# Patient Record
Sex: MALE | Race: WHITE | NOT HISPANIC OR LATINO | Employment: OTHER | URBAN - METROPOLITAN AREA
[De-identification: names, ages, dates, MRNs, and addresses within clinical notes are randomized per-mention and may not be internally consistent; named-entity substitution may affect disease eponyms.]

---

## 2017-01-25 ENCOUNTER — TRANSCRIBE ORDERS (OUTPATIENT)
Dept: ADMINISTRATIVE | Facility: HOSPITAL | Age: 77
End: 2017-01-25

## 2017-01-25 DIAGNOSIS — G95.9 CERVICAL MYELOPATHY (HCC): Primary | ICD-10-CM

## 2017-01-30 ENCOUNTER — APPOINTMENT (OUTPATIENT)
Dept: MRI IMAGING | Facility: HOSPITAL | Age: 77
End: 2017-01-30
Attending: INTERNAL MEDICINE

## 2017-02-03 ENCOUNTER — HOSPITAL ENCOUNTER (OUTPATIENT)
Dept: MRI IMAGING | Facility: HOSPITAL | Age: 77
Discharge: HOME | End: 2017-02-03
Attending: INTERNAL MEDICINE | Admitting: INTERNAL MEDICINE

## 2017-02-03 DIAGNOSIS — G95.9 CERVICAL MYELOPATHY (HCC): ICD-10-CM

## 2017-02-03 PROCEDURE — 0 GADOBENATE DIMEGLUMINE 529 MG/ML SOLUTION: Performed by: INTERNAL MEDICINE

## 2017-02-03 PROCEDURE — 82565 ASSAY OF CREATININE: CPT

## 2017-02-03 PROCEDURE — 72156 MRI NECK SPINE W/O & W/DYE: CPT

## 2017-02-03 PROCEDURE — A9577 INJ MULTIHANCE: HCPCS | Performed by: INTERNAL MEDICINE

## 2017-02-03 RX ADMIN — GADOBENATE DIMEGLUMINE 17 ML: 529 INJECTION, SOLUTION INTRAVENOUS at 07:38

## 2017-02-06 LAB — CREAT BLDA-MCNC: 1 MG/DL (ref 0.6–1.3)

## 2017-07-05 ENCOUNTER — TELEPHONE (OUTPATIENT)
Dept: SURGERY | Facility: CLINIC | Age: 77
End: 2017-07-05

## 2017-07-05 NOTE — TELEPHONE ENCOUNTER
Patient called stating he has an abscess on his face under his right eye and that you have seen him in the past for the same thing.  Asking to be seen today.  I looked in portal and did not find any H&P or Op Notes by you so it may have been some years ago but he has been seen by Dr. Barahona in 2016.  I also told him when your next opening was and that is August and could not guarantee that you would see him because it is on his face and may recommend him seeing a plastic surgeon or proceeding to the ER.  He wanted to know what the ER could do for him and is very reluctant to go.  Do you want to see him or have me call and give him any other recommendations?    I would not feel comfortable opening a wound around the eye.  He might see if Dr Barahona is willing, but he may be reluctant as well.    Korin Melo MD

## 2017-08-03 ENCOUNTER — TRANSCRIBE ORDERS (OUTPATIENT)
Dept: ADMINISTRATIVE | Facility: HOSPITAL | Age: 77
End: 2017-08-03

## 2017-08-03 DIAGNOSIS — L72.9 SKIN CYST: Primary | ICD-10-CM

## 2017-08-09 ENCOUNTER — HOSPITAL ENCOUNTER (OUTPATIENT)
Dept: INFUSION THERAPY | Facility: HOSPITAL | Age: 77
Discharge: HOME | End: 2017-08-09
Attending: PLASTIC SURGERY | Admitting: PLASTIC SURGERY

## 2017-08-09 VITALS
RESPIRATION RATE: 20 BRPM | SYSTOLIC BLOOD PRESSURE: 124 MMHG | OXYGEN SATURATION: 98 % | DIASTOLIC BLOOD PRESSURE: 75 MMHG | TEMPERATURE: 96.3 F | HEART RATE: 70 BPM | WEIGHT: 195 LBS | HEIGHT: 72 IN | BODY MASS INDEX: 26.41 KG/M2

## 2017-08-09 DIAGNOSIS — L72.9 SKIN CYST: Primary | ICD-10-CM

## 2017-08-09 PROCEDURE — 88312 SPECIAL STAINS GROUP 1: CPT | Performed by: PLASTIC SURGERY

## 2017-08-09 PROCEDURE — 88304 TISSUE EXAM BY PATHOLOGIST: CPT | Performed by: PLASTIC SURGERY

## 2017-08-09 RX ORDER — LIDOCAINE HYDROCHLORIDE AND EPINEPHRINE 10; 10 MG/ML; UG/ML
20 INJECTION, SOLUTION INFILTRATION; PERINEURAL ONCE
Status: COMPLETED | OUTPATIENT
Start: 2017-08-09 | End: 2017-08-09

## 2017-08-09 RX ADMIN — LIDOCAINE HYDROCHLORIDE,EPINEPHRINE BITARTRATE 20 ML: 10; .01 INJECTION, SOLUTION INFILTRATION; PERINEURAL at 11:59

## 2017-08-09 NOTE — PROGRESS NOTES
Medications added to the MAR by the ACU nurse were used for procedure by provider.  See provider procedure dictation for details regarding provider's method and timing of administration as well as dosages.    Pt tolerated procedure well.  AVS given with discharge instructions, incisional care, and follow-up information.  Pt D/C per ambulation with cane @ 1958.

## 2017-08-09 NOTE — PATIENT INSTRUCTIONS
MINOR SURGERY DISCHARGE INSTRUCTIONS    IMPORTANT:  The following information will help you return to your best level of health.    Wound Care:  · Leave steri-strips in place till seen by MD next week.    · If incision is near the ear, clean phones.  · If incision is on head or neck, cover your pillow with a towel.  · Allow steri-strips to come off as you bathe/shower.  Steri-strips will likely have a red or brown blood stain on them.  The incision will still be secure.  If steri-strips are present.  · No cosmetics to incision.  · You may use band aids after dressing is removed.  · Avoid stooping and heavy lifting.    You may shower:  Tomorrow  Do not get steri-strips soaking wet.    Apply ice to area 30 minutes on and off for the rest of the day and possibly the next day if helpful.  Elevate area for 24-48 hours to reduce swelling.    If hand:  While walking keep hand higher than your elbow.  When resting keep your hand higher than your     heart.    If incision is on finger or toe:   Do not get dressing wet.  Recommend glove and Glad Press N Seal for showering.    Check the color of skin at the end of the finger or toe.    Medications:   Following this procedure, you should continue to take all other medications as directed by your primary  physician unless otherwise instructed. There have been no changes to the medications you provided  us (with the following  exceptions, if applicable):   You may use Tylenol or Advil for discomfort.    Activity:   You may increase your activity as tolerated.   You may resume normal activity:        Tomorrow   No strenuous activity, lifting or sports:        Today      Call your doctor to make an appointment for: 5 days.    Dr. Trevino  Office # [phone]      Incision Care  An incision is when a surgeon cuts into your body. After surgery, the incision needs to be cared for properly to prevent infection.   HOW TO CARE FOR YOUR INCISION  · Take medicines only as directed  by your health care provider.  · There are many different ways to close and cover an incision, including stitches, skin glue, and adhesive strips. Follow your health care provider's instructions on:    Incision care.    Bandage (dressing) changes and removal.    Incision closure removal.  · Do not take baths, swim, or use a hot tub until your health care provider approves. You may shower as directed by your health care provider.  · Resume your normal diet and activities as directed.  · Use anti-itch medicine (such as an antihistamine) as directed by your health care provider. The incision may itch while it is healing. Do not pick or scratch at the incision.  · Drink enough fluid to keep your urine clear or pale yellow.  SEEK MEDICAL CARE IF:   · You have drainage, redness, swelling, or pain at your incision site.  · You have muscle aches, chills, or a general ill feeling.  · You notice a bad smell coming from the incision or dressing.  · Your incision edges separate after the sutures, staples, or skin adhesive strips have been removed.  · You have persistent nausea or vomiting.  · You have a fever.  · You are dizzy.  SEEK IMMEDIATE MEDICAL CARE IF:   · You have a rash.  · You faint.  · You have difficulty breathing.  MAKE SURE YOU:   · Understand these instructions.  · Will watch your condition.  · Will get help right away if you are not doing well or get worse.     This information is not intended to replace advice given to you by your health care provider. Make sure you discuss any questions you have with your health care provider.     Document Released: 07/07/2006 Document Revised: 01/08/2016 Document Reviewed: 02/11/2015  [email or web address] Interactive Patient Education ©2017 [email or web address]

## 2017-08-11 ENCOUNTER — TELEPHONE (OUTPATIENT)
Dept: INFUSION THERAPY | Facility: HOSPITAL | Age: 77
End: 2017-08-11

## 2017-08-11 LAB
CYTO UR: NORMAL
LAB AP CASE REPORT: NORMAL
LAB AP INTRADEPARTMENTAL CONSULT: NORMAL
Lab: NORMAL
PATH REPORT.FINAL DX SPEC: NORMAL
PATH REPORT.GROSS SPEC: NORMAL

## 2017-08-14 ENCOUNTER — DOCUMENTATION (OUTPATIENT)
Dept: SURGERY | Facility: HOSPITAL | Age: 77
End: 2017-08-14

## 2017-08-14 NOTE — OP NOTE
Lesion Excision Procedure Note    Indications: patient complains of a lesion on the right cheek just below the right lower eyelid, which has been previously infected and now enlarging.    Pre-operative Diagnosis:  Sebaceous cyst right cheek       Post-operative Diagnosis: Post-Op Diagnosis Codes:     Sebaceous cyst right cheek    Surgeon: Ike Trevino MD     Assistants: None    Anesthesia:  Local Anesthesia 1% Xylocaine with 1:100,000 epinephrine    ASA Class: 2    Procedure Details     A Time Out was held and the above information confirmed.    The patient was placed lying supine.  The face was prepped and draped in standard fashion.  One percent lidocaine with epinephrine was used to anesthetize the skin surrounding the lesion.  An oblique elliptical incision 2.5 x 1cm was made around the upper cheek lesion.  The cyst was completely excised.  Hemostasis was achieved with cautery.  Closure was achieved a with layered closure utilizing a 5-0 Vicryl sutures. Steri-Strips were placed on the wound.  At the end of the operation, all sponge, instrument, and needle counts were correct.    Findings:  Sebaceous cyst right cheek    Estimated Blood Loss:  Minimal           Specimens: x 1             Complications:  None; patient tolerated the procedure well.           Disposition: Home           Condition: stable    Attending Attestation: I was present and scrubbed for the entire procedure.    Ike Trevino MD

## 2022-09-23 ENCOUNTER — APPOINTMENT (OUTPATIENT)
Dept: CARDIOLOGY | Facility: HOSPITAL | Age: 82
End: 2022-09-23

## 2022-09-23 ENCOUNTER — APPOINTMENT (OUTPATIENT)
Dept: GENERAL RADIOLOGY | Facility: HOSPITAL | Age: 82
End: 2022-09-23

## 2022-09-23 ENCOUNTER — HOSPITAL ENCOUNTER (INPATIENT)
Facility: HOSPITAL | Age: 82
LOS: 2 days | Discharge: HOME | End: 2022-09-25
Attending: STUDENT IN AN ORGANIZED HEALTH CARE EDUCATION/TRAINING PROGRAM | Admitting: STUDENT IN AN ORGANIZED HEALTH CARE EDUCATION/TRAINING PROGRAM

## 2022-09-23 PROBLEM — E78.5 HLD (HYPERLIPIDEMIA): Status: ACTIVE | Noted: 2022-09-23

## 2022-09-23 PROBLEM — Z86.39 HISTORY OF THYROTOXICOSIS: Status: ACTIVE | Noted: 2022-09-23

## 2022-09-23 PROBLEM — I48.91 NEW ONSET ATRIAL FIBRILLATION: Status: ACTIVE | Noted: 2022-09-23

## 2022-09-23 LAB
ALBUMIN SERPL-MCNC: 3.7 G/DL (ref 3.5–5.2)
ALBUMIN/GLOB SERPL: 1.7 G/DL
ALP SERPL-CCNC: 120 U/L (ref 39–117)
ALT SERPL W P-5'-P-CCNC: 37 U/L (ref 1–41)
ANION GAP SERPL CALCULATED.3IONS-SCNC: 12.5 MMOL/L (ref 5–15)
AORTIC DIMENSIONLESS INDEX: 0.9 (DI)
ASCENDING AORTA: 3 CM
AST SERPL-CCNC: 27 U/L (ref 1–40)
BASOPHILS # BLD AUTO: 0.03 10*3/MM3 (ref 0–0.2)
BASOPHILS NFR BLD AUTO: 0.5 % (ref 0–1.5)
BH CV ECHO MEAS - ACS: 1.58 CM
BH CV ECHO MEAS - AO MEAN PG: 1.88 MMHG
BH CV ECHO MEAS - AO ROOT DIAM: 3.4 CM
BH CV ECHO MEAS - AO V2 VTI: 19.9 CM
BH CV ECHO MEAS - AVA(I,D): 3.3 CM2
BH CV ECHO MEAS - EDV(CUBED): 43.5 ML
BH CV ECHO MEAS - EDV(MOD-SP2): 81 ML
BH CV ECHO MEAS - EDV(MOD-SP4): 80 ML
BH CV ECHO MEAS - EF(MOD-SP2): 61.7 %
BH CV ECHO MEAS - EF(MOD-SP4): 65 %
BH CV ECHO MEAS - ESV(CUBED): 10.6 ML
BH CV ECHO MEAS - ESV(MOD-SP2): 31 ML
BH CV ECHO MEAS - ESV(MOD-SP4): 28 ML
BH CV ECHO MEAS - FS: 37.5 %
BH CV ECHO MEAS - IVS/LVPW: 1.25 CM
BH CV ECHO MEAS - IVSD: 1.7 CM
BH CV ECHO MEAS - LA A2CS (ATRIAL LENGTH): 6.2 CM
BH CV ECHO MEAS - LA A4C LENGTH: 5.6 CM
BH CV ECHO MEAS - LV DIASTOLIC VOL/BSA (35-75): 40.6 CM2
BH CV ECHO MEAS - LV MASS(C)D: 200.7 GRAMS
BH CV ECHO MEAS - LV MEAN PG: 1.51 MMHG
BH CV ECHO MEAS - LV SYSTOLIC VOL/BSA (12-30): 14.2 CM2
BH CV ECHO MEAS - LV V1 VTI: 17.7 CM
BH CV ECHO MEAS - LVIDD: 3.5 CM
BH CV ECHO MEAS - LVIDS: 2.2 CM
BH CV ECHO MEAS - LVOT AREA: 3.7 CM2
BH CV ECHO MEAS - LVOT DIAM: 2.16 CM
BH CV ECHO MEAS - LVPWD: 1.36 CM
BH CV ECHO MEAS - MR MAX PG: 99.8 MMHG
BH CV ECHO MEAS - MR MAX VEL: 499.5 CM/SEC
BH CV ECHO MEAS - MV DEC SLOPE: 756.5 CM/SEC2
BH CV ECHO MEAS - MV DEC TIME: 0.21 MSEC
BH CV ECHO MEAS - MV E MAX VEL: 123.9 CM/SEC
BH CV ECHO MEAS - MV MEAN PG: 3 MMHG
BH CV ECHO MEAS - MV P1/2T: 54.7 MSEC
BH CV ECHO MEAS - MV V2 VTI: 18.4 CM
BH CV ECHO MEAS - MVA(P1/2T): 4 CM2
BH CV ECHO MEAS - MVA(VTI): 3.6 CM2
BH CV ECHO MEAS - PA ACC SLOPE: 61.7 CM/SEC2
BH CV ECHO MEAS - PA ACC TIME: 0.07 SEC
BH CV ECHO MEAS - PA PR(ACCEL): 47.3 MMHG
BH CV ECHO MEAS - PA V2 MAX: 66 CM/SEC
BH CV ECHO MEAS - PI END-D VEL: 81 CM/SEC
BH CV ECHO MEAS - PULM A REVS DUR: 0.12 SEC
BH CV ECHO MEAS - PULM A REVS VEL: 22 CM/SEC
BH CV ECHO MEAS - PULM DIAS VEL: 29.3 CM/SEC
BH CV ECHO MEAS - PULM S/D: 1.46
BH CV ECHO MEAS - PULM SYS VEL: 42.8 CM/SEC
BH CV ECHO MEAS - RAP SYSTOLE: 15 MMHG
BH CV ECHO MEAS - RV MAX PG: 0.97 MMHG
BH CV ECHO MEAS - RV V1 MAX: 49.4 CM/SEC
BH CV ECHO MEAS - RV V1 VTI: 8.6 CM
BH CV ECHO MEAS - RVSP: 38 MMHG
BH CV ECHO MEAS - SI(MOD-SP2): 25.4 ML/M2
BH CV ECHO MEAS - SI(MOD-SP4): 26.4 ML/M2
BH CV ECHO MEAS - SV(LVOT): 65.2 ML
BH CV ECHO MEAS - SV(MOD-SP2): 50 ML
BH CV ECHO MEAS - SV(MOD-SP4): 52 ML
BH CV ECHO MEAS - TAPSE (>1.6): 1.6 CM
BH CV ECHO MEAS - TR MAX PG: 23.6 MMHG
BH CV ECHO MEAS - TR MAX VEL: 243 CM/SEC
BH CV XLRA - RV BASE: 4.5 CM
BH CV XLRA - RV LENGTH: 6.5 CM
BH CV XLRA - RV MID: 3.4 CM
BILIRUB SERPL-MCNC: 1.6 MG/DL (ref 0–1.2)
BUN SERPL-MCNC: 24 MG/DL (ref 8–23)
BUN/CREAT SERPL: 25.5 (ref 7–25)
CALCIUM SPEC-SCNC: 9.3 MG/DL (ref 8.6–10.5)
CHLORIDE SERPL-SCNC: 106 MMOL/L (ref 98–107)
CO2 SERPL-SCNC: 24.5 MMOL/L (ref 22–29)
CREAT SERPL-MCNC: 0.94 MG/DL (ref 0.76–1.27)
DEPRECATED RDW RBC AUTO: 42.5 FL (ref 37–54)
EGFRCR SERPLBLD CKD-EPI 2021: 80.9 ML/MIN/1.73
EOSINOPHIL # BLD AUTO: 0.08 10*3/MM3 (ref 0–0.4)
EOSINOPHIL NFR BLD AUTO: 1.3 % (ref 0.3–6.2)
ERYTHROCYTE [DISTWIDTH] IN BLOOD BY AUTOMATED COUNT: 14 % (ref 12.3–15.4)
GLOBULIN UR ELPH-MCNC: 2.2 GM/DL
GLUCOSE SERPL-MCNC: 72 MG/DL (ref 65–99)
HCT VFR BLD AUTO: 42.2 % (ref 37.5–51)
HGB BLD-MCNC: 13.7 G/DL (ref 13–17.7)
IMM GRANULOCYTES # BLD AUTO: 0.02 10*3/MM3 (ref 0–0.05)
IMM GRANULOCYTES NFR BLD AUTO: 0.3 % (ref 0–0.5)
LV EF 2D ECHO EST: 61 %
LYMPHOCYTES # BLD AUTO: 1.37 10*3/MM3 (ref 0.7–3.1)
LYMPHOCYTES NFR BLD AUTO: 22.5 % (ref 19.6–45.3)
MAGNESIUM SERPL-MCNC: 2.5 MG/DL (ref 1.6–2.4)
MAXIMAL PREDICTED HEART RATE: 138 BPM
MCH RBC QN AUTO: 27.3 PG (ref 26.6–33)
MCHC RBC AUTO-ENTMCNC: 32.5 G/DL (ref 31.5–35.7)
MCV RBC AUTO: 84.1 FL (ref 79–97)
MONOCYTES # BLD AUTO: 0.48 10*3/MM3 (ref 0.1–0.9)
MONOCYTES NFR BLD AUTO: 7.9 % (ref 5–12)
NEUTROPHILS NFR BLD AUTO: 4.1 10*3/MM3 (ref 1.7–7)
NEUTROPHILS NFR BLD AUTO: 67.5 % (ref 42.7–76)
NRBC BLD AUTO-RTO: 0 /100 WBC (ref 0–0.2)
NT-PROBNP SERPL-MCNC: 2037 PG/ML (ref 0–1800)
PHOSPHATE SERPL-MCNC: 3.4 MG/DL (ref 2.5–4.5)
PLATELET # BLD AUTO: 103 10*3/MM3 (ref 140–450)
PMV BLD AUTO: 13 FL (ref 6–12)
POTASSIUM SERPL-SCNC: 4.5 MMOL/L (ref 3.5–5.2)
PROT SERPL-MCNC: 5.9 G/DL (ref 6–8.5)
QT INTERVAL: 342 MS
RBC # BLD AUTO: 5.02 10*6/MM3 (ref 4.14–5.8)
SODIUM SERPL-SCNC: 143 MMOL/L (ref 136–145)
STRESS TARGET HR: 117 BPM
T4 FREE SERPL-MCNC: 1.59 NG/DL (ref 0.93–1.7)
TROPONIN T SERPL-MCNC: <0.01 NG/ML (ref 0–0.03)
TSH SERPL DL<=0.05 MIU/L-ACNC: 2.6 UIU/ML (ref 0.27–4.2)
WBC NRBC COR # BLD: 6.08 10*3/MM3 (ref 3.4–10.8)

## 2022-09-23 PROCEDURE — 93010 ELECTROCARDIOGRAM REPORT: CPT | Performed by: INTERNAL MEDICINE

## 2022-09-23 PROCEDURE — 80053 COMPREHEN METABOLIC PANEL: CPT | Performed by: STUDENT IN AN ORGANIZED HEALTH CARE EDUCATION/TRAINING PROGRAM

## 2022-09-23 PROCEDURE — 93306 TTE W/DOPPLER COMPLETE: CPT

## 2022-09-23 PROCEDURE — 84439 ASSAY OF FREE THYROXINE: CPT | Performed by: STUDENT IN AN ORGANIZED HEALTH CARE EDUCATION/TRAINING PROGRAM

## 2022-09-23 PROCEDURE — 84443 ASSAY THYROID STIM HORMONE: CPT | Performed by: STUDENT IN AN ORGANIZED HEALTH CARE EDUCATION/TRAINING PROGRAM

## 2022-09-23 PROCEDURE — 25010000002 FUROSEMIDE PER 20 MG: Performed by: STUDENT IN AN ORGANIZED HEALTH CARE EDUCATION/TRAINING PROGRAM

## 2022-09-23 PROCEDURE — 99222 1ST HOSP IP/OBS MODERATE 55: CPT | Performed by: NURSE PRACTITIONER

## 2022-09-23 PROCEDURE — 83735 ASSAY OF MAGNESIUM: CPT | Performed by: STUDENT IN AN ORGANIZED HEALTH CARE EDUCATION/TRAINING PROGRAM

## 2022-09-23 PROCEDURE — 85025 COMPLETE CBC W/AUTO DIFF WBC: CPT | Performed by: STUDENT IN AN ORGANIZED HEALTH CARE EDUCATION/TRAINING PROGRAM

## 2022-09-23 PROCEDURE — 84484 ASSAY OF TROPONIN QUANT: CPT | Performed by: STUDENT IN AN ORGANIZED HEALTH CARE EDUCATION/TRAINING PROGRAM

## 2022-09-23 PROCEDURE — 84100 ASSAY OF PHOSPHORUS: CPT | Performed by: STUDENT IN AN ORGANIZED HEALTH CARE EDUCATION/TRAINING PROGRAM

## 2022-09-23 PROCEDURE — 71045 X-RAY EXAM CHEST 1 VIEW: CPT

## 2022-09-23 PROCEDURE — 83880 ASSAY OF NATRIURETIC PEPTIDE: CPT | Performed by: STUDENT IN AN ORGANIZED HEALTH CARE EDUCATION/TRAINING PROGRAM

## 2022-09-23 PROCEDURE — 93306 TTE W/DOPPLER COMPLETE: CPT | Performed by: INTERNAL MEDICINE

## 2022-09-23 PROCEDURE — 93005 ELECTROCARDIOGRAM TRACING: CPT | Performed by: STUDENT IN AN ORGANIZED HEALTH CARE EDUCATION/TRAINING PROGRAM

## 2022-09-23 RX ORDER — FUROSEMIDE 10 MG/ML
20 INJECTION INTRAMUSCULAR; INTRAVENOUS ONCE
Status: COMPLETED | OUTPATIENT
Start: 2022-09-23 | End: 2022-09-23

## 2022-09-23 RX ORDER — NITROGLYCERIN 0.4 MG/1
0.4 TABLET SUBLINGUAL
Status: DISCONTINUED | OUTPATIENT
Start: 2022-09-23 | End: 2022-09-25 | Stop reason: HOSPADM

## 2022-09-23 RX ORDER — MULTIPLE VITAMINS W/ MINERALS TAB 9MG-400MCG
1 TAB ORAL DAILY
Status: DISCONTINUED | OUTPATIENT
Start: 2022-09-24 | End: 2022-09-25 | Stop reason: HOSPADM

## 2022-09-23 RX ORDER — ONDANSETRON 2 MG/ML
4 INJECTION INTRAMUSCULAR; INTRAVENOUS EVERY 6 HOURS PRN
Status: DISCONTINUED | OUTPATIENT
Start: 2022-09-23 | End: 2022-09-25 | Stop reason: HOSPADM

## 2022-09-23 RX ORDER — MULTIPLE VITAMINS W/ MINERALS TAB 9MG-400MCG
1 TAB ORAL DAILY
COMMUNITY

## 2022-09-23 RX ORDER — ACETAMINOPHEN 325 MG/1
650 TABLET ORAL EVERY 4 HOURS PRN
Status: DISCONTINUED | OUTPATIENT
Start: 2022-09-23 | End: 2022-09-25 | Stop reason: HOSPADM

## 2022-09-23 RX ORDER — UREA 10 %
3 LOTION (ML) TOPICAL NIGHTLY PRN
Status: DISCONTINUED | OUTPATIENT
Start: 2022-09-23 | End: 2022-09-25 | Stop reason: HOSPADM

## 2022-09-23 RX ORDER — HEPARIN SODIUM 5000 [USP'U]/ML
40-80 INJECTION, SOLUTION INTRAVENOUS; SUBCUTANEOUS EVERY 6 HOURS PRN
Status: CANCELLED | OUTPATIENT
Start: 2022-09-23

## 2022-09-23 RX ORDER — METHIMAZOLE 5 MG/1
5 TABLET ORAL SEE ADMIN INSTRUCTIONS
Status: DISCONTINUED | OUTPATIENT
Start: 2022-09-24 | End: 2022-09-24

## 2022-09-23 RX ORDER — HEPARIN SODIUM 10000 [USP'U]/100ML
18 INJECTION, SOLUTION INTRAVENOUS
Status: CANCELLED | OUTPATIENT
Start: 2022-09-23

## 2022-09-23 RX ORDER — ONDANSETRON 4 MG/1
4 TABLET, FILM COATED ORAL EVERY 6 HOURS PRN
Status: DISCONTINUED | OUTPATIENT
Start: 2022-09-23 | End: 2022-09-25 | Stop reason: HOSPADM

## 2022-09-23 RX ORDER — MELATONIN
1000 DAILY
Status: DISCONTINUED | OUTPATIENT
Start: 2022-09-24 | End: 2022-09-25 | Stop reason: HOSPADM

## 2022-09-23 RX ORDER — POLYETHYLENE GLYCOL 3350 17 G/17G
8.5 POWDER, FOR SOLUTION ORAL DAILY PRN
COMMUNITY

## 2022-09-23 RX ORDER — FUROSEMIDE 10 MG/ML
40 INJECTION INTRAMUSCULAR; INTRAVENOUS EVERY 12 HOURS
Status: DISCONTINUED | OUTPATIENT
Start: 2022-09-24 | End: 2022-09-25

## 2022-09-23 RX ORDER — METHIMAZOLE 5 MG/1
5 TABLET ORAL
COMMUNITY

## 2022-09-23 RX ORDER — HEPARIN SODIUM 5000 [USP'U]/ML
80 INJECTION, SOLUTION INTRAVENOUS; SUBCUTANEOUS ONCE
Status: CANCELLED | OUTPATIENT
Start: 2022-09-23 | End: 2022-09-23

## 2022-09-23 RX ORDER — MELATONIN
1000 DAILY
COMMUNITY

## 2022-09-23 RX ADMIN — FUROSEMIDE 20 MG: 20 INJECTION, SOLUTION INTRAMUSCULAR; INTRAVENOUS at 16:12

## 2022-09-23 RX ADMIN — METOPROLOL TARTRATE 25 MG: 25 TABLET, FILM COATED ORAL at 22:37

## 2022-09-24 PROBLEM — I50.30 (HFPEF) HEART FAILURE WITH PRESERVED EJECTION FRACTION: Status: ACTIVE | Noted: 2022-09-24

## 2022-09-24 PROBLEM — I48.19 ATRIAL FIBRILLATION, PERSISTENT: Status: ACTIVE | Noted: 2022-09-24

## 2022-09-24 LAB
ANION GAP SERPL CALCULATED.3IONS-SCNC: 9.3 MMOL/L (ref 5–15)
BUN SERPL-MCNC: 27 MG/DL (ref 8–23)
BUN/CREAT SERPL: 19.9 (ref 7–25)
CALCIUM SPEC-SCNC: 9.7 MG/DL (ref 8.6–10.5)
CHLORIDE SERPL-SCNC: 103 MMOL/L (ref 98–107)
CO2 SERPL-SCNC: 33.7 MMOL/L (ref 22–29)
CREAT SERPL-MCNC: 1.36 MG/DL (ref 0.76–1.27)
DEPRECATED RDW RBC AUTO: 45.4 FL (ref 37–54)
EGFRCR SERPLBLD CKD-EPI 2021: 52 ML/MIN/1.73
ERYTHROCYTE [DISTWIDTH] IN BLOOD BY AUTOMATED COUNT: 14.3 % (ref 12.3–15.4)
GLUCOSE SERPL-MCNC: 114 MG/DL (ref 65–99)
HCT VFR BLD AUTO: 46.4 % (ref 37.5–51)
HGB BLD-MCNC: 14.6 G/DL (ref 13–17.7)
MAGNESIUM SERPL-MCNC: 2.4 MG/DL (ref 1.6–2.4)
MCH RBC QN AUTO: 27.1 PG (ref 26.6–33)
MCHC RBC AUTO-ENTMCNC: 31.5 G/DL (ref 31.5–35.7)
MCV RBC AUTO: 86.2 FL (ref 79–97)
PHOSPHATE SERPL-MCNC: 4.5 MG/DL (ref 2.5–4.5)
PLATELET # BLD AUTO: 140 10*3/MM3 (ref 140–450)
PMV BLD AUTO: 12.7 FL (ref 6–12)
POTASSIUM SERPL-SCNC: 4.1 MMOL/L (ref 3.5–5.2)
RBC # BLD AUTO: 5.38 10*6/MM3 (ref 4.14–5.8)
SODIUM SERPL-SCNC: 146 MMOL/L (ref 136–145)
WBC NRBC COR # BLD: 8.24 10*3/MM3 (ref 3.4–10.8)

## 2022-09-24 PROCEDURE — 83735 ASSAY OF MAGNESIUM: CPT | Performed by: STUDENT IN AN ORGANIZED HEALTH CARE EDUCATION/TRAINING PROGRAM

## 2022-09-24 PROCEDURE — 25010000002 FUROSEMIDE PER 20 MG: Performed by: NURSE PRACTITIONER

## 2022-09-24 PROCEDURE — 85027 COMPLETE CBC AUTOMATED: CPT | Performed by: STUDENT IN AN ORGANIZED HEALTH CARE EDUCATION/TRAINING PROGRAM

## 2022-09-24 PROCEDURE — 80048 BASIC METABOLIC PNL TOTAL CA: CPT | Performed by: STUDENT IN AN ORGANIZED HEALTH CARE EDUCATION/TRAINING PROGRAM

## 2022-09-24 PROCEDURE — 99232 SBSQ HOSP IP/OBS MODERATE 35: CPT | Performed by: INTERNAL MEDICINE

## 2022-09-24 PROCEDURE — 84100 ASSAY OF PHOSPHORUS: CPT | Performed by: STUDENT IN AN ORGANIZED HEALTH CARE EDUCATION/TRAINING PROGRAM

## 2022-09-24 RX ORDER — METHIMAZOLE 5 MG/1
5 TABLET ORAL
Status: DISCONTINUED | OUTPATIENT
Start: 2022-09-24 | End: 2022-09-25 | Stop reason: HOSPADM

## 2022-09-24 RX ORDER — POLYETHYLENE GLYCOL 3350 17 G/17G
8.5 POWDER, FOR SOLUTION ORAL DAILY PRN
Status: DISCONTINUED | OUTPATIENT
Start: 2022-09-24 | End: 2022-09-25 | Stop reason: HOSPADM

## 2022-09-24 RX ADMIN — Medication 1000 UNITS: at 08:06

## 2022-09-24 RX ADMIN — MULTIPLE VITAMINS W/ MINERALS TAB 1 TABLET: TAB at 08:06

## 2022-09-24 RX ADMIN — FUROSEMIDE 40 MG: 10 INJECTION, SOLUTION INTRAMUSCULAR; INTRAVENOUS at 05:11

## 2022-09-24 RX ADMIN — METHIMAZOLE 5 MG: 5 TABLET ORAL at 20:24

## 2022-09-24 RX ADMIN — METOPROLOL TARTRATE 25 MG: 25 TABLET, FILM COATED ORAL at 20:23

## 2022-09-24 RX ADMIN — METOPROLOL TARTRATE 25 MG: 25 TABLET, FILM COATED ORAL at 08:06

## 2022-09-24 RX ADMIN — APIXABAN 5 MG: 5 TABLET, FILM COATED ORAL at 20:23

## 2022-09-24 RX ADMIN — APIXABAN 5 MG: 5 TABLET, FILM COATED ORAL at 08:06

## 2022-09-24 RX ADMIN — FUROSEMIDE 40 MG: 10 INJECTION, SOLUTION INTRAMUSCULAR; INTRAVENOUS at 17:02

## 2022-09-25 ENCOUNTER — READMISSION MANAGEMENT (OUTPATIENT)
Dept: CALL CENTER | Facility: HOSPITAL | Age: 82
End: 2022-09-25

## 2022-09-25 VITALS
SYSTOLIC BLOOD PRESSURE: 111 MMHG | WEIGHT: 146 LBS | BODY MASS INDEX: 20.44 KG/M2 | HEART RATE: 85 BPM | RESPIRATION RATE: 16 BRPM | DIASTOLIC BLOOD PRESSURE: 77 MMHG | HEIGHT: 71 IN | OXYGEN SATURATION: 95 % | TEMPERATURE: 98 F

## 2022-09-25 LAB
ANION GAP SERPL CALCULATED.3IONS-SCNC: 6.2 MMOL/L (ref 5–15)
BUN SERPL-MCNC: 30 MG/DL (ref 8–23)
BUN/CREAT SERPL: 21.9 (ref 7–25)
CALCIUM SPEC-SCNC: 9.1 MG/DL (ref 8.6–10.5)
CHLORIDE SERPL-SCNC: 104 MMOL/L (ref 98–107)
CO2 SERPL-SCNC: 36.8 MMOL/L (ref 22–29)
CREAT SERPL-MCNC: 1.37 MG/DL (ref 0.76–1.27)
DEPRECATED RDW RBC AUTO: 43.5 FL (ref 37–54)
EGFRCR SERPLBLD CKD-EPI 2021: 51.5 ML/MIN/1.73
ERYTHROCYTE [DISTWIDTH] IN BLOOD BY AUTOMATED COUNT: 14.3 % (ref 12.3–15.4)
GLUCOSE SERPL-MCNC: 92 MG/DL (ref 65–99)
HCT VFR BLD AUTO: 42.8 % (ref 37.5–51)
HGB BLD-MCNC: 14 G/DL (ref 13–17.7)
MAGNESIUM SERPL-MCNC: 2.3 MG/DL (ref 1.6–2.4)
MCH RBC QN AUTO: 27.2 PG (ref 26.6–33)
MCHC RBC AUTO-ENTMCNC: 32.7 G/DL (ref 31.5–35.7)
MCV RBC AUTO: 83.1 FL (ref 79–97)
PHOSPHATE SERPL-MCNC: 5.4 MG/DL (ref 2.5–4.5)
PLATELET # BLD AUTO: 142 10*3/MM3 (ref 140–450)
PMV BLD AUTO: 12.2 FL (ref 6–12)
POTASSIUM SERPL-SCNC: 4 MMOL/L (ref 3.5–5.2)
RBC # BLD AUTO: 5.15 10*6/MM3 (ref 4.14–5.8)
SODIUM SERPL-SCNC: 147 MMOL/L (ref 136–145)
WBC NRBC COR # BLD: 7.6 10*3/MM3 (ref 3.4–10.8)

## 2022-09-25 PROCEDURE — 83735 ASSAY OF MAGNESIUM: CPT | Performed by: STUDENT IN AN ORGANIZED HEALTH CARE EDUCATION/TRAINING PROGRAM

## 2022-09-25 PROCEDURE — 80048 BASIC METABOLIC PNL TOTAL CA: CPT | Performed by: STUDENT IN AN ORGANIZED HEALTH CARE EDUCATION/TRAINING PROGRAM

## 2022-09-25 PROCEDURE — 84100 ASSAY OF PHOSPHORUS: CPT | Performed by: STUDENT IN AN ORGANIZED HEALTH CARE EDUCATION/TRAINING PROGRAM

## 2022-09-25 PROCEDURE — 85027 COMPLETE CBC AUTOMATED: CPT | Performed by: STUDENT IN AN ORGANIZED HEALTH CARE EDUCATION/TRAINING PROGRAM

## 2022-09-25 PROCEDURE — 99232 SBSQ HOSP IP/OBS MODERATE 35: CPT | Performed by: INTERNAL MEDICINE

## 2022-09-25 PROCEDURE — 25010000002 FUROSEMIDE PER 20 MG: Performed by: NURSE PRACTITIONER

## 2022-09-25 RX ORDER — FUROSEMIDE 40 MG/1
40 TABLET ORAL 3 TIMES WEEKLY
Qty: 12 TABLET | Refills: 0
Start: 2022-09-26 | End: 2022-11-28 | Stop reason: SDUPTHER

## 2022-09-25 RX ORDER — FUROSEMIDE 40 MG/1
40 TABLET ORAL DAILY
Status: DISCONTINUED | OUTPATIENT
Start: 2022-09-26 | End: 2022-09-25 | Stop reason: HOSPADM

## 2022-09-25 RX ORDER — FUROSEMIDE 40 MG/1
40 TABLET ORAL DAILY
Qty: 30 TABLET | Refills: 0 | Status: SHIPPED | OUTPATIENT
Start: 2022-09-26 | End: 2022-09-25 | Stop reason: SDUPTHER

## 2022-09-25 RX ADMIN — FUROSEMIDE 40 MG: 10 INJECTION, SOLUTION INTRAMUSCULAR; INTRAVENOUS at 05:00

## 2022-09-25 RX ADMIN — APIXABAN 5 MG: 5 TABLET, FILM COATED ORAL at 08:27

## 2022-09-25 RX ADMIN — Medication 1000 UNITS: at 08:27

## 2022-09-25 RX ADMIN — METOPROLOL TARTRATE 25 MG: 25 TABLET, FILM COATED ORAL at 08:27

## 2022-09-25 RX ADMIN — MULTIPLE VITAMINS W/ MINERALS TAB 1 TABLET: TAB at 08:27

## 2022-09-26 NOTE — OUTREACH NOTE
Prep Survey    Flowsheet Row Responses   [facility] patient discharged from? [place]   Is LACE score < 7 ? No   Emergency Room discharge w/ pulse ox? No   Eligibility Readm Mgmt   Discharge diagnosis CHF   Does the patient have one of the following disease processes/diagnoses(primary or secondary)? CHF   Does the patient have Home health ordered? No   Is there a DME ordered? No   Medication alerts for this patient see AVS   Prep survey completed? Yes          JEANETH JAMES - Registered Nurse

## 2022-09-28 ENCOUNTER — READMISSION MANAGEMENT (OUTPATIENT)
Dept: CALL CENTER | Facility: HOSPITAL | Age: 82
End: 2022-09-28

## 2022-09-28 NOTE — OUTREACH NOTE
CHF Week 1 Survey    Flowsheet Row Responses   [facility] patient discharged from? [place]   Does the patient have one of the following disease processes/diagnoses(primary or secondary)? CHF   CHF Week 1 attempt successful? No   Unsuccessful attempts Attempt 1          TANISHA ZAMARRIPA - Licensed Nurse

## 2022-10-04 ENCOUNTER — READMISSION MANAGEMENT (OUTPATIENT)
Dept: CALL CENTER | Facility: HOSPITAL | Age: 82
End: 2022-10-04

## 2022-10-04 NOTE — OUTREACH NOTE
CHF Week 2 Survey    Flowsheet Row Responses   [facility] patient discharged from? [place]   Does the patient have one of the following disease processes/diagnoses(primary or secondary)? CHF   Week 2 attempt successful? Yes   Call start time 1012   Revoke Decline to participate   Call end time 1013   Discharge diagnosis CHF   Call end time 1013          HARVEY JAMES - Registered Nurse

## 2022-10-10 ENCOUNTER — OFFICE VISIT (OUTPATIENT)
Dept: CARDIOLOGY | Facility: CLINIC | Age: 82
End: 2022-10-10

## 2022-10-10 VITALS
SYSTOLIC BLOOD PRESSURE: 120 MMHG | BODY MASS INDEX: 21.14 KG/M2 | HEIGHT: 71 IN | HEART RATE: 73 BPM | WEIGHT: 151 LBS | DIASTOLIC BLOOD PRESSURE: 80 MMHG

## 2022-10-10 DIAGNOSIS — I27.20 PULMONARY HYPERTENSION: ICD-10-CM

## 2022-10-10 DIAGNOSIS — E87.0 ACUTE HYPERNATREMIA: ICD-10-CM

## 2022-10-10 DIAGNOSIS — I48.19 ATRIAL FIBRILLATION, PERSISTENT: Primary | ICD-10-CM

## 2022-10-10 DIAGNOSIS — I50.31 ACUTE HEART FAILURE WITH PRESERVED EJECTION FRACTION: ICD-10-CM

## 2022-10-10 DIAGNOSIS — E07.9 THYROID DISEASE: ICD-10-CM

## 2022-10-10 PROCEDURE — 99214 OFFICE O/P EST MOD 30 MIN: CPT | Performed by: NURSE PRACTITIONER

## 2022-10-10 PROCEDURE — 93000 ELECTROCARDIOGRAM COMPLETE: CPT | Performed by: NURSE PRACTITIONER

## 2022-10-10 NOTE — SIGNIFICANT NOTE
10/10/22 1017   [id number] Score for Atrial Fibrillation Stroke Risk   Age in Years 75+   Sex Male   CHF History Y   Hypertension History N   Stroke/TIA/Thromboembolism History N   Vascular Disease History N   Diabetes History N   [id number] Score 3

## 2022-10-10 NOTE — PROGRESS NOTES
Date of Office Visit: 10/10/2022  Encounter Provider: CHARLEE Lopez  Place of Service: [facility] CARDIOLOGY  Patient Name: Mehreen Silva IV  :1940  Primary Cardiologist: Dr. Huang Chan    Chief Complaint   Patient presents with   • Atrial Fibrillation   • Cardiomyopathy   • Hospital Follow Up Visit   :     HPI: Mehreen Silva IV is a pleasant 82 y.o. male who presents today for hospital follow-up visit for heart failure and atrial fibrillation.  He is a new patient to me and I reviewed his medical records.    He has been a relatively healthy 82-year-old gentleman.  He has been diagnosed with arthritis, thyrotoxicosis, hyperlipidemia, and cervical spondylosis with several back surgeries in the past.  His mobility is significantly impaired and is often in a wheelchair or ambulates with a walker.    In 2022, he presented to his PCP for symptoms of lower extremity edema, orthopnea and weight gain of 16 pounds.  He denied shortness of breath.  Upon arrival, he was in atrial fibrillation with heart rates in the 80s.  He denied any history of cardiovascular disease.  Echocardiogram showed EF 61%, moderate LVH, diastolic function indeterminate, right ventricle cavity moderately dilated with moderately reduced right systolic function, left atrial volume mildly increased, right atrium mild to moderately dilated, aortic valve sclerosis, moderate mitral valve calcification, trace to mild MR, trace to mild TR, and mild pulmonary hypertension.  He underwent significant diuresis with resolution of lower extremity edema and hypoxia.  His serum sodium elevated due to mild intravascular volume depletion.  He was transitioned from IV furosemide to oral furosemide.  He was discharged on apixaban and metoprolol.    He presents today for a follow-up visit with his wife accompanying him.  He is in a wheelchair today, and ambulates at home with a walker.  His wife informs me that they  saw Dr. Hines last week and he had blood work completed.  She has been concerned about his weight loss and he has been recommended to start taking furosemide 20 mg on Monday, , and .  His heart failure symptom before was orthopnea and they both deny that symptoms today.  He denies chest pain, shortness of air, palpitations, edema, dizziness, syncope, or bleeding.      Past Medical History:   Diagnosis Date   • (HFpEF) heart failure with preserved ejection fraction (HCC) 2022   • Arthritis    • Atrial fibrillation, persistent (HCC) 2022   • Cervical spondylosis with myelopathy    • HLD (hyperlipidemia) 2022   • Hx of toxic multinodular goiter 2022   • Lumbar radiculopathy    • Pulmonary hypertension (HCC)        Past Surgical History:   Procedure Laterality Date   • APPENDECTOMY     • BACK SURGERY     • COLONOSCOPY     • EXCISION MASS TRUNK N/A 2016    Procedure: Excision soft tissue neoplasm on abdominal wall and left neck;  Surgeon: Shaji Barahona Jr., MD;  Location: [facility];  Service:    • KNEE SURGERY     • NECK SURGERY     • SHOULDER SURGERY         Social History     Socioeconomic History   • Marital status:    • Number of children: 1   • Highest education level: Professional school degree (e.g., MD, DDS, DVM, BRITNEY)   Tobacco Use   • Smoking status: Former     Types: Cigarettes     Quit date: 1983     Years since quittin.3   • Smokeless tobacco: Never   Substance and Sexual Activity   • Alcohol use: Yes     Comment: SOCIAL   • Drug use: No   • Sexual activity: Defer       Family History   Problem Relation Age of Onset   • Cancer Mother    • Heart disease Father    • Aneurysm Father        The following portion of the patient's history were reviewed and updated as appropriate: past medical history, past surgical history, past social history, past family history, allergies, current medications, and problem list.    Review of Systems  "  Constitutional: Positive for weight loss.   Cardiovascular: Negative.    Respiratory: Negative.    Hematologic/Lymphatic: Negative.    Neurological: Negative.        No Known Allergies      Current Outpatient Medications:   •  apixaban (ELIQUIS) 5 MG tablet tablet, Take 1 tablet by mouth Every 12 (Twelve) Hours for 60 days. Indications: Atrial Fibrillation, Disp: 60 tablet, Rfl: 1  •  cholecalciferol (VITAMIN D3) 25 MCG (1000 UT) tablet, Take 1,000 Units by mouth Daily., Disp: , Rfl:   •  ezetimibe (ZETIA) 10 MG tablet, Take 10 mg by mouth daily., Disp: , Rfl:   •  furosemide (LASIX) 40 MG tablet, Take 1 tablet by mouth 3 (Three) Times a Week for 30 days., Disp: 12 tablet, Rfl: 0  •  methIMAzole (TAPAZOLE) 5 MG tablet, Take 5 mg by mouth. Takes 5 days - No Wednesday or sundays, Disp: , Rfl:   •  metoprolol tartrate (LOPRESSOR) 25 MG tablet, Take 1 tablet by mouth Every 12 (Twelve) Hours for 60 days., Disp: 60 tablet, Rfl: 1  •  multivitamin with minerals tablet tablet, Take 1 tablet by mouth Daily., Disp: , Rfl:   •  polyethylene glycol (MIRALAX) 17 g packet, Take 8.5 g by mouth Daily As Needed. Takes every 3 days 1/2 cap, Disp: , Rfl:         Objective:     Vitals:    10/10/22 0936   BP: 120/80   Pulse: 73   Weight: 68.5 kg (151 lb)   Height: 180.3 cm (71\")     Body mass index is 21.06 kg/m².    PHYSICAL EXAM:    Vitals Reviewed.   General Appearance: No acute distress, well developed and well nourished.  Thin.  Eyes: Conjunctiva and lids: No erythema, swelling, or discharge. Sclera non-icteric.   HENT: Atraumatic, normocephalic. External eyes, ears, and nose normal. No hearing loss noted. Mucous membranes normal. Lips not cyanotic. Neck supple with no tenderness. Wearing mask.   Respiratory: No signs of respiratory distress. Respiration rhythm and depth normal.   Clear to auscultation. No rales, crackles, rhonchi, or wheezing auscultated.   Cardiovascular:  Jugular Venous Pressure: Normal  Heart Rate and Rhythm: " Irregular, irregular.  Heart Sounds: Normal S1 and S2. No S3 or S4 noted.  Murmurs: No murmurs noted. No rubs, thrills, or gallops.   Lower Extremities: No edema noted.  Gastrointestinal:  Abdomen soft, non-distended, non-tender.    Musculoskeletal: Normal movement of extremities.  Skin and Nails: General appearance normal. No pallor, cyanosis, diaphoresis. Skin temperature normal. No clubbing of fingernails.   Psychiatric: Patient alert and oriented to person, place, and time. Speech and behavior appropriate. Normal mood and affect.       ECG 12 Lead    Date/Time: 10/10/2022 9:40 AM  Performed by: Clarita Armenta APRN  Authorized by: Clarita Armenta APRN   Comparison: compared with previous ECG from 9/23/2022  Similar to previous ECG  Rhythm: atrial fibrillation  Rate: normal  BPM: 73  Conduction: conduction normal  ST Segments: ST segments normal  T Waves: T waves normal  QRS axis: normal    Clinical impression: abnormal EKG              Assessment:       Diagnosis Plan   1. Atrial fibrillation, persistent (HCC)  Holter Monitor - 72 Hour Up To 15 Days    ECG 12 Lead      2. Acute heart failure with preserved ejection fraction (HCC)        3. Thyroid disease        4. Acute hypernatremia        5. Pulmonary hypertension (HCC)               Plan:       1.  Persistent Atrial Fibrillation: Rate controlled on current dose of metoprolol.  He has been compliant with the apixaban and denies bleeding.  I recommended a 14-day Holter monitor to assess if this is persistent versus paroxysmal.  His right atrium was mild to moderately dilated on echocardiogram.  He may be a possible candidate for cardioversion in the future.    2.  Acute Heart Failure with preserved EF: He was experiencing orthopnea and this has resolved.  His PCP reduced his furosemide to 20 mg on Monday, Wednesday, and Friday starting today.  I asked them to assess for any fluid retention symptoms such as orthopnea, shortness of breath, lower extremity  edema, or weight gain.    3.  Thyroid Disease: His recent TSH and free T4 were normal in the hospital.  His wife is concerned that the atrial fibrillation is related to his thyroid disease.  They will plan to follow-up with Dr. Bess Jackson.    4.  Hypernatremia: I requested his recent blood work from his PCP office.    5.  Mild pulmonary hypertension noted per recent echocardiogram. Sleep apnea?    6.  He will follow-up in 1 month with Dr. Chan for consideration of cardioversion.    ADDENDUM 10/10/2022:  · I reviewed blood work 10/3/2022.  · CMP normal except for BUN of 31 and total bilirubin 1.7.  Sodium 141, potassium 4.8, and creatinine 1.16.  · TSH and free T4 both normal.  · Hemoglobin 14.8 and hematocrit 47.1.    As always, it has been a pleasure to participate in your patient's care. Thank you.       Sincerely,         CHARLEE Jackson  [facility] Cardiology      · Dictated utilizing Dragon Dictation  · COVID-19 Precautions - Patient was compliant in wearing a mask. When I saw the patient, I used appropriate personal protective equipment (PPE) including mask and eye shield (standard procedure).  Additionally, I used gown and gloves if indicated.  Hand hygiene was completed before and after seeing the patient.  · I spent 30 minutes reviewing her medical records/testing/previous office notes/labs, face-to-face interaction with patient, physical examination, formulating the plan of care, and discussion of plan of care with patient.

## 2022-11-03 ENCOUNTER — OFFICE VISIT (OUTPATIENT)
Dept: CARDIOLOGY | Facility: CLINIC | Age: 82
End: 2022-11-03

## 2022-11-03 VITALS
HEART RATE: 72 BPM | BODY MASS INDEX: 22.68 KG/M2 | HEIGHT: 71 IN | WEIGHT: 162 LBS | SYSTOLIC BLOOD PRESSURE: 106 MMHG | DIASTOLIC BLOOD PRESSURE: 66 MMHG

## 2022-11-03 DIAGNOSIS — I48.19 ATRIAL FIBRILLATION, PERSISTENT: Primary | ICD-10-CM

## 2022-11-03 DIAGNOSIS — I50.32 CHRONIC HEART FAILURE WITH PRESERVED EJECTION FRACTION: ICD-10-CM

## 2022-11-03 PROCEDURE — 93000 ELECTROCARDIOGRAM COMPLETE: CPT | Performed by: INTERNAL MEDICINE

## 2022-11-03 PROCEDURE — 99213 OFFICE O/P EST LOW 20 MIN: CPT | Performed by: INTERNAL MEDICINE

## 2022-11-03 NOTE — PROGRESS NOTES
Date of Office Visit: 22  Encounter Provider: Huang Chan MD  Place of Service: [facility] CARDIOLOGY  Patient Name: Mehreen Silva IV  :1940    Chief Complaint   Patient presents with   • Atrial Fibrillation   :     HPI:     Mr. Silva is 82 y.o. and presents today to follow up.     He presented in 2022 with dyspnea; he was found to have acute diastolic CHF and atrial fibrillation. An echo revealed normal LVSF (60%), moderate LVH, bi-atrial dilation, and very mild PHTN. He was started on metoprolol, apixaban, and furosemide.     He's doing well. He's limited in his mobility but he's still working. His son accompanies him today. He has no orthopnea, dyspnea, PND, chest pain, palpitations, or lightheadedness. He has mild pedal edema. A 14-day rhythm monitor resulted yesterday; it shows an average HR of 80 bpm.     Past Medical History:   Diagnosis Date   • (HFpEF) heart failure with preserved ejection fraction (HCC) 2022   • Arthritis    • Atrial fibrillation, persistent (HCC) 2022   • Cervical spondylosis with myelopathy    • HLD (hyperlipidemia) 2022   • Hx of toxic multinodular goiter 2022   • Lumbar radiculopathy    • Pulmonary hypertension (HCC)        Past Surgical History:   Procedure Laterality Date   • APPENDECTOMY     • BACK SURGERY     • COLONOSCOPY     • EXCISION MASS TRUNK N/A 2016    Procedure: Excision soft tissue neoplasm on abdominal wall and left neck;  Surgeon: Shaji Barahona Jr., MD;  Location: [facility];  Service:    • KNEE SURGERY     • NECK SURGERY     • SHOULDER SURGERY         Social History     Socioeconomic History   • Marital status:    • Number of children: 1   • Highest education level: Professional school degree (e.g., MD, DDS, DVM, BRITNEY)   Tobacco Use   • Smoking status: Former     Types: Cigarettes     Quit date: 1983     Years since quittin.4   • Smokeless tobacco: Never   Vaping Use  "  • Vaping Use: Never used   Substance and Sexual Activity   • Alcohol use: Yes     Comment: SOCIAL   • Drug use: No   • Sexual activity: Defer       Family History   Problem Relation Age of Onset   • Cancer Mother    • Heart disease Father    • Aneurysm Father        Review of Systems   Constitutional: Positive for malaise/fatigue.   Musculoskeletal: Positive for muscle weakness.   Neurological: Positive for weakness.   Psychiatric/Behavioral: Positive for depression.       No Known Allergies      Current Outpatient Medications:   •  apixaban (ELIQUIS) 5 MG tablet tablet, Take 1 tablet by mouth Every 12 (Twelve) Hours for 60 days. Indications: Atrial Fibrillation, Disp: 60 tablet, Rfl: 1  •  cholecalciferol (VITAMIN D3) 25 MCG (1000 UT) tablet, Take 1,000 Units by mouth Daily., Disp: , Rfl:   •  ezetimibe (ZETIA) 10 MG tablet, Take 10 mg by mouth daily., Disp: , Rfl:   •  metoprolol tartrate (LOPRESSOR) 25 MG tablet, Take 1 tablet by mouth Every 12 (Twelve) Hours for 60 days., Disp: 60 tablet, Rfl: 1  •  multivitamin with minerals tablet tablet, Take 1 tablet by mouth Daily., Disp: , Rfl:   •  polyethylene glycol (MIRALAX) 17 g packet, Take 8.5 g by mouth Daily As Needed. Takes every 3 days 1/2 cap, Disp: , Rfl:   •  furosemide (LASIX) 40 MG tablet, Take 1 tablet by mouth 3 (Three) Times a Week for 30 days., Disp: 12 tablet, Rfl: 0  •  methIMAzole (TAPAZOLE) 5 MG tablet, Take 5 mg by mouth. Takes 5 days - No Wednesday or sundays, Disp: , Rfl:       Objective:     Vitals:    11/03/22 0939   BP: 106/66   BP Location: Right arm   Pulse: 72   Weight: 73.5 kg (162 lb)   Height: 180.3 cm (71\")     Body mass index is 22.59 kg/m².    Vitals reviewed.   Constitutional:       Appearance: Well-developed and not in distress.      Comments: frail   Eyes:      Conjunctiva/sclera: Conjunctivae normal.   HENT:      Head: Normocephalic.      Nose: Nose normal.   Neck:      Vascular: No JVD. JVD normal.      Lymphadenopathy: No " cervical adenopathy.   Pulmonary:      Effort: Pulmonary effort is normal.      Breath sounds: Normal breath sounds.   Cardiovascular:      Normal rate. Irregular rhythm.      Murmurs: There is no murmur.   Pulses:     Intact distal pulses.   Edema:     Peripheral edema absent.   Abdominal:      Palpations: Abdomen is soft.      Tenderness: There is no abdominal tenderness.   Musculoskeletal: Normal range of motion.      Cervical back: Normal range of motion. Skin:     General: Skin is warm and dry.      Findings: No rash.   Neurological:      General: No focal deficit present.      Mental Status: Alert and oriented to person, place, and time.      Cranial Nerves: No cranial nerve deficit.   Psychiatric:         Behavior: Behavior normal.         Thought Content: Thought content normal.         Judgment: Judgment normal.           ECG 12 Lead    Date/Time: 11/3/2022 9:49 AM  Performed by: Huang Chan MD  Authorized by: Huang Chan MD   Comparison: compared with previous ECG   Similar to previous ECG  Rhythm: atrial fibrillation  Conduction: conduction normal  ST Segments: ST segments normal  T Waves: T waves normal  QRS axis: normal  Other findings: poor R wave progression    Clinical impression: abnormal EKG            Assessment:       Diagnosis Plan   1. Atrial fibrillation, persistent (HCC)        2. Chronic heart failure with preserved ejection fraction (HCC)            Plan:       Mr Silva has persistent (likely permanent) atrial fibrillation. He has significant atrial dilation. I feel that the likelihood of successful rhythm control is very small and that we should proceed with rate control. He will remain on metoprolol and apixaban. He will remain on furosemide every other day. I asked him to call if he has worsening edema, or orthopnea or any other worrisome symptom.     He can see us on a yearly basis.     Sincerely,       Huang Chan MD

## 2022-11-28 RX ORDER — FUROSEMIDE 40 MG/1
40 TABLET ORAL 3 TIMES WEEKLY
Qty: 45 TABLET | Refills: 1 | Status: SHIPPED | OUTPATIENT
Start: 2022-11-28

## 2022-12-06 ENCOUNTER — TELEPHONE (OUTPATIENT)
Dept: CARDIOLOGY | Facility: CLINIC | Age: 82
End: 2022-12-06

## 2022-12-06 NOTE — TELEPHONE ENCOUNTER
Called and informed pt, he is aware of the potential risk while holding his medication prior to dental procedure.

## 2022-12-06 NOTE — TELEPHONE ENCOUNTER
He is acceptable risk to proceed with the procedure.  He may hold the apixaban 1 full day before the procedure and start as soon as possible postprocedure.  When holding the apixaban, he is at potential risk of stroke with persistent atrial fibrillation.  Please make sure he is aware of that.  If he has any strokelike symptoms, he needs to present to the ED immediately.  Thank you.

## 2022-12-06 NOTE — TELEPHONE ENCOUNTER
Dr. Chan Pt:  Out of office: [facility]   EST APC: SHALONDA STERN  [phone]  Please advise.        Pt called, he is scheduled to have a tooth extraction on Thurs.  Pt is on Eliquis 5 mg bid and will need instruction prior to extraction.

## 2023-04-03 ENCOUNTER — HOSPITAL ENCOUNTER (OUTPATIENT)
Dept: GENERAL RADIOLOGY | Facility: HOSPITAL | Age: 83
Discharge: HOME | End: 2023-04-03
Admitting: INTERNAL MEDICINE
Payer: MEDICARE

## 2023-04-03 ENCOUNTER — TRANSCRIBE ORDERS (OUTPATIENT)
Dept: ADMINISTRATIVE | Facility: HOSPITAL | Age: 83
End: 2023-04-03
Payer: MEDICARE

## 2023-04-03 DIAGNOSIS — M79.671 PAIN IN RIGHT FOOT: Primary | ICD-10-CM

## 2023-04-03 DIAGNOSIS — M79.671 PAIN IN RIGHT FOOT: ICD-10-CM

## 2023-04-03 PROCEDURE — 73630 X-RAY EXAM OF FOOT: CPT

## 2023-05-10 ENCOUNTER — TELEPHONE (OUTPATIENT)
Dept: CARDIOLOGY | Facility: CLINIC | Age: 83
End: 2023-05-10

## 2023-05-10 NOTE — TELEPHONE ENCOUNTER
Caller: Mehreen Silva IV    Relationship: Self    Best call back number: [phone]    What is the best time to reach you: ANYTIME      What was the call regarding: PATIENT HAS QUESTIONS AND WANT TO KNOW IF HE SHOULD COME IN FOR AN APPOINTMENT. PLEASE REACH OUT TO PATIENT.     Do you require a callback: YES

## 2023-05-17 NOTE — PROGRESS NOTES
RM:________     PCP: System, Provider Not In    : 1940  AGE: 83 y.o.  EST PATIENT   REASON FOR VISIT/  CC:    BP Readings from Last 3 Encounters:   05/15/23 116/59   23 122/65   23 128/74        WT: ____________ BP: __________L __________R HR______    CHEST PAIN: _____________    SOA: _____________PALPS: _______________     LIGHTHEADED: ___________FATIGUE: ________________ EDEMA __________    ALLERGIES:Patient has no known allergies. SMOKING HISTORY:  Social History     Tobacco Use   • Smoking status: Former     Types: Cigarettes     Quit date: 1983     Years since quittin.9   • Smokeless tobacco: Never   Vaping Use   • Vaping Use: Never used   Substance Use Topics   • Alcohol use: Yes     Comment: SOCIAL   • Drug use: No     CAFFEINE USE_________________  ALCOHOL ______________________    Below is the patient's most recent value for Albumin, ALT, AST, BUN, Calcium, Chloride, Cholesterol, CO2, Creatinine, GFR, Glucose, HDL, Hematocrit, Hemoglobin, Hemoglobin A1C, LDL, Magnesium, Phosphorus, Platelets, Potassium, PSA, Sodium, Triglycerides, TSH and WBC.   Lab Results   Component Value Date    ALBUMIN 3.70 2022    ALT 37 2022    AST 27 2022    BUN 30 (H) 2022    CALCIUM 9.1 2022     2022    CO2 36.8 (H) 2022    CREATININE 1.37 (H) 2022    HCT 42.8 2022    HGB 14.0 2022    MG 2.3 2022    PHOS 5.4 (H) 2022     2022    K 4.0 2022     (H) 2022    TSH 2.600 2022    WBC 7.60 2022          NEW DIAGNOSIS/ SURGERY/ HOSP OR ED VISITS: ______________________    __________________________________________________________________      RECENT LABS OR DIAGNOSTIC TESTING:  _____________________________    __________________________________________________________________      ASSESSMENT/ PLAN:  _______________________________________________    __________________________________________________________________

## 2023-05-22 ENCOUNTER — OFFICE VISIT (OUTPATIENT)
Dept: CARDIOLOGY | Facility: CLINIC | Age: 83
End: 2023-05-22
Payer: MEDICARE

## 2023-05-22 ENCOUNTER — TELEPHONE (OUTPATIENT)
Dept: CARDIOLOGY | Facility: CLINIC | Age: 83
End: 2023-05-22

## 2023-05-22 VITALS
HEART RATE: 84 BPM | HEIGHT: 70 IN | DIASTOLIC BLOOD PRESSURE: 70 MMHG | BODY MASS INDEX: 23.79 KG/M2 | SYSTOLIC BLOOD PRESSURE: 120 MMHG | OXYGEN SATURATION: 97 % | WEIGHT: 166.2 LBS

## 2023-05-22 DIAGNOSIS — I50.32 CHRONIC HEART FAILURE WITH PRESERVED EJECTION FRACTION: Primary | ICD-10-CM

## 2023-05-22 DIAGNOSIS — I48.19 ATRIAL FIBRILLATION, PERSISTENT: ICD-10-CM

## 2023-05-22 RX ORDER — FUROSEMIDE 20 MG/1
TABLET ORAL
Qty: 90 TABLET | Refills: 1 | Status: SHIPPED | OUTPATIENT
Start: 2023-05-22

## 2023-05-22 NOTE — TELEPHONE ENCOUNTER
Caller: Belem Silva    Relationship: Emergency Contact     PHONE: [phone]      PATIENT WAS SUPPOSED TO HAVE A 2 WEEK FOLLOW UP FROM 5.22.23, BUT THE APPOINTMENT WAS MADE FOR 11.8.23. PLEASE CALL TO RESCHEDULE FOLLOW UP.

## 2023-05-22 NOTE — PROGRESS NOTES
Date of Office Visit: 23  Encounter Provider: Huang Chan MD  Place of Service: [facility] CARDIOLOGY  Patient Name: Mehreen Silva IV  :1940    Chief Complaint   Patient presents with   • Fatigue   • Edema   • Follow-up   :     HPI:     Mr. Silva is 83 y.o. and presents today to follow up. I have reviewed prior notes and there are no changes except for any new updates described below. I have also reviewed any information entered into the medical record by the patient or by ancillary staff.     He presented in 2022 with dyspnea; he was found to have acute diastolic CHF and atrial fibrillation. An echo revealed normal LVSF (60%), moderate LVH, bi-atrial dilation, and very mild PHTN. He was started on metoprolol, apixaban, and furosemide.     Since I saw him last, his previous primary care physician decreased his frusemide from 40 mg 3 days weekly to 20 mg 3 days weekly.  However, for at least the last few months, he has not been taking it at all.  He struggles with urinary frequency and incontinence.  His wife has noticed that he is gasping for breath in his sleep every single night, multiple times per night.  He is more fatigued and more short of breath.    Past Medical History:   Diagnosis Date   • (HFpEF) heart failure with preserved ejection fraction 2022   • Arthritis    • Atrial fibrillation, persistent 2022   • Cervical spondylosis with myelopathy    • HLD (hyperlipidemia) 2022   • Hx of toxic multinodular goiter 2022   • Lumbar radiculopathy    • Pulmonary hypertension        Past Surgical History:   Procedure Laterality Date   • APPENDECTOMY     • BACK SURGERY     • COLONOSCOPY     • EXCISION MASS TRUNK N/A 2016    Procedure: Excision soft tissue neoplasm on abdominal wall and left neck;  Surgeon: Shaji Barahona Jr., MD;  Location: [facility] OR;  Service:    • KNEE SURGERY     • NECK SURGERY     • SHOULDER SURGERY          Social History     Socioeconomic History   • Marital status:    • Number of children: 1   • Highest education level: Professional school degree (e.g., MD, DDS, DVM, BRITNEY)   Tobacco Use   • Smoking status: Former     Types: Cigarettes     Quit date: 1983     Years since quittin.9   • Smokeless tobacco: Never   Vaping Use   • Vaping Use: Never used   Substance and Sexual Activity   • Alcohol use: Yes     Comment: SOCIAL   • Drug use: No   • Sexual activity: Defer       Family History   Problem Relation Age of Onset   • Cancer Mother    • Heart disease Father    • Aneurysm Father        Review of Systems   Constitutional: Positive for malaise/fatigue.   Cardiovascular: Positive for orthopnea.   Respiratory: Positive for shortness of breath.    Musculoskeletal: Positive for muscle weakness.   Neurological: Positive for weakness.   Psychiatric/Behavioral: Positive for depression.       No Known Allergies      Current Outpatient Medications:   •  apixaban (Eliquis) 5 MG tablet tablet, Take 1 tablet by mouth Every 12 (Twelve) Hours., Disp: 180 tablet, Rfl: 3  •  cholecalciferol (VITAMIN D3) 25 MCG (1000 UT) tablet, Take 1 tablet by mouth Daily., Disp: , Rfl:   •  ezetimibe (ZETIA) 10 MG tablet, Take 1 tablet by mouth Daily., Disp: , Rfl:   •  methIMAzole (TAPAZOLE) 5 MG tablet, Take 1 tablet by mouth. Takes 5 days - No Wednesday or sundays, Disp: , Rfl:   •  metoprolol tartrate (LOPRESSOR) 25 MG tablet, TAKE ONE TABLET BY MOUTH EVERY 12 HOURS, Disp: 180 tablet, Rfl: 3  •  multivitamin with minerals tablet tablet, Take 1 tablet by mouth Daily., Disp: , Rfl:   •  mupirocin (BACTROBAN) 2 % ointment, Apply to affected area with each dressing change; change dressing at least once a day., Disp: 22 g, Rfl: 0      Objective:     Vitals:    23 0947   BP: 120/70   BP Location: Left arm   Patient Position: Sitting   Cuff Size: Adult   Pulse: 84   SpO2: 97%   Weight: 75.4 kg (166 lb 3.2 oz)   Height: 177.8 cm  "(70\")     Body mass index is 23.85 kg/m².    Vitals reviewed.   Constitutional:       Appearance: Well-developed and not in distress.      Comments: In a wheelchair, [facility]   Eyes:      Conjunctiva/sclera: Conjunctivae normal.   HENT:      Head: Normocephalic.      Nose: Nose normal.   Neck:      Vascular: No JVD. JVD normal.      Lymphadenopathy: No cervical adenopathy.   Pulmonary:      Effort: Pulmonary effort is normal.      Breath sounds: Normal breath sounds.   Cardiovascular:      Normal rate. Irregular rhythm.      Murmurs: There is no murmur.   Pulses:     Intact distal pulses.   Edema:     Peripheral edema present.     Ankle: bilateral 1+ edema of the ankle.  Abdominal:      Palpations: Abdomen is soft.      Tenderness: There is no abdominal tenderness.   Musculoskeletal: Normal range of motion.      Cervical back: Normal range of motion. Skin:     General: Skin is warm and dry.      Findings: No rash.   Neurological:      General: No focal deficit present.      Mental Status: Alert and oriented to person, place, and time.      Cranial Nerves: No cranial nerve deficit.   Psychiatric:         Behavior: Behavior normal.         Thought Content: Thought content normal.         Judgment: Judgment normal.           ECG 12 Lead    Date/Time: 5/22/2023 9:59 AM  Performed by: Huang Chan MD  Authorized by: Huang Chan MD   Comparison: compared with previous ECG   Similar to previous ECG  Rhythm: atrial fibrillation  Conduction: conduction normal  ST Segments: ST segments normal  T Waves: T waves normal  QRS axis: normal  Other: no other findings    Clinical impression: abnormal EKG            Assessment:       Diagnosis Plan   1. Chronic heart failure with preserved ejection fraction        2. Atrial fibrillation, persistent            Plan:       Mr Silva has persistent (likely permanent) atrial fibrillation. He has significant atrial dilation. I feel that the likelihood of successful rhythm control is very " small and that we should proceed with rate control. He will remain on metoprolol and apixaban.    He was previously on 40 mg of furosemide 3 days/week but this reportedly caused an increase in his serum creatinine.  It was decreased to 20 mg 3 days/week but he has self discontinued this due to urinary frequency and incontinence.  Unfortunately, he is going to require some diuretic.  I asked him to take 40 mg 3 days/week for one week and then to resume 20 mg 3 days/week.    I would like him to come back in 2 weeks and have labs drawn in the office that day.    Sincerely,       Huang Chan MD

## 2023-06-05 ENCOUNTER — TELEPHONE (OUTPATIENT)
Dept: CARDIOLOGY | Facility: CLINIC | Age: 83
End: 2023-06-05

## 2023-06-05 ENCOUNTER — OFFICE VISIT (OUTPATIENT)
Dept: CARDIOLOGY | Facility: CLINIC | Age: 83
End: 2023-06-05
Payer: MEDICARE

## 2023-06-05 ENCOUNTER — HOSPITAL ENCOUNTER (OUTPATIENT)
Dept: CARDIOLOGY | Facility: HOSPITAL | Age: 83
Discharge: HOME | End: 2023-06-05
Admitting: NURSE PRACTITIONER
Payer: MEDICARE

## 2023-06-05 VITALS
DIASTOLIC BLOOD PRESSURE: 70 MMHG | SYSTOLIC BLOOD PRESSURE: 110 MMHG | WEIGHT: 168 LBS | BODY MASS INDEX: 24.05 KG/M2 | HEIGHT: 70 IN | HEART RATE: 66 BPM

## 2023-06-05 DIAGNOSIS — R06.00 PND (PAROXYSMAL NOCTURNAL DYSPNEA): ICD-10-CM

## 2023-06-05 DIAGNOSIS — I48.19 ATRIAL FIBRILLATION, PERSISTENT: ICD-10-CM

## 2023-06-05 DIAGNOSIS — R60.0 LOWER EXTREMITY EDEMA: ICD-10-CM

## 2023-06-05 DIAGNOSIS — I50.33 ACUTE ON CHRONIC HEART FAILURE WITH PRESERVED EJECTION FRACTION: Primary | ICD-10-CM

## 2023-06-05 DIAGNOSIS — R06.02 SHORTNESS OF BREATH: ICD-10-CM

## 2023-06-05 LAB
ANION GAP SERPL CALCULATED.3IONS-SCNC: 5 MMOL/L (ref 5–15)
BUN SERPL-MCNC: 19 MG/DL (ref 8–23)
BUN/CREAT SERPL: 18.6 (ref 7–25)
CALCIUM SPEC-SCNC: 9.3 MG/DL (ref 8.6–10.5)
CHLORIDE SERPL-SCNC: 107 MMOL/L (ref 98–107)
CO2 SERPL-SCNC: 31 MMOL/L (ref 22–29)
CREAT SERPL-MCNC: 1.02 MG/DL (ref 0.76–1.27)
EGFRCR SERPLBLD CKD-EPI 2021: 72.9 ML/MIN/1.73
GLUCOSE SERPL-MCNC: 84 MG/DL (ref 65–99)
NT-PROBNP SERPL-MCNC: 2226 PG/ML (ref 0–1800)
POTASSIUM SERPL-SCNC: 5.3 MMOL/L (ref 3.5–5.2)
SODIUM SERPL-SCNC: 143 MMOL/L (ref 136–145)

## 2023-06-05 PROCEDURE — 80048 BASIC METABOLIC PNL TOTAL CA: CPT | Performed by: NURSE PRACTITIONER

## 2023-06-05 PROCEDURE — 83880 ASSAY OF NATRIURETIC PEPTIDE: CPT | Performed by: NURSE PRACTITIONER

## 2023-06-05 PROCEDURE — 36415 COLL VENOUS BLD VENIPUNCTURE: CPT

## 2023-06-05 NOTE — PROGRESS NOTES
Date of Office Visit: 2023  Encounter Provider: CHARLEE Lopez  Place of Service: [facility] CARDIOLOGY  Patient Name: Mehreen Silva IV  :1940  Primary Cardiologist: Dr. Huang Chan     Chief Complaint   Patient presents with    Follow-up    Congestive Heart Failure   :     HPI: Mehreen Silva IV is a 83 y.o. male who presents today for follow-up on heart failure symptoms.  I have reviewed his medical records.    He has known arthritis, cervical spondylosis, status post several back surgeries, and mobility is significantly impaired.  He either ambulates with a walker or uses a wheelchair.    In 2022, he was experiencing heart failure symptoms and presented to the ED.  He was noted to be in atrial fibrillation with heart rates in the 80s.  Echocardiogram showed normal EF, moderate LVH, diastolic function indeterminate, RV moderately dilated, moderately reduced right systolic function, LA volume mildly increased, RA mild to moderately dilated, aortic valve sclerosis, moderate mitral valve calcification, trace to mild MR/TR and mild pulmonary hypertension.  He underwent significant diuresis with resolution of his symptoms.  He was discharged on apixaban, metoprolol, and furosemide.     In May 2023, he followed up with Dr. Chan.  Over the last few months, he had stopped taking his furosemide due to urinary frequency and incontinence.  His wife noticed that he was gasping for breath in his sleep every single night, multiple times per night.  He had worsening lower extremity edema, fatigue, and shortness of breath.  She noted that he previously was on furosemide 40 mg 3 days/week which caused an increase in serum creatinine.  Dr. Chan recommended that he restart frusemide 40 mg 3 days/week for 1 week and then resume furosemide 20 mg 3 days/week.  He was to return in 2 weeks for follow-up and lab work.    He presents today for follow-up visit with his wife and son  accompanying him.  His wife feels that his paroxysmal nocturnal dyspnea and orthopnea have resolved.  His shortness of breath, fatigue level, and lower extremity edema have all improved.  He has been compliant taking the furosemide as instructed and is currently on 20 mg 3 days/week.  He denies chest pain, palpitations, dizziness, syncope, or bleeding.  Blood pressure and heart rate are normal.  Weight is up 2 pounds from last visit.      Past Medical History:   Diagnosis Date    (HFpEF) heart failure with preserved ejection fraction 2022    Arthritis     Atrial fibrillation, persistent 2022    Cervical spondylosis with myelopathy     HLD (hyperlipidemia) 2022    Hx of toxic multinodular goiter 2022    Lumbar radiculopathy     Pulmonary hypertension        Past Surgical History:   Procedure Laterality Date    APPENDECTOMY      BACK SURGERY      COLONOSCOPY      EXCISION MASS TRUNK N/A 2016    Procedure: Excision soft tissue neoplasm on abdominal wall and left neck;  Surgeon: Shaji Barahona Jr., MD;  Location: [facility];  Service:     KNEE SURGERY      NECK SURGERY  1974    SHOULDER SURGERY         Social History     Socioeconomic History    Marital status:     Number of children: 1    Highest education level: Professional school degree (e.g., MD, DDS, DVM, BRITNEY)   Tobacco Use    Smoking status: Former     Types: Cigarettes     Quit date: 1983     Years since quittin.0    Smokeless tobacco: Never   Vaping Use    Vaping Use: Never used   Substance and Sexual Activity    Alcohol use: Yes     Comment: SOCIAL    Drug use: No    Sexual activity: Defer       Family History   Problem Relation Age of Onset    Cancer Mother     Heart disease Father     Aneurysm Father        The following portion of the patient's history were reviewed and updated as appropriate: past medical history, past surgical history, past social history, past family history, allergies, current medications, and  "problem list.    Review of Systems   Constitutional: Negative.   Cardiovascular:  Positive for leg swelling.   Respiratory: Negative.     Hematologic/Lymphatic: Negative.    Neurological: Negative.      No Known Allergies      Current Outpatient Medications:     apixaban (Eliquis) 5 MG tablet tablet, Take 1 tablet by mouth Every 12 (Twelve) Hours., Disp: 180 tablet, Rfl: 3    cholecalciferol (VITAMIN D3) 25 MCG (1000 UT) tablet, Take 1 tablet by mouth Daily., Disp: , Rfl:     ezetimibe (ZETIA) 10 MG tablet, Take 1 tablet by mouth Daily., Disp: , Rfl:     furosemide (LASIX) 20 MG tablet, Take 2 pills on MWF x 1 week, then take one pill daily on MWF, Disp: 90 tablet, Rfl: 1    methIMAzole (TAPAZOLE) 5 MG tablet, Take 1 tablet by mouth. Takes 5 days - No Wednesday or sundays, Disp: , Rfl:     metoprolol tartrate (LOPRESSOR) 25 MG tablet, TAKE ONE TABLET BY MOUTH EVERY 12 HOURS, Disp: 180 tablet, Rfl: 3    multivitamin with minerals tablet tablet, Take 1 tablet by mouth Daily., Disp: , Rfl:     mupirocin (BACTROBAN) 2 % ointment, Apply to affected area with each dressing change; change dressing at least once a day., Disp: 22 g, Rfl: 0         Objective:     Vitals:    06/05/23 1054   BP: 110/70   BP Location: Left arm   Patient Position: Sitting   Cuff Size: Adult   Pulse: 66   Weight: 76.2 kg (168 lb)   Height: 177.8 cm (70\")     Body mass index is 24.11 kg/m².    PHYSICAL EXAM:    Vitals Reviewed.   General Appearance: No acute distress, well developed and well nourished.    HENT: No hearing loss noted.    Respiratory: No signs of respiratory distress. Respiration rhythm and depth normal.  Clear to auscultation.   Cardiovascular:  Jugular Venous Pressure: Normal  Heart Rate and Rhythm: Irregular, irregular.  Heart Sounds: Normal S1 and S2. No S3 or S4 noted.  Murmurs: No murmurs noted. No rubs, thrills, or gallops.   Lower Extremities: Bilateral trace lower extremity edema noted.  Musculoskeletal: Normal movement of " extremities.  Skin: General appearance normal.    Psychiatric: Patient alert and oriented to person, place, and time. Speech and behavior appropriate. Normal mood and affect.       ECG 12 Lead    Date/Time: 6/5/2023 11:01 AM  Performed by: Clarita Armenta APRN  Authorized by: Clarita Armenta APRN   Comparison: compared with previous ECG from 5/22/2023  Similar to previous ECG  Rhythm: atrial fibrillation  Rate: normal  BPM: 66  Q waves: V1 and V2    ST Segments: ST segments normal  T Waves: T waves normal  QRS axis: normal    Clinical impression: abnormal EKG          Assessment:       Diagnosis Plan   1. Acute on chronic heart failure with preserved ejection fraction        2. PND (paroxysmal nocturnal dyspnea)  Basic Metabolic Panel    proBNP      3. Lower extremity edema  Basic Metabolic Panel    proBNP      4. Shortness of breath        5. Atrial fibrillation, persistent  Basic Metabolic Panel    proBNP             Plan:       1-4.  Acute on chronic heart failure: He recently was not taking his furosemide as prescribed.  He was experiencing PND, orthopnea, shortness of breath, lower extremity edema, and fatigue.  His symptoms have improved on frusemide 20 mg 3 days/week.  Repeat a proBNP and BMP today.  I gave them instructions to perform daily weights and observe for symptoms.  If he has symptoms or weight gain, he should take an extra dose of furosemide the following day.    5.  Persistent Atrial Fibrillation: Rate controlled on metoprolol tartrate and remains on apixaban for stroke prevention.  He has a [id number] score of 3.    6.  I recommended that they call with any concerns.  Keep scheduled appointment Dr. Chan in November.    As always, it has been a pleasure to participate in your patient's care. Thank you.         Sincerely,         CHARLEE Jackson  [facility] Cardiology      Dictated utilizing Dragon Dictation  I spent 30 minutes reviewing her medical  records/testing/previous office notes/labs, face-to-face interaction with patient, physical examination, formulating the plan of care, and discussion of plan of care with patient.

## 2023-06-05 NOTE — TELEPHONE ENCOUNTER
Patient's wife (Belem) called asking if she can be called to get final instructions on how patient is to take his furosemide if he has bleeding problems at night. Patient was in the office today.

## 2023-06-06 RX ORDER — FUROSEMIDE 20 MG/1
20 TABLET ORAL 3 TIMES WEEKLY
Qty: 45 TABLET | Refills: 1 | Status: SHIPPED | OUTPATIENT
Start: 2023-06-07

## 2023-06-06 NOTE — TELEPHONE ENCOUNTER
Patient's wife called back and is upset she did not get a call yesterday or heard anything back as of yet today.  She is confused on the instructions and wants clarification.  Clarita can you please clarify for her.  In the assessment and plan it states if patient has increase in weight gain he is to take an extra Furosemide the following day.  I am confused and want to be certain this is correct before it is relayed. Thank you/ JOCELYN

## 2023-06-06 NOTE — TELEPHONE ENCOUNTER
proBNP elevated. BMP stable except for potassium a little high at 5.3. He is not on a potassium supplement. Decrease potassium intake.  Wife informed of results. She wanted to review the furosemide dosage. She verbalized understanding and will call with any concerns.

## 2023-06-06 NOTE — PROGRESS NOTES
proBNP elevated.  BMP stable except for potassium a little high at 5.3.  Is not on a potassium supplement.  Wife informed of results.

## 2023-06-19 ENCOUNTER — TELEPHONE (OUTPATIENT)
Dept: CARDIOLOGY | Facility: CLINIC | Age: 83
End: 2023-06-19
Payer: MEDICARE

## 2023-06-19 NOTE — TELEPHONE ENCOUNTER
Pt's wife called and left a vm, that she needed to change pt's appt on 09/12.  After chart review and research pt is not scheduled with our office until Nov.      I called pt's wife, but had to leave a vm.

## 2023-08-16 DIAGNOSIS — L89.611 PRESSURE INJURY OF RIGHT HEEL, STAGE 1: Primary | ICD-10-CM

## 2023-08-17 ENCOUNTER — TELEPHONE (OUTPATIENT)
Dept: CARDIOLOGY | Facility: CLINIC | Age: 83
End: 2023-08-17
Payer: MEDICARE

## 2023-08-17 NOTE — TELEPHONE ENCOUNTER
Last OV  6/5/23    Patient's wife called concerning his medication. Patient is having swelling in his right foot and ankle. Patient was currently taking furosemide 1 tablet on Monday, Wednesday and Friday. Patient's wife states they have added another day, Thursday due to his swelling. Patient's wife is asking how much of the Furosemide can he take a day with his condition?        Please Advise

## 2023-08-18 NOTE — TELEPHONE ENCOUNTER
I would have him take furosemide 40 mg x 3 days.  Then go back to 20 mg 3 times per week and see if that helps the swelling.  Leg elevation, compression stockings, and low-sodium diet.  Please check on them next week.  Thank you

## 2023-08-18 NOTE — TELEPHONE ENCOUNTER
I spoke with Mehreen JOHNSON and his wife and updated them on recommendations from provider.  They verbalized understanding and has no further questions at this time.    I'll put a note to FU on this pt on Monday.    Thank you,    Rachel Aguirre RN  Triage [facility] [place]  08/18/23 11:49 EDT

## 2023-08-21 NOTE — TELEPHONE ENCOUNTER
I tried to call Mehreen Silva IV but there was no answer.  Left a voicemail asking patient to call back.  Will continue to try to reach pt.    Thank you,    Rachel Aguirre RN  [place] [facility]  08/21/23 15:10 EDT

## 2023-08-21 NOTE — TELEPHONE ENCOUNTER
Patients, wife Belem, returned call.     Patient did lasix 40 mg x 3 days per recommendation from TK for LE swelling.  His left RLE was > LLE originally on the VM they left 8/17.  The increase in lasix has helped swelling in the left lower extremity, but had little affect on right calf ankle and calf swelling.    Of note, per chart review, patient has some type of wound on the right heel.  Belem reports it is not a pressure ulcer, but like a pressure ulcer.  Dr. Kristopher Villa, dermatology?, has ordered a right foot xray and the patient plans on getting that this week.  The wife just worries about swelling with his CHF.  He is not really more SOA thank normal.    His mobility is unchanged.  He did have a fall last week and went to  for abrasions bilteral upper arms.  APRN recommended antibiotic ointment to right heel and border gauze and kerlix.  I am not sure if swelling is related to this it is hard to tell with the information she is giving me about the possible right heel wound?  She says it is a little red on his foot and a little painful but unclear how long this has been going on.    Any additional recommendations for right lower extremity swelling?  How do you want them to take lasix moving forward?    Stephanie Silva RN  [facility] Triage  08/21/23 15:45 EDT

## 2023-08-22 NOTE — TELEPHONE ENCOUNTER
I would be concerned about possible DVT of the right lower extremity.  You can see if you can get him into see a nurse practitioner tomorrow.  If not, he may need to go to the ED for further evaluation.

## 2023-08-22 NOTE — TELEPHONE ENCOUNTER
Reviewed recommendations with Mehreen Silva IV and the patient verbalized understanding of the recommendations.    Patient declined appointment today due to transportation issues but is agreeable to an appointment tomorrow morning, 8/23, at 9:30 am with Jenny.  Patient has been scheduled.    Notified medical records of add-on.    Thank you,  Yeni KNAPP RN  Triage Nurse [facility]   11:11 EDT

## 2023-08-23 ENCOUNTER — HOSPITAL ENCOUNTER (OUTPATIENT)
Dept: CARDIOLOGY | Facility: HOSPITAL | Age: 83
Discharge: HOME | End: 2023-08-23
Payer: MEDICARE

## 2023-08-23 ENCOUNTER — OFFICE VISIT (OUTPATIENT)
Dept: CARDIOLOGY | Facility: CLINIC | Age: 83
End: 2023-08-23
Payer: MEDICARE

## 2023-08-23 ENCOUNTER — HOSPITAL ENCOUNTER (OUTPATIENT)
Dept: GENERAL RADIOLOGY | Facility: HOSPITAL | Age: 83
Discharge: HOME | End: 2023-08-23
Payer: MEDICARE

## 2023-08-23 ENCOUNTER — TELEPHONE (OUTPATIENT)
Dept: CARDIOLOGY | Facility: CLINIC | Age: 83
End: 2023-08-23
Payer: MEDICARE

## 2023-08-23 VITALS
DIASTOLIC BLOOD PRESSURE: 70 MMHG | HEIGHT: 70 IN | WEIGHT: 165 LBS | BODY MASS INDEX: 23.62 KG/M2 | HEART RATE: 82 BPM | SYSTOLIC BLOOD PRESSURE: 110 MMHG

## 2023-08-23 DIAGNOSIS — I50.31 ACUTE DIASTOLIC CHF (CONGESTIVE HEART FAILURE): Primary | ICD-10-CM

## 2023-08-23 DIAGNOSIS — E78.5 HYPERLIPIDEMIA, UNSPECIFIED HYPERLIPIDEMIA TYPE: Primary | ICD-10-CM

## 2023-08-23 DIAGNOSIS — I50.30 HEART FAILURE WITH PRESERVED EJECTION FRACTION, UNSPECIFIED HF CHRONICITY: ICD-10-CM

## 2023-08-23 DIAGNOSIS — L89.611 PRESSURE INJURY OF RIGHT HEEL, STAGE 1: ICD-10-CM

## 2023-08-23 DIAGNOSIS — M79.89 SWELLING OF RIGHT LOWER EXTREMITY: ICD-10-CM

## 2023-08-23 DIAGNOSIS — I48.19 ATRIAL FIBRILLATION, PERSISTENT: ICD-10-CM

## 2023-08-23 LAB
ANION GAP SERPL CALCULATED.3IONS-SCNC: 7.4 MMOL/L (ref 5–15)
BASOPHILS # BLD AUTO: 0.03 10*3/MM3 (ref 0–0.2)
BASOPHILS NFR BLD AUTO: 0.4 % (ref 0–1.5)
BH CV LOWER VASCULAR LEFT COMMON FEMORAL AUGMENT: NORMAL
BH CV LOWER VASCULAR LEFT COMMON FEMORAL COMPETENT: NORMAL
BH CV LOWER VASCULAR LEFT COMMON FEMORAL COMPRESS: NORMAL
BH CV LOWER VASCULAR LEFT COMMON FEMORAL PHASIC: NORMAL
BH CV LOWER VASCULAR LEFT COMMON FEMORAL SPONT: NORMAL
BH CV LOWER VASCULAR RIGHT COMMON FEMORAL AUGMENT: NORMAL
BH CV LOWER VASCULAR RIGHT COMMON FEMORAL COMPETENT: NORMAL
BH CV LOWER VASCULAR RIGHT COMMON FEMORAL COMPRESS: NORMAL
BH CV LOWER VASCULAR RIGHT COMMON FEMORAL PHASIC: NORMAL
BH CV LOWER VASCULAR RIGHT COMMON FEMORAL SPONT: NORMAL
BH CV LOWER VASCULAR RIGHT DISTAL FEMORAL COMPRESS: NORMAL
BH CV LOWER VASCULAR RIGHT GASTRONEMIUS COMPRESS: NORMAL
BH CV LOWER VASCULAR RIGHT GREATER SAPH AK COMPRESS: NORMAL
BH CV LOWER VASCULAR RIGHT GREATER SAPH BK COMPRESS: NORMAL
BH CV LOWER VASCULAR RIGHT LESSER SAPH COMPRESS: NORMAL
BH CV LOWER VASCULAR RIGHT MID FEMORAL AUGMENT: NORMAL
BH CV LOWER VASCULAR RIGHT MID FEMORAL COMPETENT: NORMAL
BH CV LOWER VASCULAR RIGHT MID FEMORAL COMPRESS: NORMAL
BH CV LOWER VASCULAR RIGHT MID FEMORAL PHASIC: NORMAL
BH CV LOWER VASCULAR RIGHT MID FEMORAL SPONT: NORMAL
BH CV LOWER VASCULAR RIGHT PERONEAL COMPRESS: NORMAL
BH CV LOWER VASCULAR RIGHT POPLITEAL AUGMENT: NORMAL
BH CV LOWER VASCULAR RIGHT POPLITEAL COMPETENT: NORMAL
BH CV LOWER VASCULAR RIGHT POPLITEAL COMPRESS: NORMAL
BH CV LOWER VASCULAR RIGHT POPLITEAL PHASIC: NORMAL
BH CV LOWER VASCULAR RIGHT POPLITEAL SPONT: NORMAL
BH CV LOWER VASCULAR RIGHT POSTERIOR TIBIAL COMPRESS: NORMAL
BH CV LOWER VASCULAR RIGHT PROFUNDA FEMORAL COMPRESS: NORMAL
BH CV LOWER VASCULAR RIGHT PROXIMAL FEMORAL COMPRESS: NORMAL
BH CV LOWER VASCULAR RIGHT SAPHENOFEMORAL JUNCTION COMPRESS: NORMAL
BH CV VAS PRELIMINARY FINDINGS SCRIPTING: 1
BUN SERPL-MCNC: 27 MG/DL (ref 8–23)
BUN/CREAT SERPL: 22.5 (ref 7–25)
CALCIUM SPEC-SCNC: 9.9 MG/DL (ref 8.6–10.5)
CHLORIDE SERPL-SCNC: 107 MMOL/L (ref 98–107)
CO2 SERPL-SCNC: 30.6 MMOL/L (ref 22–29)
CREAT SERPL-MCNC: 1.2 MG/DL (ref 0.76–1.27)
DEPRECATED RDW RBC AUTO: 45.9 FL (ref 37–54)
EGFRCR SERPLBLD CKD-EPI 2021: 60 ML/MIN/1.73
EOSINOPHIL # BLD AUTO: 0.04 10*3/MM3 (ref 0–0.4)
EOSINOPHIL NFR BLD AUTO: 0.5 % (ref 0.3–6.2)
ERYTHROCYTE [DISTWIDTH] IN BLOOD BY AUTOMATED COUNT: 14.7 % (ref 12.3–15.4)
GLUCOSE SERPL-MCNC: 78 MG/DL (ref 65–99)
HCT VFR BLD AUTO: 40.2 % (ref 37.5–51)
HGB BLD-MCNC: 13.2 G/DL (ref 13–17.7)
IMM GRANULOCYTES # BLD AUTO: 0.01 10*3/MM3 (ref 0–0.05)
IMM GRANULOCYTES NFR BLD AUTO: 0.1 % (ref 0–0.5)
LYMPHOCYTES # BLD AUTO: 1.18 10*3/MM3 (ref 0.7–3.1)
LYMPHOCYTES NFR BLD AUTO: 14.9 % (ref 19.6–45.3)
MCH RBC QN AUTO: 28.1 PG (ref 26.6–33)
MCHC RBC AUTO-ENTMCNC: 32.8 G/DL (ref 31.5–35.7)
MCV RBC AUTO: 85.7 FL (ref 79–97)
MONOCYTES # BLD AUTO: 0.69 10*3/MM3 (ref 0.1–0.9)
MONOCYTES NFR BLD AUTO: 8.7 % (ref 5–12)
NEUTROPHILS NFR BLD AUTO: 5.97 10*3/MM3 (ref 1.7–7)
NEUTROPHILS NFR BLD AUTO: 75.4 % (ref 42.7–76)
NRBC BLD AUTO-RTO: 0 /100 WBC (ref 0–0.2)
NT-PROBNP SERPL-MCNC: 2961 PG/ML (ref 0–1800)
PLATELET # BLD AUTO: 158 10*3/MM3 (ref 140–450)
PMV BLD AUTO: 12.1 FL (ref 6–12)
POTASSIUM SERPL-SCNC: 4.4 MMOL/L (ref 3.5–5.2)
RBC # BLD AUTO: 4.69 10*6/MM3 (ref 4.14–5.8)
SODIUM SERPL-SCNC: 145 MMOL/L (ref 136–145)
WBC NRBC COR # BLD: 7.92 10*3/MM3 (ref 3.4–10.8)

## 2023-08-23 PROCEDURE — 93000 ELECTROCARDIOGRAM COMPLETE: CPT | Performed by: NURSE PRACTITIONER

## 2023-08-23 PROCEDURE — 83880 ASSAY OF NATRIURETIC PEPTIDE: CPT | Performed by: NURSE PRACTITIONER

## 2023-08-23 PROCEDURE — 73630 X-RAY EXAM OF FOOT: CPT

## 2023-08-23 PROCEDURE — 80048 BASIC METABOLIC PNL TOTAL CA: CPT | Performed by: NURSE PRACTITIONER

## 2023-08-23 PROCEDURE — 36415 COLL VENOUS BLD VENIPUNCTURE: CPT

## 2023-08-23 PROCEDURE — 99214 OFFICE O/P EST MOD 30 MIN: CPT | Performed by: NURSE PRACTITIONER

## 2023-08-23 PROCEDURE — 1159F MED LIST DOCD IN RCRD: CPT | Performed by: NURSE PRACTITIONER

## 2023-08-23 PROCEDURE — 93971 EXTREMITY STUDY: CPT

## 2023-08-23 PROCEDURE — 85025 COMPLETE CBC W/AUTO DIFF WBC: CPT | Performed by: NURSE PRACTITIONER

## 2023-08-23 PROCEDURE — 1160F RVW MEDS BY RX/DR IN RCRD: CPT | Performed by: NURSE PRACTITIONER

## 2023-08-23 NOTE — PROGRESS NOTES
Subjective:     Encounter Date:08/23/2023      Patient ID: Mehreen Silva IV is a 83 y.o. male.    Chief Complaint:lower extremity swelling  History of Present Illness  This is an 82 y/o man who follows with Dr. Chan and is new to me today. He has a pmhx of atrial fibrillation, diastolic CHF, arthritis, cervical spondylosis, s/p several back surgeries, and impaired mobility.     In September 2022, he was seen in the ED for heart failure symptoms. He had never been seen by a cardiologist prior to this. He was noted to be in atrial fibrillation with rates in the 80s. An echocardiogram showed normal EF, moderate LVH, diastolic function indeterminate, RV moderately dilated, moderately reduced right systolic function, LA volume mildly increased, RA mild to moderately dilated, aortic valve sclerosis, moderate mitral valve calcification, trace to mild MR/TR and mild pulmonary hypertension. He was IV diuresed and discharged on apixaban, metoprolol, and furosemide.     In May 2023 at follow up with Dr. Chan, he reported that he stopped taking his furosemide due to urinary frequency and incontinence. He was having problems with shortness of breath, lower extremity edema, and fatigue. Dr. Chan recommended that he restart frusemide 40 mg 3 days/week for 1 week and then resume furosemide 20 mg 3 days/week.     At follow up with CHARLEE Jackson in June 2023 he was feeling better and tolerating the 20 mg 3 days a week well. A proBNP and BMP were repeated at that visit. proBNP was a little elevated and he was to take an extra furosemide.     He then contacted the office on 8/17 with reported worsening swelling in his legs. He was instructed to take furosemide 40 mg x 3 days, then go back to 20 mg 3 times per week and see if that helps the swelling. Triage RN called to follow up on how the patient was doing on 8/21 and the patient reported the extra lasix helped the left lower extremity but did not help with the right. He  injured his right leg the previous week so there was concern for possible DVT. He was then scheduled to see me urgently.     He is here today for evaluation and quite difficult to get a clear story from. Most info is from his son who is quite involved in his care. He has been having progressive weakness over the last few weeks along with worsening swelling in both legs. He took the extra lasix and the swelling in gone in the left leg but still present in the right. He denies any warmth to touch, fevers or increased pain in the right leg. There is chronic swelling in the right ankle and foot but the son says it is worse than normal. He denies any shortness of breath, cough, dyspnea on exertion, orthopnea or PND. He has not gained any weight per our records but he does not weigh at home. He says he does not feel like he is retaining fluid. He saw a dermatologist recently who has started treatment for multiple sores/wounds on his right foot. An xray was also obtained this morning to rule out any acute abnormalities.     I have reviewed and updated as appropriate allergies, current medications, past family history, past medical history, past surgical history and problem list.    Review of Systems   Constitutional: Negative for fever, malaise/fatigue, weight gain and weight loss.   HENT:  Negative for congestion, hoarse voice and sore throat.    Eyes:  Negative for blurred vision and double vision.   Cardiovascular:  Positive for leg swelling. Negative for chest pain, dyspnea on exertion, orthopnea, palpitations and syncope.   Respiratory:  Negative for cough, shortness of breath and wheezing.    Skin:  Positive for poor wound healing.   Gastrointestinal:  Negative for abdominal pain, hematemesis, hematochezia and melena.   Genitourinary:  Negative for dysuria and hematuria.   Neurological:  Negative for dizziness, headaches, light-headedness and numbness.   Psychiatric/Behavioral:  Negative for depression. The patient  is not nervous/anxious.        Current Outpatient Medications:     apixaban (Eliquis) 5 MG tablet tablet, Take 1 tablet by mouth Every 12 (Twelve) Hours., Disp: 180 tablet, Rfl: 3    cholecalciferol (VITAMIN D3) 25 MCG (1000 UT) tablet, Take 1 tablet by mouth Daily., Disp: , Rfl:     methIMAzole (TAPAZOLE) 5 MG tablet, Take 1 tablet by mouth. Takes 5 days - No Wednesday or sundays, Disp: , Rfl:     metoprolol tartrate (LOPRESSOR) 25 MG tablet, TAKE ONE TABLET BY MOUTH EVERY 12 HOURS, Disp: 180 tablet, Rfl: 3    multivitamin with minerals tablet tablet, Take 1 tablet by mouth Daily., Disp: , Rfl:     mupirocin (BACTROBAN) 2 % ointment, Apply to affected area with each dressing change; change dressing at least once a day., Disp: 22 g, Rfl: 0    ezetimibe (ZETIA) 10 MG tablet, Take 1 tablet by mouth Daily. (Patient not taking: Reported on 8/23/2023), Disp: , Rfl:     furosemide (LASIX) 20 MG tablet, Take 1 tablet by mouth 3 (Three) Times a Week. On Monday, Wed, Friday (Patient not taking: Reported on 8/23/2023), Disp: 45 tablet, Rfl: 1    Past Medical History:   Diagnosis Date    (HFpEF) heart failure with preserved ejection fraction 9/24/2022    Arthritis     Atrial fibrillation, persistent 9/24/2022    Cervical spondylosis with myelopathy     HLD (hyperlipidemia) 9/23/2022    Hx of toxic multinodular goiter 9/23/2022    Lumbar radiculopathy     Pulmonary hypertension        Past Surgical History:   Procedure Laterality Date    APPENDECTOMY      BACK SURGERY      COLONOSCOPY      EXCISION MASS TRUNK N/A 6/8/2016    Procedure: Excision soft tissue neoplasm on abdominal wall and left neck;  Surgeon: Shaji Barahona Jr., MD;  Location: [facility] MAIN OR;  Service:     KNEE SURGERY      NECK SURGERY  1974    SHOULDER SURGERY         Family History   Problem Relation Age of Onset    Cancer Mother     Heart disease Father     Aneurysm Father        Social History     Tobacco Use    Smoking status: Former     Types: Cigarettes  "    Quit date: 1983     Years since quittin.2    Smokeless tobacco: Never   Vaping Use    Vaping Use: Never used   Substance Use Topics    Alcohol use: Yes     Comment: SOCIAL    Drug use: No         ECG 12 Lead    Date/Time: 2023 9:56 AM  Performed by: Jenny Alex APRN  Authorized by: Jenny Alex APRN   Comparison: compared with previous ECG from 2023  Similar to previous ECG  Rhythm: atrial fibrillation           Objective:     Visit Vitals  /70   Pulse 82   Ht 177.8 cm (70\")   Wt 74.8 kg (165 lb)   BMI 23.68 kg/mý             Physical Exam  Constitutional:       Appearance: Normal appearance. He is normal weight.   HENT:      Head: Normocephalic.   Neck:      Vascular: No carotid bruit.   Cardiovascular:      Rate and Rhythm: Normal rate. Rhythm irregularly irregular.      Chest Wall: PMI is not displaced.      Pulses: Normal pulses.           Radial pulses are 2+ on the right side and 2+ on the left side.        Posterior tibial pulses are 1+ on the right side and 1+ on the left side.      Heart sounds: Normal heart sounds. No murmur heard.    No friction rub. No gallop.   Pulmonary:      Effort: Pulmonary effort is normal.      Breath sounds: Normal breath sounds.   Abdominal:      General: Bowel sounds are normal. There is no distension.      Palpations: Abdomen is soft.   Musculoskeletal:      Right lower le+ Edema present.      Left lower leg: No edema.   Skin:     General: Skin is warm and dry.      Capillary Refill: Capillary refill takes less than 2 seconds.   Neurological:      Mental Status: He is alert and oriented to person, place, and time.   Psychiatric:         Mood and Affect: Mood normal.         Behavior: Behavior normal.         Thought Content: Thought content normal.        Lab Review:         Cardiac Procedures:       Assessment:         Diagnoses and all orders for this visit:    1. Hyperlipidemia, unspecified hyperlipidemia type (Primary)    2. Atrial " fibrillation, persistent    3. Heart failure with preserved ejection fraction, unspecified HF chronicity  -     proBNP  -     Basic Metabolic Panel; Future  -     CBC & Differential; Future    4. Swelling of right lower extremity  -     Duplex Venous Lower Extremity - Left CAR; Future    Other orders  -     ECG 12 Lead            Plan:       Right lower extremity swelling: he doesn't really appear to be volume overloaded on exam. He is on apixaban for his atrial fibrillation so I have a somewhat low suspicion for DVT. However, given the injuries to his right foot, recent fall, and persistent increased swelling of the leg, I think we need to obtain a duplex to rule out DVT. The xray obtained by dermatology was ok. I am also going to obtain a proBNP and BMP.  Progressive weakness: will obtain CBC to make sure that anemia is not contributing to his weakness.   Chronic diastolic CHF: on furosemide 20 mg three days a week. We may need to increase this to four days a week to keep the swelling down. Will see how his labs look today.  Permanent atrial fibrillation: rates are controlled with metoprolol. Anticoagulated with apixaban.  HLD: on statin therapy.    Thank you for allowing me to participate in this patient's care. Please call with any questions or concerns. Mr. Silva will follow up with Clarita Armenta as scheduled in November.          Your medication list            Accurate as of August 23, 2023  9:57 AM. If you have any questions, ask your nurse or doctor.                CONTINUE taking these medications        Instructions Last Dose Given Next Dose Due   apixaban 5 MG tablet tablet  Commonly known as: Eliquis      Take 1 tablet by mouth Every 12 (Twelve) Hours.       cholecalciferol 25 MCG (1000 UT) tablet  Commonly known as: VITAMIN D3      Take 1 tablet by mouth Daily.       ezetimibe 10 MG tablet  Commonly known as: ZETIA      Take 1 tablet by mouth Daily.       furosemide 20 MG tablet  Commonly known as:  LASIX      Take 1 tablet by mouth 3 (Three) Times a Week. On Monday, Wed, Friday       methIMAzole 5 MG tablet  Commonly known as: TAPAZOLE      Take 1 tablet by mouth. Takes 5 days - No Wednesday or sundays       metoprolol tartrate 25 MG tablet  Commonly known as: LOPRESSOR      TAKE ONE TABLET BY MOUTH EVERY 12 HOURS       multivitamin with minerals tablet tablet      Take 1 tablet by mouth Daily.       mupirocin 2 % ointment  Commonly known as: BACTROBAN      Apply to affected area with each dressing change; change dressing at least once a day.                  Jenny Alex, CHARLEE  08/23/23  8:23 AM EDT

## 2023-08-23 NOTE — PROGRESS NOTES
Please let Mr. Silva know that the ultrasound of his leg does not show a blood clot. However, his labwork does indicate that he is retaining fluid. I would recommend increasing furosemide to 40 mg 3 days a week.

## 2023-08-23 NOTE — TELEPHONE ENCOUNTER
Pt called back. Went over results and recommendations. Pt verbalized understanding.    Thank you,    Debi Grijalva, RN  Triage [facility]  08/23/23 13:12 EDT

## 2023-08-23 NOTE — TELEPHONE ENCOUNTER
----- Message from CHARLEE Fairbanks sent at 8/23/2023 12:52 PM EDT -----  Please let Mr. Silva know that the ultrasound of his leg does not show a blood clot. However, his labwork does indicate that he is retaining fluid. I would recommend increasing furosemide to 40 mg 3 days a week.

## 2023-08-25 NOTE — TELEPHONE ENCOUNTER
Wife called back to clarify dosage of lasix.  I let her know to increase lasix to 40 mg three days a week.  Also asked patient to get labs in 2 weeks and we went over the process and location for that.    She verbalizes understanding and agrees with plan.    Stephanie Silva RN  [facility] Triage  08/25/23 08:15 EDT

## 2023-11-01 NOTE — PROGRESS NOTES
RM:________     PCP: Taiwo Powers MD    : 1940  AGE: 83 y.o.  EST PATIENT     REASON FOR VISIT/  CC:        BP Readings from Last 3 Encounters:   23 110/70   23 114/63   23 110/70      Wt Readings from Last 3 Encounters:   23 74.8 kg (165 lb)   23 74.8 kg (165 lb)   23 76.2 kg (168 lb)        WT: ____________ BP: __________L __________R HR______    CHEST PAIN: _____________    SOA: _____________PALPS: _______________     LIGHTHEADED: ___________FATIGUE: ________________ EDEMA __________    ALLERGIES:Patient has no known allergies. SMOKING HISTORY:  Social History     Tobacco Use    Smoking status: Former     Types: Cigarettes     Quit date: 1983     Years since quittin.4    Smokeless tobacco: Never   Vaping Use    Vaping Use: Never used   Substance Use Topics    Alcohol use: Yes     Comment: SOCIAL    Drug use: No     CAFFEINE USE_________________  ALCOHOL ______________________

## 2023-11-08 ENCOUNTER — HOSPITAL ENCOUNTER (OUTPATIENT)
Dept: GENERAL RADIOLOGY | Facility: HOSPITAL | Age: 83
Discharge: HOME | End: 2023-11-08
Payer: MEDICARE

## 2023-11-08 ENCOUNTER — APPOINTMENT (OUTPATIENT)
Dept: CT IMAGING | Facility: HOSPITAL | Age: 83
DRG: 291 | End: 2023-11-08
Payer: MEDICARE

## 2023-11-08 ENCOUNTER — HOSPITAL ENCOUNTER (OUTPATIENT)
Dept: CARDIOLOGY | Facility: HOSPITAL | Age: 83
Discharge: HOME | End: 2023-11-08
Payer: MEDICARE

## 2023-11-08 ENCOUNTER — HOSPITAL ENCOUNTER (INPATIENT)
Facility: HOSPITAL | Age: 83
LOS: 6 days | Discharge: HOME | DRG: 291 | End: 2023-11-14
Attending: HOSPITALIST | Admitting: HOSPITALIST
Payer: MEDICARE

## 2023-11-08 ENCOUNTER — OFFICE VISIT (OUTPATIENT)
Dept: CARDIOLOGY | Facility: CLINIC | Age: 83
End: 2023-11-08
Payer: MEDICARE

## 2023-11-08 VITALS
WEIGHT: 165 LBS | SYSTOLIC BLOOD PRESSURE: 114 MMHG | HEIGHT: 70 IN | DIASTOLIC BLOOD PRESSURE: 76 MMHG | HEART RATE: 107 BPM | BODY MASS INDEX: 23.62 KG/M2

## 2023-11-08 VITALS — OXYGEN SATURATION: 82 % | RESPIRATION RATE: 18 BRPM | HEART RATE: 68 BPM

## 2023-11-08 DIAGNOSIS — I50.33 ACUTE ON CHRONIC DIASTOLIC CHF (CONGESTIVE HEART FAILURE): Primary | ICD-10-CM

## 2023-11-08 DIAGNOSIS — I48.19 ATRIAL FIBRILLATION, PERSISTENT: ICD-10-CM

## 2023-11-08 DIAGNOSIS — M54.16 LUMBAR RADICULOPATHY: ICD-10-CM

## 2023-11-08 DIAGNOSIS — I50.33 ACUTE ON CHRONIC HEART FAILURE WITH PRESERVED EJECTION FRACTION: ICD-10-CM

## 2023-11-08 DIAGNOSIS — R06.02 SHORTNESS OF BREATH: Primary | ICD-10-CM

## 2023-11-08 DIAGNOSIS — R06.02 SHORTNESS OF BREATH: ICD-10-CM

## 2023-11-08 DIAGNOSIS — I50.32 CHRONIC HEART FAILURE WITH PRESERVED EJECTION FRACTION: Primary | ICD-10-CM

## 2023-11-08 DIAGNOSIS — I50.32 CHRONIC HEART FAILURE WITH PRESERVED EJECTION FRACTION: ICD-10-CM

## 2023-11-08 DIAGNOSIS — M47.12 CERVICAL SPONDYLOSIS WITH MYELOPATHY: ICD-10-CM

## 2023-11-08 PROBLEM — R09.02 HYPOXIA: Status: ACTIVE | Noted: 2023-11-08

## 2023-11-08 PROBLEM — R73.9 HYPERGLYCEMIA: Status: ACTIVE | Noted: 2023-11-08

## 2023-11-08 PROBLEM — E87.5 HYPERKALEMIA: Status: ACTIVE | Noted: 2023-11-08

## 2023-11-08 PROBLEM — G95.9 CERVICAL MYELOPATHY: Status: ACTIVE | Noted: 2023-11-08

## 2023-11-08 LAB
ALBUMIN SERPL-MCNC: 3.6 G/DL (ref 3.5–5.2)
ALBUMIN/GLOB SERPL: 1.2 G/DL
ALP SERPL-CCNC: 189 U/L (ref 39–117)
ALT SERPL W P-5'-P-CCNC: 32 U/L (ref 1–41)
ANION GAP SERPL CALCULATED.3IONS-SCNC: 9.3 MMOL/L (ref 5–15)
ARTERIAL PATENCY WRIST A: POSITIVE
AST SERPL-CCNC: 33 U/L (ref 1–40)
ATMOSPHERIC PRESS: 739.4 MMHG
BASE EXCESS BLDA CALC-SCNC: 1 MMOL/L (ref 0–2)
BASOPHILS # BLD AUTO: 0.03 10*3/MM3 (ref 0–0.2)
BASOPHILS NFR BLD AUTO: 0.3 % (ref 0–1.5)
BDY SITE: ABNORMAL
BILIRUB SERPL-MCNC: 1.9 MG/DL (ref 0–1.2)
BUN SERPL-MCNC: 27 MG/DL (ref 8–23)
BUN/CREAT SERPL: 25 (ref 7–25)
CALCIUM SPEC-SCNC: 9.5 MG/DL (ref 8.6–10.5)
CHLORIDE SERPL-SCNC: 105 MMOL/L (ref 98–107)
CO2 BLDA-SCNC: 27.1 MMOL/L (ref 23–27)
CO2 SERPL-SCNC: 28.7 MMOL/L (ref 22–29)
CREAT SERPL-MCNC: 1.08 MG/DL (ref 0.76–1.27)
DEPRECATED RDW RBC AUTO: 45 FL (ref 37–54)
EGFRCR SERPLBLD CKD-EPI 2021: 68.1 ML/MIN/1.73
EOSINOPHIL # BLD AUTO: 0.01 10*3/MM3 (ref 0–0.4)
EOSINOPHIL NFR BLD AUTO: 0.1 % (ref 0.3–6.2)
ERYTHROCYTE [DISTWIDTH] IN BLOOD BY AUTOMATED COUNT: 15.1 % (ref 12.3–15.4)
GEN 5 2HR TROPONIN T REFLEX: 56 NG/L
GLOBULIN UR ELPH-MCNC: 2.9 GM/DL
GLUCOSE SERPL-MCNC: 116 MG/DL (ref 65–99)
HCO3 BLDA-SCNC: 25.8 MMOL/L (ref 22–28)
HCT VFR BLD AUTO: 37.8 % (ref 37.5–51)
HEMODILUTION: NO
HGB BLD-MCNC: 11.9 G/DL (ref 13–17.7)
IMM GRANULOCYTES # BLD AUTO: 0.04 10*3/MM3 (ref 0–0.05)
IMM GRANULOCYTES NFR BLD AUTO: 0.4 % (ref 0–0.5)
LYMPHOCYTES # BLD AUTO: 0.92 10*3/MM3 (ref 0.7–3.1)
LYMPHOCYTES NFR BLD AUTO: 8.9 % (ref 19.6–45.3)
Lab: ABNORMAL
MCH RBC QN AUTO: 26.1 PG (ref 26.6–33)
MCHC RBC AUTO-ENTMCNC: 31.5 G/DL (ref 31.5–35.7)
MCV RBC AUTO: 82.9 FL (ref 79–97)
MODALITY: ABNORMAL
MONOCYTES # BLD AUTO: 0.8 10*3/MM3 (ref 0.1–0.9)
MONOCYTES NFR BLD AUTO: 7.8 % (ref 5–12)
NEUTROPHILS NFR BLD AUTO: 8.49 10*3/MM3 (ref 1.7–7)
NEUTROPHILS NFR BLD AUTO: 82.5 % (ref 42.7–76)
NOTIFIED WHO: ABNORMAL
NRBC BLD AUTO-RTO: 0 /100 WBC (ref 0–0.2)
NT-PROBNP SERPL-MCNC: 4685 PG/ML (ref 0–1800)
PCO2 BLDA: 40.8 MM HG (ref 35–45)
PH BLDA: 7.41 PH UNITS (ref 7.35–7.45)
PLATELET # BLD AUTO: 316 10*3/MM3 (ref 140–450)
PMV BLD AUTO: 11 FL (ref 6–12)
PO2 BLDA: 50.5 MM HG (ref 80–100)
POTASSIUM SERPL-SCNC: 5.8 MMOL/L (ref 3.5–5.2)
PROCALCITONIN SERPL-MCNC: 0.1 NG/ML (ref 0–0.25)
PROT SERPL-MCNC: 6.5 G/DL (ref 6–8.5)
RBC # BLD AUTO: 4.56 10*6/MM3 (ref 4.14–5.8)
READ BACK: YES
SAO2 % BLDCOA: 85.6 % (ref 92–98.5)
SODIUM SERPL-SCNC: 143 MMOL/L (ref 136–145)
TOTAL RATE: 18 BREATHS/MINUTE
TROPONIN T DELTA: -4 NG/L
TROPONIN T SERPL HS-MCNC: 60 NG/L
TROPONIN T SERPL HS-MCNC: 64 NG/L
TSH SERPL DL<=0.05 MIU/L-ACNC: 1.65 UIU/ML (ref 0.27–4.2)
URATE SERPL-MCNC: 6.6 MG/DL (ref 3.4–7)
WBC NRBC COR # BLD: 10.29 10*3/MM3 (ref 3.4–10.8)

## 2023-11-08 PROCEDURE — 25010000002 FUROSEMIDE PER 20 MG: Performed by: HOSPITALIST

## 2023-11-08 PROCEDURE — 1160F RVW MEDS BY RX/DR IN RCRD: CPT | Performed by: INTERNAL MEDICINE

## 2023-11-08 PROCEDURE — 94760 N-INVAS EAR/PLS OXIMETRY 1: CPT

## 2023-11-08 PROCEDURE — 83880 ASSAY OF NATRIURETIC PEPTIDE: CPT | Performed by: INTERNAL MEDICINE

## 2023-11-08 PROCEDURE — 85025 COMPLETE CBC W/AUTO DIFF WBC: CPT | Performed by: INTERNAL MEDICINE

## 2023-11-08 PROCEDURE — 82803 BLOOD GASES ANY COMBINATION: CPT

## 2023-11-08 PROCEDURE — 36600 WITHDRAWAL OF ARTERIAL BLOOD: CPT

## 2023-11-08 PROCEDURE — 94640 AIRWAY INHALATION TREATMENT: CPT

## 2023-11-08 PROCEDURE — 84145 PROCALCITONIN (PCT): CPT | Performed by: HOSPITALIST

## 2023-11-08 PROCEDURE — 84443 ASSAY THYROID STIM HORMONE: CPT | Performed by: INTERNAL MEDICINE

## 2023-11-08 PROCEDURE — 94799 UNLISTED PULMONARY SVC/PX: CPT

## 2023-11-08 PROCEDURE — 71046 X-RAY EXAM CHEST 2 VIEWS: CPT

## 2023-11-08 PROCEDURE — 99215 OFFICE O/P EST HI 40 MIN: CPT | Performed by: INTERNAL MEDICINE

## 2023-11-08 PROCEDURE — 84550 ASSAY OF BLOOD/URIC ACID: CPT | Performed by: HOSPITALIST

## 2023-11-08 PROCEDURE — 93000 ELECTROCARDIOGRAM COMPLETE: CPT | Performed by: INTERNAL MEDICINE

## 2023-11-08 PROCEDURE — 80053 COMPREHEN METABOLIC PANEL: CPT | Performed by: INTERNAL MEDICINE

## 2023-11-08 PROCEDURE — 84484 ASSAY OF TROPONIN QUANT: CPT | Performed by: HOSPITALIST

## 2023-11-08 PROCEDURE — 1159F MED LIST DOCD IN RCRD: CPT | Performed by: INTERNAL MEDICINE

## 2023-11-08 PROCEDURE — 71250 CT THORAX DX C-: CPT

## 2023-11-08 PROCEDURE — 36415 COLL VENOUS BLD VENIPUNCTURE: CPT

## 2023-11-08 PROCEDURE — G2212 PROLONG OUTPT/OFFICE VIS: HCPCS | Performed by: INTERNAL MEDICINE

## 2023-11-08 RX ORDER — FUROSEMIDE 10 MG/ML
80 INJECTION INTRAMUSCULAR; INTRAVENOUS ONCE
Status: COMPLETED | OUTPATIENT
Start: 2023-11-08 | End: 2023-11-08

## 2023-11-08 RX ORDER — MULTIPLE VITAMINS W/ MINERALS TAB 9MG-400MCG
1 TAB ORAL DAILY
Status: DISCONTINUED | OUTPATIENT
Start: 2023-11-08 | End: 2023-11-14 | Stop reason: HOSPADM

## 2023-11-08 RX ORDER — ACETAMINOPHEN 325 MG/1
650 TABLET ORAL EVERY 4 HOURS PRN
Status: DISCONTINUED | OUTPATIENT
Start: 2023-11-08 | End: 2023-11-14 | Stop reason: HOSPADM

## 2023-11-08 RX ORDER — ONDANSETRON 2 MG/ML
4 INJECTION INTRAMUSCULAR; INTRAVENOUS EVERY 6 HOURS PRN
Status: DISCONTINUED | OUTPATIENT
Start: 2023-11-08 | End: 2023-11-14 | Stop reason: HOSPADM

## 2023-11-08 RX ORDER — FUROSEMIDE 10 MG/ML
40 INJECTION INTRAMUSCULAR; INTRAVENOUS EVERY 8 HOURS
Status: DISCONTINUED | OUTPATIENT
Start: 2023-11-08 | End: 2023-11-09

## 2023-11-08 RX ORDER — ONDANSETRON 4 MG/1
4 TABLET, FILM COATED ORAL EVERY 6 HOURS PRN
Status: DISCONTINUED | OUTPATIENT
Start: 2023-11-08 | End: 2023-11-14 | Stop reason: HOSPADM

## 2023-11-08 RX ORDER — BISACODYL 5 MG/1
5 TABLET, DELAYED RELEASE ORAL DAILY PRN
Status: DISCONTINUED | OUTPATIENT
Start: 2023-11-08 | End: 2023-11-14 | Stop reason: HOSPADM

## 2023-11-08 RX ORDER — NITROGLYCERIN 0.4 MG/1
0.4 TABLET SUBLINGUAL
Status: DISCONTINUED | OUTPATIENT
Start: 2023-11-08 | End: 2023-11-14 | Stop reason: HOSPADM

## 2023-11-08 RX ORDER — ACETAMINOPHEN 650 MG/1
650 SUPPOSITORY RECTAL EVERY 4 HOURS PRN
Status: DISCONTINUED | OUTPATIENT
Start: 2023-11-08 | End: 2023-11-14 | Stop reason: HOSPADM

## 2023-11-08 RX ORDER — SODIUM CHLORIDE 9 MG/ML
40 INJECTION, SOLUTION INTRAVENOUS AS NEEDED
Status: DISCONTINUED | OUTPATIENT
Start: 2023-11-08 | End: 2023-11-14 | Stop reason: HOSPADM

## 2023-11-08 RX ORDER — BISACODYL 10 MG
10 SUPPOSITORY, RECTAL RECTAL DAILY PRN
Status: DISCONTINUED | OUTPATIENT
Start: 2023-11-08 | End: 2023-11-14 | Stop reason: HOSPADM

## 2023-11-08 RX ORDER — ACETAMINOPHEN 160 MG/5ML
650 SOLUTION ORAL EVERY 4 HOURS PRN
Status: DISCONTINUED | OUTPATIENT
Start: 2023-11-08 | End: 2023-11-14 | Stop reason: HOSPADM

## 2023-11-08 RX ORDER — AMOXICILLIN 250 MG
2 CAPSULE ORAL 2 TIMES DAILY
Status: DISCONTINUED | OUTPATIENT
Start: 2023-11-08 | End: 2023-11-14 | Stop reason: HOSPADM

## 2023-11-08 RX ORDER — SODIUM CHLORIDE 0.9 % (FLUSH) 0.9 %
10 SYRINGE (ML) INJECTION EVERY 12 HOURS SCHEDULED
Status: DISCONTINUED | OUTPATIENT
Start: 2023-11-08 | End: 2023-11-14 | Stop reason: HOSPADM

## 2023-11-08 RX ORDER — SODIUM CHLORIDE 0.9 % (FLUSH) 0.9 %
10 SYRINGE (ML) INJECTION AS NEEDED
Status: DISCONTINUED | OUTPATIENT
Start: 2023-11-08 | End: 2023-11-14 | Stop reason: HOSPADM

## 2023-11-08 RX ORDER — IPRATROPIUM BROMIDE AND ALBUTEROL SULFATE 2.5; .5 MG/3ML; MG/3ML
3 SOLUTION RESPIRATORY (INHALATION)
Status: DISCONTINUED | OUTPATIENT
Start: 2023-11-08 | End: 2023-11-14 | Stop reason: HOSPADM

## 2023-11-08 RX ORDER — POLYETHYLENE GLYCOL 3350 17 G/17G
17 POWDER, FOR SOLUTION ORAL DAILY PRN
Status: DISCONTINUED | OUTPATIENT
Start: 2023-11-08 | End: 2023-11-14 | Stop reason: HOSPADM

## 2023-11-08 RX ADMIN — METOPROLOL TARTRATE 25 MG: 25 TABLET, FILM COATED ORAL at 16:36

## 2023-11-08 RX ADMIN — MULTIPLE VITAMINS W/ MINERALS TAB 1 TABLET: TAB at 16:36

## 2023-11-08 RX ADMIN — FUROSEMIDE 80 MG: 10 INJECTION, SOLUTION INTRAMUSCULAR; INTRAVENOUS at 17:03

## 2023-11-08 RX ADMIN — IPRATROPIUM BROMIDE AND ALBUTEROL SULFATE 3 ML: 2.5; .5 SOLUTION RESPIRATORY (INHALATION) at 20:47

## 2023-11-08 RX ADMIN — Medication 10 ML: at 21:48

## 2023-11-08 NOTE — H&P
Name: Mehreen Silva IV ADMIT: 2023   : 1940  PCP: Taiwo Powers MD    MRN: [id number] LOS: 0 days   AGE/SEX: 83 y.o. male  ROOM: [facility]     No chief complaint on file.      Subjective   Patient is a 83 y.o. male who presents to [facility] with the above chief complaint.  Symptoms started about 4 days ago.  He says his symptoms are mostly anxiety.  He feels anxious about what can happen to him he thinks there is something really wrong with him he worries about what would happen to his wife and son if there is something wrong with him.  He feels short of breath especially at rest.  He admits to some orthopnea but has trouble really saying that he is more short of breath since he does not move around much.  He mainly gets around in a wheelchair and is carried from place to place.  He has been doing this for a while.  He denies chest pain or palpitations.  He says he was admitted at [facility] earlier this year and was diuresed over about 4 days in the hospital there.  He has not been taking his Lasix at home.    History of Present Illness    Past Medical History:   Diagnosis Date    (HFpEF) heart failure with preserved ejection fraction 2022    Arthritis     Atrial fibrillation, persistent 2022    Cervical spondylosis with myelopathy     HLD (hyperlipidemia) 2022    Hx of toxic multinodular goiter 2022    Lumbar radiculopathy     Pulmonary hypertension      Past Surgical History:   Procedure Laterality Date    APPENDECTOMY      BACK SURGERY      COLONOSCOPY      EXCISION MASS TRUNK N/A 2016    Procedure: Excision soft tissue neoplasm on abdominal wall and left neck;  Surgeon: Shaji Barahona Jr., MD;  Location: [facility] OR;  Service:     KNEE SURGERY      NECK SURGERY  1974    SHOULDER SURGERY       Family History   Problem Relation Age of Onset    Cancer Mother     Heart disease Father     Aneurysm Father      Social History     Tobacco Use     Smoking status: Former     Types: Cigarettes     Quit date: 1983     Years since quittin.4    Smokeless tobacco: Never   Vaping Use    Vaping Use: Never used   Substance Use Topics    Alcohol use: Yes     Comment: SOCIAL    Drug use: No     Medications Prior to Admission   Medication Sig Dispense Refill Last Dose    apixaban (Eliquis) 5 MG tablet tablet Take 1 tablet by mouth Every 12 (Twelve) Hours. 180 tablet 3     cholecalciferol (VITAMIN D3) 25 MCG (1000 UT) tablet Take 1 tablet by mouth Daily.       ezetimibe (ZETIA) 10 MG tablet Take 1 tablet by mouth Daily.       furosemide (LASIX) 20 MG tablet Take 1 tablet by mouth 3 (Three) Times a Week. On Monday, Wed, Friday 45 tablet 1     methIMAzole (TAPAZOLE) 5 MG tablet Take 1 tablet by mouth. Takes 5 days - No Wednesday or sundays       metoprolol tartrate (LOPRESSOR) 25 MG tablet TAKE ONE TABLET BY MOUTH EVERY 12 HOURS 180 tablet 3     multivitamin with minerals tablet tablet Take 1 tablet by mouth Daily.       mupirocin (BACTROBAN) 2 % ointment Apply to affected area with each dressing change; change dressing at least once a day. 22 g 0      Allergies:  Patient has no known allergies.    Review of Systems     Objective    Vital Signs  Temp:  [96.9 °F (36.1 °C)] 96.9 °F (36.1 °C)  Heart Rate:  [] 66  Resp:  [18] 18  BP: (114-150)/(76-81) 150/81  SpO2:  [82 %-93 %] 93 %  on   ;   Device (Oxygen Therapy): room air  There is no height or weight on file to calculate BMI.    Physical Exam  Vitals and nursing note reviewed.   Constitutional:       General: He is not in acute distress.     Appearance: He is ill-appearing.   Cardiovascular:      Rate and Rhythm: Normal rate and regular rhythm.   Pulmonary:      Breath sounds: Wheezing and rales present.      Comments: His breathing is somewhat paradoxical.  He is using a lot of his belly and shoulders to breathe his chest does not rise as efficiently as it showed.  Abdominal:      General: Bowel sounds are  "normal. There is no distension.      Palpations: Abdomen is soft.      Tenderness: There is no abdominal tenderness.   Neurological:      Mental Status: He is alert and oriented to person, place, and time.         Results Review:   I reviewed the patient's new clinical results.  Results from last 7 days   Lab Units 11/08/23  1003   WBC 10*3/mm3 10.29   HEMOGLOBIN g/dL 11.9*   PLATELETS 10*3/mm3 316     Results from last 7 days   Lab Units 11/08/23  1003   SODIUM mmol/L 143   POTASSIUM mmol/L 5.8*   CHLORIDE mmol/L 105   CO2 mmol/L 28.7   BUN mg/dL 27*   CREATININE mg/dL 1.08   GLUCOSE mg/dL 116*   ALBUMIN g/dL 3.6   BILIRUBIN mg/dL 1.9*   ALK PHOS U/L 189*   AST (SGOT) U/L 33   ALT (SGPT) U/L 32   Estimated Creatinine Clearance: 54.8 mL/min (by C-G formula based on SCr of 1.08 mg/dL).  Results from last 7 days   Lab Units 11/08/23  1003   PROBNP pg/mL 4,685.0*         Invalid input(s): \"LDLCALC\"    FL Sniff Test    (Results Pending)   CT Chest Without Contrast Diagnostic    (Results Pending)     Assessment & Plan       Acute on chronic diastolic CHF (congestive heart failure)    Hx of toxic multinodular goiter    HLD (hyperlipidemia)    (HFpEF) heart failure with preserved ejection fraction    Atrial fibrillation, persistent    Thyroid disease    Cervical myelopathy    Hypoxia    Hyperkalemia    Hyperglycemia      Assessment & Plan  This is an 83-year-old gentleman with a history of cervical myelopathy, thyroid disease, chronic atrial fibrillation, chronic diastolic heart failure and impaired mobility who presents to the hospital with anxiety and shortness of breath from his cardiologist office where he was noted to be hypoxic with an elevated BNP concerning for acute on chronic diastolic heart failure with hypoxia.  -We will give him 1 dose of 80 mg of IV Lasix monitor his response and adjust his diuretic dosing.  Looks like cardiology is already ordered 3 times daily Lasix 40 mg IV.  We will monitor his renal " function electrolytes and adjust diuretics follow daily weights but this is difficult since he does not stand.  -We will defer to cardiology whether echocardiogram needs to be repeated.  The last one was about a year ago.  -His x-ray does show infiltrates bilaterally.  This is right greater than left.  I am a little bit concerned about dysphagia in this gentleman.  We will have speech therapy evaluate him but of also get a get a CT of the chest to evaluate his infiltrates closer.  -He does not have significant leukocytosis but does have a mild left shift.  We will monitor his white blood cell count and check a procalcitonin but hold off on empiric antibiotics.  -His breathing pattern while lying down is somewhat paradoxical.  His abdomen moves in the wrong direction with respiration and diaphragm does as well.  Not sure what to think about this.  Get a get a sniff test today to evaluate his diaphragm and follow with the CT scan as listed above.  We will also check an ABG.  He cannot speak more than 3-4 words without taking a breath but his breath and respiration seems somewhat shallow.  Not currently hypoxic.  -Discussed with Dr. Chan today she is quite concerned about his impaired mobility and his progressive weakness.  I have asked neurology to evaluate.  He does have a known cervical myelopathy and has had 5 back surgeries in the past.  Certainly this could be part of the problem but we will follow-up their evaluation.  -Noted hyperkalemia and hyperglycemia.  I am expecting his potassium to drop with diuretics we will monitor his electrolytes and may need to aggressively treat if needed.  For his hyperglycemia we will check an A1c but no history of diabetes.  Could just be reactive.  -Speech physical and Occupational Therapy of all been consulted.      I discussed the patients findings and my recommendations with patient and nursing staff.          Kristopher Soliman MD  [facility]  11/08/23  14:43 EST       Chest CT reviewed and Right Thoracentesis ordered for the AM plan to hold Eliquis.    Other abnormalities noted, fluid studies ordered.  Procal is low less likely to be infectious at this point

## 2023-11-08 NOTE — PROGRESS NOTES
Date of Office Visit: 23  Encounter Provider: Huang Chan MD  Place of Service: [facility] CARDIOLOGY  Patient Name: Mehreen Silva IV  :1940    Chief Complaint   Patient presents with    Congestive Heart Failure   :   HPI:     Mr. Silva is 83 y.o. and presents today to follow up. I have reviewed prior notes and there are no changes except for any new updates described below. I have also reviewed any information entered into the medical record by the patient or by ancillary staff.     He presented in 2022 with dyspnea; he was found to have acute diastolic CHF and atrial fibrillation. An echo revealed normal LVSF (60%), moderate LVH, bi-atrial dilation, and very mild PHTN. He was started on metoprolol, apixaban, and furosemide.     When I saw him last, his previous primary care physician had decreased furosemide dose due to urinary frequency.  However, the patient stopped taking it completely.  When I saw him in the office he was volume overloaded and I asked him to resume furosemide.    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  He can no longer walk.  He is losing his muscle mass.  He is anxious and depressed and frustrated.  Over the last 4 to 5 days he has had increasing shortness of breath, worsening edema, and he is now having episodes of PND.  He again complains about the urinary frequency with the diuretic, and I wonder if he is taking it as directed.    Past Medical History:   Diagnosis Date    (HFpEF) heart failure with preserved ejection fraction 2022    Arthritis     Atrial fibrillation, persistent 2022    Cervical spondylosis with myelopathy     HLD (hyperlipidemia) 2022    Hx of toxic multinodular goiter 2022    Lumbar radiculopathy     Pulmonary hypertension        Past Surgical History:   Procedure Laterality Date    APPENDECTOMY       BACK SURGERY      COLONOSCOPY      EXCISION MASS TRUNK N/A 2016    Procedure: Excision soft tissue neoplasm on abdominal wall and left neck;  Surgeon: Shaji Barahona Jr., MD;  Location: [facility];  Service:     KNEE SURGERY      NECK SURGERY  1974    SHOULDER SURGERY         Social History     Socioeconomic History    Marital status:     Number of children: 1    Highest education level: Professional school degree (e.g., MD, DDS, DVM, BRITNEY)   Tobacco Use    Smoking status: Former     Types: Cigarettes     Quit date: 1983     Years since quittin.4    Smokeless tobacco: Never   Vaping Use    Vaping Use: Never used   Substance and Sexual Activity    Alcohol use: Yes     Comment: SOCIAL    Drug use: No    Sexual activity: Defer       Family History   Problem Relation Age of Onset    Cancer Mother     Heart disease Father     Aneurysm Father        Review of Systems   Constitutional: Positive for malaise/fatigue.   Musculoskeletal:  Positive for muscle weakness.   Neurological:  Positive for weakness.   Psychiatric/Behavioral:  Positive for depression.      No Known Allergies      Current Outpatient Medications:     apixaban (Eliquis) 5 MG tablet tablet, Take 1 tablet by mouth Every 12 (Twelve) Hours., Disp: 180 tablet, Rfl: 3    cholecalciferol (VITAMIN D3) 25 MCG (1000 UT) tablet, Take 1 tablet by mouth Daily., Disp: , Rfl:     ezetimibe (ZETIA) 10 MG tablet, Take 1 tablet by mouth Daily., Disp: , Rfl:     furosemide (LASIX) 20 MG tablet, Take 1 tablet by mouth 3 (Three) Times a Week. On Monday, Wed, Friday, Disp: 45 tablet, Rfl: 1    methIMAzole (TAPAZOLE) 5 MG tablet, Take 1 tablet by mouth. Takes 5 days - No Wednesday or sundays, Disp: , Rfl:     metoprolol tartrate (LOPRESSOR) 25 MG tablet, TAKE ONE TABLET BY MOUTH EVERY 12 HOURS, Disp: 180 tablet, Rfl: 3    multivitamin with minerals tablet tablet, Take 1 tablet by mouth Daily., Disp: , Rfl:     mupirocin (BACTROBAN) 2 % ointment, Apply to  "affected area with each dressing change; change dressing at least once a day., Disp: 22 g, Rfl: 0      Objective:     Vitals:    11/08/23 0929   BP: 114/76   BP Location: Left arm   Pulse: 107   Weight: 74.8 kg (165 lb)   Height: 177.8 cm (70\")       Body mass index is 23.68 kg/m².    Vitals reviewed.   Constitutional:       Appearance: Well-developed and not in distress.      Comments: In a wheelchair, [place]   Eyes:      Conjunctiva/sclera: Conjunctivae normal.   HENT:      Head: Normocephalic.      Nose: Nose normal.   Neck:      Vascular: No JVD. JVD normal.      Lymphadenopathy: No cervical adenopathy.   Pulmonary:      Effort: Pulmonary effort is normal.      Breath sounds: Normal breath sounds.   Cardiovascular:      Normal rate. Irregular rhythm.      Murmurs: There is no murmur.   Pulses:     Intact distal pulses.   Abdominal:      Palpations: Abdomen is soft.      Tenderness: There is no abdominal tenderness.   Musculoskeletal: Normal range of motion.      Cervical back: Normal range of motion. Skin:     General: Skin is warm and dry.      Findings: No rash.   Neurological:      General: No focal deficit present.      Mental Status: Alert and oriented to person, place, and time.      Cranial Nerves: No cranial nerve deficit.   Psychiatric:         Behavior: Behavior normal.         Thought Content: Thought content normal.         Judgment: Judgment normal.           ECG 12 Lead    Date/Time: 11/8/2023 9:40 AM  Performed by: Huang Chan MD    Authorized by: Huang Chan MD  Comparison: compared with previous ECG   Similar to previous ECG  Rhythm: atrial fibrillation  Conduction: conduction normal  ST Segments: ST segments normal  T Waves: T waves normal  QRS axis: normal  Other: no other findings    Clinical impression: abnormal EKG          Assessment:       Diagnosis Plan   1. Chronic heart failure with preserved ejection fraction  ECG 12 Lead    CBC & Differential    proBNP    Comprehensive Metabolic " Panel    TSH    XR Chest 2 View      2. Atrial fibrillation, persistent  ECG 12 Lead    CBC & Differential    proBNP    Comprehensive Metabolic Panel    TSH    XR Chest 2 View      3. Shortness of breath  CBC & Differential    proBNP    Comprehensive Metabolic Panel    TSH    XR Chest 2 View            Plan:       Mr Silva has permanent atrial fibrillation. He has significant atrial dilation. I feel that the likelihood of successful rhythm control is very small and that we should proceed with rate control. He will remain on metoprolol and apixaban.    He is volume overloaded and visibly short of breath with even speaking.  His oxygen saturation on room air was 81-82%.  He has decreased breath sounds at both bases, right greater than left.  His proBNP is greater than 4000 and a chest x-ray shows diffuse pulmonary edema and bilateral effusions.  He is going to require direct admission to the hospital for IV diuresis.  I do not think we need to repeat an echocardiogram as this is a known diagnosis.  I suspect that his heart rate will improve as his oxygenation improves.    Unfortunately, he struggles with the diuretic at home because of his limited mobility.  However, if not taken regularly, this is going to be a chronic issue.  I am worried about his progressive neurological decline and lack of a diagnosis that seems to explain the severity of his condition.  He has been referred to neurology as an outpatient but no appointment has been made yet.  He is coughing in the room, which may be heart failure, but I worry that he could be having aspiration or even diaphragmatic dysfunction that could be contributing things as well.  His mood seems poor; he has worsening memory loss and increasing frustration and anxiety.  I do believe that he is very frightened of his disease progression, and I wonder if some additional goals of care discussions could occur while he is there with palliative care.    Between the office visit,  his care while in [facility], and discussing result findings with him several times, discussing his case with his primary care physician by phone, and discussing his case with Dr. Soliman, the hospitalist, I have spent 75 minutes directly involved in his care.    Sincerely,       Huang Chan MD

## 2023-11-09 ENCOUNTER — APPOINTMENT (OUTPATIENT)
Dept: CARDIOLOGY | Facility: HOSPITAL | Age: 83
DRG: 291 | End: 2023-11-09
Payer: MEDICARE

## 2023-11-09 ENCOUNTER — APPOINTMENT (OUTPATIENT)
Dept: GENERAL RADIOLOGY | Facility: HOSPITAL | Age: 83
DRG: 291 | End: 2023-11-09
Payer: MEDICARE

## 2023-11-09 ENCOUNTER — APPOINTMENT (OUTPATIENT)
Dept: ULTRASOUND IMAGING | Facility: HOSPITAL | Age: 83
DRG: 291 | End: 2023-11-09
Payer: MEDICARE

## 2023-11-09 LAB
ANION GAP SERPL CALCULATED.3IONS-SCNC: 10.9 MMOL/L (ref 5–15)
AORTIC DIMENSIONLESS INDEX: 0.7 (DI)
APPEARANCE FLD: ABNORMAL
ASCENDING AORTA: 3.2 CM
BH CV ECHO MEAS - ACS: 2.6 CM
BH CV ECHO MEAS - AO MAX PG: 8.6 MMHG
BH CV ECHO MEAS - AO MEAN PG: 5 MMHG
BH CV ECHO MEAS - AO ROOT DIAM: 3.1 CM
BH CV ECHO MEAS - AO V2 MAX: 147 CM/SEC
BH CV ECHO MEAS - AO V2 VTI: 33.4 CM
BH CV ECHO MEAS - AVA(I,D): 2.11 CM2
BH CV ECHO MEAS - EDV(CUBED): 27 ML
BH CV ECHO MEAS - EDV(MOD-SP2): 67 ML
BH CV ECHO MEAS - EDV(MOD-SP4): 66 ML
BH CV ECHO MEAS - EF(MOD-BP): 65.4 %
BH CV ECHO MEAS - EF(MOD-SP2): 67.2 %
BH CV ECHO MEAS - EF(MOD-SP4): 66.7 %
BH CV ECHO MEAS - ESV(CUBED): 9 ML
BH CV ECHO MEAS - ESV(MOD-SP2): 22 ML
BH CV ECHO MEAS - ESV(MOD-SP4): 22 ML
BH CV ECHO MEAS - FS: 30.7 %
BH CV ECHO MEAS - IVS/LVPW: 1.08 CM
BH CV ECHO MEAS - IVSD: 1.78 CM
BH CV ECHO MEAS - LA DIMENSION: 3.5 CM
BH CV ECHO MEAS - LV DIASTOLIC VOL/BSA (35-75): 33.8 CM2
BH CV ECHO MEAS - LV MASS(C)D: 199.3 GRAMS
BH CV ECHO MEAS - LV MAX PG: 5.8 MMHG
BH CV ECHO MEAS - LV MEAN PG: 3 MMHG
BH CV ECHO MEAS - LV SYSTOLIC VOL/BSA (12-30): 11.3 CM2
BH CV ECHO MEAS - LV V1 MAX: 120 CM/SEC
BH CV ECHO MEAS - LV V1 VTI: 22.4 CM
BH CV ECHO MEAS - LVIDD: 3 CM
BH CV ECHO MEAS - LVIDS: 2.08 CM
BH CV ECHO MEAS - LVOT AREA: 3.1 CM2
BH CV ECHO MEAS - LVOT DIAM: 2 CM
BH CV ECHO MEAS - LVPWD: 1.65 CM
BH CV ECHO MEAS - MR MAX PG: 75 MMHG
BH CV ECHO MEAS - MR MAX VEL: 433 CM/SEC
BH CV ECHO MEAS - MR MEAN PG: 51 MMHG
BH CV ECHO MEAS - MR MEAN VEL: 341 CM/SEC
BH CV ECHO MEAS - MR VTI: 137 CM
BH CV ECHO MEAS - MV DEC SLOPE: 742.5 CM/SEC2
BH CV ECHO MEAS - MV DEC TIME: 135 SEC
BH CV ECHO MEAS - MV E MAX VEL: 110 CM/SEC
BH CV ECHO MEAS - MV MAX PG: 8.1 MMHG
BH CV ECHO MEAS - MV MEAN PG: 3 MMHG
BH CV ECHO MEAS - MV P1/2T: 60.7 MSEC
BH CV ECHO MEAS - MV V2 VTI: 26.6 CM
BH CV ECHO MEAS - MVA(P1/2T): 3.6 CM2
BH CV ECHO MEAS - MVA(VTI): 2.6 CM2
BH CV ECHO MEAS - PA ACC TIME: 0.12 SEC
BH CV ECHO MEAS - PA V2 MAX: 82.8 CM/SEC
BH CV ECHO MEAS - PI END-D VEL: 96.4 CM/SEC
BH CV ECHO MEAS - QP/QS: 1.68
BH CV ECHO MEAS - RAP SYSTOLE: 3 MMHG
BH CV ECHO MEAS - RV MAX PG: 2.06 MMHG
BH CV ECHO MEAS - RV V1 MAX: 71.8 CM/SEC
BH CV ECHO MEAS - RV V1 VTI: 17.9 CM
BH CV ECHO MEAS - RVDD: 2.04 CM
BH CV ECHO MEAS - RVOT DIAM: 2.9 CM
BH CV ECHO MEAS - RVSP: 40.7 MMHG
BH CV ECHO MEAS - SI(MOD-SP2): 23 ML/M2
BH CV ECHO MEAS - SI(MOD-SP4): 22.5 ML/M2
BH CV ECHO MEAS - SV(LVOT): 70.4 ML
BH CV ECHO MEAS - SV(MOD-SP2): 45 ML
BH CV ECHO MEAS - SV(MOD-SP4): 44 ML
BH CV ECHO MEAS - SV(RVOT): 118.2 ML
BH CV ECHO MEAS - TAPSE (>1.6): 1.8 CM
BH CV ECHO MEAS - TR MAX PG: 37.7 MMHG
BH CV ECHO MEAS - TR MAX VEL: 307 CM/SEC
BH CV XLRA - RV BASE: 3.6 CM
BH CV XLRA - RV LENGTH: 6.5 CM
BH CV XLRA - RV MID: 2.49 CM
BUN SERPL-MCNC: 28 MG/DL (ref 8–23)
BUN/CREAT SERPL: 26.4 (ref 7–25)
CALCIUM SPEC-SCNC: 9 MG/DL (ref 8.6–10.5)
CHLORIDE SERPL-SCNC: 105 MMOL/L (ref 98–107)
CO2 SERPL-SCNC: 30.1 MMOL/L (ref 22–29)
COLOR FLD: YELLOW
CREAT SERPL-MCNC: 1.06 MG/DL (ref 0.76–1.27)
DEPRECATED RDW RBC AUTO: 44.5 FL (ref 37–54)
EGFRCR SERPLBLD CKD-EPI 2021: 69.6 ML/MIN/1.73
ERYTHROCYTE [DISTWIDTH] IN BLOOD BY AUTOMATED COUNT: 15.2 % (ref 12.3–15.4)
GLUCOSE SERPL-MCNC: 80 MG/DL (ref 65–99)
HBA1C MFR BLD: 5.4 % (ref 4.8–5.6)
HCT VFR BLD AUTO: 34 % (ref 37.5–51)
HGB BLD-MCNC: 11.1 G/DL (ref 13–17.7)
INR PPP: 1.42 (ref 0.9–1.1)
LDH FLD-CCNC: 58 U/L
LDH SERPL-CCNC: 280 U/L (ref 135–225)
LEFT ATRIUM VOLUME INDEX: 29.9 ML/M2
LYMPHOCYTES NFR FLD MANUAL: 16 %
MAGNESIUM SERPL-MCNC: 2.3 MG/DL (ref 1.6–2.4)
MCH RBC QN AUTO: 26.4 PG (ref 26.6–33)
MCHC RBC AUTO-ENTMCNC: 32.6 G/DL (ref 31.5–35.7)
MCV RBC AUTO: 81 FL (ref 79–97)
METHOD: ABNORMAL
MONOCYTES NFR FLD: 45 %
MONOS+MACROS NFR FLD: 29 %
NEUTROPHILS NFR FLD MANUAL: 10 %
NUC CELL # FLD: 111 /MM3
PHOSPHATE SERPL-MCNC: 4.1 MG/DL (ref 2.5–4.5)
PLATELET # BLD AUTO: 249 10*3/MM3 (ref 140–450)
PMV BLD AUTO: 11.3 FL (ref 6–12)
POTASSIUM SERPL-SCNC: 3.9 MMOL/L (ref 3.5–5.2)
PROT FLD-MCNC: 1.4 G/DL
PROT SERPL-MCNC: 5.8 G/DL (ref 6–8.5)
PROTHROMBIN TIME: 17.5 SECONDS (ref 11.7–14.2)
RBC # BLD AUTO: 4.2 10*6/MM3 (ref 4.14–5.8)
RBC # FLD AUTO: 14 /MM3
SINUS: 2.6 CM
SODIUM SERPL-SCNC: 146 MMOL/L (ref 136–145)
STJ: 2.7 CM
URATE SERPL-MCNC: 8.4 MG/DL (ref 3.4–7)
WBC NRBC COR # BLD: 8.68 10*3/MM3 (ref 3.4–10.8)

## 2023-11-09 PROCEDURE — 93306 TTE W/DOPPLER COMPLETE: CPT

## 2023-11-09 PROCEDURE — 94760 N-INVAS EAR/PLS OXIMETRY 1: CPT

## 2023-11-09 PROCEDURE — 87070 CULTURE OTHR SPECIMN AEROBIC: CPT | Performed by: HOSPITALIST

## 2023-11-09 PROCEDURE — 99222 1ST HOSP IP/OBS MODERATE 55: CPT | Performed by: NURSE PRACTITIONER

## 2023-11-09 PROCEDURE — 85027 COMPLETE CBC AUTOMATED: CPT | Performed by: HOSPITALIST

## 2023-11-09 PROCEDURE — 94799 UNLISTED PULMONARY SVC/PX: CPT

## 2023-11-09 PROCEDURE — 0W993ZX DRAINAGE OF RIGHT PLEURAL CAVITY, PERCUTANEOUS APPROACH, DIAGNOSTIC: ICD-10-PCS | Performed by: RADIOLOGY

## 2023-11-09 PROCEDURE — 25010000002 FUROSEMIDE PER 20 MG: Performed by: INTERNAL MEDICINE

## 2023-11-09 PROCEDURE — 93306 TTE W/DOPPLER COMPLETE: CPT | Performed by: INTERNAL MEDICINE

## 2023-11-09 PROCEDURE — 87205 SMEAR GRAM STAIN: CPT | Performed by: HOSPITALIST

## 2023-11-09 PROCEDURE — 83036 HEMOGLOBIN GLYCOSYLATED A1C: CPT | Performed by: INTERNAL MEDICINE

## 2023-11-09 PROCEDURE — 84100 ASSAY OF PHOSPHORUS: CPT | Performed by: HOSPITALIST

## 2023-11-09 PROCEDURE — 85610 PROTHROMBIN TIME: CPT | Performed by: HOSPITALIST

## 2023-11-09 PROCEDURE — 88112 CYTOPATH CELL ENHANCE TECH: CPT | Performed by: HOSPITALIST

## 2023-11-09 PROCEDURE — 84550 ASSAY OF BLOOD/URIC ACID: CPT | Performed by: HOSPITALIST

## 2023-11-09 PROCEDURE — 94664 DEMO&/EVAL PT USE INHALER: CPT

## 2023-11-09 PROCEDURE — 80048 BASIC METABOLIC PNL TOTAL CA: CPT | Performed by: HOSPITALIST

## 2023-11-09 PROCEDURE — 99232 SBSQ HOSP IP/OBS MODERATE 35: CPT | Performed by: STUDENT IN AN ORGANIZED HEALTH CARE EDUCATION/TRAINING PROGRAM

## 2023-11-09 PROCEDURE — 76000 FLUOROSCOPY <1 HR PHYS/QHP: CPT

## 2023-11-09 PROCEDURE — 89051 BODY FLUID CELL COUNT: CPT | Performed by: HOSPITALIST

## 2023-11-09 PROCEDURE — 84155 ASSAY OF PROTEIN SERUM: CPT | Performed by: HOSPITALIST

## 2023-11-09 PROCEDURE — 83615 LACTATE (LD) (LDH) ENZYME: CPT | Performed by: HOSPITALIST

## 2023-11-09 PROCEDURE — 83735 ASSAY OF MAGNESIUM: CPT | Performed by: HOSPITALIST

## 2023-11-09 PROCEDURE — 88305 TISSUE EXAM BY PATHOLOGIST: CPT | Performed by: HOSPITALIST

## 2023-11-09 PROCEDURE — 25010000002 LIDOCAINE 1 % SOLUTION: Performed by: RADIOLOGY

## 2023-11-09 PROCEDURE — 76942 ECHO GUIDE FOR BIOPSY: CPT

## 2023-11-09 PROCEDURE — 25010000002 FUROSEMIDE PER 20 MG: Performed by: STUDENT IN AN ORGANIZED HEALTH CARE EDUCATION/TRAINING PROGRAM

## 2023-11-09 PROCEDURE — 84157 ASSAY OF PROTEIN OTHER: CPT | Performed by: HOSPITALIST

## 2023-11-09 PROCEDURE — 25510000001 PERFLUTREN (DEFINITY) 8.476 MG IN SODIUM CHLORIDE (PF) 0.9 % 10 ML INJECTION: Performed by: HOSPITALIST

## 2023-11-09 PROCEDURE — 94761 N-INVAS EAR/PLS OXIMETRY MLT: CPT

## 2023-11-09 RX ORDER — FUROSEMIDE 10 MG/ML
40 INJECTION INTRAMUSCULAR; INTRAVENOUS ONCE
Status: COMPLETED | OUTPATIENT
Start: 2023-11-09 | End: 2023-11-09

## 2023-11-09 RX ORDER — LIDOCAINE HYDROCHLORIDE 10 MG/ML
10 INJECTION, SOLUTION INFILTRATION; PERINEURAL ONCE
Status: COMPLETED | OUTPATIENT
Start: 2023-11-09 | End: 2023-11-09

## 2023-11-09 RX ADMIN — Medication 10 ML: at 21:19

## 2023-11-09 RX ADMIN — METOPROLOL TARTRATE 25 MG: 25 TABLET, FILM COATED ORAL at 16:06

## 2023-11-09 RX ADMIN — IPRATROPIUM BROMIDE AND ALBUTEROL SULFATE 3 ML: 2.5; .5 SOLUTION RESPIRATORY (INHALATION) at 15:32

## 2023-11-09 RX ADMIN — METOPROLOL TARTRATE 25 MG: 25 TABLET, FILM COATED ORAL at 04:04

## 2023-11-09 RX ADMIN — MULTIPLE VITAMINS W/ MINERALS TAB 1 TABLET: TAB at 09:24

## 2023-11-09 RX ADMIN — DOCUSATE SODIUM 50MG AND SENNOSIDES 8.6MG 2 TABLET: 8.6; 5 TABLET, FILM COATED ORAL at 21:19

## 2023-11-09 RX ADMIN — FUROSEMIDE 40 MG: 10 INJECTION, SOLUTION INTRAMUSCULAR; INTRAVENOUS at 19:47

## 2023-11-09 RX ADMIN — METOPROLOL TARTRATE 25 MG: 25 TABLET, FILM COATED ORAL at 09:27

## 2023-11-09 RX ADMIN — Medication 10 ML: at 09:25

## 2023-11-09 RX ADMIN — PERFLUTREN 2 ML: 6.52 INJECTION, SUSPENSION INTRAVENOUS at 13:52

## 2023-11-09 RX ADMIN — METOPROLOL TARTRATE 25 MG: 25 TABLET, FILM COATED ORAL at 21:19

## 2023-11-09 RX ADMIN — FUROSEMIDE 40 MG: 10 INJECTION, SOLUTION INTRAMUSCULAR; INTRAVENOUS at 00:07

## 2023-11-09 RX ADMIN — IPRATROPIUM BROMIDE AND ALBUTEROL SULFATE 3 ML: 2.5; .5 SOLUTION RESPIRATORY (INHALATION) at 20:14

## 2023-11-09 RX ADMIN — DOCUSATE SODIUM 50MG AND SENNOSIDES 8.6MG 2 TABLET: 8.6; 5 TABLET, FILM COATED ORAL at 09:24

## 2023-11-09 RX ADMIN — LIDOCAINE HYDROCHLORIDE 7 ML: 10 INJECTION, SOLUTION INFILTRATION; PERINEURAL at 11:59

## 2023-11-09 RX ADMIN — IPRATROPIUM BROMIDE AND ALBUTEROL SULFATE 3 ML: 2.5; .5 SOLUTION RESPIRATORY (INHALATION) at 07:46

## 2023-11-09 RX ADMIN — FUROSEMIDE 40 MG: 10 INJECTION, SOLUTION INTRAMUSCULAR; INTRAVENOUS at 07:02

## 2023-11-09 NOTE — PLAN OF CARE
Goal Outcome Evaluation:  Plan of Care Reviewed With: patient        Progress: no change  Outcome Evaluation: Pt on 1L NC, Afib on monitor. Purewick in place with good output. Q2 turns. Thoracentesis today. Rested well. Will continue to monitor.

## 2023-11-09 NOTE — PROGRESS NOTES
LOS: 1 day   Patient Care Team:  Taiwo Powers MD as PCP - General (Internal Medicine)  Self, Bess YO MD as Consulting Physician (Endocrinology)  Huang Chan MD as Cardiologist (Cardiology)    Chief Complaint:  Following for decompensated diastolic CHF    Interval History:   Net -3.3 L    CT chest shows probable multifocal pneumonia and large right and moderate left pleural effusion.  There is also a masslike area within the right chest wall contiguous to the pectoralis muscle which could represent malignancy.    He underwent thoracentesis at the time my evaluation, studies pending.  He is having significant productive cough likely related to atelectasis, but otherwise states his breathing is a lot better.    Objective   Vital Signs  Temp:  [96.9 °F (36.1 °C)-98.5 °F (36.9 °C)] 98.5 °F (36.9 °C)  Heart Rate:  [] 125  Resp:  [18] 18  BP: (102-150)/(61-81) 105/68    Intake/Output Summary (Last 24 hours) at 11/9/2023 0834  Last data filed at 11/9/2023 0407  Gross per 24 hour   Intake 240 ml   Output 3600 ml   Net -3360 ml       Comfortable NAD, resting in bed  PERRL, conjunctivae clear  Neck supple, JVD noted to mid neck  S1/S2 irregularly irregular, no m/r/g  Coarse breath sounds noted diffusely.  Abdomen S/NT/ND (+) BS, no HSM appreciated  Extremities warm, no clubbing, cyanosis, 1+ RLE, RUE edema  Moves all 4 extremities well without deficits  No visible or palpable skin lesions  A/Ox4, mood and affect appropriate    Results Review:      Results from last 7 days   Lab Units 11/09/23  0644 11/08/23  1003   SODIUM mmol/L 146* 143   POTASSIUM mmol/L 3.9 5.8*   CHLORIDE mmol/L 105 105   CO2 mmol/L 30.1* 28.7   BUN mg/dL 28* 27*   CREATININE mg/dL 1.06 1.08   GLUCOSE mg/dL 80 116*   CALCIUM mg/dL 9.0 9.5     Results from last 7 days   Lab Units 11/08/23  2048 11/08/23  1534 11/08/23  1003   HSTROP T ng/L 64* 56* 60*     Results from last 7 days   Lab Units 11/09/23  0644 11/08/23  1003   WBC  10*3/mm3 8.68 10.29   HEMOGLOBIN g/dL 11.1* 11.9*   HEMATOCRIT % 34.0* 37.8   PLATELETS 10*3/mm3 249 316     Results from last 7 days   Lab Units 11/09/23  0644   INR  1.42*         Results from last 7 days   Lab Units 11/09/23  0644   MAGNESIUM mg/dL 2.3           I reviewed the patient's new clinical results.  I personally viewed and interpreted the patient's EKG/Telemetry data        Medication Review:   furosemide, 40 mg, Intravenous, Q8H  ipratropium-albuterol, 3 mL, Nebulization, 4x Daily - RT  metoprolol tartrate, 25 mg, Oral, Q12H  multivitamin with minerals, 1 tablet, Oral, Daily  senna-docusate sodium, 2 tablet, Oral, BID  sodium chloride, 10 mL, Intravenous, Q12H        hold, 1 each        Assessment & Plan       Acute on chronic diastolic CHF (congestive heart failure)    Hx of toxic multinodular goiter    HLD (hyperlipidemia)    (HFpEF) heart failure with preserved ejection fraction    Atrial fibrillation, persistent    Thyroid disease    Cervical myelopathy    Hypoxia    Hyperkalemia    Hyperglycemia    Acute on chronic decompensated diastolic heart failure.  Continue diuresis.  Lasix was stopped by his primary care due to worsening urinary incontinence  We will give a dose renal dose of Lasix IV 40 this afternoon, he still has some JVD on exam and his echo did show mildly elevated right atrial pressure  Echo shows mildly elevated PA pressures when compared to previous echo 1 year ago  There is a thickened segment in the RV that appears consistent with a thickened papillary muscle.  It is nonmobile.  This was also noted on previous echocardiogram 1 year ago.,  Stable in size.  Unlikely thrombus, patient is on Eliquis  Acute on chronic hypoxic respite failure, likely related to pneumonia versus aspiration  Bilateral pleural effusions  Right chest wall mass, predominately right-sided pleural effusion  Agree with thoracentesis.  We will need to establish if it is a transudate or exudate  Concerns for  neurodegenerative disease, weakness, cachexia  Atrial fibrillation, probably permanent  Rates currently controlled.  Reasonable to hold his anticoagulation for procedures  His rates do remain a little bit high.  Reasonable given his illness, but for now we will increase his metoprolol to 25 mg every 6 hours    Thank you for the consult.  We will continue diuresis for now, awaiting above testing and studies    Hector Youssef MD  11/09/23  08:34 EST      Part of this note may be an electronic transcription/translation of spoken language to printed text using the Dragon Dictation System.

## 2023-11-09 NOTE — SIGNIFICANT NOTE
11/09/23 1405   OTHER   Discipline occupational therapist   Rehab Time/Intention   Session Not Performed patient unavailable for evaluation  (Pt [facility] for cardiology. OT will f/u tomorrow 11/9.)   Recommendation   OT - Next Appointment 11/10/23

## 2023-11-09 NOTE — PROGRESS NOTES
"Nutrition Services    Patient Name:  Mehreen Silva IV  YOB: 1940  MRN: [id number]  Admit Date:  11/8/2023    Assessment Date:  11/09/23    NUTRITION SCREENING      Reason for Encounter MST score 2+, Nonhealing wound or pressure ulcer   Diagnosis/Problem Acute on Chronic CHF, HLD, Afib, hyperkalemia, hyperglycemia       PO Diet Diet: Cardiac Diets; Healthy Heart (2-3 Na+); Texture: Regular Texture (IDDSI 7); Fluid Consistency: Thin (IDDSI 0)   Supplements    PO Intake % 50%       Medications MAR reviewed by RD   Labs  Listed below, reviewed   Physical Findings Alert, disoriented to time, O2 therapy   GI Function Small BM 11/8   Skin Status R heel pi- unstageable       Height  Weight  BMI  Weight Trend     Height: 177.8 cm (70\")  Weight: 77.6 kg (171 lb) (11/09/23 1304)  Body mass index is 24.54 kg/m².  Gain       Nutrition Problem (PES) Increased nutrient needs related to protein needs AEB pressure injury.       Intervention/Plan Mauricio BID (mixed with crystal light) to promote wound healing. Boost once daily at lunch.     RD to follow up per protocol.     Results from last 7 days   Lab Units 11/09/23  0644 11/08/23  1003   SODIUM mmol/L 146* 143   POTASSIUM mmol/L 3.9 5.8*   CHLORIDE mmol/L 105 105   CO2 mmol/L 30.1* 28.7   BUN mg/dL 28* 27*   CREATININE mg/dL 1.06 1.08   CALCIUM mg/dL 9.0 9.5   BILIRUBIN mg/dL  --  1.9*   ALK PHOS U/L  --  189*   ALT (SGPT) U/L  --  32   AST (SGOT) U/L  --  33   GLUCOSE mg/dL 80 116*     Results from last 7 days   Lab Units 11/09/23  0644   MAGNESIUM mg/dL 2.3   PHOSPHORUS mg/dL 4.1   HEMOGLOBIN g/dL 11.1*   HEMATOCRIT % 34.0*     Lab Results   Component Value Date    HGBA1C 5.40 11/09/2023         Electronically signed by:  Emma Daigle RD  11/09/23 14:23 EST  "

## 2023-11-09 NOTE — CONSULTS
"Neurology Consult Note    Consult Date: 11/9/2023    Referring MD: Huang Chan MD    Reason for Consult I have been asked to see the patient in neurological consultation to render advice and opinion regarding \"myelopathy vs neuropathy\"    Mehreen Silva IV is a 83 y.o. male with A-fib on Eliquis, pulmonary hypertension, CHF, cervical spondylosis with myelopathy and lumbar radiculopathy status post previous surgeries, and toxic multinodular goiter who was admitted with shortness of air, and hypoxia, diagnosed with acute on chronic CHF and pneumonia.  Neurology consulted due to decline in mobility.  There was also mention of abnormal diaphragm movement on exam.    History provided by the patient and his wife.  He has a longstanding history of neck and back problems.  He underwent 2 cervical surgeries with fusion in 1974 and 4 lumbar surgeries in the early 2000's.  Since his cervical surgeries he has had right arm and leg weakness. He has also had progressively worsening lower extremity weakness and associated balance issues. His wife he has had a limp and right foot drop for as long as she has been  to him, 38 years.  He used a cane for many years, then a walker for 3 to 4 years and has progressed to using a wheelchair for the last year.  He no longer drives.  They report these changes have been gradual with no acute worsening.  No change in bowel or bladder.  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  He has severe right leg weakness on exam and leg is contracted which his wife states is not new.Cognitively he has not had any issues and continues to work 1 to 2 days a week as an .      He denies any change in " speech, difficulty swallowing, change in vision or double vision.  No difficulty breathing until he developed shortness of air and cough over the last few days.  There is concern he may have dysphagia due to pneumonia seen on CT chest however he is waiting to see the speech therapist.  He did undergo thoracentesis today.    Past Medical/Surgical Hx:  Past Medical History:   Diagnosis Date    (HFpEF) heart failure with preserved ejection fraction 9/24/2022    Arthritis     Atrial fibrillation, persistent 9/24/2022    Cervical spondylosis with myelopathy     HLD (hyperlipidemia) 9/23/2022    Hx of toxic multinodular goiter 9/23/2022    Lumbar radiculopathy     Pulmonary hypertension      Past Surgical History:   Procedure Laterality Date    APPENDECTOMY      BACK SURGERY      COLONOSCOPY      EXCISION MASS TRUNK N/A 6/8/2016    Procedure: Excision soft tissue neoplasm on abdominal wall and left neck;  Surgeon: Shaji Barahona Jr., MD;  Location: [facility];  Service:     KNEE SURGERY      NECK SURGERY  1974    SHOULDER SURGERY         Medications On Admission  Medications Prior to Admission   Medication Sig Dispense Refill Last Dose    apixaban (Eliquis) 5 MG tablet tablet Take 1 tablet by mouth Every 12 (Twelve) Hours. 180 tablet 3 11/8/2023    cholecalciferol (VITAMIN D3) 25 MCG (1000 UT) tablet Take 1 tablet by mouth Daily.   11/8/2023    ezetimibe (ZETIA) 10 MG tablet Take 1 tablet by mouth Daily.   11/8/2023    furosemide (LASIX) 20 MG tablet Take 1 tablet by mouth 3 (Three) Times a Week. On Monday, Wed, Friday 45 tablet 1 11/8/2023    methIMAzole (TAPAZOLE) 5 MG tablet Take 1 tablet by mouth. Takes 5 days - No Wednesday or sundays   11/7/2023    metoprolol tartrate (LOPRESSOR) 25 MG tablet TAKE ONE TABLET BY MOUTH EVERY 12 HOURS 180 tablet 3 11/8/2023    multivitamin with minerals tablet tablet Take 1 tablet by mouth Daily.   11/8/2023    mupirocin (BACTROBAN) 2 % ointment Apply to affected area with each  "dressing change; change dressing at least once a day. 22 g 0 Unknown       Allergies:  No Known Allergies    Social Hx:  Social History     Socioeconomic History    Marital status:     Number of children: 1    Highest education level: Professional school degree (e.g., MD, DDS, DVM, BRITNEY)   Tobacco Use    Smoking status: Former     Types: Cigarettes     Quit date: 1983     Years since quittin.4    Smokeless tobacco: Never   Vaping Use    Vaping Use: Never used   Substance and Sexual Activity    Alcohol use: Yes     Comment: SOCIAL    Drug use: No    Sexual activity: Defer       Family Hx:  Family History   Problem Relation Age of Onset    Cancer Mother     Heart disease Father     Aneurysm Father        REVIEW OF SYSTEMS:   Constitutional: [No fevers, chills, sweats or weight loss/gain]   Eye: [No change in vision, double vision, or loss of vision]   HEENT: [No headaches, tenderness, dizziness, or tinnitus. Normal smell and taste. Normal speech and swallowing]   Respiratory:+ SOA,cough  Cardiovascular: [No Chest pain, palpitations, syncope] +RDZ, +edema worse in RUE/RLE  Gastrointestinal: [No nausea, vomiting, diarrhea] +chronic constipation  Genitourinary: [Normal bladder function]   Musculoskeletal: Chronic back pain, weakness per HPI  Skin: [No itching, burning, pain, rashes, or birthmarks]   Endocrinology: [No heat or cold intolerance]   Psychiatric: [No sleep disturbance. No anxiety or depression]   Neurologic: [See HPI, above]       Exam    /71 (BP Location: Left arm)   Pulse 85   Temp 97.3 °F (36.3 °C) (Oral)   Resp 16   Ht 177.8 cm (70\")   Wt 77.6 kg (171 lb)   SpO2 96%   BMI 24.54 kg/m²   General appearance: Well developed, alert and cooperative.   HEENT: Normocephalic.   Neck: decreased ROM/fused.   Cardiac: Irregular rhythm  Chest Exam: Breath sounds diminished.  Extremities: R pedal edema.  Skin: No rashes or birthmarks.     Higher integrative function: Oriented to time, place, " "person, intact recent and remote memory, attention span, concentration and language. Spontaneous speech, fund of vocabulary are normal.   Cranial nerves: Visual fields intact, extraocular movements full without nystagmus.  PERRL.  Normal facial sensation, face symmetric.  Hearing intact.  Symmetric palatal movement, symmetric shoulder shrug.  Tongue midline.  Mild dysarthria.  Motor: Very mild weakness in the right upper extremity compared to the left which is 5 out of 5.  Right leg 2 out of 5 with foot drop, left leg 4 out of 5 throughout.  Increased tone in right lower extremity.  Sensation: Decreased to light touch in the right foot.  Intact to temperature sensation in all extremities.  Station and gait: Deferred, nonambulatory at baseline.  Muscle stretch reflexes: Reflexes are normal and symmetric in the upper 3+ in the right patella, absent in the left lower extremity.  No Najera's.Plantar reflexes are flexor bilaterally.   Coordination: Finger to nose test showed no dysmetria.           DATA:    Lab Results   Component Value Date    GLUCOSE 80 11/09/2023    CALCIUM 9.0 11/09/2023     (H) 11/09/2023    K 3.9 11/09/2023    CO2 30.1 (H) 11/09/2023     11/09/2023    BUN 28 (H) 11/09/2023    CREATININE 1.06 11/09/2023    EGFRIFNONA 74 06/06/2016    BCR 26.4 (H) 11/09/2023    ANIONGAP 10.9 11/09/2023     Lab Results   Component Value Date    WBC 8.68 11/09/2023    HGB 11.1 (L) 11/09/2023    HCT 34.0 (L) 11/09/2023    MCV 81.0 11/09/2023     11/09/2023     No results found for: \"CHOL\"  No results found for: \"HDL\"  No results found for: \"LDL\"  No results found for: \"TRIG\"  Lab Results   Component Value Date    HGBA1C 5.40 11/09/2023     Lab Results   Component Value Date    INR 1.42 (H) 11/09/2023    PROTIME 17.5 (H) 11/09/2023       Imaging review:   US Thoracentesis    Result Date: 11/9/2023  Ultrasound-guided right thoracentesis as described     This report was finalized on 11/9/2023 1:34 PM by " Dr. Randy Villa M.D on Workstation: [id number]      CT Chest Without Contrast Diagnostic    Result Date: 11/8/2023  1.  Findings most suggestive of multifocal pneumonia within the left upper lobe and perihilar aspect of the left lower lobe. Large right and moderate left pleural effusion. There is near complete collapse of the right lower lobe with a somewhat rounded appearance. While findings may be related to rounded atelectasis, continued close attention follow-up is recommended with chest CT in 4 to 6 weeks to ensure appropriate evolution/resolution and exclude any possibility of neoplasm. 2.  Ovoid masslike area within the right chest wall contiguous with the pectoralis musculature, possibly representing a hematoma. However, findings are incompletely characterized and underlying neoplasm/malignancy cannot be excluded. Correlation with patient history is recommended with at least continued attention on follow-up to ensure resolution. Findings can also be further evaluated with MRI with and without contrast if clinically indicated. 3.  Cholelithiasis. 4.  Other findings above.    This report was finalized on 11/8/2023 5:06 PM by Dr. Matias Fischer M.D on Workstation: [id number]      XR Chest 2 View    Result Date: 11/8/2023  Extensive bilateral infiltrates and right more than left pleural effusions.  This report was finalized on 11/8/2023 12:24 PM by Dr. Jun Maldonado M.D on Workstation: [id number]       Impression/Plan:  1) 80-year-old male with past medical history as above, anticoagulated, admitted with CHF exacerbation, hypoxia, and pneumonia with decline in mobility.  Patient and wife describe a gradual decline in mobility over the years previously attributed to cervical myelopathy and lumbar stenosis.      Given more abrupt recent decline, concern for dysphagia, and abnormal diaphragm movement will check MRI brain without and MRI C-spine with contrast.      We will also check B12 and folate.  TSH normal  at 1.65 this admission.    Consider outpatient EMG.    F/U SLP eval    D/W Dr Osorio today. Will follow.

## 2023-11-09 NOTE — PROGRESS NOTES
Discharge Planning Assessment  [facility]     Patient Name: Mehreen Silva IV  MRN: [id number]  Today's Date: 11/9/2023    Admit Date: 11/8/2023    Plan: Return home with spouse and caregiver and [facility]  following (referral pending)   Discharge Needs Assessment       Row Name 11/09/23 1228       Living Environment    People in Home spouse    Name(s) of People in Home Benjamin Patricia    Current Living Arrangements home    Potentially Unsafe Housing Conditions none    Primary Care Provided by other (see comments)    Provides Primary Care For no one, unable/limited ability to care for self    Family Caregiver if Needed spouse    Family Caregiver Names Wife Belem [phone]    Quality of Family Relationships helpful    Able to Return to Prior Arrangements yes       Resource/Environmental Concerns    Resource/Environmental Concerns none    Transportation Concerns none       Food Insecurity    Within the past 12 months, you worried that your food would run out before you got the money to buy more. Never true    Within the past 12 months, the food you bought just didn't last and you didn't have money to get more. Never true       Transition Planning    Patient/Family Anticipates Transition to home with family    Patient/Family Anticipated Services at Transition home health care    Transportation Anticipated family or friend will provide       Discharge Needs Assessment    Readmission Within the Last 30 Days no previous admission in last 30 days    Equipment Currently Used at Home wheelchair;cane, straight;walker, rolling    Concerns to be Addressed basic needs    Anticipated Changes Related to Illness none    Equipment Needed After Discharge none    Provided Post Acute Provider List? Yes    Post Acute Provider List Home Health    Delivered To Support Person    Support Person Benjamin Patricia    Method of Delivery In person                   Discharge Plan       Row Name 11/09/23 1237       Plan    Plan Return home with  spouse and caregiver and [facility] following (referral pending)    Patient/Family in Agreement with Plan yes    Plan Comments Spoke with wife Belem [phone] at bedside.  Patient is off unit for testing.  Patient lives with wife, has a caregiver, uses a W/C, [facility].  He has used home PT in the past but they are not sure of provider, and has never been to SNF.  PCP is Dr. Taiwo Powers and pharmacy is [facility] in [place].  Patient has walker and cane but is currently unable to stand or use them.  They are interested in HH following - would like to use [facility] - left message for Ivanna.  Plan is to return home at [place] with [facility] following (pending acceptance).  CCP will follow.  Loree BARCENAS                  Continued Care and Services - Admitted Since 11/8/2023       Home Medical Care       Service Provider Request Status Selected Services Address Phone Fax Patient Preferred    [facility] Pending - Request Sent N/A [id number], [address], [place] [facility] [id number] [phone] --                  Expected Discharge Date and Time       Expected Discharge Date Expected Discharge Time    Nov 12, 2023            Demographic Summary       Row Name 11/09/23 1226       General Information    Admission Type inpatient    Arrived From home    Referral Source admission list    Reason for Consult discharge planning    Preferred Language English                   Functional Status       Row Name 11/09/23 1227       Functional Status    Usual Activity Tolerance poor    Current Activity Tolerance poor       Functional Status, IADL    Medications completely dependent    Meal Preparation completely dependent    Housekeeping completely dependent    Laundry completely dependent    Shopping completely dependent       Mental Status    General Appearance WDL --  Unable to assess       Mental Status Summary    Recent Changes in Mental Status/Cognitive Functioning unable to assess                               Becky S. [place], RN

## 2023-11-09 NOTE — NURSING NOTE
"   11/09/23 1535   Wound 11/08/23 2144 Right heel Pressure Injury   Placement Date/Time: 11/08/23 2144   Side: Right  Location: heel  Primary Wound Type: Pressure Injury   Base purple;dry  (fading)   Edges   (dry skin, lifting)   Wound Length (cm)   (approx 6x6)   Drainage Amount none   Dressing Care dressing applied  (betadine moist gauze, ABD pad, kerlix)   Wound 11/09/23 Right lateral malleolus Pressure Injury   Placement Date: 11/09/23   Present on Original Admission: Yes  Side: Right  Orientation: lateral  Location: malleolus  Primary Wound Type: Pressure Injury   Base purple  (skin soft, mobile, intact)   Wound Length (cm) 2 cm   Wound Width (cm) 2 cm   Wound Surface Area (cm^2) 4 cm^2   Drainage Amount none   Dressing Care dressing applied  (betadine moist gauze, ABD pad, kerlix)   Wound 11/09/23 Right lateral foot Pressure Injury   Placement Date: 11/09/23   Present on Original Admission: Yes  Side: Right  Orientation: lateral  Location: foot  Primary Wound Type: Pressure Injury   Base purple  (soft, skin intact but mobile)   Wound Length (cm) 1.5 cm   Wound Width (cm) 1.5 cm   Wound Surface Area (cm^2) 2.25 cm^2   Drainage Amount none   Dressing Care dressing applied  (betadine moist gauze, ABD pad, kerlix)   Skin Interventions   Pressure Reduction Devices heel offloading device utilized  (heel boots applied BLE)     Wound/ostomy - consult received regarding wounds to R lateral foot present on admission. Patient w/c dependent, no longer ambulatory, RLE laterally rotates causing R lateral foot constant pressure in bed, patient also wears shoes which are store bought, canvas regular width. They provide fragmented history of foot issues, states \"they have been there for years, we have been to podiatrists, dermatologist and a wound center\". [facility] reviewed and it is noted patient has had mobility issues curvature of foot, has had c=issues with calluses and has been to  and [facility] dating back to 2021 for " callus paring and management. Patient was at Dr. Stef Villa's office in Aug 2023 and noted to have a stage 1 pressure injury to the R heel, that is the first encounter noted for pressure injury.     Wound to heel is dry, firm but not hard eschar, fading purple/brown, edges intact but lifting, no redness or indications of any infection, wounds stable, evolving DTI. Lateral foot and malleolus soft, skin intact. Recommend to treat with betadine and ABD pad and kerlix to pad. They were initially focussed on what they could put on it to heal it. Discussed in detail with both need for pressure relief for healing. Betadine will allow for clean environment and dry the surface. Should follow up with a wound center upon d/c but not urgently just to have someone addressing any changes. Hell boots applied, wedge used at trochanter to prevent lateral rotation, discussed importance of controlling the lateral rotation. Advised no use of shoes as feet are swollen and they provide pressure. Can get all supplies OTC or from [employer], Wife states they have ABD pads and rolled gauze already.

## 2023-11-09 NOTE — SIGNIFICANT NOTE
11/09/23 1303   OTHER   Discipline physical therapist   Rehab Time/Intention   Session Not Performed patient unavailable for evaluation  (Patient CHAY, unable to complete therapy evaluation. Will follow-up tomorrow.)   Recommendation   PT - Next Appointment 11/10/23

## 2023-11-09 NOTE — PROGRESS NOTES
[facility]    [phone]   LOS: 1 day     Name: Mehreen Silva IV  Age/Sex: 83 y.o. male  :  1940        PCP: Taiwo Powers MD  No chief complaint on file.     Subjective   He feels a little better today.  He is urinated quite a bit per the nursing staff.  Feels less anxious this morning.  General: No Fever or Chills, Cardiac: No Chest Pain or Palpitations, GI: No Nausea, Vomiting, or Diarrhea, and Other: No bleeding    furosemide, 40 mg, Intravenous, Q8H  ipratropium-albuterol, 3 mL, Nebulization, 4x Daily - RT  metoprolol tartrate, 25 mg, Oral, Q12H  multivitamin with minerals, 1 tablet, Oral, Daily  senna-docusate sodium, 2 tablet, Oral, BID  sodium chloride, 10 mL, Intravenous, Q12H      hold, 1 each        Objective   Vital Signs  Temp:  [96.9 °F (36.1 °C)-98.5 °F (36.9 °C)] 98.5 °F (36.9 °C)  Heart Rate:  [] 125  Resp:  [18] 18  BP: (102-150)/(61-81) 105/68  Body mass index is 24.55 kg/m².    Intake/Output Summary (Last 24 hours) at 2023 0816  Last data filed at 2023 0407  Gross per 24 hour   Intake 240 ml   Output 3600 ml   Net -3360 ml       Physical Exam  Vitals and nursing note reviewed.   Constitutional:       Appearance: Normal appearance.   Cardiovascular:      Rate and Rhythm: Normal rate. Rhythm irregular.   Pulmonary:      Effort: Pulmonary effort is normal. No respiratory distress.      Comments: Decreased breath sounds at the bases bilaterally with noted expiratory wheezes and rails  Abdominal:      General: Bowel sounds are normal. There is no distension.      Palpations: Abdomen is soft.   Neurological:      Mental Status: He is alert.           Results Review:       I reviewed the patient's new clinical results.  Results from last 7 days   Lab Units 23  0644 23  1003   WBC 10*3/mm3 8.68 10.29   HEMOGLOBIN g/dL 11.1* 11.9*   PLATELETS 10*3/mm3 249 316     Results from last 7 days   Lab Units 23  0644 23  1003   SODIUM  mmol/L 146* 143   POTASSIUM mmol/L 3.9 5.8*   CHLORIDE mmol/L 105 105   CO2 mmol/L 30.1* 28.7   BUN mg/dL 28* 27*   CREATININE mg/dL 1.06 1.08   CALCIUM mg/dL 9.0 9.5   MAGNESIUM mg/dL 2.3  --    PHOSPHORUS mg/dL 4.1  --    Estimated Creatinine Clearance: 58 mL/min (by C-G formula based on SCr of 1.06 mg/dL).      Assessment & Plan   Active Hospital Problems    Diagnosis  POA    **Acute on chronic diastolic CHF (congestive heart failure) [I50.33]  Yes    Cervical myelopathy [G95.9]  Unknown    Hypoxia [R09.02]  Unknown    Hyperkalemia [E87.5]  Unknown    Hyperglycemia [R73.9]  Unknown    Thyroid disease [E07.9]  Yes    (HFpEF) heart failure with preserved ejection fraction [I50.30]  Yes    Atrial fibrillation, persistent [I48.19]  Yes    HLD (hyperlipidemia) [E78.5]  Yes    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       PLAN  This is an 83-year-old gentleman with a history of chronic diastolic heart failure, thyroid issues, atrial fibrillation, hyperlipidemia and cervical myelopathy with neuropathy who presents to the hospital with shortness of breath anxiety and weakness and was admitted with acute on chronic diastolic heart failure  -He has diuresed rather aggressively with more than 3 L diuresed overnight.  It looks like he may be a little volume contracted this morning with now contraction alkalosis and hypernatremia.  Uric acid is also increased.  He is scheduled to get 3 doses of IV Lasix today.  This might be too much.  We will follow-up cardiology's recommendations.  -In the meantime he had the CT scan that I independently reviewed last night shows a right greater than left pleural effusion.  He also has scattered infiltrates and some other findings that could probably benefit from CT with contrast once things have stabilized.  In the meantime we need to do a thoracentesis today given his oxygen requirements and ongoing symptoms.  Fluid  studies have been ordered and we will follow these up to determine the etiology for this pleural effusion.  -Given the bilateral pleural effusions and change in his clinical status I have gone ahead and ordered an echocardiogram today.  We will follow-up those results and see if things have changed.  -His potassium is normalized this morning after aggressive diuresis.  Continue to monitor electrolytes and renal function closely  -Neurology was consulted yesterday for this progressive decline and weakness.  We will follow-up their evaluation today.  Per patient this is all related to his cervical myelopathy but has been referred to neurology multiple times but has not been able to make that appointment.  -Continue beta-blocker for now his heart rate has been somewhat labile with heart rates up into the 120s at times.  Mom holding his anticoagulant this morning for thoracentesis can resume Lasix following procedure.  -His breathing pattern is much more normal this morning.  I think the diuretics really helped.  I am less concerned about a neurogenic problem with his diaphragm at this time but he is already been taken for the sniff test this morning we will follow-up those results.  -ABG yesterday showed significant hypoxia but no CO2 retention continue to monitor closely.  -Patient is a DNR    Disposition  Expected discharge date/ time has not been documented.       Kristopher Soliman MD  [facility]  11/09/23  08:16 EST

## 2023-11-09 NOTE — DISCHARGE PLACEMENT REQUEST
"Ellen Silva IV (83 y.o. Male)       Date of Birth   1940    Social Security Number       Address   [address] [place] [address]    Home Phone   [phone]    MRN   [id number]       P[employer]ecostal   Anabaptist    Marital Status                               Admission Date   11/8/23    Admission Type   Urgent    Admitting Provider   Kristopher Soliman MD    Attending Provider   Kristopher Soliman MD    Department, Room/Bed   [address], S622/1       Discharge Date       Discharge Disposition       Discharge Destination                                 Attending Provider: Kristopher Soliman MD    Allergies: No Known Allergies    Isolation: None   Infection: None   Code Status: No CPR    Ht: 177.8 cm (70\")   Wt: 77.6 kg (171 lb 1.2 oz)    Admission Cmt: None   Principal Problem: Acute on chronic diastolic CHF (congestive heart failure) [I50.33]                   Active Insurance as of 11/8/2023       Primary Coverage       Payor Plan Insurance Group Employer/Plan Group    MEDICARE MEDICARE A & B        Payor Plan Address Payor Plan Phone Number Payor Plan Fax Number Effective Dates    [address] [phone]  5/1/2005 - None Entered    [facility] [id number]         Subscriber Name Subscriber Birth Date Member ID       ELLEN SILVA IV 1940 [id number]               Secondary Coverage       Payor Plan Insurance Group Employer/Plan Group    [facility] SUPP [id number]       Payor Plan Address Payor Plan Phone Number Payor Plan Fax Number Effective Dates    [address]   5/1/2005 - None Entered    [facility] [id number]         Subscriber Name Subscriber Birth Date Member ID       ELLEN SILVA IV 1940 [id number]                     Emergency Contacts        (Rel.) Home Phone Work Phone Mobile Phone    Belem Silva (Spouse) [phone] -- [phone]    ELLEN SILVA (Son) [phone] -- [phone]                "

## 2023-11-09 NOTE — SIGNIFICANT NOTE
11/09/23 1129   OTHER   Discipline speech language pathologist   Rehab Time/Intention   Session Not Performed patient unavailable for evaluation  (Patient off floor for thoracentesis. Unable to complete swallow evaluation. Will follow up as time allows.)

## 2023-11-10 ENCOUNTER — APPOINTMENT (OUTPATIENT)
Dept: MRI IMAGING | Facility: HOSPITAL | Age: 83
DRG: 291 | End: 2023-11-10
Payer: MEDICARE

## 2023-11-10 LAB
ANION GAP SERPL CALCULATED.3IONS-SCNC: 7.1 MMOL/L (ref 5–15)
BASOPHILS # BLD AUTO: 0.02 10*3/MM3 (ref 0–0.2)
BASOPHILS NFR BLD AUTO: 0.2 % (ref 0–1.5)
BUN SERPL-MCNC: 30 MG/DL (ref 8–23)
BUN/CREAT SERPL: 31.9 (ref 7–25)
CALCIUM SPEC-SCNC: 8.4 MG/DL (ref 8.6–10.5)
CHLORIDE SERPL-SCNC: 100 MMOL/L (ref 98–107)
CO2 SERPL-SCNC: 35.9 MMOL/L (ref 22–29)
CREAT SERPL-MCNC: 0.94 MG/DL (ref 0.76–1.27)
DEPRECATED RDW RBC AUTO: 42.6 FL (ref 37–54)
EGFRCR SERPLBLD CKD-EPI 2021: 80.4 ML/MIN/1.73
EOSINOPHIL # BLD AUTO: 0.06 10*3/MM3 (ref 0–0.4)
EOSINOPHIL NFR BLD AUTO: 0.7 % (ref 0.3–6.2)
ERYTHROCYTE [DISTWIDTH] IN BLOOD BY AUTOMATED COUNT: 14.9 % (ref 12.3–15.4)
FOLATE SERPL-MCNC: >20 NG/ML (ref 4.78–24.2)
GLUCOSE SERPL-MCNC: 90 MG/DL (ref 65–99)
HCT VFR BLD AUTO: 32.3 % (ref 37.5–51)
HGB BLD-MCNC: 10.8 G/DL (ref 13–17.7)
IMM GRANULOCYTES # BLD AUTO: 0.03 10*3/MM3 (ref 0–0.05)
IMM GRANULOCYTES NFR BLD AUTO: 0.4 % (ref 0–0.5)
LYMPHOCYTES # BLD AUTO: 1.01 10*3/MM3 (ref 0.7–3.1)
LYMPHOCYTES NFR BLD AUTO: 12.5 % (ref 19.6–45.3)
MCH RBC QN AUTO: 26.4 PG (ref 26.6–33)
MCHC RBC AUTO-ENTMCNC: 33.4 G/DL (ref 31.5–35.7)
MCV RBC AUTO: 79 FL (ref 79–97)
MONOCYTES # BLD AUTO: 0.77 10*3/MM3 (ref 0.1–0.9)
MONOCYTES NFR BLD AUTO: 9.6 % (ref 5–12)
NEUTROPHILS NFR BLD AUTO: 6.16 10*3/MM3 (ref 1.7–7)
NEUTROPHILS NFR BLD AUTO: 76.6 % (ref 42.7–76)
NRBC BLD AUTO-RTO: 0 /100 WBC (ref 0–0.2)
PLATELET # BLD AUTO: 234 10*3/MM3 (ref 140–450)
PMV BLD AUTO: 11 FL (ref 6–12)
POTASSIUM SERPL-SCNC: 3.3 MMOL/L (ref 3.5–5.2)
POTASSIUM SERPL-SCNC: 3.9 MMOL/L (ref 3.5–5.2)
RBC # BLD AUTO: 4.09 10*6/MM3 (ref 4.14–5.8)
SODIUM SERPL-SCNC: 143 MMOL/L (ref 136–145)
VIT B12 BLD-MCNC: 1703 PG/ML (ref 211–946)
WBC NRBC COR # BLD: 8.05 10*3/MM3 (ref 3.4–10.8)

## 2023-11-10 PROCEDURE — 94799 UNLISTED PULMONARY SVC/PX: CPT

## 2023-11-10 PROCEDURE — 72156 MRI NECK SPINE W/O & W/DYE: CPT

## 2023-11-10 PROCEDURE — 82607 VITAMIN B-12: CPT | Performed by: NURSE PRACTITIONER

## 2023-11-10 PROCEDURE — A9577 INJ MULTIHANCE: HCPCS | Performed by: HOSPITALIST

## 2023-11-10 PROCEDURE — 99232 SBSQ HOSP IP/OBS MODERATE 35: CPT | Performed by: STUDENT IN AN ORGANIZED HEALTH CARE EDUCATION/TRAINING PROGRAM

## 2023-11-10 PROCEDURE — 94761 N-INVAS EAR/PLS OXIMETRY MLT: CPT

## 2023-11-10 PROCEDURE — 0 GADOBENATE DIMEGLUMINE 529 MG/ML SOLUTION: Performed by: HOSPITALIST

## 2023-11-10 PROCEDURE — 82746 ASSAY OF FOLIC ACID SERUM: CPT | Performed by: NURSE PRACTITIONER

## 2023-11-10 PROCEDURE — 85025 COMPLETE CBC W/AUTO DIFF WBC: CPT | Performed by: HOSPITALIST

## 2023-11-10 PROCEDURE — 80048 BASIC METABOLIC PNL TOTAL CA: CPT | Performed by: HOSPITALIST

## 2023-11-10 PROCEDURE — 92610 EVALUATE SWALLOWING FUNCTION: CPT

## 2023-11-10 PROCEDURE — 97166 OT EVAL MOD COMPLEX 45 MIN: CPT

## 2023-11-10 PROCEDURE — 70551 MRI BRAIN STEM W/O DYE: CPT

## 2023-11-10 PROCEDURE — 94760 N-INVAS EAR/PLS OXIMETRY 1: CPT

## 2023-11-10 PROCEDURE — 99233 SBSQ HOSP IP/OBS HIGH 50: CPT | Performed by: NURSE PRACTITIONER

## 2023-11-10 PROCEDURE — 94664 DEMO&/EVAL PT USE INHALER: CPT

## 2023-11-10 PROCEDURE — 84132 ASSAY OF SERUM POTASSIUM: CPT | Performed by: HOSPITALIST

## 2023-11-10 RX ORDER — FUROSEMIDE 40 MG/1
40 TABLET ORAL DAILY
Status: DISCONTINUED | OUTPATIENT
Start: 2023-11-10 | End: 2023-11-10

## 2023-11-10 RX ORDER — POTASSIUM CHLORIDE 750 MG/1
40 TABLET, FILM COATED, EXTENDED RELEASE ORAL EVERY 4 HOURS
Status: COMPLETED | OUTPATIENT
Start: 2023-11-10 | End: 2023-11-10

## 2023-11-10 RX ORDER — METOPROLOL SUCCINATE 25 MG/1
37.5 TABLET, EXTENDED RELEASE ORAL EVERY 12 HOURS SCHEDULED
Status: DISCONTINUED | OUTPATIENT
Start: 2023-11-10 | End: 2023-11-11

## 2023-11-10 RX ORDER — FUROSEMIDE 40 MG/1
40 TABLET ORAL DAILY
Status: DISCONTINUED | OUTPATIENT
Start: 2023-11-11 | End: 2023-11-14 | Stop reason: HOSPADM

## 2023-11-10 RX ADMIN — IPRATROPIUM BROMIDE AND ALBUTEROL SULFATE 3 ML: 2.5; .5 SOLUTION RESPIRATORY (INHALATION) at 16:01

## 2023-11-10 RX ADMIN — Medication 10 ML: at 20:26

## 2023-11-10 RX ADMIN — EMPAGLIFLOZIN 10 MG: 10 TABLET, FILM COATED ORAL at 17:14

## 2023-11-10 RX ADMIN — APIXABAN 5 MG: 5 TABLET, FILM COATED ORAL at 11:10

## 2023-11-10 RX ADMIN — IPRATROPIUM BROMIDE AND ALBUTEROL SULFATE 3 ML: 2.5; .5 SOLUTION RESPIRATORY (INHALATION) at 11:28

## 2023-11-10 RX ADMIN — GADOBENATE DIMEGLUMINE 15 ML: 529 INJECTION, SOLUTION INTRAVENOUS at 01:15

## 2023-11-10 RX ADMIN — POTASSIUM CHLORIDE 40 MEQ: 750 TABLET, EXTENDED RELEASE ORAL at 09:42

## 2023-11-10 RX ADMIN — IPRATROPIUM BROMIDE AND ALBUTEROL SULFATE 3 ML: 2.5; .5 SOLUTION RESPIRATORY (INHALATION) at 19:37

## 2023-11-10 RX ADMIN — IPRATROPIUM BROMIDE AND ALBUTEROL SULFATE 3 ML: 2.5; .5 SOLUTION RESPIRATORY (INHALATION) at 08:08

## 2023-11-10 RX ADMIN — APIXABAN 5 MG: 5 TABLET, FILM COATED ORAL at 20:26

## 2023-11-10 RX ADMIN — METOPROLOL SUCCINATE 37.5 MG: 25 TABLET, EXTENDED RELEASE ORAL at 20:26

## 2023-11-10 RX ADMIN — Medication 10 ML: at 08:48

## 2023-11-10 RX ADMIN — METOPROLOL TARTRATE 25 MG: 25 TABLET, FILM COATED ORAL at 09:42

## 2023-11-10 RX ADMIN — MULTIPLE VITAMINS W/ MINERALS TAB 1 TABLET: TAB at 08:48

## 2023-11-10 RX ADMIN — POTASSIUM CHLORIDE 40 MEQ: 750 TABLET, EXTENDED RELEASE ORAL at 14:43

## 2023-11-10 NOTE — PROGRESS NOTES
LOS: 2 days   Patient Care Team:  Taiwo Powers MD as PCP - General (Internal Medicine)  Self, Bess YO MD as Consulting Physician (Endocrinology)  Huang Chan MD as Cardiologist (Cardiology)    Chief Complaint:  Following for decompensated diastolic CHF    Interval History:     Cytology remains pending.  Remains net negative.  Breathing better.    Fluid studies suggest transudate.  He is off oxygen and overall feeling much better.  His edema has resolved.    Objective   Vital Signs  Temp:  [97.3 °F (36.3 °C)-97.9 °F (36.6 °C)] 97.9 °F (36.6 °C)  Heart Rate:  [] 101  Resp:  [16-20] 20  BP: ()/(55-71) 104/60    Intake/Output Summary (Last 24 hours) at 11/10/2023 0934  Last data filed at 11/10/2023 0840  Gross per 24 hour   Intake 480 ml   Output 3200 ml   Net -2720 ml       Comfortable NAD, resting in bed  PERRL, conjunctivae clear  Neck supple, JVD noted to just above clavicle  S1/S2 irregularly irregular, no m/r/g  Coarse breath sounds noted diffusely.  Abdomen S/NT/ND (+) BS, no HSM appreciated  Extremities warm, no clubbing, cyanosis,  no significant lower extremity edema or upper extremity edema noted.  Moves all 4 extremities well without deficits  No visible or palpable skin lesions  A/Ox4, mood and affect appropriate    Results Review:      Results from last 7 days   Lab Units 11/10/23  0739 11/09/23  0644 11/08/23  1003   SODIUM mmol/L 143 146* 143   POTASSIUM mmol/L 3.3* 3.9 5.8*   CHLORIDE mmol/L 100 105 105   CO2 mmol/L 35.9* 30.1* 28.7   BUN mg/dL 30* 28* 27*   CREATININE mg/dL 0.94 1.06 1.08   GLUCOSE mg/dL 90 80 116*   CALCIUM mg/dL 8.4* 9.0 9.5     Results from last 7 days   Lab Units 11/08/23  2048 11/08/23  1534 11/08/23  1003   HSTROP T ng/L 64* 56* 60*     Results from last 7 days   Lab Units 11/10/23  0739 11/09/23  0644 11/08/23  1003   WBC 10*3/mm3 8.05 8.68 10.29   HEMOGLOBIN g/dL 10.8* 11.1* 11.9*   HEMATOCRIT % 32.3* 34.0* 37.8   PLATELETS 10*3/mm3 234 057 586      Results from last 7 days   Lab Units 11/09/23  0644   INR  1.42*         Results from last 7 days   Lab Units 11/09/23  0644   MAGNESIUM mg/dL 2.3           I reviewed the patient's new clinical results.  I personally viewed and interpreted the patient's EKG/Telemetry data        Medication Review:   ipratropium-albuterol, 3 mL, Nebulization, 4x Daily - RT  metoprolol tartrate, 25 mg, Oral, Q6H  multivitamin with minerals, 1 tablet, Oral, Daily  potassium chloride ER, 40 mEq, Oral, Q4H  senna-docusate sodium, 2 tablet, Oral, BID  sodium chloride, 10 mL, Intravenous, Q12H        hold, 1 each        Assessment & Plan       Acute on chronic diastolic CHF (congestive heart failure)    Hx of toxic multinodular goiter    HLD (hyperlipidemia)    (HFpEF) heart failure with preserved ejection fraction    Atrial fibrillation, persistent    Thyroid disease    Cervical myelopathy    Hypoxia    Hyperkalemia    Hyperglycemia    Acute on chronic decompensated diastolic heart failure.  We will resume oral Lasix 40 daily.  Can hold off on today's dose.  Echo shows mildly elevated PA pressures when compared to previous echo 1 year ago  There is a thickened segment in the RV that appears consistent with a thickened papillary muscle.  It is nonmobile.  This was also noted on previous echocardiogram 1 year ago.,  Stable in size.  Unlikely thrombus, patient is on Eliquis  I discussed with patient.  He has no prior history of any significant UTIs.  We will start Jardiance 10.  Acute on chronic hypoxic respite failure, likely related to pneumonia versus aspiration  Bilateral pleural effusions  Right chest wall mass, predominately right-sided pleural effusion  Thoracentesis shows transudate.  Cytology remains pending  Diuresis per above  Concerns for neurodegenerative disease, weakness, cachexia  Atrial fibrillation, probably permanent  Rates currently controlled.  Reasonable to hold his anticoagulation for procedures  His rates do  remain a little bit high.  Reasonable given his illness, but for now we will increase his metoprolol to 25 mg every 6 hours    Thank you for the consult.  We will continue diuresis for now, awaiting above testing and studies    Hector Youssef MD  11/10/23  09:34 EST      Part of this note may be an electronic transcription/translation of spoken language to printed text using the Dragon Dictation System.

## 2023-11-10 NOTE — SIGNIFICANT NOTE
11/10/23 1258   OTHER   Discipline physical therapist   Rehab Time/Intention   Session Not Performed other (see comments)  (Per OT and chart the patient is dependent for transfers and ADLs at baseline and states that he does not need any therapy services. The patient is at baseline mobility and the family is trained in use of lift for transfers. PT services not indicated.)   Therapy Assessment/Plan (PT)   Criteria for Skilled Interventions Met (PT) no

## 2023-11-10 NOTE — PLAN OF CARE
Goal Outcome Evaluation:  Plan of Care Reviewed With: patient           Outcome Evaluation: Clinical swallow evaluation completed. Recommend soft/chopped solid diet with nectar thick liquids. No mixed consistencies. Meds crushed in puree. Sitting upright, slow rate, small bites/sips. Speech to follow with [facility].

## 2023-11-10 NOTE — PROGRESS NOTES
[facility] Hospital [facility]    [phone]   LOS: 2 days     Name: Mehreen Silva IV  Age/Sex: 83 y.o. male  :  1940        PCP: Taiwo Powers MD  No chief complaint on file.     Subjective   He feels a little better today.  He is urinated quite a bit per the nursing staff.  Feels less anxious this morning.  General: No Fever or Chills, Cardiac: No Chest Pain or Palpitations, GI: No Nausea, Vomiting, or Diarrhea, and Other: No bleeding    ipratropium-albuterol, 3 mL, Nebulization, 4x Daily - RT  metoprolol tartrate, 25 mg, Oral, Q6H  multivitamin with minerals, 1 tablet, Oral, Daily  potassium chloride ER, 40 mEq, Oral, Q4H  senna-docusate sodium, 2 tablet, Oral, BID  sodium chloride, 10 mL, Intravenous, Q12H      hold, 1 each        Objective   Vital Signs  Temp:  [97.3 °F (36.3 °C)-97.9 °F (36.6 °C)] 97.9 °F (36.6 °C)  Heart Rate:  [] 101  Resp:  [16-20] 20  BP: ()/(55-71) 104/60  Body mass index is 24.74 kg/m².    Intake/Output Summary (Last 24 hours) at 11/10/2023 0932  Last data filed at 11/10/2023 0840  Gross per 24 hour   Intake 480 ml   Output 3200 ml   Net -2720 ml       Physical Exam  Vitals and nursing note reviewed.   Constitutional:       Appearance: Normal appearance.   Cardiovascular:      Rate and Rhythm: Normal rate. Rhythm irregular.   Pulmonary:      Effort: Pulmonary effort is normal. No respiratory distress.      Comments: Decreased breath sounds at the bases bilaterally with noted expiratory wheezes and rails  Abdominal:      General: Bowel sounds are normal. There is no distension.      Palpations: Abdomen is soft.   Neurological:      Mental Status: He is alert.           Results Review:       I reviewed the patient's new clinical results.  Results from last 7 days   Lab Units 11/10/23  0739 23  0644 23  1003   WBC 10*3/mm3 8.05 8.68 10.29   HEMOGLOBIN g/dL 10.8* 11.1* 11.9*   PLATELETS 10*3/mm3 234 249 316     Results from last 7 days   Lab Units  11/10/23  0739 11/09/23  0644 11/08/23  1003   SODIUM mmol/L 143 146* 143   POTASSIUM mmol/L 3.3* 3.9 5.8*   CHLORIDE mmol/L 100 105 105   CO2 mmol/L 35.9* 30.1* 28.7   BUN mg/dL 30* 28* 27*   CREATININE mg/dL 0.94 1.06 1.08   CALCIUM mg/dL 8.4* 9.0 9.5   MAGNESIUM mg/dL  --  2.3  --    PHOSPHORUS mg/dL  --  4.1  --    Estimated Creatinine Clearance: 65.9 mL/min (by C-G formula based on SCr of 0.94 mg/dL).      Assessment & Plan   Active Hospital Problems    Diagnosis  POA    **Acute on chronic diastolic CHF (congestive heart failure) [I50.33]  Yes    Cervical myelopathy [G95.9]  Unknown    Hypoxia [R09.02]  Unknown    Hyperkalemia [E87.5]  Unknown    Hyperglycemia [R73.9]  Unknown    Thyroid disease [E07.9]  Yes    (HFpEF) heart failure with preserved ejection fraction [I50.30]  Yes    Atrial fibrillation, persistent [I48.19]  Yes    HLD (hyperlipidemia) [E78.5]  Yes    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       PLAN  This is an 83-year-old gentleman with a history of chronic diastolic heart failure, thyroid issues, atrial fibrillation, hyperlipidemia and cervical myelopathy with neuropathy who presents to the hospital with shortness of breath anxiety and weakness and was admitted with acute on chronic diastolic heart failure  -He has diuresed rather aggressively with more than 5.5 L diuresed since admission.  Also had 1.7L removed via thoracentesis.  Give another day for fluid to equilibrate before initiating oral diuretic unless cardiology feels differently  -In the meantime he had the CT scan that I independently reviewed last night shows a right greater than left pleural effusion.  He also has scattered infiltrates and some other findings that could probably benefit from CT (plan tomorrow if renal function remains stable).     -noted the possible pna's his WBC is nl and nl procal, I suspect possible chronic pneumonitis from aspiration;  awaiting SLP eval, will need video exam.    -Tolerated thoracentesis and breathing has improved.  LDH and Protein levels support transudate  effusion    -His diaphragm moves but not a lot; I suspect he has significant diaphragmatic weakness from deconditioning.  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  Recommend aggressive pulm hygeine  -Echo shows preserved EF and diastolic dysfunction (HFpEF)  -His potassium is low this AM and will correct  -Appreciate neurology eval and noted MRIs both independently reviewed and no CVA and noted chronic cervical changes.  -Continue beta-blocker for now his heart rate has been somewhat labile with heart rates up into the 120s at times.  Resume AC today  -His breathing pattern is much more normal this morning.  I think the diuretics and thoracentesis really helped.  Sniff test reviewed.    -ABG yesterday showed significant hypoxia but no CO2 retention continue to monitor closely.  -Patient is a DNR    Disposition  Expected Discharge Date: 11/12/2023; Expected Discharge Time:        Kristopher Soliman MD  [facility]  11/10/23  09:32 EST

## 2023-11-10 NOTE — PROGRESS NOTES
"DOS: 11/10/2023  NAME: Mehreen Silva IV   : 1940  PCP: Taiwo Powers MD  CC: SOA    Neurology    Subjective:  No significant events overnight. SOA/cough improving. Denies worsening weakness. No family at bedside.     Objective:  Vital signs: /60 (BP Location: Left arm, Patient Position: Lying)   Pulse 100   Temp 97.9 °F (36.6 °C) (Oral)   Resp 20   Ht 177.8 cm (70\")   Wt 78.2 kg (172 lb 6.4 oz)   SpO2 95%   BMI 24.74 kg/m²       General appearance: Well developed, alert and cooperative.   HEENT: Normocephalic.   Neck: decreased ROM/fused.   Cardiac: Irregular rhythm  Chest Exam: Breath sounds diminished.  Extremities: No edema.      Higher integrative function: Oriented to month/year (not exact date), place, person. Spontaneous speech, fund of vocabulary are normal. No neglect.  Cranial nerves: Visual fields intact, extraocular movements full without nystagmus.  PERRL.  Normal facial sensation, face symmetric.  Hearing intact.  Symmetric palatal movement, symmetric shoulder shrug.  Tongue midline.  No dysarthria.  Motor: Very mild weakness in the right upper extremity compared to the left which is 5 out of 5.  Right leg 2 out of 5 with foot drop, left leg 4 out of 5 throughout.  Increased tone in right lower extremity.  Sensation: Decreased to light touch in RLE   Station and gait: Deferred, nonambulatory at baseline.  The patient was reexamined, changes noted.     Scheduled Meds:apixaban, 5 mg, Oral, Q12H  ipratropium-albuterol, 3 mL, Nebulization, 4x Daily - RT  metoprolol tartrate, 25 mg, Oral, Q6H  multivitamin with minerals, 1 tablet, Oral, Daily  potassium chloride ER, 40 mEq, Oral, Q4H  senna-docusate sodium, 2 tablet, Oral, BID  sodium chloride, 10 mL, Intravenous, Q12H      Continuous Infusions:hold, 1 each      PRN Meds:.  acetaminophen **OR** acetaminophen **OR** acetaminophen    senna-docusate sodium **AND** polyethylene glycol **AND** bisacodyl **AND** bisacodyl    Calcium " "Replacement - Follow Nurse / BPA Driven Protocol    hold    influenza vaccine    Magnesium Standard Dose Replacement - Follow Nurse / BPA Driven Protocol    nitroglycerin    ondansetron **OR** ondansetron    Phosphorus Replacement - Follow Nurse / BPA Driven Protocol    Potassium Replacement - Follow Nurse / BPA Driven Protocol    sodium chloride    sodium chloride    Laboratory results:  Lab Results   Component Value Date    GLUCOSE 90 11/10/2023    CALCIUM 8.4 (L) 11/10/2023     11/10/2023    K 3.3 (L) 11/10/2023    CO2 35.9 (H) 11/10/2023     11/10/2023    BUN 30 (H) 11/10/2023    CREATININE 0.94 11/10/2023    EGFRIFNONA 74 06/06/2016    BCR 31.9 (H) 11/10/2023    ANIONGAP 7.1 11/10/2023     Lab Results   Component Value Date    WBC 8.05 11/10/2023    HGB 10.8 (L) 11/10/2023    HCT 32.3 (L) 11/10/2023    MCV 79.0 11/10/2023     11/10/2023     No results found for: \"CHOL\"  No results found for: \"HDL\"  No results found for: \"LDL\"  No results found for: \"TRIG\"      Lab 11/09/23  0644   HEMOGLOBIN A1C 5.40      Review and interpretation of imaging: MRI brain images viewed by me, no acute findings seen  MRI Cervical Spine With & Without Contrast    Result Date: 11/10/2023  Patient: ELLEN WRAY  Time Out: 05:17 Exam(s): MRI C SPINE W WO Contrast EXAM:   MR Cervical Spine Without and With Intravenous Contrast CLINICAL HISTORY:    Reason for exam: Myelopathy, chronic, cervical spine. TECHNIQUE:   Magnetic resonance images of the cervical spine without and with intravenous contrast in multiple planes. COMPARISON: 2017. FINDINGS:   Vertebrae:  No acute fracture. Normal alignment.   Interspaces:  Unremarkable.   Spinal cord: Mild Malacia of the cord at C4-5.   Soft tissues:  Unremarkable.  DISCS SPINAL CANAL NEURAL FORAMINA: C2-3: No spinal canal stenosis.  Mild to moderate left foraminal stenosis. C3-4: No spinal canal stenosis.  Severe bilateral foraminal stenosis. C4-5: No spinal canal stenosis.  Mild " bilateral foraminal stenosis. C5-6: Severe spinal canal stenosis from posterior disc osteophyte complex and ligamentum flavum redundancy.  Mild bilateral foraminal stenosis. C6-7: Severe spinal canal stenosis from posterior disc osteophyte complex and ligamentum flavum redundancy.  Severe bilateral foraminal stenosis. IMPRESSION:       No acute findings.  Relatively similar degenerative changes are seen most notable C5-6 and C6-7.  Chronic myelomalacia of the cord again seen C4-5.     Electronically signed by Margarette Roque MD on 11-10-23 at 0517    MRI Brain Without Contrast    Result Date: 11/10/2023  Patient: ELLEN WRAY  Time Out: 04:54 Exam(s): MRI HEAD Without Contrast EXAM:   MR Head Without Intravenous Contrast CLINICAL HISTORY:    Reason for exam: Palate weakness (CN 9). TECHNIQUE:   Magnetic resonance images of the head brain without intravenous contrast in multiple planes. COMPARISON:   No relevant prior studies available. FINDINGS:   Brain:  No mass.  No hemorrhage.  No restricted diffusion.  Chronic microvascular ischemic changes.  Cerebral volume loss.   Ventricles:  No hydrocephalus.   Bones joints:  Unremarkable.   Sinuses:  No acute sinusitis.   Mastoid air cells:  No effusion.   Orbits:  Unremarkable. IMPRESSION:       No acute infarct, hemorrhage, or hydrocephalus.     Electronically signed by Margarette Roque MD on 11-10-23 at 0454    FL Sniff Test    Result Date: 11/9/2023  FLUOROSCOPIC SNIFF TEST 11/09/2023  HISTORY: Evaluate for diaphragmatic paralysis.   image of the chest shows borderline cardiomegaly. There is increased density in the right lung base which may be from right pleural effusion and there may be some atelectasis as well. Minimal haziness of the left base is seen as well.  With inspiration, expiration and sniffing there is diminished movement of the diaphragm but this is probably related to limited inspiration and expiration by this patient. There is no paradoxical movement  of the diaphragm to suggest paralysis and the right and left diaphragms move relatively symmetrically.      1. Limited inspiration and expiration by this patient with limited movement of the diaphragm but no evidence of diaphragmatic paralysis. 2. Increased density in the right lung base as described. Minimal haziness in the left base is seen as well.   FLUOROSCOPY TIME:  55 seconds,  16   images. 1026 dose area product.  This report was finalized on 11/9/2023 3:24 PM by Dr. Romeo Cummings M.D on Workstation: [id number]      Adult Transthoracic Echo Complete W/ Cont if Necessary Per Protocol    Result Date: 11/9/2023    Left ventricular systolic function is hyperdynamic (EF > 70%).   Left ventricular wall thickness is consistent with mild concentric hypertrophy with component of basal septal asymmetric hypertrophy.   Left ventricular diastolic function is consistent with (grade II w/high LAP) pseudonormalization.   RV cavity mass near the apex likely part of papillary muscle but can not rule out mass.   Calculated right ventricular systolic pressure from tricuspid regurgitation is 41 mmHg.     US Thoracentesis    Result Date: 11/9/2023  ULTRASOUND-GUIDED RIGHT THORACENTESIS  HISTORY: Pleural effusion  After signed informed consent was obtained the patient was prepped and draped in the usual sterile fashion with 2% chlorhexidine. Lidocaine was used for local anesthesia.  Ultrasound guidance was used to place a 5 [place]ian catheter into the right pleural effusion. 1700 mL of straw-colored fluid was removed. Sample was sent to the lab.  Confirmatory images were obtained.  Patient tolerated the procedure well with no complications.      Ultrasound-guided right thoracentesis as described     This report was finalized on 11/9/2023 1:34 PM by Dr. aRndy Villa M.D on Workstation: [id number]      CT Chest Without Contrast Diagnostic    Result Date: 11/8/2023  CT CHEST WITHOUT IV CONTRAST  HISTORY: Evaluate pneumonia  effusions  TECHNIQUE: Radiation dose reduction techniques were utilized, including automated exposure control and exposure modulation based on body size. 3 mm images were obtained through the chest without IV contrast.  COMPARISON: None  FINDINGS: Evaluation is suboptimal without intravenous contrast.  There is cholelithiasis. No mediastinal or axillary adenopathy by size criteria. Asymmetric masslike swelling at contiguous with the right pectoralis musculature measures approximate 3.4 x 6.9 cm. No significant pericardial effusion. There is a large right and moderate left pleural effusion with overlying pulmonary opacification. The right lower lobe is nearly completely collapsed. There is a large area of pulmonary consolidation within the left upper lobe. Small area of pulmonary opacification is present in the perihilar aspect of the left lower lobe. Left shoulder arthroplasty is present.  No suspicious lytic or blastic osseous lesions. Lumbar fusion hardware is incompletely visualized and cannot be characterized.      1.  Findings most suggestive of multifocal pneumonia within the left upper lobe and perihilar aspect of the left lower lobe. Large right and moderate left pleural effusion. There is near complete collapse of the right lower lobe with a somewhat rounded appearance. While findings may be related to rounded atelectasis, continued close attention follow-up is recommended with chest CT in 4 to 6 weeks to ensure appropriate evolution/resolution and exclude any possibility of neoplasm. 2.  Ovoid masslike area within the right chest wall contiguous with the pectoralis musculature, possibly representing a hematoma. However, findings are incompletely characterized and underlying neoplasm/malignancy cannot be excluded. Correlation with patient history is recommended with at least continued attention on follow-up to ensure resolution. Findings can also be further evaluated with MRI with and without contrast if  clinically indicated. 3.  Cholelithiasis. 4.  Other findings above.    This report was finalized on 11/8/2023 5:06 PM by Dr. Matias Fischer M.D on Workstation: [id number]      XR Chest 2 View    Result Date: 11/8/2023  XR CHEST 2 VW-  HISTORY: Male who is 83 years-old, hypoxia, shortness of breath, cough  TECHNIQUE: Frontal and lateral views of the chest  COMPARISON: 9/23/2022  FINDINGS: The heart size is borderline. Extensive bilateral infiltrates are present, and may represent edema and/or pneumonia. Moderate to large right and mild to moderate left pleural effusions are apparent. No pneumothorax. Otherwise stable.      Extensive bilateral infiltrates and right more than left pleural effusions.  This report was finalized on 11/8/2023 12:24 PM by Dr. Jun Maldonado M.D on Workstation: [id number]       Impression:  Mehreen Silva IV is a 83 y.o. male with A-fib on Eliquis, pulmonary hypertension, CHF, cervical spondylosis with myelopathy and lumbar radiculopathy status post previous surgeries, and toxic multinodular goiter who was admitted with shortness of air, and hypoxia, diagnosed with acute on chronic CHF and pneumonia.  Neurology consulted due to decline in mobility.  There was also mention of abnormal diaphragm movement on exam.  Patient and wife reported a longstanding history of neck and back problems status post 2 cervical surgeries in the 1970s and 4 lumbar surgeries in early 2000.  He has had progressive worsening of bilateral lower extremity weakness, particularly in the right lower extremity which she was told was unexpected given his spine disease.  He was told he was not a surgical candidate.    Work-up:   CT chest: Multifocal pneumonia noted, worse on the right with large right pleural effusion and moderate left pleural effusion.  Right chest wall ovoid masslike area, possible hematoma, cannot rule out neoplasm/malignancy.  MRI brain without: No acute findings   MRI C-spine with and without: Chronic  myelomalacia again seen at C4-5, no acute findings.  Similar degenerative changes most notable at C5-C6 and C6-C7 compared to previous MRI C-spine from 2017.  Sniff test: Limited inspiration and expiration with limited movement of the diaphragm but no evidence of diaphragmatic paralysis  2D echo: EF greater than 70%, mild concentric hypertrophy of the LV, normal left atrial cavity size.  Mild tricuspid valve regurgitation.  Labs: TSH 1.65, B12 1703, folate greater than 20    Diagnoses:  CHF exacerbation  Multifocal pneumonia, concern for dysphagia  Cervical spondylosis with myelopathy and lumbar radiculopathy felt to be the most likely cause of patient's decreased mobility and decline  Pleural effusion status post right thoracentesis  Right chest wall mass    Plan:  No further inpatient neurological work-up needed.  We will arrange outpatient neurology follow-up with Dr. Meyer or Dr. Villa.  Can consider EMG as outpatient.  PT/ST  D/W Dr Osorio today. Will sign off but please call with further questions/concerns.

## 2023-11-10 NOTE — THERAPY EVALUATION
Patient Name: Mehreen Silva IV  : 1940    MRN: [id number]                              Today's Date: 11/10/2023       Admit Date: 2023    Visit Dx: No diagnosis found.  Patient Active Problem List   Diagnosis    Cervical spondylosis with myelopathy    Lumbar radiculopathy    Hx of toxic multinodular goiter    HLD (hyperlipidemia)    (HFpEF) heart failure with preserved ejection fraction    Atrial fibrillation, persistent    Thyroid disease    Acute on chronic diastolic CHF (congestive heart failure)    Cervical myelopathy    Hypoxia    Hyperkalemia    Hyperglycemia     Past Medical History:   Diagnosis Date    (HFpEF) heart failure with preserved ejection fraction 2022    Arthritis     Atrial fibrillation, persistent 2022    Cervical spondylosis with myelopathy     HLD (hyperlipidemia) 2022    Hx of toxic multinodular goiter 2022    Lumbar radiculopathy     Pulmonary hypertension      Past Surgical History:   Procedure Laterality Date    APPENDECTOMY      BACK SURGERY      COLONOSCOPY      EXCISION MASS TRUNK N/A 2016    Procedure: Excision soft tissue neoplasm on abdominal wall and left neck;  Surgeon: Shaji Barahona Jr., MD;  Location: [facility];  Service:     KNEE SURGERY      NECK SURGERY  1974    SHOULDER SURGERY        General Information       Row Name 11/10/23 1044          OT Time and Intention    Document Type evaluation  -     Mode of Treatment individual therapy;occupational therapy  -       Row Name 11/10/23 1044          General Information    Patient Profile Reviewed yes  -     Prior Level of Function dependent:  -     Existing Precautions/Restrictions fall  -     Barriers to Rehab medically complex;previous functional deficit  -       Row Name 11/10/23 1044          Living Environment    People in Home spouse;child(juventino), adult  -     Name(s) of People in Home [place]  -       Row Name 11/10/23 1044          Home Main Entrance    Number of Stairs,  Main Entrance none  -     Stair Railings, Main Entrance none  -       Row Name 11/10/23 1044          Stairs Within Home, Primary    Number of Stairs, Within Home, Primary none  -     Stair Railings, Within Home, Primary none  -       Row Name 11/10/23 1044          Cognition    Orientation Status (Cognition) oriented x 4  -       Row Name 11/10/23 1044          Safety Issues, Functional Mobility    Safety Issues Affecting Function (Mobility) --  -     Impairments Affecting Function (Mobility) balance;coordination;endurance/activity tolerance;shortness of breath  -     Cognitive Impairments, Mobility Safety/Performance --  -               User Key  (r) = Recorded By, (t) = Taken By, (c) = Cosigned By      Initials Name Provider Type     Ivanna Taylor OT Occupational Therapist                     Mobility/ADL's       [facility] Name 11/10/23 1046          Bed Mobility    Bed Mobility bed mobility (all) activities  -     All Activities, [place] (Bed Mobility) maximum assist (25% patient effort)  -     Bed Mobility, Safety Issues impaired trunk control for bed mobility;decreased use of arms for pushing/pulling;decreased use of legs for bridging/pushing  -     Assistive Device (Bed Mobility) bed rails  -       Row Name 11/10/23 1046          Transfers    Comment, (Transfers) N/A patient does not transfer at baseline, he reports that his son lifts him and places him in chair. Patient has obtained a mechanical lift  -       Row Name 11/10/23 1046          Functional Mobility    Functional Mobility- Comment Patient reports that he has difficulty bearing weight through bilateral lower extremities, has son assist with lifting during all transfers <> wc  -     Patient was able to Ambulate no, other medical factors prevent ambulation  -     Reason Patient was unable to Ambulate Excessive Weakness  -       Row Name 11/10/23 1046          Activities of Daily Living    BADL Assessment/Intervention  grooming;feeding  -[facility] Name 11/10/23 1046          Grooming Assessment/Training    [place] Level (Grooming) grooming skills;wash face, hands;oral care regimen;minimum assist (75% patient effort)  -     Position (Grooming) edge of bed sitting  -[facility] Name 11/10/23 1046          Self-Feeding Assessment/Training    [place] Level (Feeding) feeding skills;finger foods;liquids to mouth;prepare tray/open items;minimum assist (75% patient effort)  -               User Key  (r) = Recorded By, (t) = Taken By, (c) = Cosigned By      Initials Name Provider Type     Ivanna Taylor OT Occupational Therapist                   Obj/Interventions       [facility] Name 11/10/23 1226          Vision Assessment/Intervention    Visual Impairment/Limitations L  -LH       Row Name 11/10/23 1226          Range of Motion Comprehensive    General Range of Motion bilateral upper extremity ROM WFL  -LH       Row Name 11/10/23 1226          Balance    Balance Interventions occupation based/functional task  -               User Key  (r) = Recorded By, (t) = Taken By, (c) = Cosigned By      Initials Name Provider Type     Ivanna Taylor OT Occupational Therapist                   Goals/Plan       Row Name 11/10/23 1230          Bed Mobility Goal 1 (OT)    Activity/Assistive Device (Bed Mobility Goal 1, OT) bed mobility activities, all  -     [place] Level/Cues Needed (Bed Mobility Goal 1, OT) maximum assist (25-49% patient effort)  -     Time Frame (Bed Mobility Goal 1, OT) short term goal (STG);1 day  -     Progress/Outcomes (Bed Mobility Goal 1, OT) goal met  -               User Key  (r) = Recorded By, (t) = Taken By, (c) = Cosigned By      Initials Name Provider Type     Ivanna Taylor OT Occupational Therapist                   Clinical Impression       [facility] Name 11/10/23 1226          Pain Assessment    Pretreatment Pain Rating 0/10 - no pain  -     Posttreatment Pain Rating 0/10 - no pain  -        Row Name 11/10/23 1226          Plan of Care Review    Plan of Care Reviewed With patient  -     Outcome Evaluation Patient is an 83 year old male who is hospitalized at [facility] with acute on chronic diastolic CHF exacerbation. Patient reports that he lives with his spouse and son, and is dependent with all functional mobility and transfers at baseline. Patient states his son picks him up and places him in wc from bed, transfers him as needed throughout the day. Patient requires mod to max A with ADLs at baseline, and son/spouse provide care in the home. Patient states his family is trained on use of mechanical lift for transfers in the home, and no further training is needed. At this time, patient is at functional baseline, and no further OT needed at this time. OT to sign off.  -       Row Name 11/10/23 1226          Therapy Assessment/Plan (OT)    Therapy Frequency (OT) evaluation only  -       Row Name 11/10/23 1226          Therapy Plan Review/Discharge Plan (OT)    Anticipated Discharge Disposition (OT) home;home with 24/7 care  -       Row Name 11/10/23 1226          Positioning and Restraints    Pre-Treatment Position in bed  -     Post Treatment Position bed  -     In Bed fowlers;call light within reach;encouraged to call for assist;exit alarm on  -               User Key  (r) = Recorded By, (t) = Taken By, (c) = Cosigned By      Initials Name Provider Type     Ivanna Taylor, OT Occupational Therapist                   Outcome Measures       Row Name 11/10/23 1238          How much help from another is currently needed...    Putting on and taking off regular lower body clothing? 1  -LH     Bathing (including washing, rinsing, and drying) 1  -LH     Toileting (which includes using toilet bed pan or urinal) 1  -LH     Putting on and taking off regular upper body clothing 2  -LH     Taking care of personal grooming (such as brushing teeth) 3  -LH     Eating meals 3  -LH     AM-PAC 6 Clicks  Score (OT) 11  -       Row Name 11/10/23 0840          How much help from another person do you currently need...    Turning from your back to your side while in flat bed without using bedrails? 3  -KE     Moving from lying on back to sitting on the side of a flat bed without bedrails? 1  -KE     Moving to and from a bed to a chair (including a wheelchair)? 1  -KE     Standing up from a chair using your arms (e.g., wheelchair, bedside chair)? 1  -KE     Climbing 3-5 steps with a railing? 1  -KE     To walk in hospital room? 1  -KE     AM-PAC 6 Clicks Score (PT) 8  -KE     Highest level of mobility 3 --> Sat at edge of bed  -       Row Name 11/10/23 1230          Functional Assessment    Outcome Measure Options AM-PAC 6 Clicks Daily Activity (OT)  -               User Key  (r) = Recorded By, (t) = Taken By, (c) = Cosigned By      Initials Name Provider Type    Karen Weston, RN Registered Nurse     Ivanna Taylor OT Occupational Therapist                    Occupational Therapy Education       Title: PT OT SLP Therapies (Done)       Topic: Occupational Therapy (Done)       Point: ADL training (Done)       Description:   Instruct learner(s) on proper safety adaptation and remediation techniques during self care or transfers.   Instruct in proper use of assistive devices.                  Learning Progress Summary             Patient Acceptance, E,TB, VU by  at 11/10/2023 1231    Comment: Role of OT, discharge planning                         Point: Home exercise program (Done)       Description:   Instruct learner(s) on appropriate technique for monitoring, assisting and/or progressing therapeutic exercises/activities.                  Learning Progress Summary             Patient Acceptance, E,TB, VU by  at 11/10/2023 1231    Comment: Role of OT, discharge planning                         Point: Precautions (Done)       Description:   Instruct learner(s) on prescribed precautions during self-care  and functional transfers.                  Learning Progress Summary             Patient Acceptance, E,TB, VU by  at 11/10/2023 1231    Comment: Role of OT, discharge planning                         Point: Body mechanics (Done)       Description:   Instruct learner(s) on proper positioning and spine alignment during self-care, functional mobility activities and/or exercises.                  Learning Progress Summary             Patient Acceptance, E,TB, VU by  at 11/10/2023 1231    Comment: Role of OT, discharge planning                                         User Key       Initials Effective Dates Name Provider Type Discipline     08/31/23 -  Ivanna Taylor, OT Occupational Therapist OT                  OT Recommendation and Plan  Therapy Frequency (OT): evaluation only  Plan of Care Review  Plan of Care Reviewed With: patient  Outcome Evaluation: Patient is an 83 year old male who is hospitalized at [facility] with acute on chronic diastolic CHF exacerbation. Patient reports that he lives with his spouse and son, and is dependent with all functional mobility and transfers at baseline. Patient states his son picks him up and places him in wc from bed, transfers him as needed throughout the day. Patient requires mod to max A with ADLs at baseline, and son/spouse provide care in the home. Patient states his family is trained on use of mechanical lift for transfers in the home, and no further training is needed. At this time, patient is at functional baseline, and no further OT needed at this time. OT to sign off.     Time Calculation:   Evaluation Complexity (OT)  Review Occupational Profile/Medical/Therapy History Complexity: brief/low complexity  Assessment, Occupational Performance/Identification of Deficit Complexity: 1-3 performance deficits  Clinical Decision Making Complexity (OT): problem focused assessment/low complexity  Overall Complexity of Evaluation (OT): low complexity     Time Calculation- OT        Row Name 11/10/23 1232             Time Calculation- OT    OT Start Time 1041  -      OT Stop Time 1056  -      OT Time Calculation (min) 15 min  -      OT Non-Billable Time (min) 15 min  -      OT Received On 11/10/23  -         Untimed Charges    OT Eval/Re-eval Minutes 15  -LH         Total Minutes    Untimed Charges Total Minutes 15  -LH       Total Minutes 15  -LH                User Key  (r) = Recorded By, (t) = Taken By, (c) = Cosigned By      Initials Name Provider Type     Ivanna Taylor OT Occupational Therapist                  Therapy Charges for Today       Code Description Service Date Service Provider Modifiers Qty    30290236935 HC OT EVAL MOD COMPLEXITY 3 11/10/2023 Ivanna Taylor OT GO 1                 Ivanna Taylor OT  11/10/2023

## 2023-11-10 NOTE — PLAN OF CARE
Goal Outcome Evaluation:  Plan of Care Reviewed With: patient        Progress: no change  Outcome Evaluation: Pt on 3LNC. Purewick in place. Hold Metoprolol this morning for BP low. MRI completed this shift. Q2 turns. Will continue to monitor.

## 2023-11-10 NOTE — PLAN OF CARE
Goal Outcome Evaluation:  Plan of Care Reviewed With: patient           Outcome Evaluation: Patient is an 83 year old male who is hospitalized at [facility] with acute on chronic diastolic CHF exacerbation. Patient reports that he lives with his spouse and son, and is dependent with all functional mobility and transfers at baseline. Patient states his son picks him up and places him in wc from bed, transfers him as needed throughout the day. Patient requires mod to max A with ADLs at baseline, and son/spouse provide care in the home. Patient states his family is trained on use of mechanical lift for transfers in the home, and no further training is needed. At this time, patient is at functional baseline, and no further OT needed at this time. OT to sign off.      Anticipated Discharge Disposition (OT): home, home with 24/7 care

## 2023-11-10 NOTE — THERAPY EVALUATION
Acute Care - Speech Language Pathology   Swallow Initial Evaluation [facility]     Patient Name: Mehreen Silva IV  : 1940  MRN: [id number]  Today's Date: 11/10/2023               Admit Date: 2023    Visit Dx:   No diagnosis found.  Patient Active Problem List   Diagnosis    Cervical spondylosis with myelopathy    Lumbar radiculopathy    Hx of toxic multinodular goiter    HLD (hyperlipidemia)    (HFpEF) heart failure with preserved ejection fraction    Atrial fibrillation, persistent    Thyroid disease    Acute on chronic diastolic CHF (congestive heart failure)    Cervical myelopathy    Hypoxia    Hyperkalemia    Hyperglycemia     Past Medical History:   Diagnosis Date    (HFpEF) heart failure with preserved ejection fraction 2022    Arthritis     Atrial fibrillation, persistent 2022    Cervical spondylosis with myelopathy     HLD (hyperlipidemia) 2022    Hx of toxic multinodular goiter 2022    Lumbar radiculopathy     Pulmonary hypertension      Past Surgical History:   Procedure Laterality Date    APPENDECTOMY      BACK SURGERY      COLONOSCOPY      EXCISION MASS TRUNK N/A 2016    Procedure: Excision soft tissue neoplasm on abdominal wall and left neck;  Surgeon: Shaji Barahona Jr., MD;  Location: [facility];  Service:     KNEE SURGERY      NECK SURGERY  1974    SHOULDER SURGERY         SLP Recommendation and Plan  SLP Swallowing Diagnosis: suspected pharyngeal dysphagia (11/10/23 1000)  SLP Diet Recommendation: soft to chew textures, chopped, no mixed consistencies, nectar thick liquids (11/10/23 1000)  Recommended Precautions and Strategies: upright posture during/after eating, small bites of food and sips of liquid, general aspiration precautions (11/10/23 1000)  SLP Rec. for Method of Medication Administration: meds crushed, with puree, as tolerated (11/10/23 1000)     Monitor for Signs of Aspiration: yes, notify SLP if any concerns (11/10/23 1000)  Recommended  Diagnostics: VFSS ([facility] [place] [facility]) (11/10/23 1000)  Swallow Criteria for Skilled Therapeutic Interventions Met: demonstrates skilled criteria (11/10/23 1000)  Anticipated Discharge Disposition (SLP): unknown (11/10/23 1000)  Rehab Potential/Prognosis, Swallowing: good, to achieve stated therapy goals (11/10/23 1000)  Therapy Frequency (Swallow): PRN (11/10/23 1000)  Predicted Duration Therapy Intervention (Days): until discharge (11/10/23 1000)  Oral Care Recommendations: Oral Care BID/PRN (11/10/23 1000)                                      Oral Care Recommendations: Oral Care BID/PRN (11/10/23 1000)    Plan of Care Reviewed With: patient  Outcome Evaluation: Clinical swallow evaluation completed. Recommend soft/chopped solid diet with nectar thick liquids. No mixed consistencies. Meds crushed in puree. Sitting upright, slow rate, small bites/sips. Speech to follow with VFSS.      SWALLOW EVALUATION (last 72 hours)       SLP Adult Swallow Evaluation       Row Name 11/10/23 1000                   Rehab Evaluation    Document Type evaluation  -CR        Subjective Information no complaints  -CR        Patient Observations alert;cooperative  -CR        Patient Effort good  -CR        Symptoms Noted During/After Treatment none  -CR           General Information    Patient Profile Reviewed yes  -CR        Pertinent History Of Current Problem 82 y/o male admitted with CHF and PNA, right greater than left.  -CR        Current Method of Nutrition regular textures;thin liquids  -CR        Precautions/Limitations, Vision WFL;for purposes of eval  -CR        Precautions/Limitations, Hearing WFL;for purposes of eval  -CR        Prior Level of Function-Swallowing no diet consistency restrictions  -CR        Plans/Goals Discussed with patient;agreed upon  -CR        Barriers to Rehab none identified  -CR           Pain    Additional Documentation Pain Scale: Numbers Pre/Post-Treatment (Group)  -CR           Pain Scale: Numbers  Pre/Post-Treatment    Pretreatment Pain Rating 0/10 - no pain  -CR           Oral Motor Structure and Function    Dentition Assessment natural, present and adequate  -CR        Secretion Management WNL/WFL  -CR        Mucosal Quality moist, healthy  -CR           Oral Musculature and Cranial Nerve Assessment    Oral Motor General Assessment WFL  -CR           Clinical Swallow Eval    Clinical Swallow Evaluation Summary Clinical swallow evaluation completed. Subtle wet vocal quality with thins by straw. Coughing followiing mixed trial. No overt s/s of aspiration with ice chips, nectar by spoon/cup/straw, puree, or soft/chopped solids. Recommend soft/chopped solid diet with nectar thick liquids. No mixed consistencies. Meds crushed in puree. Sitting upright, slow rate, small bites/sips. MD wishing for VFSS to be completed. Speech to follow with VFSS.  -CR           SLP Evaluation Clinical Impression    SLP Swallowing Diagnosis suspected pharyngeal dysphagia  -CR        Functional Impact risk of aspiration/pneumonia  -CR        Rehab Potential/Prognosis, Swallowing good, to achieve stated therapy goals  -CR        Swallow Criteria for Skilled Therapeutic Interventions Met demonstrates skilled criteria  -CR           Recommendations    Therapy Frequency (Swallow) PRN  -CR        Predicted Duration Therapy Intervention (Days) until discharge  -CR        SLP Diet Recommendation soft to chew textures;chopped;no mixed consistencies;nectar thick liquids  -CR        Recommended Diagnostics VFSS ([facility])  -CR        Recommended Precautions and Strategies upright posture during/after eating;small bites of food and sips of liquid;general aspiration precautions  -CR        Oral Care Recommendations Oral Care BID/PRN  -CR        SLP Rec. for Method of Medication Administration meds crushed;with puree;as tolerated  -CR        Monitor for Signs of Aspiration yes;notify SLP if any concerns  -CR        Anticipated Discharge Disposition  (SLP) unknown  -CR           Swallow Goals (SLP)    Swallow LTGs Patient will demonstrate functional swallow for  -CR           (LTG) Patient will demonstrate functional swallow for    Diet Texture (Demonstrate functional swallow) soft to chew (chopped) textures  -CR        Liquid viscosity (Demonstrate functional swallow) thin liquids  -CR        Lares (Demonstrate functional swallow) independently (over 90% accuracy)  -CR        Time Frame (Demonstrate functional swallow) by discharge  -CR        Progress/Outcomes (Demonstrate functional swallow) new goal  -CR                  User Key  (r) = Recorded By, (t) = Taken By, (c) = Cosigned By      Initials Name Effective Dates    Aurea Voss SLP 08/28/23 -                     EDUCATION  The patient has been educated in the following areas:   Dysphagia (Swallowing Impairment).        SLP GOALS       Row Name 11/10/23 1000             (LTG) Patient will demonstrate functional swallow for    Diet Texture (Demonstrate functional swallow) soft to chew (chopped) textures  -CR      Liquid viscosity (Demonstrate functional swallow) thin liquids  -CR      Lares (Demonstrate functional swallow) independently (over 90% accuracy)  -CR      Time Frame (Demonstrate functional swallow) by discharge  -CR      Progress/Outcomes (Demonstrate functional swallow) new goal  -CR                User Key  (r) = Recorded By, (t) = Taken By, (c) = Cosigned By      Initials Name Provider Type    Aurea Voss SLP Speech and Language Pathologist                       Time Calculation:    Time Calculation- SLP       Row Name 11/10/23 1045             Time Calculation- SLP    SLP Start Time 0740  -CR      SLP Received On 11/10/23  -CR         Untimed Charges    80305-AR Eval Oral Pharyng Swallow Minutes 45  -CR         Total Minutes    Untimed Charges Total Minutes 45  -CR       Total Minutes 45  -CR                User Key  (r) = Recorded By, (t) = Taken By, (c)  = Cosigned By      Initials Name Provider Type    Aurea Voss SLP Speech and Language Pathologist                    Therapy Charges for Today       Code Description Service Date Service Provider Modifiers Qty    85288765899 HC ST EVAL ORAL PHARYNG SWALLOW 3 11/10/2023 Aurea Melendez, FORREST GN 1                 FORREST Hoyt  11/10/2023

## 2023-11-11 ENCOUNTER — APPOINTMENT (OUTPATIENT)
Dept: GENERAL RADIOLOGY | Facility: HOSPITAL | Age: 83
DRG: 291 | End: 2023-11-11
Payer: MEDICARE

## 2023-11-11 LAB
ANION GAP SERPL CALCULATED.3IONS-SCNC: 7.6 MMOL/L (ref 5–15)
BASOPHILS # BLD AUTO: 0.03 10*3/MM3 (ref 0–0.2)
BASOPHILS NFR BLD AUTO: 0.4 % (ref 0–1.5)
BUN SERPL-MCNC: 29 MG/DL (ref 8–23)
BUN/CREAT SERPL: 28.7 (ref 7–25)
CALCIUM SPEC-SCNC: 8.4 MG/DL (ref 8.6–10.5)
CHLORIDE SERPL-SCNC: 101 MMOL/L (ref 98–107)
CO2 SERPL-SCNC: 32.4 MMOL/L (ref 22–29)
CREAT SERPL-MCNC: 1.01 MG/DL (ref 0.76–1.27)
DEPRECATED RDW RBC AUTO: 44.8 FL (ref 37–54)
EGFRCR SERPLBLD CKD-EPI 2021: 73.8 ML/MIN/1.73
EOSINOPHIL # BLD AUTO: 0.1 10*3/MM3 (ref 0–0.4)
EOSINOPHIL NFR BLD AUTO: 1.3 % (ref 0.3–6.2)
ERYTHROCYTE [DISTWIDTH] IN BLOOD BY AUTOMATED COUNT: 15.1 % (ref 12.3–15.4)
GLUCOSE SERPL-MCNC: 113 MG/DL (ref 65–99)
HCT VFR BLD AUTO: 32.1 % (ref 37.5–51)
HGB BLD-MCNC: 10.2 G/DL (ref 13–17.7)
IMM GRANULOCYTES # BLD AUTO: 0.02 10*3/MM3 (ref 0–0.05)
IMM GRANULOCYTES NFR BLD AUTO: 0.3 % (ref 0–0.5)
LYMPHOCYTES # BLD AUTO: 0.91 10*3/MM3 (ref 0.7–3.1)
LYMPHOCYTES NFR BLD AUTO: 11.5 % (ref 19.6–45.3)
MCH RBC QN AUTO: 26 PG (ref 26.6–33)
MCHC RBC AUTO-ENTMCNC: 31.8 G/DL (ref 31.5–35.7)
MCV RBC AUTO: 81.9 FL (ref 79–97)
MONOCYTES # BLD AUTO: 0.64 10*3/MM3 (ref 0.1–0.9)
MONOCYTES NFR BLD AUTO: 8.1 % (ref 5–12)
NEUTROPHILS NFR BLD AUTO: 6.24 10*3/MM3 (ref 1.7–7)
NEUTROPHILS NFR BLD AUTO: 78.4 % (ref 42.7–76)
NRBC BLD AUTO-RTO: 0 /100 WBC (ref 0–0.2)
PLATELET # BLD AUTO: 208 10*3/MM3 (ref 140–450)
PMV BLD AUTO: 11.1 FL (ref 6–12)
POTASSIUM SERPL-SCNC: 4.2 MMOL/L (ref 3.5–5.2)
RBC # BLD AUTO: 3.92 10*6/MM3 (ref 4.14–5.8)
SODIUM SERPL-SCNC: 141 MMOL/L (ref 136–145)
WBC NRBC COR # BLD: 7.94 10*3/MM3 (ref 3.4–10.8)

## 2023-11-11 PROCEDURE — 94799 UNLISTED PULMONARY SVC/PX: CPT

## 2023-11-11 PROCEDURE — 94664 DEMO&/EVAL PT USE INHALER: CPT

## 2023-11-11 PROCEDURE — 94760 N-INVAS EAR/PLS OXIMETRY 1: CPT

## 2023-11-11 PROCEDURE — 85025 COMPLETE CBC W/AUTO DIFF WBC: CPT | Performed by: HOSPITALIST

## 2023-11-11 PROCEDURE — 74230 X-RAY XM SWLNG FUNCJ C+: CPT

## 2023-11-11 PROCEDURE — 94761 N-INVAS EAR/PLS OXIMETRY MLT: CPT

## 2023-11-11 PROCEDURE — 99232 SBSQ HOSP IP/OBS MODERATE 35: CPT | Performed by: NURSE PRACTITIONER

## 2023-11-11 PROCEDURE — 80048 BASIC METABOLIC PNL TOTAL CA: CPT | Performed by: HOSPITALIST

## 2023-11-11 PROCEDURE — 92611 MOTION FLUOROSCOPY/SWALLOW: CPT

## 2023-11-11 RX ADMIN — BARIUM SULFATE 55 ML: 0.81 POWDER, FOR SUSPENSION ORAL at 11:45

## 2023-11-11 RX ADMIN — BARIUM SULFATE 4 ML: 980 POWDER, FOR SUSPENSION ORAL at 11:46

## 2023-11-11 RX ADMIN — IPRATROPIUM BROMIDE AND ALBUTEROL SULFATE 3 ML: 2.5; .5 SOLUTION RESPIRATORY (INHALATION) at 19:17

## 2023-11-11 RX ADMIN — METOPROLOL TARTRATE 37.5 MG: 25 TABLET, FILM COATED ORAL at 17:55

## 2023-11-11 RX ADMIN — APIXABAN 5 MG: 5 TABLET, FILM COATED ORAL at 08:31

## 2023-11-11 RX ADMIN — BARIUM SULFATE 50 ML: 400 SUSPENSION ORAL at 11:46

## 2023-11-11 RX ADMIN — APIXABAN 5 MG: 5 TABLET, FILM COATED ORAL at 21:48

## 2023-11-11 RX ADMIN — FUROSEMIDE 40 MG: 40 TABLET ORAL at 08:31

## 2023-11-11 RX ADMIN — Medication 10 ML: at 08:31

## 2023-11-11 RX ADMIN — METOPROLOL SUCCINATE 37.5 MG: 25 TABLET, EXTENDED RELEASE ORAL at 08:31

## 2023-11-11 RX ADMIN — EMPAGLIFLOZIN 10 MG: 10 TABLET, FILM COATED ORAL at 08:31

## 2023-11-11 RX ADMIN — MULTIPLE VITAMINS W/ MINERALS TAB 1 TABLET: TAB at 08:31

## 2023-11-11 RX ADMIN — IPRATROPIUM BROMIDE AND ALBUTEROL SULFATE 3 ML: 2.5; .5 SOLUTION RESPIRATORY (INHALATION) at 07:35

## 2023-11-11 RX ADMIN — IPRATROPIUM BROMIDE AND ALBUTEROL SULFATE 3 ML: 2.5; .5 SOLUTION RESPIRATORY (INHALATION) at 14:48

## 2023-11-11 RX ADMIN — BARIUM SULFATE 1 TEASPOON(S): 0.6 CREAM ORAL at 11:45

## 2023-11-11 RX ADMIN — Medication 10 ML: at 21:48

## 2023-11-11 NOTE — MBS/VFSS/FEES
Acute Care - Speech Language Pathology   Swallow Initial Evaluation [facility]     Patient Name: Mehreen Silva IV  : 1940  MRN: [id number]  Today's Date: 2023               Admit Date: 2023    Visit Dx:   No diagnosis found.  Patient Active Problem List   Diagnosis    Cervical spondylosis with myelopathy    Lumbar radiculopathy    Hx of toxic multinodular goiter    HLD (hyperlipidemia)    (HFpEF) heart failure with preserved ejection fraction    Atrial fibrillation, persistent    Thyroid disease    Acute on chronic diastolic CHF (congestive heart failure)    Cervical myelopathy    Hypoxia    Hyperkalemia    Hyperglycemia     Past Medical History:   Diagnosis Date    (HFpEF) heart failure with preserved ejection fraction 2022    Arthritis     Atrial fibrillation, persistent 2022    Cervical spondylosis with myelopathy     HLD (hyperlipidemia) 2022    Hx of toxic multinodular goiter 2022    Lumbar radiculopathy     Pulmonary hypertension      Past Surgical History:   Procedure Laterality Date    APPENDECTOMY      BACK SURGERY      COLONOSCOPY      EXCISION MASS TRUNK N/A 2016    Procedure: Excision soft tissue neoplasm on abdominal wall and left neck;  Surgeon: Shaji Barahona Jr., MD;  Location: [facility] OR;  Service:     KNEE SURGERY      NECK SURGERY  1974    SHOULDER SURGERY         SLP Recommendation and Plan  Video swallow study completed. Mild oropharyngeal dysphagia with minimal to moderate pharyngeal residue/pooling secondary to minimal pharyngeal weakness and anatomical differences/cervical spurring at C5, C6 level. Penetration ejected from the airway occurs with thins. Maximal verbal cues are required to initiate additional swallows to clear residuals.       Recommend: regular and thin liquids. Medications: with thin liquids. Compensations: avoid straws, multiple swallows s/p each bite/sip, upright for all PO, GERD precautions consumption of small meals,  utilization of thin liquid wash or extra sauces/gravies as indicated, remaining upright for all PO consumption and at least 30 minutes s/p, avoid eating at reasonable time frame based on sleep schedule, avoid dry/sticky/dense foods as needed.       SLP extensively discussed and video review for all results s/p VFSS completion in fluoroscopy suite including necessity for multiple swallows and reflux precautions to reduce aspiration risk of residuals and/or reflux. Pt verbalizes agreement, however would benefit from carryover.      SWALLOW EVALUATION (last 72 hours)       SLP Adult Swallow Evaluation       Row Name 11/11/23 1200       Rehab Evaluation    Document Type evaluation  -AB    Subjective Information complains of  irritation with testing  -AB    Patient Observations alert  -AB    Patient/Family/Caregiver Comments/Observations seen in fluoro suite  -AB    Patient Effort good  -AB    Symptoms Noted During/After Treatment none  -AB       General Information    Patient Profile Reviewed yes  -AB    Pertinent History Of Current Problem 83 y.o. male with A-fib on Eliquis, pulmonary hypertension, CHF, cervical spondylosis with myelopathy and lumbar radiculopathy status post previous surgeries, and toxic multinodular goiter who was admitted with shortness of air, and hypoxia, diagnosed with acute on chronic CHF and pneumonia.  Neurology consulted due to decline in mobility.  There was also mention of abnormal diaphragm movement on exam.     History provided by the patient and his wife.  He has a longstanding history of neck and back problems.  He underwent 2 cervical surgeries with fusion in 1974 and 4 lumbar surgeries in the early 2000's.  Since his cervical surgeries he has had right arm and leg weakness. He has also had progressively worsening lower extremity weakness and associated balance issues. His wife he has had a limp and right foot drop for as long as she has been  to him, 38 years.  He used a cane  for many years, then a walker for 3 to 4 years and has progressed to using a wheelchair for the last year.  He no longer drives.  They report these changes have been gradual with no acute worsening.  No change in bowel or bladder.  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  He has severe right leg weakness on exam and leg is contracted which his wife states is not new.Cognitively he has not had any issues and continues to work 1 to 2 days a week as an .       He denies any change in speech, difficulty swallowing, change in vision or double vision.  No difficulty breathing until he developed shortness of air and cough over the last few days.  There is concern he may have dysphagia due to pneumonia seen on CT chest however he is waiting to see the speech therapist.  He did undergo thoracentesis today.  -AB    Current Method of Nutrition soft to chew textures;nectar/syrup-thick liquids  -AB    Precautions/Limitations, Vision WFL;for purposes of eval  -AB    Precautions/Limitations, Hearing WFL;for purposes of eval  -AB    Prior Level of Function-Communication WFL  works 1-2 days weekly as an   -AB    Prior Level of Function-Swallowing no diet consistency restrictions  -AB    Plans/Goals Discussed with patient;agreed upon  -AB    Barriers to Rehab none identified  -AB       Pain    Additional Documentation --       Pain Scale: Numbers Pre/Post-Treatment    Pretreatment Pain Rating --       Oral Motor Structure and Function    Oral Lesions or Structural Abnormalities and/or variants none  -AB    Dentition Assessment natural, present and adequate  -AB    Secretion Management WNL/WFL  -AB    Mucosal Quality moist,  healthy  -AB    Gag Response --  did not assess  -AB    Volitional Swallow WFL  -AB    Volitional Cough WFL  -AB       Oral Musculature and Cranial Nerve Assessment    Oral Motor General Assessment WFL  -AB       Clinical Swallow Eval    Clinical Swallow Evaluation Summary --       MBS/VFSS Interpretation    VFSS Summary Radiologist Dr. Denny present. Pt seated and visualized in a lateral position. Trials assessed included: NTL by cup, puree, mechanical soft/mixed consistency, regular solids, and thin liquids by cup/straw. Cervical spurring at C5, C6 vertebral level results in minimal impingement on posterior esophgeal wall, narrowing in this area. Lingual movements slow in initiation, decreased bolus control. Premature spillage to pyriform with NTL by cup/straw, mixed consistency. Epiglottic deflection incomplete, approximates posterior pharyngeal wall. Anterior movement of hyolaryngeal complex decreased. Aforementioned deficits result in pharyngeal residuals - minimal base of tongue and valleculae following NTL via cup; minimal base of tongue, valleculae, pooled pyriform with NTL via straw, puree, regular solids, thins via cup/straw; moderate to maximal base of tongue, valleculae, pyriform following soft solids. Cued dry swallow eliminates or decreases residuals. Penetration underside of the epiglottis, above vocal folds, ejected from the airway following thins via cup. Penetration during the swallow, above vocal folds, majority to all appears ejected, increased pyriform pooling and base of tongue, valleculae residue following thins by straw. Esophageal scan following regular solids with questionable slow distal clearance  -AB       SLP Communication to Radiology    Severity Level of Dysphagia mild dysphagia;pharyngeal dysfunction;oral dysfunction;suspected esophageal dysfunction  -AB    Consistencies Aspirated/Penetrated penetrated;thin liquids  -AB    Summary Statement Radiologist Dr. Denny present. Pt  seated and visualized in a lateral position. Trials assessed included: NTL by cup, puree, mechanical soft/mixed consistency, regular solids, and thin liquids by cup/straw. Cervical spurring at C5, C6 vertebral level results in minimal impingement on posterior esophgeal wall, narrowing in this area. Lingual movements slow in initiation, decreased bolus control. Premature spillage to pyriform with NTL by cup/straw, mixed consistency. Epiglottic deflection incomplete, approximates posterior pharyngeal wall. Anterior movement of hyolaryngeal complex decreased. Aforementioned deficits result in pharyngeal residuals - minimal base of tongue and valleculae following NTL via cup; minimal base of tongue, valleculae, pooled pyriform with NTL via straw, puree, regular solids, thins via cup/straw; moderate to maximal base of tongue, valleculae, pyriform following soft solids. Cued dry swallow eliminates or decreases residuals. Penetration underside of the epiglottis, above vocal folds, ejected from the airway following thins via cup. Penetration during the swallow, above vocal folds, majority to all appears ejected, increased pyriform pooling and base of tongue, valleculae residue following thins by straw. Esophageal scan following regular solids with questionable slow distal clearance  \  -AB       SLP Evaluation Clinical Impression    SLP Swallowing Diagnosis mild;oral dysphagia;pharyngeal dysphagia  -AB    Functional Impact risk of aspiration/pneumonia  -AB    Rehab Potential/Prognosis, Swallowing good, to achieve stated therapy goals  -AB    Swallow Criteria for Skilled Therapeutic Interventions Met demonstrates skilled criteria  -AB       Recommendations    Therapy Frequency (Swallow) PRN  -AB    Predicted Duration Therapy Intervention (Days) until discharge  -AB    SLP Diet Recommendation regular textures;thin liquids  -AB    Recommended Diagnostics --  -AB    Recommended Precautions and Strategies upright posture  during/after eating;small bites of food and sips of liquid;no straw;multiple swallows per bite of food;multiple swallows per sip of liquid;reflux precautions  -AB    Oral Care Recommendations Oral Care BID/PRN  -AB    SLP Rec. for Method of Medication Administration with thin liquids;as tolerated  -AB    Monitor for Signs of Aspiration yes;notify SLP if any concerns  -AB    Anticipated Discharge Disposition (SLP) unknown  -AB       Swallow Goals (SLP)    Swallow LTGs Patient will demonstrate functional swallow for;Swallow Long Term Goal (free text)  -AB       (LTG) Patient will demonstrate functional swallow for    Diet Texture (Demonstrate functional swallow) soft to chew (chopped) textures  -AB    Liquid viscosity (Demonstrate functional swallow) thin liquids  -AB    [place] (Demonstrate functional swallow) independently (over 90% accuracy)  -AB    Time Frame (Demonstrate functional swallow) by discharge  -AB    Progress/Outcomes (Demonstrate functional swallow) goal no longer appropriate  -AB       (LTG) Swallow    (LTG) Swallow pt will demonstrate tolerance regular/thin diet utilizing compensatory strategies (multiple swallows, no straw, reflux precautions) in 10/10 opps with NO cues required for carryover  -AB    [place] (Swallow Long Term Goal) independently (over 90% accuracy)  -AB    Time Frame (Swallow Long Term Goal) by discharge  -AB    Progress/Outcomes (Swallow Long Term Goal) new goal  -AB              User Key  (r) = Recorded By, (t) = Taken By, (c) = Cosigned By      Initials Name Effective Dates    Ariane Pearson, MS CCC-SLP 12/27/22 -                     EDUCATION  The patient has been educated in the following areas:   Dysphagia (Swallowing Impairment) Modified Diet Instruction.        SLP GOALS       Row Name 11/11/23 1200       (LTG) Patient will demonstrate functional swallow for    Diet Texture (Demonstrate functional swallow) soft to chew (chopped) textures  -AB    Liquid  viscosity (Demonstrate functional swallow) thin liquids  -AB    [place] (Demonstrate functional swallow) independently (over 90% accuracy)  -AB    Time Frame (Demonstrate functional swallow) by discharge  -AB    Progress/Outcomes (Demonstrate functional swallow) goal no longer appropriate  -AB       (LTG) Swallow    (LTG) Swallow pt will demonstrate tolerance regular/thin diet utilizing compensatory strategies (multiple swallows, no straw, reflux precautions) in 10/10 opps with NO cues required for carryover  -AB    [place] (Swallow Long Term Goal) independently (over 90% accuracy)  -AB    Time Frame (Swallow Long Term Goal) by discharge  -AB    Progress/Outcomes (Swallow Long Term Goal) new goal  -AB              User Key  (r) = Recorded By, (t) = Taken By, (c) = Cosigned By      Initials Name Provider Type    Ariane Pearson MS CCC-SLP Speech and Language Pathologist                       Time Calculation:    Time Calculation- SLP       Row Name 11/11/23 1322             Time Calculation- SLP    SLP Received On 11/11/23  -AB                User Key  (r) = Recorded By, (t) = Taken By, (c) = Cosigned By      Initials Name Provider Type    Ariane Pearson MS CCC-SLP Speech and Language Pathologist                    Therapy Charges for Today       Code Description Service Date Service Provider Modifiers Qty    [id number] HC ST MOTION FLUORO EVAL SWALLOW 6 11/11/2023 Ariane Tovar MS CCC-SLP GN 1                 Ariane Tovar MS CCC-FORREST  11/11/2023

## 2023-11-11 NOTE — PLAN OF CARE
Goal Outcome Evaluation:  Plan of Care Reviewed With: patient        Progress: no change  Outcome Evaluation: Video swallow study completed. Mild oropharyngeal dysphagia with minimal to moderate pharyngeal residue/pooling secondary to minimal pharyngeal weakness and anatomical differences/cervical spurring at C5, C6 level. Penetration ejected from the airway occurs with thins. Maximal verbal cues are required to initiate additional swallows to clear residuals.       Recommend: regular and thin liquids. Medications: with thin liquids. Compensations: avoid straws, multiple swallows s/p each bite/sip, upright for all PO, GERD precautions consumption of small meals, utilization of thin liquid wash or extra sauces/gravies as indicated, remaining upright for all PO consumption and at least 30 minutes s/p, avoid eating at reasonable time frame based on sleep schedule, avoid dry/sticky/dense foods as needed.       SLP extensively discussed and video review for all results s/p VFSS completion in fluoroscopy suite including necessity for multiple swallows and reflux precautions to reduce aspiration risk of residuals and/or reflux. Pt verbalizes agreement, however would benefit from carryover.

## 2023-11-11 NOTE — PROGRESS NOTES
[facility]    [phone]   LOS: 3 days     Name: Mehreen Silva IV  Age/Sex: 83 y.o. male  :  1940        PCP: Taiwo Powers MD  No chief complaint on file.     Subjective   He is frustrated this morning by still being here.  He is not enthused about additional testing.  Tolerating thickened liquids initiated yesterday.  Rested well overnight.  General: No Fever or Chills, Cardiac: No Chest Pain or Palpitations, GI: No Nausea, Vomiting, or Diarrhea, and Other: No bleeding    apixaban, 5 mg, Oral, Q12H  empagliflozin, 10 mg, Oral, Daily  furosemide, 40 mg, Oral, Daily  ipratropium-albuterol, 3 mL, Nebulization, 4x Daily - RT  metoprolol succinate XL, 37.5 mg, Oral, Q12H  multivitamin with minerals, 1 tablet, Oral, Daily  senna-docusate sodium, 2 tablet, Oral, BID  sodium chloride, 10 mL, Intravenous, Q12H      hold, 1 each        Objective   Vital Signs  Temp:  [97.5 °F (36.4 °C)-98.1 °F (36.7 °C)] 97.7 °F (36.5 °C)  Heart Rate:  [] 99  Resp:  [16-20] 16  BP: ()/(60-80) 97/64  Body mass index is 23.69 kg/m².    Intake/Output Summary (Last 24 hours) at 2023 0631  Last data filed at 11/10/2023 1837  Gross per 24 hour   Intake 920 ml   Output --   Net 920 ml       Physical Exam  Vitals and nursing note reviewed.   Constitutional:       Appearance: Normal appearance.   Cardiovascular:      Rate and Rhythm: Normal rate. Rhythm irregular.   Pulmonary:      Effort: Pulmonary effort is normal. No respiratory distress.      Comments: Decreased breath sounds at the bases bilaterally with noted expiratory wheezes and rails  Abdominal:      General: Bowel sounds are normal. There is no distension.      Palpations: Abdomen is soft.   Neurological:      Mental Status: He is alert.           Results Review:       I reviewed the patient's new clinical results.  Results from last 7 days   Lab Units 11/10/23  0739 23  0644 23  1003   WBC 10*3/mm3 8.05 8.68 10.29    HEMOGLOBIN g/dL 10.8* 11.1* 11.9*   PLATELETS 10*3/mm3 234 249 316     Results from last 7 days   Lab Units 11/10/23  1813 11/10/23  0739 11/09/23  0644 11/08/23  1003   SODIUM mmol/L  --  143 146* 143   POTASSIUM mmol/L 3.9 3.3* 3.9 5.8*   CHLORIDE mmol/L  --  100 105 105   CO2 mmol/L  --  35.9* 30.1* 28.7   BUN mg/dL  --  30* 28* 27*   CREATININE mg/dL  --  0.94 1.06 1.08   CALCIUM mg/dL  --  8.4* 9.0 9.5   MAGNESIUM mg/dL  --   --  2.3  --    PHOSPHORUS mg/dL  --   --  4.1  --    Estimated Creatinine Clearance: 63.1 mL/min (by C-G formula based on SCr of 0.94 mg/dL).      Assessment & Plan   Active Hospital Problems    Diagnosis  POA    **Acute on chronic diastolic CHF (congestive heart failure) [I50.33]  Yes    Cervical myelopathy [G95.9]  Unknown    Hypoxia [R09.02]  Unknown    Hyperkalemia [E87.5]  Unknown    Hyperglycemia [R73.9]  Unknown    Thyroid disease [E07.9]  Yes    (HFpEF) heart failure with preserved ejection fraction [I50.30]  Yes    Atrial fibrillation, persistent [I48.19]  Yes    HLD (hyperlipidemia) [E78.5]  Yes    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       PLAN  This is an 83-year-old gentleman with a history of chronic diastolic heart failure, thyroid issues, atrial fibrillation, hyperlipidemia and cervical myelopathy with neuropathy who presents to the hospital with shortness of breath anxiety and weakness and was admitted with acute on chronic diastolic heart failure  -He has been started back on oral Lasix.  We will monitor his fluid status.  I's and O's are not really accurately recorded over the last 24 hours will discuss further with nursing today.  -Plan to repeat CT scan with contrast today to evaluate this ovoid mass as well as evaluate the other pleural-based findings and effusions  -noted the possible pna's his WBC is nl and nl procal, I suspect possible chronic pneumonitis from aspiration;  Continue to  hold off on antibiotics  -Tolerated thoracentesis and breathing has improved.  LDH and Protein levels support transudate  effusion    -Appreciate speech therapy evaluation.  Planning for video swallow study today  -His diaphragm moves but not a lot; I suspect he has significant diaphragmatic weakness from deconditioning.  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  Recommend aggressive pulm hygeine  -Echo shows preserved EF and diastolic dysfunction (HFpEF)  -His potassium corrected yesterday.  Continue to follow  -Appreciate neurology eval and noted MRIs both independently reviewed and no CVA and noted chronic cervical changes.  Plan outpatient follow-up with Dr. Meyer or Dr. Villa  -Continue beta-blocker for now his heart rate has been somewhat labile with heart rates up into the 120s at times.  Resume AC today  -His breathing pattern is much more normal this morning.  I think the diuretics and thoracentesis really helped.  Sniff test reviewed.    -ABG showed significant hypoxia but no CO2 retention continue to monitor closely.  -Patient is a DNR    Disposition  Expected Discharge Date: 11/12/2023; Expected Discharge Time:    Plan discharge home tomorrow following video swallow study today    Kristopher Soliman MD  [facility]  11/11/23  06:31 EST

## 2023-11-11 NOTE — PROGRESS NOTES
"      [facility]    Patient Name: Mehreen Silva IV  :1940  83 y.o.  LOS: 3  Encounter Provider: CHARLEE Reddy      Patient Care Team:  Taiwo Powers MD as PCP - General (Internal Medicine)  Self, Bess YO MD as Consulting Physician (Endocrinology)  Huang Chan MD as Cardiologist (Cardiology)    Chief Complaint: Follow-up HFrEF    Interval History: Patient reports that he is irritated from being here and is ready to go home.  He does have further testing ordered, has CT of chest that has been ordered by primary team as well as swallow study.  Denies shortness of breath, edema, chest discomfort       Objective   Vital Signs  Temp:  [97.2 °F (36.2 °C)-98.1 °F (36.7 °C)] 97.2 °F (36.2 °C)  Heart Rate:  [] 98  Resp:  [16-20] 16  BP: ()/(61-80) 104/69    Intake/Output Summary (Last 24 hours) at 2023 1030  Last data filed at 2023 0925  Gross per 24 hour   Intake 558 ml   Output --   Net 558 ml     Flowsheet Rows      Flowsheet Row First Filed Value   Admission Height 177.8 cm (70\") Documented at 2023 1304   Admission Weight 77.6 kg (171 lb 1.2 oz) Documented at 2023 0634              Constitutional:       Appearance: Normal appearance. Well-developed.   Eyes:      Conjunctiva/sclera: Conjunctivae normal.   Neck:      Vascular: No carotid bruit.   Pulmonary:      Effort: Pulmonary effort is normal.      Breath sounds: Normal breath sounds.   Cardiovascular:      Normal rate. Regular rhythm. Normal S1. Normal S2.       Murmurs: There is no murmur.      No gallop.  No click. No rub.   Edema:     Peripheral edema absent.   Musculoskeletal: Normal range of motion. Skin:     General: Skin is warm and dry.   Neurological:      Mental Status: Alert and oriented to person, place, and time.      GCS: GCS eye subscore is 4. GCS verbal subscore is 5. GCS motor subscore is 6.   Psychiatric:         Speech: Speech normal.         Behavior: Behavior normal.  " "       Thought Content: Thought content normal.         Judgment: Judgment normal.           Pertinent Test Results:  Results from last 7 days   Lab Units 11/10/23  1813 11/10/23  0739 11/09/23  0644 11/08/23  1003   SODIUM mmol/L  --  143 146* 143   POTASSIUM mmol/L 3.9 3.3* 3.9 5.8*   CHLORIDE mmol/L  --  100 105 105   CO2 mmol/L  --  35.9* 30.1* 28.7   BUN mg/dL  --  30* 28* 27*   CREATININE mg/dL  --  0.94 1.06 1.08   GLUCOSE mg/dL  --  90 80 116*   CALCIUM mg/dL  --  8.4* 9.0 9.5   AST (SGOT) U/L  --   --   --  33   ALT (SGPT) U/L  --   --   --  32     Results from last 7 days   Lab Units 11/08/23  2048 11/08/23  1534 11/08/23  1003   HSTROP T ng/L 64* 56* 60*     Results from last 7 days   Lab Units 11/11/23  0907 11/10/23  0739 11/09/23  0644 11/08/23  1003   WBC 10*3/mm3 7.94 8.05 8.68 10.29   HEMOGLOBIN g/dL 10.2* 10.8* 11.1* 11.9*   HEMATOCRIT % 32.1* 32.3* 34.0* 37.8   PLATELETS 10*3/mm3 208 234 249 316     Results from last 7 days   Lab Units 11/09/23  0644   INR  1.42*     Results from last 7 days   Lab Units 11/09/23  0644   MAGNESIUM mg/dL 2.3           Invalid input(s): \"LDLCALC\"  Results from last 7 days   Lab Units 11/08/23  1003   PROBNP pg/mL 4,685.0*     Results from last 7 days   Lab Units 11/08/23  1003   TSH uIU/mL 1.650           Medication Review:   apixaban, 5 mg, Oral, Q12H  empagliflozin, 10 mg, Oral, Daily  furosemide, 40 mg, Oral, Daily  ipratropium-albuterol, 3 mL, Nebulization, 4x Daily - RT  metoprolol succinate XL, 37.5 mg, Oral, Q12H  multivitamin with minerals, 1 tablet, Oral, Daily  senna-docusate sodium, 2 tablet, Oral, BID  sodium chloride, 10 mL, Intravenous, Q12H         hold, 1 each        Assessment & Plan     Active Hospital Problems    Diagnosis  POA    **Acute on chronic diastolic CHF (congestive heart failure) [I50.33]  Yes    Cervical myelopathy [G95.9]  Unknown    Hypoxia [R09.02]  Unknown    Hyperkalemia [E87.5]  Unknown    Hyperglycemia [R73.9]  Unknown    Thyroid " disease [E07.9]  Yes    (HFpEF) heart failure with preserved ejection fraction [I50.30]  Yes    Atrial fibrillation, persistent [I48.19]  Yes    HLD (hyperlipidemia) [E78.5]  Yes    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        Acute on chronic HFrEF  History of toxic multinodular goiter  Hyperlipidemia  Persistent A-fib  Thyroid disease  Cervical myelopathy  Hypoxia  Hyperkalemia  Hyperglycemia    Patient now on oral diuretics  Echocardiogram reveals mildly elevated PA pressures when compared to previous echo 1 year ago  Thickened segments of the RV that appears consistent with thickened papillary muscle, stable from 1 year ago  Add Jardiance 10 mg/day  Patient s/p right thoracentesis, cytology pending  Plan for repeat CT of the chest to further evaluate mass  Swallow study ordered by primary team.  Heart rates remaining in the low 100s-120s.  Increase metoprolol tartrate to 37.5 mg every 6 hours.  Monitor for hypotension.      CHARLEE Reddy  [facility]  11/11/23  10:30 EST

## 2023-11-12 ENCOUNTER — APPOINTMENT (OUTPATIENT)
Dept: CT IMAGING | Facility: HOSPITAL | Age: 83
DRG: 291 | End: 2023-11-12
Payer: MEDICARE

## 2023-11-12 PROBLEM — I50.32 CHRONIC HEART FAILURE WITH PRESERVED EJECTION FRACTION (HFPEF): Status: ACTIVE | Noted: 2023-11-12

## 2023-11-12 LAB
ALBUMIN SERPL-MCNC: 2.8 G/DL (ref 3.5–5.2)
ANION GAP SERPL CALCULATED.3IONS-SCNC: 7.9 MMOL/L (ref 5–15)
BUN SERPL-MCNC: 26 MG/DL (ref 8–23)
BUN/CREAT SERPL: 27.4 (ref 7–25)
CALCIUM SPEC-SCNC: 8.5 MG/DL (ref 8.6–10.5)
CHLORIDE SERPL-SCNC: 98 MMOL/L (ref 98–107)
CO2 SERPL-SCNC: 33.1 MMOL/L (ref 22–29)
CREAT SERPL-MCNC: 0.95 MG/DL (ref 0.76–1.27)
DEPRECATED RDW RBC AUTO: 45.9 FL (ref 37–54)
EGFRCR SERPLBLD CKD-EPI 2021: 79.4 ML/MIN/1.73
ERYTHROCYTE [DISTWIDTH] IN BLOOD BY AUTOMATED COUNT: 15.1 % (ref 12.3–15.4)
GLUCOSE SERPL-MCNC: 129 MG/DL (ref 65–99)
HCT VFR BLD AUTO: 36.4 % (ref 37.5–51)
HGB BLD-MCNC: 11.4 G/DL (ref 13–17.7)
MCH RBC QN AUTO: 26.1 PG (ref 26.6–33)
MCHC RBC AUTO-ENTMCNC: 31.3 G/DL (ref 31.5–35.7)
MCV RBC AUTO: 83.5 FL (ref 79–97)
PHOSPHATE SERPL-MCNC: 3.2 MG/DL (ref 2.5–4.5)
PLATELET # BLD AUTO: 186 10*3/MM3 (ref 140–450)
PMV BLD AUTO: 11.6 FL (ref 6–12)
POTASSIUM SERPL-SCNC: 4.2 MMOL/L (ref 3.5–5.2)
RBC # BLD AUTO: 4.36 10*6/MM3 (ref 4.14–5.8)
SODIUM SERPL-SCNC: 139 MMOL/L (ref 136–145)
WBC NRBC COR # BLD: 7.66 10*3/MM3 (ref 3.4–10.8)

## 2023-11-12 PROCEDURE — 94664 DEMO&/EVAL PT USE INHALER: CPT

## 2023-11-12 PROCEDURE — 94799 UNLISTED PULMONARY SVC/PX: CPT

## 2023-11-12 PROCEDURE — 85027 COMPLETE CBC AUTOMATED: CPT | Performed by: HOSPITALIST

## 2023-11-12 PROCEDURE — 71260 CT THORAX DX C+: CPT

## 2023-11-12 PROCEDURE — 80069 RENAL FUNCTION PANEL: CPT | Performed by: HOSPITALIST

## 2023-11-12 PROCEDURE — 94761 N-INVAS EAR/PLS OXIMETRY MLT: CPT

## 2023-11-12 PROCEDURE — 94760 N-INVAS EAR/PLS OXIMETRY 1: CPT

## 2023-11-12 PROCEDURE — 25510000001 IOPAMIDOL 61 % SOLUTION: Performed by: HOSPITALIST

## 2023-11-12 PROCEDURE — 99232 SBSQ HOSP IP/OBS MODERATE 35: CPT | Performed by: NURSE PRACTITIONER

## 2023-11-12 RX ADMIN — FUROSEMIDE 40 MG: 40 TABLET ORAL at 08:47

## 2023-11-12 RX ADMIN — IPRATROPIUM BROMIDE AND ALBUTEROL SULFATE 3 ML: 2.5; .5 SOLUTION RESPIRATORY (INHALATION) at 07:01

## 2023-11-12 RX ADMIN — METOPROLOL TARTRATE 37.5 MG: 25 TABLET, FILM COATED ORAL at 06:40

## 2023-11-12 RX ADMIN — IOPAMIDOL 75 ML: 612 INJECTION, SOLUTION INTRAVENOUS at 09:42

## 2023-11-12 RX ADMIN — METOPROLOL TARTRATE 37.5 MG: 25 TABLET, FILM COATED ORAL at 11:19

## 2023-11-12 RX ADMIN — IPRATROPIUM BROMIDE AND ALBUTEROL SULFATE 3 ML: 2.5; .5 SOLUTION RESPIRATORY (INHALATION) at 19:53

## 2023-11-12 RX ADMIN — EMPAGLIFLOZIN 10 MG: 10 TABLET, FILM COATED ORAL at 08:47

## 2023-11-12 RX ADMIN — Medication 10 ML: at 08:48

## 2023-11-12 RX ADMIN — Medication 10 ML: at 22:53

## 2023-11-12 RX ADMIN — MULTIPLE VITAMINS W/ MINERALS TAB 1 TABLET: TAB at 08:47

## 2023-11-12 RX ADMIN — APIXABAN 5 MG: 5 TABLET, FILM COATED ORAL at 22:52

## 2023-11-12 RX ADMIN — IPRATROPIUM BROMIDE AND ALBUTEROL SULFATE 3 ML: 2.5; .5 SOLUTION RESPIRATORY (INHALATION) at 11:07

## 2023-11-12 RX ADMIN — APIXABAN 5 MG: 5 TABLET, FILM COATED ORAL at 08:47

## 2023-11-12 RX ADMIN — IPRATROPIUM BROMIDE AND ALBUTEROL SULFATE 3 ML: 2.5; .5 SOLUTION RESPIRATORY (INHALATION) at 16:34

## 2023-11-12 NOTE — PLAN OF CARE
Goal Outcome Evaluation:  Plan of Care Reviewed With: patient        Progress: no change  Outcome Evaluation: VSS, no complaints of pain.  Pt is bedbound.  Diet changed to regular consistancy,  no mixed consistancies, and thin liquids after the video swallow study complete.  Drsg change to right lower extremity daily.  Plan is for pt to go home later today after chest ct completed.

## 2023-11-12 NOTE — PROGRESS NOTES
"[facility]    Patient Name: Mehreen Silva IV  :1940  83 y.o.  LOS: 4  Encounter Provider: CHARLEE Reddy      Patient Care Team:  Taiwo Powers MD as PCP - General (Internal Medicine)  Self, Bess YO MD as Consulting Physician (Endocrinology)  Huang Chan MD as Cardiologist (Cardiology)    Chief Complaint: Follow-up HFrEF    Interval History: HR more controlled today after increasing BB.  Patient denies dyspnea or chest pain.  He is very anxious to go home.       Objective   Vital Signs  Temp:  [97.5 °F (36.4 °C)-97.7 °F (36.5 °C)] 97.5 °F (36.4 °C)  Heart Rate:  [] 87  Resp:  [16] 16  BP: ()/(59-78) 109/78    Intake/Output Summary (Last 24 hours) at 2023 1140  Last data filed at 2023 1700  Gross per 24 hour   Intake 446 ml   Output --   Net 446 ml     Flowsheet Rows      Flowsheet Row First Filed Value   Admission Height 177.8 cm (70\") Documented at 2023 1304   Admission Weight 77.6 kg (171 lb 1.2 oz) Documented at 2023 0634              Constitutional:       Appearance: Normal appearance. Well-developed.   Eyes:      Conjunctiva/sclera: Conjunctivae normal.   Neck:      Vascular: No carotid bruit.   Pulmonary:      Effort: Pulmonary effort is normal.      Breath sounds: Normal breath sounds.   Cardiovascular:      Normal rate. Regular rhythm. Normal S1. Normal S2.       Murmurs: There is no murmur.      No gallop.  No click. No rub.   Edema:     Peripheral edema absent.   Musculoskeletal: Normal range of motion. Skin:     General: Skin is warm and dry.   Neurological:      Mental Status: Alert and oriented to person, place, and time.      GCS: GCS eye subscore is 4. GCS verbal subscore is 5. GCS motor subscore is 6.   Psychiatric:         Speech: Speech normal.         Behavior: Behavior normal.         Thought Content: Thought content normal.         Judgment: Judgment normal.           Pertinent Test Results:  Results from last " "7 days   Lab Units 11/11/23  0907 11/10/23  1813 11/10/23  0739 11/09/23  0644 11/08/23  1003   SODIUM mmol/L 141  --  143 146* 143   POTASSIUM mmol/L 4.2 3.9 3.3* 3.9 5.8*   CHLORIDE mmol/L 101  --  100 105 105   CO2 mmol/L 32.4*  --  35.9* 30.1* 28.7   BUN mg/dL 29*  --  30* 28* 27*   CREATININE mg/dL 1.01  --  0.94 1.06 1.08   GLUCOSE mg/dL 113*  --  90 80 116*   CALCIUM mg/dL 8.4*  --  8.4* 9.0 9.5   AST (SGOT) U/L  --   --   --   --  33   ALT (SGPT) U/L  --   --   --   --  32     Results from last 7 days   Lab Units 11/08/23  2048 11/08/23  1534 11/08/23  1003   HSTROP T ng/L 64* 56* 60*     Results from last 7 days   Lab Units 11/11/23  0907 11/10/23  0739 11/09/23  0644 11/08/23  1003   WBC 10*3/mm3 7.94 8.05 8.68 10.29   HEMOGLOBIN g/dL 10.2* 10.8* 11.1* 11.9*   HEMATOCRIT % 32.1* 32.3* 34.0* 37.8   PLATELETS 10*3/mm3 208 234 249 316     Results from last 7 days   Lab Units 11/09/23  0644   INR  1.42*     Results from last 7 days   Lab Units 11/09/23  0644   MAGNESIUM mg/dL 2.3           Invalid input(s): \"LDLCALC\"  Results from last 7 days   Lab Units 11/08/23  1003   PROBNP pg/mL 4,685.0*     Results from last 7 days   Lab Units 11/08/23  1003   TSH uIU/mL 1.650           Medication Review:   apixaban, 5 mg, Oral, Q12H  empagliflozin, 10 mg, Oral, Daily  furosemide, 40 mg, Oral, Daily  ipratropium-albuterol, 3 mL, Nebulization, 4x Daily - RT  metoprolol tartrate, 37.5 mg, Oral, Q6H  multivitamin with minerals, 1 tablet, Oral, Daily  senna-docusate sodium, 2 tablet, Oral, BID  sodium chloride, 10 mL, Intravenous, Q12H         hold, 1 each        Assessment & Plan     Active Hospital Problems    Diagnosis  POA    **Acute on chronic diastolic CHF (congestive heart failure) [I50.33]  Yes    Cervical myelopathy [G95.9]  Unknown    Hypoxia [R09.02]  Unknown    Hyperkalemia [E87.5]  Unknown    Hyperglycemia [R73.9]  Unknown    Thyroid disease [E07.9]  Yes    (HFpEF) heart failure with preserved ejection fraction " [I50.30]  Yes    Atrial fibrillation, persistent [I48.19]  Yes    HLD (hyperlipidemia) [E78.5]  Yes    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        Acute on chronic HFrEF  History of toxic multinodular goiter  Hyperlipidemia  Persistent A-fib  Thyroid disease  Cervical myelopathy  Hypoxia  Hyperkalemia  Hyperglycemia    Patient now on oral diuretics  Echocardiogram reveals mildly elevated PA pressures when compared to previous echo 1 year ago  Thickened segments of the RV that appears consistent with thickened papillary muscle, stable from 1 year ago  Jardiance 10 mg/day added  Patient s/p right thoracentesis, cytology pending  Plan for repeat CT of the chest to further evaluate mass  Swallow study ordered by primary team.  Heart rates more controlled with increased BB.  Patient anxious to go home, he is stable from cardiovascular standpoint.  We will sign off and see on an as-needed basis only.  Patient will need outpatient follow-up with CHARLEE Wang in 1 week.  I will add this follow-up information to the ADT and send a note to scheduling department.      CHARLEE Reddy  [facility]  11/12/23  10:30 EST

## 2023-11-12 NOTE — PROGRESS NOTES
[facility]    [phone]   LOS: 4 days     Name: Mehreen Silva IV  Age/Sex: 83 y.o. male  :  1940        PCP: Taiwo Powers MD  No chief complaint on file.     Subjective   He is still worried about his over all prognosis and what to expect long term.      General: No Fever or Chills, Cardiac: No Chest Pain or Palpitations, GI: No Nausea, Vomiting, or Diarrhea, and Other: No bleeding    apixaban, 5 mg, Oral, Q12H  empagliflozin, 10 mg, Oral, Daily  furosemide, 40 mg, Oral, Daily  ipratropium-albuterol, 3 mL, Nebulization, 4x Daily - RT  metoprolol tartrate, 37.5 mg, Oral, Q6H  multivitamin with minerals, 1 tablet, Oral, Daily  senna-docusate sodium, 2 tablet, Oral, BID  sodium chloride, 10 mL, Intravenous, Q12H      hold, 1 each        Objective   Vital Signs  Temp:  [97.5 °F (36.4 °C)-97.7 °F (36.5 °C)] 97.5 °F (36.4 °C)  Heart Rate:  [] 87  Resp:  [16] 16  BP: ()/(59-78) 109/78  Body mass index is 23.66 kg/m².    Intake/Output Summary (Last 24 hours) at 2023 1316  Last data filed at 2023 1700  Gross per 24 hour   Intake 446 ml   Output --   Net 446 ml       Physical Exam  Vitals and nursing note reviewed.   Constitutional:       Appearance: Normal appearance.   Cardiovascular:      Rate and Rhythm: Normal rate. Rhythm irregular.   Pulmonary:      Effort: Pulmonary effort is normal. No respiratory distress.      Comments: Decreased breath sounds at the bases bilaterally with noted expiratory wheezes and rails  Abdominal:      General: Bowel sounds are normal. There is no distension.      Palpations: Abdomen is soft.   Neurological:      Mental Status: He is alert.           Results Review:       I reviewed the patient's new clinical results.  Results from last 7 days   Lab Units 23  1239 23  0907 11/10/23  0739 23  0644 23  1003   WBC 10*3/mm3 7.66 7.94 8.05 8.68 10.29   HEMOGLOBIN g/dL 11.4* 10.2* 10.8* 11.1* 11.9*   PLATELETS  10*3/mm3 186 208 234 249 316     Results from last 7 days   Lab Units 11/11/23  0907 11/10/23  1813 11/10/23  0739 11/09/23  0644 11/08/23  1003   SODIUM mmol/L 141  --  143 146* 143   POTASSIUM mmol/L 4.2 3.9 3.3* 3.9 5.8*   CHLORIDE mmol/L 101  --  100 105 105   CO2 mmol/L 32.4*  --  35.9* 30.1* 28.7   BUN mg/dL 29*  --  30* 28* 27*   CREATININE mg/dL 1.01  --  0.94 1.06 1.08   CALCIUM mg/dL 8.4*  --  8.4* 9.0 9.5   MAGNESIUM mg/dL  --   --   --  2.3  --    PHOSPHORUS mg/dL  --   --   --  4.1  --    Estimated Creatinine Clearance: 58.6 mL/min (by C-G formula based on SCr of 1.01 mg/dL).      Assessment & Plan   Active Hospital Problems    Diagnosis  POA    **Acute on chronic diastolic CHF (congestive heart failure) [I50.33]  Yes    Cervical myelopathy [G95.9]  Unknown    Hypoxia [R09.02]  Unknown    Hyperkalemia [E87.5]  Unknown    Hyperglycemia [R73.9]  Unknown    Thyroid disease [E07.9]  Yes    (HFpEF) heart failure with preserved ejection fraction [I50.30]  Yes    Atrial fibrillation, persistent [I48.19]  Yes    HLD (hyperlipidemia) [E78.5]  Yes    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       PLAN  This is an 83-year-old gentleman with a history of chronic diastolic heart failure, thyroid issues, atrial fibrillation, hyperlipidemia and cervical myelopathy with neuropathy who presents to the hospital with shortness of breath anxiety and weakness and was admitted with acute on chronic diastolic heart failure  -He has been started back on oral Lasix.  We will monitor his fluid status.  I's and O's are not really accurately recorded over the last 24 hours.  It is difficult to assess the effectiveness of his oral diuretics  -CT scan independently reviewed.  Does show some improvement in the urine.  He still has moderate to large bilateral pleural effusions.  Not sure how effectively we will be diuresing these but he is no longer requiring  oxygen and no longer he spearing any respiratory symptoms.  I think at this point further thoracentesis likely puts him at too high risk for complications and less symptoms are to develop.  -noted the possible pna's his WBC is nl and nl procal, I suspect possible chronic pneumonitis from aspiration;  Continue to hold off on antibiotics especially in light of ongoing clinical improvement and improvement in imaging.  -Tolerated thoracentesis and breathing has improved.  LDH and Protein levels support transudate  effusion    -Appreciate speech therapy evaluation.  Video swallow reviewed and currently on regular and thins  -His diaphragm moves but not a lot; I suspect he has significant diaphragmatic weakness from deconditioning.  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  Recommend aggressive pulm hygeine.  Again reiterated the importance of incentive spirometry  -Echo shows preserved EF and diastolic dysfunction (HFpEF)  -His potassium corrected.  Continue to follow  -Appreciate neurology eval and noted MRIs both independently reviewed and no CVA and noted chronic cervical changes.  Plan outpatient follow-up with Dr. Meyer or Dr. Villa  -Continue beta-blocker for now his heart rate has been somewhat labile with heart rates up into the 120s at times.  Resume AC today  -His breathing pattern is much more normal this morning.  I think the diuretics and thoracentesis really helped.  Sniff test reviewed.    -ABG showed significant hypoxia but no CO2 retention continue to monitor closely.  -Patient is a DNR    Disposition  Expected Discharge Date: 11/12/2023; Expected Discharge Time:    Plan discharge home tomorrow     Kristopher Soliman MD  [facility]  11/12/23  13:16 EST

## 2023-11-13 LAB
ALBUMIN SERPL-MCNC: 2.7 G/DL (ref 3.5–5.2)
ANION GAP SERPL CALCULATED.3IONS-SCNC: 8 MMOL/L (ref 5–15)
BASOPHILS # BLD AUTO: 0.02 10*3/MM3 (ref 0–0.2)
BASOPHILS NFR BLD AUTO: 0.3 % (ref 0–1.5)
BUN SERPL-MCNC: 23 MG/DL (ref 8–23)
BUN/CREAT SERPL: 27.4 (ref 7–25)
CALCIUM SPEC-SCNC: 8.5 MG/DL (ref 8.6–10.5)
CHLORIDE SERPL-SCNC: 101 MMOL/L (ref 98–107)
CO2 SERPL-SCNC: 30 MMOL/L (ref 22–29)
CREAT SERPL-MCNC: 0.84 MG/DL (ref 0.76–1.27)
CYTO UR: NORMAL
DEPRECATED RDW RBC AUTO: 44.2 FL (ref 37–54)
EGFRCR SERPLBLD CKD-EPI 2021: 86.5 ML/MIN/1.73
EOSINOPHIL # BLD AUTO: 0.15 10*3/MM3 (ref 0–0.4)
EOSINOPHIL NFR BLD AUTO: 2 % (ref 0.3–6.2)
ERYTHROCYTE [DISTWIDTH] IN BLOOD BY AUTOMATED COUNT: 15.1 % (ref 12.3–15.4)
GLUCOSE SERPL-MCNC: 85 MG/DL (ref 65–99)
HCT VFR BLD AUTO: 32.4 % (ref 37.5–51)
HGB BLD-MCNC: 10.4 G/DL (ref 13–17.7)
IMM GRANULOCYTES # BLD AUTO: 0.02 10*3/MM3 (ref 0–0.05)
IMM GRANULOCYTES NFR BLD AUTO: 0.3 % (ref 0–0.5)
LAB AP CASE REPORT: NORMAL
LYMPHOCYTES # BLD AUTO: 1 10*3/MM3 (ref 0.7–3.1)
LYMPHOCYTES NFR BLD AUTO: 13.7 % (ref 19.6–45.3)
MAGNESIUM SERPL-MCNC: 2.3 MG/DL (ref 1.6–2.4)
MCH RBC QN AUTO: 26 PG (ref 26.6–33)
MCHC RBC AUTO-ENTMCNC: 32.1 G/DL (ref 31.5–35.7)
MCV RBC AUTO: 81 FL (ref 79–97)
MONOCYTES # BLD AUTO: 0.58 10*3/MM3 (ref 0.1–0.9)
MONOCYTES NFR BLD AUTO: 7.9 % (ref 5–12)
NEUTROPHILS NFR BLD AUTO: 5.55 10*3/MM3 (ref 1.7–7)
NEUTROPHILS NFR BLD AUTO: 75.8 % (ref 42.7–76)
NRBC BLD AUTO-RTO: 0 /100 WBC (ref 0–0.2)
PATH REPORT.FINAL DX SPEC: NORMAL
PATH REPORT.GROSS SPEC: NORMAL
PHOSPHATE SERPL-MCNC: 2.9 MG/DL (ref 2.5–4.5)
PLATELET # BLD AUTO: 182 10*3/MM3 (ref 140–450)
PMV BLD AUTO: 10.8 FL (ref 6–12)
POTASSIUM SERPL-SCNC: 3.9 MMOL/L (ref 3.5–5.2)
RBC # BLD AUTO: 4 10*6/MM3 (ref 4.14–5.8)
SODIUM SERPL-SCNC: 139 MMOL/L (ref 136–145)
WBC NRBC COR # BLD: 7.32 10*3/MM3 (ref 3.4–10.8)

## 2023-11-13 PROCEDURE — 94799 UNLISTED PULMONARY SVC/PX: CPT

## 2023-11-13 PROCEDURE — 94760 N-INVAS EAR/PLS OXIMETRY 1: CPT

## 2023-11-13 PROCEDURE — 80069 RENAL FUNCTION PANEL: CPT | Performed by: HOSPITALIST

## 2023-11-13 PROCEDURE — 94761 N-INVAS EAR/PLS OXIMETRY MLT: CPT

## 2023-11-13 PROCEDURE — 83735 ASSAY OF MAGNESIUM: CPT | Performed by: HOSPITALIST

## 2023-11-13 PROCEDURE — 85025 COMPLETE CBC W/AUTO DIFF WBC: CPT | Performed by: HOSPITALIST

## 2023-11-13 PROCEDURE — 94664 DEMO&/EVAL PT USE INHALER: CPT

## 2023-11-13 PROCEDURE — 99232 SBSQ HOSP IP/OBS MODERATE 35: CPT | Performed by: NURSE PRACTITIONER

## 2023-11-13 RX ORDER — DIGOXIN 0.25 MG/ML
250 INJECTION INTRAMUSCULAR; INTRAVENOUS ONCE
Status: DISCONTINUED | OUTPATIENT
Start: 2023-11-13 | End: 2023-11-13

## 2023-11-13 RX ADMIN — IPRATROPIUM BROMIDE AND ALBUTEROL SULFATE 3 ML: 2.5; .5 SOLUTION RESPIRATORY (INHALATION) at 07:22

## 2023-11-13 RX ADMIN — APIXABAN 5 MG: 5 TABLET, FILM COATED ORAL at 08:46

## 2023-11-13 RX ADMIN — DOCUSATE SODIUM 50MG AND SENNOSIDES 8.6MG 2 TABLET: 8.6; 5 TABLET, FILM COATED ORAL at 20:32

## 2023-11-13 RX ADMIN — EMPAGLIFLOZIN 10 MG: 10 TABLET, FILM COATED ORAL at 08:46

## 2023-11-13 RX ADMIN — METOPROLOL TARTRATE 37.5 MG: 25 TABLET, FILM COATED ORAL at 11:42

## 2023-11-13 RX ADMIN — Medication 10 ML: at 20:32

## 2023-11-13 RX ADMIN — Medication 10 ML: at 08:45

## 2023-11-13 RX ADMIN — MULTIPLE VITAMINS W/ MINERALS TAB 1 TABLET: TAB at 08:46

## 2023-11-13 RX ADMIN — IPRATROPIUM BROMIDE AND ALBUTEROL SULFATE 3 ML: 2.5; .5 SOLUTION RESPIRATORY (INHALATION) at 15:35

## 2023-11-13 RX ADMIN — IPRATROPIUM BROMIDE AND ALBUTEROL SULFATE 3 ML: 2.5; .5 SOLUTION RESPIRATORY (INHALATION) at 19:45

## 2023-11-13 RX ADMIN — IPRATROPIUM BROMIDE AND ALBUTEROL SULFATE 3 ML: 2.5; .5 SOLUTION RESPIRATORY (INHALATION) at 11:06

## 2023-11-13 RX ADMIN — APIXABAN 5 MG: 5 TABLET, FILM COATED ORAL at 20:32

## 2023-11-13 RX ADMIN — METOPROLOL TARTRATE 25 MG: 25 TABLET, FILM COATED ORAL at 20:36

## 2023-11-13 NOTE — PROGRESS NOTES
"    Patient Name: Mehreen Silva IV  :1940  83 y.o.      Patient Care Team:  Taiwo Powers MD as PCP - General (Internal Medicine)  Self, Bess YO MD as Consulting Physician (Endocrinology)  Huagn Chan MD as Cardiologist (Cardiology)    Chief Complaint: follow up atrial fibrillation    Interval History: asked to see again for tachycardia. Lasix and metoprolol held this morning for hypotension. Last dose of metoprolol was yesterday at 11AM.   He is lying flat without orthopnea. He does not feel his heart racing.        Objective   Vital Signs  Temp:  [97.3 °F (36.3 °C)-98.2 °F (36.8 °C)] 97.5 °F (36.4 °C)  Heart Rate:  [] 92  Resp:  [16-18] 18  BP: ()/(57-85) 110/70    Intake/Output Summary (Last 24 hours) at 2023 1513  Last data filed at 2023 1300  Gross per 24 hour   Intake 690 ml   Output --   Net 690 ml     Flowsheet Rows      Flowsheet Row First Filed Value   Admission Height 177.8 cm (70\") Documented at 2023 1304   Admission Weight 77.6 kg (171 lb 1.2 oz) Documented at 2023 0634            Physical Exam:   General Appearance:    Alert, cooperative, in no acute distress   Lungs:     Clear to auscultation.  Normal respiratory effort and rate.      Heart:    irregular rhythm and normal rate, normal S1 and S2, no murmurs, gallops or rubs.     Chest Wall:    No abnormalities observed   Abdomen:     Soft, nontender, positive bowel sounds.     Extremities:   no cyanosis, clubbing or edema.  No marked joint deformities.  Adequate musculoskeletal strength.       Results Review:    Results from last 7 days   Lab Units 23  0643   SODIUM mmol/L 139   POTASSIUM mmol/L 3.9   CHLORIDE mmol/L 101   CO2 mmol/L 30.0*   BUN mg/dL 23   CREATININE mg/dL 0.84   GLUCOSE mg/dL 85   CALCIUM mg/dL 8.5*     Results from last 7 days   Lab Units 23  2048 23  1534 23  1003   HSTROP T ng/L 64* 56* 60*     Results from last 7 days   Lab Units 23  0643   WBC " 10*3/mm3 7.32   HEMOGLOBIN g/dL 10.4*   HEMATOCRIT % 32.4*   PLATELETS 10*3/mm3 182     Results from last 7 days   Lab Units 11/09/23  0644   INR  1.42*     Results from last 7 days   Lab Units 11/13/23  0643   MAGNESIUM mg/dL 2.3             Echo 11/9/2023    Left ventricular systolic function is hyperdynamic (EF > 70%).    Left ventricular wall thickness is consistent with mild concentric hypertrophy with component of basal septal asymmetric hypertrophy.    Left ventricular diastolic function is consistent with (grade II w/high LAP) pseudonormalization.    RV cavity mass near the apex likely part of papillary muscle but can not rule out mass.    Calculated right ventricular systolic pressure from tricuspid regurgitation is 41 mmHg.      Medication Review:   apixaban, 5 mg, Oral, Q12H  empagliflozin, 10 mg, Oral, Daily  furosemide, 40 mg, Oral, Daily  ipratropium-albuterol, 3 mL, Nebulization, 4x Daily - RT  metoprolol tartrate, 37.5 mg, Oral, Q6H  multivitamin with minerals, 1 tablet, Oral, Daily  senna-docusate sodium, 2 tablet, Oral, BID  sodium chloride, 10 mL, Intravenous, Q12H         hold, 1 each        Assessment & Plan   Acute on chronic decompensated diastolic heart failure - on oral diuretics. Held this morning due to hypotension.   Acute on chronic hypoxic respiratory failure  Bilateral pleural effusions status post right thoracentesis 1700 mL drained 11/9/2023  Right chest wall mass  Concerns for neurodegenerative disease, weakness, cachexia  Atrial fibrillation, probably permanent. Rate mildly elevated. Last 3 doses of BB held due to hypotension.     BB has been held due to low BP. Will back off to twice daily. I think his HR is reasonable given clinical scenario. Currently 95-low 100s.     CHARLEE Chappell  [facility]  11/13/23  15:13 EST

## 2023-11-13 NOTE — PROGRESS NOTES
Name: Mehreen Silva IV ADMIT: 2023   : 1940  PCP: Taiwo Powers MD    MRN: [id number] LOS: 5 days   AGE/SEX: 83 y.o. male  ROOM: [facility]     Subjective   Subjective   Resting in bed without new complaints this morning.    Objective   Objective   Vital Signs  Temp:  [97.3 °F (36.3 °C)-98.2 °F (36.8 °C)] 97.3 °F (36.3 °C)  Heart Rate:  [] 117  Resp:  [16-18] 18  BP: ()/(57-78) 88/60  SpO2:  [92 %-97 %] 94 %  on  Flow (L/min):  [2] 2;   Device (Oxygen Therapy): room air  Body mass index is 23.53 kg/m².  Physical Exam  Constitutional:       Appearance: Normal appearance.   Cardiovascular:      Rate and Rhythm: Tachycardia present. Rhythm irregular.   Abdominal:      General: Abdomen is flat. There is no distension.      Tenderness: There is no abdominal tenderness.   Skin:     Comments: Right foot/ankle wrapped in Kerlix   Neurological:      General: No focal deficit present.      Mental Status: He is alert and oriented to person, place, and time.         Results Review     I reviewed the patient's new clinical results.  Results from last 7 days   Lab Units 23  0643 23  1239 23  0907 11/10/23  0739   WBC 10*3/mm3 7.32 7.66 7.94 8.05   HEMOGLOBIN g/dL 10.4* 11.4* 10.2* 10.8*   PLATELETS 10*3/mm3 182 186 208 234     Results from last 7 days   Lab Units 23  0643 23  1535 23  0907 11/10/23  1813 11/10/23  0739   SODIUM mmol/L 139 139 141  --  143   POTASSIUM mmol/L 3.9 4.2 4.2 3.9 3.3*   CHLORIDE mmol/L 101 98 101  --  100   CO2 mmol/L 30.0* 33.1* 32.4*  --  35.9*   BUN mg/dL 23 26* 29*  --  30*   CREATININE mg/dL 0.84 0.95 1.01  --  0.94   GLUCOSE mg/dL 85 129* 113*  --  90   EGFR mL/min/1.73 86.5 79.4 73.8  --  80.4     Results from last 7 days   Lab Units 23  0643 23  1535 23  1003   ALBUMIN g/dL 2.7* 2.8* 3.6   BILIRUBIN mg/dL  --   --  1.9*   ALK PHOS U/L  --   --  189*   AST (SGOT) U/L  --   --  33   ALT (SGPT) U/L  --   --  32  "    Results from last 7 days   Lab Units 11/13/23  0643 11/12/23  1535 11/11/23  0907 11/10/23  0739 11/09/23  0644 11/08/23  1003   CALCIUM mg/dL 8.5* 8.5* 8.4* 8.4* 9.0 9.5   ALBUMIN g/dL 2.7* 2.8*  --   --   --  3.6   MAGNESIUM mg/dL 2.3  --   --   --  2.3  --    PHOSPHORUS mg/dL 2.9 3.2  --   --  4.1  --      Results from last 7 days   Lab Units 11/08/23  1003   PROCALCITONIN ng/mL 0.10     No results found for: \"HGBA1C\", \"POCGLU\"    CT Chest With Contrast Diagnostic    Result Date: 11/12/2023  1. Moderately large bilateral pleural effusions, right greater than left, with bilateral lower lobe atelectasis and/or pneumonia. There is some mild patchy infiltrate in the right upper lobe. 2. Moderate left upper lobe pneumonia has shown improvement since the 11/08/2023 study.  Radiation dose reduction techniques were utilized, including automated exposure control and exposure modulation based on body size.      Scheduled Medications  apixaban, 5 mg, Oral, Q12H  empagliflozin, 10 mg, Oral, Daily  furosemide, 40 mg, Oral, Daily  ipratropium-albuterol, 3 mL, Nebulization, 4x Daily - RT  metoprolol tartrate, 37.5 mg, Oral, Q6H  multivitamin with minerals, 1 tablet, Oral, Daily  senna-docusate sodium, 2 tablet, Oral, BID  sodium chloride, 10 mL, Intravenous, Q12H    Infusions  hold, 1 each    Diet  Diet: Cardiac Diets; Healthy Heart (2-3 Na+); No Mixed Consistencies, No Straw; Texture: Regular Texture (IDDSI 7); Fluid Consistency: Thin (IDDSI 0)       Assessment/Plan     Active Hospital Problems    Diagnosis  POA    **Acute on chronic diastolic CHF (congestive heart failure) [I50.33]  Yes    Chronic heart failure with preserved ejection fraction (HFpEF) [I50.32]  Yes    Cervical myelopathy [G95.9]  Unknown    Hypoxia [R09.02]  Unknown    Hyperkalemia [E87.5]  Unknown    Hyperglycemia [R73.9]  Unknown    Thyroid disease [E07.9]  Yes    (HFpEF) heart failure with preserved ejection fraction [I50.30]  Yes    Atrial " fibrillation, persistent [I48.19]  Yes    HLD (hyperlipidemia) [E78.5]  Yes    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       83 y.o. male admitted with Acute on chronic diastolic CHF (congestive heart failure).      11/13/23  Unfortunately back in A-fib with RVR and BP on the lower side.  Reconsult cardiology.    A-fib w/ RVR  -RC: Metoprolol 37.5 mg every 6 hours  -AC: Apixaban  - cardiology reconsulted given RVR and lower BP    Acute on chronic HFpEF  -Bilateral effusions remain on CT chest on 1112, though oxygenation now improved  -Lasix 40 mg daily, Jardiance    Cervical spondylosis with myelopathy and lumbar radiculopathy  -MRI negative  -Neurology evaluated-recommend outpatient follow-up with Dr. Meyer or Dr. Villa and consider EMG at that time    Long-term prognosis for this gentleman is guarded.  His mobility has declined significantly.  He has persistent effusions which are no longer symptomatic after thoracentesis and diuresis.  However they do remain on CT scan and suspect at some point these will cause issues again.  Given his overall frailty he is not a great candidate for chest tube or aggressive intervention, but again these are currently well controlled on diuretics alone.    Flow (L/min):  [2] 2    DVT Ppx: Eliquis (home med)  Discussed with patient and nursing staff.  Anticipated discharge home, when cleared by consultants            Roderick Casas MD  [facility]  11/13/23  10:51 EST

## 2023-11-13 NOTE — NURSING NOTE
RN notified cardiology that metoprolol has been held for multiple doses due to hold parameters.  BP this morning was 88/60 with HR of 105. Cardiology will see the patient this afternoon.

## 2023-11-14 ENCOUNTER — READMISSION MANAGEMENT (OUTPATIENT)
Dept: CALL CENTER | Facility: HOSPITAL | Age: 83
End: 2023-11-14
Payer: MEDICARE

## 2023-11-14 VITALS
SYSTOLIC BLOOD PRESSURE: 118 MMHG | WEIGHT: 165.4 LBS | RESPIRATION RATE: 18 BRPM | HEIGHT: 70 IN | BODY MASS INDEX: 23.68 KG/M2 | DIASTOLIC BLOOD PRESSURE: 72 MMHG | OXYGEN SATURATION: 90 % | TEMPERATURE: 97.7 F | HEART RATE: 93 BPM

## 2023-11-14 PROBLEM — R73.9 HYPERGLYCEMIA: Status: RESOLVED | Noted: 2023-11-08 | Resolved: 2023-11-14

## 2023-11-14 PROBLEM — E87.5 HYPERKALEMIA: Status: RESOLVED | Noted: 2023-11-08 | Resolved: 2023-11-14

## 2023-11-14 PROBLEM — R09.02 HYPOXIA: Status: RESOLVED | Noted: 2023-11-08 | Resolved: 2023-11-14

## 2023-11-14 LAB
ALBUMIN SERPL-MCNC: 2.7 G/DL (ref 3.5–5.2)
ANION GAP SERPL CALCULATED.3IONS-SCNC: 5.3 MMOL/L (ref 5–15)
BACTERIA FLD CULT: NORMAL
BUN SERPL-MCNC: 22 MG/DL (ref 8–23)
BUN/CREAT SERPL: 30.1 (ref 7–25)
CALCIUM SPEC-SCNC: 8.6 MG/DL (ref 8.6–10.5)
CHLORIDE SERPL-SCNC: 103 MMOL/L (ref 98–107)
CO2 SERPL-SCNC: 29.7 MMOL/L (ref 22–29)
CREAT SERPL-MCNC: 0.73 MG/DL (ref 0.76–1.27)
EGFRCR SERPLBLD CKD-EPI 2021: 90.3 ML/MIN/1.73
GLUCOSE SERPL-MCNC: 82 MG/DL (ref 65–99)
GRAM STN SPEC: NORMAL
PHOSPHATE SERPL-MCNC: 3.4 MG/DL (ref 2.5–4.5)
POTASSIUM SERPL-SCNC: 4.2 MMOL/L (ref 3.5–5.2)
SODIUM SERPL-SCNC: 138 MMOL/L (ref 136–145)

## 2023-11-14 PROCEDURE — 25010000002 INFLUENZA VAC HIGH-DOSE QUAD 0.7 ML SUSPENSION PREFILLED SYRINGE: Performed by: HOSPITALIST

## 2023-11-14 PROCEDURE — 80069 RENAL FUNCTION PANEL: CPT | Performed by: HOSPITALIST

## 2023-11-14 PROCEDURE — 90662 IIV NO PRSV INCREASED AG IM: CPT | Performed by: HOSPITALIST

## 2023-11-14 PROCEDURE — G0008 ADMIN INFLUENZA VIRUS VAC: HCPCS | Performed by: HOSPITALIST

## 2023-11-14 RX ADMIN — MULTIPLE VITAMINS W/ MINERALS TAB 1 TABLET: TAB at 08:50

## 2023-11-14 RX ADMIN — EMPAGLIFLOZIN 10 MG: 10 TABLET, FILM COATED ORAL at 08:50

## 2023-11-14 RX ADMIN — APIXABAN 5 MG: 5 TABLET, FILM COATED ORAL at 08:50

## 2023-11-14 RX ADMIN — FUROSEMIDE 40 MG: 40 TABLET ORAL at 08:50

## 2023-11-14 RX ADMIN — Medication 10 ML: at 08:50

## 2023-11-14 RX ADMIN — METOPROLOL TARTRATE 25 MG: 25 TABLET, FILM COATED ORAL at 08:50

## 2023-11-14 RX ADMIN — INFLUENZA A VIRUS A/VICTORIA/4897/2022 IVR-238 (H1N1) ANTIGEN (FORMALDEHYDE INACTIVATED), INFLUENZA A VIRUS A/DARWIN/9/2021 SAN-010 (H3N2) ANTIGEN (FORMALDEHYDE INACTIVATED), INFLUENZA B VIRUS B/PHUKET/3073/2013 ANTIGEN (FORMALDEHYDE INACTIVATED), AND INFLUENZA B VIRUS B/MICHIGAN/01/2021 ANTIGEN (FORMALDEHYDE INACTIVATED) 0.7 ML: 60; 60; 60; 60 INJECTION, SUSPENSION INTRAMUSCULAR at 10:18

## 2023-11-14 NOTE — PROGRESS NOTES
Continued Stay Note  [facility]     Patient Name: Mehreen Silva IV  MRN: [id number]  Today's Date: 11/14/2023    Admit Date: 11/8/2023    Plan: Return home with spouse and caregivers and [facility]  following   Discharge Plan       Row Name 11/14/23 1548       Plan    Plan Return home with spouse and caregivers and [facility]  following    Patient/Family in Agreement with Plan yes    Plan Comments Spoke with son at bedside.  He is aware [facility]  will call for F/U.    Final Discharge Disposition Code 06 - home with home health care    Final Note DC'd home with [facility]  following                 Expected Discharge Date and Time       Expected Discharge Date Expected Discharge Time    Nov 14, 2023               Becky S. [place], RN

## 2023-11-14 NOTE — OUTREACH NOTE
Prep Survey      Flowsheet Row Responses   Scientologist facility patient discharged from? [facility]   Is LACE score < 7 ? No   Eligibility Readm Mgmt   Discharge diagnosis Acute on chronic diastolic CHF (congestive heart failure)   Does the patient have one of the following disease processes/diagnoses(primary or secondary)? CHF   Does the patient have Home health ordered? Yes   What is the Home health agency?  [facility]   Is there a DME ordered? No   Medication alerts for this patient Jardiance   Prep survey completed? Yes            Sruthi WINSLOW - Registered Nurse

## 2023-11-14 NOTE — PLAN OF CARE
Goal Outcome Evaluation:              Outcome Evaluation: Patient a/o x4, bed bound with sydni use at baseline. Pt NSR-a fib on the monitor. Pt refusing turns and had to be educated on importance of skin care and preventing skin breakdown. All meds given as ordered. No new issues noted. See v/s and labs.

## 2023-11-14 NOTE — PROGRESS NOTES
"Enter Query Response Below      Query Response: Acute hypoxic respiratory failure              If applicable, please update the problem list.     Patient: Mehreen Silva Iv        : 1940  Account: [id number]           Admit Date:         How to Respond to this query:       a. Click New Note     b. Answer query within the yellow box.                c. Update the Problem List, if applicable.        Dr. Montemayor,     83 year old male with diastolic HF, persistent atrial fibrillation, (noted also as \"probably permanent\") and Pulmonary HTN admitted  for decompensated HF.   He does not carry a diagnosis of COPD or Chronic Respiratory failure. He does not wear home O2.  Admit VS include a RA saturation of 82% RRs frequently noted at 20.  Hospitalist 11/10 \"-ABG yesterday showed significant hypoxia but no CO2 retention continue to monitor closely.\"  ABG  pH 7.49 / pCo2 40.8 / pO2 50.5 on RA.  Patient required up to 3L NC while dyspneic and short of breath. Weaning as able. He was also treated with Duo-nebs.   Discharge summary currently marked incomplete includes \"Hypoxia\"    Please clarify if patient treated/monitored for one or more of the following:    - Acute hypoxic respiratory failure  - Hypoxia only  - Other- specify______  - Unable to determine      By submitting this query, we are merely seeking further clarification of documentation to accurately reflect all conditions that you are monitoring, evaluating, treating or that extend the hospitalization or utilize additional resources of care. Please utilize your independent clinical judgment when addressing the question(s) above.     This query and your response, once completed, will be entered into the legal medical record.    Sincerely,  Dakota Neely RN CDIS  Clinical Documentation Integrity Program   [email or web address]  "

## 2023-11-14 NOTE — DISCHARGE SUMMARY
Patient Name: Mehreen Silva IV  : 1940  MRN: [id number]    Date of Admission: 2023  Date of Discharge:  2023  Primary Care Physician: Taiwo Powers MD      Chief Complaint:   No chief complaint on file.      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 male who presents to [facility] with the above chief complaint.  Symptoms started about 4 days ago.  He says his symptoms are mostly anxiety.  He feels anxious about what can happen to him he thinks there is something really wrong with him he worries about what would happen to his wife and son if there is something wrong with him.  He feels short of breath especially at rest.  He admits to some orthopnea but has trouble really saying that he is more short of breath since he does not move around much.  He mainly gets around in a wheelchair and is carried from place to place.  He has been doing this for a while.  He denies chest pain or palpitations.  He says he was admitted at [facility] earlier this year and was diuresed over about 4 days in the hospital there.  He has not been taking his Lasix at home.     Hospital Course     Pt admitted for hypoxia, with imaging showing large pleural effusions on admission.  He apparently also had some  paradoxical breathing with possible concern for diaphragm issues.  He underwent thoracentesis with 1.7 L removed with vast improvement in his respiratory status.  Paradoxical breathing also resolved following this.  Sniff test was done which did not show any diaphragmatic paralysis.  His diuretic had been decreased to 3 times weekly and this coincided with his pleural effusions.  He will be discharged with daily diuretic dosing and will need close follow-up with PCP and cardiology for continued management.  Cardiology did evaluate patient during hospitalization for atrial fibrillation and his regimen was adjusted as summarized below.  In addition he was seen by neurology given longstanding cervical issues and possible evaluation for neurodegenerative disease.  Neurology did not recommend any acute intervention but do recommend follow-up with consideration of EMG.  He got repeat MRIs of his cervical spine and brain which did not show any new pathology, though did reveal chronic myelomalacia of the cervical cord.  He has been seen in the past by Dr. Huff who noted that he is not a candidate for any further cervical surgeries.  I discussed the case at length with patient and spouse he is stable for discharge home..    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 Meyer or Dr. Villa and consider EMG at that time     Day of Discharge     Physical Exam:  Temp:  [97.3 °F (36.3 °C)-97.7 °F (36.5 °C)] 97.7 °F (36.5 °C)  Heart Rate:  [] 93  Resp:  [18-20] 18  BP: (100-118)/(65-85) 118/72  Body mass index is 23.73 kg/m².  Physical Exam  Constitutional:       Appearance: Normal appearance.    Cardiovascular:      Rate and Rhythm: Rhythm irregular.   Abdominal:      General: Abdomen is flat. There is no distension.      Tenderness: There is no abdominal tenderness.   Skin:     Comments: Right foot/ankle wrapped in Kerlix   Neurological:      General: No focal deficit present.      Mental Status: He is alert and oriented to person, place, and time.     Consultants     Consult Orders (all) (From admission, onward)       Start     Ordered    11/08/23 1425  Inpatient Cardiology Consult  Once        Specialty:  Cardiology  Provider:  Lee Boykin III, MD    11/08/23 1424    11/08/23 1424  Inpatient Neurology Consult General  Once        Specialty:  Neurology  Provider:  Elizabeth Call MD    11/08/23 1423                  Procedures     * Surgery not found *      Imaging Results (All)       Procedure Component Value Units Date/Time    CT Chest With Contrast Diagnostic [[id number]] Collected: 11/12/23 1251     Updated: 11/13/23 2119    Narrative:      CT OF THE CHEST WITHOUT CONTRAST 11/12/2013     HISTORY: Pleural effusion. Possible mass.     Spiral images were obtained from the lung apices to the upper abdomen  after intravenous contrast.     There are moderately large bilateral pleural effusions, right greater  than left. These appear similar to the 11/08/2023 study though the right  pleural effusion may be slightly smaller. There has been partial  clearing of the left upper lobe since the previous study with  mild-to-moderate patchy increased density which has a somewhat  ground-glass appearance. Mild bilateral lower lobe atelectasis and/or  consolidation. There is a mild patchy infiltrate in the right upper  lobe.     Small amount of fluid is seen in pericardial recesses. No pathologically  enlarged lymph nodes are seen. There is some aortic and coronary  calcification.       Impression:      1. Moderately large bilateral pleural effusions, right greater than  left, with bilateral lower lobe  atelectasis and/or pneumonia. There is  some mild patchy infiltrate in the right upper lobe.  2. Moderate left upper lobe pneumonia has shown improvement since the  11/08/2023 study.  3. Short-term follow-up CT of the chest suggested.     Radiation dose reduction techniques were utilized, including automated  exposure control and exposure modulation based on body size.        This report was finalized on 11/13/2023 9:15 PM by Dr. Romeo Cummings M.D on Workstation: [id number]       FL Video Swallow With Speech Single Contrast [[id number]] Collected: 11/11/23 1341     Updated: 11/11/23 1618    Narrative:      VIDEO ESOPHAGRAM     HISTORY: Dysphagia.     The patient exhibits mild oropharyngeal dysphagia with some penetration  of thin liquids. Please also see the speech therapist report.     14 imaging sequences were obtained and the fluoroscopy time measures 2  minutes and 26 seconds. The dose Kerma measures 11 mGy.     This report was finalized on 11/11/2023 4:14 PM by Dr. Chavo Denny M.D on Workstation: [id number]       MRI Cervical Spine With & Without Contrast [[id number]] Collected: 11/10/23 0518     Updated: 11/10/23 0518    Narrative:        Patient: ELLEN WARY  Time Out: 05:17  Exam(s): MRI C SPINE W WO Contrast     EXAM:    MR Cervical Spine Without and With Intravenous Contrast    CLINICAL HISTORY:     Reason for exam: Myelopathy, chronic, cervical spine.    TECHNIQUE:    Magnetic resonance images of the cervical spine without and with   intravenous contrast in multiple planes.    COMPARISON:  2017.    FINDINGS:    Vertebrae:  No acute fracture. Normal alignment.    Interspaces:  Unremarkable.    Spinal cord: Mild Malacia of the cord at C4-5.    Soft tissues:  Unremarkable.     DISCS SPINAL CANAL NEURAL FORAMINA:    C2-3: No spinal canal stenosis.  Mild to moderate left foraminal stenosis.    C3-4: No spinal canal stenosis.  Severe bilateral foraminal stenosis.  C4-5: No spinal canal stenosis.  Mild  bilateral foraminal stenosis.  C5-6: Severe spinal canal stenosis from posterior disc osteophyte complex   and ligamentum flavum redundancy.  Mild bilateral foraminal stenosis.  C6-7: Severe spinal canal stenosis from posterior disc osteophyte complex   and ligamentum flavum redundancy.  Severe bilateral foraminal stenosis.    IMPRESSION:         No acute findings.  Relatively similar degenerative changes are seen   most notable C5-6 and C6-7.  Chronic myelomalacia of the cord again seen   C4-5.      Impression:          Electronically signed by Margarette Roque MD on 11-10-23 at 0517    MRI Brain Without Contrast [[id number]] Collected: 11/10/23 0455     Updated: 11/10/23 0455    Narrative:        Patient: ELLEN WRAY  Time Out: 04:54  Exam(s): MRI HEAD Without Contrast     EXAM:    MR Head Without Intravenous Contrast    CLINICAL HISTORY:     Reason for exam: Palate weakness (CN 9).    TECHNIQUE:    Magnetic resonance images of the head brain without intravenous   contrast in multiple planes.    COMPARISON:    No relevant prior studies available.    FINDINGS:    Brain:  No mass.  No hemorrhage.  No restricted diffusion.  Chronic   microvascular ischemic changes.  Cerebral volume loss.    Ventricles:  No hydrocephalus.    Bones joints:  Unremarkable.    Sinuses:  No acute sinusitis.    Mastoid air cells:  No effusion.    Orbits:  Unremarkable.    IMPRESSION:         No acute infarct, hemorrhage, or hydrocephalus.      Impression:          Electronically signed by Margarette Roque MD on 11-10-23 at 0454    FL Sniff Test [[id number]] Collected: 11/09/23 1513     Updated: 11/09/23 1527    Narrative:      FLUOROSCOPIC SNIFF TEST 11/09/2023     HISTORY: Evaluate for diaphragmatic paralysis.      image of the chest shows borderline cardiomegaly. There is  increased density in the right lung base which may be from right pleural  effusion and there may be some atelectasis as well. Minimal haziness of  the left base is seen  as well.     With inspiration, expiration and sniffing there is diminished movement  of the diaphragm but this is probably related to limited inspiration and  expiration by this patient. There is no paradoxical movement of the  diaphragm to suggest paralysis and the right and left diaphragms move  relatively symmetrically.       Impression:      1. Limited inspiration and expiration by this patient with limited  movement of the diaphragm but no evidence of diaphragmatic paralysis.  2. Increased density in the right lung base as described. Minimal  haziness in the left base is seen as well.        FLUOROSCOPY TIME:  55 seconds,  16   images. 1026 dose area product.     This report was finalized on 11/9/2023 3:24 PM by Dr. Romeo Cummings M.D on Workstation: [id number]       US Thoracentesis [[id number]] Collected: 11/09/23 1333    Specimen: Body Fluid Updated: 11/09/23 1337    Narrative:      ULTRASOUND-GUIDED RIGHT THORACENTESIS     HISTORY: Pleural effusion     After signed informed consent was obtained the patient was prepped and  draped in the usual sterile fashion with 2% chlorhexidine. Lidocaine was  used for local anesthesia.     Ultrasound guidance was used to place a 5 [place] catheter into the right  pleural effusion. 1700 mL of straw-colored fluid was removed. Sample was  sent to the lab.     Confirmatory images were obtained.     Patient tolerated the procedure well with no complications.       Impression:      Ultrasound-guided right thoracentesis as described              This report was finalized on 11/9/2023 1:34 PM by Dr. Randy Villa M.D on Workstation: [id number]       CT Chest Without Contrast Diagnostic [[id number]] Collected: 11/08/23 1655     Updated: 11/08/23 1709    Narrative:      CT CHEST WITHOUT IV CONTRAST     HISTORY: Evaluate pneumonia effusions     TECHNIQUE: Radiation dose reduction techniques were utilized, including  automated exposure control and exposure modulation based on  body size.   3 mm images were obtained through the chest without IV contrast.     COMPARISON: None     FINDINGS:  Evaluation is suboptimal without intravenous contrast.     There is cholelithiasis. No mediastinal or axillary adenopathy by size  criteria. Asymmetric masslike swelling at contiguous with the right  pectoralis musculature measures approximate 3.4 x 6.9 cm. No significant  pericardial effusion. There is a large right and moderate left pleural  effusion with overlying pulmonary opacification. The right lower lobe is  nearly completely collapsed. There is a large area of pulmonary  consolidation within the left upper lobe. Small area of pulmonary  opacification is present in the perihilar aspect of the left lower lobe.  Left shoulder arthroplasty is present.     No suspicious lytic or blastic osseous lesions. Lumbar fusion hardware  is incompletely visualized and cannot be characterized.       Impression:      1.  Findings most suggestive of multifocal pneumonia within the left  upper lobe and perihilar aspect of the left lower lobe. Large right and  moderate left pleural effusion. There is near complete collapse of the  right lower lobe with a somewhat rounded appearance. While findings may  be related to rounded atelectasis, continued close attention follow-up  is recommended with chest CT in 4 to 6 weeks to ensure appropriate  evolution/resolution and exclude any possibility of neoplasm.  2.  Ovoid masslike area within the right chest wall contiguous with the  pectoralis musculature, possibly representing a hematoma. However,  findings are incompletely characterized and underlying  neoplasm/malignancy cannot be excluded. Correlation with patient history  is recommended with at least continued attention on follow-up to ensure  resolution. Findings can also be further evaluated with MRI with and  without contrast if clinically indicated.  3.  Cholelithiasis.  4.  Other findings above.           This  report was finalized on 11/8/2023 5:06 PM by Dr. Matias Fischer M.D  on Workstation: [id number]             Results for orders placed during the hospital encounter of 08/23/23    Duplex Venous Lower Extremity - Right CAR    Interpretation Summary    Normal right lower extremity venous duplex scan.    Results for orders placed during the hospital encounter of 11/08/23    Adult Transthoracic Echo Complete W/ Cont if Necessary Per Protocol    Interpretation Summary    Left ventricular systolic function is hyperdynamic (EF > 70%).    Left ventricular wall thickness is consistent with mild concentric hypertrophy with component of basal septal asymmetric hypertrophy.    Left ventricular diastolic function is consistent with (grade II w/high LAP) pseudonormalization.    RV cavity mass near the apex likely part of papillary muscle but can not rule out mass.    Calculated right ventricular systolic pressure from tricuspid regurgitation is 41 mmHg.    Pertinent Labs     Results from last 7 days   Lab Units 11/13/23  0643 11/12/23  1239 11/11/23  0907 11/10/23  0739   WBC 10*3/mm3 7.32 7.66 7.94 8.05   HEMOGLOBIN g/dL 10.4* 11.4* 10.2* 10.8*   PLATELETS 10*3/mm3 182 186 208 234     Results from last 7 days   Lab Units 11/14/23  0658 11/13/23  0643 11/12/23  1535 11/11/23  0907   SODIUM mmol/L 138 139 139 141   POTASSIUM mmol/L 4.2 3.9 4.2 4.2   CHLORIDE mmol/L 103 101 98 101   CO2 mmol/L 29.7* 30.0* 33.1* 32.4*   BUN mg/dL 22 23 26* 29*   CREATININE mg/dL 0.73* 0.84 0.95 1.01   GLUCOSE mg/dL 82 85 129* 113*   EGFR mL/min/1.73 90.3 86.5 79.4 73.8     Results from last 7 days   Lab Units 11/14/23  0658 11/13/23  0643 11/12/23  1535 11/08/23  1003   ALBUMIN g/dL 2.7* 2.7* 2.8* 3.6   BILIRUBIN mg/dL  --   --   --  1.9*   ALK PHOS U/L  --   --   --  189*   AST (SGOT) U/L  --   --   --  33   ALT (SGPT) U/L  --   --   --  32     Results from last 7 days   Lab Units 11/14/23  0658 11/13/23  0643 11/12/23  1535 11/11/23  0907  "11/10/23  0739 11/09/23  0644 11/08/23  1003   CALCIUM mg/dL 8.6 8.5* 8.5* 8.4*   < > 9.0 9.5   ALBUMIN g/dL 2.7* 2.7* 2.8*  --   --   --  3.6   MAGNESIUM mg/dL  --  2.3  --   --   --  2.3  --    PHOSPHORUS mg/dL 3.4 2.9 3.2  --   --  4.1  --     < > = values in this interval not displayed.       Results from last 7 days   Lab Units 11/08/23 2048 11/08/23  1534 11/08/23  1003   HSTROP T ng/L 64* 56* 60*   PROBNP pg/mL  --   --  4,685.0*     Results from last 7 days   Lab Units 11/09/23  0644   URIC ACID mg/dL 8.4*         Invalid input(s): \"LDLCALC\"  Results from last 7 days   Lab Units 11/09/23  1200   BODYFLDCX  No growth at 5 days         Test Results Pending at Discharge       Discharge Details        Discharge Medications        New Medications        Instructions Start Date   Jardiance 10 MG tablet tablet  Generic drug: empagliflozin   10 mg, Oral, Daily             Continue These Medications        Instructions Start Date   apixaban 5 MG tablet tablet  Commonly known as: Eliquis   5 mg, Oral, Every 12 Hours Scheduled      cholecalciferol 25 MCG (1000 UT) tablet  Commonly known as: VITAMIN D3   1,000 Units, Oral, Daily      ezetimibe 10 MG tablet  Commonly known as: ZETIA   10 mg, Oral, Daily      furosemide 20 MG tablet  Commonly known as: LASIX   20 mg, Oral, 3 Times Weekly, On Monday, Wed, Friday      methIMAzole 5 MG tablet  Commonly known as: TAPAZOLE   5 mg, Oral, Takes 5 days - No Wednesday or sundays      metoprolol tartrate 25 MG tablet  Commonly known as: LOPRESSOR   TAKE ONE TABLET BY MOUTH EVERY 12 HOURS      multivitamin with minerals tablet tablet   1 tablet, Oral, Daily      mupirocin 2 % ointment  Commonly known as: BACTROBAN   Apply to affected area with each dressing change; change dressing at least once a day.               No Known Allergies    Discharge Disposition:  Home or Self Care      Discharge Diet:  Diet Order   Procedures    Diet: Cardiac Diets; Healthy Heart (2-3 Na+); No Mixed " Consistencies, No Straw; Texture: Regular Texture (IDDSI 7); Fluid Consistency: Thin (IDDSI 0)       Discharge Activity:   Activity Instructions       Activity as Tolerated              CODE STATUS:    Code Status and Medical Interventions:   Ordered at: 11/08/23 1455     Medical Intervention Limits:    NO intubation (DNI)     Level Of Support Discussed With:    Patient    Health Care Surrogate     Code Status (Patient has no pulse and is not breathing):    No CPR (Do Not Attempt to Resuscitate)     Medical Interventions (Patient has pulse or is breathing):    Limited Support       Future Appointments   Date Time Provider Department Center   11/20/2023 10:00 AM Priya Mustafa APRN [facility]      Contact information for follow-up providers       Clarita Armenta APRN. Schedule an appointment as soon as possible for a visit in 1 week(s).    Specialty: Cardiology  Contact information:  [address] [facility] [address]  [place] [address]  [phone]               Taiwo Powers MD. Schedule an appointment as soon as possible for a visit.    Specialty: Internal Medicine  Contact information:  [address] [facility] [id number] [phone]               Jun Meyer II, MD Follow up in 2 week(s).    Specialty: Neurology  Contact information:  [address] [facility]  [place] [address]  [phone]                       Contact information for after-discharge care       Home Medical Care       [facility] .    Service: Home Health Services  Contact information:  [address] , [address]  [facility] [address] [phone]                                   Time Spent on Discharge:  Greater than 30 minutes spent on discharge management including final examination, discussion of hospital stay and patient education, preparation of records, medication reconciliation, follow up planning      Roderick Casas MD  [facility]  11/14/23  08:37  EST

## 2023-11-15 ENCOUNTER — READMISSION MANAGEMENT (OUTPATIENT)
Dept: CALL CENTER | Facility: HOSPITAL | Age: 83
End: 2023-11-15
Payer: MEDICARE

## 2023-11-15 NOTE — OUTREACH NOTE
CHF Week 1 Survey      Flowsheet Row Responses   [facility] facility patient discharged from? [place]   Does the patient have one of the following disease processes/diagnoses(primary or secondary)? CHF   CHF Week 1 attempt successful? No   Unsuccessful attempts Attempt 1            Paramjit SANTIZO - Registered Nurse

## 2023-11-16 ENCOUNTER — READMISSION MANAGEMENT (OUTPATIENT)
Dept: CALL CENTER | Facility: HOSPITAL | Age: 83
End: 2023-11-16
Payer: MEDICARE

## 2023-11-16 NOTE — OUTREACH NOTE
CHF Week 1 Survey      Flowsheet Row Responses   [facility] patient discharged from? [place]   Does the patient have one of the following disease processes/diagnoses(primary or secondary)? CHF   CHF Week 1 attempt successful? Yes   Call start time 0841   Call end time 0910   Discharge diagnosis Acute on chronic diastolic CHF (congestive heart failure)   Person spoke with today (if not patient) and relationship [place], wife   Meds reviewed with patient/caregiver? Yes   Does the patient have all medications ordered at discharge? Yes   Is the patient taking all medications as directed (includes completed medication regime)? Yes   Medication comments Wife needs clarification of lasix dosing as was told verbally to give 40mg QD but AVS indicates three times weekly, wife has called hospital and spoke with a supervisor and has yet to receive a response--will send a message to  for f/u   Comments regarding appointments Cardiac on 11/20/23   Does the patient have a primary care provider?  Yes   Has the patient kept scheduled appointments due by today? N/A   Comments [facility]red [facility] to [facility] for f/u   What is the Home health agency?  [facility]    Has home health visited the patient within 72 hours of discharge? Yes   Psychosocial issues? No   Did the patient receive a copy of their discharge instructions? Yes   Nursing interventions Reviewed instructions with patient   What is the patient's perception of their health status since discharge? Improving  [Denies any new edema or SOA, pt is weak and tired still.  Pt is unable to stand to complete daily wts but reviewed s/s exac and to inform MD if present.]   Nursing interventions Nurse provided patient education   Is the patient able to teach back signs and symptoms of worsening condition? (i.e. weight gain, shortness of air, etc.) Yes   CHF Zone this Call Green Zone   Green Zone No chest pain, No new swelling -  feet, ankles and legs look normal  for you, No new or worsening shortness of breath   Green Zone Interventions Meds as directed, Follow up visits planned  [Pt is unable to weigh]    CHF Week 1 call completed? Yes   Call end time 0910            MARGARITA BEAN - Registered Nurse

## 2023-11-20 ENCOUNTER — OFFICE VISIT (OUTPATIENT)
Dept: CARDIOLOGY | Facility: CLINIC | Age: 83
End: 2023-11-20
Payer: MEDICARE

## 2023-11-20 ENCOUNTER — TELEPHONE (OUTPATIENT)
Dept: CARDIOLOGY | Facility: CLINIC | Age: 83
End: 2023-11-20
Payer: MEDICARE

## 2023-11-20 VITALS
SYSTOLIC BLOOD PRESSURE: 100 MMHG | WEIGHT: 165 LBS | OXYGEN SATURATION: 99 % | HEIGHT: 70 IN | HEART RATE: 73 BPM | BODY MASS INDEX: 23.62 KG/M2 | DIASTOLIC BLOOD PRESSURE: 70 MMHG

## 2023-11-20 DIAGNOSIS — I50.33 ACUTE ON CHRONIC HEART FAILURE WITH PRESERVED EJECTION FRACTION: Primary | ICD-10-CM

## 2023-11-20 DIAGNOSIS — I48.19 ATRIAL FIBRILLATION, PERSISTENT: ICD-10-CM

## 2023-11-20 DIAGNOSIS — I27.20 PULMONARY HYPERTENSION: ICD-10-CM

## 2023-11-20 DIAGNOSIS — E78.5 HYPERLIPIDEMIA, UNSPECIFIED HYPERLIPIDEMIA TYPE: ICD-10-CM

## 2023-11-20 PROCEDURE — 99214 OFFICE O/P EST MOD 30 MIN: CPT | Performed by: NURSE PRACTITIONER

## 2023-11-20 PROCEDURE — 1159F MED LIST DOCD IN RCRD: CPT | Performed by: NURSE PRACTITIONER

## 2023-11-20 PROCEDURE — 1160F RVW MEDS BY RX/DR IN RCRD: CPT | Performed by: NURSE PRACTITIONER

## 2023-11-20 NOTE — TELEPHONE ENCOUNTER
Patient's wife calls because she does not understand why patient is being instructed to take the 20mg of Lasix 3 times a week. She said he was on this dose before his hospitalization. I explained to her that per the patient he said he could not tolerate the increased dose, so we were ok with him taking it 3 times a week, but any weight gain or edema he would need to call us immediately. She is unhappy with this and thinks that the patient should be on 20mg of Lasix 5 days a week and that we should be proactive about this situation since he almost  last time. I told her I would send you a message in regards to all this to see what you recommend.     Stella Hernandez RN  Triage MG

## 2023-11-20 NOTE — PROGRESS NOTES
CARDIOLOGY        Patient Name: Mehreen Silva IV  :1940  Age: 83 y.o.  Primary Cardiologist: Huang Chan MD  Encounter Provider:  CHARLEE Reddy    Date of Service: 23      CHIEF COMPLAINT / REASON FOR OFFICE VISIT     Congestive Heart Failure      HISTORY OF PRESENT ILLNESS       HPI  Mehreen Silva IV is a 83 y.o. male who presents today for hospital follow-up.     Pt has a  history significant for HFrEF, persistent atrial fibrillation, hyperlipidemia, hypertension.    Patient is new to me but I have reviewed prior medical records.  Patient originally presented to cardiology clinic in 2022 with dyspnea.  Patient was found to have atrial fibrillation and acute diastolic heart failure.  Echocardiogram at that time revealed LVEF 60% with moderate LVH, biatrial dilatation and very mild pulmonary hypertension.  At that time patient was started on metoprolol, apixaban, furosemide.    Patient was evaluated in cardiology clinic 2023 by Dr. Chan.  At that time he was found to be volume overloaded with hypoxia.  Patient also had decreased breath sounds to bilateral bases.  proBNP elevated at 4000 and chest x-ray reveals pulmonary edema with bilateral effusions.  Patient was directly admitted to the hospital for IV diuresis.    Patient was ultimately hospitalized  - 2023.  Patient did have thoracentesis with 1.7 L removed with improvement of respiratory status.  Patient was discharged with daily diuretic dosing with close follow-up.  It should be noted that in the past patient has had difficulty with compliance with diuretics secondary to urinary frequency.  It was also recommended that patient be discontinued with Jardiance.    Patient notes that he feels better since discharge from the hospital.  He notes that he has been unable to take diuretics daily but he is taking furosemide at 20 mg/day 3 times weekly.  He reports that he is taking this every Monday, Wednesday, Friday.   "Patient notes that he was unaware of new medication, Jardiance, and has not picked that up from the pharmacy and thus is not taking it.  He currently does not feel that he is volume overloaded or holding onto fluid.  He denies dyspnea, dyspnea with exertion, orthopnea, generalized fatigue.    The following portions of the patient's history were reviewed and updated as appropriate: allergies, current medications, past family history, past medical history, past social history, past surgical history and problem list.      VITAL SIGNS     Visit Vitals  /70 (BP Location: Right arm, Patient Position: Sitting)   Pulse 73   Ht 177.8 cm (70\")   Wt 74.8 kg (165 lb)   SpO2 99%   BMI 23.68 kg/m²         Wt Readings from Last 3 Encounters:   11/20/23 74.8 kg (165 lb)   11/14/23 75 kg (165 lb 6.4 oz)   11/09/23 77.6 kg (171 lb)     Body mass index is 23.68 kg/m².      REVIEW OF SYSTEMS   Review of Systems   Constitutional: Negative for chills, fever, weight gain and weight loss.   Cardiovascular:  Negative for leg swelling.   Respiratory:  Negative for cough, snoring and wheezing.    Hematologic/Lymphatic: Negative for bleeding problem. Does not bruise/bleed easily.   Skin:  Negative for color change.   Musculoskeletal:  Negative for falls, joint pain and myalgias.   Gastrointestinal:  Negative for melena.   Genitourinary:  Negative for hematuria.   Neurological:  Negative for excessive daytime sleepiness.   Psychiatric/Behavioral:  Negative for depression. The patient is not nervous/anxious.            PHYSICAL EXAMINATION     Constitutional:       Appearance: Normal appearance. Well-developed.   Eyes:      Conjunctiva/sclera: Conjunctivae normal.   Neck:      Vascular: No carotid bruit.   Pulmonary:      Effort: Pulmonary effort is normal.      Breath sounds: Normal breath sounds.   Cardiovascular:      Normal rate. Regular rhythm. Normal S1. Normal S2.       Murmurs: There is no murmur.      No gallop.  No click. No rub. "   Edema:     Peripheral edema absent.   Musculoskeletal: Normal range of motion. Skin:     General: Skin is warm and dry.   Neurological:      Mental Status: Alert and oriented to person, place, and time.      GCS: GCS eye subscore is 4. GCS verbal subscore is 5. GCS motor subscore is 6.   Psychiatric:         Speech: Speech normal.         Behavior: Behavior normal.         Thought Content: Thought content normal.         Judgment: Judgment normal.           REVIEWED DATA     Procedures    Cardiac Procedures:  Echocardiogram 9/23/2022.  LVEF 56-60%.  Moderate hypertrophy.  Right atrial cavity is mild to moderately dilated.  Left atrial volume is mildly increased.  Moderately reduced RV systolic function.  Calculated RVSP 38 mmHg.  Mild pulmonary hypertension.  Zio monitor 11/2/2022.  Predominant rhythm was atrial fibrillation.  Average heart rate 81.  No significant pauses noted.  Echocardiogram 11/9/2023.  LVEF greater than 70%.  Mild hypertrophy.  Grade 2 diastolic dysfunction.  RV cavity mass near the apex is likely part of the papillary muscle but cannot rule out mass.  Calculated RVSP 41 mmHg.        Lab Results   Component Value Date     11/14/2023     11/13/2023    K 4.2 11/14/2023    K 3.9 11/13/2023     11/14/2023     11/13/2023    CO2 29.7 (H) 11/14/2023    CO2 30.0 (H) 11/13/2023    BUN 22 11/14/2023    BUN 23 11/13/2023    CREATININE 0.73 (L) 11/14/2023    CREATININE 0.84 11/13/2023    EGFRIFNONA 74 06/06/2016    GLUCOSE 82 11/14/2023    GLUCOSE 85 11/13/2023    CALCIUM 8.6 11/14/2023    CALCIUM 8.5 (L) 11/13/2023    ALBUMIN 2.7 (L) 11/14/2023    ALBUMIN 2.7 (L) 11/13/2023    BILITOT 1.9 (H) 11/08/2023    BILITOT 1.6 (H) 09/23/2022    AST 33 11/08/2023    AST 27 09/23/2022    ALT 32 11/08/2023    ALT 37 09/23/2022     Lab Results   Component Value Date    WBC 7.32 11/13/2023    WBC 7.66 11/12/2023    HGB 10.4 (L) 11/13/2023    HGB 11.4 (L) 11/12/2023    HCT 32.4 (L) 11/13/2023     HCT 36.4 (L) 11/12/2023    MCV 81.0 11/13/2023    MCV 83.5 11/12/2023     11/13/2023     11/12/2023     Lab Results   Component Value Date    PROBNP 4,685.0 (H) 11/08/2023    PROBNP 2,961.0 (H) 08/23/2023     Lab Results   Component Value Date    TROPONINT 64 (C) 11/08/2023     Lab Results   Component Value Date    TSH 1.650 11/08/2023    TSH 1.83 09/21/2023             ASSESSMENT & PLAN     Diagnoses and all orders for this visit:    1. Acute on chronic heart failure with preserved ejection fraction (Primary)  Assessment & Plan:  Patient with recent hospitalization for exacerbation of HFpEF.  Patient did have thoracentesis with 1.7 L of fluid removed  Patient continues to be unable to tolerate diuretics on a daily basis and is only taking furosemide 20 mg 3 times weekly.  Informed patient that if he experiences increased shortness of breath or signs of weight gain and edema that he needs to increase to daily dosing.  He verbalized understanding  Recommended patient adding SGLT2 during hospitalization however patient has not picked this up from the pharmacy.  Encouraged him to start taking this medication  Patient appears euvolemic on exam today.      2. Atrial fibrillation, persistent  Assessment & Plan:  Heart rate currently controlled  Continue metoprolol tartrate 25 mg twice daily  Patient anticoagulated with apixaban and denies bleeding complications    CHADS-VASc Risk Assessment              3 Total Score    1 CHF    2 Age >/= 75        Criteria that do not apply:    Hypertension    DM    PRIOR STROKE/TIA/THROMBO    Vascular Disease    Age 65-74    Sex: Female                3. Pulmonary hypertension    4. Hyperlipidemia, unspecified hyperlipidemia type  Assessment & Plan:  Lipid abnormalities are unchanged.  Pharmacotherapy as ordered.  Lipids will be reassessed in 6 months.      Other orders  -     empagliflozin (JARDIANCE) 10 MG tablet tablet; Take 1 tablet by mouth Daily.  Dispense: 90  tablet; Refill: 1          Return in about 3 months (around 2/20/2024) for Dr. [facility] follow up.    Future Appointments         Provider Department Center    1/30/2024 10:40 AM Jun Meyer II, MD [facility] NEUROLOGY [place]    2/21/2024 10:15 AM Huang Chan MD [facility] CARDIOLOGY [place]                  MEDICATIONS         Discharge Medications            Accurate as of November 20, 2023 11:05 AM. If you have any questions, ask your nurse or doctor.                Continue These Medications        Instructions Start Date   apixaban 5 MG tablet tablet  Commonly known as: Eliquis   5 mg, Oral, Every 12 Hours Scheduled      cholecalciferol 25 MCG (1000 UT) tablet  Commonly known as: VITAMIN D3   1,000 Units, Oral, Daily      empagliflozin 10 MG tablet tablet  Commonly known as: JARDIANCE   10 mg, Oral, Daily      ezetimibe 10 MG tablet  Commonly known as: ZETIA   10 mg, Oral, Daily      furosemide 20 MG tablet  Commonly known as: LASIX   20 mg, Oral, 3 Times Weekly, On Monday, Wed, Friday      methIMAzole 5 MG tablet  Commonly known as: TAPAZOLE   5 mg, Oral, Takes 5 days - No Wednesday or sundays      metoprolol tartrate 25 MG tablet  Commonly known as: LOPRESSOR   TAKE ONE TABLET BY MOUTH EVERY 12 HOURS      multivitamin with minerals tablet tablet   1 tablet, Oral, Daily                   **Dragon Disclaimer:   Much of this encounter note is an electronic transcription/translation of spoken language to printed text. The electronic translation of spoken language may permit erroneous, or at times, nonsensical words or phrases to be inadvertently transcribed. Although I have reviewed the note for such errors, some may still exist.

## 2023-11-20 NOTE — ASSESSMENT & PLAN NOTE
Heart rate currently controlled  Continue metoprolol tartrate 25 mg twice daily  Patient anticoagulated with apixaban and denies bleeding complications    CHADS-VASc Risk Assessment              3 Total Score    1 CHF    2 Age >/= 75        Criteria that do not apply:    Hypertension    DM    PRIOR STROKE/TIA/THROMBO    Vascular Disease    Age 65-74    Sex: Female

## 2023-11-20 NOTE — TELEPHONE ENCOUNTER
Called and left VM. Will continue to try to reach patient's wife.     Stella Hernandez RN  Triage [facility]

## 2023-11-20 NOTE — ASSESSMENT & PLAN NOTE
Patient with recent hospitalization for exacerbation of HFpEF.  Patient did have thoracentesis with 1.7 L of fluid removed  Patient continues to be unable to tolerate diuretics on a daily basis and is only taking furosemide 20 mg 3 times weekly.  Informed patient that if he experiences increased shortness of breath or signs of weight gain and edema that he needs to increase to daily dosing.  He verbalized understanding  Recommended patient adding SGLT2 during hospitalization however patient has not picked this up from the pharmacy.  Encouraged him to start taking this medication  Patient appears euvolemic on exam today.

## 2023-11-20 NOTE — TELEPHONE ENCOUNTER
11/20/23  13:00 EST    I specifically told the patient and the caregiver that was with him today that if his shortness of breath worsens he must take the diuretic daily.  I cannot force the patient to take the diuretic daily, I can only make recommendations.  He states that because of his urinary issues he only wishes to take this 3 times weekly.      CHARLEE Bryant  [facility]

## 2023-11-20 NOTE — TELEPHONE ENCOUNTER
Reviewed Stephanie's message with Belem and she verbalized understanding of recommendations.  Belem stated she spoke with patient and he has agreed to take furosemide 20 mg daily.    Updated med on patient's med list and pended for signature.    Thank you,  Yeni KNAPP RN  Triage Nurse [facility]   14:21 EST

## 2023-11-20 NOTE — TELEPHONE ENCOUNTER
"Patient's wife Belem returned call.  Reviewed Stephanie's message with Belem.  Belem stated that patient doesn't have issues with shortness of breath, that his primary problem is the swelling.  Belem stated taht patient elevates his feet while sitting and wears compression stockings, but this is not enough.  She stated patient's right side of his body swelled up overnight prior to his previous admission.  Belem is asking if patient should me taking furosemide 20 mg daily to help with his swelling?  She stated that patient will not be able to tolerate another hospital admission/ \"go through what he just went through again\".  Explained recommendation likely applies to managing swelling, however Belem is requesting input from provider.      Please let me know how you would like to proceed.    Thank you,  Yeni KNAPP RN  Triage Nurse [facility]   13:37 EST    "

## 2023-11-20 NOTE — TELEPHONE ENCOUNTER
Yes, the furosemide will also help with the swelling.  The patient has to be willing to take this daily and in the appointment with me he was only willing to take it 3 times weekly.  It would be best if it is taken daily.  Again, I cannot force the patient to take the medicine anymore than he is willing to take it.

## 2023-11-21 RX ORDER — FUROSEMIDE 20 MG/1
20 TABLET ORAL DAILY
Qty: 90 TABLET | Refills: 3 | Status: SHIPPED | OUTPATIENT
Start: 2023-11-21

## 2023-12-04 ENCOUNTER — OFFICE VISIT (OUTPATIENT)
Dept: WOUND CARE | Facility: HOSPITAL | Age: 83
End: 2023-12-04
Payer: MEDICARE

## 2023-12-04 PROCEDURE — G0463 HOSPITAL OUTPT CLINIC VISIT: HCPCS

## 2023-12-04 PROCEDURE — 97602 WOUND(S) CARE NON-SELECTIVE: CPT

## 2023-12-05 ENCOUNTER — TRANSCRIBE ORDERS (OUTPATIENT)
Dept: ADMINISTRATIVE | Facility: HOSPITAL | Age: 83
End: 2023-12-05
Payer: MEDICARE

## 2023-12-05 DIAGNOSIS — I70.235 ATHEROSCLEROSIS OF NATIVE ARTERY OF RIGHT LOWER EXTREMITY WITH ULCERATION OF OTHER PART OF FOOT: Primary | ICD-10-CM

## 2023-12-05 RX ORDER — SULFAMETHOXAZOLE AND TRIMETHOPRIM 400; 80 MG/1; MG/1
1 TABLET ORAL 2 TIMES DAILY
Qty: 20 TABLET | Refills: 0 | Status: SHIPPED | OUTPATIENT
Start: 2023-12-05

## 2023-12-12 RX ORDER — ESCITALOPRAM OXALATE 5 MG/1
5 TABLET ORAL DAILY
COMMUNITY
Start: 2023-11-29

## 2023-12-13 ENCOUNTER — ANESTHESIA EVENT (OUTPATIENT)
Dept: PERIOP | Facility: HOSPITAL | Age: 83
End: 2023-12-13
Payer: MEDICARE

## 2023-12-13 ENCOUNTER — ANESTHESIA (OUTPATIENT)
Dept: PERIOP | Facility: HOSPITAL | Age: 83
End: 2023-12-13
Payer: MEDICARE

## 2023-12-13 ENCOUNTER — HOSPITAL ENCOUNTER (INPATIENT)
Facility: HOSPITAL | Age: 83
LOS: 3 days | Discharge: HOME | End: 2023-12-16
Attending: SURGERY | Admitting: SURGERY
Payer: MEDICARE

## 2023-12-13 DIAGNOSIS — M86.471 CHRONIC OSTEOMYELITIS OF RIGHT ANKLE WITH DRAINING SINUS: Primary | ICD-10-CM

## 2023-12-13 DIAGNOSIS — M86.9 OSTEOMYELITIS: ICD-10-CM

## 2023-12-13 LAB
ABO GROUP BLD: NORMAL
ANION GAP SERPL CALCULATED.3IONS-SCNC: 9.5 MMOL/L (ref 5–15)
BLD GP AB SCN SERPL QL: NEGATIVE
BUN SERPL-MCNC: 17 MG/DL (ref 8–23)
BUN/CREAT SERPL: 15.7 (ref 7–25)
CALCIUM SPEC-SCNC: 8.4 MG/DL (ref 8.6–10.5)
CHLORIDE SERPL-SCNC: 102 MMOL/L (ref 98–107)
CO2 SERPL-SCNC: 26.5 MMOL/L (ref 22–29)
CREAT SERPL-MCNC: 1.08 MG/DL (ref 0.76–1.27)
DEPRECATED RDW RBC AUTO: 45.8 FL (ref 37–54)
EGFRCR SERPLBLD CKD-EPI 2021: 68.1 ML/MIN/1.73
ERYTHROCYTE [DISTWIDTH] IN BLOOD BY AUTOMATED COUNT: 15.8 % (ref 12.3–15.4)
GLUCOSE SERPL-MCNC: 91 MG/DL (ref 65–99)
HCT VFR BLD AUTO: 36.3 % (ref 37.5–51)
HGB BLD-MCNC: 11.2 G/DL (ref 13–17.7)
MAGNESIUM SERPL-MCNC: 2.4 MG/DL (ref 1.6–2.4)
MCH RBC QN AUTO: 24.8 PG (ref 26.6–33)
MCHC RBC AUTO-ENTMCNC: 30.9 G/DL (ref 31.5–35.7)
MCV RBC AUTO: 80.3 FL (ref 79–97)
PHOSPHATE SERPL-MCNC: 4.2 MG/DL (ref 2.5–4.5)
PLATELET # BLD AUTO: 218 10*3/MM3 (ref 140–450)
PMV BLD AUTO: 10.8 FL (ref 6–12)
POTASSIUM SERPL-SCNC: 5 MMOL/L (ref 3.5–5.2)
RBC # BLD AUTO: 4.52 10*6/MM3 (ref 4.14–5.8)
RH BLD: POSITIVE
SODIUM SERPL-SCNC: 138 MMOL/L (ref 136–145)
T&S EXPIRATION DATE: NORMAL
WBC NRBC COR # BLD AUTO: 10.3 10*3/MM3 (ref 3.4–10.8)

## 2023-12-13 PROCEDURE — 86900 BLOOD TYPING SEROLOGIC ABO: CPT | Performed by: SURGERY

## 2023-12-13 PROCEDURE — 83735 ASSAY OF MAGNESIUM: CPT | Performed by: SURGERY

## 2023-12-13 PROCEDURE — 25810000003 LACTATED RINGERS PER 1000 ML: Performed by: SURGERY

## 2023-12-13 PROCEDURE — 0Y6C0Z3 DETACHMENT AT RIGHT UPPER LEG, LOW, OPEN APPROACH: ICD-10-PCS | Performed by: SURGERY

## 2023-12-13 PROCEDURE — 25010000002 ROPIVACAINE PER 1 MG: Performed by: ANESTHESIOLOGY

## 2023-12-13 PROCEDURE — 25010000002 CEFAZOLIN PER 500 MG: Performed by: SURGERY

## 2023-12-13 PROCEDURE — 25010000002 FENTANYL CITRATE (PF) 50 MCG/ML SOLUTION: Performed by: NURSE ANESTHETIST, CERTIFIED REGISTERED

## 2023-12-13 PROCEDURE — 84100 ASSAY OF PHOSPHORUS: CPT | Performed by: SURGERY

## 2023-12-13 PROCEDURE — 86850 RBC ANTIBODY SCREEN: CPT | Performed by: SURGERY

## 2023-12-13 PROCEDURE — 25810000003 LACTATED RINGERS PER 1000 ML: Performed by: ANESTHESIOLOGY

## 2023-12-13 PROCEDURE — 25010000002 PROPOFOL 200 MG/20ML EMULSION: Performed by: NURSE ANESTHETIST, CERTIFIED REGISTERED

## 2023-12-13 PROCEDURE — 25010000002 ONDANSETRON PER 1 MG: Performed by: NURSE ANESTHETIST, CERTIFIED REGISTERED

## 2023-12-13 PROCEDURE — 88311 DECALCIFY TISSUE: CPT | Performed by: SURGERY

## 2023-12-13 PROCEDURE — 25010000002 MIDAZOLAM PER 1 MG: Performed by: ANESTHESIOLOGY

## 2023-12-13 PROCEDURE — 25010000002 FENTANYL CITRATE (PF) 50 MCG/ML SOLUTION: Performed by: ANESTHESIOLOGY

## 2023-12-13 PROCEDURE — 80048 BASIC METABOLIC PNL TOTAL CA: CPT | Performed by: SURGERY

## 2023-12-13 PROCEDURE — 88307 TISSUE EXAM BY PATHOLOGIST: CPT | Performed by: SURGERY

## 2023-12-13 PROCEDURE — 85027 COMPLETE CBC AUTOMATED: CPT | Performed by: SURGERY

## 2023-12-13 PROCEDURE — 25010000002 CEFAZOLIN IN DEXTROSE 2-4 GM/100ML-% SOLUTION: Performed by: SURGERY

## 2023-12-13 PROCEDURE — 25010000002 PHENYLEPHRINE 10 MG/ML SOLUTION: Performed by: NURSE ANESTHETIST, CERTIFIED REGISTERED

## 2023-12-13 PROCEDURE — 86901 BLOOD TYPING SEROLOGIC RH(D): CPT | Performed by: SURGERY

## 2023-12-13 RX ORDER — PROPOFOL 10 MG/ML
INJECTION, EMULSION INTRAVENOUS AS NEEDED
Status: DISCONTINUED | OUTPATIENT
Start: 2023-12-13 | End: 2023-12-13 | Stop reason: SURG

## 2023-12-13 RX ORDER — HYDROMORPHONE HYDROCHLORIDE 1 MG/ML
0.5 INJECTION, SOLUTION INTRAMUSCULAR; INTRAVENOUS; SUBCUTANEOUS
Status: DISCONTINUED | OUTPATIENT
Start: 2023-12-13 | End: 2023-12-16 | Stop reason: HOSPADM

## 2023-12-13 RX ORDER — NICOTINE POLACRILEX 4 MG
15 LOZENGE BUCCAL
Status: DISCONTINUED | OUTPATIENT
Start: 2023-12-13 | End: 2023-12-13

## 2023-12-13 RX ORDER — IPRATROPIUM BROMIDE AND ALBUTEROL SULFATE 2.5; .5 MG/3ML; MG/3ML
3 SOLUTION RESPIRATORY (INHALATION) ONCE AS NEEDED
Status: DISCONTINUED | OUTPATIENT
Start: 2023-12-13 | End: 2023-12-13 | Stop reason: HOSPADM

## 2023-12-13 RX ORDER — LIDOCAINE HYDROCHLORIDE 10 MG/ML
0.5 INJECTION, SOLUTION INFILTRATION; PERINEURAL ONCE AS NEEDED
Status: DISCONTINUED | OUTPATIENT
Start: 2023-12-13 | End: 2023-12-13 | Stop reason: HOSPADM

## 2023-12-13 RX ORDER — FENTANYL CITRATE 50 UG/ML
INJECTION, SOLUTION INTRAMUSCULAR; INTRAVENOUS
Status: COMPLETED | OUTPATIENT
Start: 2023-12-13 | End: 2023-12-13

## 2023-12-13 RX ORDER — HYDRALAZINE HYDROCHLORIDE 20 MG/ML
5 INJECTION INTRAMUSCULAR; INTRAVENOUS
Status: DISCONTINUED | OUTPATIENT
Start: 2023-12-13 | End: 2023-12-13 | Stop reason: HOSPADM

## 2023-12-13 RX ORDER — FENTANYL CITRATE 50 UG/ML
50 INJECTION, SOLUTION INTRAMUSCULAR; INTRAVENOUS ONCE AS NEEDED
Status: DISCONTINUED | OUTPATIENT
Start: 2023-12-13 | End: 2023-12-13 | Stop reason: HOSPADM

## 2023-12-13 RX ORDER — ACETAMINOPHEN 500 MG
1000 TABLET ORAL EVERY 8 HOURS
Status: DISCONTINUED | OUTPATIENT
Start: 2023-12-13 | End: 2023-12-16 | Stop reason: HOSPADM

## 2023-12-13 RX ORDER — FENTANYL CITRATE 50 UG/ML
25 INJECTION, SOLUTION INTRAMUSCULAR; INTRAVENOUS
Status: DISCONTINUED | OUTPATIENT
Start: 2023-12-13 | End: 2023-12-13 | Stop reason: HOSPADM

## 2023-12-13 RX ORDER — NITROGLYCERIN 0.4 MG/1
0.4 TABLET SUBLINGUAL
Status: DISCONTINUED | OUTPATIENT
Start: 2023-12-13 | End: 2023-12-16 | Stop reason: HOSPADM

## 2023-12-13 RX ORDER — LIDOCAINE HYDROCHLORIDE 20 MG/ML
INJECTION, SOLUTION INFILTRATION; PERINEURAL AS NEEDED
Status: DISCONTINUED | OUTPATIENT
Start: 2023-12-13 | End: 2023-12-13 | Stop reason: SURG

## 2023-12-13 RX ORDER — NALOXONE HCL 0.4 MG/ML
0.2 VIAL (ML) INJECTION AS NEEDED
Status: DISCONTINUED | OUTPATIENT
Start: 2023-12-13 | End: 2023-12-13 | Stop reason: HOSPADM

## 2023-12-13 RX ORDER — HYDROMORPHONE HYDROCHLORIDE 1 MG/ML
0.25 INJECTION, SOLUTION INTRAMUSCULAR; INTRAVENOUS; SUBCUTANEOUS
Status: DISCONTINUED | OUTPATIENT
Start: 2023-12-13 | End: 2023-12-13 | Stop reason: HOSPADM

## 2023-12-13 RX ORDER — MIDAZOLAM HYDROCHLORIDE 1 MG/ML
INJECTION INTRAMUSCULAR; INTRAVENOUS
Status: COMPLETED | OUTPATIENT
Start: 2023-12-13 | End: 2023-12-13

## 2023-12-13 RX ORDER — LABETALOL HYDROCHLORIDE 5 MG/ML
5 INJECTION, SOLUTION INTRAVENOUS
Status: DISCONTINUED | OUTPATIENT
Start: 2023-12-13 | End: 2023-12-13 | Stop reason: HOSPADM

## 2023-12-13 RX ORDER — INSULIN LISPRO 100 [IU]/ML
2-7 INJECTION, SOLUTION INTRAVENOUS; SUBCUTANEOUS
Status: DISCONTINUED | OUTPATIENT
Start: 2023-12-13 | End: 2023-12-13

## 2023-12-13 RX ORDER — ONDANSETRON 4 MG/1
4 TABLET, FILM COATED ORAL EVERY 6 HOURS PRN
Status: DISCONTINUED | OUTPATIENT
Start: 2023-12-13 | End: 2023-12-16 | Stop reason: HOSPADM

## 2023-12-13 RX ORDER — IBUPROFEN 600 MG/1
1 TABLET ORAL
Status: DISCONTINUED | OUTPATIENT
Start: 2023-12-13 | End: 2023-12-13

## 2023-12-13 RX ORDER — DIPHENHYDRAMINE HYDROCHLORIDE 50 MG/ML
12.5 INJECTION INTRAMUSCULAR; INTRAVENOUS
Status: DISCONTINUED | OUTPATIENT
Start: 2023-12-13 | End: 2023-12-13 | Stop reason: HOSPADM

## 2023-12-13 RX ORDER — DEXTROSE MONOHYDRATE 25 G/50ML
25 INJECTION, SOLUTION INTRAVENOUS
Status: DISCONTINUED | OUTPATIENT
Start: 2023-12-13 | End: 2023-12-13

## 2023-12-13 RX ORDER — ONDANSETRON 2 MG/ML
INJECTION INTRAMUSCULAR; INTRAVENOUS AS NEEDED
Status: DISCONTINUED | OUTPATIENT
Start: 2023-12-13 | End: 2023-12-13 | Stop reason: SURG

## 2023-12-13 RX ORDER — PHENYLEPHRINE HYDROCHLORIDE 10 MG/ML
INJECTION INTRAVENOUS AS NEEDED
Status: DISCONTINUED | OUTPATIENT
Start: 2023-12-13 | End: 2023-12-13 | Stop reason: SURG

## 2023-12-13 RX ORDER — MIDAZOLAM HYDROCHLORIDE 1 MG/ML
0.5 INJECTION INTRAMUSCULAR; INTRAVENOUS
Status: DISCONTINUED | OUTPATIENT
Start: 2023-12-13 | End: 2023-12-13 | Stop reason: HOSPADM

## 2023-12-13 RX ORDER — CEFAZOLIN SODIUM 2 G/100ML
2000 INJECTION, SOLUTION INTRAVENOUS EVERY 8 HOURS
Qty: 200 ML | Refills: 0 | Status: COMPLETED | OUTPATIENT
Start: 2023-12-13 | End: 2023-12-14

## 2023-12-13 RX ORDER — PROMETHAZINE HYDROCHLORIDE 25 MG/1
25 TABLET ORAL ONCE AS NEEDED
Status: DISCONTINUED | OUTPATIENT
Start: 2023-12-13 | End: 2023-12-13 | Stop reason: HOSPADM

## 2023-12-13 RX ORDER — HYDROCODONE BITARTRATE AND ACETAMINOPHEN 7.5; 325 MG/1; MG/1
1 TABLET ORAL EVERY 4 HOURS PRN
Status: DISCONTINUED | OUTPATIENT
Start: 2023-12-13 | End: 2023-12-13 | Stop reason: HOSPADM

## 2023-12-13 RX ORDER — ONDANSETRON 2 MG/ML
4 INJECTION INTRAMUSCULAR; INTRAVENOUS ONCE AS NEEDED
Status: DISCONTINUED | OUTPATIENT
Start: 2023-12-13 | End: 2023-12-13 | Stop reason: HOSPADM

## 2023-12-13 RX ORDER — ROPIVACAINE HYDROCHLORIDE 5 MG/ML
INJECTION, SOLUTION EPIDURAL; INFILTRATION; PERINEURAL
Status: COMPLETED | OUTPATIENT
Start: 2023-12-13 | End: 2023-12-13

## 2023-12-13 RX ORDER — SODIUM CHLORIDE, SODIUM LACTATE, POTASSIUM CHLORIDE, CALCIUM CHLORIDE 600; 310; 30; 20 MG/100ML; MG/100ML; MG/100ML; MG/100ML
9 INJECTION, SOLUTION INTRAVENOUS CONTINUOUS
Status: DISCONTINUED | OUTPATIENT
Start: 2023-12-13 | End: 2023-12-13

## 2023-12-13 RX ORDER — OXYCODONE HYDROCHLORIDE 5 MG/1
5 TABLET ORAL EVERY 4 HOURS PRN
Status: DISCONTINUED | OUTPATIENT
Start: 2023-12-13 | End: 2023-12-16 | Stop reason: HOSPADM

## 2023-12-13 RX ORDER — NITROGLYCERIN 0.4 MG/1
0.4 TABLET SUBLINGUAL
Status: DISCONTINUED | OUTPATIENT
Start: 2023-12-13 | End: 2023-12-13 | Stop reason: SDUPTHER

## 2023-12-13 RX ORDER — FLUMAZENIL 0.1 MG/ML
0.2 INJECTION INTRAVENOUS AS NEEDED
Status: DISCONTINUED | OUTPATIENT
Start: 2023-12-13 | End: 2023-12-13 | Stop reason: HOSPADM

## 2023-12-13 RX ORDER — SODIUM CHLORIDE, SODIUM LACTATE, POTASSIUM CHLORIDE, CALCIUM CHLORIDE 600; 310; 30; 20 MG/100ML; MG/100ML; MG/100ML; MG/100ML
100 INJECTION, SOLUTION INTRAVENOUS CONTINUOUS
Status: DISCONTINUED | OUTPATIENT
Start: 2023-12-13 | End: 2023-12-14

## 2023-12-13 RX ORDER — SODIUM CHLORIDE 0.9 % (FLUSH) 0.9 %
3 SYRINGE (ML) INJECTION EVERY 12 HOURS SCHEDULED
Status: DISCONTINUED | OUTPATIENT
Start: 2023-12-13 | End: 2023-12-13 | Stop reason: HOSPADM

## 2023-12-13 RX ORDER — DROPERIDOL 2.5 MG/ML
0.62 INJECTION, SOLUTION INTRAMUSCULAR; INTRAVENOUS
Status: DISCONTINUED | OUTPATIENT
Start: 2023-12-13 | End: 2023-12-13 | Stop reason: HOSPADM

## 2023-12-13 RX ORDER — CEFAZOLIN SODIUM 2 G/100ML
2000 INJECTION, SOLUTION INTRAVENOUS ONCE
Status: COMPLETED | OUTPATIENT
Start: 2023-12-13 | End: 2023-12-13

## 2023-12-13 RX ORDER — SODIUM CHLORIDE 0.9 % (FLUSH) 0.9 %
3-10 SYRINGE (ML) INJECTION AS NEEDED
Status: DISCONTINUED | OUTPATIENT
Start: 2023-12-13 | End: 2023-12-13 | Stop reason: HOSPADM

## 2023-12-13 RX ORDER — FENTANYL CITRATE 50 UG/ML
INJECTION, SOLUTION INTRAMUSCULAR; INTRAVENOUS AS NEEDED
Status: DISCONTINUED | OUTPATIENT
Start: 2023-12-13 | End: 2023-12-13 | Stop reason: SURG

## 2023-12-13 RX ORDER — PROMETHAZINE HYDROCHLORIDE 25 MG/1
25 SUPPOSITORY RECTAL ONCE AS NEEDED
Status: DISCONTINUED | OUTPATIENT
Start: 2023-12-13 | End: 2023-12-13 | Stop reason: HOSPADM

## 2023-12-13 RX ORDER — ONDANSETRON 2 MG/ML
4 INJECTION INTRAMUSCULAR; INTRAVENOUS EVERY 6 HOURS PRN
Status: DISCONTINUED | OUTPATIENT
Start: 2023-12-13 | End: 2023-12-16 | Stop reason: HOSPADM

## 2023-12-13 RX ORDER — EPHEDRINE SULFATE 50 MG/ML
5 INJECTION, SOLUTION INTRAVENOUS ONCE AS NEEDED
Status: DISCONTINUED | OUTPATIENT
Start: 2023-12-13 | End: 2023-12-13 | Stop reason: HOSPADM

## 2023-12-13 RX ORDER — HYDROCODONE BITARTRATE AND ACETAMINOPHEN 5; 325 MG/1; MG/1
1 TABLET ORAL ONCE AS NEEDED
Status: DISCONTINUED | OUTPATIENT
Start: 2023-12-13 | End: 2023-12-13 | Stop reason: HOSPADM

## 2023-12-13 RX ORDER — FAMOTIDINE 10 MG/ML
20 INJECTION, SOLUTION INTRAVENOUS ONCE
Status: COMPLETED | OUTPATIENT
Start: 2023-12-13 | End: 2023-12-13

## 2023-12-13 RX ORDER — ESCITALOPRAM OXALATE 5 MG/1
5 TABLET ORAL DAILY
Status: DISCONTINUED | OUTPATIENT
Start: 2023-12-13 | End: 2023-12-16 | Stop reason: HOSPADM

## 2023-12-13 RX ADMIN — ROPIVACAINE HYDROCHLORIDE 20 ML: 5 INJECTION EPIDURAL; INFILTRATION; PERINEURAL at 11:02

## 2023-12-13 RX ADMIN — EMPAGLIFLOZIN 10 MG: 10 TABLET, FILM COATED ORAL at 16:23

## 2023-12-13 RX ADMIN — PHENYLEPHRINE HYDROCHLORIDE 200 MCG: 10 INJECTION INTRAVENOUS at 12:57

## 2023-12-13 RX ADMIN — FENTANYL CITRATE 50 MCG: 50 INJECTION, SOLUTION INTRAMUSCULAR; INTRAVENOUS at 12:38

## 2023-12-13 RX ADMIN — METOPROLOL TARTRATE 25 MG: 25 TABLET, FILM COATED ORAL at 21:31

## 2023-12-13 RX ADMIN — CEFAZOLIN SODIUM 2000 MG: 2 INJECTION, SOLUTION INTRAVENOUS at 11:44

## 2023-12-13 RX ADMIN — PHENYLEPHRINE HYDROCHLORIDE 200 MCG: 10 INJECTION INTRAVENOUS at 12:08

## 2023-12-13 RX ADMIN — MIDAZOLAM 0.5 MG: 1 INJECTION INTRAMUSCULAR; INTRAVENOUS at 10:57

## 2023-12-13 RX ADMIN — FAMOTIDINE 20 MG: 10 INJECTION INTRAVENOUS at 11:11

## 2023-12-13 RX ADMIN — ONDANSETRON 4 MG: 2 INJECTION INTRAMUSCULAR; INTRAVENOUS at 13:07

## 2023-12-13 RX ADMIN — PHENYLEPHRINE HYDROCHLORIDE 200 MCG: 10 INJECTION INTRAVENOUS at 12:05

## 2023-12-13 RX ADMIN — FENTANYL CITRATE 50 MCG: 50 INJECTION, SOLUTION INTRAMUSCULAR; INTRAVENOUS at 10:56

## 2023-12-13 RX ADMIN — SODIUM CHLORIDE, POTASSIUM CHLORIDE, SODIUM LACTATE AND CALCIUM CHLORIDE 100 ML/HR: 600; 310; 30; 20 INJECTION, SOLUTION INTRAVENOUS at 19:51

## 2023-12-13 RX ADMIN — PHENYLEPHRINE HYDROCHLORIDE 200 MCG: 10 INJECTION INTRAVENOUS at 12:42

## 2023-12-13 RX ADMIN — PHENYLEPHRINE HYDROCHLORIDE 200 MCG: 10 INJECTION INTRAVENOUS at 13:01

## 2023-12-13 RX ADMIN — LIDOCAINE HYDROCHLORIDE 100 MG: 20 INJECTION, SOLUTION INFILTRATION; PERINEURAL at 11:58

## 2023-12-13 RX ADMIN — SODIUM CHLORIDE, POTASSIUM CHLORIDE, SODIUM LACTATE AND CALCIUM CHLORIDE 9 ML/HR: 600; 310; 30; 20 INJECTION, SOLUTION INTRAVENOUS at 11:45

## 2023-12-13 RX ADMIN — PROPOFOL 20 MG: 10 INJECTION, EMULSION INTRAVENOUS at 12:37

## 2023-12-13 RX ADMIN — CEFAZOLIN SODIUM 2000 MG: 2 INJECTION, SOLUTION INTRAVENOUS at 19:50

## 2023-12-13 RX ADMIN — SODIUM CHLORIDE, POTASSIUM CHLORIDE, SODIUM LACTATE AND CALCIUM CHLORIDE 50 ML/HR: 600; 310; 30; 20 INJECTION, SOLUTION INTRAVENOUS at 14:48

## 2023-12-13 RX ADMIN — PROPOFOL 130 MG: 10 INJECTION, EMULSION INTRAVENOUS at 11:58

## 2023-12-13 RX ADMIN — ACETAMINOPHEN 1000 MG: 500 TABLET ORAL at 16:23

## 2023-12-13 RX ADMIN — ACETAMINOPHEN 1000 MG: 500 TABLET ORAL at 21:31

## 2023-12-13 NOTE — ANESTHESIA PROCEDURE NOTES
Peripheral Block      Patient reassessed immediately prior to procedure    Patient location during procedure: holding area  Stop time: 12/13/2023 11:05 AM  Reason for block: at surgeon's request and post-op pain management  Performed by  Anesthesiologist: Ivanna Mederos MD  Preanesthetic Checklist  Completed: patient identified, IV checked, site marked, risks and benefits discussed, surgical consent, monitors and equipment checked, pre-op evaluation and timeout performed  Prep:  Sterile barriers:gloves  Prep: ChloraPrep  Patient monitoring: continuous pulse oximetry, blood pressure monitoring and EKG  Procedure    Sedation: yes  Performed under: PNB  Guidance:ultrasound guided    ULTRASOUND INTERPRETATION.  Using ultrasound guidance a 20 G gauge needle was placed in close proximity to the nerve, at which point, under ultrasound guidance anesthetic was injected in the area of the nerve and spread of the anesthesia was seen on ultrasound in close proximity thereto.  There were no abnormalities seen on ultrasound; a digital image was taken; and the patient tolerated the procedure with no complications. Images:still images not obtained    Laterality:right  Block Type:adductor canal block      Medications Used: ropivacaine (NAROPIN) 0.5 % injection - Injection   20 mL - 12/13/2023 11:02:00 AM      Medications  Comment:Ultrasound guidance was used to view and verify medication disbursement.  Ultrasound guidance was used for needle placement.    Post Assessment  Injection Assessment: negative aspiration for heme, no paresthesia on injection and incremental injection  Patient Tolerance:comfortable throughout block  Complications:no  Additional Notes  Ultrasound guidance was used to guide needle placement, as well as to view and verify medication disbursement.

## 2023-12-13 NOTE — ANESTHESIA POSTPROCEDURE EVALUATION
Patient: Mehreen Silva IV    Procedure Summary       Date: 12/13/23 Room / Location: [facility] OR [address] [place] [address] MAIN OR    Anesthesia Start: 1149 Anesthesia Stop: 1336    Procedure: ABOVE KNEE AMPUTATION RIGHT, proveena wound vac placement (Right: Thigh) Diagnosis:     Surgeons: Issa Nieves MD Provider: Lupillo Granado MD    Anesthesia Type: general with block ASA Status: 3            Anesthesia Type: general with block    Vitals  Vitals Value Taken Time   /63 12/13/23 1345   Temp 36.4 °C (97.6 °F) 12/13/23 1332   Pulse 103 12/13/23 1356   Resp 16 12/13/23 1345   SpO2 98 % 12/13/23 1356   Vitals shown include unfiled device data.        Post Anesthesia Care and Evaluation    Patient location during evaluation: bedside  Patient participation: complete - patient participated  Level of consciousness: awake  Pain management: adequate    Airway patency: patent  Anesthetic complications: No anesthetic complications    Cardiovascular status: acceptable  Respiratory status: acceptable  Hydration status: acceptable    Comments: /63   Pulse 120   Temp 36.4 °C (97.6 °F) (Oral)   Resp 16   SpO2 95%

## 2023-12-13 NOTE — ANESTHESIA PREPROCEDURE EVALUATION
Anesthesia Evaluation                  Airway   Mallampati: I  Neck ROM: limited  Dental          Pulmonary    (+) pneumonia , pleural effusion,  Cardiovascular     PT is on anticoagulation therapy  Patient on routine beta blocker    (+) dysrhythmias Atrial Fib, CHF Diastolic >=55%, hyperlipidemia    ROS comment: Pulm hypertension    Neuro/Psych  (+) numbness    ROS Comment: Cervical spondylosis with myelopathy  GI/Hepatic/Renal/Endo    (+) thyroid problem     Musculoskeletal     Abdominal    Substance History      OB/GYN          Other   arthritis,       Other Comment: Gangrene of heel              Anesthesia Plan    ASA 3     general with block     intravenous induction     Anesthetic plan, risks, benefits, and alternatives have been provided, discussed and informed consent has been obtained with: patient.    CODE STATUS:

## 2023-12-13 NOTE — PLAN OF CARE
Goal Outcome Evaluation:            Pt admitted for monitoring after right aka, stable pt at this time, see charting in epic

## 2023-12-13 NOTE — ANESTHESIA PROCEDURE NOTES
Airway  Urgency: elective    Date/Time: 12/13/2023 12:01 PM  Airway not difficult    General Information and Staff    Patient location during procedure: OR  Anesthesiologist: Lupillo Granado MD  CRNA/CAA: Amy Molina CRNA    Indications and Patient Condition    Preoxygenated: yes  Mask difficulty assessment: 1 - vent by mask    Final Airway Details  Final airway type: supraglottic airway      Successful airway: classic  Size 5     Number of attempts at approach: 1  Assessment: lips, teeth, and gum same as pre-op    Additional Comments  Pt to OR and positioned self to OR table. Monitors applied. PreO2: SIVI and easy insertion LMA. ETCO2 +, Equal and bilateral chest rise

## 2023-12-13 NOTE — OP NOTE
Date of Admission:  12/13/2023  Today's Date:  12/13/23  Issa Nieves MD  [facility]    Preoperative Diagnosis:   Nonsalvageable right heel wound related to osteomyelitis.  Knee contracture right leg.  Nonambulatory.    Postoperative Diagnosis:   Same    Procedure Performed:   Right above-knee amputation  Prevena incisional wound VAC system.    CPT:   25828 -primary above-knee amputation.    Surgeon:   Issa Nieves MD    Assistant:    Juany INGRAM, Provided critical assistance in exposure, retraction, and suction that overall decrease blood loss and operative time.    Anesthesia:   General    Estimated Blood Loss:    cc    Findings:    Successful distal transfemoral amputation via fishmouth incision.    Surgically adequate blood supply.    No ascending infection.    Implants:    Nothing was implanted during the procedure    Staff:   Circulator: Rafael Wood RN; Sruthi Guerra RN  Scrub Person: Jody Woodall  Assistant: Juany Vela CSA    Specimen:   none    Complications:   none    Dispo:   to PACU    Indication for procedure:   83 y.o. male with right heel osteomyelitis with large wet gangrenous wound.  I saw him in the office last week.  Recommended above-knee amputation.  Risk benefits alternatives discussed.  Informed consent obtained.    Description of procedure:   Patient was taken to the operating room.  Anesthesia was induced without difficulty.  Surgical sites were prepped and draped in the usual sterile manner.  A full surgical timeout was performed.  A standard fishmouth incision was made in the mid to distal thigh.  Bovie electrocautery was used to dissect down through the subcutaneous tissue.  The investing fascia of the thigh was incised along the incision line.  Muscles were divided.  Along the medial aspect of the femoral artery and femoral vein or individually tied between suture ligatures.  All nerve structures were ligated under tension after  infiltrated with Marcaine.  Once complete soft tissue transection had been performed the distal femur was freed from its periosteal elevations with a periosteal elevator.  Femur itself was transected with a Gigli saw.  Rasping maneuvers were performed to smooth out the bone edges.  Meticulous hemostasis was obtained.  Wound thoroughly irrigated with an antibiotic-containing solution.  Local muscles were used to cover the end of the femur bone.  Fascia was closed with a multitude of 0 Vicryl sutures in interrupted manner.  Skin stapled closed.  Sterile dressings applied.  Overall patient tolerated the procedure well.    At the case completion all laps, instruments, and needle counts were correct x2.    Issa Nieves MD  12/13/23     There are no hospital problems to display for this patient.

## 2023-12-14 LAB
ANION GAP SERPL CALCULATED.3IONS-SCNC: 6.9 MMOL/L (ref 5–15)
BUN SERPL-MCNC: 15 MG/DL (ref 8–23)
BUN/CREAT SERPL: 18.1 (ref 7–25)
CALCIUM SPEC-SCNC: 7.8 MG/DL (ref 8.6–10.5)
CHLORIDE SERPL-SCNC: 103 MMOL/L (ref 98–107)
CO2 SERPL-SCNC: 25.1 MMOL/L (ref 22–29)
CREAT SERPL-MCNC: 0.83 MG/DL (ref 0.76–1.27)
DEPRECATED RDW RBC AUTO: 46.7 FL (ref 37–54)
EGFRCR SERPLBLD CKD-EPI 2021: 86.8 ML/MIN/1.73
ERYTHROCYTE [DISTWIDTH] IN BLOOD BY AUTOMATED COUNT: 15.9 % (ref 12.3–15.4)
GLUCOSE BLDC GLUCOMTR-MCNC: 83 MG/DL (ref 70–130)
GLUCOSE SERPL-MCNC: 77 MG/DL (ref 65–99)
HCT VFR BLD AUTO: 32.8 % (ref 37.5–51)
HGB BLD-MCNC: 10.3 G/DL (ref 13–17.7)
MAGNESIUM SERPL-MCNC: 2.1 MG/DL (ref 1.6–2.4)
MCH RBC QN AUTO: 25.2 PG (ref 26.6–33)
MCHC RBC AUTO-ENTMCNC: 31.4 G/DL (ref 31.5–35.7)
MCV RBC AUTO: 80.2 FL (ref 79–97)
PHOSPHATE SERPL-MCNC: 3.3 MG/DL (ref 2.5–4.5)
PLATELET # BLD AUTO: 196 10*3/MM3 (ref 140–450)
PMV BLD AUTO: 10.8 FL (ref 6–12)
POTASSIUM SERPL-SCNC: 4.6 MMOL/L (ref 3.5–5.2)
QT INTERVAL: 314 MS
QTC INTERVAL: 451 MS
RBC # BLD AUTO: 4.09 10*6/MM3 (ref 4.14–5.8)
SODIUM SERPL-SCNC: 135 MMOL/L (ref 136–145)
WBC NRBC COR # BLD AUTO: 7.36 10*3/MM3 (ref 3.4–10.8)

## 2023-12-14 PROCEDURE — 83735 ASSAY OF MAGNESIUM: CPT | Performed by: SURGERY

## 2023-12-14 PROCEDURE — 80048 BASIC METABOLIC PNL TOTAL CA: CPT | Performed by: SURGERY

## 2023-12-14 PROCEDURE — 25810000003 SODIUM CHLORIDE 0.9 % SOLUTION: Performed by: SURGERY

## 2023-12-14 PROCEDURE — 85027 COMPLETE CBC AUTOMATED: CPT | Performed by: SURGERY

## 2023-12-14 PROCEDURE — 84100 ASSAY OF PHOSPHORUS: CPT | Performed by: SURGERY

## 2023-12-14 PROCEDURE — 25010000002 CEFAZOLIN IN DEXTROSE 2-4 GM/100ML-% SOLUTION: Performed by: SURGERY

## 2023-12-14 PROCEDURE — 93010 ELECTROCARDIOGRAM REPORT: CPT | Performed by: INTERNAL MEDICINE

## 2023-12-14 PROCEDURE — 99222 1ST HOSP IP/OBS MODERATE 55: CPT | Performed by: INTERNAL MEDICINE

## 2023-12-14 PROCEDURE — 82948 REAGENT STRIP/BLOOD GLUCOSE: CPT

## 2023-12-14 PROCEDURE — 93005 ELECTROCARDIOGRAM TRACING: CPT | Performed by: SURGERY

## 2023-12-14 RX ORDER — DIGOXIN 0.25 MG/ML
500 INJECTION INTRAMUSCULAR; INTRAVENOUS ONCE
Status: DISCONTINUED | OUTPATIENT
Start: 2023-12-14 | End: 2023-12-14

## 2023-12-14 RX ADMIN — METOPROLOL TARTRATE 25 MG: 25 TABLET, FILM COATED ORAL at 12:17

## 2023-12-14 RX ADMIN — ACETAMINOPHEN 1000 MG: 500 TABLET ORAL at 14:22

## 2023-12-14 RX ADMIN — SODIUM CHLORIDE 250 ML: 900 INJECTION, SOLUTION INTRAVENOUS at 00:00

## 2023-12-14 RX ADMIN — ESCITALOPRAM 5 MG: 5 TABLET, FILM COATED ORAL at 08:07

## 2023-12-14 RX ADMIN — CEFAZOLIN SODIUM 2000 MG: 2 INJECTION, SOLUTION INTRAVENOUS at 05:11

## 2023-12-14 RX ADMIN — EMPAGLIFLOZIN 10 MG: 10 TABLET, FILM COATED ORAL at 08:07

## 2023-12-14 RX ADMIN — ACETAMINOPHEN 1000 MG: 500 TABLET ORAL at 05:11

## 2023-12-14 RX ADMIN — ACETAMINOPHEN 1000 MG: 500 TABLET ORAL at 20:25

## 2023-12-14 RX ADMIN — APIXABAN 5 MG: 5 TABLET, FILM COATED ORAL at 14:22

## 2023-12-14 RX ADMIN — METOPROLOL TARTRATE 25 MG: 25 TABLET, FILM COATED ORAL at 20:25

## 2023-12-14 NOTE — CONSULTS
Date of Hospital Visit: 2023  Encounter Provider: Johnson Arnold MD  Place of Service: [facility] CARDIOLOGY  Patient Name: Mehreen Silva IV  :1940  Referral Provider: Issa Nieves MD    Chief complaint right heel wound     History of Present Illness Mehreen Silva is a 83 year old pt of Dr. Chan with a history of arthritis, persistent Afib, CHF, HLD, and pulmonary HTN    In 2022, pt presented with dyspnea. Pt was found to be in Afib and acute diastolic heart failure. An ECHO then showed  LVEF 60% with moderate LVH, biatrial dilatation and very mild pulmonary hypertension. Pt was started on  metoprolol, apixaban, furosemide.     Pt was seen in the office on 23 by Dr. Chan and was found to have volume overload with hypoxia. Pt had decreased breath sounds in bilateral bases. proBNP 4000, CXR showed  pulmonary edema with bilateral effusions.  Patient was directly admitted to the hospital for IV diuresis. Pt was discharged on Jardiance.    Pt was seen in follow up on 23 by Stephanie. Pt was feeling better since discharge. Pt reported he has not been able to  take diuretics daily but  is taking the lasix  20 mg a day 3 times weekly. He was taking it Monday/Wednesday/Friday. Pt reported he was not aware of the new medication jardiance and had not been taking it. He denied any shortness of breath, RDZ, orthopnea, or generalized fatigue. Pt was encourage to  hsi prescription for jardiance and to take it . No medication changes were made.     Pt was admitted on -23. Pt had a thoracentesis with 1.7 Liters removed with improvement. Pt was discharged on daily diuretics. Pt has had difficulty with compliance with diuretics secondary to urinary frequency.     Pt presented to [facility] on 23 for right above the knee amputation.      Pt is post op day 1 and had low BP overnight and responded well to Jesse gtt. Pt off the gtt currently. Pt is in Afib and orders to  hold metoprolol for BP of less than 110.     His blood pressure was soft overnight but he has been normotensive over the last few hours off vasopressor support.  Heart rate has come down and he is in rate controlled atrial fibrillation.  He denies palpitations, dizziness syncope.  No angina.  No orthopnea, PND or edema.    HPI was reviewed, updated and amended when necessary.          ECHO 11/9/23    Left ventricular systolic function is hyperdynamic (EF > 70%).    Left ventricular wall thickness is consistent with mild concentric hypertrophy with component of basal septal asymmetric hypertrophy.    Left ventricular diastolic function is consistent with (grade II w/high LAP) pseudonormalization.    RV cavity mass near the apex likely part of papillary muscle but can not rule out mass.    Calculated right ventricular systolic pressure from tricuspid regurgitation is 41 mmHg.     Past Medical History:   Diagnosis Date    (HFpEF) heart failure with preserved ejection fraction 09/24/2022    Arthritis     Atrial fibrillation     Atrial fibrillation, persistent 09/24/2022    Cervical spondylosis with myelopathy     CHF (congestive heart failure)     Disease of thyroid gland     HLD (hyperlipidemia) 09/23/2022    Hx of toxic multinodular goiter 09/23/2022    Lumbar radiculopathy     Pulmonary hypertension     Ulcer with gangrene     right heel       Past Surgical History:   Procedure Laterality Date    ABOVE KNEE AMPUTATION Right 12/13/2023    Procedure: ABOVE KNEE AMPUTATION RIGHT, proveena wound vac placement;  Surgeon: Issa Nieves MD;  Location: [facility];  Service: Vascular;  Laterality: Right;    APPENDECTOMY      BACK SURGERY      COLONOSCOPY      EXCISION MASS TRUNK N/A 6/8/2016    Procedure: Excision soft tissue neoplasm on abdominal wall and left neck;  Surgeon: Shaji Barahona Jr., MD;  Location: [facility] OR;  Service:     KNEE SURGERY      NECK SURGERY  1974    SHOULDER SURGERY         Medications  Prior to Admission   Medication Sig Dispense Refill Last Dose    cholecalciferol (VITAMIN D3) 25 MCG (1000 UT) tablet Take 1 tablet by mouth Daily.   12/12/2023 at 0730    empagliflozin (JARDIANCE) 10 MG tablet tablet Take 1 tablet by mouth Daily. 90 tablet 1 12/12/2023 at 0730    ezetimibe (ZETIA) 10 MG tablet Take 1 tablet by mouth Daily.   12/12/2023 at 0730    furosemide (LASIX) 20 MG tablet Take 1 tablet by mouth Daily. 90 tablet 3 12/12/2023 at 0730    methIMAzole (TAPAZOLE) 5 MG tablet Take 1 tablet by mouth. Takes 5 days - No Wednesday or sundays   12/13/2023 at 0715    metoprolol tartrate (LOPRESSOR) 25 MG tablet TAKE ONE TABLET BY MOUTH EVERY 12 HOURS 180 tablet 3 12/13/2023 at 0715    multivitamin with minerals tablet tablet Take 1 tablet by mouth Daily.   12/12/2023 at 0730    apixaban (Eliquis) 5 MG tablet tablet Take 1 tablet by mouth Every 12 (Twelve) Hours. (Patient taking differently: Take 1 tablet by mouth Every 12 (Twelve) Hours. 12/10 ld) 180 tablet 3 12/10/2023    escitalopram (LEXAPRO) 5 MG tablet Take 1 tablet by mouth Daily. Hasn't started at this time   Unknown       Current Meds  Scheduled Meds:acetaminophen, 1,000 mg, Oral, Q8H  apixaban, 5 mg, Oral, Q12H  empagliflozin, 10 mg, Oral, Daily  escitalopram, 5 mg, Oral, Daily  metoprolol tartrate, 25 mg, Oral, Q12H      Continuous Infusions:   PRN Meds:.  Calcium Replacement - Follow Nurse / BPA Driven Protocol    HYDROmorphone    Magnesium Cardiology Dose Replacement - Follow Nurse / BPA Driven Protocol    nitroglycerin    ondansetron **OR** ondansetron    oxyCODONE    Phosphorus Replacement - Follow Nurse / BPA Driven Protocol    Potassium Replacement - Follow Nurse / BPA Driven Protocol    Allergies as of 12/05/2023    (No Known Allergies)       Social History     Socioeconomic History    Marital status:     Number of children: 1    Highest education level: Professional school degree (e.g., MD, DDS, DVM, BRITNEY)   Tobacco Use    Smoking  status: Former     Types: Cigarettes     Quit date: 1983     Years since quittin.5    Smokeless tobacco: Never   Vaping Use    Vaping Use: Never used   Substance and Sexual Activity    Alcohol use: Yes     Comment: SOCIAL    Drug use: No    Sexual activity: Defer       Family History   Problem Relation Age of Onset    Cancer Mother     Heart disease Father     Aneurysm Father     Malig Hyperthermia Neg Hx      Past medical, surgical, social and family history was reviewed, updated and amended when necessary.    Review of Systems   Constitutional: Negative for chills and fever.   HENT:  Negative for hoarse voice and sore throat.    Eyes:  Negative for double vision and photophobia.   Cardiovascular:  Negative for chest pain, leg swelling, near-syncope, orthopnea, palpitations, paroxysmal nocturnal dyspnea and syncope.   Respiratory:  Negative for cough and wheezing.    Skin:  Negative for poor wound healing and rash.   Musculoskeletal:  Negative for arthritis and joint swelling.   Gastrointestinal:  Negative for bloating, abdominal pain, hematemesis and hematochezia.   Neurological:  Negative for dizziness and focal weakness.   Psychiatric/Behavioral:  Negative for depression and suicidal ideas.             Objective:   Temp:  [97.3 °F (36.3 °C)-98.4 °F (36.9 °C)] 98.3 °F (36.8 °C)  Heart Rate:  [] 112  Resp:  [14-16] 16  BP: ()/(52-89) 116/78  Body mass index is 22.59 kg/m².  Flowsheet Rows      Flowsheet Row First Filed Value   Admission Height --   Admission Weight 71.4 kg (157 lb 6.5 oz) Documented at 2023 0521          Vitals:    23 1100   BP: 116/78   Pulse: 112   Resp: 16   Temp: 98.3 °F (36.8 °C)   SpO2: 95%       Vitals reviewed.   Constitutional:       Appearance: Not in distress. Chronically ill-appearing.   Neck:      Vascular: No JVR. JVD normal.   Pulmonary:      Effort: Pulmonary effort is normal.      Breath sounds: No wheezing. No rhonchi. No rales.   Chest:       Chest wall: Not tender to palpatation.   Cardiovascular:      PMI at left midclavicular line. Normal rate. Irregularly irregular rhythm. Normal S1. Normal S2.       Murmurs: There is no murmur.      No gallop.  No click. No rub.   Pulses:     Decreased pulses.   Edema:     Peripheral edema absent.   Abdominal:      General: Bowel sounds are normal.      Palpations: Abdomen is soft.      Tenderness: There is no abdominal tenderness.   Musculoskeletal: Normal range of motion.         General: No tenderness.      Comments: Right BKA wound is dressed which is clean dry and intact Skin:     General: Skin is warm and dry.   Neurological:      General: No focal deficit present.      Mental Status: Alert and oriented to person, place and time.                 Lab Review:      Results from last 7 days   Lab Units 12/14/23  0435   SODIUM mmol/L 135*   POTASSIUM mmol/L 4.6   CHLORIDE mmol/L 103   CO2 mmol/L 25.1   BUN mg/dL 15   CREATININE mg/dL 0.83   CALCIUM mg/dL 7.8*   GLUCOSE mg/dL 77         @LABRCNTbnp@  Results from last 7 days   Lab Units 12/14/23  0435 12/13/23  1802   WBC 10*3/mm3 7.36 10.30   HEMOGLOBIN g/dL 10.3* 11.2*   HEMATOCRIT % 32.8* 36.3*   PLATELETS 10*3/mm3 196 218         Results from last 7 days   Lab Units 12/14/23  0435   MAGNESIUM mg/dL 2.1     @LABRCNTIP(chol,trig,hdl,ldl)                          I personally viewed and interpreted the patient's EKG/Telemetry data    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  He had soft blood pressures going through the night but it seems as we get further out from anesthesia exposure that blood pressure is coming up and heart rate is better controlled.  I will not change medications at this time.  Will continue to monitor closely.  Postop hypotension -as above.  We will continue metoprolol cautiously.  Chronic diastolic heart  failure -he is euvolemic on exam today.  Sounds as though his volume status is quite labile.  We will monitor very closely to determine timing of restarting Lasix.  Pulmonary hypertension -euvolemic on exam    Johnson Arnold MD  12/14/23  13:20 EST.  Time spent in reviewing chart, discussion and examination:

## 2023-12-14 NOTE — PLAN OF CARE
Goal Outcome Evaluation:           Progress: no change  Outcome Evaluation: VSS with exception of BP. Pressures 80/50s. New orders to hold metoprolol if BP is under 110, 1 time 250cc bolus given, and a.m. CBC per Dr. Allen.  afib on heart monitor. Minimal blood in catheter line when pt repositioned in bed. No c/o pain. IV abx given per order

## 2023-12-14 NOTE — PLAN OF CARE
Goal Outcome Evaluation:            Pt is A&Ox4, no c/o pain. F/C was d/cd and pt has urinated. Wound CDI. Pt was in high 130s HR. Cardiology consulted. Pt currently in high 80's HR. B/P WNL. VSS. WCTM.

## 2023-12-14 NOTE — NURSING NOTE
"   12/14/23 1610   Wound 12/13/23 1427 coccyx Pressure Injury   Placement Date/Time: 12/13/23 1427   Present on Original Admission: Yes  Location: coccyx  Primary Wound Type: (c) Pressure Injury   Pressure Injury Stage 1   Base red  (minimal blanching, small scab noted <0.5 cm)   Drainage Amount none   Skin Interventions   Pressure Reduction Devices specialty bed utilized  (is on a low air loss mattress, setting adjusted to \"6\", KARINA, AP 10 mins)     Wound/ostomy - consult received regarding concerns to coccyx. Patient is on a specialty air mattress, setting was set at 9 which is firmest, setting adjusted to 6 per patient's weight, placed on AP and KARINA, patient reports that is much more comfortable. Patient is unsure if he has had wounds to coccyx area before.     Patient assisted to side, skin to coccyx intact, redness at coccyx/sacral region, slight blanching, also hyperpigmented skin and a small scab consistent with previous skin damage. Bony sacral structure, little adipose for padding, could benefit from a correctly placed sacral optifoam to cushion and protect using the 7x7 size. Routine and diligent turning to offload, pillow was placed and tilted towards L.   "

## 2023-12-14 NOTE — DISCHARGE PLACEMENT REQUEST
"Ellen Silva IV (83 y.o. Male)       Date of Birth   1940    Social Security Number       Address   [address] [place] [address]    Home Phone       MRN   [id number]       Presybeterian   Scientologist    Marital Status                               Admission Date   12/13/23    Admission Type   Elective    Admitting Provider   Issa Nieves MD    Attending Provider   Issa Nieves MD    Department, Room/Bed   [address], E[address]/1       Discharge Date       Discharge Disposition       Discharge Destination                                 Attending Provider: Issa Nieves MD    Allergies: No Known Allergies    Isolation: None   Infection: None   Code Status: CPR    Ht: 177.8 cm (70\")   Wt: 71.4 kg (157 lb 6.5 oz)    Admission Cmt: None   Principal Problem: 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 [address]         Subscriber Name Subscriber Birth Date Member ID       ELLEN SILVA IV 1940 [id number]               Secondary Coverage       Payor Plan Insurance Group Employer/Plan Group    [facility] SUPP [id number]       Payor Plan Address Payor Plan Phone Number Payor Plan Fax Number Effective Dates    [address]   5/1/2005 - None Entered    [facility] [address]         Subscriber Name Subscriber Birth Date Member ID       ELLEN SILVA IV 1940 [id number]                     Emergency Contacts        (Rel.) Home Phone Work Phone Mobile Phone    Belem Silva (Spouse) [phone] -- [phone]    ELLEN SILVA (Son) [phone] -- [phone]              Emergency Contact Information       " Name Relation Home Work Mobile    RicardoBelem Spouse [phone]    ELLEN WRAY Son [phone]

## 2023-12-14 NOTE — CASE MANAGEMENT/SOCIAL WORK
Discharge Planning Assessment  [facility]     Patient Name: Mehreen Silva IV  MRN: [id number]  Today's Date: 12/14/2023    Admit Date: 12/13/2023    Plan: Home with wound vac, HH, wife and caregiver   Discharge Needs Assessment       Row Name 12/14/23 1146       Living Environment    People in Home spouse    Current Living Arrangements home    Primary Care Provided by spouse/significant other;other (see comments)  private caregiver    Provides Primary Care For no one, unable/limited ability to care for self    Family Caregiver if Needed spouse    Quality of Family Relationships supportive       Resource/Environmental Concerns    Resource/Environmental Concerns none       Transition Planning    Patient/Family Anticipates Transition to home with family;home with help/services    Patient/Family Anticipated Services at Transition home health care    Transportation Anticipated health plan transportation       Discharge Needs Assessment    Equipment Currently Used at Home wheelchair;lift device;commode    Equipment Needed After Discharge none    Outpatient/Agency/Support Group Needs homecare agency    Discharge Facility/Level of Care Needs home with home health                   Discharge Plan       Row Name 12/14/23 7834       Plan    Plan Home with wound vac, HH, wife and caregiver    Patient/Family in Agreement with Plan yes    Plan Comments Spoke with pts wife over phone.  Introduced self, explained CCP role, faceseheet verified. Pt lives at home with wife and has caregiver 4 hours/day.  When caregiver is not present, wife and son assist.  Pt has adjustable bed, sydni lift, and wheelchair.  Has used [facility] in past and family would like [facility] again for nursing/wound care and PT.  Referral placed in [facility].  Will use EMS for transport home, no steps to enter home. Will need wound vac.  Referral place in Epic for [facility].  No further needs identified at this time. CCP will follow.  BENOIT Hackett RN                   Continued Care and Services - Admitted Since 12/13/2023       Durable Medical Equipment       Service Provider Request Status Selected Services Address Phone Fax Patient Preferred    [facility] Pending - Request Sent N/A [address] [phone] --              Home Medical Care       Service Provider Request Status Selected Services Address Phone Fax Patient Preferred    [facility] Pending - Request Sent N/A [address], [facility] [address] [phone] --                  Selected Continued Care - Prior Encounters Includes continued care and service providers with selected services from prior encounters from 9/14/2023 to 12/14/2023      Discharged on 11/14/2023 Admission date: 11/8/2023 - Discharge disposition: Home or Self Care      Home Medical Care       Service Provider Selected Services Address Phone Fax Patient Preferred    [facility] [facility] [address], [facility] [address] [phone] --                             Demographic Summary       Row Name 12/14/23 1146       General Information    Admission Type inpatient    Arrived From home    Referral Source admission list    Reason for Consult discharge planning    Preferred Language English       Contact Information    Permission Granted to Share Info With family/designee  wife                   Functional Status    No documentation.                  Psychosocial    No documentation.                  Abuse/Neglect    No documentation.                  Legal    No documentation.                  Substance Abuse    No documentation.                  Patient Forms    No documentation.                     Milagros Hackett RN

## 2023-12-14 NOTE — PROGRESS NOTES
Name: Mehreen Silva IV ADMIT: 2023   : 1940  PCP: Taiwo Powers MD    MRN: [id number] LOS: 1 days   AGE/SEX: 83 y.o. male  ROOM: [address]    [place], Post Op Global    No chief complaint on file.    CC: Post right above-knee amputation follow-up.  Subjective     83 y.o. male resting comfortably.  Pain controlled.  In good spirits.    Review of Systems    Objective     Scheduled Medications:   acetaminophen, 1,000 mg, Oral, Q8H  apixaban, 5 mg, Oral, Q12H  empagliflozin, 10 mg, Oral, Daily  escitalopram, 5 mg, Oral, Daily  metoprolol tartrate, 25 mg, Oral, Q12H        Active Infusions:  lactated ringers, 100 mL/hr, Last Rate: 100 mL/hr (23)        As Needed Medications:    Calcium Replacement - Follow Nurse / BPA Driven Protocol    HYDROmorphone    Magnesium Cardiology Dose Replacement - Follow Nurse / BPA Driven Protocol    nitroglycerin    ondansetron **OR** ondansetron    oxyCODONE    Phosphorus Replacement - Follow Nurse / BPA Driven Protocol    Potassium Replacement - Follow Nurse / BPA Driven Protocol    Vital Signs  Vital Signs Patient Vitals for the past 24 hrs:   BP Temp Temp src Pulse Resp SpO2 Weight   23 0521 94/60 97.3 °F (36.3 °C) Oral -- -- -- 71.4 kg (157 lb 6.5 oz)   23 2325 (!) 87/56 -- -- -- -- -- --   23 2300 (!) 85/54 -- -- -- -- -- --   23 2200 (!) 86/58 -- -- -- -- -- --   23 2131 136/62 -- -- -- -- -- --   23 2049 100/70 97.6 °F (36.4 °C) Oral -- 14 -- --   23 1800 118/63 -- -- 88 -- -- --   23 1709 (!) 84/59 -- -- 91 -- -- --   23 1700 (!) 86/52 -- -- 80 -- -- --   23 1500 104/66 97.4 °F (36.3 °C) Oral 85 14 97 % --   23 1427 103/69 98.4 °F (36.9 °C) Oral 81 14 99 % --   23 1400 105/67 97.6 °F (36.4 °C) Oral 108 16 98 % --   23 1345 113/63 -- -- 120 16 95 % --   23 1340 126/78 -- -- 94 16 99 % --   23 1335 126/89 -- -- 102 16 100 % --   23 1332  124/74 97.6 °F (36.4 °C) Oral 95 16 99 % --   12/13/23 1115 99/75 -- -- 84 23 99 % --   12/13/23 1105 123/72 -- -- 101 18 97 % --   12/13/23 1103 -- -- -- 99 -- 97 % --   12/13/23 1102 113/61 -- -- -- -- -- --   12/13/23 1101 123/72 -- -- 102 21 98 % --   12/13/23 1100 123/72 -- -- 86 -- 99 % --   12/13/23 1057 -- -- -- 82 -- 98 % --   12/13/23 1055 119/80 -- -- -- -- -- --   12/13/23 1054 -- -- -- 84 -- 96 % --   12/13/23 1051 -- -- -- 89 -- 94 % --   12/13/23 1048 -- -- -- 84 -- 97 % --   12/13/23 1047 -- -- -- -- -- 95 % --   12/13/23 1045 -- -- -- 97 -- -- --   12/13/23 1042 -- -- -- 92 -- -- --   12/13/23 1039 -- -- -- 82 -- -- --   12/13/23 1037 -- -- -- -- -- 95 % --   12/13/23 1036 -- -- -- 102 -- 96 % --   12/13/23 1006 108/90 96.5 °F (35.8 °C) Axillary 84 18 97 % --     I/O:  I/O last 3 completed shifts:  In: 997 [P.O.:240; I.V.:757]  Out: 950 [Urine:950]    Physical Exam:  Physical Exam     Results Review:     CBC    Results from last 7 days   Lab Units 12/14/23  0435 12/13/23  1802   WBC 10*3/mm3 7.36 10.30   HEMOGLOBIN g/dL 10.3* 11.2*   PLATELETS 10*3/mm3 196 218     BMP   Results from last 7 days   Lab Units 12/14/23  0435 12/13/23  1507   SODIUM mmol/L 135* 138   POTASSIUM mmol/L 4.6 5.0   CHLORIDE mmol/L 103 102   CO2 mmol/L 25.1 26.5   BUN mg/dL 15 17   CREATININE mg/dL 0.83 1.08   GLUCOSE mg/dL 77 91   MAGNESIUM mg/dL 2.1 2.4   PHOSPHORUS mg/dL 3.3 4.2       Assessment & Plan     Assessment & Plan      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 male nonsalvageable right calcaneal osteomyelitis in a nonambulatory gentleman.    12/13/2023: Right above-knee amputation with incisional wound VAC placement.    12/14/2023: Postop day #1.  Had some low blood pressures overnight responded to neocurrently off Jesse-Synephrine.  Will hold further IV fluids and DC his Whitehead catheter today.  Ultimately the wound dressing will stay in place for 7 days.   Hopefully discharge in the next 1 to 2 days.  I am having our discharge coordinators look into home health for him.      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

## 2023-12-15 LAB
ANION GAP SERPL CALCULATED.3IONS-SCNC: 6.7 MMOL/L (ref 5–15)
BUN SERPL-MCNC: 16 MG/DL (ref 8–23)
BUN/CREAT SERPL: 18.4 (ref 7–25)
CALCIUM SPEC-SCNC: 8.1 MG/DL (ref 8.6–10.5)
CHLORIDE SERPL-SCNC: 104 MMOL/L (ref 98–107)
CO2 SERPL-SCNC: 26.3 MMOL/L (ref 22–29)
CREAT SERPL-MCNC: 0.87 MG/DL (ref 0.76–1.27)
EGFRCR SERPLBLD CKD-EPI 2021: 85.6 ML/MIN/1.73
GLUCOSE SERPL-MCNC: 73 MG/DL (ref 65–99)
LAB AP CASE REPORT: NORMAL
MAGNESIUM SERPL-MCNC: 2.2 MG/DL (ref 1.6–2.4)
PATH REPORT.FINAL DX SPEC: NORMAL
PATH REPORT.GROSS SPEC: NORMAL
PHOSPHATE SERPL-MCNC: 2.8 MG/DL (ref 2.5–4.5)
POTASSIUM SERPL-SCNC: 4.4 MMOL/L (ref 3.5–5.2)
SODIUM SERPL-SCNC: 137 MMOL/L (ref 136–145)

## 2023-12-15 PROCEDURE — 84100 ASSAY OF PHOSPHORUS: CPT | Performed by: SURGERY

## 2023-12-15 PROCEDURE — 80048 BASIC METABOLIC PNL TOTAL CA: CPT | Performed by: SURGERY

## 2023-12-15 PROCEDURE — 99232 SBSQ HOSP IP/OBS MODERATE 35: CPT | Performed by: NURSE PRACTITIONER

## 2023-12-15 PROCEDURE — 83735 ASSAY OF MAGNESIUM: CPT | Performed by: SURGERY

## 2023-12-15 RX ORDER — METOPROLOL TARTRATE 50 MG/1
50 TABLET, FILM COATED ORAL EVERY 12 HOURS
Status: DISCONTINUED | OUTPATIENT
Start: 2023-12-15 | End: 2023-12-16 | Stop reason: HOSPADM

## 2023-12-15 RX ADMIN — APIXABAN 5 MG: 5 TABLET, FILM COATED ORAL at 08:57

## 2023-12-15 RX ADMIN — METOPROLOL TARTRATE 50 MG: 50 TABLET, FILM COATED ORAL at 20:31

## 2023-12-15 RX ADMIN — METOPROLOL TARTRATE 5 MG: 1 INJECTION, SOLUTION INTRAVENOUS at 16:56

## 2023-12-15 RX ADMIN — METOPROLOL TARTRATE 25 MG: 25 TABLET, FILM COATED ORAL at 16:57

## 2023-12-15 RX ADMIN — METOPROLOL TARTRATE 25 MG: 25 TABLET, FILM COATED ORAL at 08:57

## 2023-12-15 RX ADMIN — EMPAGLIFLOZIN 10 MG: 10 TABLET, FILM COATED ORAL at 08:57

## 2023-12-15 RX ADMIN — ESCITALOPRAM 5 MG: 5 TABLET, FILM COATED ORAL at 08:57

## 2023-12-15 RX ADMIN — APIXABAN 5 MG: 5 TABLET, FILM COATED ORAL at 20:32

## 2023-12-15 NOTE — PROGRESS NOTES
Patient Name: Mehreen Silva IV  :1940  83 y.o.      Patient Care Team:  Taiwo Powers MD as PCP - General (Internal Medicine)  Self, Bess YO MD as Consulting Physician (Endocrinology)  Huang Chan MD as Cardiologist (Cardiology)    Chief Complaint: follow up atrial fibrillation    Interval History: marginal blood pressures but tolerating 25 mg metoprolol. HR up. 110-130.        Objective   Vital Signs  Temp:  [97.4 °F (36.3 °C)-98 °F (36.7 °C)] 98 °F (36.7 °C)  Heart Rate:  [] 89  Resp:  [18-20] 18  BP: (102-119)/(63-86) 119/83    Intake/Output Summary (Last 24 hours) at 12/15/2023 1628  Last data filed at 2023 1700  Gross per 24 hour   Intake --   Output 100 ml   Net -100 ml     Flowsheet Rows      Flowsheet Row First Filed Value   Admission Height --   Admission Weight 71.4 kg (157 lb 6.5 oz) Documented at 2023 0521            Physical Exam:   General Appearance:    Alert, cooperative, in no acute distress   Lungs:     Clear to auscultation.  Normal respiratory effort and rate.      Heart:    Regular rhythm and normal rate, normal S1 and S2, no murmurs, gallops or rubs.     Chest Wall:    No abnormalities observed   Abdomen:     Soft, nontender, positive bowel sounds.     Extremities:   no cyanosis, clubbing or edema.  No marked joint deformities.  Adequate musculoskeletal strength.       Results Review:    Results from last 7 days   Lab Units 12/15/23  0449   SODIUM mmol/L 137   POTASSIUM mmol/L 4.4   CHLORIDE mmol/L 104   CO2 mmol/L 26.3   BUN mg/dL 16   CREATININE mg/dL 0.87   GLUCOSE mg/dL 73   CALCIUM mg/dL 8.1*         Results from last 7 days   Lab Units 23  0435   WBC 10*3/mm3 7.36   HEMOGLOBIN g/dL 10.3*   HEMATOCRIT % 32.8*   PLATELETS 10*3/mm3 196         Results from last 7 days   Lab Units 12/15/23  0449   MAGNESIUM mg/dL 2.2                   Medication Review:   acetaminophen, 1,000 mg, Oral, Q8H  apixaban, 5 mg, Oral, Q12H  empagliflozin, 10 mg, Oral,  Daily  escitalopram, 5 mg, Oral, Daily  metoprolol tartrate, 5 mg, Intravenous, Once  metoprolol tartrate, 25 mg, Oral, Once  metoprolol tartrate, 50 mg, Oral, Q12H              Assessment & Plan   PAD - POD #2 right BKA.   Post op hypotension, improving. Tolerating beta blocker at current dose  Persistent atrial fibrillation - HR fast. Will try increasing beta blocker. He is asymptomatic. On apixaban.  Chronic diastolic heart failure , euvolemic . Hold off on restarting diuretic today. Reassess tomorrow.   Pulmonary hypertension     CHARLEE Chappell  [facility]  12/15/23  16:28 EST

## 2023-12-15 NOTE — CASE MANAGEMENT/SOCIAL WORK
Continued Stay Note  [facility]     Patient Name: Mehreen Silva IV  MRN: [id number]  Today's Date: 12/15/2023    Admit Date: 12/13/2023    Plan: Home with [facility] and caregiver   Discharge Plan       Row Name 12/15/23 1542       Plan    Plan Home with [facility] and caregiver    Patient/Family in Agreement with Plan yes    Plan Comments Pt plans to return home at discharge with HH and caregivers.  [facility] following.  Ambulance arranged to transport pt at 1pm tomorrow.  Pt has Prevena wound vac which will need to be removed Wednesday 12/20  BENOIT Hackett RN                   Discharge Codes    No documentation.                       Milagros Hackett, RN

## 2023-12-15 NOTE — PROGRESS NOTES
Name: Mehreen Silva IV ADMIT: 2023   : 1940  PCP: Taiwo Powers MD    MRN: [id number] LOS: 2 days   AGE/SEX: 83 y.o. male  ROOM: [address]    Billin, Post Op Global    No chief complaint on file.    CC: Post right above-knee amputation follow-up.  Subjective     83 y.o. male resting comfortably.  Pain controlled.  In good spirits.    Review of Systems    Objective     Scheduled Medications:   acetaminophen, 1,000 mg, Oral, Q8H  apixaban, 5 mg, Oral, Q12H  empagliflozin, 10 mg, Oral, Daily  escitalopram, 5 mg, Oral, Daily  metoprolol tartrate, 25 mg, Oral, Q12H        Active Infusions:         As Needed Medications:    Calcium Replacement - Follow Nurse / BPA Driven Protocol    HYDROmorphone    Magnesium Cardiology Dose Replacement - Follow Nurse / BPA Driven Protocol    nitroglycerin    ondansetron **OR** ondansetron    oxyCODONE    Phosphorus Replacement - Follow Nurse / BPA Driven Protocol    Potassium Replacement - Follow Nurse / BPA Driven Protocol    Vital Signs  Vital Signs Patient Vitals for the past 24 hrs:   BP Temp Temp src Pulse Resp SpO2   12/15/23 0733 117/70 97.4 °F (36.3 °C) Oral 96 19 97 %   23 2314 102/65 98 °F (36.7 °C) Oral 79 20 94 %   23 119/86 97.7 °F (36.5 °C) Oral 112 18 98 %   23 1700 107/63 -- -- 94 -- 92 %   23 1500 113/70 98 °F (36.7 °C) Oral 91 16 93 %   23 1300 116/75 -- -- 105 -- 92 %   23 1100 116/78 98.3 °F (36.8 °C) Oral 112 16 95 %   23 0900 98/73 -- -- 113 -- 92 %     I/O:  I/O last 3 completed shifts:  In: 720 [P.O.:720]  Out: 100 [Urine:100]    Physical Exam:  Physical Exam     Results Review:     CBC    Results from last 7 days   Lab Units 23  0435 23  1802   WBC 10*3/mm3 7.36 10.30   HEMOGLOBIN g/dL 10.3* 11.2*   PLATELETS 10*3/mm3 196 218     BMP   Results from last 7 days   Lab Units 12/15/23  0449 23  0435 23  1507   SODIUM mmol/L 137 135* 138   POTASSIUM  mmol/L 4.4 4.6 5.0   CHLORIDE mmol/L 104 103 102   CO2 mmol/L 26.3 25.1 26.5   BUN mg/dL 16 15 17   CREATININE mg/dL 0.87 0.83 1.08   GLUCOSE mg/dL 73 77 91   MAGNESIUM mg/dL 2.2 2.1 2.4   PHOSPHORUS mg/dL 2.8 3.3 4.2       Assessment & Plan     Assessment & Plan      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 male nonsalvageable right calcaneal osteomyelitis in a nonambulatory gentleman.    12/13/2023: Right above-knee amputation with incisional wound VAC placement.    12/14/2023: Postop day #1.  Had some low blood pressures overnight responded to neocurrently off Jesse-Synephrine.  Will hold further IV fluids and DC his Whitehead catheter today.  Ultimately the wound dressing will stay in place for 7 days.  Hopefully discharge in the next 1 to 2 days.  I am having our discharge coordinators look into home health for him.    12/15/2023: Postop day 2.  Much of his blood pressure and heart rate issues that we experience the last 36 hours seem to be resolved.  I do appreciate cardiology's assistance.  Will plan on leaving the Prevena incisional wound VAC in place with plans to takedown Wednesday of next week.  Home today versus tomorrow once cardiology okay with discharge, family and home health all arranged.    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

## 2023-12-15 NOTE — PLAN OF CARE
Goal Outcome Evaluation:           Progress: no change  Outcome Evaluation: VSS. 2L NC while asleep. A/Ox3-4. Sometimes disoriented to situation. BP remains stable this shift. No c/o pain. Incontinent to urine overnight.

## 2023-12-15 NOTE — PLAN OF CARE
Goal Outcome Evaluation:  Plan of Care Reviewed With: patient        Progress: no change  Outcome Evaluation: afib, room air, A/Ox4, forgetful at times. R AKA completed on 12/13, prevena wound vac on RLE. HR elevated to 120-140s during shift, one time dose PO and IV Metroprolol ordered per Cardiology. PO Metroprolol changed to 50mg per Cardiology. Plans to d/c home with home health tomorrow, EMS transportation arranged for 1300.

## 2023-12-15 NOTE — CASE MANAGEMENT/SOCIAL WORK
"Physicians Statement of Medical Necessity for  Ambulance Transportation    GENERAL INFORMATION     Name: Mehreen Silva IV  YOB: 1940  Medicare #: [id number]  Transport Date: 12/16/2023 (Valid for round trips this date, or for scheduled repetitive trips for 60 days from the date signed below.)  Origin: [facility]  Destination: [address] [place] [address]  Is the Patient's stay covered under Medicare Part A (PPS/DRG?)Yes  Closest appropriate facility? Yes  If this a hosp-hosp transfer? No  Is this a hospice patient? No    MEDICAL NECESSITY QUESTIONAIRE    Ambulance Transportation is medically necessary only if other means of transportation are contraindicated or would be potentially harmful to the patient.  To meet this requirement, the patient must be either \"bed confined\" or suffer from a condition such that transport by means other than an ambulance is contraindicated by the patient's condition.  The following questions must be answered by the healthcare professional signing below for this form to be valid:     1) Describe the MEDICAL CONDITION (physical and/or mental) of this patient AT THE TIME OF AMBULANCE TRANSPORT that requires the patient to be transported in an ambulance, and why transport by other means is contraindicated by the patient's condition:   Past Medical History:   Diagnosis Date    (HFpEF) heart failure with preserved ejection fraction 09/24/2022    Arthritis     Atrial fibrillation     Atrial fibrillation, persistent 09/24/2022    Cervical spondylosis with myelopathy     CHF (congestive heart failure)     Disease of thyroid gland     HLD (hyperlipidemia) 09/23/2022    Hx of toxic multinodular goiter 09/23/2022    Lumbar radiculopathy     Pulmonary hypertension     Ulcer with gangrene     right heel      Past Surgical History:   Procedure Laterality Date    ABOVE KNEE AMPUTATION Right 12/13/2023    Procedure: ABOVE KNEE AMPUTATION RIGHT, proveena wound " "vac placement;  Surgeon: Issa Nieves MD;  Location: [facility] MAIN OR;  Service: Vascular;  Laterality: Right;    APPENDECTOMY      BACK SURGERY      COLONOSCOPY      EXCISION MASS TRUNK N/A 6/8/2016    Procedure: Excision soft tissue neoplasm on abdominal wall and left neck;  Surgeon: Shaji Barahona Jr., MD;  Location: [facility] MAIN OR;  Service:     KNEE SURGERY      NECK SURGERY  1974    SHOULDER SURGERY        2) Is this patient \"bed confined\" as defined below?Yes   To be \"bed confined\" the patient must satisfy all three of the following criteria:  (1) unable to get up from bed without assistance; AND (2) unable to ambulate;  AND (3) unable to sit in a chair or wheelchair.  3) Can this patient safely be transported by car or wheelchair van (I.e., may safely sit during transport, without an attendant or monitoring?)No   4. In addition to completing questions 1-3 above, please check any of the following conditions that apply*:          *Note: supporting documentation for any boxes checked must be maintained in the patient's medical records Unable to tolerate seated position for time needed to transport      SIGNATURE OF PHYSICIAN OR OTHER AUTHORIZED HEALTHCARE PROFESSIONAL    I certify that the above information is true and correct based on my evaluation of this patient, and represent that the patient requires transport by ambulance and that other forms of transport are contraindicated.  I understand that this information will be used by the Centers for Medicare and Medicaid Services (CMS) to support the determiniation of medical necessity for ambulance services, and I represent that I have personal knowledge of the patient's condition at the time of transport.       If this box is checked, I also certify that the patient is physically or mentally incapable of signing the ambulance service's claim form and that the institution with which I am affiliated has furnished care, services or assistance to the patient.  " My signature below is made on behalf of the patient pursuant to 42 .36(b)(4). In accordance with 42 .37, the specific reason(s) that the patient is physically or mentally incapable of signing the claim for is as follows:   Signature of Physician or Healthcare Professional  Date/Time:        (For Scheduled repetitive transport, this form is not valid for transports performed more than 60 days after this date).                                                                                                                                            --------------------------------------------------------------------------------------------  Printed Name and Credentials of Physician or Authorized Healthcare Professional     *Form must be signed by patient's attending physician for scheduled, repetitive transports,.  For non-repetitive ambulance transports, if unable to obtain the signature of the attending physician, any of the following may sign (please select below):     Physician  Clinical Nurse Specialist  Registered Nurse     Physician Assistant  Discharge Planner  Licensed Practical Nurse     Nurse Practitioner

## 2023-12-16 ENCOUNTER — READMISSION MANAGEMENT (OUTPATIENT)
Dept: CALL CENTER | Facility: HOSPITAL | Age: 83
End: 2023-12-16
Payer: MEDICARE

## 2023-12-16 VITALS
BODY MASS INDEX: 22.54 KG/M2 | TEMPERATURE: 97.4 F | HEIGHT: 70 IN | DIASTOLIC BLOOD PRESSURE: 77 MMHG | HEART RATE: 88 BPM | WEIGHT: 157.41 LBS | RESPIRATION RATE: 18 BRPM | SYSTOLIC BLOOD PRESSURE: 108 MMHG | OXYGEN SATURATION: 99 %

## 2023-12-16 PROCEDURE — 99232 SBSQ HOSP IP/OBS MODERATE 35: CPT | Performed by: NURSE PRACTITIONER

## 2023-12-16 RX ORDER — OXYCODONE HYDROCHLORIDE 5 MG/1
5 TABLET ORAL EVERY 6 HOURS PRN
Qty: 25 TABLET | Refills: 0 | Status: SHIPPED | OUTPATIENT
Start: 2023-12-16 | End: 2023-12-23

## 2023-12-16 RX ADMIN — APIXABAN 5 MG: 5 TABLET, FILM COATED ORAL at 08:30

## 2023-12-16 RX ADMIN — METOPROLOL TARTRATE 50 MG: 50 TABLET, FILM COATED ORAL at 08:30

## 2023-12-16 RX ADMIN — ESCITALOPRAM 5 MG: 5 TABLET, FILM COATED ORAL at 08:30

## 2023-12-16 RX ADMIN — EMPAGLIFLOZIN 10 MG: 10 TABLET, FILM COATED ORAL at 08:30

## 2023-12-16 NOTE — OUTREACH NOTE
Prep Survey      Flowsheet Row Responses   [facility] facility patient discharged from? [facility]   Is LACE score < 7 ? No   Eligibility Readm Mgmt   Discharge diagnosis Above knee amputation   Does the patient have one of the following disease processes/diagnoses(primary or secondary)? General Surgery   Does the patient have Home health ordered? Yes   What is the Home health agency?  [facility]    Is there a DME ordered? Yes   What DME was ordered? [employer] for wound vac   Prep survey completed? Yes            [facility] - Registered Nurse

## 2023-12-16 NOTE — PLAN OF CARE
Goal Outcome Evaluation:  Plan of Care Reviewed With: patient        Progress: no change  Outcome Evaluation: VSS, no complaints. Prevena to RLE. Plan to DC home.

## 2023-12-16 NOTE — PROGRESS NOTES
"Name: Mehreen Silva IV ADMIT: 2023   : 1940  PCP: Taiwo Powers MD    MRN: [id number] LOS: 3 days   AGE/SEX: 83 y.o. male  ROOM: [address]    Billin, Post Op Global    No chief complaint on file.    CC: Post right above-knee amputation follow-up.  Subjective     83 y.o. male resting comfortably.  Denies having any pain. Desiring to go home today.     Review of Systems    Objective     Scheduled Medications:   acetaminophen, 1,000 mg, Oral, Q8H  apixaban, 5 mg, Oral, Q12H  empagliflozin, 10 mg, Oral, Daily  escitalopram, 5 mg, Oral, Daily  metoprolol tartrate, 50 mg, Oral, Q12H        Active Infusions:         As Needed Medications:    Calcium Replacement - Follow Nurse / BPA Driven Protocol    HYDROmorphone    Magnesium Cardiology Dose Replacement - Follow Nurse / BPA Driven Protocol    nitroglycerin    ondansetron **OR** ondansetron    oxyCODONE    Phosphorus Replacement - Follow Nurse / BPA Driven Protocol    Potassium Replacement - Follow Nurse / BPA Driven Protocol    Vital Signs  Vital Signs Patient Vitals for the past 24 hrs:   BP Temp Temp src Pulse Resp SpO2 Height   23 0726 116/64 97.6 °F (36.4 °C) Oral 96 18 98 % --   12/15/23 2253 108/68 97.3 °F (36.3 °C) Oral 80 18 96 % --   12/15/23 2200 -- -- -- -- -- -- 177.8 cm (70\")   12/15/23 2032 105/67 97.6 °F (36.4 °C) Oral 79 18 93 % --   12/15/23 1515 121/84 97.6 °F (36.4 °C) Oral 104 18 95 % --   12/15/23 1100 119/83 98 °F (36.7 °C) Oral 89 18 -- --     I/O:  I/O last 3 completed shifts:  In: 480 [P.O.:480]  Out: -     Physical Exam:  Physical Exam   R AKA stump with incisional vac in place.     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 male nonsalvageable right calcaneal osteomyelitis in a nonambulatory gentleman.    12/13/2023: Right above-knee amputation with incisional wound VAC placement.    12/14/2023: Postop day #1.  Had some low blood pressures overnight responded to neocurrently off Jesse-Synephrine.  Will hold further IV fluids and DC his Whitehead catheter today.  Ultimately the wound dressing will stay in place for 7 days.  Hopefully discharge in the next 1 to 2 days.  I am having our discharge coordinators look into home health for him.    12/15/2023: Postop day 2.  Much of his blood pressure and heart rate issues that we experience the last 36 hours seem to be resolved.  I do appreciate cardiology's assistance.  Will plan on leaving the Prevena incisional wound VAC in place with plans to takedown Wednesday of next week.  Home today versus tomorrow once cardiology okay with discharge, family and home health all arranged.    12/16/2023: POD#3: Still doing well this morning. He is ready for discharge home when cleared with cardiology. Will continue Prevena until next Wednesda but can come to see us in clinic on Tuesday to have it removed if patient/family not comfortable with that. Pain not an issue.     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

## 2023-12-16 NOTE — DISCHARGE INSTRUCTIONS
[facility]  Jun Simmons Lipski, Ezequiel Allen,Karl, Ayse  [address], [address]  [phone]    Lower Extremity Amputation    Please refer to the following instructions for your post-procedure care.  Your surgeon and/or nurse will discuss any changes with you.    Activity  Avoid lifting more than five to 10 pounds or strenuous activity.       Bathing/Showering  You may shower/sponge bathe after you go home.     Incision Care  Remove the incisional vacuum dressing on WEDNESDAY if you feel comfortable doing it yourself. If you want Dr. Nieves to remove it, you will need to come to the clinic on TUESDAY of this week.     Diet  Resume your normal diet. There are no special food restrictions following this procedure. A low fat/low cholesterol diet is recommended for all patients with  vascular disease.    Medications  Resume taking all of your medications unless your doctor or nurse practitioner tells you not to. If your incision is causing pain, you may take over-the-counter pain relievers such as acetaminophen (Tylenol®).    If you were prescribed a stronger pain medication, please be aware these medications can cause nausea and constipation. Prevent nausea by taking the medication  with a snack or meal. Avoid constipation by drinking plenty of fluids and eating foods with a high amount of fiber, such as fruits, vegetables, and grains. Do not take  Tylenol® if you are taking prescription pain medications.      Please call us immediately for any of the following conditions   Severe or worsening pain in your legs or feet while at rest or while walking   Increased pain, redness, warmth, or drainage (pus) from your incision site(s)   Fever of 101 degrees or higher      Reduce your risk of vascular disease  Stop smoking if you smoke!  Manage your cholesterol  Maintain a desired weight  Control your diabetes  Keep your blood pressure down

## 2023-12-16 NOTE — DISCHARGE SUMMARY
Name: Mehreen Silva IV ADMIT: 2023   : 1940  PCP: Taiwo Powers MD    MRN: [id number] LOS: 3 days   AGE/SEX: 83 y.o. 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       SECONDARY DIAGNOSES  Past Medical History:   Diagnosis Date    (HFpEF) heart failure with preserved ejection fraction 2022    Arthritis     Atrial fibrillation     Atrial fibrillation, persistent 2022    Cervical spondylosis with myelopathy     CHF (congestive heart failure)     Disease of thyroid gland     HLD (hyperlipidemia) 2022    Hx of toxic multinodular goiter 2022    Lumbar radiculopathy     Pulmonary hypertension     Ulcer with gangrene     right heel       CONSULTS   Consults       Date and Time Order Name Status Description    2023  9:26 AM Inpatient Cardiology Consult Completed     2023  2:23 PM Inpatient Neurology Consult General Completed             PROCEDURES PERFORMED    Date: -    HOSPITAL COURSE  Patient is a 83 y.o. male presented to [facility] admitted following a right above knee amputation.  Please see the admitting history and physical for further details.  Following surgery the patient did well.  Pain controlled with oral pain medications.  He was restarted on his Eliquis.  Patient was previously immobile prior to this but home health has been arranged to assist with transition back home.  Patient will be discharging home to follow-up with Dr. Nieves 4 weeks after surgery.  Patient has an incisional VAC that can be removed Wednesday by the patient/his family and discarded.  If patient/family prefer Dr. Nieves will  "remove it in clinic on this Tuesday.        VITAL SIGNS  /77 (BP Location: Right arm, Patient Position: Lying)   Pulse 88   Temp 97.4 °F (36.3 °C) (Oral)   Resp 18   Ht 177.8 cm (70\")   Wt 71.4 kg (157 lb 6.5 oz)   SpO2 99%   BMI 22.59 kg/m²   Objective:  Vital signs: (most recent): Blood pressure 108/77, pulse 88, temperature 97.4 °F (36.3 °C), temperature source Oral, resp. rate 18, height 177.8 cm (70\"), weight 71.4 kg (157 lb 6.5 oz), SpO2 99%.              CONDITION ON DISCHARGE  Stable.      DISCHARGE DISPOSITION   Home or Self Care      DISCHARGE MEDICATIONS     Discharge Medications        New Medications        Instructions Start Date   oxyCODONE 5 MG immediate release tablet  Commonly known as: ROXICODONE   5 mg, Oral, Every 6 Hours PRN             Changes to Medications        Instructions Start Date   apixaban 5 MG tablet tablet  Commonly known as: Eliquis  What changed: additional instructions   5 mg, Oral, Every 12 Hours Scheduled             Continue These Medications        Instructions Start Date   cholecalciferol 25 MCG (1000 UT) tablet  Commonly known as: VITAMIN D3   1,000 Units, Oral, Daily      empagliflozin 10 MG tablet tablet  Commonly known as: JARDIANCE   10 mg, Oral, Daily      escitalopram 5 MG tablet  Commonly known as: LEXAPRO   5 mg, Oral, Daily, Hasn't started at this time      ezetimibe 10 MG tablet  Commonly known as: ZETIA   10 mg, Oral, Daily      methIMAzole 5 MG tablet  Commonly known as: TAPAZOLE   5 mg, Oral, Takes 5 days - No Wednesday or sundays      metoprolol tartrate 25 MG tablet  Commonly known as: LOPRESSOR   TAKE ONE TABLET BY MOUTH EVERY 12 HOURS      multivitamin with minerals tablet tablet   1 tablet, Oral, Daily             Stop These Medications      furosemide 20 MG tablet  Commonly known as: LASIX               Future Appointments   Date Time Provider Department Center   1/30/2024 10:40 AM Jun Meyer II, MD [facility]   2/21/2024 10:15 AM " Huang Chan MD [facility]      Follow-up Information       Taiwo Powers MD .    Specialty: Internal Medicine  Contact information:  [address] [id number] [phone]               Issa Nieves MD Follow up in 3 week(s).    Specialty: Vascular Surgery  Why: Call to confirm appointment.  Contact information:  [address] [id number] [phone]                               TEST  RESULTS PENDING AT DISCHARGE      Billin, Post Op Global    Luis Eduardo Roberts II, MD  Office Number [phone]    23  12:35 EST

## 2023-12-16 NOTE — PROGRESS NOTES
Hospital Follow Up    LOS:  LOS: 3 days   Patient Name: Mehreen Silva IV  Age/Sex: 83 y.o. male  : 1940  MRN: [id number]    Day of Service: 23   Length of Stay: 3  Encounter Provider: CHARLEE Fischer  Place of Service: [facility] CARDIOLOGY  Patient Care Team:  Taiwo Powers MD as PCP - General (Internal Medicine)  Self, Bess YO MD as Consulting Physician (Endocrinology)  Huang Chan MD as Cardiologist (Cardiology)    Subjective:     Chief Complaint: afib, diastolic chf, hypotension    Interval History: No complaints awaiting to go home.     Objective:     Objective:  Temp:  [97.3 °F (36.3 °C)-97.6 °F (36.4 °C)] 97.4 °F (36.3 °C)  Heart Rate:  [] 88  Resp:  [18] 18  BP: (105-121)/(64-84) 108/77     Intake/Output Summary (Last 24 hours) at 2023 1230  Last data filed at 12/15/2023 2100  Gross per 24 hour   Intake 480 ml   Output --   Net 480 ml     Body mass index is 22.59 kg/m².      23  0521   Weight: 71.4 kg (157 lb 6.5 oz)     Weight change:     Physical Exam:   General Appearance:    Awake alert and oriented in no acute distress.   Color:  Skin:  Neuro:  HEENT:    Lungs:     Pink  Warm and dry  No focal, motor or sensory deficits  Neck supple, pupils equal, round and reactive. No JVD, No Bruit  Clear to auscultation,respirations regular, even and                  unlabored    Heart:    Regular rate and rhythm, S1 and S2, no murmur, no gallop, no rub. No edema, DP/PT pulses are 2+   Chest Wall:    No abnormalities observed   Abdomen:     Normal bowel sounds, no masses, no organomegaly, soft        non-tender, non-distended, no guarding, no ascites noted   Extremities:   R BKA, LLE stable       Lab Review:   Results from last 7 days   Lab Units 12/15/23  0449 23  0435   SODIUM mmol/L 137 135*   POTASSIUM mmol/L 4.4 4.6   CHLORIDE mmol/L 104 103   CO2 mmol/L 26.3 25.1   BUN mg/dL 16 15   CREATININE mg/dL 0.87 0.83   GLUCOSE  "mg/dL 73 77   CALCIUM mg/dL 8.1* 7.8*         Results from last 7 days   Lab Units 12/14/23  0435 12/13/23  1802   WBC 10*3/mm3 7.36 10.30   HEMOGLOBIN g/dL 10.3* 11.2*   HEMATOCRIT % 32.8* 36.3*   PLATELETS 10*3/mm3 196 218         Results from last 7 days   Lab Units 12/15/23  0449 12/14/23  0435   MAGNESIUM mg/dL 2.2 2.1           Invalid input(s): \"LDLCALC\"          I reviewed the patient's new clinical results.  I personally viewed and interpreted the patient's EKG  Current Medications:   Scheduled Meds:acetaminophen, 1,000 mg, Oral, Q8H  apixaban, 5 mg, Oral, Q12H  empagliflozin, 10 mg, Oral, Daily  escitalopram, 5 mg, Oral, Daily  metoprolol tartrate, 50 mg, Oral, Q12H      Continuous Infusions:     Allergies:  No Known Allergies    Assessment:     PAD-S/P right BKA  Post op hypotension- resolved. Continue to hold lasix on dc . Tolerating BB  Persistent afib- HR improved and stable. AC with Eliquis 5mg BID  Chronic diastolic CHF- Continue to hold lasix and reassess as outpatient  Pulmonary hypertension      Plan:           CHARLEE Fischer  12/16/23  12:30 EST  Electronically signed by CHARLEE Fischer, 12/16/23, 12:30 PM EST.     "

## 2023-12-16 NOTE — PLAN OF CARE
Goal Outcome Evaluation:      VSS. Controlled afib. A/o x4. R stump with prevena wound vac, dressing cdi. Q2 turns. Plan to d/c home today.      Progress: no change

## 2023-12-17 NOTE — CASE MANAGEMENT/SOCIAL WORK
Case Management Discharge Note      Final Note: home with [facility] via [facility]         Selected Continued Care - Discharged on 12/16/2023 Admission date: 12/13/2023 - Discharge disposition: Home or Self Care      Destination    No services have been selected for the patient.                Durable Medical Equipment    No services have been selected for the patient.                Dialysis/Infusion    No services have been selected for the patient.                Home Medical Care Coordination complete.      Service Provider Selected Services Address Phone Fax Patient Preferred    [facility] [address], [address], [place] [address] [phone] --              Therapy    No services have been selected for the patient.                Community Resources    No services have been selected for the patient.                Community & DME    No services have been selected for the patient.                    Selected Continued Care - Prior Encounters Includes continued care and service providers with selected services from prior encounters from 9/14/2023 to 12/16/2023      Discharged on 11/14/2023 Admission date: 11/8/2023 - Discharge disposition: Home or Self Care      Home Medical Care       Service Provider Selected Services Address Phone Fax Patient Preferred    [facility] [address], [address], [place] [address] [phone] --                          Transportation Services  Ambulance: [facility]    Final Discharge Disposition Code: 06 - home with home health care

## 2023-12-18 RX ORDER — APIXABAN 5 MG/1
5 TABLET, FILM COATED ORAL EVERY 12 HOURS
Qty: 180 TABLET | Refills: 3 | Status: SHIPPED | OUTPATIENT
Start: 2023-12-18

## 2023-12-19 ENCOUNTER — READMISSION MANAGEMENT (OUTPATIENT)
Dept: CALL CENTER | Facility: HOSPITAL | Age: 83
End: 2023-12-19
Payer: MEDICARE

## 2023-12-19 NOTE — OUTREACH NOTE
General Surgery Week 1 Survey      Flowsheet Row Responses   [facility] patient discharged from? [place]   Does the patient have one of the following disease processes/diagnoses(primary or secondary)? General Surgery   Week 1 attempt successful? No   Unsuccessful attempts Attempt 1            Cherelle De La Torre Licensed Nurse

## 2023-12-21 ENCOUNTER — READMISSION MANAGEMENT (OUTPATIENT)
Dept: CALL CENTER | Facility: HOSPITAL | Age: 83
End: 2023-12-21
Payer: MEDICARE

## 2023-12-21 NOTE — OUTREACH NOTE
General Surgery Week 1 Survey      Flowsheet Row Responses   [facility] patient discharged from? [place]   Does the patient have one of the following disease processes/diagnoses(primary or secondary)? General Surgery   Week 1 attempt successful? No   Unsuccessful attempts Attempt 2            Paramjit SANTIZO - Registered Nurse

## 2024-01-05 ENCOUNTER — TELEPHONE (OUTPATIENT)
Dept: CARDIOLOGY | Facility: CLINIC | Age: 84
End: 2024-01-05

## 2024-01-05 ENCOUNTER — READMISSION MANAGEMENT (OUTPATIENT)
Dept: CALL CENTER | Facility: HOSPITAL | Age: 84
End: 2024-01-05
Payer: MEDICARE

## 2024-01-05 NOTE — OUTREACH NOTE
General Surgery Week 3 Survey      Flowsheet Row Responses   [facility] patient discharged from? [place]   Does the patient have one of the following disease processes/diagnoses(primary or secondary)? General Surgery   Week 3 attempt successful? No   Unsuccessful attempts Attempt 1            KARIME SCHOFIELD - Registered Nurse

## 2024-01-05 NOTE — TELEPHONE ENCOUNTER
PATIENT CALLED BACK TO LET US KNOW THAT SHE COULD KEEP THIS APPT, AND WE DON'T HAVE TO CHANGE ANYTHING

## 2024-01-05 NOTE — TELEPHONE ENCOUNTER
Caller: Belem Silva    Relationship to patient: Emergency Contact    Best call back number: [phone]    Chief complaint: NEEDING FOLLOW UP    Type of visit: HOSPITAL FOLLOW UP    Requested date: AROUND 2.21.23     If rescheduling, when is the original appointment: 2.21.23     Additional notes:PATIENT IS NEEDING TO RESCHEDULE APPOINTMENT. WIFE HAS AN APPOINTMENT AT THAT SAME TIME THAT SHE CAM NOT MISS AND NEEDS TO GET HIM RESCHEDULED. PLEASE CALL HER BACK TO WORK ON GETTING THAT RESCHEDULED, THANK YOU!    PATIENTS WIFE WOULD LIKE TO REMAIN WITH DR LAO FOR THIS APPOINTMENT DUE TO RECENT RIGHT LEG AMPUTATION  DINE BY VASCULAR SURGEON DR WEBB DID ON 12.13.23 BECAUSE OF INFECTION IN LEG, THANK YOU

## 2024-01-12 ENCOUNTER — TELEPHONE (OUTPATIENT)
Dept: CARDIOLOGY | Facility: CLINIC | Age: 84
End: 2024-01-12
Payer: MEDICARE

## 2024-01-12 ENCOUNTER — DOCUMENTATION (OUTPATIENT)
Dept: CARDIOLOGY | Facility: CLINIC | Age: 84
End: 2024-01-12
Payer: MEDICARE

## 2024-01-12 NOTE — PROGRESS NOTES
Patient's wife called answering service with questions about Jardiance.  He has been hospitalized twice in the last few months and during November hospitalization he was started on Jardiance 10 mg daily.  I explained rationale for Jardiance and let her know that he is to continue this medication I also encouraged her to call the office on Monday to make a hospital follow-up appointment with either Dr. Chan or CHARLEE Wang.

## 2024-01-12 NOTE — TELEPHONE ENCOUNTER
Pts wife called stating that pt was d/c from hospital back in December. He was started on Jardiance and would like to know if he needs to continue taking Jardiance and why he is taking Jardiance    In reviewing his records I would say that he is on Jardiance for heart failure

## 2024-01-15 NOTE — TELEPHONE ENCOUNTER
Spoke with pts wife  She did speak with Doretha DESHPANDE over the weekend in regards to this.    I offered her an earlier appt than 2/21/24 with Clarita but she would like to keep the appt with Dr. Chan on 2/21/24     I did advise that if he starts to have any concerns prior to that appt to give us a call

## 2024-01-22 NOTE — TELEPHONE ENCOUNTER
NO protocol    NOV-02/21/24-JK  LOV-11/20/23-KARINA    1. Acute on chronic heart failure with preserved ejection fraction (Primary)  Assessment & Plan:  Patient with recent hospitalization for exacerbation of HFpEF.  Patient did have thoracentesis with 1.7 L of fluid removed  Patient continues to be unable to tolerate diuretics on a daily basis and is only taking furosemide 20 mg 3 times weekly.  Informed patient that if he experiences increased shortness of breath or signs of weight gain and edema that he needs to increase to daily dosing.  He verbalized understanding  Recommended patient adding SGLT2 during hospitalization however patient has not picked this up from the pharmacy.  Encouraged him to start taking this medication  Patient appears euvolemic on exam today.        2. Atrial fibrillation, persistent  Assessment & Plan:  Heart rate currently controlled  Continue metoprolol tartrate 25 mg twice daily  Patient anticoagulated with apixaban and denies bleeding complications     CHADS-VASc Risk Assessment                 3 Total Score     1 CHF     2 Age >/= 75           Criteria that do not apply:     Hypertension     DM     PRIOR STROKE/TIA/THROMBO     Vascular Disease     Age 65-74     Sex: Female                      3. Pulmonary hypertension     4. Hyperlipidemia, unspecified hyperlipidemia type  Assessment & Plan:  Lipid abnormalities are unchanged.  Pharmacotherapy as ordered.  Lipids will be reassessed in 6 months.

## 2024-01-30 ENCOUNTER — TRANSCRIBE ORDERS (OUTPATIENT)
Dept: ADMINISTRATIVE | Facility: HOSPITAL | Age: 84
End: 2024-01-30
Payer: MEDICARE

## 2024-01-30 DIAGNOSIS — I73.9 PAD (PERIPHERAL ARTERY DISEASE): Primary | ICD-10-CM

## 2024-02-05 ENCOUNTER — OFFICE VISIT (OUTPATIENT)
Dept: WOUND CARE | Facility: HOSPITAL | Age: 84
End: 2024-02-05
Payer: MEDICARE

## 2024-02-06 ENCOUNTER — DOCUMENTATION (OUTPATIENT)
Dept: INTERNAL MEDICINE | Facility: HOSPITAL | Age: 84
End: 2024-02-06
Payer: MEDICARE

## 2024-02-08 NOTE — PROGRESS NOTES
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  He has been seen by [facility] and fitted with a .  The goal is for him to do standing with a lockable knee.  The left lower extremity is weak.  Last summer he used an elevated walker and was dragging the right foot.  He has a left knee flexion contracture.  Reports that his bilateral upper extremity strength is okay.  He was not previously limited by his heart or lung from doing activities.  Does have a history of a left quadricep tendon tear repair 10 years ago, left shoulder replacement, cervical spine surgery in his late 20s, lumbar spine surgery x 3.    Social history: Lives with his wife.  Hired caregiver 6 days a week.    Physical examination: Lungs-decreased breath sounds on the right side.  Heart irregular.  Decreased active range of motion bilateral shoulders.  Good elbow extension, finger flexion, hand intrinsic strength.  Decreased dexterity in the left upper extremity.  Left knee flexion contracture.  Takes resistance with left hip flexion, knee extension, ankle dorsiflexion.  Right above-knee amputation residual limb with good soft tissue coverage.  Incisions healed.       Assessment plan :      Right above-knee amputation.  The patient is motivated to mobilize with a prosthesis.  Plan is for K2 potential, lanyard suspension system, lockable knee, hydraulic foot for stand pivot transfer.  He presently has been using a Kimberly lift for his transfers.  They are going to get utilizing a Samanta left which they will  work on obtaining through his primary care physician and continue activities with his hired caregiver that he has 6 days a week.      He will follow-up in this clinic in 6 weeks.  Will plan for outpatient physical therapy once he has a prosthesis at [facility]        Johnson Duran MD     Date of service  02/06/2024             Electronically signed by Johnson Duran MD on 02/08/24

## 2024-02-09 ENCOUNTER — APPOINTMENT (OUTPATIENT)
Dept: CT IMAGING | Facility: HOSPITAL | Age: 84
DRG: 186 | End: 2024-02-09
Payer: MEDICARE

## 2024-02-09 ENCOUNTER — APPOINTMENT (OUTPATIENT)
Dept: ULTRASOUND IMAGING | Facility: HOSPITAL | Age: 84
DRG: 186 | End: 2024-02-09
Payer: MEDICARE

## 2024-02-09 ENCOUNTER — HOSPITAL ENCOUNTER (INPATIENT)
Facility: HOSPITAL | Age: 84
LOS: 11 days | Discharge: HOME HEALTH | DRG: 186 | End: 2024-02-20
Attending: EMERGENCY MEDICINE | Admitting: HOSPITALIST
Payer: MEDICARE

## 2024-02-09 ENCOUNTER — APPOINTMENT (OUTPATIENT)
Dept: GENERAL RADIOLOGY | Facility: HOSPITAL | Age: 84
DRG: 186 | End: 2024-02-09
Payer: MEDICARE

## 2024-02-09 ENCOUNTER — TELEPHONE (OUTPATIENT)
Dept: CARDIOLOGY | Facility: CLINIC | Age: 84
End: 2024-02-09
Payer: MEDICARE

## 2024-02-09 DIAGNOSIS — R79.89 ELEVATED TROPONIN: ICD-10-CM

## 2024-02-09 DIAGNOSIS — R06.00 DYSPNEA, UNSPECIFIED TYPE: ICD-10-CM

## 2024-02-09 DIAGNOSIS — J90 PLEURAL EFFUSION: Primary | ICD-10-CM

## 2024-02-09 PROBLEM — I48.91 A-FIB: Status: ACTIVE | Noted: 2022-09-24

## 2024-02-09 PROBLEM — I50.32 CHRONIC DIASTOLIC CHF (CONGESTIVE HEART FAILURE): Status: ACTIVE | Noted: 2023-11-08

## 2024-02-09 PROBLEM — E11.9 TYPE 2 DIABETES MELLITUS: Status: ACTIVE | Noted: 2024-02-09

## 2024-02-09 LAB
ALBUMIN SERPL-MCNC: 3.4 G/DL (ref 3.5–5.2)
ALBUMIN/GLOB SERPL: 1.2 G/DL
ALP SERPL-CCNC: 145 U/L (ref 39–117)
ALT SERPL W P-5'-P-CCNC: 26 U/L (ref 1–41)
AMYLASE FLD-CCNC: 50 U/L
ANION GAP SERPL CALCULATED.3IONS-SCNC: 10 MMOL/L (ref 5–15)
ARTERIAL PATENCY WRIST A: POSITIVE
AST SERPL-CCNC: 25 U/L (ref 1–40)
ATMOSPHERIC PRESS: 746.8 MMHG
B PARAPERT DNA SPEC QL NAA+PROBE: NOT DETECTED
B PERT DNA SPEC QL NAA+PROBE: NOT DETECTED
BACTERIA SPEC RESP CULT: NORMAL
BASE EXCESS BLDA CALC-SCNC: 0.6 MMOL/L (ref 0–2)
BASOPHILS # BLD AUTO: 0.04 10*3/MM3 (ref 0–0.2)
BASOPHILS NFR BLD AUTO: 0.5 % (ref 0–1.5)
BDY SITE: ABNORMAL
BILIRUB SERPL-MCNC: 0.9 MG/DL (ref 0–1.2)
BUN SERPL-MCNC: 21 MG/DL (ref 8–23)
BUN/CREAT SERPL: 22.3 (ref 7–25)
C PNEUM DNA NPH QL NAA+NON-PROBE: NOT DETECTED
CALCIUM SPEC-SCNC: 9.1 MG/DL (ref 8.6–10.5)
CHLORIDE SERPL-SCNC: 103 MMOL/L (ref 98–107)
CO2 BLDA-SCNC: 26.1 MMOL/L (ref 23–27)
CO2 SERPL-SCNC: 29 MMOL/L (ref 22–29)
CREAT SERPL-MCNC: 0.94 MG/DL (ref 0.76–1.27)
D-LACTATE SERPL-SCNC: 2 MMOL/L (ref 0.5–2)
D-LACTATE SERPL-SCNC: 2.2 MMOL/L (ref 0.5–2)
DEPRECATED RDW RBC AUTO: 49.8 FL (ref 37–54)
DEVICE COMMENT: ABNORMAL
EGFRCR SERPLBLD CKD-EPI 2021: 80.4 ML/MIN/1.73
EOSINOPHIL # BLD AUTO: 0.05 10*3/MM3 (ref 0–0.4)
EOSINOPHIL NFR BLD AUTO: 0.7 % (ref 0.3–6.2)
ERYTHROCYTE [DISTWIDTH] IN BLOOD BY AUTOMATED COUNT: 18.7 % (ref 12.3–15.4)
FLUAV SUBTYP SPEC NAA+PROBE: NOT DETECTED
FLUBV RNA ISLT QL NAA+PROBE: NOT DETECTED
GAS FLOW AIRWAY: 4 LPM
GEN 5 2HR TROPONIN T REFLEX: 60 NG/L
GLOBULIN UR ELPH-MCNC: 2.8 GM/DL
GLUCOSE BLDC GLUCOMTR-MCNC: 88 MG/DL (ref 70–130)
GLUCOSE FLD-MCNC: 100 MG/DL
GLUCOSE SERPL-MCNC: 115 MG/DL (ref 65–99)
GRAM STN SPEC: NORMAL
HADV DNA SPEC NAA+PROBE: NOT DETECTED
HBA1C MFR BLD: 5.4 % (ref 4.8–5.6)
HCO3 BLDA-SCNC: 24.9 MMOL/L (ref 22–28)
HCOV 229E RNA SPEC QL NAA+PROBE: NOT DETECTED
HCOV HKU1 RNA SPEC QL NAA+PROBE: NOT DETECTED
HCOV NL63 RNA SPEC QL NAA+PROBE: NOT DETECTED
HCOV OC43 RNA SPEC QL NAA+PROBE: NOT DETECTED
HCT VFR BLD AUTO: 46.6 % (ref 37.5–51)
HEMODILUTION: NO
HGB BLD-MCNC: 14.7 G/DL (ref 13–17.7)
HMPV RNA NPH QL NAA+NON-PROBE: NOT DETECTED
HOLD SPECIMEN: NORMAL
HOLD SPECIMEN: NORMAL
HPIV1 RNA ISLT QL NAA+PROBE: NOT DETECTED
HPIV2 RNA SPEC QL NAA+PROBE: NOT DETECTED
HPIV3 RNA NPH QL NAA+PROBE: NOT DETECTED
HPIV4 P GENE NPH QL NAA+PROBE: NOT DETECTED
IMM GRANULOCYTES # BLD AUTO: 0.01 10*3/MM3 (ref 0–0.05)
IMM GRANULOCYTES NFR BLD AUTO: 0.1 % (ref 0–0.5)
L PNEUMO1 AG UR QL IA: NEGATIVE
LDH FLD-CCNC: 56 U/L
LYMPHOCYTES # BLD AUTO: 0.97 10*3/MM3 (ref 0.7–3.1)
LYMPHOCYTES NFR BLD AUTO: 12.8 % (ref 19.6–45.3)
Lab: ABNORMAL
M PNEUMO IGG SER IA-ACNC: NOT DETECTED
MCH RBC QN AUTO: 24.8 PG (ref 26.6–33)
MCHC RBC AUTO-ENTMCNC: 31.5 G/DL (ref 31.5–35.7)
MCV RBC AUTO: 78.7 FL (ref 79–97)
MODALITY: ABNORMAL
MONOCYTES # BLD AUTO: 0.59 10*3/MM3 (ref 0.1–0.9)
MONOCYTES NFR BLD AUTO: 7.8 % (ref 5–12)
NEUTROPHILS NFR BLD AUTO: 5.92 10*3/MM3 (ref 1.7–7)
NEUTROPHILS NFR BLD AUTO: 78.1 % (ref 42.7–76)
NOTIFIED WHO: ABNORMAL
NRBC BLD AUTO-RTO: 0 /100 WBC (ref 0–0.2)
NT-PROBNP SERPL-MCNC: 4357 PG/ML (ref 0–1800)
PCO2 BLDA: 38.1 MM HG (ref 35–45)
PH BLDA: 7.42 PH UNITS (ref 7.35–7.45)
PLATELET # BLD AUTO: 199 10*3/MM3 (ref 140–450)
PMV BLD AUTO: 10.7 FL (ref 6–12)
PO2 BLDA: 52.9 MM HG (ref 80–100)
POTASSIUM SERPL-SCNC: 5 MMOL/L (ref 3.5–5.2)
PROCALCITONIN SERPL-MCNC: 0.08 NG/ML (ref 0–0.25)
PROT FLD-MCNC: 3 G/DL
PROT SERPL-MCNC: 6.2 G/DL (ref 6–8.5)
QT INTERVAL: 323 MS
QTC INTERVAL: 470 MS
RBC # BLD AUTO: 5.92 10*6/MM3 (ref 4.14–5.8)
READ BACK: YES
RHINOVIRUS RNA SPEC NAA+PROBE: NOT DETECTED
RSV RNA NPH QL NAA+NON-PROBE: NOT DETECTED
S PNEUM AG SPEC QL LA: NEGATIVE
SAO2 % BLDCOA: 87.8 % (ref 92–98.5)
SARS-COV-2 RNA NPH QL NAA+NON-PROBE: NOT DETECTED
SODIUM SERPL-SCNC: 142 MMOL/L (ref 136–145)
T4 FREE SERPL-MCNC: 1.66 NG/DL (ref 0.93–1.7)
TOTAL RATE: 24 BREATHS/MINUTE
TROPONIN T DELTA: -5 NG/L
TROPONIN T SERPL HS-MCNC: 65 NG/L
TSH SERPL DL<=0.05 MIU/L-ACNC: 0.68 UIU/ML (ref 0.27–4.2)
WBC NRBC COR # BLD AUTO: 7.58 10*3/MM3 (ref 3.4–10.8)
WHOLE BLOOD HOLD COAG: NORMAL
WHOLE BLOOD HOLD SPECIMEN: NORMAL

## 2024-02-09 PROCEDURE — 83605 ASSAY OF LACTIC ACID: CPT | Performed by: EMERGENCY MEDICINE

## 2024-02-09 PROCEDURE — 94664 DEMO&/EVAL PT USE INHALER: CPT

## 2024-02-09 PROCEDURE — 82803 BLOOD GASES ANY COMBINATION: CPT

## 2024-02-09 PROCEDURE — 84443 ASSAY THYROID STIM HORMONE: CPT | Performed by: INTERNAL MEDICINE

## 2024-02-09 PROCEDURE — 36600 WITHDRAWAL OF ARTERIAL BLOOD: CPT

## 2024-02-09 PROCEDURE — 87015 SPECIMEN INFECT AGNT CONCNTJ: CPT | Performed by: NURSE PRACTITIONER

## 2024-02-09 PROCEDURE — 25010000002 AZITHROMYCIN PER 500 MG: Performed by: INTERNAL MEDICINE

## 2024-02-09 PROCEDURE — C1729 CATH, DRAINAGE: HCPCS

## 2024-02-09 PROCEDURE — 87116 MYCOBACTERIA CULTURE: CPT | Performed by: NURSE PRACTITIONER

## 2024-02-09 PROCEDURE — 25010000002 CEFTRIAXONE PER 250 MG: Performed by: INTERNAL MEDICINE

## 2024-02-09 PROCEDURE — 36415 COLL VENOUS BLD VENIPUNCTURE: CPT

## 2024-02-09 PROCEDURE — 0W9930Z DRAINAGE OF RIGHT PLEURAL CAVITY WITH DRAINAGE DEVICE, PERCUTANEOUS APPROACH: ICD-10-PCS | Performed by: RADIOLOGY

## 2024-02-09 PROCEDURE — 94799 UNLISTED PULMONARY SVC/PX: CPT

## 2024-02-09 PROCEDURE — 93005 ELECTROCARDIOGRAM TRACING: CPT | Performed by: EMERGENCY MEDICINE

## 2024-02-09 PROCEDURE — 87205 SMEAR GRAM STAIN: CPT | Performed by: NURSE PRACTITIONER

## 2024-02-09 PROCEDURE — 84439 ASSAY OF FREE THYROXINE: CPT | Performed by: INTERNAL MEDICINE

## 2024-02-09 PROCEDURE — 25510000001 IOPAMIDOL PER 1 ML: Performed by: EMERGENCY MEDICINE

## 2024-02-09 PROCEDURE — 84481 FREE ASSAY (FT-3): CPT | Performed by: INTERNAL MEDICINE

## 2024-02-09 PROCEDURE — 93010 ELECTROCARDIOGRAM REPORT: CPT | Performed by: STUDENT IN AN ORGANIZED HEALTH CARE EDUCATION/TRAINING PROGRAM

## 2024-02-09 PROCEDURE — 0202U NFCT DS 22 TRGT SARS-COV-2: CPT | Performed by: INTERNAL MEDICINE

## 2024-02-09 PROCEDURE — 82945 GLUCOSE OTHER FLUID: CPT | Performed by: NURSE PRACTITIONER

## 2024-02-09 PROCEDURE — 25810000003 SODIUM CHLORIDE 0.9 % SOLUTION 250 ML FLEX CONT: Performed by: INTERNAL MEDICINE

## 2024-02-09 PROCEDURE — 87070 CULTURE OTHR SPECIMN AEROBIC: CPT | Performed by: NURSE PRACTITIONER

## 2024-02-09 PROCEDURE — 87449 NOS EACH ORGANISM AG IA: CPT | Performed by: INTERNAL MEDICINE

## 2024-02-09 PROCEDURE — 71045 X-RAY EXAM CHEST 1 VIEW: CPT

## 2024-02-09 PROCEDURE — 99223 1ST HOSP IP/OBS HIGH 75: CPT | Performed by: NURSE PRACTITIONER

## 2024-02-09 PROCEDURE — 87205 SMEAR GRAM STAIN: CPT | Performed by: INTERNAL MEDICINE

## 2024-02-09 PROCEDURE — 83615 LACTATE (LD) (LDH) ENZYME: CPT | Performed by: NURSE PRACTITIONER

## 2024-02-09 PROCEDURE — 71275 CT ANGIOGRAPHY CHEST: CPT

## 2024-02-09 PROCEDURE — 84157 ASSAY OF PROTEIN OTHER: CPT | Performed by: NURSE PRACTITIONER

## 2024-02-09 PROCEDURE — 83880 ASSAY OF NATRIURETIC PEPTIDE: CPT | Performed by: EMERGENCY MEDICINE

## 2024-02-09 PROCEDURE — 83036 HEMOGLOBIN GLYCOSYLATED A1C: CPT | Performed by: INTERNAL MEDICINE

## 2024-02-09 PROCEDURE — 99285 EMERGENCY DEPT VISIT HI MDM: CPT

## 2024-02-09 PROCEDURE — 82948 REAGENT STRIP/BLOOD GLUCOSE: CPT

## 2024-02-09 PROCEDURE — 84484 ASSAY OF TROPONIN QUANT: CPT | Performed by: EMERGENCY MEDICINE

## 2024-02-09 PROCEDURE — 80053 COMPREHEN METABOLIC PANEL: CPT | Performed by: EMERGENCY MEDICINE

## 2024-02-09 PROCEDURE — 94761 N-INVAS EAR/PLS OXIMETRY MLT: CPT

## 2024-02-09 PROCEDURE — 82150 ASSAY OF AMYLASE: CPT | Performed by: NURSE PRACTITIONER

## 2024-02-09 PROCEDURE — 85025 COMPLETE CBC W/AUTO DIFF WBC: CPT | Performed by: EMERGENCY MEDICINE

## 2024-02-09 PROCEDURE — 87206 SMEAR FLUORESCENT/ACID STAI: CPT | Performed by: NURSE PRACTITIONER

## 2024-02-09 PROCEDURE — 87102 FUNGUS ISOLATION CULTURE: CPT | Performed by: NURSE PRACTITIONER

## 2024-02-09 PROCEDURE — 87040 BLOOD CULTURE FOR BACTERIA: CPT | Performed by: EMERGENCY MEDICINE

## 2024-02-09 PROCEDURE — 25010000002 LIDOCAINE 1 % SOLUTION: Performed by: RADIOLOGY

## 2024-02-09 PROCEDURE — 93005 ELECTROCARDIOGRAM TRACING: CPT

## 2024-02-09 PROCEDURE — 84145 PROCALCITONIN (PCT): CPT | Performed by: EMERGENCY MEDICINE

## 2024-02-09 RX ORDER — GABAPENTIN 100 MG/1
100 CAPSULE ORAL NIGHTLY
Status: DISCONTINUED | OUTPATIENT
Start: 2024-02-09 | End: 2024-02-20 | Stop reason: HOSPADM

## 2024-02-09 RX ORDER — POLYETHYLENE GLYCOL 3350 17 G/17G
17 POWDER, FOR SOLUTION ORAL DAILY PRN
Status: DISCONTINUED | OUTPATIENT
Start: 2024-02-09 | End: 2024-02-20 | Stop reason: HOSPADM

## 2024-02-09 RX ORDER — SODIUM CHLORIDE 0.9 % (FLUSH) 0.9 %
10 SYRINGE (ML) INJECTION EVERY 12 HOURS SCHEDULED
Status: DISCONTINUED | OUTPATIENT
Start: 2024-02-09 | End: 2024-02-20 | Stop reason: HOSPADM

## 2024-02-09 RX ORDER — ONDANSETRON 2 MG/ML
4 INJECTION INTRAMUSCULAR; INTRAVENOUS EVERY 6 HOURS PRN
Status: DISCONTINUED | OUTPATIENT
Start: 2024-02-09 | End: 2024-02-20 | Stop reason: HOSPADM

## 2024-02-09 RX ORDER — SODIUM CHLORIDE 9 MG/ML
40 INJECTION, SOLUTION INTRAVENOUS AS NEEDED
Status: DISCONTINUED | OUTPATIENT
Start: 2024-02-09 | End: 2024-02-20 | Stop reason: HOSPADM

## 2024-02-09 RX ORDER — NICOTINE POLACRILEX 4 MG
15 LOZENGE BUCCAL
Status: DISCONTINUED | OUTPATIENT
Start: 2024-02-09 | End: 2024-02-10

## 2024-02-09 RX ORDER — NITROGLYCERIN 0.4 MG/1
0.4 TABLET SUBLINGUAL
Status: DISCONTINUED | OUTPATIENT
Start: 2024-02-09 | End: 2024-02-20 | Stop reason: HOSPADM

## 2024-02-09 RX ORDER — ONDANSETRON 4 MG/1
4 TABLET, ORALLY DISINTEGRATING ORAL EVERY 6 HOURS PRN
Status: DISCONTINUED | OUTPATIENT
Start: 2024-02-09 | End: 2024-02-20 | Stop reason: HOSPADM

## 2024-02-09 RX ORDER — SODIUM CHLORIDE 0.9 % (FLUSH) 0.9 %
10 SYRINGE (ML) INJECTION AS NEEDED
Status: DISCONTINUED | OUTPATIENT
Start: 2024-02-09 | End: 2024-02-20 | Stop reason: HOSPADM

## 2024-02-09 RX ORDER — INSULIN LISPRO 100 [IU]/ML
2-7 INJECTION, SOLUTION INTRAVENOUS; SUBCUTANEOUS
Status: DISCONTINUED | OUTPATIENT
Start: 2024-02-09 | End: 2024-02-10

## 2024-02-09 RX ORDER — ACETAMINOPHEN 325 MG/1
650 TABLET ORAL EVERY 4 HOURS PRN
Status: DISCONTINUED | OUTPATIENT
Start: 2024-02-09 | End: 2024-02-20 | Stop reason: HOSPADM

## 2024-02-09 RX ORDER — AMOXICILLIN 250 MG
2 CAPSULE ORAL 2 TIMES DAILY PRN
Status: DISCONTINUED | OUTPATIENT
Start: 2024-02-09 | End: 2024-02-20 | Stop reason: HOSPADM

## 2024-02-09 RX ORDER — ACETAMINOPHEN 650 MG/1
650 SUPPOSITORY RECTAL EVERY 4 HOURS PRN
Status: DISCONTINUED | OUTPATIENT
Start: 2024-02-09 | End: 2024-02-20 | Stop reason: HOSPADM

## 2024-02-09 RX ORDER — BISACODYL 10 MG
10 SUPPOSITORY, RECTAL RECTAL DAILY PRN
Status: DISCONTINUED | OUTPATIENT
Start: 2024-02-09 | End: 2024-02-20 | Stop reason: HOSPADM

## 2024-02-09 RX ORDER — IBUPROFEN 600 MG/1
1 TABLET ORAL
Status: DISCONTINUED | OUTPATIENT
Start: 2024-02-09 | End: 2024-02-10

## 2024-02-09 RX ORDER — IPRATROPIUM BROMIDE AND ALBUTEROL SULFATE 2.5; .5 MG/3ML; MG/3ML
3 SOLUTION RESPIRATORY (INHALATION) EVERY 6 HOURS PRN
Status: DISCONTINUED | OUTPATIENT
Start: 2024-02-09 | End: 2024-02-20 | Stop reason: HOSPADM

## 2024-02-09 RX ORDER — BISACODYL 5 MG/1
5 TABLET, DELAYED RELEASE ORAL DAILY PRN
Status: DISCONTINUED | OUTPATIENT
Start: 2024-02-09 | End: 2024-02-20 | Stop reason: HOSPADM

## 2024-02-09 RX ORDER — DEXTROSE MONOHYDRATE 25 G/50ML
25 INJECTION, SOLUTION INTRAVENOUS
Status: DISCONTINUED | OUTPATIENT
Start: 2024-02-09 | End: 2024-02-10

## 2024-02-09 RX ORDER — FAMOTIDINE 20 MG/1
40 TABLET, FILM COATED ORAL DAILY
Status: DISCONTINUED | OUTPATIENT
Start: 2024-02-09 | End: 2024-02-19

## 2024-02-09 RX ORDER — LORAZEPAM 0.5 MG/1
0.5 TABLET ORAL EVERY 8 HOURS PRN
Status: ACTIVE | OUTPATIENT
Start: 2024-02-09 | End: 2024-02-14

## 2024-02-09 RX ORDER — FUROSEMIDE 20 MG/1
20 TABLET ORAL DAILY
Status: DISCONTINUED | OUTPATIENT
Start: 2024-02-10 | End: 2024-02-10

## 2024-02-09 RX ORDER — GABAPENTIN 100 MG/1
100 CAPSULE ORAL
COMMUNITY

## 2024-02-09 RX ORDER — ACETAMINOPHEN 160 MG/5ML
650 SOLUTION ORAL EVERY 4 HOURS PRN
Status: DISCONTINUED | OUTPATIENT
Start: 2024-02-09 | End: 2024-02-20 | Stop reason: HOSPADM

## 2024-02-09 RX ORDER — GUAIFENESIN 600 MG/1
1200 TABLET, EXTENDED RELEASE ORAL EVERY 12 HOURS SCHEDULED
Status: DISCONTINUED | OUTPATIENT
Start: 2024-02-09 | End: 2024-02-20 | Stop reason: HOSPADM

## 2024-02-09 RX ORDER — FUROSEMIDE 20 MG/1
20 TABLET ORAL DAILY
COMMUNITY

## 2024-02-09 RX ORDER — OXYCODONE HYDROCHLORIDE 5 MG/1
5 TABLET ORAL EVERY 4 HOURS PRN
Status: DISCONTINUED | OUTPATIENT
Start: 2024-02-09 | End: 2024-02-20 | Stop reason: HOSPADM

## 2024-02-09 RX ORDER — LIDOCAINE HYDROCHLORIDE 10 MG/ML
20 INJECTION, SOLUTION INFILTRATION; PERINEURAL ONCE
Status: COMPLETED | OUTPATIENT
Start: 2024-02-09 | End: 2024-02-09

## 2024-02-09 RX ADMIN — AZITHROMYCIN MONOHYDRATE 500 MG: 500 INJECTION, POWDER, LYOPHILIZED, FOR SOLUTION INTRAVENOUS at 21:38

## 2024-02-09 RX ADMIN — LIDOCAINE HYDROCHLORIDE 20 ML: 10 INJECTION, SOLUTION INFILTRATION; PERINEURAL at 15:24

## 2024-02-09 RX ADMIN — Medication 10 ML: at 20:25

## 2024-02-09 RX ADMIN — METOPROLOL TARTRATE 25 MG: 25 TABLET, FILM COATED ORAL at 18:23

## 2024-02-09 RX ADMIN — GUAIFENESIN 1200 MG: 600 TABLET, EXTENDED RELEASE ORAL at 18:23

## 2024-02-09 RX ADMIN — GABAPENTIN 100 MG: 100 CAPSULE ORAL at 20:24

## 2024-02-09 RX ADMIN — FAMOTIDINE 40 MG: 20 TABLET, FILM COATED ORAL at 18:23

## 2024-02-09 RX ADMIN — CEFTRIAXONE SODIUM 1000 MG: 1 INJECTION, POWDER, FOR SOLUTION INTRAMUSCULAR; INTRAVENOUS at 13:53

## 2024-02-09 RX ADMIN — IOPAMIDOL 95 ML: 755 INJECTION, SOLUTION INTRAVENOUS at 12:02

## 2024-02-09 NOTE — CONSULTS
CONSULT NOTE    Patient Identification:  Mehreen Silva IV  83 y.o.  male  1940  [id number]            Requesting physician: Dr Sherwin Haddad    Reason for Consultation:  pleural effusion large, ramírez    CC: ramírez    History of Present Illness:  Patient is a very nice 83-year-old male with a history of prior pleural effusion hypertension hyperlipidemia congestive heart failure and AKA on the right lower extremity with atrial fibrillation who presented to the emergency room with increased shortness of breath.  Symptoms were rather sudden onset 2 nights ago getting significantly worse.  However upon reflection he says about 2 weeks ago he developed cough and more sputum production no fever no chills.  Gradually progressive short miss of breath when he reflects.  No lethargy no confusion.  No hemoptysis no chest pain.  Positive shortness of breath.  Positive anxiety associated with this.    In the ER was found to have very large pleural effusions we have been called by the ER physician to help evaluate this.    Review of Systems:  As per HPI otherwise a 12 system review of systems performed and all else negative    Past Medical History:   Diagnosis Date    (HFpEF) heart failure with preserved ejection fraction 09/24/2022    Arthritis     Atrial fibrillation     Atrial fibrillation, persistent 09/24/2022    Cervical spondylosis with myelopathy     CHF (congestive heart failure)     Disease of thyroid gland     HLD (hyperlipidemia) 09/23/2022    Hx of toxic multinodular goiter 09/23/2022    Lumbar radiculopathy     Pulmonary hypertension     Ulcer with gangrene     right heel       Past Surgical History:   Procedure Laterality Date    ABOVE KNEE AMPUTATION Right 12/13/2023    Procedure: ABOVE KNEE AMPUTATION RIGHT, proveena wound vac placement;  Surgeon: Issa Nieves MD;  Location: [facility];  Service: Vascular;  Laterality: Right;    APPENDECTOMY      BACK SURGERY      COLONOSCOPY      EXCISION MASS  "TRUNK N/A 2016    Procedure: Excision soft tissue neoplasm on abdominal wall and left neck;  Surgeon: Shaji Barahona Jr., MD;  Location: [facility] OR;  Service:     KNEE SURGERY      NECK SURGERY  1974    SHOULDER SURGERY          (Not in a hospital admission)      No Known Allergies    Social History     Socioeconomic History    Marital status:     Number of children: 1    Highest education level: Professional school degree (e.g., MD, DDS, DVM, BRITNEY)   Tobacco Use    Smoking status: Former     Types: Cigarettes     Quit date: 1983     Years since quittin.7    Smokeless tobacco: Never   Vaping Use    Vaping Use: Never used   Substance and Sexual Activity    Alcohol use: Yes     Comment: SOCIAL    Drug use: No    Sexual activity: Defer       Family History   Problem Relation Age of Onset    Cancer Mother     Heart disease Father     Aneurysm Father     Malig Hyperthermia Neg Hx        Physical Exam:  /73 (BP Location: Right arm, Patient Position: Sitting)   Pulse 99   Temp 97.5 °F (36.4 °C)   Resp 18   Ht 177.8 cm (70\")   Wt 72.6 kg (160 lb)   SpO2 95%   BMI 22.96 kg/m²   Body mass index is 22.96 kg/m².   General appearance: NAD, conversant   Eyes: Anicteric sclerae, moist conjunctivae; no lid lag;   HENT: Atraumatic; oropharynx clear with moist mucous membranes and no mucosal ulcerations; normal hard and soft palate  Neck: Trachea midline supple, no thyromegaly    Lungs: diminished right sided breath sounds, with normal respiratory effort and no intercostal retractions  CV: RRR, no MRGs   Abdomen: Soft, nontender; no masses or HSM  Extremities:aka rle + peripheral edema    Skin: wwp no diffuse visible rash  Psych: Appropriate affect, alert   Neuro: CNS 2-12 grossly intact moves all ext    LABS:  Results from last 7 days   Lab Units 24  1114   WBC 10*3/mm3 7.58   HEMOGLOBIN g/dL 14.7   PLATELETS 10*3/mm3 199     Results from last 7 days   Lab Units 24  1114   SODIUM mmol/L " 142   POTASSIUM mmol/L 5.0   CHLORIDE mmol/L 103   CO2 mmol/L 29.0   BUN mg/dL 21   CREATININE mg/dL 0.94   GLUCOSE mg/dL 115*   CALCIUM mg/dL 9.1   Estimated Creatinine Clearance: 61.1 mL/min (by C-G formula based on SCr of 0.94 mg/dL).      Nov 2023 pleural fluid   neg for malignant scells  Ldh 58  Protein 1.4    Imaging: I personally visualized the images of scans/x-rays performed within last 3 days.  Imaging Results (Most Recent)       Procedure Component Value Units Date/Time    CT Angiogram Chest [[id number]] Resulted: 02/09/24 1202     Updated: 02/09/24 1205    XR Chest 1 View [[id number]] Collected: 02/09/24 1106     Updated: 02/09/24 1112    Narrative:      CHEST SINGLE VIEW     HISTORY: Chest pain. Shortness of air and cough.     COMPARISON: AP chest 11/09/2023, CT chest 11/12/2023.     FINDINGS: There has developed complete white out of the right thorax due  to large right pleural effusion and associated right basilar atelectasis  or infiltrate. Left lung appears clear. There appears to be mild right  to left cardiomediastinal shift that is likely related to the presence  of pleural fluid.       Impression:      New white out of the right lung appears to be mostly related  to a large pleural effusion, though there is also a suspected component  of atelectasis or infiltrate. There is mild right to left  cardiomediastinal shift. CT would be the best means to further evaluate.     This report was finalized on 2/9/2024 11:09 AM by Dr. 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 Dw pt an wife plan of care. All questions answered.  Prior studies from nov supporting HF etiology.    Us thora with ldh cytology cultures ordered however will ask thoracic to eval - with size off effusion may need ct.  Consult to thoracic - this may need chest tube or pleurodesis to prevent recurrence  Will follow with  Anam ER MD, CTS team.  Labs reviewed  Prior hospital records reviewed  Prior imaging and present imaging personally reviewed imaging.  Complex medical patient.      Gerardo Tinajero MD  [facility]  02/09/24  12:58 EST

## 2024-02-09 NOTE — CONSULTS
Inpatient Thoracic Surgery Consult  Consult performed by: Mary Augustin, NOHEMY, APRN  Consult ordered by: Gerardo Tinajero MD          Patient Care Team:  Taiwo Powers MD as PCP - General (Internal Medicine)  Self, Bess YO MD as Consulting Physician (Endocrinology)  Huang Chan MD as Cardiologist (Cardiology)    Chief Complaint   Patient presents with    Chest Pain       Subjective     History of Present Illness    Mr. 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  He had a productive cough and increasing shortness of breath x 1 week.  He called his cardiology office who advised him to come to the ER for further evaluation.  Initial assessment included a chest x-ray which demonstrated a large right pleural effusion.  This was confirmed with CT angiogram of the chest.  Thoracic surgery has been asked to see him regarding this finding.        Review of Systems   Constitutional:  Positive for fatigue.   HENT:  Positive for congestion.    Eyes: Negative.    Respiratory:  Positive for cough and shortness of breath.    Cardiovascular: Negative.    Gastrointestinal: Negative.    Endocrine: Negative.    Genitourinary: Negative.    Musculoskeletal:  Positive for gait problem.   Skin: Negative.    Neurological:  Positive for weakness.   Hematological:  Bruises/bleeds easily.   Psychiatric/Behavioral:          Anxious        Past Medical History:   Diagnosis Date    (HFpEF) heart failure with preserved ejection fraction 09/24/2022    Arthritis     Atrial fibrillation     Atrial fibrillation, persistent 09/24/2022    Cervical spondylosis with myelopathy     CHF (congestive heart failure)     Disease of thyroid gland     HLD (hyperlipidemia) 09/23/2022    Hx of toxic multinodular goiter 09/23/2022    Lumbar radiculopathy     Pulmonary hypertension     Ulcer with gangrene     right heel      Past Surgical History:   Procedure Laterality Date    ABOVE KNEE AMPUTATION Right 2023    Procedure: ABOVE KNEE AMPUTATION RIGHT, proveena wound vac placement;  Surgeon: Issa Nieves MD;  Location: [facility];  Service: Vascular;  Laterality: Right;    APPENDECTOMY      BACK SURGERY      COLONOSCOPY      EXCISION MASS TRUNK N/A 2016    Procedure: Excision soft tissue neoplasm on abdominal wall and left neck;  Surgeon: Shaji Barahona Jr., MD;  Location: [facility];  Service:     KNEE SURGERY      NECK SURGERY  1974    SHOULDER SURGERY       Family History   Problem Relation Age of Onset    Cancer Mother     Heart disease Father     Aneurysm Father     Malig Hyperthermia Neg Hx      Social History     Socioeconomic History    Marital status:     Number of children: 1    Highest education level: Professional school degree (e.g., MD, DDS, DVM, BRITNEY)   Tobacco Use    Smoking status: Former     Types: Cigarettes     Quit date: 1983     Years since quittin.7    Smokeless tobacco: Never   Vaping Use    Vaping Use: Never used   Substance and Sexual Activity    Alcohol use: Yes     Comment: SOCIAL    Drug use: No    Sexual activity: Defer     (Not in a hospital admission)    No Known Allergies    Objective      Vital Signs  Temp:  [97.5 °F (36.4 °C)] 97.5 °F (36.4 °C)  Heart Rate:  [74-99] 74  Resp:  [17-18] 17  BP: (106-107)/(72-73) 107/72    Intake & Output (last day)       None            Physical Exam  Constitutional:       General: He is not in acute distress.     Appearance: Normal appearance.   HENT:      Head: Normocephalic and atraumatic.      Comments: Nasal cannula  Pulmonary:      Effort: No respiratory distress.      Breath sounds: Examination of the right-upper field reveals decreased breath sounds. Examination of the right-lower field reveals decreased breath sounds. Decreased breath sounds present.   Musculoskeletal:      Cervical back: Normal range of motion.       Comments: Status post right AKA   Skin:     General: Skin is warm.   Neurological:      General: No focal deficit present.      Mental Status: He is alert.      Motor: Weakness present.         Results Review:    I reviewed the patient's new clinical results.  I reviewed the patient's new imaging results and agree with the interpretation.  Discussed with patient, RN and Dr. Santoro    I have independently reviewed the CT of the chest performed upon admission and agree with interpretation.  There is a large right pleural effusion.  There is a small left pleural effusion.    Imaging Results (Last 24 Hours)       Procedure Component Value Units Date/Time    CT Angiogram Chest [[id number]] Collected: 02/09/24 1355     Updated: 02/09/24 1355    Narrative:      CT ANGIOGRAM OF THE CHEST WITH CONTRAST INCLUDING RECONSTRUCTION IMAGES  02/09/2024     HISTORY: Shortness of breath.     Following the intravenous contrast injection CT angiography was  performed through the chest. Sagittal, coronal and 3D reconstruction  images were reviewed.     The pulmonary arterial system is somewhat heterogeneous but no definite  pulmonary embolus is seen. The heterogeneity is probably artifact.     There is a large right pleural effusion with atelectasis of the right  lung. There is a small left pleural effusion.     Small amount of fluid is seen in pericardial recesses. There is some  aortic and coronary calcification.       Impression:      1. Large right pleural effusion with atelectasis of the right lung. The  right hemithorax is completely opacified.  2. Small left pleural effusion.  3. The pulmonary arterial system is somewhat heterogeneous but no  definite pulmonary embolus is seen.           Radiation dose reduction techniques were utilized, including automated  exposure control and exposure modulation based on body size.          XR Chest 1 View [[id number]] Collected: 02/09/24 1106     Updated: 02/09/24 1112    Narrative:      CHEST  SINGLE VIEW     HISTORY: Chest pain. Shortness of air and cough.     COMPARISON: AP chest 11/09/2023, CT chest 11/12/2023.     FINDINGS: There has developed complete white out of the right thorax due  to large right pleural effusion and associated right basilar atelectasis  or infiltrate. Left lung appears clear. There appears to be mild right  to left cardiomediastinal shift that is likely related to the presence  of pleural fluid.       Impression:      New white out of the right lung appears to be mostly related  to a large pleural effusion, though there is also a suspected component  of atelectasis or infiltrate. There is mild right to left  cardiomediastinal shift. CT would be the best means to further evaluate.     This report was finalized on 2/9/2024 11:09 AM by Dr. Ciro Casey M.D on Workstation: [id number]               Lab Results:  Lab Results (last 24 hours)       Procedure Component Value Units Date/Time    STAT Lactic Acid, Reflex [[id number]] Collected: 02/09/24 1401    Specimen: Blood Updated: 02/09/24 1409    High Sensitivity Troponin T 2Hr [[id number]] Collected: 02/09/24 1401    Specimen: Blood Updated: 02/09/24 1409    [place] Draw [[id number]] Collected: 02/09/24 1114    Specimen: Blood Updated: 02/09/24 1215    Narrative:      The following orders were created for panel order [place] Draw.  Procedure                               Abnormality         Status                     ---------                               -----------         ------                     Green Top (Gel)[[id number]]                                  Final result               Lavender Top[[id number]]                                     Final result               Gold Top - SST[[id number]]                                   Final result               Light Blue Top[[id number]]                                   Final result                 Please view results for these tests on the individual orders.    Green Top (Gel)  [[id number]] Collected: 02/09/24 1114    Specimen: Blood Updated: 02/09/24 1215     Extra Tube Hold for add-ons.     Comment: Auto resulted.       Lavender Top [[id number]] Collected: 02/09/24 1114    Specimen: Blood Updated: 02/09/24 1215     Extra Tube hold for add-on     Comment: Auto resulted       Gold Top - SST [[id number]] Collected: 02/09/24 1114    Specimen: Blood Updated: 02/09/24 1215     Extra Tube Hold for add-ons.     Comment: Auto resulted.       Light Blue Top [[id number]] Collected: 02/09/24 1114    Specimen: Blood Updated: 02/09/24 1215     Extra Tube Hold for add-ons.     Comment: Auto resulted       Blood Culture - Blood, Arm, Left [[id number]] Collected: 02/09/24 1154    Specimen: Blood from Arm, Left Updated: 02/09/24 1159    Blood Culture - Blood, Arm, Right [[id number]] Collected: 02/09/24 1154    Specimen: Blood from Arm, Right Updated: 02/09/24 1158    Single High Sensitivity Troponin T [[id number]]  (Abnormal) Collected: 02/09/24 1114    Specimen: Blood Updated: 02/09/24 1152     HS Troponin T 65 ng/L     Narrative:      High Sensitive Troponin T Reference Range:  <14.0 ng/L- Negative Female for AMI  <22.0 ng/L- Negative Male for AMI  >=14 - Abnormal Female indicating possible myocardial injury.  >=22 - Abnormal Male indicating possible myocardial injury.   Clinicians would have to utilize clinical acumen, EKG, Troponin, and serial changes to determine if it is an Acute Myocardial Infarction or myocardial injury due to an underlying chronic condition.         BNP [[id number]]  (Abnormal) Collected: 02/09/24 1114    Specimen: Blood Updated: 02/09/24 1150     proBNP 4,357.0 pg/mL     Narrative:      This assay is used as an aid in the diagnosis of individuals suspected of having heart failure. It can be used as an aid in the diagnosis of acute decompensated heart failure (ADHF) in patients presenting with signs and symptoms of ADHF to the emergency department (ED). In addition, NT-proBNP of <300  "pg/mL indicates ADHF is not likely.    Age Range Result Interpretation  NT-proBNP Concentration (pg/mL:      <50             Positive            >450                   Gray                 300-450                    Negative             <300    50-75           Positive            >900                  Gray                300-900                  Negative            <300      >75             Positive            >1800                  Gray                300-1800                  Negative            <300    Procalcitonin [[id number]]  (Normal) Collected: 02/09/24 1114    Specimen: Blood Updated: 02/09/24 1150     Procalcitonin 0.08 ng/mL     Narrative:      As a Marker for Sepsis (Non-Neonates):    1. <0.5 ng/mL represents a low risk of severe sepsis and/or septic shock.  2. >2 ng/mL represents a high risk of severe sepsis and/or septic shock.    As a Marker for Lower Respiratory Tract Infections that require antibiotic therapy:    PCT on Admission    Antibiotic Therapy       6-12 Hrs later    >0.5                Strongly Recommended  >0.25 - <0.5        Recommended   0.1 - 0.25          Discouraged              Remeasure/reassess PCT  <0.1                Strongly Discouraged     Remeasure/reassess PCT    As 28 day mortality risk marker: \"Change in Procalcitonin Result\" (>80% or <=80%) if Day 0 (or Day 1) and Day 4 values are available. Refer to http://www.[facility]pct[facility]cal[facility]    Change in PCT <=80%  A decrease of PCT levels below or equal to 80% defines a positive change in PCT test result representing a higher risk for 28-day all-cause mortality of patients diagnosed with severe sepsis for septic shock.    Change in PCT >80%  A decrease of PCT levels of more than 80% defines a negative change in PCT result representing a lower risk for 28-day all-cause mortality of patients diagnosed with severe sepsis or septic shock.       Comprehensive Metabolic Panel [[id number]]  (Abnormal) Collected: 02/09/24 1114    " Specimen: Blood Updated: 02/09/24 1144     Glucose 115 mg/dL      BUN 21 mg/dL      Creatinine 0.94 mg/dL      Sodium 142 mmol/L      Potassium 5.0 mmol/L      Comment: Slight hemolysis detected by analyzer. Result may be falsely elevated.        Chloride 103 mmol/L      CO2 29.0 mmol/L      Calcium 9.1 mg/dL      Total Protein 6.2 g/dL      Albumin 3.4 g/dL      ALT (SGPT) 26 U/L      AST (SGOT) 25 U/L      Alkaline Phosphatase 145 U/L      Total Bilirubin 0.9 mg/dL      Globulin 2.8 gm/dL      A/G Ratio 1.2 g/dL      BUN/Creatinine Ratio 22.3     Anion Gap 10.0 mmol/L      eGFR 80.4 mL/min/1.73     Narrative:      GFR Normal >60  Chronic Kidney Disease <60  Kidney Failure <15    The GFR formula is only valid for adults with stable renal function between ages 18 and 70.    Lactic Acid, Plasma [[id number]]  (Abnormal) Collected: 02/09/24 1114    Specimen: Blood Updated: 02/09/24 1143     Lactate 2.2 mmol/L     CBC & Differential [[id number]]  (Abnormal) Collected: 02/09/24 1114    Specimen: Blood Updated: 02/09/24 1127    Narrative:      The following orders were created for panel order CBC & Differential.  Procedure                               Abnormality         Status                     ---------                               -----------         ------                     CBC Auto Differential[[id number]]        Abnormal            Final result                 Please view results for these tests on the individual orders.    CBC Auto Differential [[id number]]  (Abnormal) Collected: 02/09/24 1114    Specimen: Blood Updated: 02/09/24 1127     WBC 7.58 10*3/mm3      RBC 5.92 10*6/mm3      Hemoglobin 14.7 g/dL      Hematocrit 46.6 %      MCV 78.7 fL      MCH 24.8 pg      MCHC 31.5 g/dL      RDW 18.7 %      RDW-SD 49.8 fl      MPV 10.7 fL      Platelets 199 10*3/mm3      Neutrophil % 78.1 %      Lymphocyte % 12.8 %      Monocyte % 7.8 %      Eosinophil % 0.7 %      Basophil % 0.5 %      Immature Grans % 0.1 %       Neutrophils, Absolute 5.92 10*3/mm3      Lymphocytes, Absolute 0.97 10*3/mm3      Monocytes, Absolute 0.59 10*3/mm3      Eosinophils, Absolute 0.05 10*3/mm3      Basophils, Absolute 0.04 10*3/mm3      Immature Grans, Absolute 0.01 10*3/mm3      nRBC 0.0 /100 WBC                 Assessment & Plan       Pleural effusion      Assessment & Plan    Right pleural effusion: Recommend CT-guided chest tube placement with fluid studies.  Possible this is infectious in nature secondary to recent illness.  Of course malignancy is on the differential as well and will send for cytology.  Patient could potentially benefit from intrapleural lytic therapy versus surgical intervention pending clinical course.  We will see how much drains from the chest tube and recheck a chest x-ray in the morning.  Patient will need to transfer to  E. for chest tube management.  Recommend to hold Eliquis with chest tube in place.  Appreciate assistance from interventional radiology with chest tube placement.  Rest of care per primary team.      I discussed the patients findings and our recommendations with patient, nursing staff, and patient, RN and Dr. Santoro    Thank you for this consult and allowing us to participate in the care of your patient.  We will follow along with you during this hospitalization.       Mary Augustin DNP, APRN  [facility]  02/09/24  14:26 EST    I spent >62 minutes reviewing the patient's chart including medical history, notes, radiographic imaging, labs and performing an assessment and development of a plan and discussion with the patient/family at bedside.

## 2024-02-09 NOTE — PLAN OF CARE
Problem: Adult Inpatient Plan of Care  Goal: Plan of Care Review  Outcome: Ongoing, Progressing  Flowsheets (Taken 2/9/2024 1334)  Progress: no change  Plan of Care Reviewed With: patient   Goal Outcome Evaluation:  Plan of Care Reviewed With: patient        Progress: no change

## 2024-02-09 NOTE — ED NOTES
Patient to ER via car from home for coughing x one week  Patient reports chest pain with coughing and SOA

## 2024-02-09 NOTE — ED NOTES
"Nursing report ED to floor  Mehreen Silva IV  83 y.o.  male    HPI :   Chief Complaint   Patient presents with    Chest Pain    Mehreen Silva IV is a 83 y.o. male with a medical history of AKA amputation right lower extremity, atrial fibrillation, hyperlipidemia and CHF who presents to the ED c/o acute shortness of breath with cough and congestion.  He says for the past couple weeks he has had persistent cough with production of phlegm.  This is making him increasingly short of breath.  With his shortness of breath he is having increased anxiety as well.  He has had some mild chest discomfort on the right side.  He denies fevers.  He did have a right lower extremity amputation procedure a couple months ago and his wound has been healing well.  He denies any new swelling or redness or rashes to either of the lower extremities.      Review of prior external notes (non-ED) -and- Review of prior external test results outside of this encounter: I reviewed the discharge summary by vascular surgery on December 16, 2023 following his AKA right lower extremity potation.  He was restarted on Eliquis at that time.    Admitting doctor:   Mile Youngblood MD    Admitting diagnosis:   There were no encounter diagnoses.    Code status:   Current Code Status       Date Active Code Status Order ID Comments User Context       Prior        Allergies:   Patient has no known allergies.    Isolation:   No active isolations    Intake and Output  No intake or output data in the 24 hours ending 02/09/24 1443    Weight:       02/09/24  1102   Weight: 72.6 kg (160 lb)       Most recent vitals:   Vitals:    02/09/24 1027 02/09/24 1100 02/09/24 1102 02/09/24 1413   BP:  106/73  107/72   BP Location:  Right arm  Right arm   Patient Position:  Sitting  Lying   Pulse: 99   74   Resp: 18   17   Temp: 97.5 °F (36.4 °C)      SpO2: 95%   97%   Weight:   72.6 kg (160 lb)    Height:   177.8 cm (70\")      Active LDAs/IV Access:   Lines, Drains & Airways  "      Active LDAs       Name Placement date Placement time Site Days    Peripheral IV 02/09/24 1146 Left Antecubital 02/09/24  1146  Antecubital  less than 1    External Urinary Catheter 12/15/23  2308  --  55                  Labs (abnormal labs have a star):   Labs Reviewed   COMPREHENSIVE METABOLIC PANEL - Abnormal; Notable for the following components:       Result Value    Glucose 115 (*)     Albumin 3.4 (*)     Alkaline Phosphatase 145 (*)     All other components within normal limits    Narrative:     GFR Normal >60  Chronic Kidney Disease <60  Kidney Failure <15    The GFR formula is only valid for adults with stable renal function between ages 18 and 70.   BNP (IN-HOUSE) - Abnormal; Notable for the following components:    proBNP 4,357.0 (*)     All other components within normal limits    Narrative:     This assay is used as an aid in the diagnosis of individuals suspected of having heart failure. It can be used as an aid in the diagnosis of acute decompensated heart failure (ADHF) in patients presenting with signs and symptoms of ADHF to the emergency department (ED). In addition, NT-proBNP of <300 pg/mL indicates ADHF is not likely.    Age Range Result Interpretation  NT-proBNP Concentration (pg/mL:      <50             Positive            >450                   Gray                 300-450                    Negative             <300    50-75           Positive            >900                  Gray                300-900                  Negative            <300      >75             Positive            >1800                  Gray                300-1800                  Negative            <300   SINGLE HSTROPONIN T - Abnormal; Notable for the following components:    HS Troponin T 65 (*)     All other components within normal limits    Narrative:     High Sensitive Troponin T Reference Range:  <14.0 ng/L- Negative Female for AMI  <22.0 ng/L- Negative Male for AMI  >=14 - Abnormal Female indicating  possible myocardial injury.  >=22 - Abnormal Male indicating possible myocardial injury.   Clinicians would have to utilize clinical acumen, EKG, Troponin, and serial changes to determine if it is an Acute Myocardial Infarction or myocardial injury due to an underlying chronic condition.        CBC WITH AUTO DIFFERENTIAL - Abnormal; Notable for the following components:    RBC 5.92 (*)     MCV 78.7 (*)     MCH 24.8 (*)     RDW 18.7 (*)     Neutrophil % 78.1 (*)     Lymphocyte % 12.8 (*)     All other components within normal limits   LACTIC ACID, PLASMA - Abnormal; Notable for the following components:    Lactate 2.2 (*)     All other components within normal limits   HIGH SENSITIVITIY TROPONIN T 2HR - Abnormal; Notable for the following components:    HS Troponin T 60 (*)     Troponin T Delta -5 (*)     All other components within normal limits    Narrative:     High Sensitive Troponin T Reference Range:  <14.0 ng/L- Negative Female for AMI  <22.0 ng/L- Negative Male for AMI  >=14 - Abnormal Female indicating possible myocardial injury.  >=22 - Abnormal Male indicating possible myocardial injury.   Clinicians would have to utilize clinical acumen, EKG, Troponin, and serial changes to determine if it is an Acute Myocardial Infarction or myocardial injury due to an underlying chronic condition.        PROCALCITONIN - Normal    Narrative:     As a Marker for Sepsis (Non-Neonates):    1. <0.5 ng/mL represents a low risk of severe sepsis and/or septic shock.  2. >2 ng/mL represents a high risk of severe sepsis and/or septic shock.    As a Marker for Lower Respiratory Tract Infections that require antibiotic therapy:    PCT on Admission    Antibiotic Therapy       6-12 Hrs later    >0.5                Strongly Recommended  >0.25 - <0.5        Recommended   0.1 - 0.25          Discouraged              Remeasure/reassess PCT  <0.1                Strongly Discouraged     Remeasure/reassess PCT    As 28 day mortality risk  "marker: \"Change in Procalcitonin Result\" (>80% or <=80%) if Day 0 (or Day 1) and Day 4 values are available. Refer to [email or web address]://[email or web address] [email or web address]-pct-[email or web address]    Change in PCT <=80%  A decrease of PCT levels below or equal to 80% defines a positive change in PCT test result representing a higher risk for 28-day all-cause mortality of patients diagnosed with severe sepsis for septic shock.    Change in PCT >80%  A decrease of PCT levels of more than 80% defines a negative change in PCT result representing a lower risk for 28-day all-cause mortality of patients diagnosed with severe sepsis or septic shock.      LACTIC ACID, REFLEX - Normal   BLOOD CULTURE   BLOOD CULTURE   RAINBOW DRAW    Narrative:     The following orders were created for panel order [facility] Draw.  Procedure                               Abnormality         Status                     ---------                               -----------         ------                     Green Top (Gel)[[id number]]                                  Final result               Lavender Top[[id number]]                                     Final result               Gold Top - SST[[id number]]                                   Final result               Light Blue Top[[id number]]                                   Final result                 Please view results for these tests on the individual orders.   CBC AND DIFFERENTIAL    Narrative:     The following orders were created for panel order CBC & Differential.  Procedure                               Abnormality         Status                     ---------                               -----------         ------                     CBC Auto Differential[[id number]]        Abnormal            Final result                 Please view results for these tests on the individual orders.   GREEN TOP   LAVENDER TOP   GOLD TOP - SST   LIGHT BLUE TOP     EKG:   ECG 12 Lead ED Triage Standing Order; SOA   Final Result   HEART RATE= 127  bpm "   RR Interval= 472  ms   TX Interval=   ms   P Horizontal Axis=   deg   P Front Axis=   deg   QRSD Interval= 76  ms   QT Interval= 323  ms   QTcB= 470  ms   QRS Axis= 31  deg   T Wave Axis= -68  deg   - ABNORMAL ECG -   Atrial fibrillation   Low voltage, extremity leads   Nonspecific T abnormalities, lateral leads   When compared with ECG of 14-Dec-2023 8:45:55,   No significant change   Electronically Signed By: Ra Thomas ([facility]) 09-Feb-2024 11:58:07   Date and Time of Study: 2024-02-09 10:33:43        Meds given in ED:   Medications   sodium chloride 0.9 % flush 10 mL (has no administration in time range)   sodium chloride 0.9 % flush 10 mL (has no administration in time range)   Hold medication (has no administration in time range)   azithromycin (ZITHROMAX) 500 mg in sodium chloride 0.9 % 250 mL IVPB-VTB (has no administration in time range)   guaiFENesin (MUCINEX) 12 hr tablet 1,200 mg (has no administration in time range)   oxyCODONE (ROXICODONE) immediate release tablet 5 mg (has no administration in time range)   iopamidol (ISOVUE-370) 76 % injection 100 mL (95 mL Intravenous Given by Other 2/9/24 1202)   cefTRIAXone (ROCEPHIN) 1,000 mg in sodium chloride 0.9 % 100 mL IVPB-VTB (1,000 mg Intravenous New Bag 2/9/24 1353)     Imaging results:  CT Angiogram Chest    Result Date: 2/9/2024  1. Large right pleural effusion with atelectasis of the right lung. The right hemithorax is completely opacified. 2. Small left pleural effusion. 3. The pulmonary arterial system is somewhat heterogeneous but no definite pulmonary embolus is seen.    Radiation dose reduction techniques were utilized, including automated exposure control and exposure modulation based on body size.       XR Chest 1 View    Result Date: 2/9/2024  New white out of the right lung appears to be mostly related to a large pleural effusion, though there is also a suspected component of atelectasis or infiltrate. There is mild right to left  cardiomediastinal shift. CT would be the best means to further evaluate.  This report was finalized on 2024 11:09 AM by Dr. Ciro Casey M.D on Workstation: [id number]       Ambulatory status:   - wheel chair. Right BKA    Social issues:   Social History     Socioeconomic History    Marital status:     Number of children: 1    Highest education level: Professional school degree (e.g., MD, DDS, DVM, [employer]ITNEY)   Tobacco Use    Smoking status: Former     Types: Cigarettes     Quit date: 1983     Years since quittin.7    Smokeless tobacco: Never   Vaping Use    Vaping Use: Never used   Substance and Sexual Activity    Alcohol use: Yes     Comment: SOCIAL    Drug use: No    Sexual activity: Defer     NIH Stroke Scale:     Salma Conn RN  24 14:43 EST

## 2024-02-09 NOTE — H&P
Patient Name:  Mehreen Silva IV  YOB: 1940  MRN:  [id number]  Admit Date:  2/9/2024  Patient Care Team:  Taiwo Powers MD as PCP - General (Internal Medicine)  Self, Bess YO MD as Consulting Physician (Endocrinology)  Huang Chan MD as Cardiologist (Cardiology)        Chief complaint.  Aggressive shortness of breath and cough over the last 1 week.    History Present Illness     A pleasant 83 years old gentleman with a past history of chronic diastolic congestive heart failure/atrial fibrillation/right ankle osteoporosis s/p right above-knee amputation/C and L-spine degenerative disc disease/hypothyroidism/dyslipidemia/right pleural effusion.  Patient presented to the emergency department with progressive shortness of breath over the last 1 week associated with cough productive of clear sputum.  No chest pain.  No palpitation.  No hemoptysis.  No PND orthopnea.  No ankle edema.  No fever or chills.  In the emergency department CTA of the chest revealed a large right pleural effusion associated with right lung atelectasis and right to left mediastinal shift.  Procalcitonin was normal.  Lactic acid elevated at 2.2.  CBC was normal.  Troponin was elevated at 65 with a repeat troponin of 60 and -5 delta.  proBNP was elevated at 4357.  CMP normal except a blood sugar of 115, albumin 3.4, alkaline phosphatase 145.  Patient was subsequently admitted.        Review of Systems   Cardiovascular/respiratory.  As above.  GI.  No abdominal pain or nausea or vomiting.  Normal bowel habits without constipation/diarrhea/bleeding per rectum/melena.  .  No dysuria or hematuria.  CNS.  No headache.  No loss of consciousness.  No focal neurological symptoms.      Personal History     Past Medical History:   Diagnosis Date    (HFpEF) heart failure with preserved ejection fraction 09/24/2022    Arthritis     Atrial fibrillation     Atrial fibrillation, persistent 09/24/2022    Cervical spondylosis with  myelopathy     CHF (congestive heart failure)     Disease of thyroid gland     HLD (hyperlipidemia) 2022    Hx of toxic multinodular goiter 2022    Lumbar radiculopathy     Pulmonary hypertension     Ulcer with gangrene     right heel     Past Surgical History:   Procedure Laterality Date    ABOVE KNEE AMPUTATION Right 2023    Procedure: ABOVE KNEE AMPUTATION RIGHT, proveena wound vac placement;  Surgeon: Issa Nieves MD;  Location: [facility] MAIN OR;  Service: Vascular;  Laterality: Right;    APPENDECTOMY      BACK SURGERY      COLONOSCOPY      EXCISION MASS TRUNK N/A 2016    Procedure: Excision soft tissue neoplasm on abdominal wall and left neck;  Surgeon: Shaji Barahona Jr., MD;  Location: [facility] MAIN OR;  Service:     KNEE SURGERY      NECK SURGERY  1974    SHOULDER SURGERY       Family History   Problem Relation Age of Onset    Cancer Mother     Heart disease Father     Aneurysm Father     Malig Hyperthermia Neg Hx      Social History     Tobacco Use    Smoking status: Former     Types: Cigarettes     Quit date: 1983     Years since quittin.7    Smokeless tobacco: Never   Vaping Use    Vaping Use: Never used   Substance Use Topics    Alcohol use: Yes     Comment: SOCIAL    Drug use: No     No current facility-administered medications on file prior to encounter.     Current Outpatient Medications on File Prior to Encounter   Medication Sig Dispense Refill    cholecalciferol (VITAMIN D3) 25 MCG (1000 UT) tablet Take 1 tablet by mouth Daily.      Eliquis 5 MG tablet tablet TAKE ONE TABLET BY MOUTH EVERY 12 HOURS 180 tablet 3    empagliflozin (JARDIANCE) 10 MG tablet tablet Take 1 tablet by mouth Daily. 90 tablet 1    ezetimibe (ZETIA) 10 MG tablet Take 1 tablet by mouth Daily.      furosemide (LASIX) 20 MG tablet Take 1 tablet by mouth Daily.      gabapentin (NEURONTIN) 100 MG capsule Take 1 capsule by mouth every night at bedtime.      methIMAzole (TAPAZOLE) 5 MG tablet Take 1  tablet by mouth 3 (Three) Times a Week. Monday, Wednesday, and Friday      metoprolol tartrate (LOPRESSOR) 25 MG tablet TAKE ONE TABLET BY MOUTH EVERY 12 HOURS (Patient taking differently: Take 1 tablet by mouth 2 (Two) Times a Day.) 180 tablet 3    multivitamin with minerals tablet tablet Take 1 tablet by mouth Daily.      [DISCONTINUED] escitalopram (LEXAPRO) 5 MG tablet Take 1 tablet by mouth Daily.       No Known Allergies    Objective    Objective     Vital Signs  Temp:  [97.5 °F (36.4 °C)] 97.5 °F (36.4 °C)  Heart Rate:  [74-99] 91  Resp:  [17-18] 17  BP: (102-107)/(68-73) 102/68  SpO2:  [95 %-97 %] 96 %  on  Flow (L/min):  [2] 2;      Body mass index is 22.96 kg/m².    Physical Exam  General.  Elderly gentleman.  Underweight.  Appears chronically ill.  In mild respiratory distress.  No acute pain.  Eyes.  Pupils equal round and reactive.  Intact extraocular musculature.  No pallor or jaundice.  Oral cavity.  Moist mucous membrane.  Neck.  Supple.  No JVD.  No lymphadenopathy or thyromegaly.  Cardiovascular.  Controlled rate A-fib with grade 2 systolic murmur.  Chest.  Chest tube on the right side.  Harsh bronchial breathing on the right with decreased air entry on the right.  No crackles.  Abdomen.  Soft lax.  No tenderness.  No organomegaly.  No guarding or rebound.  Extremities.  Status post right above-knee amputation.  No clubbing or cyanosis.  CNS.  No acute focal neurological deficits.      Results Review:  I reviewed the patient's new clinical results.  I reviewed the patient's new imaging results and agree with the interpretation.  I reviewed the patient's other test results and agree with the interpretation  I personally viewed and interpreted the patient's EKG/Telemetry data  Discussed with ED provider.    Lab Results (last 24 hours)       Procedure Component Value Units Date/Time    CBC & Differential [[id number]]  (Abnormal) Collected: 02/09/24 1114    Specimen: Blood Updated: 02/09/24 3518     Narrative:      The following orders were created for panel order CBC & Differential.  Procedure                               Abnormality         Status                     ---------                               -----------         ------                     CBC Auto Differential[[id number]]        Abnormal            Final result                 Please view results for these tests on the individual orders.    Comprehensive Metabolic Panel [[id number]]  (Abnormal) Collected: 02/09/24 1114    Specimen: Blood Updated: 02/09/24 1144     Glucose 115 mg/dL      BUN 21 mg/dL      Creatinine 0.94 mg/dL      Sodium 142 mmol/L      Potassium 5.0 mmol/L      Comment: Slight hemolysis detected by analyzer. Result may be falsely elevated.        Chloride 103 mmol/L      CO2 29.0 mmol/L      Calcium 9.1 mg/dL      Total Protein 6.2 g/dL      Albumin 3.4 g/dL      ALT (SGPT) 26 U/L      AST (SGOT) 25 U/L      Alkaline Phosphatase 145 U/L      Total Bilirubin 0.9 mg/dL      Globulin 2.8 gm/dL      A/G Ratio 1.2 g/dL      BUN/Creatinine Ratio 22.3     Anion Gap 10.0 mmol/L      eGFR 80.4 mL/min/1.73     Narrative:      GFR Normal >60  Chronic Kidney Disease <60  Kidney Failure <15    The GFR formula is only valid for adults with stable renal function between ages 18 and 70.    BNP [[id number]]  (Abnormal) Collected: 02/09/24 1114    Specimen: Blood Updated: 02/09/24 1150     proBNP 4,357.0 pg/mL     Narrative:      This assay is used as an aid in the diagnosis of individuals suspected of having heart failure. It can be used as an aid in the diagnosis of acute decompensated heart failure (ADHF) in patients presenting with signs and symptoms of ADHF to the emergency department (ED). In addition, NT-proBNP of <300 pg/mL indicates ADHF is not likely.    Age Range Result Interpretation  NT-proBNP Concentration (pg/mL:      <50             Positive            >450                   Gray                 300-450                    Negative              <300    50-75           Positive            >900                  Gray                300-900                  Negative            <300      >75             Positive            >1800                  Gray                300-1800                  Negative            <300    Single High Sensitivity Troponin T [[id number]]  (Abnormal) Collected: 02/09/24 1114    Specimen: Blood Updated: 02/09/24 1152     HS Troponin T 65 ng/L     Narrative:      High Sensitive Troponin T Reference Range:  <14.0 ng/L- Negative Female for AMI  <22.0 ng/L- Negative Male for AMI  >=14 - Abnormal Female indicating possible myocardial injury.  >=22 - Abnormal Male indicating possible myocardial injury.   Clinicians would have to utilize clinical acumen, EKG, Troponin, and serial changes to determine if it is an Acute Myocardial Infarction or myocardial injury due to an underlying chronic condition.         CBC Auto Differential [[id number]]  (Abnormal) Collected: 02/09/24 1114    Specimen: Blood Updated: 02/09/24 1127     WBC 7.58 10*3/mm3      RBC 5.92 10*6/mm3      Hemoglobin 14.7 g/dL      Hematocrit 46.6 %      MCV 78.7 fL      MCH 24.8 pg      MCHC 31.5 g/dL      RDW 18.7 %      RDW-SD 49.8 fl      MPV 10.7 fL      Platelets 199 10*3/mm3      Neutrophil % 78.1 %      Lymphocyte % 12.8 %      Monocyte % 7.8 %      Eosinophil % 0.7 %      Basophil % 0.5 %      Immature Grans % 0.1 %      Neutrophils, Absolute 5.92 10*3/mm3      Lymphocytes, Absolute 0.97 10*3/mm3      Monocytes, Absolute 0.59 10*3/mm3      Eosinophils, Absolute 0.05 10*3/mm3      Basophils, Absolute 0.04 10*3/mm3      Immature Grans, Absolute 0.01 10*3/mm3      nRBC 0.0 /100 WBC     Lactic Acid, Plasma [[id number]]  (Abnormal) Collected: 02/09/24 1114    Specimen: Blood Updated: 02/09/24 1143     Lactate 2.2 mmol/L     Procalcitonin [[id number]]  (Normal) Collected: 02/09/24 1114    Specimen: Blood Updated: 02/09/24 1150     Procalcitonin 0.08 ng/mL     Narrative:   "    As a Marker for Sepsis (Non-Neonates):    1. <0.5 ng/mL represents a low risk of severe sepsis and/or septic shock.  2. >2 ng/mL represents a high risk of severe sepsis and/or septic shock.    As a Marker for Lower Respiratory Tract Infections that require antibiotic therapy:    PCT on Admission    Antibiotic Therapy       6-12 Hrs later    >0.5                Strongly Recommended  >0.25 - <0.5        Recommended   0.1 - 0.25          Discouraged              Remeasure/reassess PCT  <0.1                Strongly Discouraged     Remeasure/reassess PCT    As 28 day mortality risk marker: \"Change in Procalcitonin Result\" (>80% or <=80%) if Day 0 (or Day 1) and Day 4 values are available. Refer to http://[email or web address].[email or web address]    Change in PCT <=80%  A decrease of PCT levels below or equal to 80% defines a positive change in PCT test result representing a higher risk for 28-day all-cause mortality of patients diagnosed with severe sepsis for septic shock.    Change in PCT >80%  A decrease of PCT levels of more than 80% defines a negative change in PCT result representing a lower risk for 28-day all-cause mortality of patients diagnosed with severe sepsis or septic shock.       Blood Culture - Blood, Arm, Right [[id number]] Collected: 02/09/24 1154    Specimen: Blood from Arm, Right Updated: 02/09/24 1158    Blood Culture - Blood, Arm, Left [[id number]] Collected: 02/09/24 1154    Specimen: Blood from Arm, Left Updated: 02/09/24 1159    STAT Lactic Acid, Reflex [[id number]]  (Normal) Collected: 02/09/24 1401    Specimen: Blood Updated: 02/09/24 1431     Lactate 2.0 mmol/L     High Sensitivity Troponin T 2Hr [[id number]]  (Abnormal) Collected: 02/09/24 1401    Specimen: Blood Updated: 02/09/24 1434     HS Troponin T 60 ng/L      Troponin T Delta -5 ng/L     Narrative:      High Sensitive Troponin T Reference Range:  <14.0 ng/L- Negative Female for AMI  <22.0 ng/L- Negative Male for AMI  >=14 - Abnormal Female " indicating possible myocardial injury.  >=22 - Abnormal Male indicating possible myocardial injury.   Clinicians would have to utilize clinical acumen, EKG, Troponin, and serial changes to determine if it is an Acute Myocardial Infarction or myocardial injury due to an underlying chronic condition.         TSH [[id number]] Collected: 02/09/24 1401    Specimen: Blood Updated: 02/09/24 1726    T4, Free [[id number]] Collected: 02/09/24 1401    Specimen: Blood Updated: 02/09/24 1726    Body Fluid Culture - Body Fluid, Pleural Cavity [[id number]] Collected: 02/09/24 1532    Specimen: Body Fluid from Pleural Cavity Updated: 02/09/24 1548    AFB Culture - Drainage, Lung, R [[id number]] Collected: 02/09/24 1532    Specimen: Drainage from Lung, R Updated: 02/09/24 1548    Glucose, Body Fluid - Body Fluid, Pleural Cavity [[id number]] Collected: 02/09/24 1532    Specimen: Body Fluid from Pleural Cavity Updated: 02/09/24 1724     Glucose, Fluid 100 mg/dL     Narrative:      No Reference Ranges Established.    Serous fluid glucose less than 60 mg/dL or less than 30 mg/dL below serum glucose suggests an infectious or malignant exudate.     This test was developed, it performance characteristics determined and judged suitable for clinical purposes by [facility].  It has not been cleared or approved by the FDA.  The laboratory is regulated under CLIA as qualified to perform high-complexity testing.     Protein, Body Fluid - Body Fluid, Pleural Cavity [[id number]] Collected: 02/09/24 1532    Specimen: Body Fluid from Pleural Cavity Updated: 02/09/24 1724     Protein, Total, Fluid 3.0 g/dL     Narrative:      No Reference Ranges Established.    A serous fluid total fluid (TP) greater than 50 percent of the serum TP suggests the fluid is an exudate.      1. Pleural TP/Serum TP >0.5  2. Pleural LD/Serum LD >0.6  3. Pleural LD >2/3 of the upper limit of normal for serum LDH    This test was developed, it performance  characteristics determined and judged suitable for clinical purposes by [facility].  It has not been cleared or approved by the FDA.  The laboratory is regulated under CLIA as qualified to perform high-complexity testing.     Lactate Dehydrogenase, Body Fluid - Body Fluid, Pleural Cavity [[id number]] Collected: 02/09/24 1532    Specimen: Body Fluid from Pleural Cavity Updated: 02/09/24 1724     Lactate Dehydrogenase (LD), Fluid 56 U/L     Narrative:      No Reference Ranges Established.    Serous fluid LDH greater than 60 percent of the serum LDH or serous fluid LDH two-thirds of the upper limit of normal for serum LDH suggests the fluid is an exudate.     1. Pleural TP/Serum TP >0.5  2. Pleural LD/Serum LD >0.6  3. Pleural LD >2/3 of the upper limit of normal for serum LDH    This test was developed, it performance characteristics determined and judged suitable for clinical purposes by [facility].  It has not been cleared or approved by the FDA.  The laboratory is regulated under CLIA as qualified to perform high-complexity testing.     Amylase, Body Fluid - Body Fluid, Pleural Cavity [[id number]] Collected: 02/09/24 1532    Specimen: Body Fluid from Pleural Cavity Updated: 02/09/24 1724     Amylase, Fluid 50 U/L     Narrative:      No Reference Ranges Established.    This test was developed, it performance characteristics determined and judged suitable for clinical purposes by [facility].  It has not been cleared or approved by the FDA.  The laboratory is regulated under CLIA as qualified to perform high-complexity testing.     Fungus Culture - Drainage, Lung, R [[id number]] Collected: 02/09/24 1532    Specimen: Drainage from Lung, R Updated: 02/09/24 1548            Imaging Results (Last 24 Hours)       Procedure Component Value Units Date/Time    CT Guided Chest Tube [[id number]] Collected: 02/09/24 1724     Updated: 02/09/24 1724     Narrative:      CT-GUIDED RIGHT CHEST TUBE PLACEMENT 02/09/2024     HISTORY: Large right pleural effusion.     TECHNIQUE: After signed informed consent was obtained the patient was  prepped and draped in the right posterior oblique position. Lidocaine  was used for local anesthesia.     CT guidance was used to place a 14 Beninese pigtail catheter into the  right pleural fluid collection. Approximately 1600 cc of nonpurulent  serous fluid was removed.     The catheter was left in place and connected to the atrium suction.     Confirmatory images were obtained.     Patient tolerated the procedure well with no complications.       Impression:      1. Successful placement of 14 Beninese pigtail catheter into the right  pleural fluid collection with removal of 1600 cc of nonpurulent fluid.        Radiation dose reduction techniques were utilized, including automated  exposure control and exposure modulation based on body size.          XR Chest 1 View [[id number]] Collected: 02/09/24 1702     Updated: 02/09/24 1706    Narrative:      AP CHEST     HISTORY: Evaluate pleural effusion     COMPARISON: Earlier today     FINDINGS: Status post placement of right chest tube. Significant  decrease in size of right pleural effusion with significant improved  aeration of the right lung. Heart size stable and left lung remains  clear.       Impression:      Significant increase in size of right pleural effusion           This report was finalized on 2/9/2024 5:03 PM by Dr. Randy Villa M.D  on Workstation: [id number]       CT Angiogram Chest [[id number]] Collected: 02/09/24 1355     Updated: 02/09/24 1355    Narrative:      CT ANGIOGRAM OF THE CHEST WITH CONTRAST INCLUDING RECONSTRUCTION IMAGES  02/09/2024     HISTORY: Shortness of breath.     Following the intravenous contrast injection CT angiography was  performed through the chest. Sagittal, coronal and 3D reconstruction  images were reviewed.     The pulmonary arterial system is  somewhat heterogeneous but no definite  pulmonary embolus is seen. The heterogeneity is probably artifact.     There is a large right pleural effusion with atelectasis of the right  lung. There is a small left pleural effusion.     Small amount of fluid is seen in pericardial recesses. There is some  aortic and coronary calcification.       Impression:      1. Large right pleural effusion with atelectasis of the right lung. The  right hemithorax is completely opacified.  2. Small left pleural effusion.  3. The pulmonary arterial system is somewhat heterogeneous but no  definite pulmonary embolus is seen.           Radiation dose reduction techniques were utilized, including automated  exposure control and exposure modulation based on body size.          XR Chest 1 View [[id number]] Collected: 02/09/24 1106     Updated: 02/09/24 1112    Narrative:      CHEST SINGLE VIEW     HISTORY: Chest pain. Shortness of air and cough.     COMPARISON: AP chest 11/09/2023, CT chest 11/12/2023.     FINDINGS: There has developed complete white out of the right thorax due  to large right pleural effusion and associated right basilar atelectasis  or infiltrate. Left lung appears clear. There appears to be mild right  to left cardiomediastinal shift that is likely related to the presence  of pleural fluid.       Impression:      New white out of the right lung appears to be mostly related  to a large pleural effusion, though there is also a suspected component  of atelectasis or infiltrate. There is mild right to left  cardiomediastinal shift. CT would be the best means to further evaluate.     This report was finalized on 2/9/2024 11:09 AM by Dr. Ciro Casey M.D on Workstation: [id number]               Results for orders placed during the hospital encounter of 11/08/23    Adult Transthoracic Echo Complete W/ Cont if Necessary Per Protocol    Interpretation Summary    Left ventricular systolic function is hyperdynamic (EF > 70%).     Left ventricular wall thickness is consistent with mild concentric hypertrophy with component of basal septal asymmetric hypertrophy.    Left ventricular diastolic function is consistent with (grade II w/high LAP) pseudonormalization.    RV cavity mass near the apex likely part of papillary muscle but can not rule out mass.    Calculated right ventricular systolic pressure from tricuspid regurgitation is 41 mmHg.      ECG 12 Lead ED Triage Standing Order; SOA   Final Result   HEART RATE= 127  bpm   RR Interval= 472  ms   MN Interval=   ms   P Horizontal Axis=   deg   P Front Axis=   deg   QRSD Interval= 76  ms   QT Interval= 323  ms   QTcB= 470  ms   QRS Axis= 31  deg   T Wave Axis= -68  deg   - ABNORMAL ECG -   Atrial fibrillation   Low voltage, extremity leads   Nonspecific T abnormalities, lateral leads   When compared with ECG of 14-Dec-2023 8:45:55,   No significant change   Electronically Signed By: Ra Thomas ([facility]) 09-Feb-2024 11:58:07   Date and Time of Study: 2024-02-09 10:33:43               Assessment/Plan     Active Hospital Problems    Diagnosis  POA    **Pleural effusion [J90]  Yes    Dyspnea [R06.00]  Yes    Type 2 diabetes mellitus [E11.9]  Yes    Chronic diastolic CHF (congestive heart failure) [I50.32]  Yes    Hyperthyroidism [E05.90]  Yes    A-fib [I48.91]  Yes    Dyslipidemia [E78.5]  Yes      Resolved Hospital Problems   No resolved problems to display.           Large right pleural effusion/right lung atelectasis leading to dyspnea.  This is recurrent.  Differential diagnoses include infection/carcinoma/diastolic congestive heart failure.  Pulmonary saw the patient and recommended cardiothoracic surgery consultation who has seen the patient and I placed a CT-guided chest tube.  Await fluid studies.  Will hold Eliquis for now.  Will check blood cultures and sputum cultures.  Initiate IV Rocephin and IV Zithromax empirically.  Procalcitonin is normal.  No fever.  Normal white count.  Will check  urine Legionella and Streptococcus antigen and respiratory panel PCR.  Pulmonary toilet with as needed DuoNebs/Mucinex/incentive spirometry.  Elevated lactic acid.  Questionable infection or secondary to hypoxemia.  Will monitor.  Type 2 diabetes.  Will hold Jardiance.  Sliding scale will be initiated.  Check A1c.  History of atrial fibrillation/chronic diastolic congestive heart failure.  Last 2D echo on 11/2023 revealing a normal ejection fraction and right ventricular cavity questionable mass/left ventricular hypertrophy with asymmetric septal hypertrophy and grade 2 diastolic dysfunction..  Will continue Lasix and metoprolol.  Blood pressure and rate is under good control.  Holding Eliquis at this time.  Consult cardiology.  Hyperthyroidism.  Continue methimazole.  Will check TSH/T3-4/T3.  Hyperlipidemia.  Holding Zetia at this time as this is nonformulary.  VTE prophylaxis.  Sequential compression devices.      Discussed my findings and plan of treatment with the patient/ER provider/nurse.    Code Status -full code..       Mile Youngblood MD  [facility]  02/09/24  17:27 EST

## 2024-02-09 NOTE — Clinical Note
Level of Care: Telemetry [5]   Diagnosis: Pleural effusion [404520]   Admitting Physician: BONILLA OTOOLE [[id number]]   Attending Physician: BONILLA OTOOLE [[id number]]

## 2024-02-09 NOTE — TELEPHONE ENCOUNTER
"Pt's wife called in stating that pt's caregiver was taking pt to ER because he is SOA, and \"can't catch his breath\" and is coughing up a small amount of phlegm.  Wife states that this is exactly what happened when he was admitted in the fall.  Wife asking if Dr. Chan can direct admit pt again like last time, and asked if she should bring pt to our office instead of ER.  I recommended if pt is in that much distress with his breathing, he should be evaluated in the ER, and they could consult cardiology if needed.  Wife agreed to this, and just wanted to let us know that pt was headed to ER.    Dottie Champagne, RN  Triage RN  02/09/24 10:08 EST    "

## 2024-02-09 NOTE — PROGRESS NOTES
Clinical Pharmacy Services: Medication History    Mehreen Silva IV is a 83 y.o. male presenting to [facility] for   Chief Complaint   Patient presents with    Chest Pain       He  has a past medical history of (HFpEF) heart failure with preserved ejection fraction (09/24/2022), Arthritis, Atrial fibrillation, Atrial fibrillation, persistent (09/24/2022), Cervical spondylosis with myelopathy, CHF (congestive heart failure), Disease of thyroid gland, HLD (hyperlipidemia) (09/23/2022), toxic multinodular goiter (09/23/2022), Lumbar radiculopathy, Pulmonary hypertension, and Ulcer with gangrene.    Allergies as of 02/09/2024    (No Known Allergies)       Medication information was obtained from: Patient   Pharmacy and Phone Number:     Prior to Admission Medications       Prescriptions Last Dose Informant Patient Reported? Taking?    cholecalciferol (VITAMIN D3) 25 MCG (1000 UT) tablet 2/9/2024 Self Yes Yes    Take 1 tablet by mouth Daily.    Eliquis 5 MG tablet tablet 2/9/2024 Pharmacy, Self No Yes    TAKE ONE TABLET BY MOUTH EVERY 12 HOURS    empagliflozin (JARDIANCE) 10 MG tablet tablet 2/9/2024 Pharmacy, Self No Yes    Take 1 tablet by mouth Daily.    ezetimibe (ZETIA) 10 MG tablet 2/9/2024 Self, Pharmacy Yes Yes    Take 1 tablet by mouth Daily.    furosemide (LASIX) 20 MG tablet 2/9/2024 Self, Pharmacy Yes Yes    Take 1 tablet by mouth Daily.    gabapentin (NEURONTIN) 100 MG capsule 2/8/2024 Self Yes Yes    Take 1 capsule by mouth every night at bedtime.    methIMAzole (TAPAZOLE) 5 MG tablet 2/9/2024 Pharmacy Yes Yes    Take 1 tablet by mouth 3 (Three) Times a Week. Monday, Wednesday, and Friday    metoprolol tartrate (LOPRESSOR) 25 MG tablet 2/9/2024 Pharmacy, Self No Yes    TAKE ONE TABLET BY MOUTH EVERY 12 HOURS    Patient taking differently:  Take 1 tablet by mouth 2 (Two) Times a Day.    multivitamin with minerals tablet tablet 2/9/2024 Self, Pharmacy Yes Yes    Take 1 tablet by mouth Daily.               Medication notes:     This medication list is complete to the best of my knowledge as of 2/9/2024    Please call if questions.    Eleno Martínez  Medication History Technician  [phone]    2/9/2024 12:35 EST

## 2024-02-10 ENCOUNTER — APPOINTMENT (OUTPATIENT)
Dept: GENERAL RADIOLOGY | Facility: HOSPITAL | Age: 84
DRG: 186 | End: 2024-02-10
Payer: MEDICARE

## 2024-02-10 ENCOUNTER — APPOINTMENT (OUTPATIENT)
Dept: CARDIOLOGY | Facility: HOSPITAL | Age: 84
DRG: 186 | End: 2024-02-10
Payer: MEDICARE

## 2024-02-10 LAB
ALBUMIN SERPL-MCNC: 2.5 G/DL (ref 3.5–5.2)
ALP SERPL-CCNC: 116 U/L (ref 39–117)
ALT SERPL W P-5'-P-CCNC: 19 U/L (ref 1–41)
ANION GAP SERPL CALCULATED.3IONS-SCNC: 11.6 MMOL/L (ref 5–15)
AORTIC DIMENSIONLESS INDEX: 1 (DI)
AST SERPL-CCNC: 18 U/L (ref 1–40)
BASOPHILS # BLD AUTO: 0.03 10*3/MM3 (ref 0–0.2)
BASOPHILS NFR BLD AUTO: 0.2 % (ref 0–1.5)
BH CV ECHO MEAS - AO MAX PG: 6.2 MMHG
BH CV ECHO MEAS - AO MEAN PG: 3.2 MMHG
BH CV ECHO MEAS - AO V2 MAX: 124.1 CM/SEC
BH CV ECHO MEAS - AO V2 VTI: 21.5 CM
BH CV ECHO MEAS - AVA(I,D): 2.9 CM2
BH CV ECHO MEAS - EDV(CUBED): 80.3 ML
BH CV ECHO MEAS - EDV(MOD-SP2): 65 ML
BH CV ECHO MEAS - EDV(MOD-SP4): 46 ML
BH CV ECHO MEAS - EF(MOD-BP): 62.6 %
BH CV ECHO MEAS - EF(MOD-SP2): 66.2 %
BH CV ECHO MEAS - EF(MOD-SP4): 58.7 %
BH CV ECHO MEAS - ESV(CUBED): 25 ML
BH CV ECHO MEAS - ESV(MOD-SP2): 22 ML
BH CV ECHO MEAS - ESV(MOD-SP4): 19 ML
BH CV ECHO MEAS - FS: 32.2 %
BH CV ECHO MEAS - IVS/LVPW: 1.38 CM
BH CV ECHO MEAS - IVSD: 1.38 CM
BH CV ECHO MEAS - LAT PEAK E' VEL: 7.6 CM/SEC
BH CV ECHO MEAS - LV MASS(C)D: 184 GRAMS
BH CV ECHO MEAS - LV MAX PG: 5.8 MMHG
BH CV ECHO MEAS - LV MEAN PG: 3.2 MMHG
BH CV ECHO MEAS - LV V1 MAX: 120.9 CM/SEC
BH CV ECHO MEAS - LV V1 VTI: 20.9 CM
BH CV ECHO MEAS - LVIDD: 4.3 CM
BH CV ECHO MEAS - LVIDS: 2.9 CM
BH CV ECHO MEAS - LVOT AREA: 3 CM2
BH CV ECHO MEAS - LVOT DIAM: 1.95 CM
BH CV ECHO MEAS - LVPWD: 1 CM
BH CV ECHO MEAS - MED PEAK E' VEL: 6.1 CM/SEC
BH CV ECHO MEAS - MV DEC SLOPE: 657 CM/SEC2
BH CV ECHO MEAS - MV DEC TIME: 129 SEC
BH CV ECHO MEAS - MV E MAX VEL: 121 CM/SEC
BH CV ECHO MEAS - MV MAX PG: 6.7 MMHG
BH CV ECHO MEAS - MV MEAN PG: 2.31 MMHG
BH CV ECHO MEAS - MV P1/2T: 58.4 MSEC
BH CV ECHO MEAS - MV V2 VTI: 21.8 CM
BH CV ECHO MEAS - MVA(P1/2T): 3.8 CM2
BH CV ECHO MEAS - MVA(VTI): 2.9 CM2
BH CV ECHO MEAS - PA ACC TIME: 0.12 SEC
BH CV ECHO MEAS - PA V2 MAX: 83.8 CM/SEC
BH CV ECHO MEAS - PULM DIAS VEL: 47.1 CM/SEC
BH CV ECHO MEAS - PULM S/D: 0.63
BH CV ECHO MEAS - PULM SYS VEL: 29.6 CM/SEC
BH CV ECHO MEAS - QP/QS: 0.56
BH CV ECHO MEAS - RV MAX PG: 1.83 MMHG
BH CV ECHO MEAS - RV V1 MAX: 67.7 CM/SEC
BH CV ECHO MEAS - RV V1 VTI: 10.6 CM
BH CV ECHO MEAS - RVOT DIAM: 2.04 CM
BH CV ECHO MEAS - SV(LVOT): 62.8 ML
BH CV ECHO MEAS - SV(MOD-SP2): 43 ML
BH CV ECHO MEAS - SV(MOD-SP4): 27 ML
BH CV ECHO MEAS - SV(RVOT): 34.9 ML
BH CV ECHO MEAS - TAPSE (>1.6): 0.6 CM
BH CV ECHO MEASUREMENTS AVERAGE E/E' RATIO: 17.66
BH CV XLRA - RV BASE: 3.6 CM
BH CV XLRA - RV LENGTH: 7.6 CM
BH CV XLRA - RV MID: 2.26 CM
BH CV XLRA - TDI S': 9.5 CM/SEC
BILIRUB CONJ SERPL-MCNC: 0.5 MG/DL (ref 0–0.3)
BILIRUB INDIRECT SERPL-MCNC: 0.7 MG/DL
BILIRUB SERPL-MCNC: 1.2 MG/DL (ref 0–1.2)
BUN SERPL-MCNC: 27 MG/DL (ref 8–23)
BUN/CREAT SERPL: 31 (ref 7–25)
CALCIUM SPEC-SCNC: 8.2 MG/DL (ref 8.6–10.5)
CHLORIDE SERPL-SCNC: 102 MMOL/L (ref 98–107)
CO2 SERPL-SCNC: 21.4 MMOL/L (ref 22–29)
CREAT SERPL-MCNC: 0.87 MG/DL (ref 0.76–1.27)
D-LACTATE SERPL-SCNC: 2 MMOL/L (ref 0.5–2)
DEPRECATED RDW RBC AUTO: 47.3 FL (ref 37–54)
EGFRCR SERPLBLD CKD-EPI 2021: 85.6 ML/MIN/1.73
EOSINOPHIL # BLD AUTO: 0 10*3/MM3 (ref 0–0.4)
EOSINOPHIL NFR BLD AUTO: 0 % (ref 0.3–6.2)
ERYTHROCYTE [DISTWIDTH] IN BLOOD BY AUTOMATED COUNT: 18.4 % (ref 12.3–15.4)
GLUCOSE BLDC GLUCOMTR-MCNC: 106 MG/DL (ref 70–130)
GLUCOSE BLDC GLUCOMTR-MCNC: 112 MG/DL (ref 70–130)
GLUCOSE SERPL-MCNC: 114 MG/DL (ref 65–99)
HCT VFR BLD AUTO: 45 % (ref 37.5–51)
HGB BLD-MCNC: 14.2 G/DL (ref 13–17.7)
IMM GRANULOCYTES # BLD AUTO: 0.05 10*3/MM3 (ref 0–0.05)
IMM GRANULOCYTES NFR BLD AUTO: 0.4 % (ref 0–0.5)
LEFT ATRIUM VOLUME INDEX: 40.9 ML/M2
LEFT ATRIUM VOLUME: 72 ML
LYMPHOCYTES # BLD AUTO: 0.91 10*3/MM3 (ref 0.7–3.1)
LYMPHOCYTES NFR BLD AUTO: 7.5 % (ref 19.6–45.3)
MCH RBC QN AUTO: 24.2 PG (ref 26.6–33)
MCHC RBC AUTO-ENTMCNC: 31.6 G/DL (ref 31.5–35.7)
MCV RBC AUTO: 76.8 FL (ref 79–97)
MONOCYTES # BLD AUTO: 0.73 10*3/MM3 (ref 0.1–0.9)
MONOCYTES NFR BLD AUTO: 6 % (ref 5–12)
NEUTROPHILS NFR BLD AUTO: 10.42 10*3/MM3 (ref 1.7–7)
NEUTROPHILS NFR BLD AUTO: 85.9 % (ref 42.7–76)
NRBC BLD AUTO-RTO: 0.1 /100 WBC (ref 0–0.2)
PLATELET # BLD AUTO: 233 10*3/MM3 (ref 140–450)
PMV BLD AUTO: 11.2 FL (ref 6–12)
POTASSIUM SERPL-SCNC: 4.7 MMOL/L (ref 3.5–5.2)
PROT SERPL-MCNC: 5.4 G/DL (ref 6–8.5)
RBC # BLD AUTO: 5.86 10*6/MM3 (ref 4.14–5.8)
SINUS: 3.5 CM
SODIUM SERPL-SCNC: 135 MMOL/L (ref 136–145)
T3 SERPL-MCNC: 56 NG/DL (ref 71–180)
T3FREE SERPL-MCNC: 1.6 PG/ML (ref 2–4.4)
WBC NRBC COR # BLD AUTO: 12.14 10*3/MM3 (ref 3.4–10.8)

## 2024-02-10 PROCEDURE — 80076 HEPATIC FUNCTION PANEL: CPT | Performed by: INTERNAL MEDICINE

## 2024-02-10 PROCEDURE — 71045 X-RAY EXAM CHEST 1 VIEW: CPT

## 2024-02-10 PROCEDURE — 25810000003 SODIUM CHLORIDE 0.9 % SOLUTION: Performed by: NURSE PRACTITIONER

## 2024-02-10 PROCEDURE — 25810000003 SODIUM CHLORIDE 0.9 % SOLUTION 250 ML FLEX CONT: Performed by: INTERNAL MEDICINE

## 2024-02-10 PROCEDURE — 88305 TISSUE EXAM BY PATHOLOGIST: CPT | Performed by: INTERNAL MEDICINE

## 2024-02-10 PROCEDURE — 97530 THERAPEUTIC ACTIVITIES: CPT

## 2024-02-10 PROCEDURE — 82948 REAGENT STRIP/BLOOD GLUCOSE: CPT

## 2024-02-10 PROCEDURE — 25010000002 CEFTRIAXONE PER 250 MG: Performed by: INTERNAL MEDICINE

## 2024-02-10 PROCEDURE — 99232 SBSQ HOSP IP/OBS MODERATE 35: CPT | Performed by: SURGERY

## 2024-02-10 PROCEDURE — 99222 1ST HOSP IP/OBS MODERATE 55: CPT | Performed by: INTERNAL MEDICINE

## 2024-02-10 PROCEDURE — 25010000002 AZITHROMYCIN PER 500 MG: Performed by: INTERNAL MEDICINE

## 2024-02-10 PROCEDURE — 80048 BASIC METABOLIC PNL TOTAL CA: CPT | Performed by: INTERNAL MEDICINE

## 2024-02-10 PROCEDURE — 25010000002 DIGOXIN PER 500 MCG: Performed by: INTERNAL MEDICINE

## 2024-02-10 PROCEDURE — 88112 CYTOPATH CELL ENHANCE TECH: CPT | Performed by: INTERNAL MEDICINE

## 2024-02-10 PROCEDURE — 83605 ASSAY OF LACTIC ACID: CPT | Performed by: INTERNAL MEDICINE

## 2024-02-10 PROCEDURE — 25810000003 SODIUM CHLORIDE 0.9 % SOLUTION: Performed by: INTERNAL MEDICINE

## 2024-02-10 PROCEDURE — 97162 PT EVAL MOD COMPLEX 30 MIN: CPT

## 2024-02-10 PROCEDURE — 93306 TTE W/DOPPLER COMPLETE: CPT

## 2024-02-10 PROCEDURE — 93306 TTE W/DOPPLER COMPLETE: CPT | Performed by: INTERNAL MEDICINE

## 2024-02-10 PROCEDURE — 85025 COMPLETE CBC W/AUTO DIFF WBC: CPT | Performed by: INTERNAL MEDICINE

## 2024-02-10 RX ORDER — DIGOXIN 0.25 MG/ML
250 INJECTION INTRAMUSCULAR; INTRAVENOUS ONCE
Status: COMPLETED | OUTPATIENT
Start: 2024-02-10 | End: 2024-02-11

## 2024-02-10 RX ORDER — DIGOXIN 0.25 MG/ML
500 INJECTION INTRAMUSCULAR; INTRAVENOUS ONCE
Status: COMPLETED | OUTPATIENT
Start: 2024-02-10 | End: 2024-02-10

## 2024-02-10 RX ORDER — METHIMAZOLE 5 MG/1
5 TABLET ORAL 3 TIMES WEEKLY
Status: DISCONTINUED | OUTPATIENT
Start: 2024-02-12 | End: 2024-02-20 | Stop reason: HOSPADM

## 2024-02-10 RX ADMIN — METOPROLOL TARTRATE 2.5 MG: 1 INJECTION, SOLUTION INTRAVENOUS at 01:51

## 2024-02-10 RX ADMIN — FAMOTIDINE 40 MG: 20 TABLET, FILM COATED ORAL at 08:16

## 2024-02-10 RX ADMIN — CEFTRIAXONE SODIUM 1000 MG: 1 INJECTION, POWDER, FOR SOLUTION INTRAMUSCULAR; INTRAVENOUS at 08:16

## 2024-02-10 RX ADMIN — GUAIFENESIN 1200 MG: 600 TABLET, EXTENDED RELEASE ORAL at 21:12

## 2024-02-10 RX ADMIN — SODIUM CHLORIDE 500 ML: 9 INJECTION, SOLUTION INTRAVENOUS at 10:09

## 2024-02-10 RX ADMIN — SODIUM CHLORIDE 250 ML: 9 INJECTION, SOLUTION INTRAVENOUS at 00:49

## 2024-02-10 RX ADMIN — GUAIFENESIN 1200 MG: 600 TABLET, EXTENDED RELEASE ORAL at 08:16

## 2024-02-10 RX ADMIN — Medication 10 ML: at 21:12

## 2024-02-10 RX ADMIN — Medication 10 ML: at 08:16

## 2024-02-10 RX ADMIN — GABAPENTIN 100 MG: 100 CAPSULE ORAL at 21:12

## 2024-02-10 RX ADMIN — DIGOXIN 500 MCG: 0.25 INJECTION INTRAMUSCULAR; INTRAVENOUS at 10:13

## 2024-02-10 RX ADMIN — METOPROLOL TARTRATE 25 MG: 25 TABLET, FILM COATED ORAL at 08:16

## 2024-02-10 RX ADMIN — AZITHROMYCIN MONOHYDRATE 500 MG: 500 INJECTION, POWDER, LYOPHILIZED, FOR SOLUTION INTRAVENOUS at 17:19

## 2024-02-10 NOTE — THERAPY EVALUATION
Patient Name: Mehreen Silva IV  : 1940    MRN: [id number]                              Today's Date: 2/10/2024       Admit Date: 2024    Visit Dx:     ICD-10-CM ICD-9-CM   1. Pleural effusion  J90 511.9   2. Dyspnea, unspecified type  R06.00 786.09   3. 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    Ankle osteomyelitis, left    Pleural effusion    Dyspnea    Type 2 diabetes mellitus     Past Medical History:   Diagnosis Date    (HFpEF) heart failure with preserved ejection fraction 2022    Arthritis     Atrial fibrillation     Atrial fibrillation, persistent 2022    Cervical spondylosis with myelopathy     CHF (congestive heart failure)     Disease of thyroid gland     HLD (hyperlipidemia) 2022    Hx of toxic multinodular goiter 2022    Lumbar radiculopathy     Pulmonary hypertension     Ulcer with gangrene     right heel     Past Surgical History:   Procedure Laterality Date    ABOVE KNEE AMPUTATION Right 2023    Procedure: ABOVE KNEE AMPUTATION RIGHT, proveena wound vac placement;  Surgeon: Issa Nieves MD;  Location: [facility];  Service: Vascular;  Laterality: Right;    APPENDECTOMY      BACK SURGERY      COLONOSCOPY      EXCISION MASS TRUNK N/A 2016    Procedure: Excision soft tissue neoplasm on abdominal wall and left neck;  Surgeon: Shaji Barahona Jr., MD;  Location: [facility];  Service:     KNEE SURGERY      NECK SURGERY  1974    SHOULDER SURGERY        General Information       Row Name 02/10/24 1455          Physical  Therapy Time and Intention    Document Type evaluation  -CS     Mode of Treatment individual therapy;physical therapy  -CS       Row Name 02/10/24 1457          General Information    Patient Profile Reviewed yes  -CS     Prior Level of Function dependent:;transfer;w/c or scooter;bed mobility  sydni lift  -CS     Existing Precautions/Restrictions fall;other (see comments)  R AKA  -CS     Barriers to Rehab medically complex;previous functional deficit  -CS       Row Name 02/10/24 1455          Living Environment    People in Home spouse  + caregiver  -CS       Row Name 02/10/24 1455          Cognition    Orientation Status (Cognition) oriented to;person;place  -CS       Row Name 02/10/24 1459          Safety Issues, Functional Mobility    Safety Issues Affecting Function (Mobility) insight into deficits/self-awareness;judgment;sequencing abilities  -CS     Impairments Affecting Function (Mobility) balance;cognition;endurance/activity tolerance;pain;postural/trunk control;strength  -CS               User Key  (r) = Recorded By, (t) = Taken By, (c) = Cosigned By      Initials Name Provider Type    CS Lali Walker, PT Physical Therapist                   Mobility       Row Name 02/10/24 2187          Bed Mobility    Bed Mobility supine-sit;sit-supine;scooting/bridging  -CS     Scooting/Bridging [place] (Bed Mobility) dependent (less than 25% patient effort);2 person assist;verbal cues;nonverbal cues (demo/gesture)  -CS     Supine-Sit [place] (Bed Mobility) maximum assist (25% patient effort);dependent (less than 25% patient effort);2 person assist;verbal cues;nonverbal cues (demo/gesture)  -CS     Sit-Supine [place] (Bed Mobility) maximum assist (25% patient effort);dependent (less than 25% patient effort);2 person assist;verbal cues;nonverbal cues (demo/gesture)  -CS     Assistive Device (Bed Mobility) bed rails;draw sheet;head of bed elevated  -CS     Comment, (Bed Mobility) cues for sequencing +  increased time to complete; poor postural control requiring mod/max A to maintain sitting balance  -CS       Row Name 02/10/24 1503          Transfers    Comment, (Transfers) deferred; pt has not stood/transferred in several months - uses a sydni lift at baseline  -       Row Name 02/10/24 1503          Sit-Stand Transfer    Sit-Stand [place] (Transfers) unable to assess  -       Row Name 02/10/24 1503          Gait/Stairs (Locomotion)    [place] Level (Gait) unable to assess  -               User Key  (r) = Recorded By, (t) = Taken By, (c) = Cosigned By      Initials Name Provider Type    CS Lali Walker, PT Physical Therapist                   Obj/Interventions       Row Name 02/10/24 1504          Range of Motion Comprehensive    General Range of Motion bilateral lower extremity ROM WFL  [facility] Name 02/10/24 1504          Strength Comprehensive (MMT)    General Manual Muscle Testing (MMT) Assessment other (see comments)  -CS     Comment, General Manual Muscle Testing (MMT) Assessment generalized weakness; B LE grossly 2+/5  -[facility] Name 02/10/24 1504          Balance    Balance Assessment sitting static balance;sitting dynamic balance  -CS     Static Sitting Balance moderate assist;maximum assist  -CS     Dynamic Sitting Balance maximum assist  -CS     Position, Sitting Balance sitting edge of bed  -CS     Comment, Balance poor postural control/sitting balance  -               User Key  (r) = Recorded By, (t) = Taken By, (c) = Cosigned By      Initials Name Provider Type    Lali [place]cala, PT Physical Therapist                   Goals/Plan    No documentation.                  Clinical Impression       Row Name 02/10/24 1506          Pain    Pretreatment Pain Rating 0/10 - no pain  -CS     Posttreatment Pain Rating 0/10 - no pain  -       Row Name 02/10/24 1506          Plan of Care Review    Plan of Care Reviewed With patient;spouse  -     Outcome Evaluation Pt is a 83  y/o M admitted [facility] with c/o SOA with work-up revealing large right pleural effusion/right lung atelectasis. Chest tube placed on admission. Pt has med hx of R AKA, cervical and lumbar DDD, CHF, Afib, and and HLD. Pt lives with his spouse where he has a hospital bed, WC, and transfers dependently with a sydni lift at baseline. Pt completed supine to and from sit requiring max/dep A x 2. Pt with poor sequencing and initiation to movement. Pt required mod/max A while sitting EOB with overall poor sitting balance. Pt unable to tolerate sitting upright for more than 2 minutes. Pt appears to be at his functional baseline and does not meet skilled critera for acute PT. PT reviewed B LE HEP and discussed with RN regarding possible transfers via sydni lift to the recliner. PT recommends pt return home with 24/7 assist and HHPT at D/C.  -CS       Row Name 02/10/24 1506          Therapy Assessment/Plan (PT)    Criteria for Skilled Interventions Met (PT) no;does not meet criteria for skilled intervention  -CS     Therapy Frequency (PT) evaluation only  -CS       Row Name 02/10/24 1506          Positioning and Restraints    Pre-Treatment Position in bed  -CS     Post Treatment Position bed  -CS     In Bed notified nsg;fowlers;call light within reach;encouraged to call for assist;exit alarm on;L waffle boot;with family/caregiver  -CS               User Key  (r) = Recorded By, (t) = Taken By, (c) = Cosigned By      Initials Name Provider Type    CS Lali Walker, PT Physical Therapist                   Outcome Measures       Row Name 02/10/24 1514 02/10/24 0810       How much help from another person do you currently need...    Turning from your back to your side while in flat bed without using bedrails? 2  -CS 2  -DC    Moving from lying on back to sitting on the side of a flat bed without bedrails? 1  -CS 2  -DC    Moving to and from a bed to a chair (including a wheelchair)? 1  -CS 1  -DC    Standing up from a chair  using your arms (e.g., wheelchair, bedside chair)? 1  -CS 1  -DC    Climbing 3-5 steps with a railing? 1  -CS 1  -DC    To walk in hospital room? 1  -CS 1  -DC    AM-PAC 6 Clicks Score (PT) 7  -CS 8  -DC    Highest Level of Mobility Goal 2 --> Bed activities/dependent transfer  -CS 3 --> Sit at edge of bed  -DC      Row Name 02/10/24 1514          Functional Assessment    Outcome Measure Options AM-PAC 6 Clicks Basic Mobility (PT)  -CS               User Key  (r) = Recorded By, (t) = Taken By, (c) = Cosigned By      Initials Name Provider Type    DC Eli Gleason, RN Registered Nurse    CS Lali Walker, PT Physical Therapist                                 Physical Therapy Education       Title: PT OT SLP Therapies (Done)       Topic: Physical Therapy (Done)       Point: Mobility training (Done)       Learning Progress Summary             Patient Acceptance, E,TB, VU,DU,NR by  at 2/10/2024 1514                         Point: Home exercise program (Done)       Learning Progress Summary             Patient Acceptance, E,TB, VU,DU,NR by  at 2/10/2024 1514                         Point: Body mechanics (Done)       Learning Progress Summary             Patient Acceptance, E,TB, VU,DU,NR by  at 2/10/2024 1514                         Point: Precautions (Done)       Learning Progress Summary             Patient Acceptance, E,TB, VU,DU,NR by  at 2/10/2024 1514                                         User Key       Initials Effective Dates Name Provider Type Discipline     09/22/22 -  Lali Walker PT Physical Therapist PT                  PT Recommendation and Plan     Plan of Care Reviewed With: patient, spouse  Outcome Evaluation: Pt is a 84 y/o M admitted to [facility] with c/o SOA with work-up revealing large right pleural effusion/right lung atelectasis. Chest tube placed on admission. Pt has med hx of R AKA, cervical and lumbar DDD, CHF, Afib, and and HLD. Pt lives with his spouse where he has a hospital  bed, WC, and transfers dependently with a sydni lift at baseline. Pt completed supine to and from sit requiring max/dep A x 2. Pt with poor sequencing and initiation to movement. Pt required mod/max A while sitting EOB with overall poor sitting balance. Pt unable to tolerate sitting upright for more than 2 minutes. Pt appears to be at his functional baseline and does not meet skilled critera for acute PT. PT reviewed B LE HEP and discussed with RN regarding possible transfers via sydni lift to the recliner. PT recommends pt return home with 24/7 assist and HHPT at D/C.     Time Calculation:         PT Charges       Row Name 02/10/24 1515             Time Calculation    Start Time 1345  -CS      Stop Time 1404  -CS      Time Calculation (min) 19 min  -CS      PT Received On 02/10/24  -CS         Time Calculation- PT    Total Timed Code Minutes- PT 14 minute(s)  -CS         Timed Charges    38796 - PT Therapeutic Activity Minutes 14  -CS         Total Minutes    Timed Charges Total Minutes 14  -CS       Total Minutes 14  -CS                User Key  (r) = Recorded By, (t) = Taken By, (c) = Cosigned By      Initials Name Provider Type    CS Lali Walker, PT Physical Therapist                  Therapy Charges for Today       Code Description Service Date Service Provider Modifiers Qty    27918026637 HC PT THERAPEUTIC ACT EA 15 MIN 2/10/2024 Lali Walker, PT GP 1    85966630231 HC PT EVAL MOD COMPLEXITY 3 2/10/2024 Lali Walker, PT GP 1            PT G-Codes  Outcome Measure Options: AM-PAC 6 Clicks Basic Mobility (PT)  AM-PAC 6 Clicks Score (PT): 7  PT Discharge Summary  Anticipated Discharge Disposition (PT): home with 24/7 care, home with home health    Lali Walker PT  2/10/2024

## 2024-02-10 NOTE — PLAN OF CARE
Goal Outcome Evaluation:  Plan of Care Reviewed With: patient        Progress: no change  Outcome Evaluation: VSS, no complaints of pain. bolus given for soft bp and 1x dose of iv lopressor for HR. weaned O2 to 2L during shift. turned pt q2, purewick in place. CT-20 of suction with intermittent air leak, no sub q air noted. pt rested off and on during shift, labs in am.

## 2024-02-10 NOTE — PROGRESS NOTES
LOS: 1 day   Patient Care Team:  Taiwo Powers MD as PCP - General (Internal Medicine)  Self, Bess YO MD as Consulting Physician (Endocrinology)  Huang Chan MD as Cardiologist (Cardiology)    Subjective     Patient is new to me today picking up pulmonary coverage from Dr. Gerardo Tinajero I have reviewed his another records.  Following patient for pleural effusion and dyspnea on exertion.  Patient has a history of congestive heart failure, fibrillation, hypertension, hyperlipidemia peripheral vascular disease with right AKA and he has had prior pleural effusion.  This department was found to have large right pleural effusion and compressive atelectasis.  Patient's breathing is dramatically better postthoracentesis he has minimal cough and minimal clear sputum.  No chest pain or minimal chest pain.  Review of Systems:          Objective     Vital Signs  Vital Sign Min/Max for last 24 hours  Temp  Min: 97.3 °F (36.3 °C)  Max: 98.4 °F (36.9 °C)   BP  Min: 73/55  Max: 163/116   Pulse  Min: 74  Max: 141   Resp  Min: 17  Max: 20   SpO2  Min: 87 %  Max: 100 %   Flow (L/min)  Min: 2  Max: 6   Weight  Min: 60.7 kg (133 lb 13.1 oz)  Max: 61.2 kg (134 lb 14.7 oz)        Ventilator/Non-Invasive Ventilation Settings (From admission, onward)      None                         Body mass index is 19.36 kg/m².  I/O last 3 completed shifts:  In: -   Out: 3250 [Urine:200; Chest Tube:3050]  No intake/output data recorded.        Physical Exam:  General Appearance: Well-developed older white male resting in bed does not look in acute distress on room air oxygen saturations mid upper 90s  Eyes: Conjunctiva are clear and anicteric  ENT: Mucous membranes are moist no exudates  Neck: Trachea midline no visible jugular venous distention  Lungs: He has a few crackles in the right base the left is pretty clear no dullness chest tube with just under a liter of serous fluid  Cardiac: Regular rate rhythm no murmur  Abdomen:  Soft no hepatosplenomegaly or masses  : Not examined  Musculoskeletal: Grossly normal  Skin: Warm and dry no jaundice no petechiae  Neuro: Alert cooperative following commands and grossly intact  Extremities/P Vascular: No clubbing no cyanosis no edema palpable radial and dorsalis pedis pulses  MSE: Seems to be in pretty good spirits       Labs:  Results from last 7 days   Lab Units 02/10/24  0532 02/09/24  1114   GLUCOSE mg/dL 114* 115*   SODIUM mmol/L 135* 142   POTASSIUM mmol/L 4.7 5.0   CO2 mmol/L 21.4* 29.0   CHLORIDE mmol/L 102 103   ANION GAP mmol/L 11.6 10.0   CREATININE mg/dL 0.87 0.94   BUN mg/dL 27* 21   BUN / CREAT RATIO  31.0* 22.3   CALCIUM mg/dL 8.2* 9.1   ALK PHOS U/L 116 145*   TOTAL PROTEIN g/dL 5.4* 6.2   ALT (SGPT) U/L 19 26   AST (SGOT) U/L 18 25   BILIRUBIN mg/dL 1.2 0.9   ALBUMIN g/dL 2.5* 3.4*   GLOBULIN gm/dL  --  2.8     Estimated Creatinine Clearance: 55.7 mL/min (by C-G formula based on SCr of 0.87 mg/dL).      Results from last 7 days   Lab Units 02/10/24  0532 02/09/24  1114   WBC 10*3/mm3 12.14* 7.58   RBC 10*6/mm3 5.86* 5.92*   HEMOGLOBIN g/dL 14.2 14.7   HEMATOCRIT % 45.0 46.6   MCV fL 76.8* 78.7*   MCH pg 24.2* 24.8*   MCHC g/dL 31.6 31.5   RDW % 18.4* 18.7*   RDW-SD fl 47.3 49.8   MPV fL 11.2 10.7   PLATELETS 10*3/mm3 233 199   NEUTROPHIL % % 85.9* 78.1*   LYMPHOCYTE % % 7.5* 12.8*   MONOCYTES % % 6.0 7.8   EOSINOPHIL % % 0.0* 0.7   BASOPHIL % % 0.2 0.5   IMM GRAN % % 0.4 0.1   NEUTROS ABS 10*3/mm3 10.42* 5.92   LYMPHS ABS 10*3/mm3 0.91 0.97   MONOS ABS 10*3/mm3 0.73 0.59   EOS ABS 10*3/mm3 0.00 0.05   BASOS ABS 10*3/mm3 0.03 0.04   IMMATURE GRANS (ABS) 10*3/mm3 0.05 0.01   NRBC /100 WBC 0.1 0.0     Results from last 7 days   Lab Units 02/09/24  1907   PH, ARTERIAL pH units 7.423   PO2 ART mm Hg 52.9*   PCO2, ARTERIAL mm Hg 38.1   HCO3 ART mmol/L 24.9     Results from last 7 days   Lab Units 02/09/24  1401 02/09/24  1114   HSTROP T ng/L 60* 65*     Results from last 7 days   Lab  Units 02/09/24  1114   PROBNP pg/mL 4,357.0*     Results from last 7 days   Lab Units 02/09/24  1914 02/09/24  1401   TSH uIU/mL  --  0.679   T3 FREE pg/mL 1.6*  --    FREE T4 ng/dL  --  1.66     Results from last 7 days   Lab Units 02/10/24  0532 02/09/24  1401 02/09/24  1114   LACTATE mmol/L 2.0 2.0 2.2*   PROCALCITONIN ng/mL  --   --  0.08         Microbiology Results (last 10 days)       Procedure Component Value - Date/Time    Legionella Antigen, Urine - Urine, Urine, Clean Catch [[id number]]  (Normal) Collected: 02/09/24 2233    Lab Status: Final result Specimen: Urine, Clean Catch Updated: 02/09/24 2307     LEGIONELLA ANTIGEN, URINE Negative    S. Pneumo Ag Urine or CSF - Urine, Urine, Clean Catch [[id number]]  (Normal) Collected: 02/09/24 2233    Lab Status: Final result Specimen: Urine, Clean Catch Updated: 02/09/24 2307     Strep Pneumo Ag Negative    Respiratory Panel PCR w/COVID-19(SARS-CoV-2) [facility] In-House, NP Swab in UTM/VTM, 2 HR TAT - Swab, Nasopharynx [[id number]]  (Normal) Collected: 02/09/24 1740    Lab Status: Final result Specimen: Swab from Nasopharynx Updated: 02/09/24 1905     ADENOVIRUS, PCR Not Detected     Coronavirus 229E Not Detected     Coronavirus HKU1 Not Detected     Coronavirus NL63 Not Detected     Coronavirus OC43 Not Detected     COVID19 Not Detected     Human Metapneumovirus Not Detected     Human Rhinovirus/Enterovirus Not Detected     Influenza A PCR Not Detected     Influenza B PCR Not Detected     Parainfluenza Virus 1 Not Detected     Parainfluenza Virus 2 Not Detected     Parainfluenza Virus 3 Not Detected     Parainfluenza Virus 4 Not Detected     RSV, PCR Not Detected     Bordetella pertussis pcr Not Detected     Bordetella parapertussis PCR Not Detected     Chlamydophila pneumoniae PCR Not Detected     Mycoplasma pneumo by PCR Not Detected    Narrative:      In the setting of a positive respiratory panel with a viral infection PLUS a negative  procalcitonin without other underlying concern for bacterial infection, consider observing off antibiotics or discontinuation of antibiotics and continue supportive care. If the respiratory panel is positive for atypical bacterial infection (Bordetella pertussis, Chlamydophila pneumoniae, or Mycoplasma pneumoniae), consider antibiotic de-escalation to target atypical bacterial infection.    Respiratory Culture - Sputum, Cough [[id number]] Collected: 02/09/24 1733    Lab Status: Final result Specimen: Sputum from Cough Updated: 02/09/24 1958     Respiratory Culture Rejected     Gram Stain Few (2+) Epithelial cells seen      Few (2+) WBCs seen      Few (2+) Gram negative bacilli      Few (2+) Gram positive cocci in pairs    Narrative:      Specimen rejected due to oropharyngeal contamination. Please reorder and recollect specimen if clinically necessary.    Body Fluid Culture - Body Fluid, Pleural Cavity [[id number]] Collected: 02/09/24 1532    Lab Status: Preliminary result Specimen: Body Fluid from Pleural Cavity Updated: 02/10/24 0926     Body Fluid Culture No growth     Gram Stain Rare (1+) WBCs seen      No organisms seen                azithromycin, 500 mg, Intravenous, Q24H  cefTRIAXone, 1,000 mg, Intravenous, Q24H  famotidine, 40 mg, Oral, Daily  furosemide, 20 mg, Oral, Daily  gabapentin, 100 mg, Oral, Nightly  guaiFENesin, 1,200 mg, Oral, Q12H  insulin lispro, 2-7 Units, Subcutaneous, 4x Daily AC & at Bedtime  metoprolol tartrate, 25 mg, Oral, Q12H  sodium chloride, 10 mL, Intravenous, Q12H      hold,         Diagnostics:  XR Chest 1 View    Result Date: 2/9/2024  XR CHEST 1 VW-  HISTORY: Male who is 83 years-old, chest tube management  TECHNIQUE: Frontal view of the chest  COMPARISON: 2/9/2024  FINDINGS: Right chest tube appears stable. Heart size is borderline. Aorta is calcified. Pulmonary vasculature is unremarkable. Opacity in the right mid to lower lung is increased, suggesting increased  "infiltrate/atelectasis. Residual right pleural effusion appears similar to prior exam. There appears to be 2 cm right apical pneumothorax, appearance could reflect \"trapped lung\" phenomenon. No left pneumothorax. Otherwise stable.      As described.    This report was finalized on 2/9/2024 7:34 PM by Dr. Jun Maldonado M.D on Workstation: [email or web address]      CT Angiogram Chest    Result Date: 2/9/2024  CT ANGIOGRAM OF THE CHEST WITH CONTRAST INCLUDING RECONSTRUCTION IMAGES 02/09/2024  HISTORY: Shortness of breath.  Following the intravenous contrast injection CT angiography was performed through the chest. Sagittal, coronal and 3D reconstruction images were reviewed.  The pulmonary arterial system is somewhat heterogeneous but no definite pulmonary embolus is seen. The heterogeneity is probably artifact.  There is a large right pleural effusion with atelectasis of the right lung. There is a small left pleural effusion.  Small amount of fluid is seen in pericardial recesses. There is some aortic and coronary calcification.      1. Large right pleural effusion with atelectasis of the right lung. The right hemithorax is completely opacified. 2. Small left pleural effusion. 3. The pulmonary arterial system is somewhat heterogeneous but no definite pulmonary embolus is seen.    Radiation dose reduction techniques were utilized, including automated exposure control and exposure modulation based on body size.   This report was finalized on 2/9/2024 5:41 PM by Dr. Romeo Cummings M.D on Workstation: [id number]      CT Guided Chest Tube    Result Date: 2/9/2024  CT-GUIDED RIGHT CHEST TUBE PLACEMENT 02/09/2024  HISTORY: Large right pleural effusion.  TECHNIQUE: After signed informed consent was obtained the patient was prepped and draped in the right posterior oblique position. Lidocaine was used for local anesthesia.  CT guidance was used to place a 14 English pigtail catheter into the right pleural fluid collection. " Approximately 1600 cc of nonpurulent serous fluid was removed.  The catheter was left in place and connected to the atrium suction.  Confirmatory images were obtained.  Patient tolerated the procedure well with no complications.      1. Successful placement of 14 Macedonian pigtail catheter into the right pleural fluid collection with removal of 1600 cc of nonpurulent fluid.   Radiation dose reduction techniques were utilized, including automated exposure control and exposure modulation based on body size.   This report was finalized on 2/9/2024 5:40 PM by Dr. Romeo Cummings M.D on Workstation: [id number]      XR Chest 1 View    Result Date: 2/9/2024  AP CHEST  HISTORY: Evaluate pleural effusion  COMPARISON: Earlier today  FINDINGS: Status post placement of right chest tube. Significant decrease in size of right pleural effusion with significant improved aeration of the right lung. Heart size stable and left lung remains clear.      Significant increase in size of right pleural effusion    This report was finalized on 2/9/2024 5:03 PM by Dr. Randy Villa M.D on Workstation: [id number]      XR Chest 1 View    Result Date: 2/9/2024  CHEST SINGLE VIEW  HISTORY: Chest pain. Shortness of air and cough.  COMPARISON: AP chest 11/09/2023, CT chest 11/12/2023.  FINDINGS: There has developed complete white out of the right thorax due to large right pleural effusion and associated right basilar atelectasis or infiltrate. Left lung appears clear. There appears to be mild right to left cardiomediastinal shift that is likely related to the presence of pleural fluid.      New white out of the right lung appears to be mostly related to a large pleural effusion, though there is also a suspected component of atelectasis or infiltrate. There is mild right to left cardiomediastinal shift. CT would be the best means to further evaluate.  This report was finalized on 2/9/2024 11:09 AM by Dr. Ciro Casey M.D on Workstation:  [id number]      Results for orders placed during the hospital encounter of 11/08/23    Adult Transthoracic Echo Complete W/ Cont if Necessary Per Protocol    Interpretation Summary    Left ventricular systolic function is hyperdynamic (EF > 70%).    Left ventricular wall thickness is consistent with mild concentric hypertrophy with component of basal septal asymmetric hypertrophy.    Left ventricular diastolic function is consistent with (grade II w/high LAP) pseudonormalization.    RV cavity mass near the apex likely part of papillary muscle but can not rule out mass.    Calculated right ventricular systolic pressure from tricuspid regurgitation is 41 mmHg.      I reviewed chest radiographs he had complete whiteout of the right hemithorax chest tube placement removed large pleural effusion there was pulmonary infiltrates/edema postdrainage that has not completely cleared but it looks like it is significantly better than on this morning's image than yesterday evening's image.    Active Hospital Problems    Diagnosis  POA    **Pleural effusion [J90]  Yes    Dyspnea [R06.00]  Yes    Type 2 diabetes mellitus [E11.9]  Yes    Chronic diastolic CHF (congestive heart failure) [I50.32]  Yes    Hyperthyroidism [E05.90]  Yes    A-fib [I48.91]  Yes    Dyslipidemia [E78.5]  Yes      Resolved Hospital Problems   No resolved problems to display.         Assessment & Plan     Large right pleural effusion with complete right lung atelectasis status post chest tube placement with drainage of effusion there was what appears to be some reexpansion pulmonary edema that appears to be improving will need to continue follow-up.  Unfortunately I do not see any pleural fluid studies it looks like he had a little over 3 L of fluid out the chest tube yesterday about 1 L overnight.  Out pleural fluid studies it is going to be difficult to determine the etiology.  Have asked nursing to try and get cytology on the fluid that we have.  Hypotension  this may be in part just a fluid shift after his large-volume pleural fluid output.  Looks like pressures are better this morning as his heart rate    Plan for disposition:    Ridge Garcia Jr, MD  02/10/24  11:38 EST    Time:

## 2024-02-10 NOTE — PROGRESS NOTES
Progress note    Reason for consult: Right pleural effusion s/p chest tube placement.    No acute issues overnight.  Pain well-controlled.  Breathing much improved after chest tube placement.    No acute distress.  Alert, oriented x 3.  Nonlabored breathing.  On room air.  Chest tube with serous drainage.  No airleak.  Hemodynamically stable.  Extremities warm.  Moving all 4 extremities.    Labs reviewed.  Imaging reviewed.  X-ray showed adequate lung expansion.  Still has small basilar pleural effusion on the right side.    He is doing well after chest tube placement.  There was more than 3000 cc of pleural effusion drained.  Lungs showed adequate expansion.  He has persistent small right-sided effusion.  The pleural fluid was exudative.  He denied exposure to asbestos.  He denied chronic tobacco abuse.  He reported losing 6 to 7 pounds in the last 4 months.  I discussed with him and his wife in detail the etiology of pleural effusion.  He has normal LVEF.  There was report of grade 2 diastolic dysfunction on transthoracic echo.  Atrial fibrillation and diastolic heart dysfunction could be potential etiology.  I am not fully convinced that the right effusion is due due to cardiac abnormalities.  It is unusual for systemic problem such as cardiac disease, kidney disease or malnutrition to cause unilateral effusion.  The pleural fluid culture has been negative so for, which could be negative in more than 50% of cases after initiation of antibiotics.    I recommended obtaining CT scan of the chest to assess pulmonary parenchyma and pleural.  I also discussed the potential of right thoracoscopy and pleural biopsy to establish diagnosis.  I will follow-up after the CT scan of the chest.    Rolando Santoro MD  Thoracic surgery

## 2024-02-10 NOTE — PLAN OF CARE
Goal Outcome Evaluation:  Plan of Care Reviewed With: patient, spouse           Outcome Evaluation: Pt is a 82 y/o M admitted to [facility] with c/o SOA with work-up revealing large right pleural effusion/right lung atelectasis. Chest tube placed on admission. Pt has med hx of R AKA, cervical and lumbar DDD, CHF, Afib, and and HLD. Pt lives with his spouse where he has a hospital bed, , and transfers dependently with a sydni lift at baseline. Pt completed supine to and from sit requiring max/dep A x 2. Pt with poor sequencing and initiation to movement. Pt required mod/max A while sitting EOB with overall poor sitting balance. Pt unable to tolerate sitting upright for more than 2 minutes. Pt appears to be at his functional baseline and does not meet skilled critera for acute PT. PT reviewed B LE HEP and discussed with RN regarding possible transfers via sydni lift to the recliner. PT recommends pt return home with 24/7 assist and HHPT at D/C.      Anticipated Discharge Disposition (PT): home with 24/7 care, home with home health

## 2024-02-10 NOTE — PROGRESS NOTES
Name: Mehreen Silva IV ADMIT: 2024   : 1940  PCP: Taiwo Powers MD    MRN: [id number] LOS: 1 days   AGE/SEX: 83 y.o. male  ROOM: [facility]     Subjective   Subjective   Chief Complaint   Patient presents with    Chest Pain     Dyspnea feels much improved since getting the chest tube.  He still has occasional cough.  Not reporting chest pain or palpitations.  No nausea vomiting or abdominal pain.     Objective   Objective   Vital Signs  Temp:  [97.3 °F (36.3 °C)-98.5 °F (36.9 °C)] 98.5 °F (36.9 °C)  Heart Rate:  [] 90  Resp:  [17-20] 20  BP: ()/() 98/74  SpO2:  [87 %-100 %] 95 %  on  Flow (L/min):  [2-6] 2;   Device (Oxygen Therapy): room air  Body mass index is 19.36 kg/m².    Physical Exam  Vitals and nursing note reviewed.   Constitutional:       General: He is not in acute distress.     Appearance: He is not diaphoretic.   HENT:      Head: Atraumatic.   Eyes:      Conjunctiva/sclera: Conjunctivae normal.   Cardiovascular:      Rate and Rhythm: Normal rate. Rhythm irregular.   Pulmonary:      Effort: Pulmonary effort is normal.      Breath sounds: No wheezing.      Comments: Chest tube  Abdominal:      General: There is no distension.      Palpations: Abdomen is soft.      Tenderness: There is no abdominal tenderness. There is no guarding or rebound.   Musculoskeletal:         General: Deformity (r aka) present. No tenderness.   Skin:     General: Skin is warm and dry.   Neurological:      Mental Status: He is alert.      Cranial Nerves: No cranial nerve deficit.   Psychiatric:         Mood and Affect: Mood normal.         Behavior: Behavior normal.       Results Review  I reviewed the patient's new clinical results.    Results from last 7 days   Lab Units 02/10/24  0532 24  1114   WBC 10*3/mm3 12.14* 7.58   HEMOGLOBIN g/dL 14.2 14.7   PLATELETS 10*3/mm3 233 199     Results from last 7 days   Lab Units 02/10/24  0532 24  1114   SODIUM mmol/L 135* 142   POTASSIUM  mmol/L 4.7 5.0   CHLORIDE mmol/L 102 103   CO2 mmol/L 21.4* 29.0   BUN mg/dL 27* 21   CREATININE mg/dL 0.87 0.94   GLUCOSE mg/dL 114* 115*   EGFR mL/min/1.73 85.6 80.4     Results from last 7 days   Lab Units 02/10/24  0532 02/09/24  1114   ALBUMIN g/dL 2.5* 3.4*   BILIRUBIN mg/dL 1.2 0.9   ALK PHOS U/L 116 145*   AST (SGOT) U/L 18 25   ALT (SGPT) U/L 19 26     Results from last 7 days   Lab Units 02/10/24  0532 02/09/24  1114   CALCIUM mg/dL 8.2* 9.1   ALBUMIN g/dL 2.5* 3.4*     Results from last 7 days   Lab Units 02/10/24  0532 02/09/24  1401 02/09/24  1114   PROCALCITONIN ng/mL  --   --  0.08   LACTATE mmol/L 2.0 2.0 2.2*     Hemoglobin A1C   Date/Time Value Ref Range Status   02/09/2024 1914 5.40 4.80 - 5.60 % Final     Glucose   Date/Time Value Ref Range Status   02/10/2024 1136 112 70 - 130 mg/dL Final   02/10/2024 0624 106 70 - 130 mg/dL Final   02/09/2024 1935 88 70 - 130 mg/dL Final       XR Chest 1 View    Result Date: 2/10/2024  As described.  This report was finalized on 2/10/2024 3:09 PM by Dr. Jun Maldonado M.D on Workstation: [email or web address]      XR Chest 1 View    Result Date: 2/9/2024  As described.    This report was finalized on 2/9/2024 7:34 PM by Dr. Jun Maldonado M.D on Workstation: [email or web address]      CT Angiogram Chest    Result Date: 2/9/2024  1. Large right pleural effusion with atelectasis of the right lung. The right hemithorax is completely opacified. 2. Small left pleural effusion. 3. The pulmonary arterial system is somewhat heterogeneous but no definite pulmonary embolus is seen.    Radiation dose reduction techniques were utilized, including automated exposure control and exposure modulation based on body size.   This report was finalized on 2/9/2024 5:41 PM by Dr. Romeo Cummings M.D on Workstation: [id number]      CT Guided Chest Tube    Result Date: 2/9/2024  1. Successful placement of 14 Thai pigtail catheter into the right pleural fluid collection with removal of 1600 cc of  nonpurulent fluid.   Radiation dose reduction techniques were utilized, including automated exposure control and exposure modulation based on body size.   This report was finalized on 2/9/2024 5:40 PM by Dr. Romeo Cummings M.D on Workstation: [id number]      XR Chest 1 View    Result Date: 2/9/2024  Significant increase in size of right pleural effusion    This report was finalized on 2/9/2024 5:03 PM by Dr. Randy Villa M.D on Workstation: [id number]      XR Chest 1 View    Result Date: 2/9/2024  New white out of the right lung appears to be mostly related to a large pleural effusion, though there is also a suspected component of atelectasis or infiltrate. There is mild right to left cardiomediastinal shift. CT would be the best means to further evaluate.  This report was finalized on 2/9/2024 11:09 AM by Dr. Ciro Casey M.D on Workstation: [id number]       I have personally reviewed all medications:  Scheduled Medications  azithromycin, 500 mg, Intravenous, Q24H  cefTRIAXone, 1,000 mg, Intravenous, Q24H  famotidine, 40 mg, Oral, Daily  furosemide, 20 mg, Oral, Daily  gabapentin, 100 mg, Oral, Nightly  guaiFENesin, 1,200 mg, Oral, Q12H  insulin lispro, 2-7 Units, Subcutaneous, 4x Daily AC & at Bedtime  metoprolol tartrate, 25 mg, Oral, Q12H  sodium chloride, 10 mL, Intravenous, Q12H      Infusions  hold,       Diet  Diet: Cardiac Diets, Diabetic Diets; Healthy Heart (2-3 Na+); Consistent Carbohydrate; No Straw; Texture: Regular Texture (IDDSI 7); Fluid Consistency: Thin (IDDSI 0)    I have personally reviewed:  [x]  Laboratory   [x]  Microbiology   [x]  Radiology   [x]  EKG/Telemetry  []  Cardiology/Vascular   []  Pathology    [x]  Records       Assessment/Plan     Active Hospital Problems    Diagnosis  POA    **Pleural effusion [J90]  Yes    Dyspnea [R06.00]  Yes    Type 2 diabetes mellitus [E11.9]  Yes    Chronic diastolic CHF (congestive heart failure) [I50.32]  Yes    Hyperthyroidism [E05.90]  Yes     A-fib [I48.91]  Yes    Dyslipidemia [E78.5]  Yes      Resolved Hospital Problems   No resolved problems to display.       83 y.o. male admitted with Pleural effusion.    Large right pleural effusion/right lung atelectasis: Had opacification of right hemithorax.  Status post chest tube placement with improvement.  Body fluid culture no growth to date.  I do see a cytology/path order as active in the lab.  Leukocytosis present today.  He is on Rocephin and azithromycin currently.  Repeat CT chest planned . Pulmonology and thoracic surgery following.  Chronic diastolic heart failure/A-fib: Rate okay on exam.  Anticoagulation has been held.  Lasix and metoprolol may be limited by low blood pressure currently.  Received digoxin earlier today.  Patient does not report any dizziness while sitting up in bed.  Echo pending.  Cardiology consulted  Hypertension: Not grossly elevated.  Plan to monitor.  Hyperthyroidism: TSH and free T4 were okay.  Continue Tapazole  Jardiance was written by cardiology in clinic due to issues of patient not taking Lasix daily at home.  Reviewed outpatient endocrinology note and there is no mention of diabetes.  A1c is not elevated.  Will resolve this diagnosis.  PPX: AC on hold for chest tube.  Resume home blood thinner when okay with thoracic surgery.  Disposition: TBD    Expected discharge date/ time has not been documented.     Feliciano Torres MD  [facility]  02/10/24  15:25 EST    Dictated portions of note using Dragon dictation software.  Copied text in this note has been reviewed by me and remains accurate as of 02/10/24

## 2024-02-10 NOTE — PLAN OF CARE
Problem: Adult Inpatient Plan of Care  Goal: Plan of Care Review  Outcome: Ongoing, Progressing  Flowsheets (Taken 2/10/2024 1605)  Progress: no change  Plan of Care Reviewed With: patient   Goal Outcome Evaluation:  Plan of Care Reviewed With: patient        Progress: no change       Pt was able to work with therapy today. Pt right side chest tube is to -20LWS, dressing is clean, dry, and intact. Pt left foot in a heel boot, mepilex to heel wound. Pt HR is now in a controlled rate. VSS

## 2024-02-11 ENCOUNTER — APPOINTMENT (OUTPATIENT)
Dept: CT IMAGING | Facility: HOSPITAL | Age: 84
DRG: 186 | End: 2024-02-11
Payer: MEDICARE

## 2024-02-11 ENCOUNTER — APPOINTMENT (OUTPATIENT)
Dept: GENERAL RADIOLOGY | Facility: HOSPITAL | Age: 84
DRG: 186 | End: 2024-02-11
Payer: MEDICARE

## 2024-02-11 LAB
ANION GAP SERPL CALCULATED.3IONS-SCNC: 8.2 MMOL/L (ref 5–15)
BASOPHILS # BLD AUTO: 0.02 10*3/MM3 (ref 0–0.2)
BASOPHILS NFR BLD AUTO: 0.3 % (ref 0–1.5)
BUN SERPL-MCNC: 22 MG/DL (ref 8–23)
BUN/CREAT SERPL: 27.2 (ref 7–25)
CALCIUM SPEC-SCNC: 7.8 MG/DL (ref 8.6–10.5)
CHLORIDE SERPL-SCNC: 106 MMOL/L (ref 98–107)
CO2 SERPL-SCNC: 24.8 MMOL/L (ref 22–29)
CREAT SERPL-MCNC: 0.81 MG/DL (ref 0.76–1.27)
DEPRECATED RDW RBC AUTO: 49.8 FL (ref 37–54)
DIGOXIN SERPL-MCNC: 2.3 NG/ML (ref 0.6–1.2)
EGFRCR SERPLBLD CKD-EPI 2021: 87.5 ML/MIN/1.73
EOSINOPHIL # BLD AUTO: 0.07 10*3/MM3 (ref 0–0.4)
EOSINOPHIL NFR BLD AUTO: 1 % (ref 0.3–6.2)
ERYTHROCYTE [DISTWIDTH] IN BLOOD BY AUTOMATED COUNT: 17.8 % (ref 12.3–15.4)
GLUCOSE SERPL-MCNC: 88 MG/DL (ref 65–99)
HCT VFR BLD AUTO: 37.8 % (ref 37.5–51)
HGB BLD-MCNC: 11.9 G/DL (ref 13–17.7)
IMM GRANULOCYTES # BLD AUTO: 0.02 10*3/MM3 (ref 0–0.05)
IMM GRANULOCYTES NFR BLD AUTO: 0.3 % (ref 0–0.5)
LYMPHOCYTES # BLD AUTO: 0.74 10*3/MM3 (ref 0.7–3.1)
LYMPHOCYTES NFR BLD AUTO: 10.7 % (ref 19.6–45.3)
MCH RBC QN AUTO: 24.6 PG (ref 26.6–33)
MCHC RBC AUTO-ENTMCNC: 31.5 G/DL (ref 31.5–35.7)
MCV RBC AUTO: 78.3 FL (ref 79–97)
MONOCYTES # BLD AUTO: 0.86 10*3/MM3 (ref 0.1–0.9)
MONOCYTES NFR BLD AUTO: 12.4 % (ref 5–12)
NEUTROPHILS NFR BLD AUTO: 5.23 10*3/MM3 (ref 1.7–7)
NEUTROPHILS NFR BLD AUTO: 75.3 % (ref 42.7–76)
NRBC BLD AUTO-RTO: 0 /100 WBC (ref 0–0.2)
PLATELET # BLD AUTO: 179 10*3/MM3 (ref 140–450)
PMV BLD AUTO: 11.2 FL (ref 6–12)
POTASSIUM SERPL-SCNC: 4.6 MMOL/L (ref 3.5–5.2)
RBC # BLD AUTO: 4.83 10*6/MM3 (ref 4.14–5.8)
SODIUM SERPL-SCNC: 139 MMOL/L (ref 136–145)
WBC NRBC COR # BLD AUTO: 6.94 10*3/MM3 (ref 3.4–10.8)

## 2024-02-11 PROCEDURE — 25010000002 CEFTRIAXONE PER 250 MG: Performed by: INTERNAL MEDICINE

## 2024-02-11 PROCEDURE — 3E1L38Z IRRIGATION OF PLEURAL CAVITY USING IRRIGATING SUBSTANCE, PERCUTANEOUS APPROACH: ICD-10-PCS | Performed by: NURSE PRACTITIONER

## 2024-02-11 PROCEDURE — 25010000002 DIGOXIN PER 500 MCG

## 2024-02-11 PROCEDURE — 71250 CT THORAX DX C-: CPT

## 2024-02-11 PROCEDURE — 80162 ASSAY OF DIGOXIN TOTAL: CPT | Performed by: INTERNAL MEDICINE

## 2024-02-11 PROCEDURE — 99232 SBSQ HOSP IP/OBS MODERATE 35: CPT | Performed by: INTERNAL MEDICINE

## 2024-02-11 PROCEDURE — 32561 LYSE CHEST FIBRIN INIT DAY: CPT | Performed by: NURSE PRACTITIONER

## 2024-02-11 PROCEDURE — 71045 X-RAY EXAM CHEST 1 VIEW: CPT

## 2024-02-11 PROCEDURE — 99232 SBSQ HOSP IP/OBS MODERATE 35: CPT | Performed by: NURSE PRACTITIONER

## 2024-02-11 PROCEDURE — 25010000002 AZITHROMYCIN PER 500 MG: Performed by: INTERNAL MEDICINE

## 2024-02-11 PROCEDURE — 25010000002 ALTEPLASE PER 1 MG: Performed by: SURGERY

## 2024-02-11 PROCEDURE — 80048 BASIC METABOLIC PNL TOTAL CA: CPT | Performed by: INTERNAL MEDICINE

## 2024-02-11 PROCEDURE — 25810000003 SODIUM CHLORIDE 0.9 % SOLUTION 250 ML FLEX CONT: Performed by: INTERNAL MEDICINE

## 2024-02-11 PROCEDURE — 3E0L3GC INTRODUCTION OF OTHER THERAPEUTIC SUBSTANCE INTO PLEURAL CAVITY, PERCUTANEOUS APPROACH: ICD-10-PCS | Performed by: NURSE PRACTITIONER

## 2024-02-11 PROCEDURE — 85025 COMPLETE CBC W/AUTO DIFF WBC: CPT | Performed by: INTERNAL MEDICINE

## 2024-02-11 RX ADMIN — GUAIFENESIN 1200 MG: 600 TABLET, EXTENDED RELEASE ORAL at 20:55

## 2024-02-11 RX ADMIN — GABAPENTIN 100 MG: 100 CAPSULE ORAL at 20:55

## 2024-02-11 RX ADMIN — ALTEPLASE 10 MG: KIT at 10:21

## 2024-02-11 RX ADMIN — Medication 10 ML: at 20:55

## 2024-02-11 RX ADMIN — OXYCODONE HYDROCHLORIDE 5 MG: 5 TABLET ORAL at 17:04

## 2024-02-11 RX ADMIN — Medication 10 ML: at 08:56

## 2024-02-11 RX ADMIN — DORNASE ALFA 5 MG: 1 SOLUTION RESPIRATORY (INHALATION) at 10:21

## 2024-02-11 RX ADMIN — AZITHROMYCIN MONOHYDRATE 500 MG: 500 INJECTION, POWDER, LYOPHILIZED, FOR SOLUTION INTRAVENOUS at 17:03

## 2024-02-11 RX ADMIN — CEFTRIAXONE SODIUM 1000 MG: 1 INJECTION, POWDER, FOR SOLUTION INTRAMUSCULAR; INTRAVENOUS at 08:55

## 2024-02-11 RX ADMIN — FAMOTIDINE 40 MG: 20 TABLET, FILM COATED ORAL at 08:55

## 2024-02-11 RX ADMIN — DIGOXIN 250 MCG: 0.25 INJECTION INTRAMUSCULAR; INTRAVENOUS at 00:55

## 2024-02-11 RX ADMIN — GUAIFENESIN 1200 MG: 600 TABLET, EXTENDED RELEASE ORAL at 08:55

## 2024-02-11 NOTE — CONSULTS
Date of Consultation: 02/10/24    Referral Provider: Feliciano Torres MD     Reason for Consultation: Rapid atrial fibrillation, right pleural effusion.    Encounter Provider: Denny Gentile MD    Group of Service: [facility]     Patient Name: Mehreen Silva IV    :1940    Chief complaint: Shortness of breath.    History of Present Illness:      This is a very pleasant 83 year-old male who is normally followed by Dr. Huang Chan in our practice.  He has a history of diastolic congestive heart failure, recurrent pleural effusions, and persistent atrial fibrillation.  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    He presented to the emergency department was admitted with increased shortness of breath.  The symptoms gradually worsened, but significantly worsened approximately 2 days ago.  He has also had more coughing.  A CT angiogram on admission showed a large right pleural effusion with atelectasis of the right lung, as well as a small left pleural effusion.  The entire right hemithorax was completely opacified.  He subsequently underwent placement of a CT-guided right chest tube placement with removal of 1600 cc of fluid.  It has continued to drain thus far at over 3 L.  Analysis of the fluid revealed it is exudative.    Today when I saw him earlier, he was hypotensive and actually appeared to be on the drier side.  He was given a fluid bolus.  He was also in rapid atrial fibrillation, and was given IV digoxin.  He also had an echocardiogram earlier today which showed hyperdynamic ejection fraction, normal right ventricular function, and mild to moderate mitral regurgitation.    Past Medical History:   Diagnosis Date    (HFpEF) heart failure with preserved ejection fraction 2022    Arthritis     Atrial fibrillation     Atrial fibrillation, persistent 2022    Cervical spondylosis with myelopathy      CHF (congestive heart failure)     Disease of thyroid gland     HLD (hyperlipidemia) 09/23/2022    Hx of toxic multinodular goiter 09/23/2022    Lumbar radiculopathy     Pulmonary hypertension     Ulcer with gangrene     right heel         Past Surgical History:   Procedure Laterality Date    ABOVE KNEE AMPUTATION Right 12/13/2023    Procedure: ABOVE KNEE AMPUTATION RIGHT, proveena wound vac placement;  Surgeon: Issa Nieves MD;  Location: [facility] OR;  Service: Vascular;  Laterality: Right;    APPENDECTOMY      BACK SURGERY      COLONOSCOPY      EXCISION MASS TRUNK N/A 6/8/2016    Procedure: Excision soft tissue neoplasm on abdominal wall and left neck;  Surgeon: Shaji Barahona Jr., MD;  Location: [facility] OR;  Service:     KNEE SURGERY      NECK SURGERY  1974    SHOULDER SURGERY           No Known Allergies      No current facility-administered medications on file prior to encounter.     Current Outpatient Medications on File Prior to Encounter   Medication Sig Dispense Refill    cholecalciferol (VITAMIN D3) 25 MCG (1000 UT) tablet Take 1 tablet by mouth Daily.      Eliquis 5 MG tablet tablet TAKE ONE TABLET BY MOUTH EVERY 12 HOURS 180 tablet 3    empagliflozin (JARDIANCE) 10 MG tablet tablet Take 1 tablet by mouth Daily. 90 tablet 1    ezetimibe (ZETIA) 10 MG tablet Take 1 tablet by mouth Daily.      furosemide (LASIX) 20 MG tablet Take 1 tablet by mouth Daily.      gabapentin (NEURONTIN) 100 MG capsule Take 1 capsule by mouth every night at bedtime.      methIMAzole (TAPAZOLE) 5 MG tablet Take 1 tablet by mouth 3 (Three) Times a Week. Monday, Wednesday, and Friday      metoprolol tartrate (LOPRESSOR) 25 MG tablet TAKE ONE TABLET BY MOUTH EVERY 12 HOURS (Patient taking differently: Take 1 tablet by mouth 2 (Two) Times a Day.) 180 tablet 3    multivitamin with minerals tablet tablet Take 1 tablet by mouth Daily.           Social History     Socioeconomic History    Marital status:     Number  "of children: 1    Highest education level: Professional school degree (e.g., MD, DDS, DVM, BRITNEY)   Tobacco Use    Smoking status: Former     Types: Cigarettes     Quit date: 1983     Years since quittin.7    Smokeless tobacco: Never   Vaping Use    Vaping Use: Never used   Substance and Sexual Activity    Alcohol use: Yes     Comment: SOCIAL    Drug use: No    Sexual activity: Defer         Family History   Problem Relation Age of Onset    Cancer Mother     Heart disease Father     Aneurysm Father     Malig Hyperthermia Neg Hx        REVIEW OF SYSTEMS:   Pertinent positives are noted in the HPI above.  Otherwise, all other systems were reviewed, and are negative.     Objective:     Vitals:    02/10/24 1700 02/10/24 1730 02/10/24 1800 02/10/24 1830   BP: 100/63 105/47 (!) 83/51 90/56   BP Location:       Patient Position:       Pulse: (!) 147 116 98 83   Resp:       Temp:       TempSrc:       SpO2:       Weight:       Height:         Body mass index is 19.36 kg/m².  Flowsheet Rows      Flowsheet Row First Filed Value   Admission Height 177.8 cm (70\") Documented at 2024 1102   Admission Weight 72.6 kg (160 lb) Documented at 2024 1102             General:    No acute distress, awake and alert.  Chronically ill-appearing.                   Head:    Normocephalic, atraumatic.   Eyes:          Conjunctivae and sclerae normal, no icterus.   Throat:   No oral lesions, no thrush, oral mucosa moist.    Neck:   Supple, trachea midline.   Lungs:     Chest tube in place.  Decreased breath sounds right side.    Heart:    Irregularly irregular rhythm with a tachycardic rate.  No murmurs, gallops, or rubs noted.   Abdomen:     Soft, non-tender, non-distended, positive bowel sounds.    Extremities:   Status post right AKA.  No left lower extremity edema.   Pulses:   Pulses palpable.   Skin:   No bleeding or rash.   Neuro:   Non-focal.  Moves all extremities well.    Psychiatric:   Normal mood and affect. "         Lab Review:                Results from last 7 days   Lab Units 02/10/24  0532   SODIUM mmol/L 135*   POTASSIUM mmol/L 4.7   CHLORIDE mmol/L 102   CO2 mmol/L 21.4*   BUN mg/dL 27*   CREATININE mg/dL 0.87   GLUCOSE mg/dL 114*   CALCIUM mg/dL 8.2*     Results from last 7 days   Lab Units 02/09/24  1401 02/09/24  1114   HSTROP T ng/L 60* 65*     Results from last 7 days   Lab Units 02/10/24  0532   WBC 10*3/mm3 12.14*   HEMOGLOBIN g/dL 14.2   HEMATOCRIT % 45.0   PLATELETS 10*3/mm3 233                       EKG (reviewed by me personally): Atrial fibrillation, low voltage, nonspecific T wave changes in the lateral leads.      Assessment:   1.  Recurrent large right pleural effusion (exudative) with associated near right lung atelectasis, status post CT-guided chest tube placement on 2/9/2024  2.  Baseline hypertension, now with hypotension  3.  Persistent atrial fibrillation with RVR  4.  Chronic diastolic CHF  5.  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  Nonspecific high-sensitivity troponin elevation  7.  Multifactorial weakness  8.  Hyperthyroidism, on methimazole    Plan:       I do not feel that the right pleural effusion is secondary to diastolic CHF.  In fact, on my exam earlier, he likely is more on the dry side.  I actually gave him 500 cc of normal saline because he was hypotensive, and he may require more IV fluids as well.  I stopped his oral Lasix for now.  The hypotension may also be secondary to large volume shifts with high output from his chest tube (as noted in Dr. Garcia's note from today).  Dr. Garcia and thoracic surgery are working on further evaluation, and potentially sending more fluid for cytology.  A CT of the chest without contrast has also been ordered by Dr. Marshall.    I gave him 1 dose of IV digoxin 500 mcg earlier today, and he got a second dose this evening at 250 mcg IV.  It will be difficult to control his heart rate  with metoprolol or other medications that affect his blood pressure since he is hypotensive.  He is on methimazole, and his TSH was 0.679.  His Eliquis is currently being held while he has the chest tube in place to minimize bleeding complications.    For now, I would hold his diuretics and potentially give him fluids if needed.  Again, I do not feel that the right pleural effusion is from congestive heart failure for multiple reasons (it is isolated, exudative, and he has no other signs of congestive heart failure on exam).  I will attempt to control his rate as best as possible under the circumstances.    Thank you very much for this consult.    Gio Gentile MD

## 2024-02-11 NOTE — PROGRESS NOTES
"    Chief Complaint: Right pleural effusion  S/P: CT-guided chest tube placement  POD # 2    Subjective:  Symptoms:  Stable.  He reports weakness.    Diet:  Poor intake.    Activity level: Impaired due to weakness.    Resting in bed. Some discomfort to chest tube site.    Vital Signs:  Temp:  [97.1 °F (36.2 °C)-98.9 °F (37.2 °C)] 97.4 °F (36.3 °C)  Heart Rate:  [] 133  Resp:  [14-20] 18  BP: ()/(42-74) 107/63    Intake & Output (last day)         02/10 0701  02/11 0700 02/11 0701  02/12 0700    P.O. 240     Total Intake(mL/kg) 240 (3.8)     Urine (mL/kg/hr) 0 (0)     Stool 0     Chest Tube 170     Total Output 170     Net +70           Urine Unmeasured Occurrence 4 x 1 x    Stool Unmeasured Occurrence 1 x             Objective:  General Appearance:  Comfortable, well-appearing and in no acute distress.    Vital signs: (most recent): Blood pressure 107/63, pulse (!) 133, temperature 97.4 °F (36.3 °C), temperature source Axillary, resp. rate 18, height 177.8 cm (70\"), weight 63.2 kg (139 lb 5.3 oz), SpO2 97%.    HEENT: Normal HEENT exam.    Lungs:  Normal effort.  He is not in respiratory distress.    Heart: Tachycardia.  Irregular rhythm.    Chest: Chest wall tenderness present.    Extremities: Decreased range of motion.  (S/p Right AKA)  Neurological: Patient is alert.    Skin:  Warm and dry.                Chest tube:   Site: Right, Clean, Dry, Intact, and Securement device intact  Suction: -20 cm  Air Leak: negative  24 Hour Total: 170ml    Results Review:     I reviewed the patient's new clinical results.  I reviewed the patient's new imaging results and agree with the interpretation.  Discussed with patient, RN and Dr. Santoro    CT chest reviewed independently.  Imaging Results (Last 24 Hours)       Procedure Component Value Units Date/Time    CT Chest Without Contrast Diagnostic [[id number]] Collected: 02/11/24 0913     Updated: 02/11/24 0926    Narrative:      CT CHEST WO CONTRAST DIAGNOSTIC-   "   INDICATIONS: Pleural effusion, malignancy suspected     TECHNIQUE: Radiation dose reduction techniques were utilized, including  automated exposure control and exposure modulation based on body size.  Unenhanced CHEST CT     COMPARISON: 2/9/2024     FINDINGS:           The heart size is borderline with minimal pericardial effusion. Coronary  arterial calcification is apparent. A few small subcentimeter short axis  mediastinal lymph nodes are seen that are not significant by size  criteria. Assessment of vascular, hilar, and mediastinal structures is  limited without intravenous contrast material. The thyroid appears  enlarged and somewhat heterogeneous, ultrasound correlation could be  obtained as indicated.     The airways appear clear.     A chest tube is seen in the right posterior basilar pleural space with  interval decrease in right pleural effusion, and some right basilar  pleural gas noted, medially and laterally. Markedly improved aeration of  the right lung is seen, with residual consolidation in the right lower  lobe suggest pneumonia (possibility of underlying lesions not excluded,  continued follow-up recommended) and some hazy groundglass opacity  predominantly at the right mid to lower lung that may represent edema  an/or atypical pneumonia. Minimal left pleural effusion is also seen.     Upper abdominal structures show no acute findings. Nonspecific  thickening of the adrenal glands is noted..     Degenerative changes are seen in the spine. No acute fracture is  identified.       Impression:         Partial improvement, as described, continued follow-up advised.     This report was finalized on 2/11/2024 9:22 AM by Dr. Jun Maldonado M.D on Workstation: [id number]       XR Chest 1 View [[id number]] Collected: 02/11/24 0658     Updated: 02/11/24 0703    Narrative:      XR CHEST 1 VW-     HISTORY: Male who is 83 years-old, chest tube management     TECHNIQUE: Frontal views of the chest      COMPARISON: 2/10/2024     FINDINGS:  Right chest tube appears stable. The heart size appears  borderline. Pulmonary vasculature is unremarkable. Opacity at the right  mid to lower lung appears similar to prior exam, with persistent right  pleural effusion. Small right apical pneumothorax is again apparent,  mildly decreased. No left pneumothorax is seen. Otherwise stable.       Impression:      As described.     This report was finalized on 2/11/2024 7:00 AM by Dr. Jun Maldonado M.D on Workstation: [email or web address]       XR Chest 1 View [[id number]] Collected: 02/10/24 1507     Updated: 02/10/24 1512    Narrative:      XR CHEST 1 VW-     HISTORY: Male who is 83 years-old, chest tube management     TECHNIQUE: Frontal views of the chest     COMPARISON: 2/9/2024     FINDINGS: Right chest tube appears stable. The heart is mildly enlarged.  Pulmonary vasculature is unremarkable. Opacity at the right mid to lower  lung appears slightly decreased with persistent right pleural effusion.  Small right apical pneumothorax is again apparent, not significantly  changed. No left pneumothorax is seen. Otherwise stable.       Impression:      As described.     This report was finalized on 2/10/2024 3:09 PM by Dr. Jun Maldonado M.D on Workstation: [email or web address]               Lab Results:     Lab Results (last 24 hours)       Procedure Component Value Units Date/Time    Body Fluid Culture - Body Fluid, Pleural Cavity [[id number]] Collected: 02/09/24 1532    Specimen: Body Fluid from Pleural Cavity Updated: 02/11/24 0807     Body Fluid Culture No growth at 2 days     Gram Stain Rare (1+) WBCs seen      No organisms seen    Basic Metabolic Panel [[id number]]  (Abnormal) Collected: 02/11/24 0315    Specimen: Blood Updated: 02/11/24 0431     Glucose 88 mg/dL      BUN 22 mg/dL      Creatinine 0.81 mg/dL      Sodium 139 mmol/L      Potassium 4.6 mmol/L      Comment: Slight hemolysis detected by analyzer. Result may be falsely elevated.         Chloride 106 mmol/L      CO2 24.8 mmol/L      Calcium 7.8 mg/dL      BUN/Creatinine Ratio 27.2     Anion Gap 8.2 mmol/L      eGFR 87.5 mL/min/1.73     Narrative:      GFR Normal >60  Chronic Kidney Disease <60  Kidney Failure <15    The GFR formula is only valid for adults with stable renal function between ages 18 and 70.    Digoxin Level [[id number]]  (Abnormal) Collected: 02/11/24 0315    Specimen: Blood Updated: 02/11/24 0425     Digoxin 2.30 ng/mL     CBC & Differential [[id number]]  (Abnormal) Collected: 02/11/24 0315    Specimen: Blood Updated: 02/11/24 0414    Narrative:      The following orders were created for panel order CBC & Differential.  Procedure                               Abnormality         Status                     ---------                               -----------         ------                     CBC Auto Differential[[id number]]        Abnormal            Final result                 Please view results for these tests on the individual orders.    CBC Auto Differential [[id number]]  (Abnormal) Collected: 02/11/24 0315    Specimen: Blood Updated: 02/11/24 0414     WBC 6.94 10*3/mm3      RBC 4.83 10*6/mm3      Hemoglobin 11.9 g/dL      Hematocrit 37.8 %      MCV 78.3 fL      MCH 24.6 pg      MCHC 31.5 g/dL      RDW 17.8 %      RDW-SD 49.8 fl      MPV 11.2 fL      Platelets 179 10*3/mm3      Neutrophil % 75.3 %      Lymphocyte % 10.7 %      Monocyte % 12.4 %      Eosinophil % 1.0 %      Basophil % 0.3 %      Immature Grans % 0.3 %      Neutrophils, Absolute 5.23 10*3/mm3      Lymphocytes, Absolute 0.74 10*3/mm3      Monocytes, Absolute 0.86 10*3/mm3      Eosinophils, Absolute 0.07 10*3/mm3      Basophils, Absolute 0.02 10*3/mm3      Immature Grans, Absolute 0.02 10*3/mm3      nRBC 0.0 /100 WBC     AFB Culture - Drainage, Lung, R [[id number]] Collected: 02/09/24 1532    Specimen: Drainage from Lung, R Updated: 02/10/24 1438     AFB Stain No acid fast bacilli seen on direct smear    Blood  Culture - Blood, Arm, Right [[id number]]  (Normal) Collected: 02/09/24 1154    Specimen: Blood from Arm, Right Updated: 02/10/24 1200     Blood Culture No growth at 24 hours    Blood Culture - Blood, Arm, Left [[id number]]  (Normal) Collected: 02/09/24 1154    Specimen: Blood from Arm, Left Updated: 02/10/24 1200     Blood Culture No growth at 24 hours    POC Glucose Once [[id number]]  (Normal) Collected: 02/10/24 1136    Specimen: Blood Updated: 02/10/24 1137     Glucose 112 mg/dL              Assessment & Plan       Pleural effusion    Dyslipidemia    A-fib    Hyperthyroidism    Chronic diastolic CHF (congestive heart failure)    Dyspnea       Assessment & Plan    Large right pleural effusion: Status post CT-guided chest tube placement with over 3 L drained in the first 24 hours.  Malignancy is on the differential and cytology from pleural fluid was ordered on Friday, 2/9/2024.  Unfortunately since the order was placed later in the day, I suspect it has not processed yet, and hopefully will be able to get those results in the next few days.  CT chest performed earlier today demonstrated some improvement to right pleural effusion with consolidation to the RLL. We will plan for intrapleural lytics for 3 days and re-evaluate with CT chest later this week. Re-check CXR in AM.    A-fib: Management per cardiology    Mray Augustin DNP, APRN  [facility]  02/11/24  10:00 EST    Greater than 35 minutes was spent reviewing the patient's chart, radiographic imaging, assessing the patient and developing a plan of care which was discussed with the patient and RN.  This excludes any other billable procedures which will be dictated separately.

## 2024-02-11 NOTE — PROGRESS NOTES
LOS: 2 days   Patient Care Team:  Taiwo Powers MD as PCP - General (Internal Medicine)  Self, Bess YO MD as Consulting Physician (Endocrinology)  Huang Chan MD as Cardiologist (Cardiology)    Subjective     .  Following patient for pleural effusion and dyspnea on exertion.  Patient has a history of congestive heart failure, fibrillation, hypertension, hyperlipidemia peripheral vascular disease with right AKA and he has had prior pleural effusion.  Patient is not in a very good mood he says he has a sore on his left heel he has been battling for 5 years and nobody has done anything with it since he came in.  He has still some shortness of breath he does not seem to understand the pleural thrombolysis he knew that thoracic surgery was going to do something.  I spent a quite a bit of time talking with him and explaining again what they were doing and why.  Not having much cough.  Does not like the air skin protection mattress it is too loud.    Review of Systems:          Objective     Vital Signs  Vital Sign Min/Max for last 24 hours  Temp  Min: 97.1 °F (36.2 °C)  Max: 98.9 °F (37.2 °C)   BP  Min: 83/51  Max: 110/65   Pulse  Min: 75  Max: 155   Resp  Min: 14  Max: 20   SpO2  Min: 92 %  Max: 98 %   Flow (L/min)  Min: 1  Max: 1   Weight  Min: 61.2 kg (134 lb 14.7 oz)  Max: 63.2 kg (139 lb 5.3 oz)        Ventilator/Non-Invasive Ventilation Settings admission, onward)      None                         Body mass index is 19.99 kg/m².  I/O last 3 completed shifts:  In: 240 [P.O.:240]  Out: 1150 [Urine:200; Chest Tube:950]  No intake/output data recorded.        Physical Exam:  General Appearance: Well-developed older white male resting in bed does not look in acute distress on room air oxygen saturations mid 90s  Eyes: Conjunctiva are clear and anicteric  ENT: Mucous membranes are moist no exudates  Neck: Trachea midline no visible jugular venous distention  Lungs: He has a few crackles in the right  base the left is pretty clear no dullness chest tube with serous fluid no airleak  Cardiac: Regular rate rhythm no murmur  Abdomen: Soft no hepatosplenomegaly or masses  : Not examined  Musculoskeletal: Grossly normal aside from the right AKA  Skin: Warm and dry no jaundice no petechiae  Neuro: Alert cooperative following commands and grossly intact  Extremities/P Vascular: No clubbing no cyanosis no edema palpable radial and left dorsalis pedis pulses  MSE: Grumpy today       Labs:  Results from last 7 days   Lab Units 02/11/24  0315 02/10/24  0532 02/09/24  1114   GLUCOSE mg/dL 88 114* 115*   SODIUM mmol/L 139 135* 142   POTASSIUM mmol/L 4.6 4.7 5.0   CO2 mmol/L 24.8 21.4* 29.0   CHLORIDE mmol/L 106 102 103   ANION GAP mmol/L 8.2 11.6 10.0   CREATININE mg/dL 0.81 0.87 0.94   BUN mg/dL 22 27* 21   BUN / CREAT RATIO  27.2* 31.0* 22.3   CALCIUM mg/dL 7.8* 8.2* 9.1   ALK PHOS U/L  --  116 145*   TOTAL PROTEIN g/dL  --  5.4* 6.2   ALT (SGPT) U/L  --  19 26   AST (SGOT) U/L  --  18 25   BILIRUBIN mg/dL  --  1.2 0.9   ALBUMIN g/dL  --  2.5* 3.4*   GLOBULIN gm/dL  --   --  2.8     Estimated Creatinine Clearance: 61.8 mL/min (by C-G formula based on SCr of 0.81 mg/dL).      Results from last 7 days   Lab Units 02/11/24  0315 02/10/24  0532 02/09/24  1114   WBC 10*3/mm3 6.94 12.14* 7.58   RBC 10*6/mm3 4.83 5.86* 5.92*   HEMOGLOBIN g/dL 11.9* 14.2 14.7   HEMATOCRIT % 37.8 45.0 46.6   MCV fL 78.3* 76.8* 78.7*   MCH pg 24.6* 24.2* 24.8*   MCHC g/dL 31.5 31.6 31.5   RDW % 17.8* 18.4* 18.7*   RDW-SD fl 49.8 47.3 49.8   MPV fL 11.2 11.2 10.7   PLATELETS 10*3/mm3 179 233 199   NEUTROPHIL % % 75.3 85.9* 78.1*   LYMPHOCYTE % % 10.7* 7.5* 12.8*   MONOCYTES % % 12.4* 6.0 7.8   EOSINOPHIL % % 1.0 0.0* 0.7   BASOPHIL % % 0.3 0.2 0.5   IMM GRAN % % 0.3 0.4 0.1   NEUTROS ABS 10*3/mm3 5.23 10.42* 5.92   LYMPHS ABS 10*3/mm3 0.74 0.91 0.97   MONOS ABS 10*3/mm3 0.86 0.73 0.59   EOS ABS 10*3/mm3 0.07 0.00 0.05   BASOS ABS 10*3/mm3 0.02 0.03  0.04   IMMATURE GRANS (ABS) 10*3/mm3 0.02 0.05 0.01   NRBC /100 WBC 0.0 0.1 0.0     Results from last 7 days   Lab Units 02/09/24  1907   PH, ARTERIAL pH units 7.423   PO2 ART mm Hg 52.9*   PCO2, ARTERIAL mm Hg 38.1   HCO3 ART mmol/L 24.9     Results from last 7 days   Lab Units 02/09/24  1401 02/09/24  1114   HSTROP T ng/L 60* 65*     Results from last 7 days   Lab Units 02/09/24  1114   PROBNP pg/mL 4,357.0*     Results from last 7 days   Lab Units 02/09/24  1914 02/09/24  1401   TSH uIU/mL  --  0.679   T3 FREE pg/mL 1.6*  --    FREE T4 ng/dL  --  1.66     Results from last 7 days   Lab Units 02/10/24  0532 02/09/24  1401 02/09/24  1114   LACTATE mmol/L 2.0 2.0 2.2*   PROCALCITONIN ng/mL  --   --  0.08         Microbiology Results (last 10 days)       Procedure Component Value - Date/Time    Legionella Antigen, Urine - Urine, Urine, Clean Catch [[id number]]  (Normal) Collected: 02/09/24 2233    Lab Status: Final result Specimen: Urine, Clean Catch Updated: 02/09/24 2307     LEGIONELLA ANTIGEN, URINE Negative    S. Pneumo Ag Urine or CSF - Urine, Urine, Clean Catch [[id number]]  (Normal) Collected: 02/09/24 2233    Lab Status: Final result Specimen: Urine, Clean Catch Updated: 02/09/24 2307     Strep Pneumo Ag Negative    Respiratory Panel PCR w/COVID-19(SARS-CoV-2) [facility] In-House, NP Swab in UTM/VTM, 2 HR TAT - Swab, Nasopharynx [[id number]]  (Normal) Collected: 02/09/24 1740    Lab Status: Final result Specimen: Swab from Nasopharynx Updated: 02/09/24 1905     ADENOVIRUS, PCR Not Detected     Coronavirus 229E Not Detected     Coronavirus HKU1 Not Detected     Coronavirus NL63 Not Detected     Coronavirus OC43 Not Detected     COVID19 Not Detected     Human Metapneumovirus Not Detected     Human Rhinovirus/Enterovirus Not Detected     Influenza A PCR Not Detected     Influenza B PCR Not Detected     Parainfluenza Virus 1 Not Detected     Parainfluenza Virus 2 Not Detected     Parainfluenza Virus 3  Not Detected     Parainfluenza Virus 4 Not Detected     RSV, PCR Not Detected     Bordetella pertussis pcr Not Detected     Bordetella parapertussis PCR Not Detected     Chlamydophila pneumoniae PCR Not Detected     Mycoplasma pneumo by PCR Not Detected    Narrative:      In the setting of a positive respiratory panel with a viral infection PLUS a negative procalcitonin without other underlying concern for bacterial infection, consider observing off antibiotics or discontinuation of antibiotics and continue supportive care. If the respiratory panel is positive for atypical bacterial infection (Bordetella pertussis, Chlamydophila pneumoniae, or Mycoplasma pneumoniae), consider antibiotic de-escalation to target atypical bacterial infection.    Respiratory Culture - Sputum, Cough [[id number]] Collected: 02/09/24 1733    Lab Status: Final result Specimen: Sputum from Cough Updated: 02/09/24 1958     Respiratory Culture Rejected     Gram Stain Few (2+) Epithelial cells seen      Few (2+) WBCs seen      Few (2+) Gram negative bacilli      Few (2+) Gram positive cocci in pairs    Narrative:      Specimen rejected due to oropharyngeal contamination. Please reorder and recollect specimen if clinically necessary.    Body Fluid Culture - Body Fluid, Pleural Cavity [[id number]] Collected: 02/09/24 1532    Lab Status: Preliminary result Specimen: Body Fluid from Pleural Cavity Updated: 02/11/24 0807     Body Fluid Culture No growth at 2 days     Gram Stain Rare (1+) WBCs seen      No organisms seen    AFB Culture - Drainage, Lung, R [[id number]] Collected: 02/09/24 1532    Lab Status: Preliminary result Specimen: Drainage from Lung, R Updated: 02/10/24 1438     AFB Stain No acid fast bacilli seen on direct smear    Blood Culture - Blood, Arm, Right [[id number]]  (Normal) Collected: 02/09/24 1154    Lab Status: Preliminary result Specimen: Blood from Arm, Right Updated: 02/10/24 1200     Blood Culture No growth at 24 hours    Blood  "Culture - Blood, Arm, Left [[id number]]  (Normal) Collected: 02/09/24 1154    Lab Status: Preliminary result Specimen: Blood from Arm, Left Updated: 02/10/24 1200     Blood Culture No growth at 24 hours                azithromycin, 500 mg, Intravenous, Q24H  cefTRIAXone, 1,000 mg, Intravenous, Q24H  famotidine, 40 mg, Oral, Daily  gabapentin, 100 mg, Oral, Nightly  guaiFENesin, 1,200 mg, Oral, Q12H  [START ON 2/12/2024] methIMAzole, 5 mg, Oral, Once per day on Monday Wednesday Friday  metoprolol tartrate, 25 mg, Oral, Q12H  sodium chloride, 10 mL, Intravenous, Q12H      hold,         Diagnostics:  XR Chest 1 View    Result Date: 2/9/2024  XR CHEST 1 VW-  HISTORY: Male who is 83 years-old, chest tube management  TECHNIQUE: Frontal view of the chest  COMPARISON: 2/9/2024  FINDINGS: Right chest tube appears stable. Heart size is borderline. Aorta is calcified. Pulmonary vasculature is unremarkable. Opacity in the right mid to lower lung is increased, suggesting increased infiltrate/atelectasis. Residual right pleural effusion appears similar to prior exam. There appears to be 2 cm right apical pneumothorax, appearance could reflect \"trapped lung\" phenomenon. No left pneumothorax. Otherwise stable.      As described.    This report was finalized on 2/9/2024 7:34 PM by Dr. Jun Maldonado M.D on Workstation: [id number]      CT Angiogram Chest    Result Date: 2/9/2024  CT ANGIOGRAM OF THE CHEST WITH CONTRAST INCLUDING RECONSTRUCTION IMAGES 02/09/2024  HISTORY: Shortness of breath.  Following the intravenous contrast injection CT angiography was performed through the chest. Sagittal, coronal and 3D reconstruction images were reviewed.  The pulmonary arterial system is somewhat heterogeneous but no definite pulmonary embolus is seen. The heterogeneity is probably artifact.  There is a large right pleural effusion with atelectasis of the right lung. There is a small left pleural effusion.  Small amount of fluid is seen in " pericardial recesses. There is some aortic and coronary calcification.      1. Large right pleural effusion with atelectasis of the right lung. The right hemithorax is completely opacified. 2. Small left pleural effusion. 3. The pulmonary arterial system is somewhat heterogeneous but no definite pulmonary embolus is seen.    Radiation dose reduction techniques were utilized, including automated exposure control and exposure modulation based on body size.   This report was finalized on 2/9/2024 5:41 PM by Dr. Romeo Cummings M.D on Workstation: [id number]      CT Guided Chest Tube    Result Date: 2/9/2024  CT-GUIDED RIGHT CHEST TUBE PLACEMENT 02/09/2024  HISTORY: Large right pleural effusion.  TECHNIQUE: After signed informed consent was obtained the patient was prepped and draped in the right posterior oblique position. Lidocaine was used for local anesthesia.  CT guidance was used to place a 14 Chinese pigtail catheter into the right pleural fluid collection. Approximately 1600 cc of nonpurulent serous fluid was removed.  The catheter was left in place and connected to the atrium suction.  Confirmatory images were obtained.  Patient tolerated the procedure well with no complications.      1. Successful placement of 14 Chinese pigtail catheter into the right pleural fluid collection with removal of 1600 cc of nonpurulent fluid.   Radiation dose reduction techniques were utilized, including automated exposure control and exposure modulation based on body size.   This report was finalized on 2/9/2024 5:40 PM by Dr. Romeo Cummings M.D on Workstation: [id number]      XR Chest 1 View    Result Date: 2/9/2024  AP CHEST  HISTORY: Evaluate pleural effusion  COMPARISON: Earlier today  FINDINGS: Status post placement of right chest tube. Significant decrease in size of right pleural effusion with significant improved aeration of the right lung. Heart size stable and left lung remains clear.      Significant increase in size  of right pleural effusion    This report was finalized on 2/9/2024 5:03 PM by Dr. Randy Villa M.D on Workstation: [id number]      XR Chest 1 View    Result Date: 2/9/2024  CHEST SINGLE VIEW  HISTORY: Chest pain. Shortness of air and cough.  COMPARISON: AP chest 11/09/2023, CT chest 11/12/2023.  FINDINGS: There has developed complete white out of the right thorax due to large right pleural effusion and associated right basilar atelectasis or infiltrate. Left lung appears clear. There appears to be mild right to left cardiomediastinal shift that is likely related to the presence of pleural fluid.      New white out of the right lung appears to be mostly related to a large pleural effusion, though there is also a suspected component of atelectasis or infiltrate. There is mild right to left cardiomediastinal shift. CT would be the best means to further evaluate.  This report was finalized on 2/9/2024 11:09 AM by Dr. Ciro Casey M.D on Workstation: [id number]      Results for orders placed during the hospital encounter of 02/09/24    Adult Transthoracic Echo Complete W/ Cont if Necessary Per Protocol    Interpretation Summary    Left ventricular systolic function is hyperdynamic (EF > 70%).    Left ventricular wall thickness is consistent with mild concentric hypertrophy.    Left ventricular diastolic function was indeterminate.    Normal right ventricular cavity size and systolic function noted.    The left atrial cavity is moderately dilated.    There is moderate aortic valve calcification    Mild to moderate mitral valve regurgitation is present.    Insufficient TR velocity profile to estimate the right ventricular systolic pressure.    There is a small (<1cm) circumferential pericardial effusion. There is no evidence of cardiac tamponade.      Personally reviewed his CT scan of the chest today multiloculated right pleural effusion there is hydropneumothorax with multiple air-fluid levels, there is significant  residual fluid as well.  There is compressive atelectasis really hard to know whether there is significant infiltrate or any other masses.  In the right lung base    Active Hospital Problems    Diagnosis  POA    **Pleural effusion [J90]  Yes    Dyspnea [R06.00]  Yes    Chronic diastolic CHF (congestive heart failure) [I50.32]  Yes    Hyperthyroidism [E05.90]  Yes    A-fib [I48.91]  Yes    Dyslipidemia [E78.5]  Yes      Resolved Hospital Problems   No resolved problems to display.         Assessment & Plan     Large right pleural effusion with complete right lung atelectasis status post chest tube placement with drainage of effusion there was what appears to be some reexpansion pulmonary edema that appears to be improving.  CT scan today reviewed there is a loculated right pleural effusion with multiple small air-fluid levels from where the fluid was drained.  I agree with thoracic surgery plan for lytic therapy.  Follow-up on pleural fluid cytology hopefully.  Hypotension this may be in part just a fluid shift after his large-volume pleural fluid output.  Look good now.  Reported left heel wound I did not take down his pressure boot and socks I will take the liberty and ask wound care to evaluate as he is very upset about this.    Plan for disposition:    Ridge Garcia Jr, MD  02/11/24  10:26 EST    Time:

## 2024-02-11 NOTE — PLAN OF CARE
Goal Outcome Evaluation:      VSS ex/ Afib. 1L O2 NC. R CT to -20sxn. No airleak, no fluctuation, no subQ air, yellow outpt. Coccyx w/ meplex on, L heel meplex on, heel boot on. Q2 turn. AxO x2-3, disoriented to situation and place at times. LAS mattress ordered.

## 2024-02-11 NOTE — PROGRESS NOTES
Name: Mehreen Silva IV ADMIT: 2024   : 1940  PCP: Taiwo Powers MD    MRN: [id number] LOS: 2 days   AGE/SEX: 83 y.o. male  ROOM: [facility]     Subjective   Subjective   Chief Complaint   Patient presents with    Chest Pain     Reports feeling lousy. Dyspnea is stable. No CP. No NVD. Aware of the fluid pocket/loculation and current treatment.      Objective   Objective   Vital Signs  Temp:  [97.1 °F (36.2 °C)-98.9 °F (37.2 °C)] 97.4 °F (36.3 °C)  Heart Rate:  [] 107  Resp:  [14-20] 18  BP: ()/(47-74) 102/63  SpO2:  [92 %-98 %] 97 %  on  Flow (L/min):  [1] 1;   Device (Oxygen Therapy): nasal cannula  Body mass index is 19.99 kg/m².    Physical Exam  Vitals and nursing note reviewed.   Constitutional:       General: He is not in acute distress.     Appearance: He is not diaphoretic.   HENT:      Head: Atraumatic.   Cardiovascular:      Rate and Rhythm: Normal rate. Rhythm irregular.   Pulmonary:      Effort: Pulmonary effort is normal.      Breath sounds: No wheezing.      Comments: Chest tube  Abdominal:      General: There is no distension.      Palpations: Abdomen is soft.      Tenderness: There is no abdominal tenderness. There is no guarding or rebound.   Musculoskeletal:         General: Deformity (r aka) present. No tenderness.   Skin:     General: Skin is warm and dry.   Neurological:      Mental Status: He is alert. Mental status is at baseline.   Psychiatric:         Mood and Affect: Mood normal.         Behavior: Behavior normal.       Results Review  I reviewed the patient's new clinical results.    Results from last 7 days   Lab Units 24  0315 02/10/24  0532 24  1114   WBC 10*3/mm3 6.94 12.14* 7.58   HEMOGLOBIN g/dL 11.9* 14.2 14.7   PLATELETS 10*3/mm3 179 233 199     Results from last 7 days   Lab Units 24  0315 02/10/24  0532 24  1114   SODIUM mmol/L 139 135* 142   POTASSIUM mmol/L 4.6 4.7 5.0   CHLORIDE mmol/L 106 102 103   CO2 mmol/L 24.8 21.4*  29.0   BUN mg/dL 22 27* 21   CREATININE mg/dL 0.81 0.87 0.94   GLUCOSE mg/dL 88 114* 115*   EGFR mL/min/1.73 87.5 85.6 80.4     Results from last 7 days   Lab Units 02/10/24  0532 02/09/24  1114   ALBUMIN g/dL 2.5* 3.4*   BILIRUBIN mg/dL 1.2 0.9   ALK PHOS U/L 116 145*   AST (SGOT) U/L 18 25   ALT (SGPT) U/L 19 26     Results from last 7 days   Lab Units 02/11/24  0315 02/10/24  0532 02/09/24  1114   CALCIUM mg/dL 7.8* 8.2* 9.1   ALBUMIN g/dL  --  2.5* 3.4*     Results from last 7 days   Lab Units 02/10/24  0532 02/09/24  1401 02/09/24  1114   PROCALCITONIN ng/mL  --   --  0.08   LACTATE mmol/L 2.0 2.0 2.2*     Hemoglobin A1C   Date/Time Value Ref Range Status   02/09/2024 1914 5.40 4.80 - 5.60 % Final     Glucose   Date/Time Value Ref Range Status   02/10/2024 1136 112 70 - 130 mg/dL Final   02/10/2024 0624 106 70 - 130 mg/dL Final   02/09/2024 1935 88 70 - 130 mg/dL Final       CT Chest Without Contrast Diagnostic    Result Date: 2/11/2024   Partial improvement, as described, continued follow-up advised.  This report was finalized on 2/11/2024 9:22 AM by Dr. Jun Maldonado M.D on Workstation: [email or web address]      XR Chest 1 View    Result Date: 2/11/2024  As described.  This report was finalized on 2/11/2024 7:00 AM by Dr. Jun Maldonado M.D on Workstation: [email or web address]      XR Chest 1 View    Result Date: 2/10/2024  As described.  This report was finalized on 2/10/2024 3:09 PM by Dr. Jun Maldonado M.D on Workstation: [email or web address]      XR Chest 1 View    Result Date: 2/9/2024  As described.    This report was finalized on 2/9/2024 7:34 PM by Dr. Jun Maldonado M.D on Workstation: [id number]      CT Guided Chest Tube    Result Date: 2/9/2024  1. Successful placement of 14 Nepali pigtail catheter into the right pleural fluid collection with removal of 1600 cc of nonpurulent fluid.   Radiation dose reduction techniques were utilized, including automated exposure control and exposure modulation based on body size.    This report was finalized on 2/9/2024 5:40 PM by Dr. Romeo Cummings M.D on Workstation: [id number]      XR Chest 1 View    Result Date: 2/9/2024  Significant increase in size of right pleural effusion    This report was finalized on 2/9/2024 5:03 PM by Dr. Randy Villa M.D on Workstation: [id number]       I have personally reviewed all medications:  Scheduled Medications  azithromycin, 500 mg, Intravenous, Q24H  cefTRIAXone, 1,000 mg, Intravenous, Q24H  famotidine, 40 mg, Oral, Daily  gabapentin, 100 mg, Oral, Nightly  guaiFENesin, 1,200 mg, Oral, Q12H  [START ON 2/12/2024] methIMAzole, 5 mg, Oral, Once per day on Monday Wednesday Friday  metoprolol tartrate, 25 mg, Oral, Q12H  sodium chloride, 10 mL, Intravenous, Q12H      Infusions  hold,       Diet  Diet: Cardiac Diets; Healthy Heart (2-3 Na+); No Straw; Texture: Regular Texture (IDDSI 7); Fluid Consistency: Thin (IDDSI 0)    I have personally reviewed:  [x]  Laboratory   [x]  Microbiology   [x]  Radiology   [x]  EKG/Telemetry  [x]  Cardiology/Vascular   []  Pathology    []  Records       Assessment/Plan     Active Hospital Problems    Diagnosis  POA    **Pleural effusion [J90]  Yes    Dyspnea [R06.00]  Yes    Chronic diastolic CHF (congestive heart failure) [I50.32]  Yes    Hyperthyroidism [E05.90]  Yes    A-fib [I48.91]  Yes    Dyslipidemia [E78.5]  Yes      Resolved Hospital Problems   No resolved problems to display.       83 y.o. male admitted with Pleural effusion.    Large right pleural effusion/right lung atelectasis: Had opacification of right hemithorax.  Status post chest tube placement with improvement.  Body fluid culture no growth to date.  Cytology ordered.  He is on Rocephin and azithromycin currently.  Repeat CT chest planned . Pulmonology and thoracic surgery following.  Chronic diastolic heart failure/A-fib: Rate okay on exam.  Anticoagulation has been held with chest tube.  Metoprolol. Lasix ad jardiance held.  Cardiology  following.  Hypertension: Not grossly elevated.  Plan to monitor.  Hyperthyroidism: TSH and free T4 were okay.  Continue Tapazole  PPX: AC on hold for chest tube.  Resume home blood thinner when okay with thoracic surgery.  Disposition: TBD    Expected discharge date/ time has not been documented.     Feliciano Torres MD  [facility]  02/11/24  14:49 EST    Dictated portions of note using Dragon dictation software.  Copied text in this note has been reviewed by me and remains accurate as of 02/11/24

## 2024-02-11 NOTE — PROCEDURES
Pre-op Diagnosis: Loculated Pleural Effusion, right     Post-Op Diagnosis: Loculated Pleural Effusion, right     Procedure performed: Irrigation pleural cavity with TPA and dornase    Anesthesia: None    Summary of procedure: The  pleural tube was accessed. 10 mg of TPA (reconstituted in 30cc of sterile water) and 5 mg of dornase (reconstituted in 30cc normal saline) was instilled into the pleural cavity. The pleural tube then was clamped.      The patient's position is to be changed to every 15 minutes for 2 hours.  The tube will then be unclamped and placed back to suction.  Patient tolerated the procedure well. We will re-evaluate with a CXR in the morning.      Appreciate assistance from SWAPNA Benitez.    Mary Augustin, DNP, APRN

## 2024-02-11 NOTE — PROGRESS NOTES
LOS: 2 days   Patient Care Team:  Taiwo Powers MD as PCP - General (Internal Medicine)  Self, Bess YO MD as Consulting Physician (Endocrinology)  Huang Chan MD as Cardiologist (Cardiology)    Chief Complaint: Follow-up recurrent large right exudative pleural effusion, persistent atrial fibrillation with RVR, chronic diastolic CHF.    Interval History: He does feel slightly better today.  He is less short of breath.  States he just feels poorly in general.  He is still having output from his chest tube.    Vital Signs:  Temp:  [97.1 °F (36.2 °C)-98.9 °F (37.2 °C)] 98.3 °F (36.8 °C)  Heart Rate:  [] 104  Resp:  [14-18] 18  BP: ()/(47-80) 104/80    Intake/Output Summary (Last 24 hours) at 2/11/2024 1617  Last data filed at 2/11/2024 0655  Gross per 24 hour   Intake 240 ml   Output 170 ml   Net 70 ml       Physical Exam:   General Appearance:    No acute distress, alert and oriented x4, chronically ill-appearing.   Lungs:     Chest tube in place.  Decreased breath sounds on the right.    Heart:    Irregularly irregular rhythm and normal rate.  No murmurs, gallops, or rubs.   Abdomen:     Soft, nontender, nondistended.    Extremities:   Status post right AKA. No left lower extremity edema.      Results Review:    Results from last 7 days   Lab Units 02/11/24  0315   SODIUM mmol/L 139   POTASSIUM mmol/L 4.6   CHLORIDE mmol/L 106   CO2 mmol/L 24.8   BUN mg/dL 22   CREATININE mg/dL 0.81   GLUCOSE mg/dL 88   CALCIUM mg/dL 7.8*     Results from last 7 days   Lab Units 02/09/24  1401 02/09/24  1114   HSTROP T ng/L 60* 65*     Results from last 7 days   Lab Units 02/11/24  0315   WBC 10*3/mm3 6.94   HEMOGLOBIN g/dL 11.9*   HEMATOCRIT % 37.8   PLATELETS 10*3/mm3 179                       I reviewed the patient's new clinical results.        Assessment:  1.  Recurrent large right pleural effusion (exudative) with associated near right lung atelectasis, status post CT-guided chest tube placement on  2/9/2024  2.  Baseline hypertension, now with hypotension  3.  Persistent atrial fibrillation with RVR  4.  Chronic diastolic CHF  5.  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  Nonspecific high-sensitivity troponin elevation  7.  Multifactorial weakness  8.  Hyperthyroidism, on methimazole    Plan:  -He had irrigation of the pleural cavity with tPA and dornase today.  He continues to have output from the chest tube.    -Again, I do not feel that this is from congestive heart failure.  It is exudative and focal.  Also, he does not have other signs or symptoms of congestive heart failure.  I actually gave him volume yesterday as he was hypotensive and likely volume depleted.  The blood pressure also was likely low because of the large volume shifts from chest tube output.  I will continue to hold his Lasix today.    -Follow-up cytology results.    -Continue to hold anticoagulation with chest tube in place and draining.    -Heart rate is much better.  Continue metoprolol 25 mg twice daily if his blood pressure can tolerate.  I did give him several doses of IV digoxin yesterday which helped, although his digoxin level today is 2.3.  Will need to obviously hold further digoxin.  I will recheck a level tomorrow.    Denny Gentile MD  02/11/24  16:17 EST

## 2024-02-11 NOTE — PLAN OF CARE
Problem: Adult Inpatient Plan of Care  Goal: Plan of Care Review  Outcome: Ongoing, Progressing  Flowsheets (Taken 2/11/2024 9397)  Progress: no change  Plan of Care Reviewed With: patient   Goal Outcome Evaluation:  Plan of Care Reviewed With: patient        Progress: no change                     Pt still has a right side chest tube to -20LWS. Pt had tpa and dornase instilled in it. Plan is to do this for another day or two. Pt tolerated this well. Pt on a low air loss mattress and q2hr turn. Pt remains on 1L nasal cannula. HR much better controlled today and blood pressure more stable. Pt will be here until late next week.

## 2024-02-12 ENCOUNTER — APPOINTMENT (OUTPATIENT)
Dept: GENERAL RADIOLOGY | Facility: HOSPITAL | Age: 84
DRG: 186 | End: 2024-02-12
Payer: MEDICARE

## 2024-02-12 LAB
ANION GAP SERPL CALCULATED.3IONS-SCNC: 7.1 MMOL/L (ref 5–15)
BASOPHILS # BLD AUTO: 0.03 10*3/MM3 (ref 0–0.2)
BASOPHILS NFR BLD AUTO: 0.5 % (ref 0–1.5)
BUN SERPL-MCNC: 15 MG/DL (ref 8–23)
BUN/CREAT SERPL: 28.8 (ref 7–25)
CALCIUM SPEC-SCNC: 7.9 MG/DL (ref 8.6–10.5)
CHLORIDE SERPL-SCNC: 106 MMOL/L (ref 98–107)
CO2 SERPL-SCNC: 23.9 MMOL/L (ref 22–29)
CREAT SERPL-MCNC: 0.52 MG/DL (ref 0.76–1.27)
DEPRECATED RDW RBC AUTO: 49.9 FL (ref 37–54)
DIGOXIN SERPL-MCNC: 0.9 NG/ML (ref 0.6–1.2)
EGFRCR SERPLBLD CKD-EPI 2021: 100 ML/MIN/1.73
EOSINOPHIL # BLD AUTO: 0.2 10*3/MM3 (ref 0–0.4)
EOSINOPHIL NFR BLD AUTO: 3.5 % (ref 0.3–6.2)
ERYTHROCYTE [DISTWIDTH] IN BLOOD BY AUTOMATED COUNT: 17.4 % (ref 12.3–15.4)
GLUCOSE SERPL-MCNC: 73 MG/DL (ref 65–99)
HCT VFR BLD AUTO: 38.3 % (ref 37.5–51)
HGB BLD-MCNC: 11.9 G/DL (ref 13–17.7)
IMM GRANULOCYTES # BLD AUTO: 0.01 10*3/MM3 (ref 0–0.05)
IMM GRANULOCYTES NFR BLD AUTO: 0.2 % (ref 0–0.5)
LYMPHOCYTES # BLD AUTO: 0.94 10*3/MM3 (ref 0.7–3.1)
LYMPHOCYTES NFR BLD AUTO: 16.3 % (ref 19.6–45.3)
MCH RBC QN AUTO: 25 PG (ref 26.6–33)
MCHC RBC AUTO-ENTMCNC: 31.1 G/DL (ref 31.5–35.7)
MCV RBC AUTO: 80.5 FL (ref 79–97)
MONOCYTES # BLD AUTO: 0.63 10*3/MM3 (ref 0.1–0.9)
MONOCYTES NFR BLD AUTO: 10.9 % (ref 5–12)
NEUTROPHILS NFR BLD AUTO: 3.95 10*3/MM3 (ref 1.7–7)
NEUTROPHILS NFR BLD AUTO: 68.6 % (ref 42.7–76)
NRBC BLD AUTO-RTO: 0 /100 WBC (ref 0–0.2)
PLATELET # BLD AUTO: 167 10*3/MM3 (ref 140–450)
PMV BLD AUTO: 11.1 FL (ref 6–12)
POTASSIUM SERPL-SCNC: 4.1 MMOL/L (ref 3.5–5.2)
RBC # BLD AUTO: 4.76 10*6/MM3 (ref 4.14–5.8)
SODIUM SERPL-SCNC: 137 MMOL/L (ref 136–145)
WBC NRBC COR # BLD AUTO: 5.76 10*3/MM3 (ref 3.4–10.8)

## 2024-02-12 PROCEDURE — 99232 SBSQ HOSP IP/OBS MODERATE 35: CPT | Performed by: INTERNAL MEDICINE

## 2024-02-12 PROCEDURE — 3E1L38Z IRRIGATION OF PLEURAL CAVITY USING IRRIGATING SUBSTANCE, PERCUTANEOUS APPROACH: ICD-10-PCS | Performed by: NURSE PRACTITIONER

## 2024-02-12 PROCEDURE — 25010000002 CEFTRIAXONE PER 250 MG: Performed by: INTERNAL MEDICINE

## 2024-02-12 PROCEDURE — 71045 X-RAY EXAM CHEST 1 VIEW: CPT

## 2024-02-12 PROCEDURE — 85025 COMPLETE CBC W/AUTO DIFF WBC: CPT | Performed by: INTERNAL MEDICINE

## 2024-02-12 PROCEDURE — 3E0L3GC INTRODUCTION OF OTHER THERAPEUTIC SUBSTANCE INTO PLEURAL CAVITY, PERCUTANEOUS APPROACH: ICD-10-PCS | Performed by: NURSE PRACTITIONER

## 2024-02-12 PROCEDURE — 99232 SBSQ HOSP IP/OBS MODERATE 35: CPT | Performed by: NURSE PRACTITIONER

## 2024-02-12 PROCEDURE — 80048 BASIC METABOLIC PNL TOTAL CA: CPT | Performed by: INTERNAL MEDICINE

## 2024-02-12 PROCEDURE — 25010000002 ALTEPLASE PER 1 MG: Performed by: NURSE PRACTITIONER

## 2024-02-12 PROCEDURE — 80162 ASSAY OF DIGOXIN TOTAL: CPT | Performed by: INTERNAL MEDICINE

## 2024-02-12 PROCEDURE — 32562 LYSE CHEST FIBRIN SUBQ DAY: CPT | Performed by: NURSE PRACTITIONER

## 2024-02-12 RX ORDER — CASTOR OIL AND BALSAM, PERU 788; 87 MG/G; MG/G
1 OINTMENT TOPICAL EVERY 12 HOURS SCHEDULED
Status: DISCONTINUED | OUTPATIENT
Start: 2024-02-12 | End: 2024-02-20 | Stop reason: HOSPADM

## 2024-02-12 RX ADMIN — GUAIFENESIN 1200 MG: 600 TABLET, EXTENDED RELEASE ORAL at 08:20

## 2024-02-12 RX ADMIN — GABAPENTIN 100 MG: 100 CAPSULE ORAL at 20:33

## 2024-02-12 RX ADMIN — CASTOR OIL AND BALSAM, PERU 1 APPLICATION: 788; 87 OINTMENT TOPICAL at 20:33

## 2024-02-12 RX ADMIN — OXYCODONE HYDROCHLORIDE 5 MG: 5 TABLET ORAL at 08:19

## 2024-02-12 RX ADMIN — Medication 1 APPLICATION: at 20:34

## 2024-02-12 RX ADMIN — METOPROLOL TARTRATE 25 MG: 25 TABLET, FILM COATED ORAL at 08:19

## 2024-02-12 RX ADMIN — GUAIFENESIN 1200 MG: 600 TABLET, EXTENDED RELEASE ORAL at 20:33

## 2024-02-12 RX ADMIN — METOPROLOL TARTRATE 25 MG: 25 TABLET, FILM COATED ORAL at 17:02

## 2024-02-12 RX ADMIN — FAMOTIDINE 40 MG: 20 TABLET, FILM COATED ORAL at 08:19

## 2024-02-12 RX ADMIN — METHIMAZOLE 5 MG: 5 TABLET ORAL at 08:20

## 2024-02-12 RX ADMIN — CEFTRIAXONE SODIUM 1000 MG: 1 INJECTION, POWDER, FOR SOLUTION INTRAMUSCULAR; INTRAVENOUS at 08:20

## 2024-02-12 RX ADMIN — DORNASE ALFA 5 MG: 1 SOLUTION RESPIRATORY (INHALATION) at 11:34

## 2024-02-12 RX ADMIN — ALTEPLASE 10 MG: KIT at 11:34

## 2024-02-12 RX ADMIN — Medication 10 ML: at 08:20

## 2024-02-12 RX ADMIN — Medication 10 ML: at 20:33

## 2024-02-12 NOTE — PROGRESS NOTES
Name: Mehreen Silva IV ADMIT: 2024   : 1940  PCP: aTiwo Powers MD    MRN: [id number] LOS: 3 days   AGE/SEX: 83 y.o. male  ROOM: [facility] ([place])     Subjective   Subjective   Chief Complaint   Patient presents with    Chest Pain     Dyspnea is stable. No CP. No NVD.  He was a bit confused this morning and upset about care.  He was concerned about potentially needing surgery.  I did discuss with him that thoracic surgery is following up in some of the rationale for it if needs tissue for diagnosis or effusion is not improving with current treatment.     Objective   Objective   Vital Signs  Temp:  [97.4 °F (36.3 °C)-98.3 °F (36.8 °C)] 97.4 °F (36.3 °C)  Heart Rate:  [] 107  Resp:  [16-18] 16  BP: ()/(43-80) 111/73  SpO2:  [95 %-99 %] 98 %  on  Flow (L/min):  [1] 1;   Device (Oxygen Therapy): nasal cannula  Body mass index is 18.13 kg/m².    Physical Exam  Vitals and nursing note reviewed.   Constitutional:       General: He is not in acute distress.     Appearance: He is not diaphoretic.   HENT:      Head: Atraumatic.   Cardiovascular:      Rate and Rhythm: Normal rate. Rhythm irregular.   Pulmonary:      Effort: Pulmonary effort is normal.      Breath sounds: No wheezing.      Comments: Chest tube  Abdominal:      General: There is no distension.      Palpations: Abdomen is soft.      Tenderness: There is no abdominal tenderness. There is no guarding or rebound.   Musculoskeletal:         General: Deformity (r aka) present. No tenderness.   Skin:     General: Skin is warm and dry.   Neurological:      General: No focal deficit present.      Mental Status: He is alert.   Psychiatric:         Mood and Affect: Mood normal.         Behavior: Behavior normal.       Results Review  I reviewed the patient's new clinical results.    Results from last 7 days   Lab Units 24  0427 24  0315 02/10/24  0532 24  1114   WBC 10*3/mm3 5.76 6.94 12.14* 7.58   HEMOGLOBIN g/dL 11.9* 11.9*  14.2 14.7   PLATELETS 10*3/mm3 167 179 233 199     Results from last 7 days   Lab Units 02/12/24  0755 02/11/24  0315 02/10/24  0532 02/09/24  1114   SODIUM mmol/L 137 139 135* 142   POTASSIUM mmol/L 4.1 4.6 4.7 5.0   CHLORIDE mmol/L 106 106 102 103   CO2 mmol/L 23.9 24.8 21.4* 29.0   BUN mg/dL 15 22 27* 21   CREATININE mg/dL 0.52* 0.81 0.87 0.94   GLUCOSE mg/dL 73 88 114* 115*   EGFR mL/min/1.73 100.0 87.5 85.6 80.4     Results from last 7 days   Lab Units 02/10/24  0532 02/09/24  1114   ALBUMIN g/dL 2.5* 3.4*   BILIRUBIN mg/dL 1.2 0.9   ALK PHOS U/L 116 145*   AST (SGOT) U/L 18 25   ALT (SGPT) U/L 19 26     Results from last 7 days   Lab Units 02/12/24  0755 02/11/24  0315 02/10/24  0532 02/09/24  1114   CALCIUM mg/dL 7.9* 7.8* 8.2* 9.1   ALBUMIN g/dL  --   --  2.5* 3.4*     Results from last 7 days   Lab Units 02/10/24  0532 02/09/24  1401 02/09/24  1114   PROCALCITONIN ng/mL  --   --  0.08   LACTATE mmol/L 2.0 2.0 2.2*     Hemoglobin A1C   Date/Time Value Ref Range Status   02/09/2024 1914 5.40 4.80 - 5.60 % Final     Glucose   Date/Time Value Ref Range Status   02/10/2024 1136 112 70 - 130 mg/dL Final   02/10/2024 0624 106 70 - 130 mg/dL Final   02/09/2024 1935 88 70 - 130 mg/dL Final       XR Chest 1 View    Result Date: 2/12/2024  Findings and impression: Small-caliber right thoracostomy tube is present. Small right pleural effusion with adjacent pulmonary pacification within the right mid and lower lung is present, as before. There appears to be a small right apical pneumothorax resulting in approximately 1.2 cm of pleural-parenchymal separation, as before. Cardiac silhouette is mildly enlarged. Mild left basilar pulmonary pacification appears to have worsened since 2/11/2024 suggestive atelectasis versus pneumonia and/or pulmonary edema in the appropriate clinical context and correlation with patient history is recommended with continued attention on follow-up to resolution. Left shoulder arthroplasty is  incompletely visualized and cannot be characterized.   This report was finalized on 2/12/2024 11:19 AM by Dr. Matias Fischer M.D on Workstation: [email or web address]      CT Chest Without Contrast Diagnostic    Result Date: 2/11/2024   Partial improvement, as described, continued follow-up advised.  This report was finalized on 2/11/2024 9:22 AM by Dr. Jun Maldonado M.D on Workstation: [email or web address]      XR Chest 1 View    Result Date: 2/11/2024  As described.  This report was finalized on 2/11/2024 7:00 AM by Dr. Jun Maldonado M.D on Workstation: [email or web address]       I have personally reviewed all medications:  Scheduled Medications  cefTRIAXone, 1,000 mg, Intravenous, Q24H  famotidine, 40 mg, Oral, Daily  gabapentin, 100 mg, Oral, Nightly  guaiFENesin, 1,200 mg, Oral, Q12H  methIMAzole, 5 mg, Oral, Once per day on Monday Wednesday Friday  metoprolol tartrate, 25 mg, Oral, Q12H  sodium chloride, 10 mL, Intravenous, Q12H      Infusions  hold,       Diet  Diet: Cardiac Diets; Healthy Heart (2-3 Na+); No Straw; Texture: Regular Texture (IDDSI 7); Fluid Consistency: Thin (IDDSI 0)    I have personally reviewed:  [x]  Laboratory   [x]  Microbiology   [x]  Radiology   [x]  EKG/Telemetry  []  Cardiology/Vascular   []  Pathology    []  Records       Assessment/Plan     Active Hospital Problems    Diagnosis  POA    **Pleural effusion [J90]  Yes    Dyspnea [R06.00]  Yes    Chronic diastolic CHF (congestive heart failure) [I50.32]  Yes    Hyperthyroidism [E05.90]  Yes    A-fib [I48.91]  Yes    Dyslipidemia [E78.5]  Yes      Resolved Hospital Problems   No resolved problems to display.       83 y.o. male admitted with Pleural effusion.    Large right pleural effusion/right lung atelectasis: Had opacification of right hemithorax.  Status post chest tube placement with improvement.  Body fluid culture no growth to date.  Cytology pending.  He is on Rocephin currently.  Repeat CT chest planned . Pulmonology and thoracic surgery  following.  Chronic diastolic heart failure/A-fib: Rate okay on exam.  Anticoagulation has been held with chest tube.  Metoprolol. Lasix and jardiance held.  Cardiology following.  Hypertension: Not grossly elevated.  Plan to monitor.  Hyperthyroidism: TSH and free T4 were okay.  Continue Tapazole  PPX: AC on hold for chest tube.  Resume home blood thinner when okay with thoracic surgery.  Disposition: TBD    Expected discharge date/ time has not been documented.     Feliciano Torres MD  [facility]  02/12/24  12:39 EST    Dictated portions of note using Dragon dictation software.  Copied text in this note has been reviewed by me and remains accurate as of 02/12/24

## 2024-02-12 NOTE — DISCHARGE PLACEMENT REQUEST
"Ellen Silva IV (83 y.o. Male)       Date of Birth   1940    Social Security Number       Address   [address] [place] [address]    Home Phone   [phone]    MRN   [id number]       [employer]   None    Marital Status                               Admission Date   2/9/24    Admission Type   Emergency    Admitting Provider   Mile Youngblood MD    Attending Provider   Feliciano Torres MD    Department, Room/Bed   [address], [address]       Discharge Date       Discharge Disposition       Discharge Destination                                 Attending Provider: Feliciano Torres MD    Allergies: No Known Allergies    Isolation: None   Infection: None   Code Status: CPR    Ht: 177.8 cm (70\")   Wt: 57.3 kg (126 lb 5.2 oz)    Admission Cmt: None   Principal Problem: Pleural effusion [J90]                   Active Insurance as of 2/9/2024       Primary Coverage       Payor Plan Insurance Group Employer/Plan Group    MEDICARE MEDICARE A & B        Payor Plan Address Payor Plan Phone Number Payor Plan Fax Number Effective Dates    [address] [phone]  5/1/2005 - None Entered    [facility] [id number]         Subscriber Name Subscriber Birth Date Member ID       ELLEN SILVA IV 1940 [id number]               Secondary Coverage       Payor Plan Insurance Group Employer/Plan Group    [facility] SUPP [id number]       Payor Plan Address Payor Plan Phone Number Payor Plan Fax Number Effective Dates    [address]   5/1/2005 - None Entered    [facility] [id number]         Subscriber Name Subscriber Birth Date Member ID       ELLEN SILVA IV 1940 [id number]                     Emergency Contacts        (Rel.) Home Phone Work Phone Mobile Phone    Belem Silva (Spouse) [phone] -- [phone]    ELLEN SILVA (Son) [phone] -- [phone]              Emergency Contact Information       Name Relation Home Work Mobile    " Belem Silva Spouse [phone]    ELLEN SILVA Son [phone]

## 2024-02-12 NOTE — PLAN OF CARE
Goal Outcome Evaluation:   VSS. Disoriented to situation. Remains in A fib, rate controlled. BP soft at times. Right chest tube remains to -20 suction. 02@1lpm. On KARINA mattress. Call light in reach.

## 2024-02-12 NOTE — PROGRESS NOTES
LOS: 3 days   Patient Care Team:  Taiwo Powers MD as PCP - General (Internal Medicine)  Self, Bess YO MD as Consulting Physician (Endocrinology)  ChanHuang henley MD as Cardiologist (Cardiology)    Subjective     .  Following patient for pleural effusion and dyspnea on exertion.  Patient has a history of congestive heart failure, fibrillation, hypertension, hyperlipidemia peripheral vascular disease with right AKA and he has had prior pleural effusion.  Patient is not in a very good mood he says he has a sore on his left heel he has been battling for 5 years and nobody has done anything with it since he came in.  Patient seems a little confused this morning, he is still upset with his care.  But I am not sure he is still confused that he is really understanding.  He does not feel too short of breath today he is not having much chest pain a little bit when he takes a deep breath.    Review of Systems:          Objective     Vital Signs  Vital Sign Min/Max for last 24 hours  Temp  Min: 97.4 °F (36.3 °C)  Max: 98.3 °F (36.8 °C)   BP  Min: 90/64  Max: 123/70   Pulse  Min: 78  Max: 133   Resp  Min: 16  Max: 18   SpO2  Min: 95 %  Max: 100 %   Flow (L/min)  Min: 1  Max: 1   Weight  Min: 57.3 kg (126 lb 5.2 oz)  Max: 57.3 kg (126 lb 5.2 oz)        Ventilator/Non-Invasive Ventilation Settings admission, onward)      None                         Body mass index is 18.13 kg/m².  I/O last 3 completed shifts:  In: 240 [P.O.:240]  Out: 1240 [Chest Tube:1240]  No intake/output data recorded.        Physical Exam:  General Appearance: Well-developed older white male resting in bed does not look in acute distress 1 L nasal cannula O2 oxygen saturation is 96 to 98%  Eyes: Conjunctiva are clear and anicteric  ENT: Mucous membranes are moist no exudates  Neck: Trachea midline no visible jugular venous distention  Lungs: He has a few crackles in the right base the left is pretty clear no dullness left there is a  little bit on the right chest tube with serous fluid no airleak  Cardiac: Regular rate rhythm no murmur  Abdomen: Soft no hepatosplenomegaly or masses  : Not examined  Musculoskeletal: Grossly normal aside from the right AKA  Skin: Warm and dry no jaundice no petechiae  Neuro: Alert following commands but he is a little confused today.  Extremities/P Vascular: No clubbing no cyanosis no edema palpable radial and left dorsalis pedis pulses  MSE: Grumpy today       Labs:  Results from last 7 days   Lab Units 02/12/24  0755 02/11/24  0315 02/10/24  0532 02/09/24  1114   GLUCOSE mg/dL 73 88 114* 115*   SODIUM mmol/L 137 139 135* 142   POTASSIUM mmol/L 4.1 4.6 4.7 5.0   CO2 mmol/L 23.9 24.8 21.4* 29.0   CHLORIDE mmol/L 106 106 102 103   ANION GAP mmol/L 7.1 8.2 11.6 10.0   CREATININE mg/dL 0.52* 0.81 0.87 0.94   BUN mg/dL 15 22 27* 21   BUN / CREAT RATIO  28.8* 27.2* 31.0* 22.3   CALCIUM mg/dL 7.9* 7.8* 8.2* 9.1   ALK PHOS U/L  --   --  116 145*   TOTAL PROTEIN g/dL  --   --  5.4* 6.2   ALT (SGPT) U/L  --   --  19 26   AST (SGOT) U/L  --   --  18 25   BILIRUBIN mg/dL  --   --  1.2 0.9   ALBUMIN g/dL  --   --  2.5* 3.4*   GLOBULIN gm/dL  --   --   --  2.8     Estimated Creatinine Clearance: 87.2 mL/min (A) (by C-G formula based on SCr of 0.52 mg/dL (L)).      Results from last 7 days   Lab Units 02/12/24  0427 02/11/24  0315 02/10/24  0532 02/09/24  1114   WBC 10*3/mm3 5.76 6.94 12.14* 7.58   RBC 10*6/mm3 4.76 4.83 5.86* 5.92*   HEMOGLOBIN g/dL 11.9* 11.9* 14.2 14.7   HEMATOCRIT % 38.3 37.8 45.0 46.6   MCV fL 80.5 78.3* 76.8* 78.7*   MCH pg 25.0* 24.6* 24.2* 24.8*   MCHC g/dL 31.1* 31.5 31.6 31.5   RDW % 17.4* 17.8* 18.4* 18.7*   RDW-SD fl 49.9 49.8 47.3 49.8   MPV fL 11.1 11.2 11.2 10.7   PLATELETS 10*3/mm3 167 179 233 199   NEUTROPHIL % % 68.6 75.3 85.9* 78.1*   LYMPHOCYTE % % 16.3* 10.7* 7.5* 12.8*   MONOCYTES % % 10.9 12.4* 6.0 7.8   EOSINOPHIL % % 3.5 1.0 0.0* 0.7   BASOPHIL % % 0.5 0.3 0.2 0.5   IMM GRAN % % 0.2  0.3 0.4 0.1   NEUTROS ABS 10*3/mm3 3.95 5.23 10.42* 5.92   LYMPHS ABS 10*3/mm3 0.94 0.74 0.91 0.97   MONOS ABS 10*3/mm3 0.63 0.86 0.73 0.59   EOS ABS 10*3/mm3 0.20 0.07 0.00 0.05   BASOS ABS 10*3/mm3 0.03 0.02 0.03 0.04   IMMATURE GRANS (ABS) 10*3/mm3 0.01 0.02 0.05 0.01   NRBC /100 WBC 0.0 0.0 0.1 0.0     Results from last 7 days   Lab Units 02/09/24  1907   PH, ARTERIAL pH units 7.423   PO2 ART mm Hg 52.9*   PCO2, ARTERIAL mm Hg 38.1   HCO3 ART mmol/L 24.9     Results from last 7 days   Lab Units 02/09/24  1401 02/09/24  1114   HSTROP T ng/L 60* 65*     Results from last 7 days   Lab Units 02/09/24  1114   PROBNP pg/mL 4,357.0*     Results from last 7 days   Lab Units 02/09/24  1914 02/09/24  1401   TSH uIU/mL  --  0.679   T3 FREE pg/mL 1.6*  --    FREE T4 ng/dL  --  1.66     Results from last 7 days   Lab Units 02/10/24  0532 02/09/24  1401 02/09/24  1114   LACTATE mmol/L 2.0 2.0 2.2*   PROCALCITONIN ng/mL  --   --  0.08         Microbiology Results (last 10 days)       Procedure Component Value - Date/Time    Legionella Antigen, Urine - Urine, Urine, Clean Catch [[id number]]  (Normal) Collected: 02/09/24 2233    Lab Status: Final result Specimen: Urine, Clean Catch Updated: 02/09/24 2307     LEGIONELLA ANTIGEN, URINE Negative    S. Pneumo Ag Urine or CSF - Urine, Urine, Clean Catch [[id number]]  (Normal) Collected: 02/09/24 2233    Lab Status: Final result Specimen: Urine, Clean Catch Updated: 02/09/24 2307     Strep Pneumo Ag Negative    Respiratory Panel PCR w/COVID-19(SARS-CoV-2) [facility] In-House, NP Swab in UTM/VTM, 2 HR TAT - Swab, Nasopharynx [[id number]]  (Normal) Collected: 02/09/24 1740    Lab Status: Final result Specimen: Swab from Nasopharynx Updated: 02/09/24 1905     ADENOVIRUS, PCR Not Detected     Coronavirus 229E Not Detected     Coronavirus HKU1 Not Detected     Coronavirus NL63 Not Detected     Coronavirus OC43 Not Detected     COVID19 Not Detected     Human Metapneumovirus Not  Detected     Human Rhinovirus/Enterovirus Not Detected     Influenza A PCR Not Detected     Influenza B PCR Not Detected     Parainfluenza Virus 1 Not Detected     Parainfluenza Virus 2 Not Detected     Parainfluenza Virus 3 Not Detected     Parainfluenza Virus 4 Not Detected     RSV, PCR Not Detected     Bordetella pertussis pcr Not Detected     Bordetella parapertussis PCR Not Detected     Chlamydophila pneumoniae PCR Not Detected     Mycoplasma pneumo by PCR Not Detected    Narrative:      In the setting of a positive respiratory panel with a viral infection PLUS a negative procalcitonin without other underlying concern for bacterial infection, consider observing off antibiotics or discontinuation of antibiotics and continue supportive care. If the respiratory panel is positive for atypical bacterial infection (Bordetella pertussis, Chlamydophila pneumoniae, or Mycoplasma pneumoniae), consider antibiotic de-escalation to target atypical bacterial infection.    Respiratory Culture - Sputum, Cough [[id number]] Collected: 02/09/24 1733    Lab Status: Final result Specimen: Sputum from Cough Updated: 02/09/24 1958     Respiratory Culture Rejected     Gram Stain Few (2+) Epithelial cells seen      Few (2+) WBCs seen      Few (2+) Gram negative bacilli      Few (2+) Gram positive cocci in pairs    Narrative:      Specimen rejected due to oropharyngeal contamination. Please reorder and recollect specimen if clinically necessary.    Body Fluid Culture - Body Fluid, Pleural Cavity [[id number]] Collected: 02/09/24 1532    Lab Status: Preliminary result Specimen: Body Fluid from Pleural Cavity Updated: 02/12/24 0759     Body Fluid Culture No growth at 3 days     Gram Stain Rare (1+) WBCs seen      No organisms seen    AFB Culture - Drainage, Lung, R [[id number]] Collected: 02/09/24 1532    Lab Status: Preliminary result Specimen: Drainage from Lung, R Updated: 02/10/24 1438     AFB Stain No acid fast bacilli seen on direct  "smear    Blood Culture - Blood, Arm, Right [[id number]]  (Normal) Collected: 02/09/24 1154    Lab Status: Preliminary result Specimen: Blood from Arm, Right Updated: 02/11/24 1200     Blood Culture No growth at 2 days    Blood Culture - Blood, Arm, Left [[id number]]  (Normal) Collected: 02/09/24 1154    Lab Status: Preliminary result Specimen: Blood from Arm, Left Updated: 02/11/24 1200     Blood Culture No growth at 2 days                cefTRIAXone, 1,000 mg, Intravenous, Q24H  famotidine, 40 mg, Oral, Daily  gabapentin, 100 mg, Oral, Nightly  guaiFENesin, 1,200 mg, Oral, Q12H  methIMAzole, 5 mg, Oral, Once per day on Monday Wednesday Friday  metoprolol tartrate, 25 mg, Oral, Q12H  sodium chloride, 10 mL, Intravenous, Q12H      hold,         Diagnostics:  XR Chest 1 View    Result Date: 2/9/2024  XR CHEST 1 VW-  HISTORY: Male who is 83 years-old, chest tube management  TECHNIQUE: Frontal view of the chest  COMPARISON: 2/9/2024  FINDINGS: Right chest tube appears stable. Heart size is borderline. Aorta is calcified. Pulmonary vasculature is unremarkable. Opacity in the right mid to lower lung is increased, suggesting increased infiltrate/atelectasis. Residual right pleural effusion appears similar to prior exam. There appears to be 2 cm right apical pneumothorax, appearance could reflect \"trapped lung\" phenomenon. No left pneumothorax. Otherwise stable.      As described.    This report was finalized on 2/9/2024 7:34 PM by Dr. Jun Maldonado M.D on Workstation: [id number]      CT Angiogram Chest    Result Date: 2/9/2024  CT ANGIOGRAM OF THE CHEST WITH CONTRAST INCLUDING RECONSTRUCTION IMAGES 02/09/2024  HISTORY: Shortness of breath.  Following the intravenous contrast injection CT angiography was performed through the chest. Sagittal, coronal and 3D reconstruction images were reviewed.  The pulmonary arterial system is somewhat heterogeneous but no definite pulmonary embolus is seen. The heterogeneity is probably " artifact.  There is a large right pleural effusion with atelectasis of the right lung. There is a small left pleural effusion.  Small amount of fluid is seen in pericardial recesses. There is some aortic and coronary calcification.      1. Large right pleural effusion with atelectasis of the right lung. The right hemithorax is completely opacified. 2. Small left pleural effusion. 3. The pulmonary arterial system is somewhat heterogeneous but no definite pulmonary embolus is seen.    Radiation dose reduction techniques were utilized, including automated exposure control and exposure modulation based on body size.   This report was finalized on 2/9/2024 5:41 PM by Dr. Romeo Cummings M.D on Workstation: [id number]      CT Guided Chest Tube    Result Date: 2/9/2024  CT-GUIDED RIGHT CHEST TUBE PLACEMENT 02/09/2024  HISTORY: Large right pleural effusion.  TECHNIQUE: After signed informed consent was obtained the patient was prepped and draped in the right posterior oblique position. Lidocaine was used for local anesthesia.  CT guidance was used to place a 14 Macedonian pigtail catheter into the right pleural fluid collection. Approximately 1600 cc of nonpurulent serous fluid was removed.  The catheter was left in place and connected to the atrium suction.  Confirmatory images were obtained.  Patient tolerated the procedure well with no complications.      1. Successful placement of 14 Macedonian pigtail catheter into the right pleural fluid collection with removal of 1600 cc of nonpurulent fluid.   Radiation dose reduction techniques were utilized, including automated exposure control and exposure modulation based on body size.   This report was finalized on 2/9/2024 5:40 PM by Dr. Romeo Cummings M.D on Workstation: [id number]      XR Chest 1 View    Result Date: 2/9/2024  AP CHEST  HISTORY: Evaluate pleural effusion  COMPARISON: Earlier today  FINDINGS: Status post placement of right chest tube. Significant decrease in size  of right pleural effusion with significant improved aeration of the right lung. Heart size stable and left lung remains clear.      Significant increase in size of right pleural effusion    This report was finalized on 2/9/2024 5:03 PM by Dr. Randy Villa M.D on Workstation: [id number]      XR Chest 1 View    Result Date: 2/9/2024  CHEST SINGLE VIEW  HISTORY: Chest pain. Shortness of air and cough.  COMPARISON: AP chest 11/09/2023, CT chest 11/12/2023.  FINDINGS: There has developed complete white out of the right thorax due to large right pleural effusion and associated right basilar atelectasis or infiltrate. Left lung appears clear. There appears to be mild right to left cardiomediastinal shift that is likely related to the presence of pleural fluid.      New white out of the right lung appears to be mostly related to a large pleural effusion, though there is also a suspected component of atelectasis or infiltrate. There is mild right to left cardiomediastinal shift. CT would be the best means to further evaluate.  This report was finalized on 2/9/2024 11:09 AM by Dr. Ciro Casey M.D on Workstation: [id number]      Results for orders placed during the hospital encounter of 02/09/24    Adult Transthoracic Echo Complete W/ Cont if Necessary Per Protocol    Interpretation Summary    Left ventricular systolic function is hyperdynamic (EF > 70%).    Left ventricular wall thickness is consistent with mild concentric hypertrophy.    Left ventricular diastolic function was indeterminate.    Normal right ventricular cavity size and systolic function noted.    The left atrial cavity is moderately dilated.    There is moderate aortic valve calcification    Mild to moderate mitral valve regurgitation is present.    Insufficient TR velocity profile to estimate the right ventricular systolic pressure.    There is a small (<1cm) circumferential pericardial effusion. There is no evidence of cardiac tamponade.      Chest  x-ray reviewed there is a small right apical pneumothorax chest tube in the right base pigtail type appears that the pleural effusion on the right may be slightly less than yesterday lung remains clear    Active Hospital Problems    Diagnosis  POA    **Pleural effusion [J90]  Yes    Dyspnea [R06.00]  Yes    Chronic diastolic CHF (congestive heart failure) [I50.32]  Yes    Hyperthyroidism [E05.90]  Yes    A-fib [I48.91]  Yes    Dyslipidemia [E78.5]  Yes      Resolved Hospital Problems   No resolved problems to display.         Assessment & Plan     Large right pleural effusion with complete right lung atelectasis status post chest tube placement with drainage of effusion residual multiloculated effusion patient started lytic therapy yesterday made slight improvement on films today.  I will be a little concerned with trapped lung given the small pneumothorax, hopefully lytics will release this.  Hypotension this may be in part just a fluid shift after his large-volume pleural fluid output.  Since then blood pressures have been adequate  Reported left heel wound I did not take down his pressure boot and socks I will take the liberty and ask wound care to evaluate as he is very upset about this still today.    Persistent A-fib with rapid ventricular response cardiology following  Chronic heart failure preserved ejection fraction  Post right AKA 12/23 for osteomyelitis  Hyperthyroidism on methimazole  Encephalopathy does not appear to have focal deficit but he is getting confused on dates and times what is going on.    Plan for disposition:    Ridge Garcia Jr, MD  02/12/24  08:46 EST    Time:

## 2024-02-12 NOTE — PROGRESS NOTES
"CC: Atrial fibrillation    Interval History: No new acute events overnight      Vital Signs  Temp:  [97.4 °F (36.3 °C)-98.3 °F (36.8 °C)] 97.4 °F (36.3 °C)  Heart Rate:  [] 107  Resp:  [16-18] 16  BP: ()/(43-80) 111/73    Intake/Output Summary (Last 24 hours) at 2/12/2024 0906  Last data filed at 2/12/2024 0605  Gross per 24 hour   Intake --   Output 1200 ml   Net -1200 ml     Flowsheet Rows      Flowsheet Row First Filed Value   Admission Height 177.8 cm (70\") Documented at 02/09/2024 1102   Admission Weight 72.6 kg (160 lb) Documented at 02/09/2024 1102            PHYSICAL EXAM:  General: No acute distress  Resp:NL Rate, symmetric chest expansion,unlabored, clear  CV: Irregularly irregular, NL PMI, NL S1 and S2, no Murmur, no gallop, no rub, No JVD.   ABD:Nl sounds, no masses or tenderness, nondistended, no guarding or rebound  Neuro: alert,cooperative and oriented  Extr:Normal pedal pulses, No edema or cyanosis, moves all extremities      Results Review:    Results from last 7 days   Lab Units 02/12/24  0755   SODIUM mmol/L 137   POTASSIUM mmol/L 4.1   CHLORIDE mmol/L 106   CO2 mmol/L 23.9   BUN mg/dL 15   CREATININE mg/dL 0.52*   GLUCOSE mg/dL 73   CALCIUM mg/dL 7.9*     Results from last 7 days   Lab Units 02/09/24  1401 02/09/24  1114   HSTROP T ng/L 60* 65*     Results from last 7 days   Lab Units 02/12/24  0427   WBC 10*3/mm3 5.76   HEMOGLOBIN g/dL 11.9*   HEMATOCRIT % 38.3   PLATELETS 10*3/mm3 167                     I reviewed the patient's new clinical results.  I personally viewed and interpreted the patient's EKG/Telemetry data        Medication Review:   Meds reviewed    hold,         Assessment/Plan    1.  Recurrent large right pleural effusion (exudative) with associated near right lung atelectasis, status post CT-guided chest tube placement on 2/9/2024  2.  Baseline hypertension, now with hypotension  3.  Persistent atrial fibrillation with RVR  4.  Chronic diastolic CHF  5.  Status post " right AKA on 12/13/2023 secondary to osteomyelitis and contracture of the right knee  6.  Nonspecific high-sensitivity troponin elevation  7.  Multifactorial weakness  8.  Hyperthyroidism, on methimazole    Received IV doses of digoxin. Level elevated yesterday and normal today.  Heart rate control is acceptable on metoprolol.  Euvolemic on exam  Chest tube in place.  Anticoagulation on hold till chest tube was removed.  Can use prophylactic Lovenox for DVT.    Kingsley Perla MD  02/12/24  09:06 EST

## 2024-02-12 NOTE — CASE MANAGEMENT/SOCIAL WORK
Discharge Planning Assessment  [facility]     Patient Name: Mehreen Silva IV  MRN: [id number]  Today's Date: 2/12/2024    Admit Date: 2/9/2024    Plan: Home with wife, caregiver, and HH   Discharge Needs Assessment       Row Name 02/12/24 1312       Living Environment    People in Home spouse    Current Living Arrangements home    Potentially Unsafe Housing Conditions none    Primary Care Provided by self    Provides Primary Care For no one;no one, unable/limited ability to care for self    Family Caregiver if Needed spouse  private caregiver/Andres    Quality of Family Relationships supportive       Transition Planning    Patient/Family Anticipates Transition to home with family;home with help/services       Discharge Needs Assessment    Equipment Currently Used at Home wheelchair;commode;shower chair    Equipment Needed After Discharge none                   Discharge Plan       Row Name 02/12/24 1326       Plan    Plan Home with wife, caregiver, and HH    Patient/Family in Agreement with Plan yes    Plan Comments Spoke with pts wife/Belem over phone.  Introduced self, explained CCP role, facesheet verified. Pt lives with wife and has caregiver 8 hours/day, 6 days/week.  Pt has wheelchair, sydni lift, and adjustable bed at home.  Pts caregivers transfers him to car for transportation to doctors appointments.  Pt has used [facility] in past and wife would like them again for nursing.  Wife did ask about sit to stand lift, CCP will inquire on where to get one.  CCP will follow.  jameel Hackett RN                  Continued Care and Services - Admitted Since 2/9/2024       Home Medical Care       Service Provider Request Status Selected Services Address Phone Fax Patient Preferred    [facility] [facility] [address], [address], [facility] [address] [phone] --                  Selected Continued Care - Prior Encounters Includes continued care and service providers  with selected services from prior encounters from 11/11/2023 to 2/12/2024      Discharged on 12/16/2023 Admission date: 12/13/2023 - Discharge disposition: Home-Health Care [facility] [place]      Home Medical Care       Service Provider Selected Services Address Phone Fax Patient Preferred    [facility] [address], [address], [facility] [address] [phone] --                      Discharged on 11/14/2023 Admission date: 11/8/2023 - Discharge disposition: Home or Self Care      Home Medical Care       Service Provider Selected Services Address Phone Fax Patient Preferred    [facility] [address], [address], [facility] [address] [phone] --                             Demographic Summary       Row Name 02/12/24 1311       General Information    Admission Type inpatient    Arrived From home    Referral Source admission list    Reason for Consult discharge planning    Preferred Language English       Contact Information    Permission Granted to Share Info With family/designee  Belem Silva (SPOUSE) [phone]                   Functional Status    No documentation.                  Psychosocial    No documentation.                  Abuse/Neglect    No documentation.                  Legal    No documentation.                  Substance Abuse    No documentation.                  Patient Forms    No documentation.                     Milagros Hcakett RN

## 2024-02-12 NOTE — NURSING NOTE
02/12/24 1359   Wound 12/13/23 1427 coccyx Pressure Injury   Placement Date/Time: 12/13/23 1427   Present on Original Admission: Yes  Location: coccyx  Primary Wound Type: (c) Pressure Injury   Pressure Injury Stage DTPI   Base non-blanchable;maroon/purple   Periwound redness;swelling   Periwound Temperature warm   Periwound Skin Turgor soft   Wound 02/09/24 1648 Left posterior heel Pressure Injury   Placement Date/Time: 02/09/24 1648   Present on Original Admission: Yes  Side: Left  Orientation: posterior  Location: heel  Primary Wound Type: Pressure Injury   Pressure Injury Stage DTPI   Dressing Appearance intact   Base non-blanchable;purple;red   Periwound redness;swelling   Periwound Temperature warm   Periwound Skin Turgor soft   Skin Interventions   Pressure Reduction Devices specialty bed utilized  ([facility])   Pressure Reduction Techniques heels elevated off bed;positioned off wounds;pressure points protected     Wound/Ostomy: Consult received regarding skin issue on Coccyx and left Heel. Patient is alert, able to repositioning with assistance, upon assessment is observed localized area of no blanchable purple discoloration on Coccyx and left Heel area, DTI POA.  Wound care order and pressure ulcer preventive  measures have been implemented into Epic.   Low air loss mattress for adequate pressure redistribution and pressure relief is already on placed.  Please re-consult for any additional needs.

## 2024-02-12 NOTE — PLAN OF CARE
Goal Outcome Evaluation:  Plan of Care Reviewed With: patient        Progress: improving     Pt remains disoriented to situation. Afib, rate controlled. BP tolerated metoprolol BID. CT -20 sx. TPA administered today, thoracic planning to give one more dose tomorrow. CT output 520ml.

## 2024-02-12 NOTE — PROGRESS NOTES
"    Chief Complaint: Right pleural effusion  S/P: CT-guided chest tube placement; lytics x1  POD # 3    Subjective:  Symptoms:  Stable.  He reports weakness.  No shortness of breath.    Diet:  Poor intake.    Activity level: Impaired due to weakness.    Resting in bed. Some discomfort to chest tube site.    Vital Signs:  Temp:  [97.4 °F (36.3 °C)-98.3 °F (36.8 °C)] 97.4 °F (36.3 °C)  Heart Rate:  [] 107  Resp:  [16-18] 16  BP: ()/(43-80) 111/73    Intake & Output (last day)         02/11 0701  02/12 0700 02/12 0701  02/13 0700    P.O.      Total Intake(mL/kg)      Urine (mL/kg/hr) 0 (0)     Stool 0     Chest Tube 1200     Total Output 1200     Net -1200           Urine Unmeasured Occurrence 5 x     Stool Unmeasured Occurrence 2 x             Objective:  General Appearance:  Comfortable, well-appearing and in no acute distress.    Vital signs: (most recent): Blood pressure 111/73, pulse 107, temperature 97.4 °F (36.3 °C), temperature source Oral, resp. rate 16, height 177.8 cm (70\"), weight 57.3 kg (126 lb 5.2 oz), SpO2 98%.    HEENT: Normal HEENT exam.    Lungs:  Normal effort.  He is not in respiratory distress.    Heart: Tachycardia.  Irregular rhythm.    Chest: Chest wall tenderness present.    Extremities: Decreased range of motion.  (S/p Right AKA)  Neurological: Patient is alert.    Skin:  Warm and dry.                Chest tube:   Site: Right, Clean, Dry, Intact, and Securement device intact  Suction: -20 cm  Air Leak: negative  24 Hour Total: 1200ml    Results Review:     I reviewed the patient's new clinical results.  I reviewed the patient's new imaging results and agree with the interpretation.  Discussed with patient, RN and Dr. Santoro    Imaging reviewed independently.  Imaging Results (Last 24 Hours)       Procedure Component Value Units Date/Time    XR Chest 1 View [[id number]] Collected: 02/12/24 1052     Updated: 02/12/24 1122    Narrative:      Portable chest radiograph     HISTORY: Chest " tube management     TECHNIQUE: Single AP portable radiograph of the chest     COMPARISON: Chest radiograph dating back 2/10/2024       Impression:      Findings and impression:  Small-caliber right thoracostomy tube is present. Small right pleural  effusion with adjacent pulmonary pacification within the right mid and  lower lung is present, as before. There appears to be a small right  apical pneumothorax resulting in approximately 1.2 cm of  pleural-parenchymal separation, as before. Cardiac silhouette is mildly  enlarged. Mild left basilar pulmonary pacification appears to have  worsened since 2/11/2024 suggestive atelectasis versus pneumonia and/or  pulmonary edema in the appropriate clinical context and correlation with  patient history is recommended with continued attention on follow-up to  resolution. Left shoulder arthroplasty is incompletely visualized and  cannot be characterized.        This report was finalized on 2/12/2024 11:19 AM by Dr. Matias Fischer M.D on Workstation: [id number]               Lab Results:     Lab Results (last 24 hours)       Procedure Component Value Units Date/Time    Blood Culture - Blood, Arm, Right [[id number]]  (Normal) Collected: 02/09/24 1154    Specimen: Blood from Arm, Right Updated: 02/12/24 1200     Blood Culture No growth at 3 days    Blood Culture - Blood, Arm, Left [[id number]]  (Normal) Collected: 02/09/24 1154    Specimen: Blood from Arm, Left Updated: 02/12/24 1200     Blood Culture No growth at 3 days    Non-gynecologic Cytology [[id number]] Collected: 02/10/24 1156    Specimen: Body Fluid from Pleural Cavity Updated: 02/12/24 1037    Basic Metabolic Panel [[id number]]  (Abnormal) Collected: 02/12/24 0755    Specimen: Blood Updated: 02/12/24 0834     Glucose 73 mg/dL      BUN 15 mg/dL      Creatinine 0.52 mg/dL      Sodium 137 mmol/L      Potassium 4.1 mmol/L      Chloride 106 mmol/L      CO2 23.9 mmol/L      Calcium 7.9 mg/dL      BUN/Creatinine Ratio 28.8      Anion Gap 7.1 mmol/L      eGFR 100.0 mL/min/1.73     Narrative:      GFR Normal >60  Chronic Kidney Disease <60  Kidney Failure <15    The GFR formula is only valid for adults with stable renal function between ages 18 and 70.    Body Fluid Culture - Body Fluid, Pleural Cavity [[id number]] Collected: 02/09/24 1532    Specimen: Body Fluid from Pleural Cavity Updated: 02/12/24 0759     Body Fluid Culture No growth at 3 days     Gram Stain Rare (1+) WBCs seen      No organisms seen    Digoxin Level [[id number]]  (Normal) Collected: 02/12/24 0427    Specimen: Blood Updated: 02/12/24 0552     Digoxin 0.90 ng/mL     CBC & Differential [[id number]]  (Abnormal) Collected: 02/12/24 0427    Specimen: Blood Updated: 02/12/24 0513    Narrative:      The following orders were created for panel order CBC & Differential.  Procedure                               Abnormality         Status                     ---------                               -----------         ------                     CBC Auto Differential[[id number]]        Abnormal            Final result                 Please view results for these tests on the individual orders.    CBC Auto Differential [[id number]]  (Abnormal) Collected: 02/12/24 0427    Specimen: Blood Updated: 02/12/24 0513     WBC 5.76 10*3/mm3      RBC 4.76 10*6/mm3      Hemoglobin 11.9 g/dL      Hematocrit 38.3 %      MCV 80.5 fL      MCH 25.0 pg      MCHC 31.1 g/dL      RDW 17.4 %      RDW-SD 49.9 fl      MPV 11.1 fL      Platelets 167 10*3/mm3      Neutrophil % 68.6 %      Lymphocyte % 16.3 %      Monocyte % 10.9 %      Eosinophil % 3.5 %      Basophil % 0.5 %      Immature Grans % 0.2 %      Neutrophils, Absolute 3.95 10*3/mm3      Lymphocytes, Absolute 0.94 10*3/mm3      Monocytes, Absolute 0.63 10*3/mm3      Eosinophils, Absolute 0.20 10*3/mm3      Basophils, Absolute 0.03 10*3/mm3      Immature Grans, Absolute 0.01 10*3/mm3      nRBC 0.0 /100 WBC              Assessment & Plan       Pleural  effusion    Dyslipidemia    A-fib    Hyperthyroidism    Chronic diastolic CHF (congestive heart failure)    Dyspnea       Assessment & Plan    Large right pleural effusion: Status post CT-guided chest tube placement with over 3 L drained in the first 24 hours.  Malignancy is on the differential and cytology from pleural fluid was ordered on Friday, 2/9/2024. Await these results. Had a round of intrapleural TPA yesterday with adequate response. Plan for another round today and tomorrow with CT chest later this week. Re-check CXR in AM. Please continue to hold anticoagulation with chest tube in place.    A-fib: Management per cardiology    Mary Augustin, NOHEMY, APRN  [facility]  02/12/24  13:01 EST    Greater than 35 minutes was spent reviewing the patient's chart, radiographic imaging, assessing the patient and developing a plan of care which was discussed with the patient and RN.  This excludes any other billable procedures which will be dictated separately.

## 2024-02-13 ENCOUNTER — APPOINTMENT (OUTPATIENT)
Dept: GENERAL RADIOLOGY | Facility: HOSPITAL | Age: 84
DRG: 186 | End: 2024-02-13
Payer: MEDICARE

## 2024-02-13 LAB
ALBUMIN SERPL-MCNC: 1.9 G/DL (ref 3.5–5.2)
ANION GAP SERPL CALCULATED.3IONS-SCNC: 7.5 MMOL/L (ref 5–15)
BUN SERPL-MCNC: 18 MG/DL (ref 8–23)
BUN/CREAT SERPL: 22.5 (ref 7–25)
CALCIUM SPEC-SCNC: 7.9 MG/DL (ref 8.6–10.5)
CHLORIDE SERPL-SCNC: 108 MMOL/L (ref 98–107)
CO2 SERPL-SCNC: 25.5 MMOL/L (ref 22–29)
CREAT SERPL-MCNC: 0.8 MG/DL (ref 0.76–1.27)
CYTO UR: NORMAL
DEPRECATED RDW RBC AUTO: 48.8 FL (ref 37–54)
EGFRCR SERPLBLD CKD-EPI 2021: 87.8 ML/MIN/1.73
ERYTHROCYTE [DISTWIDTH] IN BLOOD BY AUTOMATED COUNT: 17.1 % (ref 12.3–15.4)
GLUCOSE SERPL-MCNC: 147 MG/DL (ref 65–99)
HCT VFR BLD AUTO: 37.4 % (ref 37.5–51)
HGB BLD-MCNC: 11.6 G/DL (ref 13–17.7)
LAB AP CASE REPORT: NORMAL
MAGNESIUM SERPL-MCNC: 2.4 MG/DL (ref 1.6–2.4)
MCH RBC QN AUTO: 24.5 PG (ref 26.6–33)
MCHC RBC AUTO-ENTMCNC: 31 G/DL (ref 31.5–35.7)
MCV RBC AUTO: 78.9 FL (ref 79–97)
PATH REPORT.FINAL DX SPEC: NORMAL
PATH REPORT.GROSS SPEC: NORMAL
PHOSPHATE SERPL-MCNC: 2.5 MG/DL (ref 2.5–4.5)
PLATELET # BLD AUTO: 189 10*3/MM3 (ref 140–450)
PMV BLD AUTO: 10.6 FL (ref 6–12)
POTASSIUM SERPL-SCNC: 4.4 MMOL/L (ref 3.5–5.2)
RBC # BLD AUTO: 4.74 10*6/MM3 (ref 4.14–5.8)
SODIUM SERPL-SCNC: 141 MMOL/L (ref 136–145)
WBC NRBC COR # BLD AUTO: 6.32 10*3/MM3 (ref 3.4–10.8)

## 2024-02-13 PROCEDURE — 25010000002 CEFTRIAXONE PER 250 MG: Performed by: INTERNAL MEDICINE

## 2024-02-13 PROCEDURE — 25010000002 ALTEPLASE PER 1 MG

## 2024-02-13 PROCEDURE — 83735 ASSAY OF MAGNESIUM: CPT | Performed by: INTERNAL MEDICINE

## 2024-02-13 PROCEDURE — 99232 SBSQ HOSP IP/OBS MODERATE 35: CPT

## 2024-02-13 PROCEDURE — 99232 SBSQ HOSP IP/OBS MODERATE 35: CPT | Performed by: INTERNAL MEDICINE

## 2024-02-13 PROCEDURE — 32562 LYSE CHEST FIBRIN SUBQ DAY: CPT

## 2024-02-13 PROCEDURE — 85027 COMPLETE CBC AUTOMATED: CPT | Performed by: INTERNAL MEDICINE

## 2024-02-13 PROCEDURE — 71045 X-RAY EXAM CHEST 1 VIEW: CPT

## 2024-02-13 PROCEDURE — 80069 RENAL FUNCTION PANEL: CPT | Performed by: INTERNAL MEDICINE

## 2024-02-13 PROCEDURE — 25010000002 DIGOXIN PER 500 MCG: Performed by: INTERNAL MEDICINE

## 2024-02-13 PROCEDURE — 3E1L38Z IRRIGATION OF PLEURAL CAVITY USING IRRIGATING SUBSTANCE, PERCUTANEOUS APPROACH: ICD-10-PCS

## 2024-02-13 RX ORDER — DIGOXIN 0.25 MG/ML
250 INJECTION INTRAMUSCULAR; INTRAVENOUS ONCE
Status: COMPLETED | OUTPATIENT
Start: 2024-02-13 | End: 2024-02-13

## 2024-02-13 RX ADMIN — DORNASE ALFA 5 MG: 1 SOLUTION RESPIRATORY (INHALATION) at 11:45

## 2024-02-13 RX ADMIN — DIGOXIN 250 MCG: 0.25 INJECTION INTRAMUSCULAR; INTRAVENOUS at 10:15

## 2024-02-13 RX ADMIN — CASTOR OIL AND BALSAM, PERU 1 APPLICATION: 788; 87 OINTMENT TOPICAL at 20:51

## 2024-02-13 RX ADMIN — Medication 10 ML: at 20:50

## 2024-02-13 RX ADMIN — OXYCODONE HYDROCHLORIDE 5 MG: 5 TABLET ORAL at 15:36

## 2024-02-13 RX ADMIN — Medication 10 ML: at 08:23

## 2024-02-13 RX ADMIN — ALTEPLASE 10 MG: KIT at 11:45

## 2024-02-13 RX ADMIN — METOPROLOL TARTRATE 25 MG: 25 TABLET, FILM COATED ORAL at 18:03

## 2024-02-13 RX ADMIN — METOPROLOL TARTRATE 25 MG: 25 TABLET, FILM COATED ORAL at 08:31

## 2024-02-13 RX ADMIN — Medication 1 APPLICATION: at 20:50

## 2024-02-13 RX ADMIN — CEFTRIAXONE SODIUM 1000 MG: 1 INJECTION, POWDER, FOR SOLUTION INTRAMUSCULAR; INTRAVENOUS at 08:23

## 2024-02-13 RX ADMIN — GABAPENTIN 100 MG: 100 CAPSULE ORAL at 20:48

## 2024-02-13 RX ADMIN — GUAIFENESIN 1200 MG: 600 TABLET, EXTENDED RELEASE ORAL at 20:48

## 2024-02-13 RX ADMIN — CASTOR OIL AND BALSAM, PERU 1 APPLICATION: 788; 87 OINTMENT TOPICAL at 08:23

## 2024-02-13 RX ADMIN — GUAIFENESIN 1200 MG: 600 TABLET, EXTENDED RELEASE ORAL at 08:23

## 2024-02-13 RX ADMIN — FAMOTIDINE 40 MG: 20 TABLET, FILM COATED ORAL at 08:23

## 2024-02-13 NOTE — PLAN OF CARE
Goal Outcome Evaluation:  Plan of Care Reviewed With: patient        Progress: improving       Pt disoriented to situation, 1L NC, afib rate controlled. IV dig 250 given x1. BP tolerated the PO metoprolol. 3rd dose of TPA given through CT today. Total output for day = 650ml. L heel wound changed.

## 2024-02-13 NOTE — PROGRESS NOTES
"    Chief Complaint: Right pleural effusion  S/P: CT-guided chest tube placement; lytics x2      Subjective:  Symptoms:  Improved.  He reports weakness.  No shortness of breath or cough.    Diet:  Poor intake.  No nausea or vomiting.    Activity level: Impaired due to weakness.    Pain:  He reports no pain.    Resting in bed.  No new complaints.    Vital Signs:  Temp:  [97.5 °F (36.4 °C)-98.6 °F (37 °C)] 98.6 °F (37 °C)  Heart Rate:  [70-94] 70  Resp:  [16] 16  BP: (101-109)/(60-78) 109/78    Intake & Output (last day)         02/12 0701  02/13 0700 02/13 0701  02/14 0700    P.O. 480     Total Intake(mL/kg) 480 (7.8)     Urine (mL/kg/hr) 0 (0)     Stool      Chest Tube 540     Total Output 540     Net -60           Urine Unmeasured Occurrence 3 x 2 x            Objective:  General Appearance:  Comfortable, well-appearing and in no acute distress.    Vital signs: (most recent): Blood pressure 109/78, pulse 70, temperature 98.6 °F (37 °C), temperature source Oral, resp. rate 16, height 177.8 cm (70\"), weight 61.7 kg (136 lb 0.4 oz), SpO2 99%.    HEENT: Normal HEENT exam.    Lungs:  Normal effort.  He is not in respiratory distress.    Heart: Tachycardia.  Irregular rhythm.    Chest: Symmetric chest wall expansion. Chest wall tenderness present.    Extremities: Decreased range of motion.  (S/p Right AKA)  Neurological: Patient is alert.    Skin:  Warm and dry.                Chest tube:   Site: Right, Clean, Dry, Intact, and Securement device intact  Suction: -20 cm  Air Leak: negative  24 Hour Total: 540ml    Results Review:     I reviewed the patient's new clinical results.  I reviewed the patient's new imaging results and agree with the interpretation.  Discussed with patient, RN and Dr. Rincon    Imaging reviewed independently.  Imaging Results (Last 24 Hours)       Procedure Component Value Units Date/Time    XR Chest 1 View [[id number]] Collected: 02/13/24 0807     Updated: 02/13/24 0812    Narrative:      XR CHEST " 1 VW-2/13/2024 at 6:01 a.m.     HISTORY: Chest tube management.     Heart size is at the upper limits of normal. A pigtail catheter  terminates over the right base.     No significant pneumothorax is seen on the current study. There is mild  vascular congestion. Left shoulder prosthesis is seen in good position.  There may be minimal right pleural effusion blunting the right  costophrenic sulcus.       Impression:      1. No significant pneumothorax is seen on the current study.     This report was finalized on 2/13/2024 8:09 AM by Dr. Romeo Cummings M.D on Workstation: [id number]               Lab Results:     Lab Results (last 24 hours)       Procedure Component Value Units Date/Time    Blood Culture - Blood, Arm, Right [[id number]]  (Normal) Collected: 02/09/24 1154    Specimen: Blood from Arm, Right Updated: 02/13/24 1201     Blood Culture No growth at 4 days    Blood Culture - Blood, Arm, Left [[id number]]  (Normal) Collected: 02/09/24 1154    Specimen: Blood from Arm, Left Updated: 02/13/24 1201     Blood Culture No growth at 4 days    Non-gynecologic Cytology [[id number]] Collected: 02/10/24 1156    Specimen: Body Fluid from Pleural Cavity Updated: 02/13/24 1000     Case Report --     Non-gynecologic Cytology                          Case: [id number]                                  Authorizing Provider:  Ridge Garcia Jr.,   Collected:           02/10/2024 11:56 AM                                 MD                                                                           Ordering Location:     [facility]  Received:            02/12/2024 10:37 AM                                 [address]                                                                       Pathologist:           Jessica Brown MD                                                          Specimen:    Pleural Cavity, Right Chest Tube                                                            Final Diagnosis --     Pleural  Fluid, Right Chest Tube:   A. Negative for malignant cells.   B. Abundant acute inflammation and rare histiocytes present.       Gross Description --     1. Pleural Cavity.  1 cup containing 15 ml of dark yellow fluid received. ThinPrep(1), cell block- in formalin @ 11:14 on 2/12/24. 0 ml of fluid remaining.            Microscopic Description --     Screened by S. Sacksteder      Body Fluid Culture - Body Fluid, Pleural Cavity [[id number]] Collected: 02/09/24 1532    Specimen: Body Fluid from Pleural Cavity Updated: 02/13/24 0700     Body Fluid Culture No growth at 4 days     Gram Stain Rare (1+) WBCs seen      No organisms seen    Renal Function Panel [[id number]]  (Abnormal) Collected: 02/13/24 0310    Specimen: Blood Updated: 02/13/24 0445     Glucose 147 mg/dL      BUN 18 mg/dL      Creatinine 0.80 mg/dL      Sodium 141 mmol/L      Potassium 4.4 mmol/L      Chloride 108 mmol/L      CO2 25.5 mmol/L      Calcium 7.9 mg/dL      Albumin 1.9 g/dL      Phosphorus 2.5 mg/dL      Anion Gap 7.5 mmol/L      BUN/Creatinine Ratio 22.5     eGFR 87.8 mL/min/1.73     Narrative:      GFR Normal >60  Chronic Kidney Disease <60  Kidney Failure <15    The GFR formula is only valid for adults with stable renal function between ages 18 and 70.    Magnesium [[id number]]  (Normal) Collected: 02/13/24 0310    Specimen: Blood Updated: 02/13/24 0442     Magnesium 2.4 mg/dL     CBC (No Diff) [[id number]]  (Abnormal) Collected: 02/13/24 0310    Specimen: Blood Updated: 02/13/24 0430     WBC 6.32 10*3/mm3      RBC 4.74 10*6/mm3      Hemoglobin 11.6 g/dL      Hematocrit 37.4 %      MCV 78.9 fL      MCH 24.5 pg      MCHC 31.0 g/dL      RDW 17.1 %      RDW-SD 48.8 fl      MPV 10.6 fL      Platelets 189 10*3/mm3              Assessment & Plan       Pleural effusion    Dyslipidemia    A-fib    Hyperthyroidism    Chronic diastolic CHF (congestive heart failure)    Dyspnea       Assessment & Plan  S/p right chest tube placement and intrapleural lytic  therapy x 2 with good response.  A.m. chest x-ray reviewed which demonstrates good lung expansion bilaterally.  Only minimal right pleural effusion. Right basilar chest tube in good position.  Patient denies any shortness of breath and is on 1 L of oxygen via nasal cannula, wean as able.  His pain is well-controlled.    Large right pleural effusion: S/p chest tube placement and lytic therapy x 2.  Plan for third round today. Continue chest tube to -20 cm once unclamped.  Plan for follow-up CT of the chest to be performed tomorrow.  Cultures no growth to date and cytology of pleural fluid negative for malignant cells. Repeat a.m. chest x-ray, please continue to hold anticoagulation with chest tube in place.  Encourage good pulmonary hygiene, IS.  Increase mobilization as able.    A-fib: Management per cardiology    CHARLEE Pascal  [facility]  02/13/24  13:19 EST    Greater than 35 minutes was spent reviewing the patient's chart, radiographic imaging, assessing the patient and developing a plan of care which was discussed with the patient and RN.  This excludes any other billable procedures which will be dictated separately.

## 2024-02-13 NOTE — PROCEDURES
Anesthesia: None     Summary of procedure: Under sterile technique the pleural catheter was opened.  10 mg of TPA (reconstituted in 30 cc of normal saline) and 5 mg of dornase (reconstituted in 30 cc of sterile water) was instilled into the pleural cavity. The pleural tube was clamped.  The patient's position was changed to every 15 minutes for 2 hours.  The tube was then unclamped.  Patient tolerated the procedure well.     CHARLEE Pascal

## 2024-02-13 NOTE — PROGRESS NOTES
Name: Mehreen Silva IV ADMIT: 2024   : 1940  PCP: Taiwo Powers MD    MRN: [id number] LOS: 4 days   AGE/SEX: 83 y.o. male  ROOM: [facility]/     Subjective   Subjective   Chief Complaint   Patient presents with    Chest Pain     Tolerating the chest tube well and he reports that he does feel like his dyspnea is improving compared to few days ago.  He reports that he is nervous and scared of potential diagnoses.  I discussed his progression and the current treatment plan with his wife yesterday afternoon.  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  She feels like it had in part to do with the loss of control over his own health.     Objective   Objective   Vital Signs  Temp:  [97.5 °F (36.4 °C)-98.6 °F (37 °C)] 98.3 °F (36.8 °C)  Heart Rate:  [70-94] 79  Resp:  [16] 16  BP: (103-113)/(60-78) 113/66  SpO2:  [95 %-100 %] 100 %  on  Flow (L/min):  [1] 1;   Device (Oxygen Therapy): nasal cannula;humidified  Body mass index is 19.52 kg/m².    Physical Exam  Vitals and nursing note reviewed.   Constitutional:       General: He is not in acute distress.     Appearance: He is not diaphoretic.   HENT:      Head: Atraumatic.   Cardiovascular:      Rate and Rhythm: Normal rate. Rhythm irregular.   Pulmonary:      Effort: Pulmonary effort is normal.      Breath sounds: No wheezing.      Comments: Chest tube  Abdominal:      General: There is no distension.      Palpations: Abdomen is soft.      Tenderness: There is no abdominal tenderness. There is no guarding or rebound.   Musculoskeletal:         General: Deformity (r aka) present. No tenderness.   Skin:     General: Skin is warm and dry.   Neurological:      General: No focal deficit present.      Mental Status: He is alert.   Psychiatric:         Mood and Affect: Mood is anxious.         Behavior: Behavior normal.       Results  "Review  I reviewed the patient's new clinical results.    Results from last 7 days   Lab Units 02/13/24 0310 02/12/24  0427 02/11/24  0315 02/10/24  0532   WBC 10*3/mm3 6.32 5.76 6.94 12.14*   HEMOGLOBIN g/dL 11.6* 11.9* 11.9* 14.2   PLATELETS 10*3/mm3 189 167 179 233     Results from last 7 days   Lab Units 02/13/24  0310 02/12/24  0755 02/11/24  0315 02/10/24  0532   SODIUM mmol/L 141 137 139 135*   POTASSIUM mmol/L 4.4 4.1 4.6 4.7   CHLORIDE mmol/L 108* 106 106 102   CO2 mmol/L 25.5 23.9 24.8 21.4*   BUN mg/dL 18 15 22 27*   CREATININE mg/dL 0.80 0.52* 0.81 0.87   GLUCOSE mg/dL 147* 73 88 114*   EGFR mL/min/1.73 87.8 100.0 87.5 85.6     Results from last 7 days   Lab Units 02/13/24  0310 02/10/24  0532 02/09/24  1114   ALBUMIN g/dL 1.9* 2.5* 3.4*   BILIRUBIN mg/dL  --  1.2 0.9   ALK PHOS U/L  --  116 145*   AST (SGOT) U/L  --  18 25   ALT (SGPT) U/L  --  19 26     Results from last 7 days   Lab Units 02/13/24  0310 02/12/24  0755 02/11/24  0315 02/10/24  0532 02/09/24  1114   CALCIUM mg/dL 7.9* 7.9* 7.8* 8.2* 9.1   ALBUMIN g/dL 1.9*  --   --  2.5* 3.4*   MAGNESIUM mg/dL 2.4  --   --   --   --    PHOSPHORUS mg/dL 2.5  --   --   --   --      Results from last 7 days   Lab Units 02/10/24  0532 02/09/24  1401 02/09/24  1114   PROCALCITONIN ng/mL  --   --  0.08   LACTATE mmol/L 2.0 2.0 2.2*     No results found for: \"HGBA1C\", \"POCGLU\"      XR Chest 1 View    Result Date: 2/13/2024  1. No significant pneumothorax is seen on the current study.  This report was finalized on 2/13/2024 8:09 AM by Dr. Romeo Cummings M.D on Workstation: [id number]      XR Chest 1 View    Result Date: 2/12/2024  Findings and impression: Small-caliber right thoracostomy tube is present. Small right pleural effusion with adjacent pulmonary pacification within the right mid and lower lung is present, as before. There appears to be a small right apical pneumothorax resulting in approximately 1.2 cm of pleural-parenchymal separation, as before. " Cardiac silhouette is mildly enlarged. Mild left basilar pulmonary pacification appears to have worsened since 2/11/2024 suggestive atelectasis versus pneumonia and/or pulmonary edema in the appropriate clinical context and correlation with patient history is recommended with continued attention on follow-up to resolution. Left shoulder arthroplasty is incompletely visualized and cannot be characterized.   This report was finalized on 2/12/2024 11:19 AM by Dr. Matias Fischer M.D on Workstation: [email or web address]       I have personally reviewed all medications:  Scheduled Medications  castor oil-balsam peru, 1 Application, Topical, Q12H  cefTRIAXone, 1,000 mg, Intravenous, Q24H  famotidine, 40 mg, Oral, Daily  gabapentin, 100 mg, Oral, Nightly  guaiFENesin, 1,200 mg, Oral, Q12H  Menthol-Zinc Oxide, 1 Application, Topical, Q12H  methIMAzole, 5 mg, Oral, Once per day on Monday Wednesday Friday  metoprolol tartrate, 25 mg, Oral, Q12H  sodium chloride, 10 mL, Intravenous, Q12H      Infusions  hold,       Diet  Diet: Cardiac Diets; Healthy Heart (2-3 Na+); No Straw; Texture: Regular Texture (IDDSI 7); Fluid Consistency: Thin (IDDSI 0)    I have personally reviewed:  [x]  Laboratory   [x]  Microbiology   [x]  Radiology   [x]  EKG/Telemetry  []  Cardiology/Vascular   [x]  Pathology    []  Records       Assessment/Plan     Active Hospital Problems    Diagnosis  POA    **Pleural effusion [J90]  Yes    Dyspnea [R06.00]  Yes    Chronic diastolic CHF (congestive heart failure) [I50.32]  Yes    Hyperthyroidism [E05.90]  Yes    A-fib [I48.91]  Yes    Dyslipidemia [E78.5]  Yes      Resolved Hospital Problems   No resolved problems to display.       83 y.o. male admitted with Pleural effusion.    Large right pleural effusion/right lung atelectasis: Had opacification of right hemithorax.  Status post chest tube placement with improvement.  Body fluid culture no growth to date.  Cytology negative.  He is on Rocephin currently.  Undergoing  intrapleural tPA with improvement in symptoms.  Repeat CT chest planned . Pulmonology and thoracic surgery following.  Chronic diastolic heart failure/A-fib:Anticoagulation has been held with chest tube.  Metoprolol. Lasix and jardiance held.  Cardiology following.  Hypertension: Not grossly elevated.  Plan to monitor.  Hyperthyroidism: TSH and free T4 were okay.  Continue Tapazole  PPX: AC on hold for chest tube.  Resume eventually.  Disposition: Home health/TBD    Expected Discharge Date: 2/14/2024; Expected Discharge Time:      Feliciano Torres MD  [facility]  02/13/24  16:04 EST    Dictated portions of note using Dragon dictation software.  Copied text in this note has been reviewed by me and remains accurate as of 02/13/24

## 2024-02-13 NOTE — PROCEDURES
Pre-op Diagnosis: Loculated Pleural Effusion, right     Post-Op Diagnosis: Loculated Pleural Effusion, right     Procedure performed: Irrigation pleural cavity with TPA and dornase    Anesthesia: None    Summary of procedure: The  pleural tube was accessed. 10 mg of TPA (reconstituted in 30cc of sterile water) and 5 mg of dornase (reconstituted in 30cc normal saline) was instilled into the pleural cavity. The pleural tube then was clamped.      The patient's position is to be changed to every 15 minutes for 2 hours.  The tube will then be unclamped and placed back to suction.  Patient tolerated the procedure well. We will re-evaluate with a CXR in the morning.      Appreciate assistance from SWAPNA Carrillo.    Mary Augustin, DNP, APRN

## 2024-02-13 NOTE — PROGRESS NOTES
LOS: 4 days   Patient Care Team:  Taiwo Powers MD as PCP - General (Internal Medicine)  Self, Bess YO MD as Consulting Physician (Endocrinology)  ChanHuang henley MD as Cardiologist (Cardiology)    Subjective     .  Following patient for pleural effusion and dyspnea on exertion.  Patient has a history of congestive heart failure, fibrillation, hypertension, hyperlipidemia peripheral vascular disease with right AKA and he has had prior pleural effusion.    Patient is feeling a little better today he does not have any specific complaints today breathing pretty good not coughing much    Review of Systems:          Objective     Vital Signs  Vital Sign Min/Max for last 24 hours  Temp  Min: 97.5 °F (36.4 °C)  Max: 98.6 °F (37 °C)   BP  Min: 101/72  Max: 109/78   Pulse  Min: 70  Max: 94   Resp  Min: 16  Max: 16   SpO2  Min: 95 %  Max: 99 %   Flow (L/min)  Min: 1  Max: 1   Weight  Min: 61.7 kg (136 lb 0.4 oz)  Max: 61.7 kg (136 lb 0.4 oz)        Ventilator/Non-Invasive Ventilation Settings admission, onward)      None                         Body mass index is 19.52 kg/m².  I/O last 3 completed shifts:  In: 480 [P.O.:480]  Out: 610 [Chest Tube:610]  No intake/output data recorded.        Physical Exam:  General Appearance: Well-developed older white male resting in bed does not look in acute distress 1 L nasal cannula O2 oxygen saturation is 99%  Eyes: Conjunctiva are clear and anicteric  ENT: Mucous membranes are moist no exudates  Neck: Trachea midline no visible jugular venous distention  Lungs: He has a few crackles in the right base the left is pretty clear no dullness left there is a little bit on the right chest tube with serous fluid no airleak  Cardiac: Regular rate rhythm no murmur  Abdomen: Soft no hepatosplenomegaly or masses  : Not examined  Musculoskeletal: Grossly normal aside from the right AKA  Skin: Warm and dry no jaundice no petechiae  Neuro: Alert following commands much better on  his orientation today  Extremities/P Vascular: No clubbing no cyanosis no edema palpable radial and left dorsalis pedis pulses  MSE: Much better mood today    Labs:  Results from last 7 days   Lab Units 02/13/24  0310 02/12/24  0755 02/11/24  0315 02/10/24  0532 02/09/24  1114   GLUCOSE mg/dL 147* 73 88 114* 115*   SODIUM mmol/L 141 137 139 135* 142   POTASSIUM mmol/L 4.4 4.1 4.6 4.7 5.0   MAGNESIUM mg/dL 2.4  --   --   --   --    CO2 mmol/L 25.5 23.9 24.8 21.4* 29.0   CHLORIDE mmol/L 108* 106 106 102 103   ANION GAP mmol/L 7.5 7.1 8.2 11.6 10.0   CREATININE mg/dL 0.80 0.52* 0.81 0.87 0.94   BUN mg/dL 18 15 22 27* 21   BUN / CREAT RATIO  22.5 28.8* 27.2* 31.0* 22.3   CALCIUM mg/dL 7.9* 7.9* 7.8* 8.2* 9.1   ALK PHOS U/L  --   --   --  116 145*   TOTAL PROTEIN g/dL  --   --   --  5.4* 6.2   ALT (SGPT) U/L  --   --   --  19 26   AST (SGOT) U/L  --   --   --  18 25   BILIRUBIN mg/dL  --   --   --  1.2 0.9   ALBUMIN g/dL 1.9*  --   --  2.5* 3.4*   GLOBULIN gm/dL  --   --   --   --  2.8     Estimated Creatinine Clearance: 61.1 mL/min (by C-G formula based on SCr of 0.8 mg/dL).      Results from last 7 days   Lab Units 02/13/24 0310 02/12/24  0427 02/11/24  0315 02/10/24  0532 02/09/24  1114   WBC 10*3/mm3 6.32 5.76 6.94 12.14* 7.58   RBC 10*6/mm3 4.74 4.76 4.83 5.86* 5.92*   HEMOGLOBIN g/dL 11.6* 11.9* 11.9* 14.2 14.7   HEMATOCRIT % 37.4* 38.3 37.8 45.0 46.6   MCV fL 78.9* 80.5 78.3* 76.8* 78.7*   MCH pg 24.5* 25.0* 24.6* 24.2* 24.8*   MCHC g/dL 31.0* 31.1* 31.5 31.6 31.5   RDW % 17.1* 17.4* 17.8* 18.4* 18.7*   RDW-SD fl 48.8 49.9 49.8 47.3 49.8   MPV fL 10.6 11.1 11.2 11.2 10.7   PLATELETS 10*3/mm3 189 167 179 233 199   NEUTROPHIL % %  --  68.6 75.3 85.9* 78.1*   LYMPHOCYTE % %  --  16.3* 10.7* 7.5* 12.8*   MONOCYTES % %  --  10.9 12.4* 6.0 7.8   EOSINOPHIL % %  --  3.5 1.0 0.0* 0.7   BASOPHIL % %  --  0.5 0.3 0.2 0.5   IMM GRAN % %  --  0.2 0.3 0.4 0.1   NEUTROS ABS 10*3/mm3  --  3.95 5.23 10.42* 5.92   LYMPHS ABS  10*3/mm3  --  0.94 0.74 0.91 0.97   MONOS ABS 10*3/mm3  --  0.63 0.86 0.73 0.59   EOS ABS 10*3/mm3  --  0.20 0.07 0.00 0.05   BASOS ABS 10*3/mm3  --  0.03 0.02 0.03 0.04   IMMATURE GRANS (ABS) 10*3/mm3  --  0.01 0.02 0.05 0.01   NRBC /100 WBC  --  0.0 0.0 0.1 0.0     Results from last 7 days   Lab Units 02/09/24  1907   PH, ARTERIAL pH units 7.423   PO2 ART mm Hg 52.9*   PCO2, ARTERIAL mm Hg 38.1   HCO3 ART mmol/L 24.9     Results from last 7 days   Lab Units 02/09/24  1401 02/09/24  1114   HSTROP T ng/L 60* 65*     Results from last 7 days   Lab Units 02/09/24  1114   PROBNP pg/mL 4,357.0*     Results from last 7 days   Lab Units 02/09/24  1914 02/09/24  1401   TSH uIU/mL  --  0.679   T3 FREE pg/mL 1.6*  --    FREE T4 ng/dL  --  1.66     Results from last 7 days   Lab Units 02/10/24  0532 02/09/24  1401 02/09/24  1114   LACTATE mmol/L 2.0 2.0 2.2*   PROCALCITONIN ng/mL  --   --  0.08         Microbiology Results (last 10 days)       Procedure Component Value - Date/Time    Legionella Antigen, Urine - Urine, Urine, Clean Catch [[id number]]  (Normal) Collected: 02/09/24 2233    Lab Status: Final result Specimen: Urine, Clean Catch Updated: 02/09/24 2307     LEGIONELLA ANTIGEN, URINE Negative    S. Pneumo Ag Urine or CSF - Urine, Urine, Clean Catch [[id number]]  (Normal) Collected: 02/09/24 2233    Lab Status: Final result Specimen: Urine, Clean Catch Updated: 02/09/24 2307     Strep Pneumo Ag Negative    Respiratory Panel PCR w/COVID-19(SARS-CoV-2) [facility] In-House, NP Swab in UTM/VTM, 2 HR TAT - Swab, Nasopharynx [[id number]]  (Normal) Collected: 02/09/24 1740    Lab Status: Final result Specimen: Swab from Nasopharynx Updated: 02/09/24 1905     ADENOVIRUS, PCR Not Detected     Coronavirus 229E Not Detected     Coronavirus HKU1 Not Detected     Coronavirus NL63 Not Detected     Coronavirus OC43 Not Detected     COVID19 Not Detected     Human Metapneumovirus Not Detected     Human Rhinovirus/Enterovirus  Not Detected     Influenza A PCR Not Detected     Influenza B PCR Not Detected     Parainfluenza Virus 1 Not Detected     Parainfluenza Virus 2 Not Detected     Parainfluenza Virus 3 Not Detected     Parainfluenza Virus 4 Not Detected     RSV, PCR Not Detected     Bordetella pertussis pcr Not Detected     Bordetella parapertussis PCR Not Detected     Chlamydophila pneumoniae PCR Not Detected     Mycoplasma pneumo by PCR Not Detected    Narrative:      In the setting of a positive respiratory panel with a viral infection PLUS a negative procalcitonin without other underlying concern for bacterial infection, consider observing off antibiotics or discontinuation of antibiotics and continue supportive care. If the respiratory panel is positive for atypical bacterial infection (Bordetella pertussis, Chlamydophila pneumoniae, or Mycoplasma pneumoniae), consider antibiotic de-escalation to target atypical bacterial infection.    Respiratory Culture - Sputum, Cough [[id number]] Collected: 02/09/24 1733    Lab Status: Final result Specimen: Sputum from Cough Updated: 02/09/24 1958     Respiratory Culture Rejected     Gram Stain Few (2+) Epithelial cells seen      Few (2+) WBCs seen      Few (2+) Gram negative bacilli      Few (2+) Gram positive cocci in pairs    Narrative:      Specimen rejected due to oropharyngeal contamination. Please reorder and recollect specimen if clinically necessary.    Body Fluid Culture - Body Fluid, Pleural Cavity [[id number]] Collected: 02/09/24 1532    Lab Status: Preliminary result Specimen: Body Fluid from Pleural Cavity Updated: 02/13/24 0700     Body Fluid Culture No growth at 4 days     Gram Stain Rare (1+) WBCs seen      No organisms seen    AFB Culture - Drainage, Lung, R [[id number]] Collected: 02/09/24 1532    Lab Status: Preliminary result Specimen: Drainage from Lung, R Updated: 02/10/24 1438     AFB Stain No acid fast bacilli seen on direct smear    Blood Culture - Blood, Arm, Right  "[[id number]]  (Normal) Collected: 02/09/24 1154    Lab Status: Preliminary result Specimen: Blood from Arm, Right Updated: 02/12/24 1200     Blood Culture No growth at 3 days    Blood Culture - Blood, Arm, Left [[id number]]  (Normal) Collected: 02/09/24 1154    Lab Status: Preliminary result Specimen: Blood from Arm, Left Updated: 02/12/24 1200     Blood Culture No growth at 3 days                alteplase (ACTIVASE) 10 mg in sodium chloride 0.9 % 30 mL Syringe, 10 mg, Intrapleural, Once   And  dornase alpha (PULMOZYME) 5 mg in sterile water (preservative free) 30 mL intrapleural syringe, 5 mg, Intrapleural, Once  castor oil-balsam peru, 1 Application, Topical, Q12H  cefTRIAXone, 1,000 mg, Intravenous, Q24H  famotidine, 40 mg, Oral, Daily  gabapentin, 100 mg, Oral, Nightly  guaiFENesin, 1,200 mg, Oral, Q12H  Menthol-Zinc Oxide, 1 Application, Topical, Q12H  methIMAzole, 5 mg, Oral, Once per day on Monday Wednesday Friday  metoprolol tartrate, 25 mg, Oral, Q12H  sodium chloride, 10 mL, Intravenous, Q12H      hold,         Diagnostics:  XR Chest 1 View    Result Date: 2/9/2024  XR CHEST 1 VW-  HISTORY: Male who is 83 years-old, chest tube management  TECHNIQUE: Frontal view of the chest  COMPARISON: 2/9/2024  FINDINGS: Right chest tube appears stable. Heart size is borderline. Aorta is calcified. Pulmonary vasculature is unremarkable. Opacity in the right mid to lower lung is increased, suggesting increased infiltrate/atelectasis. Residual right pleural effusion appears similar to prior exam. There appears to be 2 cm right apical pneumothorax, appearance could reflect \"trapped lung\" phenomenon. No left pneumothorax. Otherwise stable.      As described.    This report was finalized on 2/9/2024 7:34 PM by Dr. Jun Maldonado M.D on Workstation: [id number]      CT Angiogram Chest    Result Date: 2/9/2024  CT ANGIOGRAM OF THE CHEST WITH CONTRAST INCLUDING RECONSTRUCTION IMAGES 02/09/2024  HISTORY: Shortness of breath.  " Following the intravenous contrast injection CT angiography was performed through the chest. Sagittal, coronal and 3D reconstruction images were reviewed.  The pulmonary arterial system is somewhat heterogeneous but no definite pulmonary embolus is seen. The heterogeneity is probably artifact.  There is a large right pleural effusion with atelectasis of the right lung. There is a small left pleural effusion.  Small amount of fluid is seen in pericardial recesses. There is some aortic and coronary calcification.      1. Large right pleural effusion with atelectasis of the right lung. The right hemithorax is completely opacified. 2. Small left pleural effusion. 3. The pulmonary arterial system is somewhat heterogeneous but no definite pulmonary embolus is seen.    Radiation dose reduction techniques were utilized, including automated exposure control and exposure modulation based on body size.   This report was finalized on 2/9/2024 5:41 PM by Dr. Romeo Cummings M.D on Workstation: [id number]      CT Guided Chest Tube    Result Date: 2/9/2024  CT-GUIDED RIGHT CHEST TUBE PLACEMENT 02/09/2024  HISTORY: Large right pleural effusion.  TECHNIQUE: After signed informed consent was obtained the patient was prepped and draped in the right posterior oblique position. Lidocaine was used for local anesthesia.  CT guidance was used to place a 14 Icelandic pigtail catheter into the right pleural fluid collection. Approximately 1600 cc of nonpurulent serous fluid was removed.  The catheter was left in place and connected to the atrium suction.  Confirmatory images were obtained.  Patient tolerated the procedure well with no complications.      1. Successful placement of 14 Icelandic pigtail catheter into the right pleural fluid collection with removal of 1600 cc of nonpurulent fluid.   Radiation dose reduction techniques were utilized, including automated exposure control and exposure modulation based on body size.   This report was  finalized on 2/9/2024 5:40 PM by Dr. Romeo Cummings M.D on Workstation: [id number]      XR Chest 1 View    Result Date: 2/9/2024  AP CHEST  HISTORY: Evaluate pleural effusion  COMPARISON: Earlier today  FINDINGS: Status post placement of right chest tube. Significant decrease in size of right pleural effusion with significant improved aeration of the right lung. Heart size stable and left lung remains clear.      Significant increase in size of right pleural effusion    This report was finalized on 2/9/2024 5:03 PM by Dr. Randy Villa M.D on Workstation: [id number]      XR Chest 1 View    Result Date: 2/9/2024  CHEST SINGLE VIEW  HISTORY: Chest pain. Shortness of air and cough.  COMPARISON: AP chest 11/09/2023, CT chest 11/12/2023.  FINDINGS: There has developed complete white out of the right thorax due to large right pleural effusion and associated right basilar atelectasis or infiltrate. Left lung appears clear. There appears to be mild right to left cardiomediastinal shift that is likely related to the presence of pleural fluid.      New white out of the right lung appears to be mostly related to a large pleural effusion, though there is also a suspected component of atelectasis or infiltrate. There is mild right to left cardiomediastinal shift. CT would be the best means to further evaluate.  This report was finalized on 2/9/2024 11:09 AM by Dr. Ciro Casey M.D on Workstation: [id number]      Results for orders placed during the hospital encounter of 02/09/24    Adult Transthoracic Echo Complete W/ Cont if Necessary Per Protocol    Interpretation Summary    Left ventricular systolic function is hyperdynamic (EF > 70%).    Left ventricular wall thickness is consistent with mild concentric hypertrophy.    Left ventricular diastolic function was indeterminate.    Normal right ventricular cavity size and systolic function noted.    The left atrial cavity is moderately dilated.    There is moderate aortic  valve calcification    Mild to moderate mitral valve regurgitation is present.    Insufficient TR velocity profile to estimate the right ventricular systolic pressure.    There is a small (<1cm) circumferential pericardial effusion. There is no evidence of cardiac tamponade.      Chest x-ray reviewed yesterday I do not see any definite pneumothorax and looks like the fluid has decreased significantly    Active Hospital Problems    Diagnosis  POA    **Pleural effusion [J90]  Yes    Dyspnea [R06.00]  Yes    Chronic diastolic CHF (congestive heart failure) [I50.32]  Yes    Hyperthyroidism [E05.90]  Yes    A-fib [I48.91]  Yes    Dyslipidemia [E78.5]  Yes      Resolved Hospital Problems   No resolved problems to display.         Assessment & Plan     Large right pleural effusion with complete right lung atelectasis status post chest tube placement with drainage of effusion residual multiloculated effusion receiving lytic therapy at least chest x-rays wise it looks like there has been significant improvement he is got another treatment today follow-up CT scan tomorrow  Hypotension this may be in part just a fluid shift after his large-volume pleural fluid output.  Since then blood pressures have been adequate  Coccygeal left heel wound -wound care following  Persistent A-fib with rapid ventricular response cardiology following  Chronic heart failure preserved ejection fraction  Post right AKA 12/23 for osteomyelitis  Hyperthyroidism on methimazole  Encephalopathy does not appear to have focal deficit but he is getting confused on dates and times what is going on.  Seems better today    Plan for disposition:    Ridge Garcia Jr, MD  02/13/24  11:44 EST    Time:

## 2024-02-13 NOTE — PROGRESS NOTES
LOS: 4 days   Patient Care Team:  Taiwo Powers MD as PCP - General (Internal Medicine)  Self, Bess YO MD as Consulting Physician (Endocrinology)  Huang Chan MD as Cardiologist (Cardiology)    Chief Complaint: Follow-up recurrent large right exudative pleural effusion, persistent atrial fibrillation with RVR, chronic diastolic CHF.    Interval History: Continues to feel better.  Again less short of breath than yesterday.  Atrial fibrillation rate still intermittently high, although acceptable.  His blood pressure is still on the lower side.  No chest pain.    Vital Signs:  Temp:  [97.5 °F (36.4 °C)-98.3 °F (36.8 °C)] 98 °F (36.7 °C)  Heart Rate:  [] 88  Resp:  [16] 16  BP: (101-109)/(60-72) 106/70    Intake/Output Summary (Last 24 hours) at 2/13/2024 0947  Last data filed at 2/13/2024 0533  Gross per 24 hour   Intake 480 ml   Output 540 ml   Net -60 ml       Physical Exam:   General Appearance:    No acute distress, alert and oriented x4, chronically ill-appearing.   Lungs:     Chest tube in place.  Decreased breath sounds on the right.    Heart:    Irregularly irregular rhythm and normal rate.  No murmurs, gallops, or rubs.   Abdomen:     Soft, nontender, nondistended.    Extremities:   Status post right AKA. No left lower extremity edema.      Results Review:    Results from last 7 days   Lab Units 02/13/24  0310   SODIUM mmol/L 141   POTASSIUM mmol/L 4.4   CHLORIDE mmol/L 108*   CO2 mmol/L 25.5   BUN mg/dL 18   CREATININE mg/dL 0.80   GLUCOSE mg/dL 147*   CALCIUM mg/dL 7.9*     Results from last 7 days   Lab Units 02/09/24  1401 02/09/24  1114   HSTROP T ng/L 60* 65*     Results from last 7 days   Lab Units 02/13/24  0310   WBC 10*3/mm3 6.32   HEMOGLOBIN g/dL 11.6*   HEMATOCRIT % 37.4*   PLATELETS 10*3/mm3 189             Results from last 7 days   Lab Units 02/13/24  0310   MAGNESIUM mg/dL 2.4           I reviewed the patient's new clinical results.        Assessment:  1.  Recurrent large  right pleural effusion (exudative) with associated near right lung atelectasis, status post CT-guided chest tube placement on 2/9/2024  2.  Baseline hypertension, now with hypotension  3.  Persistent atrial fibrillation with RVR  4.  Chronic diastolic CHF  5.  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  Nonspecific high-sensitivity troponin elevation  7.  Multifactorial weakness  8.  Hyperthyroidism, on methimazole    Plan:  -Still intermittently getting intrapleural tPA.  Holding anticoagulation for now per thoracic surgery while chest tube is in place.    -Awaiting cytology results.  Hopefully these will be back today.    -Heart rate is still intermittently high, but reasonable given the situation.  Continue metoprolol 25 mg every 12 hours.  I will give him another 250 mcg of IV digoxin today and recheck a level tomorrow.    -Do not feel that this is congestive heart failure.  I would continue to hold his Lasix for now given his borderline blood pressure.  Also suspect that some of the hypotension is secondary to volume shifts from the chest tube output.    -Cardiology will continue to follow.    Denny Gentile MD  02/13/24  09:47 EST

## 2024-02-14 ENCOUNTER — APPOINTMENT (OUTPATIENT)
Dept: GENERAL RADIOLOGY | Facility: HOSPITAL | Age: 84
DRG: 186 | End: 2024-02-14
Payer: MEDICARE

## 2024-02-14 ENCOUNTER — APPOINTMENT (OUTPATIENT)
Dept: CT IMAGING | Facility: HOSPITAL | Age: 84
DRG: 186 | End: 2024-02-14
Payer: MEDICARE

## 2024-02-14 LAB
ANION GAP SERPL CALCULATED.3IONS-SCNC: 5 MMOL/L (ref 5–15)
BACTERIA FLD CULT: NORMAL
BACTERIA SPEC AEROBE CULT: NORMAL
BACTERIA SPEC AEROBE CULT: NORMAL
BASOPHILS # BLD AUTO: 0.04 10*3/MM3 (ref 0–0.2)
BASOPHILS NFR BLD AUTO: 0.5 % (ref 0–1.5)
BUN SERPL-MCNC: 14 MG/DL (ref 8–23)
BUN/CREAT SERPL: 25 (ref 7–25)
CALCIUM SPEC-SCNC: 7.9 MG/DL (ref 8.6–10.5)
CHLORIDE SERPL-SCNC: 106 MMOL/L (ref 98–107)
CO2 SERPL-SCNC: 26 MMOL/L (ref 22–29)
CREAT SERPL-MCNC: 0.56 MG/DL (ref 0.76–1.27)
DEPRECATED RDW RBC AUTO: 49.4 FL (ref 37–54)
DIGOXIN SERPL-MCNC: 1.1 NG/ML (ref 0.6–1.2)
EGFRCR SERPLBLD CKD-EPI 2021: 97.8 ML/MIN/1.73
EOSINOPHIL # BLD AUTO: 0.26 10*3/MM3 (ref 0–0.4)
EOSINOPHIL NFR BLD AUTO: 3.6 % (ref 0.3–6.2)
ERYTHROCYTE [DISTWIDTH] IN BLOOD BY AUTOMATED COUNT: 17.3 % (ref 12.3–15.4)
GLUCOSE SERPL-MCNC: 90 MG/DL (ref 65–99)
GRAM STN SPEC: NORMAL
GRAM STN SPEC: NORMAL
HCT VFR BLD AUTO: 36.9 % (ref 37.5–51)
HGB BLD-MCNC: 11.8 G/DL (ref 13–17.7)
IMM GRANULOCYTES # BLD AUTO: 0.03 10*3/MM3 (ref 0–0.05)
IMM GRANULOCYTES NFR BLD AUTO: 0.4 % (ref 0–0.5)
LYMPHOCYTES # BLD AUTO: 1.04 10*3/MM3 (ref 0.7–3.1)
LYMPHOCYTES NFR BLD AUTO: 14.3 % (ref 19.6–45.3)
MCH RBC QN AUTO: 25.3 PG (ref 26.6–33)
MCHC RBC AUTO-ENTMCNC: 32 G/DL (ref 31.5–35.7)
MCV RBC AUTO: 79.2 FL (ref 79–97)
MONOCYTES # BLD AUTO: 0.86 10*3/MM3 (ref 0.1–0.9)
MONOCYTES NFR BLD AUTO: 11.8 % (ref 5–12)
NEUTROPHILS NFR BLD AUTO: 5.06 10*3/MM3 (ref 1.7–7)
NEUTROPHILS NFR BLD AUTO: 69.4 % (ref 42.7–76)
NRBC BLD AUTO-RTO: 0 /100 WBC (ref 0–0.2)
PLATELET # BLD AUTO: 198 10*3/MM3 (ref 140–450)
PMV BLD AUTO: 10.8 FL (ref 6–12)
POTASSIUM SERPL-SCNC: 4.3 MMOL/L (ref 3.5–5.2)
RBC # BLD AUTO: 4.66 10*6/MM3 (ref 4.14–5.8)
SODIUM SERPL-SCNC: 137 MMOL/L (ref 136–145)
WBC NRBC COR # BLD AUTO: 7.29 10*3/MM3 (ref 3.4–10.8)

## 2024-02-14 PROCEDURE — 99232 SBSQ HOSP IP/OBS MODERATE 35: CPT | Performed by: NURSE PRACTITIONER

## 2024-02-14 PROCEDURE — 25010000002 CEFTRIAXONE PER 250 MG: Performed by: INTERNAL MEDICINE

## 2024-02-14 PROCEDURE — 71250 CT THORAX DX C-: CPT

## 2024-02-14 PROCEDURE — 85025 COMPLETE CBC W/AUTO DIFF WBC: CPT | Performed by: INTERNAL MEDICINE

## 2024-02-14 PROCEDURE — 80048 BASIC METABOLIC PNL TOTAL CA: CPT | Performed by: INTERNAL MEDICINE

## 2024-02-14 PROCEDURE — 99232 SBSQ HOSP IP/OBS MODERATE 35: CPT

## 2024-02-14 PROCEDURE — 80162 ASSAY OF DIGOXIN TOTAL: CPT | Performed by: INTERNAL MEDICINE

## 2024-02-14 PROCEDURE — 71045 X-RAY EXAM CHEST 1 VIEW: CPT

## 2024-02-14 RX ADMIN — CEFTRIAXONE SODIUM 1000 MG: 1 INJECTION, POWDER, FOR SOLUTION INTRAMUSCULAR; INTRAVENOUS at 09:38

## 2024-02-14 RX ADMIN — Medication 10 ML: at 09:39

## 2024-02-14 RX ADMIN — Medication 1 APPLICATION: at 20:01

## 2024-02-14 RX ADMIN — CASTOR OIL AND BALSAM, PERU 1 APPLICATION: 788; 87 OINTMENT TOPICAL at 09:39

## 2024-02-14 RX ADMIN — GUAIFENESIN 1200 MG: 600 TABLET, EXTENDED RELEASE ORAL at 09:38

## 2024-02-14 RX ADMIN — CASTOR OIL AND BALSAM, PERU 1 APPLICATION: 788; 87 OINTMENT TOPICAL at 19:57

## 2024-02-14 RX ADMIN — Medication 10 ML: at 20:01

## 2024-02-14 RX ADMIN — GABAPENTIN 100 MG: 100 CAPSULE ORAL at 19:58

## 2024-02-14 RX ADMIN — METOPROLOL TARTRATE 25 MG: 25 TABLET, FILM COATED ORAL at 05:41

## 2024-02-14 RX ADMIN — OXYCODONE HYDROCHLORIDE 5 MG: 5 TABLET ORAL at 09:54

## 2024-02-14 RX ADMIN — FAMOTIDINE 40 MG: 20 TABLET, FILM COATED ORAL at 09:38

## 2024-02-14 RX ADMIN — METHIMAZOLE 5 MG: 5 TABLET ORAL at 09:38

## 2024-02-14 RX ADMIN — GUAIFENESIN 1200 MG: 600 TABLET, EXTENDED RELEASE ORAL at 19:58

## 2024-02-14 RX ADMIN — OXYCODONE HYDROCHLORIDE 5 MG: 5 TABLET ORAL at 19:11

## 2024-02-14 RX ADMIN — METOPROLOL TARTRATE 25 MG: 25 TABLET, FILM COATED ORAL at 19:11

## 2024-02-14 NOTE — PROGRESS NOTES
"    Chief Complaint: Right pleural effusion  S/P: CT-guided chest tube placement; lytics x3      Subjective:  Symptoms:  Improved.  He reports weakness.  No shortness of breath or cough.    Diet:  Poor intake.  No nausea or vomiting.    Activity level: Impaired due to weakness.    Pain:  He reports no pain.    Resting in bed.  No new complaints.  Going down to get CT chest.    Vital Signs:  Temp:  [97.4 °F (36.3 °C)-98.6 °F (37 °C)] 97.4 °F (36.3 °C)  Heart Rate:  [62-83] 83  Resp:  [16] 16  BP: (105-114)/(53-78) 105/68    Intake & Output (last day)         02/13 0701  02/14 0700 02/14 0701  02/15 0700    P.O. 760     Total Intake(mL/kg) 760 (12.5)     Urine (mL/kg/hr) 0 (0)     Stool 0     Chest Tube 50     Total Output 50     Net +710           Urine Unmeasured Occurrence 13 x     Stool Unmeasured Occurrence 1 x             Objective:  General Appearance:  Comfortable, well-appearing and in no acute distress.    Vital signs: (most recent): Blood pressure 105/68, pulse 83, temperature 97.4 °F (36.3 °C), temperature source Oral, resp. rate 16, height 177.8 cm (70\"), weight 60.7 kg (133 lb 13.1 oz), SpO2 97%.    HEENT: Normal HEENT exam.    Lungs:  Normal effort.  He is not in respiratory distress.    Heart: Tachycardia.  Irregular rhythm.    Chest: Symmetric chest wall expansion. Chest wall tenderness present.    Extremities: Decreased range of motion.  (S/p Right AKA)  Neurological: Patient is alert.    Skin:  Warm and dry.                Chest tube:   Site: Right, Clean, Dry, Intact, and Securement device intact  Suction: -20 cm  Air Leak: negative  24 Hour Total: >500ml    Results Review:     I reviewed the patient's new clinical results.  I reviewed the patient's new imaging results and agree with the interpretation.  Discussed with patient, RN and Dr. Rincon    Imaging reviewed independently.  CT chest is improved.  Imaging Results (Last 24 Hours)       Procedure Component Value Units Date/Time    CT Chest " Without Contrast Diagnostic [[id number]] Collected: 02/14/24 0943     Updated: 02/14/24 1001    Narrative:      CT CHEST WO CONTRAST DIAGNOSTIC-     INDICATION: Follow-up pleural effusion     COMPARISON: CT chest February 11, 2024     TECHNIQUE:  Routine CT chest without IV contrast. Coronal and sagittal reformats.  Radiation dose reduction techniques were utilized, including automated  exposure control and exposure modulation based on body size.     FINDINGS:      Chest wall: Fatty atrophy of the right rotator cuff muscles. No  lymphadenopathy. Thyromegaly with possible thyroid nodules.     Mediastinum: Coronary artery atherosclerotic calcifications with  three-vessel involvement. Heart is at upper limits in size. No  pericardial effusion. Mildly enlarged main pulmonary artery. Calcified  mediastinal and right hilar lymph nodes, consistent with prior  granulomatous infection. No mediastinal or hilar lymphadenopathy.     Lungs/pleura: Right pigtail pleural drain seen at the right base.  Minimal fluid seen adjacent to the pigtail drain catheter. Small  suspected partially loculated right pleural effusion, extends into the  major fissure and to the lung apex, similar in size to prior. Small left  pleural effusion, slightly larger in the interval. Small right  pneumothorax. Patent central airways. Mild airways wall thickening.  Reticular and groundglass opacities in the right lung. Posterior  dependent and bibasilar subsegmental atelectasis. Subpleural reticular  opacities in the left lung.     Upper abdomen: Cholelithiasis.     Osseous structures: Severe right glenohumeral osteoarthritis with  posterior subluxation. Total left shoulder arthroplasty, incompletely  imaged.       Impression:         1. Small partially loculated right pleural effusion, similar in size to  prior. Minimal fluid seen surrounding the right pigtail pleural drain.  2. Small left pleural effusion, slightly larger in the interval.  3. Small right  pneumothorax, not significantly changed, could be related  to trapped lung.  4. Improved aeration of the right lower lobe.  5. Subpleural reticular opacities. Question some underlying interstitial  lung disease. Follow-up high-resolution chest CT following Hospital and  treatment course and resolution of effusions.   6. Mild enlarged main pulmonary artery, can be seen with pulmonary  artery hypertension.     This report was finalized on 2/14/2024 9:57 AM by Dr. Taiwo Espinosa M.D on Workstation: [id number]       XR Chest 1 View [[id number]] Collected: 02/14/24 0906     Updated: 02/14/24 0906    Narrative:      XR CHEST 1 VW-     HISTORY: 83-year-old male with right chest tube for pleural effusion.     FINDINGS: There is mild improved aeration at the right lower lobe with  decrease in the small remaining right pleural effusion and decrease in  atelectasis. There are no new opacities and there is no CHF.               Lab Results:     Lab Results (last 24 hours)       Procedure Component Value Units Date/Time    Body Fluid Culture - Body Fluid, Pleural Cavity [[id number]] Collected: 02/09/24 1532    Specimen: Body Fluid from Pleural Cavity Updated: 02/14/24 0731     Body Fluid Culture No growth at 5 days     Gram Stain Rare (1+) WBCs seen      No organisms seen    Digoxin Level [[id number]]  (Normal) Collected: 02/14/24 0316    Specimen: Blood Updated: 02/14/24 0428     Digoxin 1.10 ng/mL     Basic Metabolic Panel [[id number]]  (Abnormal) Collected: 02/14/24 0316    Specimen: Blood Updated: 02/14/24 0428     Glucose 90 mg/dL      BUN 14 mg/dL      Creatinine 0.56 mg/dL      Sodium 137 mmol/L      Potassium 4.3 mmol/L      Chloride 106 mmol/L      CO2 26.0 mmol/L      Calcium 7.9 mg/dL      BUN/Creatinine Ratio 25.0     Anion Gap 5.0 mmol/L      eGFR 97.8 mL/min/1.73     Narrative:      GFR Normal >60  Chronic Kidney Disease <60  Kidney Failure <15    The GFR formula is only valid for adults with stable renal function  between ages 18 and 70.    CBC & Differential [[id number]]  (Abnormal) Collected: 02/14/24 0316    Specimen: Blood Updated: 02/14/24 0411    Narrative:      The following orders were created for panel order CBC & Differential.  Procedure                               Abnormality         Status                     ---------                               -----------         ------                     CBC Auto Differential[[id number]]        Abnormal            Final result                 Please view results for these tests on the individual orders.    CBC Auto Differential [[id number]]  (Abnormal) Collected: 02/14/24 0316    Specimen: Blood Updated: 02/14/24 0411     WBC 7.29 10*3/mm3      RBC 4.66 10*6/mm3      Hemoglobin 11.8 g/dL      Hematocrit 36.9 %      MCV 79.2 fL      MCH 25.3 pg      MCHC 32.0 g/dL      RDW 17.3 %      RDW-SD 49.4 fl      MPV 10.8 fL      Platelets 198 10*3/mm3      Neutrophil % 69.4 %      Lymphocyte % 14.3 %      Monocyte % 11.8 %      Eosinophil % 3.6 %      Basophil % 0.5 %      Immature Grans % 0.4 %      Neutrophils, Absolute 5.06 10*3/mm3      Lymphocytes, Absolute 1.04 10*3/mm3      Monocytes, Absolute 0.86 10*3/mm3      Eosinophils, Absolute 0.26 10*3/mm3      Basophils, Absolute 0.04 10*3/mm3      Immature Grans, Absolute 0.03 10*3/mm3      nRBC 0.0 /100 WBC     Blood Culture - Blood, Arm, Right [[id number]]  (Normal) Collected: 02/09/24 1154    Specimen: Blood from Arm, Right Updated: 02/13/24 1201     Blood Culture No growth at 4 days    Blood Culture - Blood, Arm, Left [[id number]]  (Normal) Collected: 02/09/24 1154    Specimen: Blood from Arm, Left Updated: 02/13/24 1201     Blood Culture No growth at 4 days             Assessment & Plan       Pleural effusion    Dyslipidemia    A-fib    Hyperthyroidism    Chronic diastolic CHF (congestive heart failure)    Dyspnea       Assessment & Plan    Large right pleural effusion: S/p chest tube placement and lytic therapy x 3 with  appropriate response.  CT chest performed earlier demonstrates improvement to right pleural effusion and a trace amount of loculated fluid remains.  We will try his chest tube to waterseal and trend the output overnight.  Pleural fluid cultures no growth to date and cytology is negative for malignant cells. Repeat a.m. chest x-ray, please continue to hold anticoagulation with chest tube in place.  Encourage good pulmonary hygiene, IS.  Increase mobilization as able.    A-fib: Management per cardiology    Mary Augustin, NOHEMY, APRN  [facility]  02/14/24  10:38 EST    Greater than 35 minutes was spent reviewing the patient's chart, radiographic imaging, assessing the patient and developing a plan of care which was discussed with the patient and RN.  This excludes any other billable procedures which will be dictated separately.

## 2024-02-14 NOTE — PROGRESS NOTES
LOS: 5 days   Patient Care Team:  Taiwo Powers MD as PCP - General (Internal Medicine)  Self, Bess YO MD as Consulting Physician (Endocrinology)  Huang Chan MD as Cardiologist (Cardiology)    Chief Complaint: follow-up recurrent large right exudative pleural effusion, persistent atrial fibrillation with RVR, chronic diastolic CHF     Interval History: He was resting in bed.  He denies chest pain or discomfort, palpitations, or shortness of breath.  He is frustrated by his clinical course and repeatedly asked me when he was going home.    Vital Signs:  Temp:  [97.4 °F (36.3 °C)-98.3 °F (36.8 °C)] 97.4 °F (36.3 °C)  Heart Rate:  [62-83] 83  Resp:  [16] 16  BP: (105-114)/(53-70) 105/68    Intake/Output Summary (Last 24 hours) at 2/14/2024 1301  Last data filed at 2/14/2024 0400  Gross per 24 hour   Intake 220 ml   Output 50 ml   Net 170 ml      Physical Exam  Vitals reviewed.   Constitutional:       General: He is not in acute distress.  HENT:      Head: Normocephalic.   Eyes:      Extraocular Movements: Extraocular movements intact.      Pupils: Pupils are equal, round, and reactive to light.   Cardiovascular:      Rate and Rhythm: Rhythm irregularly irregular.      Pulses: Normal pulses.      Heart sounds: Normal heart sounds, S1 normal and S2 normal. Heart sounds not distant. No murmur heard.     No friction rub. No gallop. No S3 or S4 sounds.   Pulmonary:      Effort: Pulmonary effort is normal.      Breath sounds: Normal breath sounds.   Abdominal:      General: Abdomen is flat. Bowel sounds are normal.      Palpations: Abdomen is soft.      Tenderness: There is no abdominal tenderness.   Skin:     General: Skin is warm and dry.   Neurological:      General: No focal deficit present.      Mental Status: He is alert and oriented to person, place, and time.   Psychiatric:         Mood and Affect: Mood normal.         Behavior: Behavior normal.         Results Review:    Results from last 7 days    Lab Units 02/14/24  0316   SODIUM mmol/L 137   POTASSIUM mmol/L 4.3   CHLORIDE mmol/L 106   CO2 mmol/L 26.0   BUN mg/dL 14   CREATININE mg/dL 0.56*   GLUCOSE mg/dL 90   CALCIUM mg/dL 7.9*     Results from last 7 days   Lab Units 02/09/24  1401 02/09/24  1114   HSTROP T ng/L 60* 65*     Results from last 7 days   Lab Units 02/14/24  0316   WBC 10*3/mm3 7.29   HEMOGLOBIN g/dL 11.8*   HEMATOCRIT % 36.9*   PLATELETS 10*3/mm3 198             Results from last 7 days   Lab Units 02/13/24  0310   MAGNESIUM mg/dL 2.4           I reviewed the patient's new clinical results.        Assessment & Plan:  Recurrent large right pleural effusion (exudative) with associated near right lung atelectasis  S/p CT-guided chest tube placement on 2/9/24  Intermittently getting intrapleural tPA, anticoagulation held while chest tube is in place   Pleural fluid with no growth today and cytology is negative for malignant cells.  Baseline hypertension - now with hypotension  Persistent atrial fibrillation with RVR  Heart rate better controlled today  Continue metoprolol 25 mg every 12 hours  Digoxin level 1.1 this morning   Chronic diastolic CHF  Euvolemic on exam. 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

## 2024-02-14 NOTE — PROGRESS NOTES
Name: Mehreen Silva IV ADMIT: 2024   : 1940  PCP: Taiwo Powers MD    MRN: [id number] LOS: 5 days   AGE/SEX: 83 y.o. male  ROOM: [facility]     Subjective   Subjective   Chief Complaint   Patient presents with    Chest Pain     Tolerating the chest tube well. No NVD or abodminal pain reported.     Objective   Objective   Vital Signs  Temp:  [97.4 °F (36.3 °C)-98.3 °F (36.8 °C)] 97.4 °F (36.3 °C)  Heart Rate:  [62-83] 83  Resp:  [16] 16  BP: (105-114)/(53-70) 105/68  SpO2:  [96 %-100 %] 97 %  on  Flow (L/min):  [1] 1;   Device (Oxygen Therapy): room air  Body mass index is 19.2 kg/m².    Physical Exam  Vitals and nursing note reviewed.   Constitutional:       General: He is not in acute distress.     Appearance: He is not diaphoretic.   HENT:      Head: Atraumatic.   Cardiovascular:      Rate and Rhythm: Normal rate. Rhythm irregular.   Pulmonary:      Effort: Pulmonary effort is normal.      Breath sounds: No wheezing.      Comments: Chest tube  Abdominal:      General: There is no distension.      Palpations: Abdomen is soft.      Tenderness: There is no abdominal tenderness. There is no guarding or rebound.   Musculoskeletal:         General: Deformity (r aka) present. No tenderness.   Skin:     General: Skin is warm and dry.   Neurological:      General: No focal deficit present.      Mental Status: He is alert.   Psychiatric:         Mood and Affect: Mood normal.         Behavior: Behavior normal.       Results Review  I reviewed the patient's new clinical results.    Results from last 7 days   Lab Units 24  0316 24  0310 24  0427 24  0315   WBC 10*3/mm3 7.29 6.32 5.76 6.94   HEMOGLOBIN g/dL 11.8* 11.6* 11.9* 11.9*   PLATELETS 10*3/mm3 198 189 167 179     Results from last 7 days   Lab Units 24  0316 24  0310 24  0755 24  0315   SODIUM mmol/L 137 141 137 139   POTASSIUM mmol/L 4.3 4.4 4.1 4.6   CHLORIDE mmol/L 106 108* 106 106   CO2 mmol/L 26.0  "25.5 23.9 24.8   BUN mg/dL 14 18 15 22   CREATININE mg/dL 0.56* 0.80 0.52* 0.81   GLUCOSE mg/dL 90 147* 73 88   EGFR mL/min/1.73 97.8 87.8 100.0 87.5     Results from last 7 days   Lab Units 02/13/24  0310 02/10/24  0532 02/09/24  1114   ALBUMIN g/dL 1.9* 2.5* 3.4*   BILIRUBIN mg/dL  --  1.2 0.9   ALK PHOS U/L  --  116 145*   AST (SGOT) U/L  --  18 25   ALT (SGPT) U/L  --  19 26     Results from last 7 days   Lab Units 02/14/24  0316 02/13/24  0310 02/12/24  0755 02/11/24  0315 02/10/24  0532 02/09/24  1114   CALCIUM mg/dL 7.9* 7.9* 7.9* 7.8* 8.2* 9.1   ALBUMIN g/dL  --  1.9*  --   --  2.5* 3.4*   MAGNESIUM mg/dL  --  2.4  --   --   --   --    PHOSPHORUS mg/dL  --  2.5  --   --   --   --      Results from last 7 days   Lab Units 02/10/24  0532 02/09/24  1401 02/09/24  1114   PROCALCITONIN ng/mL  --   --  0.08   LACTATE mmol/L 2.0 2.0 2.2*     No results found for: \"HGBA1C\", \"POCGLU\"      CT Chest Without Contrast Diagnostic    Result Date: 2/14/2024   1. Small partially loculated right pleural effusion, similar in size to prior. Minimal fluid seen surrounding the right pigtail pleural drain. 2. Small left pleural effusion, slightly larger in the interval. 3. Small right pneumothorax, not significantly changed, could be related to trapped lung. 4. Improved aeration of the right lower lobe. 5. Subpleural reticular opacities. Question some underlying interstitial lung disease. Follow-up high-resolution chest CT following Hospital and treatment course and resolution of effusions. 6. Mild enlarged main pulmonary artery, can be seen with pulmonary artery hypertension.  This report was finalized on 2/14/2024 9:57 AM by Dr. Taiwo Espinosa M.D on Workstation: [id number]      XR Chest 1 View    Result Date: 2/13/2024  1. No significant pneumothorax is seen on the current study.  This report was finalized on 2/13/2024 8:09 AM by Dr. Romeo Cummings M.D on Workstation: [id number]       I have personally reviewed all " medications:  Scheduled Medications  castor oil-balsam peru, 1 Application, Topical, Q12H  cefTRIAXone, 1,000 mg, Intravenous, Q24H  famotidine, 40 mg, Oral, Daily  gabapentin, 100 mg, Oral, Nightly  guaiFENesin, 1,200 mg, Oral, Q12H  Menthol-Zinc Oxide, 1 Application, Topical, Q12H  methIMAzole, 5 mg, Oral, Once per day on Monday Wednesday Friday  metoprolol tartrate, 25 mg, Oral, Q12H  sodium chloride, 10 mL, Intravenous, Q12H      Infusions  hold,       Diet  Diet: Cardiac Diets; Healthy Heart (2-3 Na+); No Straw; Texture: Regular Texture (IDDSI 7); Fluid Consistency: Thin (IDDSI 0)    I have personally reviewed:  [x]  Laboratory   []  Microbiology   [x]  Radiology   [x]  EKG/Telemetry  []  Cardiology/Vascular   []  Pathology    []  Records       Assessment/Plan     Active Hospital Problems    Diagnosis  POA    **Pleural effusion [J90]  Yes    Dyspnea [R06.00]  Yes    Chronic diastolic CHF (congestive heart failure) [I50.32]  Yes    Hyperthyroidism [E05.90]  Yes    A-fib [I48.91]  Yes    Dyslipidemia [E78.5]  Yes      Resolved Hospital Problems   No resolved problems to display.       83 y.o. male admitted with Pleural effusion.    Large right pleural effusion/right lung atelectasis: Had opacification of right hemithorax.  Status post chest tube placement with improvement.  Body fluid culture no growth to date.  Cytology negative.  He is on Rocephin currently.  sp intrapleural tPA with improvement in symptoms.  Pulmonology and thoracic surgery following.  Chronic diastolic heart failure/A-fib:Anticoagulation has been held with chest tube.  Metoprolol. Lasix and jardiance held.  Cardiology following.  Hypertension: Not grossly elevated.  Plan to monitor.  Hyperthyroidism: TSH and free T4 were okay.  Continue Tapazole  PPX: AC on hold for chest tube.  Resume eventually.  Disposition: Home health/TBD    Expected Discharge Date: 2/14/2024; Expected Discharge Time:      Feliciano Torres MD  [facility]  02/14/24  14:01 EST    Dictated portions of note using Dragon dictation software.  Copied text in this note has been reviewed by me and remains accurate as of 02/14/24

## 2024-02-14 NOTE — PLAN OF CARE
Problem: Adult Inpatient Plan of Care  Goal: Plan of Care Review  Outcome: Ongoing, Progressing  Flowsheets  Taken 2/14/2024 0406 by Lupillo Marcum, RN  Progress: improving  Outcome Evaluation: VSS.  Chest tube output-50ml this shift.  No complaints or complications  Taken 2/13/2024 1841 by Gerardo Quezada, RN  Plan of Care Reviewed With: patient   Goal Outcome Evaluation:           Progress: improving  Outcome Evaluation: VSS.  Chest tube output-50ml this shift.  No complaints or complications

## 2024-02-14 NOTE — PROGRESS NOTES
LOS: 4 days   Patient Care Team:  Taiwo Powers MD as PCP - General (Internal Medicine)  Self, Bess YO MD as Consulting Physician (Endocrinology)  ChanuHang henley MD as Cardiologist (Cardiology)    Subjective     .  Following patient for pleural effusion and dyspnea on exertion.  Patient has a history of congestive heart failure, fibrillation, hypertension, hyperlipidemia peripheral vascular disease with right AKA and he has had prior pleural effusion.    Patient is irritable tonight he says he does not understand what is going on his TV remote does not work, the food is not good nobody will set him up to eat.  Tried sitting up he did want me to set him up then I showed him how his remote works it does work    Review of Systems:          Objective     Vital Signs  Vital Sign Min/Max for last 24 hours  Temp  Min: 97.5 °F (36.4 °C)  Max: 98.6 °F (37 °C)   BP  Min: 101/72  Max: 109/78   Pulse  Min: 70  Max: 94   Resp  Min: 16  Max: 16   SpO2  Min: 95 %  Max: 99 %   Flow (L/min)  Min: 1  Max: 1   Weight  Min: 61.7 kg (136 lb 0.4 oz)  Max: 61.7 kg (136 lb 0.4 oz)        Ventilator/Non-Invasive Ventilation Settings admission, onward)      None                         Body mass index is 19.52 kg/m².  I/O last 3 completed shifts:  In: 480 [P.O.:480]  Out: 610 [Chest Tube:610]  No intake/output data recorded.        Physical Exam:  General Appearance: Well-developed older white male resting in bed does not look in acute distress saturations are 96% on room air  Eyes: Conjunctiva are clear and anicteric  ENT: Mucous membranes are moist no exudates  Neck: Trachea midline no visible jugular venous distention  Lungs: He has a few crackles in the right base the left is pretty clear no dullness left there is a little bit on the right chest tube with serous fluid no airleak just to gravity  Cardiac: Regular rate rhythm no murmur  Abdomen: Soft no hepatosplenomegaly or masses  : Not examined  Musculoskeletal:  Grossly normal aside from the right AKA  Skin: Warm and dry no jaundice no petechiae  Neuro: He seems to be more confused today he is moving extremities  Extremities/P Vascular: No clubbing no cyanosis no edema palpable radial and left dorsalis pedis pulses  MSE: Irritable    Labs:  Results from last 7 days   Lab Units 02/13/24  0310 02/12/24  0755 02/11/24  0315 02/10/24  0532 02/09/24  1114   GLUCOSE mg/dL 147* 73 88 114* 115*   SODIUM mmol/L 141 137 139 135* 142   POTASSIUM mmol/L 4.4 4.1 4.6 4.7 5.0   MAGNESIUM mg/dL 2.4  --   --   --   --    CO2 mmol/L 25.5 23.9 24.8 21.4* 29.0   CHLORIDE mmol/L 108* 106 106 102 103   ANION GAP mmol/L 7.5 7.1 8.2 11.6 10.0   CREATININE mg/dL 0.80 0.52* 0.81 0.87 0.94   BUN mg/dL 18 15 22 27* 21   BUN / CREAT RATIO  22.5 28.8* 27.2* 31.0* 22.3   CALCIUM mg/dL 7.9* 7.9* 7.8* 8.2* 9.1   ALK PHOS U/L  --   --   --  116 145*   TOTAL PROTEIN g/dL  --   --   --  5.4* 6.2   ALT (SGPT) U/L  --   --   --  19 26   AST (SGOT) U/L  --   --   --  18 25   BILIRUBIN mg/dL  --   --   --  1.2 0.9   ALBUMIN g/dL 1.9*  --   --  2.5* 3.4*   GLOBULIN gm/dL  --   --   --   --  2.8     Estimated Creatinine Clearance: 61.1 mL/min (by C-G formula based on SCr of 0.8 mg/dL).      Results from last 7 days   Lab Units 02/13/24  0310 02/12/24  0427 02/11/24  0315 02/10/24  0532 02/09/24  1114   WBC 10*3/mm3 6.32 5.76 6.94 12.14* 7.58   RBC 10*6/mm3 4.74 4.76 4.83 5.86* 5.92*   HEMOGLOBIN g/dL 11.6* 11.9* 11.9* 14.2 14.7   HEMATOCRIT % 37.4* 38.3 37.8 45.0 46.6   MCV fL 78.9* 80.5 78.3* 76.8* 78.7*   MCH pg 24.5* 25.0* 24.6* 24.2* 24.8*   MCHC g/dL 31.0* 31.1* 31.5 31.6 31.5   RDW % 17.1* 17.4* 17.8* 18.4* 18.7*   RDW-SD fl 48.8 49.9 49.8 47.3 49.8   MPV fL 10.6 11.1 11.2 11.2 10.7   PLATELETS 10*3/mm3 189 167 179 233 199   NEUTROPHIL % %  --  68.6 75.3 85.9* 78.1*   LYMPHOCYTE % %  --  16.3* 10.7* 7.5* 12.8*   MONOCYTES % %  --  10.9 12.4* 6.0 7.8   EOSINOPHIL % %  --  3.5 1.0 0.0* 0.7   BASOPHIL % %  --   0.5 0.3 0.2 0.5   IMM GRAN % %  --  0.2 0.3 0.4 0.1   NEUTROS ABS 10*3/mm3  --  3.95 5.23 10.42* 5.92   LYMPHS ABS 10*3/mm3  --  0.94 0.74 0.91 0.97   MONOS ABS 10*3/mm3  --  0.63 0.86 0.73 0.59   EOS ABS 10*3/mm3  --  0.20 0.07 0.00 0.05   BASOS ABS 10*3/mm3  --  0.03 0.02 0.03 0.04   IMMATURE GRANS (ABS) 10*3/mm3  --  0.01 0.02 0.05 0.01   NRBC /100 WBC  --  0.0 0.0 0.1 0.0     Results from last 7 days   Lab Units 02/09/24  1907   PH, ARTERIAL pH units 7.423   PO2 ART mm Hg 52.9*   PCO2, ARTERIAL mm Hg 38.1   HCO3 ART mmol/L 24.9     Results from last 7 days   Lab Units 02/09/24  1401 02/09/24  1114   HSTROP T ng/L 60* 65*     Results from last 7 days   Lab Units 02/09/24  1114   PROBNP pg/mL 4,357.0*     Results from last 7 days   Lab Units 02/09/24  1914 02/09/24  1401   TSH uIU/mL  --  0.679   T3 FREE pg/mL 1.6*  --    FREE T4 ng/dL  --  1.66     Results from last 7 days   Lab Units 02/10/24  0532 02/09/24  1401 02/09/24  1114   LACTATE mmol/L 2.0 2.0 2.2*   PROCALCITONIN ng/mL  --   --  0.08         Microbiology Results (last 10 days)       Procedure Component Value - Date/Time    Legionella Antigen, Urine - Urine, Urine, Clean Catch [[id number]]  (Normal) Collected: 02/09/24 2233    Lab Status: Final result Specimen: Urine, Clean Catch Updated: 02/09/24 2307     LEGIONELLA ANTIGEN, URINE Negative    S. Pneumo Ag Urine or CSF - Urine, Urine, Clean Catch [[id number]]  (Normal) Collected: 02/09/24 2233    Lab Status: Final result Specimen: Urine, Clean Catch Updated: 02/09/24 2307     Strep Pneumo Ag Negative    Respiratory Panel PCR w/COVID-19(SARS-CoV-2) [facility] In-House, NP Swab in UTM/VTM, 2 HR TAT - Swab, Nasopharynx [[id number]]  (Normal) Collected: 02/09/24 1740    Lab Status: Final result Specimen: Swab from Nasopharynx Updated: 02/09/24 1905     ADENOVIRUS, PCR Not Detected     Coronavirus 229E Not Detected     Coronavirus HKU1 Not Detected     Coronavirus NL63 Not Detected     Coronavirus  OC43 Not Detected     COVID19 Not Detected     Human Metapneumovirus Not Detected     Human Rhinovirus/Enterovirus Not Detected     Influenza A PCR Not Detected     Influenza B PCR Not Detected     Parainfluenza Virus 1 Not Detected     Parainfluenza Virus 2 Not Detected     Parainfluenza Virus 3 Not Detected     Parainfluenza Virus 4 Not Detected     RSV, PCR Not Detected     Bordetella pertussis pcr Not Detected     Bordetella parapertussis PCR Not Detected     Chlamydophila pneumoniae PCR Not Detected     Mycoplasma pneumo by PCR Not Detected    Narrative:      In the setting of a positive respiratory panel with a viral infection PLUS a negative procalcitonin without other underlying concern for bacterial infection, consider observing off antibiotics or discontinuation of antibiotics and continue supportive care. If the respiratory panel is positive for atypical bacterial infection (Bordetella pertussis, Chlamydophila pneumoniae, or Mycoplasma pneumoniae), consider antibiotic de-escalation to target atypical bacterial infection.    Respiratory Culture - Sputum, Cough [[id number]] Collected: 02/09/24 1733    Lab Status: Final result Specimen: Sputum from Cough Updated: 02/09/24 1958     Respiratory Culture Rejected     Gram Stain Few (2+) Epithelial cells seen      Few (2+) WBCs seen      Few (2+) Gram negative bacilli      Few (2+) Gram positive cocci in pairs    Narrative:      Specimen rejected due to oropharyngeal contamination. Please reorder and recollect specimen if clinically necessary.    Body Fluid Culture - Body Fluid, Pleural Cavity [[id number]] Collected: 02/09/24 1532    Lab Status: Final result Specimen: Body Fluid from Pleural Cavity Updated: 02/14/24 0731     Body Fluid Culture No growth at 5 days     Gram Stain Rare (1+) WBCs seen      No organisms seen    AFB Culture - Drainage, Lung, R [[id number]] Collected: 02/09/24 1532    Lab Status: Preliminary result Specimen: Drainage from Lung, R Updated:  "02/10/24 1438     AFB Stain No acid fast bacilli seen on direct smear    Fungus Culture - Drainage, Lung, R [[id number]] Collected: 02/09/24 1532    Lab Status: Preliminary result Specimen: Drainage from Lung, R Updated: 02/14/24 1601     Fungus Culture No fungus isolated at less than 1 week    Blood Culture - Blood, Arm, Right [[id number]]  (Normal) Collected: 02/09/24 1154    Lab Status: Final result Specimen: Blood from Arm, Right Updated: 02/14/24 1201     Blood Culture No growth at 5 days    Blood Culture - Blood, Arm, Left [[id number]]  (Normal) Collected: 02/09/24 1154    Lab Status: Final result Specimen: Blood from Arm, Left Updated: 02/14/24 1201     Blood Culture No growth at 5 days                alteplase (ACTIVASE) 10 mg in sodium chloride 0.9 % 30 mL Syringe, 10 mg, Intrapleural, Once   And  dornase alpha (PULMOZYME) 5 mg in sterile water (preservative free) 30 mL intrapleural syringe, 5 mg, Intrapleural, Once  castor oil-balsam peru, 1 Application, Topical, Q12H  cefTRIAXone, 1,000 mg, Intravenous, Q24H  famotidine, 40 mg, Oral, Daily  gabapentin, 100 mg, Oral, Nightly  guaiFENesin, 1,200 mg, Oral, Q12H  Menthol-Zinc Oxide, 1 Application, Topical, Q12H  methIMAzole, 5 mg, Oral, Once per day on Monday Wednesday Friday  metoprolol tartrate, 25 mg, Oral, Q12H  sodium chloride, 10 mL, Intravenous, Q12H      hold,         Diagnostics:  XR Chest 1 View    Result Date: 2/9/2024  XR CHEST 1 VW-  HISTORY: Male who is 83 years-old, chest tube management  TECHNIQUE: Frontal view of the chest  COMPARISON: 2/9/2024  FINDINGS: Right chest tube appears stable. Heart size is borderline. Aorta is calcified. Pulmonary vasculature is unremarkable. Opacity in the right mid to lower lung is increased, suggesting increased infiltrate/atelectasis. Residual right pleural effusion appears similar to prior exam. There appears to be 2 cm right apical pneumothorax, appearance could reflect \"trapped lung\" phenomenon. No left " pneumothorax. Otherwise stable.      As described.    This report was finalized on 2/9/2024 7:34 PM by Dr. Jun Maldonado M.D on Workstation: [id number]      CT Angiogram Chest    Result Date: 2/9/2024  CT ANGIOGRAM OF THE CHEST WITH CONTRAST INCLUDING RECONSTRUCTION IMAGES 02/09/2024  HISTORY: Shortness of breath.  Following the intravenous contrast injection CT angiography was performed through the chest. Sagittal, coronal and 3D reconstruction images were reviewed.  The pulmonary arterial system is somewhat heterogeneous but no definite pulmonary embolus is seen. The heterogeneity is probably artifact.  There is a large right pleural effusion with atelectasis of the right lung. There is a small left pleural effusion.  Small amount of fluid is seen in pericardial recesses. There is some aortic and coronary calcification.      1. Large right pleural effusion with atelectasis of the right lung. The right hemithorax is completely opacified. 2. Small left pleural effusion. 3. The pulmonary arterial system is somewhat heterogeneous but no definite pulmonary embolus is seen.    Radiation dose reduction techniques were utilized, including automated exposure control and exposure modulation based on body size.   This report was finalized on 2/9/2024 5:41 PM by Dr. Romeo Cummings M.D on Workstation: [id number]      CT Guided Chest Tube    Result Date: 2/9/2024  CT-GUIDED RIGHT CHEST TUBE PLACEMENT 02/09/2024  HISTORY: Large right pleural effusion.  TECHNIQUE: After signed informed consent was obtained the patient was prepped and draped in the right posterior oblique position. Lidocaine was used for local anesthesia.  CT guidance was used to place a 14 Barb[place] pigtail catheter into the right pleural fluid collection. Approximately 1600 cc of nonpurulent serous fluid was removed.  The catheter was left in place and connected to the atrium suction.  Confirmatory images were obtained.  Patient tolerated the procedure well  with no complications.      1. Successful placement of 14 [place] pigtail catheter into the right pleural fluid collection with removal of 1600 cc of nonpurulent fluid.   Radiation dose reduction techniques were utilized, including automated exposure control and exposure modulation based on body size.   This report was finalized on 2/9/2024 5:40 PM by Dr. Romeo Cummings M.D on Workstation: [id number]      XR Chest 1 View    Result Date: 2/9/2024  AP CHEST  HISTORY: Evaluate pleural effusion  COMPARISON: Earlier today  FINDINGS: Status post placement of right chest tube. Significant decrease in size of right pleural effusion with significant improved aeration of the right lung. Heart size stable and left lung remains clear.      Significant increase in size of right pleural effusion    This report was finalized on 2/9/2024 5:03 PM by Dr. Randy Villa M.D on Workstation: [id number]      XR Chest 1 View    Result Date: 2/9/2024  CHEST SINGLE VIEW  HISTORY: Chest pain. Shortness of air and cough.  COMPARISON: AP chest 11/09/2023, CT chest 11/12/2023.  FINDINGS: There has developed complete white out of the right thorax due to large right pleural effusion and associated right basilar atelectasis or infiltrate. Left lung appears clear. There appears to be mild right to left cardiomediastinal shift that is likely related to the presence of pleural fluid.      New white out of the right lung appears to be mostly related to a large pleural effusion, though there is also a suspected component of atelectasis or infiltrate. There is mild right to left cardiomediastinal shift. CT would be the best means to further evaluate.  This report was finalized on 2/9/2024 11:09 AM by Dr. Ciro Casey M.D on Workstation: [id number]      Results for orders placed during the hospital encounter of 02/09/24    Adult Transthoracic Echo Complete W/ Cont if Necessary Per Protocol    Interpretation Summary    Left ventricular systolic  function is hyperdynamic (EF > 70%).    Left ventricular wall thickness is consistent with mild concentric hypertrophy.    Left ventricular diastolic function was indeterminate.    Normal right ventricular cavity size and systolic function noted.    The left atrial cavity is moderately dilated.    There is moderate aortic valve calcification    Mild to moderate mitral valve regurgitation is present.    Insufficient TR velocity profile to estimate the right ventricular systolic pressure.    There is a small (<1cm) circumferential pericardial effusion. There is no evidence of cardiac tamponade.      CT scan of the chest reviewed marked improvement in the multiloculated right pleural effusion there is still a small right effusion and small basilar pneumothorax small left pleural effusion.  Active Hospital Problems    Diagnosis  POA    **Pleural effusion [J90]  Yes    Dyspnea [R06.00]  Yes    Chronic diastolic CHF (congestive heart failure) [I50.32]  Yes    Hyperthyroidism [E05.90]  Yes    A-fib [I48.91]  Yes    Dyslipidemia [E78.5]  Yes      Resolved Hospital Problems   No resolved problems to display.         Assessment & Plan     Large right pleural effusion with complete right lung atelectasis status post chest tube placement with drainage of effusion residual multiloculated effusion receiving lytic therapy 3 days of therapy there has been significant improvement there is a residual right hydropneumothorax on CT scan and a small left pleural effusion.  Cultures and cytology have remained negative etiology is not certain  need to monitor closely.  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

## 2024-02-14 NOTE — PROGRESS NOTES
"Nutrition Services    Patient Name:  Mehreen Silva IV  YOB: 1940  MRN: [id number]  Admit Date:  2/9/2024    Assessment Date:  02/14/24    NUTRITION SCREENING      Reason for Encounter MST score 2+   Diagnosis/Problem Pleural effusion, dyspnea, CHF       PO Diet Diet: Cardiac Diets; Healthy Heart (2-3 Na+); No Straw; Texture: Regular Texture (IDDSI 7); Fluid Consistency: Thin (IDDSI 0)   Supplements    PO Intake % %       Medications MAR reviewed by RD   Labs  Listed below, reviewed   Physical Findings Alert, disoriented x 1, Chest tube in place, AKA   GI Function BM 2/12   Skin Status DTI to heel, red/blanchable, DTI to coccyx, red,non blanchable       Height  Weight  BMI  Weight Trend     Height: 177.8 cm (70\")  Weight: 60.7 kg (133 lb 13.1 oz) (02/14/24 0440)  Body mass index is 19.2 kg/m². (Adj BMI 22)  Loss From AKA in 12/2023       Nutrition Problem (PES) Nutrition appropriate for condition at this time as evidenced by good po intake.       Intervention/Plan Pt reports good appetite, Poss DC tomorrow noted. Encouraged good nutrition. Labs, meds, skin reviewed.    RD to follow up per protocol.     Results from last 7 days   Lab Units 02/14/24  0316 02/13/24  0310 02/12/24  0755 02/11/24  0315 02/10/24  0532 02/09/24  1114   SODIUM mmol/L 137 141 137   < > 135* 142   POTASSIUM mmol/L 4.3 4.4 4.1   < > 4.7 5.0   CHLORIDE mmol/L 106 108* 106   < > 102 103   CO2 mmol/L 26.0 25.5 23.9   < > 21.4* 29.0   BUN mg/dL 14 18 15   < > 27* 21   CREATININE mg/dL 0.56* 0.80 0.52*   < > 0.87 0.94   CALCIUM mg/dL 7.9* 7.9* 7.9*   < > 8.2* 9.1   BILIRUBIN mg/dL  --   --   --   --  1.2 0.9   ALK PHOS U/L  --   --   --   --  116 145*   ALT (SGPT) U/L  --   --   --   --  19 26   AST (SGOT) U/L  --   --   --   --  18 25   GLUCOSE mg/dL 90 147* 73   < > 114* 115*    < > = values in this interval not displayed.     Results from last 7 days   Lab Units 02/14/24  0316 02/13/24  0310   MAGNESIUM mg/dL  --  2.4 "   PHOSPHORUS mg/dL  --  2.5   HEMOGLOBIN g/dL 11.8* 11.6*   HEMATOCRIT % 36.9* 37.4*     Lab Results   Component Value Date    HGBA1C 5.40 02/09/2024         Electronically signed by:  Christi Rico RD  02/14/24 15:29 EST

## 2024-02-15 ENCOUNTER — APPOINTMENT (OUTPATIENT)
Dept: GENERAL RADIOLOGY | Facility: HOSPITAL | Age: 84
DRG: 186 | End: 2024-02-15
Payer: MEDICARE

## 2024-02-15 PROCEDURE — 32652 THORACOSCOPY REM TOTL CORTEX: CPT

## 2024-02-15 PROCEDURE — 32562 LYSE CHEST FIBRIN SUBQ DAY: CPT

## 2024-02-15 PROCEDURE — 99232 SBSQ HOSP IP/OBS MODERATE 35: CPT

## 2024-02-15 PROCEDURE — 25010000002 CEFTRIAXONE PER 250 MG: Performed by: INTERNAL MEDICINE

## 2024-02-15 PROCEDURE — 3E1L38Z IRRIGATION OF PLEURAL CAVITY USING IRRIGATING SUBSTANCE, PERCUTANEOUS APPROACH: ICD-10-PCS

## 2024-02-15 PROCEDURE — 71045 X-RAY EXAM CHEST 1 VIEW: CPT

## 2024-02-15 PROCEDURE — 25010000002 ALTEPLASE PER 1 MG

## 2024-02-15 RX ADMIN — Medication 1 APPLICATION: at 20:40

## 2024-02-15 RX ADMIN — CASTOR OIL AND BALSAM, PERU 1 APPLICATION: 788; 87 OINTMENT TOPICAL at 20:40

## 2024-02-15 RX ADMIN — Medication 1 APPLICATION: at 12:34

## 2024-02-15 RX ADMIN — OXYCODONE HYDROCHLORIDE 5 MG: 5 TABLET ORAL at 17:26

## 2024-02-15 RX ADMIN — Medication 10 ML: at 08:38

## 2024-02-15 RX ADMIN — GUAIFENESIN 1200 MG: 600 TABLET, EXTENDED RELEASE ORAL at 08:37

## 2024-02-15 RX ADMIN — GABAPENTIN 100 MG: 100 CAPSULE ORAL at 20:40

## 2024-02-15 RX ADMIN — ALTEPLASE 10 MG: KIT at 14:17

## 2024-02-15 RX ADMIN — FAMOTIDINE 40 MG: 20 TABLET, FILM COATED ORAL at 08:37

## 2024-02-15 RX ADMIN — CASTOR OIL AND BALSAM, PERU 1 APPLICATION: 788; 87 OINTMENT TOPICAL at 08:37

## 2024-02-15 RX ADMIN — DORNASE ALFA 5 MG: 1 SOLUTION RESPIRATORY (INHALATION) at 14:17

## 2024-02-15 RX ADMIN — METOPROLOL TARTRATE 25 MG: 25 TABLET, FILM COATED ORAL at 17:27

## 2024-02-15 RX ADMIN — METOPROLOL TARTRATE 25 MG: 25 TABLET, FILM COATED ORAL at 05:16

## 2024-02-15 RX ADMIN — GUAIFENESIN 1200 MG: 600 TABLET, EXTENDED RELEASE ORAL at 20:40

## 2024-02-15 RX ADMIN — Medication 10 ML: at 20:40

## 2024-02-15 RX ADMIN — CEFTRIAXONE SODIUM 1000 MG: 1 INJECTION, POWDER, FOR SOLUTION INTRAMUSCULAR; INTRAVENOUS at 08:38

## 2024-02-15 NOTE — PROGRESS NOTES
Name: Mehreen Silva IV ADMIT: 2024   : 1940  PCP: Taiwo Powers MD    MRN: [id number] LOS: 6 days   AGE/SEX: 83 y.o. male  ROOM: [facility]     Subjective   Subjective   Chief Complaint   Patient presents with    Chest Pain     Tolerating the chest tube. No NVD or abodminal pain reported.     Objective   Objective   Vital Signs  Temp:  [97.5 °F (36.4 °C)-98.1 °F (36.7 °C)] 97.9 °F (36.6 °C)  Heart Rate:  [] 79  Resp:  [16] 16  BP: ()/(57-71) 116/59  SpO2:  [94 %-99 %] 98 %  on   ;   Device (Oxygen Therapy): room air  Body mass index is 19.93 kg/m².    Physical Exam  Vitals and nursing note reviewed.   Constitutional:       General: He is not in acute distress.     Appearance: He is not diaphoretic.   HENT:      Head: Atraumatic.   Cardiovascular:      Rate and Rhythm: Normal rate. Rhythm irregular.   Pulmonary:      Effort: Pulmonary effort is normal.      Breath sounds: No wheezing.      Comments: Chest tube  Abdominal:      General: There is no distension.      Palpations: Abdomen is soft.      Tenderness: There is no abdominal tenderness. There is no guarding or rebound.   Musculoskeletal:         General: Deformity (r aka) present. No tenderness.   Skin:     General: Skin is warm and dry.   Neurological:      General: No focal deficit present.      Mental Status: He is alert.   Psychiatric:         Mood and Affect: Mood normal.         Behavior: Behavior normal.       Results Review  I reviewed the patient's new clinical results.    Results from last 7 days   Lab Units 24  0316 24  0310 24  0427 24  0315   WBC 10*3/mm3 7.29 6.32 5.76 6.94   HEMOGLOBIN g/dL 11.8* 11.6* 11.9* 11.9*   PLATELETS 10*3/mm3 198 189 167 179     Results from last 7 days   Lab Units 24  0316 24  0310 24  0755 24  0315   SODIUM mmol/L 137 141 137 139   POTASSIUM mmol/L 4.3 4.4 4.1 4.6   CHLORIDE mmol/L 106 108* 106 106   CO2 mmol/L 26.0 25.5 23.9 24.8   BUN  "mg/dL 14 18 15 22   CREATININE mg/dL 0.56* 0.80 0.52* 0.81   GLUCOSE mg/dL 90 147* 73 88   EGFR mL/min/1.73 97.8 87.8 100.0 87.5     Results from last 7 days   Lab Units 02/13/24  0310 02/10/24  0532 02/09/24  1114   ALBUMIN g/dL 1.9* 2.5* 3.4*   BILIRUBIN mg/dL  --  1.2 0.9   ALK PHOS U/L  --  116 145*   AST (SGOT) U/L  --  18 25   ALT (SGPT) U/L  --  19 26     Results from last 7 days   Lab Units 02/14/24  0316 02/13/24  0310 02/12/24  0755 02/11/24  0315 02/10/24  0532 02/09/24  1114   CALCIUM mg/dL 7.9* 7.9* 7.9* 7.8* 8.2* 9.1   ALBUMIN g/dL  --  1.9*  --   --  2.5* 3.4*   MAGNESIUM mg/dL  --  2.4  --   --   --   --    PHOSPHORUS mg/dL  --  2.5  --   --   --   --      Results from last 7 days   Lab Units 02/10/24  0532 02/09/24  1401 02/09/24  1114   PROCALCITONIN ng/mL  --   --  0.08   LACTATE mmol/L 2.0 2.0 2.2*     No results found for: \"HGBA1C\", \"POCGLU\"      CT Chest Without Contrast Diagnostic    Result Date: 2/14/2024   1. Small partially loculated right pleural effusion, similar in size to prior. Minimal fluid seen surrounding the right pigtail pleural drain. 2. Small left pleural effusion, slightly larger in the interval. 3. Small right pneumothorax, not significantly changed, could be related to trapped lung. 4. Improved aeration of the right lower lobe. 5. Subpleural reticular opacities. Question some underlying interstitial lung disease. Follow-up high-resolution chest CT following Hospital and treatment course and resolution of effusions. 6. Mild enlarged main pulmonary artery, can be seen with pulmonary artery hypertension.  This report was finalized on 2/14/2024 9:57 AM by Dr. Taiwo Espinosa M.D on Workstation: [id number]       I have personally reviewed all medications:  Scheduled Medications  alteplase (ACTIVASE) 10 mg in sodium chloride 0.9 % 30 mL Syringe, 10 mg, Intrapleural, Once   And  dornase alpha (PULMOZYME) 5 mg in sterile water (preservative free) 30 mL intrapleural syringe, 5 mg, " Intrapleural, Once  castor oil-balsam peru, 1 Application, Topical, Q12H  cefTRIAXone, 1,000 mg, Intravenous, Q24H  famotidine, 40 mg, Oral, Daily  gabapentin, 100 mg, Oral, Nightly  guaiFENesin, 1,200 mg, Oral, Q12H  Menthol-Zinc Oxide, 1 Application, Topical, Q12H  methIMAzole, 5 mg, Oral, Once per day on Monday Wednesday Friday  metoprolol tartrate, 25 mg, Oral, Q12H  sodium chloride, 10 mL, Intravenous, Q12H      Infusions  hold,       Diet  Diet: Cardiac Diets; Healthy Heart (2-3 Na+); No Straw; Texture: Regular Texture (IDDSI 7); Fluid Consistency: Thin (IDDSI 0)    I have personally reviewed:  [x]  Laboratory   []  Microbiology   [x]  Radiology   [x]  EKG/Telemetry  []  Cardiology/Vascular   []  Pathology    []  Records       Assessment/Plan     Active Hospital Problems    Diagnosis  POA    **Pleural effusion [J90]  Yes    Dyspnea [R06.00]  Yes    Chronic diastolic CHF (congestive heart failure) [I50.32]  Yes    Hyperthyroidism [E05.90]  Yes    A-fib [I48.91]  Yes    Dyslipidemia [E78.5]  Yes      Resolved Hospital Problems   No resolved problems to display.       83 y.o. male admitted with Pleural effusion.    Large right pleural effusion/right lung atelectasis: Had opacification of right hemithorax.  Status post chest tube placement with improvement.  Body fluid culture no growth to date.  Cytology negative.  He is on Rocephin currently.  Additional intrapleural tPA planned.  Pulmonology and thoracic surgery following.  Chronic diastolic heart failure/A-fib: Anticoagulation has been held with chest tube.  Metoprolol. Lasix and jardiance held.  Cardiology following.  Hypertension: Not grossly elevated.  Plan to monitor.  Hyperthyroidism: TSH and free T4 were okay.  Continue Tapazole  PPX: AC on hold for chest tube.  Resume eventually.  Disposition: Home health/TBD    Expected Discharge Date: 2/14/2024; Expected Discharge Time:      Feliciano Torres MD  [facility]  02/15/24  13:38  EST    Dictated portions of note using Dragon dictation software.  Copied text in this note has been reviewed by me and remains accurate as of 02/15/24

## 2024-02-15 NOTE — PROGRESS NOTES
"    Chief Complaint: Right pleural effusion  S/P: CT-guided chest tube placement; lytics x3      Subjective:  Symptoms:  Improved.  He reports weakness.  No shortness of breath or cough.    Diet:  Poor intake.  No nausea or vomiting.    Activity level: Impaired due to weakness.    Pain:  He reports no pain.        Vital Signs:  Temp:  [97.5 °F (36.4 °C)-98.1 °F (36.7 °C)] 97.9 °F (36.6 °C)  Heart Rate:  [] 79  Resp:  [16] 16  BP: ()/(57-71) 116/59    Intake & Output (last day)         02/14 0701  02/15 0700 02/15 0701 02/16 0700    P.O.  240    Total Intake(mL/kg)  240 (3.8)    Urine (mL/kg/hr)      Stool      Chest Tube 50     Total Output 50     Net -50 +240          Urine Unmeasured Occurrence 3 x 1 x            Objective:  General Appearance:  Comfortable, well-appearing and in no acute distress.    Vital signs: (most recent): Blood pressure 94/79, pulse 79, temperature 97.6 °F (36.4 °C), temperature source Oral, resp. rate 16, height 177.8 cm (70\"), weight 63 kg (138 lb 14.2 oz), SpO2 92%.    HEENT: Normal HEENT exam.    Lungs:  Normal effort.  He is not in respiratory distress.    Heart: Tachycardia.  Irregular rhythm.    Chest: Symmetric chest wall expansion. Chest wall tenderness present.    Extremities: Decreased range of motion.  (S/p Right AKA)  Neurological: Patient is alert.    Skin:  Warm and dry.                Chest tube:   Site: Right, Clean, Dry, Intact, and Securement device intact  Suction: waterseal  Air Leak: negative  24 Hour Total: 50ml    Results Review:     I reviewed the patient's new clinical results.  I reviewed the patient's new imaging results and agree with the interpretation.  Discussed with patient, RN and Dr. Santoro    Imaging reviewed independently.  CT chest is improved.  Imaging Results (Last 24 Hours)       Procedure Component Value Units Date/Time    XR Chest 1 View [[id number]] Collected: 02/15/24 0705     Updated: 02/15/24 0711    Narrative:      XR CHEST 1 VW-   "   Clinical: Chest tube management     COMPARISON examination 2/14/2024     FINDINGS: Pigtail chest tube demonstrated at the right base, position is  similar to the previous examination. No pneumothorax. There is some  increasing opacity along the right mid and lower lung zone which likely  represents pleural fluid tracking into the major fissure. The  cardiomediastinal silhouette is stable and the left lung is clear. No  other interval change has occurred.     This report was finalized on 2/15/2024 7:08 AM by Dr. Gregory Dang M.D  on Workstation: [id number]       XR Chest 1 View [[id number]] Collected: 02/14/24 0906     Updated: 02/14/24 1516    Narrative:      XR CHEST 1 VW-     HISTORY: 83-year-old male with right chest tube for pleural effusion.     FINDINGS: There is mild improved aeration at the right lower lobe with  decrease in the small remaining right pleural effusion and decrease in  atelectasis. There are no new opacities and there is no CHF.     This report was finalized on 2/14/2024 3:13 PM by Dr. Aubree Cosme M.D on  Workstation: [id number]               Lab Results:     Lab Results (last 24 hours)       Procedure Component Value Units Date/Time    Fungus Culture - Drainage, Lung, R [[id number]] Collected: 02/09/24 1532    Specimen: Drainage from Lung, R Updated: 02/14/24 1601     Fungus Culture No fungus isolated at less than 1 week             Assessment & Plan       Pleural effusion    Dyslipidemia    A-fib    Hyperthyroidism    Chronic diastolic CHF (congestive heart failure)    Dyspnea       Assessment & Plan  Follow-up chest x-ray performed today demonstrates the right basilar chest tube seen in position, no pneumothorax.  Increasing opacity along the right mid and lower lung zones representative of pleural fluid likely tracking into the fissure.  Patient denies any shortness of breath and is on room air.  He appears comfortable on exam and denies any significant pain.    Large right pleural  effusion: S/p chest tube placement and lytic therapy x 3 with appropriate response.  CT of the chest performed yesterday independently reviewed which demonstrates significant improvement of the right pleural effusion, small residual pleural effusion right greater than left.  Plan for fourth round of intrapleural lytic therapy today.  Continue chest tube to -20 cm of suction once unclamped.  Repeat a.m. chest x-ray.  Encourage good pulmonary hygiene, IS.  Increase mobilization as able.    A-fib: Management per cardiology, please continue to hold anticoagulation with chest tube in place.    CHARLEE Pascal  [facility]  02/15/24  14:32 EST    Greater than 35 minutes was spent reviewing the patient's chart, radiographic imaging, assessing the patient and developing a plan of care which was discussed with the patient and RN.  This excludes any other billable procedures which will be dictated separately.

## 2024-02-15 NOTE — PROCEDURES
Anesthesia: None     Summary of procedure: Under sterile technique the pleural catheter was opened.  10 mg of TPA (reconstituted in 30 cc of normal saline) and 5 mg of dornase (reconstituted in 30 cc of sterile water) was instilled into the pleural cavity.    The pleural tube was clamped.  The patient's position was changed to every 15 minutes for 2 hours.  The tube was then unclamped and placed back to -20 cm suction.  Patient tolerated the procedure well.  Continue chest tube to suction and trend output overnight.    CHARLEE Pascal

## 2024-02-15 NOTE — PLAN OF CARE
Goal Outcome Evaluation:  Plan of Care Reviewed With: patient        Progress: no change  Outcome Evaluation: vss. plan for tpa this shift and for chest tube to be returned to -20 suction. Chest tube atrium changed

## 2024-02-15 NOTE — PROGRESS NOTES
LOS: 4 days   Patient Care Team:  Taiwo Powers MD as PCP - General (Internal Medicine)  Self, Bess YO MD as Consulting Physician (Endocrinology)  ChanHuang henley MD as Cardiologist (Cardiology)    Subjective     .  Following patient for pleural effusion and dyspnea on exertion.  Patient has a history of congestive heart failure, fibrillation, hypertension, hyperlipidemia peripheral vascular disease with right AKA and he has had prior pleural effusion.    Still not real happy but denies shortness of breath or significant cough no chest pain    Review of Systems:          Objective     Vital Signs  Vital Sign Min/Max for last 24 hours  Temp  Min: 97.5 °F (36.4 °C)  Max: 98.6 °F (37 °C)   BP  Min: 101/72  Max: 109/78   Pulse  Min: 70  Max: 94   Resp  Min: 16  Max: 16   SpO2  Min: 95 %  Max: 99 %   Flow (L/min)  Min: 1  Max: 1   Weight  Min: 61.7 kg (136 lb 0.4 oz)  Max: 61.7 kg (136 lb 0.4 oz)        Ventilator/Non-Invasive Ventilation Settings admission, onward)      None                         Body mass index is 19.52 kg/m².  I/O last 3 completed shifts:  In: 480 [P.O.:480]  Out: 610 [Chest Tube:610]  No intake/output data recorded.        Physical Exam:  General Appearance: Well-developed older white male resting in bed does not look in acute distress saturations are 95% on room air  Eyes: Conjunctiva are clear and anicteric  ENT: Mucous membranes are moist no exudates  Neck: Trachea midline no visible jugular venous distention  Lungs: He has a few crackles in the right base the left is pretty clear no dullness left there is a little bit on the right.  chest tube with serous fluid no airleak just to gravity  Cardiac: Regular rate rhythm no murmur  Abdomen: Soft no hepatosplenomegaly or masses  : Not examined  Musculoskeletal: Grossly normal aside from the right AKA  Skin: Warm and dry no jaundice no petechiae  Neuro: He is awake and alert  Extremities/P Vascular: No clubbing no cyanosis no  edema palpable radial and left dorsalis pedis pulses  MSE: Irritable    Labs:  Results from last 7 days   Lab Units 02/13/24 0310 02/12/24  0755 02/11/24  0315 02/10/24  0532 02/09/24  1114   GLUCOSE mg/dL 147* 73 88 114* 115*   SODIUM mmol/L 141 137 139 135* 142   POTASSIUM mmol/L 4.4 4.1 4.6 4.7 5.0   MAGNESIUM mg/dL 2.4  --   --   --   --    CO2 mmol/L 25.5 23.9 24.8 21.4* 29.0   CHLORIDE mmol/L 108* 106 106 102 103   ANION GAP mmol/L 7.5 7.1 8.2 11.6 10.0   CREATININE mg/dL 0.80 0.52* 0.81 0.87 0.94   BUN mg/dL 18 15 22 27* 21   BUN / CREAT RATIO  22.5 28.8* 27.2* 31.0* 22.3   CALCIUM mg/dL 7.9* 7.9* 7.8* 8.2* 9.1   ALK PHOS U/L  --   --   --  116 145*   TOTAL PROTEIN g/dL  --   --   --  5.4* 6.2   ALT (SGPT) U/L  --   --   --  19 26   AST (SGOT) U/L  --   --   --  18 25   BILIRUBIN mg/dL  --   --   --  1.2 0.9   ALBUMIN g/dL 1.9*  --   --  2.5* 3.4*   GLOBULIN gm/dL  --   --   --   --  2.8     Estimated Creatinine Clearance: 61.1 mL/min (by C-G formula based on SCr of 0.8 mg/dL).      Results from last 7 days   Lab Units 02/13/24 0310 02/12/24  0427 02/11/24  0315 02/10/24  0532 02/09/24  1114   WBC 10*3/mm3 6.32 5.76 6.94 12.14* 7.58   RBC 10*6/mm3 4.74 4.76 4.83 5.86* 5.92*   HEMOGLOBIN g/dL 11.6* 11.9* 11.9* 14.2 14.7   HEMATOCRIT % 37.4* 38.3 37.8 45.0 46.6   MCV fL 78.9* 80.5 78.3* 76.8* 78.7*   MCH pg 24.5* 25.0* 24.6* 24.2* 24.8*   MCHC g/dL 31.0* 31.1* 31.5 31.6 31.5   RDW % 17.1* 17.4* 17.8* 18.4* 18.7*   RDW-SD fl 48.8 49.9 49.8 47.3 49.8   MPV fL 10.6 11.1 11.2 11.2 10.7   PLATELETS 10*3/mm3 189 167 179 233 199   NEUTROPHIL % %  --  68.6 75.3 85.9* 78.1*   LYMPHOCYTE % %  --  16.3* 10.7* 7.5* 12.8*   MONOCYTES % %  --  10.9 12.4* 6.0 7.8   EOSINOPHIL % %  --  3.5 1.0 0.0* 0.7   BASOPHIL % %  --  0.5 0.3 0.2 0.5   IMM GRAN % %  --  0.2 0.3 0.4 0.1   NEUTROS ABS 10*3/mm3  --  3.95 5.23 10.42* 5.92   LYMPHS ABS 10*3/mm3  --  0.94 0.74 0.91 0.97   MONOS ABS 10*3/mm3  --  0.63 0.86 0.73 0.59   EOS ABS  10*3/mm3  --  0.20 0.07 0.00 0.05   BASOS ABS 10*3/mm3  --  0.03 0.02 0.03 0.04   IMMATURE GRANS (ABS) 10*3/mm3  --  0.01 0.02 0.05 0.01   NRBC /100 WBC  --  0.0 0.0 0.1 0.0     Results from last 7 days   Lab Units 02/09/24  1907   PH, ARTERIAL pH units 7.423   PO2 ART mm Hg 52.9*   PCO2, ARTERIAL mm Hg 38.1   HCO3 ART mmol/L 24.9     Results from last 7 days   Lab Units 02/09/24  1401 02/09/24  1114   HSTROP T ng/L 60* 65*     Results from last 7 days   Lab Units 02/09/24  1114   PROBNP pg/mL 4,357.0*     Results from last 7 days   Lab Units 02/09/24  1914 02/09/24  1401   TSH uIU/mL  --  0.679   T3 FREE pg/mL 1.6*  --    FREE T4 ng/dL  --  1.66     Results from last 7 days   Lab Units 02/10/24  0532 02/09/24  1401 02/09/24  1114   LACTATE mmol/L 2.0 2.0 2.2*   PROCALCITONIN ng/mL  --   --  0.08         Microbiology Results (last 10 days)       Procedure Component Value - Date/Time    Legionella Antigen, Urine - Urine, Urine, Clean Catch [[id number]]  (Normal) Collected: 02/09/24 2233    Lab Status: Final result Specimen: Urine, Clean Catch Updated: 02/09/24 2307     LEGIONELLA ANTIGEN, URINE Negative    S. Pneumo Ag Urine or CSF - Urine, Urine, Clean Catch [[id number]]  (Normal) Collected: 02/09/24 2233    Lab Status: Final result Specimen: Urine, Clean Catch Updated: 02/09/24 2307     Strep Pneumo Ag Negative    Respiratory Panel PCR w/COVID-19(SARS-CoV-2) [facility] In-House, NP Swab in UTM/VTM, 2 HR TAT - Swab, Nasopharynx [[id number]]  (Normal) Collected: 02/09/24 1740    Lab Status: Final result Specimen: Swab from Nasopharynx Updated: 02/09/24 8280     ADENOVIRUS, PCR Not Detected     Coronavirus 229E Not Detected     Coronavirus HKU1 Not Detected     Coronavirus NL63 Not Detected     Coronavirus OC43 Not Detected     COVID19 Not Detected     Human Metapneumovirus Not Detected     Human Rhinovirus/Enterovirus Not Detected     Influenza A PCR Not Detected     Influenza B PCR Not Detected      Parainfluenza Virus 1 Not Detected     Parainfluenza Virus 2 Not Detected     Parainfluenza Virus 3 Not Detected     Parainfluenza Virus 4 Not Detected     RSV, PCR Not Detected     Bordetella pertussis pcr Not Detected     Bordetella parapertussis PCR Not Detected     Chlamydophila pneumoniae PCR Not Detected     Mycoplasma pneumo by PCR Not Detected    Narrative:      In the setting of a positive respiratory panel with a viral infection PLUS a negative procalcitonin without other underlying concern for bacterial infection, consider observing off antibiotics or discontinuation of antibiotics and continue supportive care. If the respiratory panel is positive for atypical bacterial infection (Bordetella pertussis, Chlamydophila pneumoniae, or Mycoplasma pneumoniae), consider antibiotic de-escalation to target atypical bacterial infection.    Respiratory Culture - Sputum, Cough [[id number]] Collected: 02/09/24 1733    Lab Status: Final result Specimen: Sputum from Cough Updated: 02/09/24 1958     Respiratory Culture Rejected     Gram Stain Few (2+) Epithelial cells seen      Few (2+) WBCs seen      Few (2+) Gram negative bacilli      Few (2+) Gram positive cocci in pairs    Narrative:      Specimen rejected due to oropharyngeal contamination. Please reorder and recollect specimen if clinically necessary.    Body Fluid Culture - Body Fluid, Pleural Cavity [[id number]] Collected: 02/09/24 1532    Lab Status: Final result Specimen: Body Fluid from Pleural Cavity Updated: 02/14/24 0731     Body Fluid Culture No growth at 5 days     Gram Stain Rare (1+) WBCs seen      No organisms seen    AFB Culture - Drainage, Lung, R [[id number]] Collected: 02/09/24 1532    Lab Status: Preliminary result Specimen: Drainage from Lung, R Updated: 02/10/24 1438     AFB Stain No acid fast bacilli seen on direct smear    Fungus Culture - Drainage, Lung, R [[id number]] Collected: 02/09/24 1532    Lab Status: Preliminary result Specimen: Drainage  "from Lung, R Updated: 02/14/24 1601     Fungus Culture No fungus isolated at less than 1 week    Blood Culture - Blood, Arm, Right [[id number]]  (Normal) Collected: 02/09/24 1154    Lab Status: Final result Specimen: Blood from Arm, Right Updated: 02/14/24 1201     Blood Culture No growth at 5 days    Blood Culture - Blood, Arm, Left [[id number]]  (Normal) Collected: 02/09/24 1154    Lab Status: Final result Specimen: Blood from Arm, Left Updated: 02/14/24 1201     Blood Culture No growth at 5 days                alteplase (ACTIVASE) 10 mg in sodium chloride 0.9 % 30 mL Syringe, 10 mg, Intrapleural, Once   And  dornase alpha (PULMOZYME) 5 mg in sterile water (preservative free) 30 mL intrapleural syringe, 5 mg, Intrapleural, Once  castor oil-balsam peru, 1 Application, Topical, Q12H  cefTRIAXone, 1,000 mg, Intravenous, Q24H  famotidine, 40 mg, Oral, Daily  gabapentin, 100 mg, Oral, Nightly  guaiFENesin, 1,200 mg, Oral, Q12H  Menthol-Zinc Oxide, 1 Application, Topical, Q12H  methIMAzole, 5 mg, Oral, Once per day on Monday Wednesday Friday  metoprolol tartrate, 25 mg, Oral, Q12H  sodium chloride, 10 mL, Intravenous, Q12H      hold,         Diagnostics:  XR Chest 1 View    Result Date: 2/9/2024  XR CHEST 1 VW-  HISTORY: Male who is 83 years-old, chest tube management  TECHNIQUE: Frontal view of the chest  COMPARISON: 2/9/2024  FINDINGS: Right chest tube appears stable. Heart size is borderline. Aorta is calcified. Pulmonary vasculature is unremarkable. Opacity in the right mid to lower lung is increased, suggesting increased infiltrate/atelectasis. Residual right pleural effusion appears similar to prior exam. There appears to be 2 cm right apical pneumothorax, appearance could reflect \"trapped lung\" phenomenon. No left pneumothorax. Otherwise stable.      As described.    This report was finalized on 2/9/2024 7:34 PM by Dr. Jun Maldonado M.D on Workstation: [id number]      CT Angiogram Chest    Result Date: " 2/9/2024  CT ANGIOGRAM OF THE CHEST WITH CONTRAST INCLUDING RECONSTRUCTION IMAGES 02/09/2024  HISTORY: Shortness of breath.  Following the intravenous contrast injection CT angiography was performed through the chest. Sagittal, coronal and 3D reconstruction images were reviewed.  The pulmonary arterial system is somewhat heterogeneous but no definite pulmonary embolus is seen. The heterogeneity is probably artifact.  There is a large right pleural effusion with atelectasis of the right lung. There is a small left pleural effusion.  Small amount of fluid is seen in pericardial recesses. There is some aortic and coronary calcification.      1. Large right pleural effusion with atelectasis of the right lung. The right hemithorax is completely opacified. 2. Small left pleural effusion. 3. The pulmonary arterial system is somewhat heterogeneous but no definite pulmonary embolus is seen.    Radiation dose reduction techniques were utilized, including automated exposure control and exposure modulation based on body size.   This report was finalized on 2/9/2024 5:41 PM by Dr. Romeo Cummings M.D on Workstation: [id number]      CT Guided Chest Tube    Result Date: 2/9/2024  CT-GUIDED RIGHT CHEST TUBE PLACEMENT 02/09/2024  HISTORY: Large right pleural effusion.  TECHNIQUE: After signed informed consent was obtained the patient was prepped and draped in the right posterior oblique position. Lidocaine was used for local anesthesia.  CT guidance was used to place a 14 Moldovan pigtail catheter into the right pleural fluid collection. Approximately 1600 cc of nonpurulent serous fluid was removed.  The catheter was left in place and connected to the atrium suction.  Confirmatory images were obtained.  Patient tolerated the procedure well with no complications.      1. Successful placement of 14 [place]ovan pigtail catheter into the right pleural fluid collection with removal of 1600 cc of nonpurulent fluid.   Radiation dose reduction  techniques were utilized, including automated exposure control and exposure modulation based on body size.   This report was finalized on 2/9/2024 5:40 PM by Dr. Romeo Cummings M.D on Workstation: [id number]      XR Chest 1 View    Result Date: 2/9/2024  AP CHEST  HISTORY: Evaluate pleural effusion  COMPARISON: Earlier today  FINDINGS: Status post placement of right chest tube. Significant decrease in size of right pleural effusion with significant improved aeration of the right lung. Heart size stable and left lung remains clear.      Significant increase in size of right pleural effusion    This report was finalized on 2/9/2024 5:03 PM by Dr. Randy Villa M.D on Workstation: [id number]      XR Chest 1 View    Result Date: 2/9/2024  CHEST SINGLE VIEW  HISTORY: Chest pain. Shortness of air and cough.  COMPARISON: AP chest 11/09/2023, CT chest 11/12/2023.  FINDINGS: There has developed complete white out of the right thorax due to large right pleural effusion and associated right basilar atelectasis or infiltrate. Left lung appears clear. There appears to be mild right to left cardiomediastinal shift that is likely related to the presence of pleural fluid.      New white out of the right lung appears to be mostly related to a large pleural effusion, though there is also a suspected component of atelectasis or infiltrate. There is mild right to left cardiomediastinal shift. CT would be the best means to further evaluate.  This report was finalized on 2/9/2024 11:09 AM by Dr. Ciro Casey M.D on Workstation: [id number]      Results for orders placed during the hospital encounter of 02/09/24    Adult Transthoracic Echo Complete W/ Cont if Necessary Per Protocol    Interpretation Summary    Left ventricular systolic function is hyperdynamic (EF > 70%).    Left ventricular wall thickness is consistent with mild concentric hypertrophy.    Left ventricular diastolic function was indeterminate.    Normal right  ventricular cavity size and systolic function noted.    The left atrial cavity is moderately dilated.    There is moderate aortic valve calcification    Mild to moderate mitral valve regurgitation is present.    Insufficient TR velocity profile to estimate the right ventricular systolic pressure.    There is a small (<1cm) circumferential pericardial effusion. There is no evidence of cardiac tamponade.      Chest x-ray reviewed and compared to yesterday looks like there is increasing fluid in the right base mild increase in effusion of blunting the angle and some increase in the fissure  Active Hospital Problems    Diagnosis  POA    **Pleural effusion [J90]  Yes    Dyspnea [R06.00]  Yes    Chronic diastolic CHF (congestive heart failure) [I50.32]  Yes    Hyperthyroidism [E05.90]  Yes    A-fib [I48.91]  Yes    Dyslipidemia [E78.5]  Yes      Resolved Hospital Problems   No resolved problems to display.         Assessment & Plan     Large right pleural effusion with complete right lung atelectasis status post chest tube placement with drainage of effusion residual multiloculated effusion receiving lytic therapy 3 days of therapy  with significant improvement there is a residual right hydropneumothorax on CT scan and a small left pleural effusion.  Cultures and cytology have remained negative etiology is not certain  need to monitor closely.  Management per thoracic surgery chest tube to waterseal there has been increase in the pleural fluid.  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

## 2024-02-15 NOTE — PLAN OF CARE
Problem: Adult Inpatient Plan of Care  Goal: Plan of Care Review  Outcome: Ongoing, Progressing     Problem: Adult Inpatient Plan of Care  Goal: Absence of Hospital-Acquired Illness or Injury  Intervention: Prevent Skin Injury  Recent Flowsheet Documentation  Taken 2/14/2024 1800 by Dilma Cleary RN  Body Position:   position changed independently   weight shifting  Taken 2/14/2024 1600 by Dilma Cleary RN  Body Position:   position changed independently   weight shifting  Taken 2/14/2024 1400 by Dilma Cleary RN  Body Position:   position changed independently   weight shifting  Taken 2/14/2024 1200 by Dilma Cleary RN  Body Position:   position changed independently   weight shifting  Taken 2/14/2024 1000 by Dilma Cleary RN  Body Position:   position changed independently   weight shifting  Taken 2/14/2024 0800 by Dilma Cleary RN  Body Position:   position changed independently   weight shifting  Skin Protection:   adhesive use limited   incontinence pads utilized   transparent dressing maintained   tubing/devices free from skin contact     Problem: Adult Inpatient Plan of Care  Goal: Absence of Hospital-Acquired Illness or Injury  Intervention: Prevent Infection  Recent Flowsheet Documentation  Taken 2/14/2024 1800 by Dilma Cleary RN  Infection Prevention: environmental surveillance performed  Taken 2/14/2024 1600 by Dilma Cleary RN  Infection Prevention: environmental surveillance performed  Taken 2/14/2024 1400 by Dilma Cleary RN  Infection Prevention: environmental surveillance performed  Taken 2/14/2024 1200 by Dilma Cleary RN  Infection Prevention: environmental surveillance performed  Taken 2/14/2024 1000 by Dilma Cleary RN  Infection Prevention: environmental surveillance performed  Taken 2/14/2024 0800 by Dilma Cleary RN  Infection Prevention: environmental surveillance performed     Problem: Adult Inpatient Plan of Care  Goal: Absence  of Hospital-Acquired Illness or Injury  Intervention: Prevent and Manage VTE (Venous Thromboembolism) Risk  Recent Flowsheet Documentation  Taken 2/14/2024 0800 by Dilma Cleary RN  Activity Management: activity encouraged  VTE Prevention/Management: (Eliquis) other (see comments)  Range of Motion: active ROM (range of motion) encouraged     Problem: Adult Inpatient Plan of Care  Goal: Optimal Comfort and Wellbeing  Intervention: Provide Person-Centered Care  Recent Flowsheet Documentation  Taken 2/14/2024 0800 by Dilma Cleary RN  Trust Relationship/Rapport:   care explained   choices provided   questions encouraged   questions answered   reassurance provided     Problem: Heart Failure Comorbidity  Goal: Maintenance of Heart Failure Symptom Control  Outcome: Ongoing, Progressing     Problem: Hypertension Comorbidity  Goal: Blood Pressure in Desired Range  Outcome: Ongoing, Progressing     Problem: Fall Injury Risk  Goal: Absence of Fall and Fall-Related Injury  Outcome: Ongoing, Progressing  Intervention: Promote Injury-Free Environment  Recent Flowsheet Documentation  Taken 2/14/2024 1800 by Dilma Cleary RN  Safety Promotion/Fall Prevention: safety round/check completed  Taken 2/14/2024 1600 by Dilma Cleary RN  Safety Promotion/Fall Prevention: safety round/check completed  Taken 2/14/2024 1400 by Dilma Cleary RN  Safety Promotion/Fall Prevention: safety round/check completed  Taken 2/14/2024 1200 by Dilma Cleary RN  Safety Promotion/Fall Prevention: safety round/check completed  Taken 2/14/2024 1000 by Dilma Cleary RN  Safety Promotion/Fall Prevention: safety round/check completed  Taken 2/14/2024 0800 by Dilma Cleary RN  Safety Promotion/Fall Prevention: safety round/check completed

## 2024-02-15 NOTE — PROGRESS NOTES
LOS: 6 days   Patient Care Team:  Taiwo Powers MD as PCP - General (Internal Medicine)  Self, Bess YO MD as Consulting Physician (Endocrinology)  Huang Chan MD as Cardiologist (Cardiology)    Chief Complaint: follow-up recurrent large right exudative pleural effusion, persistent atrial fibrillation with RVR, chronic diastolic CHF     Interval History: He remains confused and irritable. He denies chest pain or discomfort, palpitations, or shortness of breath.     Vital Signs:  Temp:  [97.5 °F (36.4 °C)-98.1 °F (36.7 °C)] 97.9 °F (36.6 °C)  Heart Rate:  [] 79  Resp:  [16] 16  BP: ()/(57-71) 116/59    Intake/Output Summary (Last 24 hours) at 2/15/2024 1345  Last data filed at 2/15/2024 0900  Gross per 24 hour   Intake 240 ml   Output 50 ml   Net 190 ml      Physical Exam  Vitals reviewed.   Constitutional:       General: He is not in acute distress.  HENT:      Head: Normocephalic.   Eyes:      Extraocular Movements: Extraocular movements intact.      Pupils: Pupils are equal, round, and reactive to light.   Cardiovascular:      Rate and Rhythm: Rhythm irregularly irregular.      Pulses: Normal pulses.      Heart sounds: Normal heart sounds, S1 normal and S2 normal. Heart sounds not distant. No murmur heard.     No friction rub. No gallop. No S3 or S4 sounds.   Pulmonary:      Effort: Pulmonary effort is normal.      Breath sounds: Normal breath sounds.   Abdominal:      General: Abdomen is flat. Bowel sounds are normal.      Palpations: Abdomen is soft.      Tenderness: There is no abdominal tenderness.   Skin:     General: Skin is warm and dry.   Neurological:      General: No focal deficit present.      Mental Status: He is alert and oriented to person, place, and time.   Psychiatric:         Mood and Affect: Mood normal.         Behavior: Behavior normal.         Results Review:    Results from last 7 days   Lab Units 02/14/24  0316   SODIUM mmol/L 137   POTASSIUM mmol/L 4.3   CHLORIDE  mmol/L 106   CO2 mmol/L 26.0   BUN mg/dL 14   CREATININE mg/dL 0.56*   GLUCOSE mg/dL 90   CALCIUM mg/dL 7.9*     Results from last 7 days   Lab Units 02/09/24  1401 02/09/24  1114   HSTROP T ng/L 60* 65*     Results from last 7 days   Lab Units 02/14/24  0316   WBC 10*3/mm3 7.29   HEMOGLOBIN g/dL 11.8*   HEMATOCRIT % 36.9*   PLATELETS 10*3/mm3 198             Results from last 7 days   Lab Units 02/13/24  0310   MAGNESIUM mg/dL 2.4           I reviewed the patient's new clinical results.        Assessment & Plan:  Recurrent large right pleural effusion (exudative) with associated near right lung atelectasis  S/p CT-guided chest tube placement on 2/9/24  Intermittently getting intrapleural tPA, anticoagulation held while chest tube is in place   Pleural fluid with no growth today and cytology is negative for malignant cells.  Baseline hypertension - now with hypotension  Persistent atrial fibrillation with RVR  Heart rate better controlled today  Continue metoprolol 25 mg every 12 hours  Digoxin level 1.1 yesterday   Anticoagulation held secondary to chest tube   Chronic diastolic CHF  Euvolemic on exam. Lasix is being held given his borderline blood pressures   Right AKA on 12/13/23 secondary to osteomyelitis and contracture of the right knee  Nonspecific high-sensitivity troponin elevation  Multifactorial weakness  Hyperthyroidism - on methimazole     Cathie Clemons, APRN  02/15/24  13:45 EST

## 2024-02-15 NOTE — CASE MANAGEMENT/SOCIAL WORK
Continued Stay Note  [facility]     Patient Name: Mehreen Silva IV  MRN: [id number]  Today's Date: 2/15/2024    Admit Date: 2/9/2024    Plan: Home wiht wife, caregiver and HH   Discharge Plan       Row Name 02/15/24 1203       Plan    Plan Home wiht wife, caregiver and HH    Patient/Family in Agreement with Plan yes    Plan Comments Spoke with pts wife over phone who confirms plan for pt to return home with HH and caregiver at home.  Discussed transportation home. Wife states pt can sit up in wheelchair and son will transfer pt to wheelchair and then car. Wife declines stretcher transportation at this time. [facility] following.  No further needs identified. CCP continues to follow.  BENOIT Hackett RN                   Discharge Codes    No documentation.                 Expected Discharge Date and Time       Expected Discharge Date Expected Discharge Time    Feb 14, 2024               Milagros Hackett, RN

## 2024-02-16 ENCOUNTER — APPOINTMENT (OUTPATIENT)
Dept: GENERAL RADIOLOGY | Facility: HOSPITAL | Age: 84
DRG: 186 | End: 2024-02-16
Payer: MEDICARE

## 2024-02-16 LAB
ANION GAP SERPL CALCULATED.3IONS-SCNC: 7.1 MMOL/L (ref 5–15)
BUN SERPL-MCNC: 18 MG/DL (ref 8–23)
BUN/CREAT SERPL: 25 (ref 7–25)
CALCIUM SPEC-SCNC: 8 MG/DL (ref 8.6–10.5)
CHLORIDE SERPL-SCNC: 108 MMOL/L (ref 98–107)
CO2 SERPL-SCNC: 25.9 MMOL/L (ref 22–29)
CREAT SERPL-MCNC: 0.72 MG/DL (ref 0.76–1.27)
DEPRECATED RDW RBC AUTO: 47.7 FL (ref 37–54)
EGFRCR SERPLBLD CKD-EPI 2021: 90.7 ML/MIN/1.73
ERYTHROCYTE [DISTWIDTH] IN BLOOD BY AUTOMATED COUNT: 17.1 % (ref 12.3–15.4)
GLUCOSE SERPL-MCNC: 132 MG/DL (ref 65–99)
HCT VFR BLD AUTO: 37.4 % (ref 37.5–51)
HGB BLD-MCNC: 11.5 G/DL (ref 13–17.7)
MCH RBC QN AUTO: 24.1 PG (ref 26.6–33)
MCHC RBC AUTO-ENTMCNC: 30.7 G/DL (ref 31.5–35.7)
MCV RBC AUTO: 78.4 FL (ref 79–97)
PLATELET # BLD AUTO: 215 10*3/MM3 (ref 140–450)
PMV BLD AUTO: 10.2 FL (ref 6–12)
POTASSIUM SERPL-SCNC: 4.2 MMOL/L (ref 3.5–5.2)
RBC # BLD AUTO: 4.77 10*6/MM3 (ref 4.14–5.8)
SODIUM SERPL-SCNC: 141 MMOL/L (ref 136–145)
WBC NRBC COR # BLD AUTO: 7.19 10*3/MM3 (ref 3.4–10.8)

## 2024-02-16 PROCEDURE — 99232 SBSQ HOSP IP/OBS MODERATE 35: CPT

## 2024-02-16 PROCEDURE — 25010000002 CEFTRIAXONE PER 250 MG: Performed by: INTERNAL MEDICINE

## 2024-02-16 PROCEDURE — 85027 COMPLETE CBC AUTOMATED: CPT | Performed by: INTERNAL MEDICINE

## 2024-02-16 PROCEDURE — 71045 X-RAY EXAM CHEST 1 VIEW: CPT

## 2024-02-16 PROCEDURE — 80048 BASIC METABOLIC PNL TOTAL CA: CPT | Performed by: INTERNAL MEDICINE

## 2024-02-16 RX ADMIN — Medication 10 ML: at 08:35

## 2024-02-16 RX ADMIN — CASTOR OIL AND BALSAM, PERU 1 APPLICATION: 788; 87 OINTMENT TOPICAL at 08:34

## 2024-02-16 RX ADMIN — METHIMAZOLE 5 MG: 5 TABLET ORAL at 08:34

## 2024-02-16 RX ADMIN — FAMOTIDINE 40 MG: 20 TABLET, FILM COATED ORAL at 08:34

## 2024-02-16 RX ADMIN — METOPROLOL TARTRATE 25 MG: 25 TABLET, FILM COATED ORAL at 05:36

## 2024-02-16 RX ADMIN — Medication 10 ML: at 22:11

## 2024-02-16 RX ADMIN — GUAIFENESIN 1200 MG: 600 TABLET, EXTENDED RELEASE ORAL at 20:25

## 2024-02-16 RX ADMIN — CASTOR OIL AND BALSAM, PERU 1 APPLICATION: 788; 87 OINTMENT TOPICAL at 20:25

## 2024-02-16 RX ADMIN — CEFTRIAXONE SODIUM 1000 MG: 1 INJECTION, POWDER, FOR SOLUTION INTRAMUSCULAR; INTRAVENOUS at 08:34

## 2024-02-16 RX ADMIN — METOPROLOL TARTRATE 25 MG: 25 TABLET, FILM COATED ORAL at 16:57

## 2024-02-16 RX ADMIN — OXYCODONE HYDROCHLORIDE 5 MG: 5 TABLET ORAL at 16:56

## 2024-02-16 RX ADMIN — GUAIFENESIN 1200 MG: 600 TABLET, EXTENDED RELEASE ORAL at 08:34

## 2024-02-16 RX ADMIN — GABAPENTIN 100 MG: 100 CAPSULE ORAL at 20:25

## 2024-02-16 RX ADMIN — Medication 1 APPLICATION: at 08:34

## 2024-02-16 NOTE — PROGRESS NOTES
Name: Mehreen Silva IV ADMIT: 2024   : 1940  PCP: Taiwo Powers MD    MRN: [id number] LOS: 7 days   AGE/SEX: 83 y.o. male  ROOM: [facility]     Subjective   Subjective   Resting in bed.  No family at bedside.  Remains with chest tube.  He denies any nausea or vomiting. Denies any chest pain or trouble breathing.  Asking when he can go home.     Objective   Objective   Vital Signs  Temp:  [97.6 °F (36.4 °C)-98 °F (36.7 °C)] 98 °F (36.7 °C)  Heart Rate:  [79-83] 80  Resp:  [16] 16  BP: ()/(59-79) 106/68  SpO2:  [92 %-99 %] 96 %  on   ;   Device (Oxygen Therapy): room air  Body mass index is 19.23 kg/m².    Physical Exam  Vitals and nursing note reviewed.   Constitutional:       Appearance: He is ill-appearing. He is not toxic-appearing.   Cardiovascular:      Rate and Rhythm: Normal rate and regular rhythm.      Pulses: Normal pulses.   Pulmonary:      Effort: Pulmonary effort is normal. No respiratory distress.      Comments: Diminished.  Chest tube present  Abdominal:      General: Bowel sounds are normal. There is no distension.      Palpations: Abdomen is soft.      Tenderness: There is no abdominal tenderness.   Musculoskeletal:         General: Normal range of motion.      Cervical back: Normal range of motion and neck supple.      Comments: Right BKA   Skin:     General: Skin is warm and dry.      Findings: No bruising.   Neurological:      Mental Status: He is alert and oriented to person, place, and time.      Sensory: No sensory deficit.      Motor: Weakness present.      Coordination: Coordination normal.   Psychiatric:         Mood and Affect: Mood normal.         Behavior: Behavior normal.       Results Review:       I reviewed the patient's new clinical results.  Results from last 7 days   Lab Units 248 24  0316 24  0310 24  0427   WBC 10*3/mm3 7.19 7.29 6.32 5.76   HEMOGLOBIN g/dL 11.5* 11.8* 11.6* 11.9*   PLATELETS 10*3/mm3 215 198 189 167  "    Results from last 7 days   Lab Units 02/16/24 0318 02/14/24 0316 02/13/24 0310 02/12/24  0755   SODIUM mmol/L 141 137 141 137   POTASSIUM mmol/L 4.2 4.3 4.4 4.1   CHLORIDE mmol/L 108* 106 108* 106   CO2 mmol/L 25.9 26.0 25.5 23.9   BUN mg/dL 18 14 18 15   CREATININE mg/dL 0.72* 0.56* 0.80 0.52*   GLUCOSE mg/dL 132* 90 147* 73   Estimated Creatinine Clearance: 66.9 mL/min (A) (by C-G formula based on SCr of 0.72 mg/dL (L)).  Results from last 7 days   Lab Units 02/13/24  0310 02/10/24  0532 02/09/24  1114   ALBUMIN g/dL 1.9* 2.5* 3.4*   BILIRUBIN mg/dL  --  1.2 0.9   ALK PHOS U/L  --  116 145*   AST (SGOT) U/L  --  18 25   ALT (SGPT) U/L  --  19 26     Results from last 7 days   Lab Units 02/16/24 0318 02/14/24 0316 02/13/24 0310 02/12/24  0755 02/11/24  0315 02/10/24  0532 02/09/24  1114   CALCIUM mg/dL 8.0* 7.9* 7.9* 7.9*   < > 8.2* 9.1   ALBUMIN g/dL  --   --  1.9*  --   --  2.5* 3.4*   MAGNESIUM mg/dL  --   --  2.4  --   --   --   --    PHOSPHORUS mg/dL  --   --  2.5  --   --   --   --     < > = values in this interval not displayed.     Results from last 7 days   Lab Units 02/10/24  0532 02/09/24  1401 02/09/24  1114   PROCALCITONIN ng/mL  --   --  0.08   LACTATE mmol/L 2.0 2.0 2.2*     No results found for: \"HGBA1C\", \"POCGLU\"    castor oil-balsam peru, 1 Application, Topical, Q12H  cefTRIAXone, 1,000 mg, Intravenous, Q24H  famotidine, 40 mg, Oral, Daily  gabapentin, 100 mg, Oral, Nightly  guaiFENesin, 1,200 mg, Oral, Q12H  Menthol-Zinc Oxide, 1 Application, Topical, Q12H  methIMAzole, 5 mg, Oral, Once per day on Monday Wednesday Friday  metoprolol tartrate, 25 mg, Oral, Q12H  sodium chloride, 10 mL, Intravenous, Q12H      hold,     Diet: Cardiac Diets; Healthy Heart (2-3 Na+); No Straw; Texture: Regular Texture (IDDSI 7); Fluid Consistency: Thin (IDDSI 0)       Assessment/Plan     Active Hospital Problems    Diagnosis  POA    **Pleural effusion [J90]  Yes    Dyspnea [R06.00]  Yes    Chronic diastolic " CHF (congestive heart failure) [I50.32]  Yes    Hyperthyroidism [E05.90]  Yes    A-fib [I48.91]  Yes    Dyslipidemia [E78.5]  Yes      Resolved Hospital Problems   No resolved problems to display.       Mr. Silva is a 83 y.o. male that presented to the hospital with progressive shortness of breath and cough.  He was found to have a large right pleural effusion.  Pulmonology as well as thoracic surgery were consulted.  He had CT-guided chest tube placed on admission.    Large right pleural effusion/right lung atelectasis: Status post chest tube placement with improvement.  Body fluid culture no growth to date.  Cytology negative.  He is on Rocephin currently. Pulmonology and thoracic surgery following. S/P 4 rounds of intrapleural lytic therapy. Possible removal of chest tube tomorrow if repeat CXR okay.  Chronic diastolic heart failure/A-fib: Anticoagulation has been held with chest tube.  Metoprolol. Lasix and jardiance held.  Cardiology following.  Hypertension: Not grossly elevated.  Plan to monitor.  Hyperthyroidism: TSH and free T4 were okay.  Continue Tapazole    Discussed with patient.    VTE Prophylaxis - SCDs Resume home anticoagulation when okay with all.  Code Status - Full code  Disposition - Anticipate discharge possibly 1-2 days.       CHARLEE Porras  [facility]  02/16/24  10:30 EST

## 2024-02-16 NOTE — PROGRESS NOTES
LOS: 7 days   Patient Care Team:  Taiwo Powers MD as PCP - General (Internal Medicine)  Self, Bess YO MD as Consulting Physician (Endocrinology)  Huang Chan MD as Cardiologist (Cardiology)    Chief Complaint: follow-up recurrent large right exudative pleural effusion, persistent atrial fibrillation with RVR, chronic diastolic CHF     Interval History: He is resting in bed with no complaints. His mentation has improved today and he is able to discuss his plan of care with me. He denies chest pain or discomfort, palpitations, or shortness of breath.     Vital Signs:  Temp:  [97.6 °F (36.4 °C)-98 °F (36.7 °C)] 98 °F (36.7 °C)  Heart Rate:  [79-83] 80  Resp:  [16] 16  BP: ()/(64-79) 106/68    Intake/Output Summary (Last 24 hours) at 2/16/2024 1108  Last data filed at 2/16/2024 0900  Gross per 24 hour   Intake 1440 ml   Output 760 ml   Net 680 ml      Physical Exam  Vitals reviewed.   Constitutional:       General: He is not in acute distress.  HENT:      Head: Normocephalic.   Eyes:      Extraocular Movements: Extraocular movements intact.      Pupils: Pupils are equal, round, and reactive to light.   Cardiovascular:      Rate and Rhythm: Rhythm irregularly irregular.      Pulses: Normal pulses.      Heart sounds: Normal heart sounds, S1 normal and S2 normal. Heart sounds not distant. No murmur heard.     No friction rub. No gallop. No S3 or S4 sounds.   Pulmonary:      Effort: Pulmonary effort is normal.      Breath sounds: Normal breath sounds.   Abdominal:      General: Abdomen is flat. Bowel sounds are normal.      Palpations: Abdomen is soft.      Tenderness: There is no abdominal tenderness.   Skin:     General: Skin is warm and dry.   Neurological:      General: No focal deficit present.      Mental Status: He is alert and oriented to person, place, and time.   Psychiatric:         Mood and Affect: Mood normal.         Behavior: Behavior normal.         Results Review:    Results from last  7 days   Lab Units 02/16/24  0318   SODIUM mmol/L 141   POTASSIUM mmol/L 4.2   CHLORIDE mmol/L 108*   CO2 mmol/L 25.9   BUN mg/dL 18   CREATININE mg/dL 0.72*   GLUCOSE mg/dL 132*   CALCIUM mg/dL 8.0*     Results from last 7 days   Lab Units 02/09/24  1401 02/09/24  1114   HSTROP T ng/L 60* 65*     Results from last 7 days   Lab Units 02/16/24  0318   WBC 10*3/mm3 7.19   HEMOGLOBIN g/dL 11.5*   HEMATOCRIT % 37.4*   PLATELETS 10*3/mm3 215             Results from last 7 days   Lab Units 02/13/24  0310   MAGNESIUM mg/dL 2.4           I reviewed the patient's new clinical results.        Assessment & Plan:  Recurrent large right pleural effusion (exudative) with associated near right lung atelectasis  S/p CT-guided chest tube placement on 2/9/24  Intermittently getting intrapleural tPA, anticoagulation held while chest tube is in place   Pleural fluid with no growth today and cytology is negative for malignant cells.  Baseline hypertension - now with hypotension  Persistent atrial fibrillation with RVR  Heart rate controlled today  Continue metoprolol 25 mg every 12 hours  Anticoagulation held secondary to chest tube   Chronic diastolic CHF  Euvolemic on exam. Lasix is being held given his borderline blood pressures   Right AKA on 12/13/23 secondary to osteomyelitis and contracture of the right knee  Nonspecific high-sensitivity troponin elevation  Multifactorial weakness  Hyperthyroidism - on methimazole     Cathie Clemons, CHARLEE  02/16/24  11:08 EST

## 2024-02-16 NOTE — PROGRESS NOTES
"    Chief Complaint: Right pleural effusion  S/P: CT-guided chest tube placement; lytics x4      Subjective:  Symptoms:  Improved.  He reports weakness.  No shortness of breath, cough or chest pain.    Diet:  Poor intake.  No nausea or vomiting.    Activity level: Impaired due to weakness.    Pain:  He reports no pain.        Vital Signs:  Temp:  [97.6 °F (36.4 °C)-98 °F (36.7 °C)] 97.8 °F (36.6 °C)  Heart Rate:  [79-83] 80  Resp:  [16] 16  BP: ()/(64-79) 131/73    Intake & Output (last day)         02/15 0701  02/16 0700 02/16 0701  02/17 0700    P.O. 1200 480    Total Intake(mL/kg) 1200 (19.7) 480 (7.9)    Urine (mL/kg/hr) 50 (0)     Chest Tube 710     Total Output 760     Net +440 +480          Urine Unmeasured Occurrence 2 x 1 x            Objective:  General Appearance:  Comfortable, well-appearing and in no acute distress.    Vital signs: (most recent): Blood pressure 131/73, pulse 80, temperature 97.8 °F (36.6 °C), temperature source Oral, resp. rate 16, height 177.8 cm (70\"), weight 60.8 kg (134 lb 0.6 oz), SpO2 96%.    HEENT: Normal HEENT exam.    Lungs:  Normal effort.  He is not in respiratory distress.    Heart: Tachycardia.  Irregular rhythm.    Chest: Symmetric chest wall expansion. Chest wall tenderness present.    Extremities: Decreased range of motion.  (S/p Right AKA)  Neurological: Patient is alert.    Skin:  Warm and dry.                Chest tube:   Site: Right, Clean, Dry, Intact, and Securement device intact  Suction: -20 cm  Air Leak: negative  24 Hour Total: 710ml    Results Review:     I reviewed the patient's new clinical results.  I reviewed the patient's new imaging results and agree with the interpretation.  Discussed with patient, RN and Dr. Santoro    Imaging reviewed independently.  CT chest is improved.  Imaging Results (Last 24 Hours)       Procedure Component Value Units Date/Time    XR Chest 1 View [[id number]] Collected: 02/16/24 0733     Updated: 02/16/24 0743    Narrative:   "    ONE-VIEW PORTABLE CHEST AT 5:38 A.M.     HISTORY: Right-sided chest tube. Follow-up of pleural effusion.     FINDINGS: A small gauge pigtail pleural drain is present in the lower  right chest with very minimal if any residual right pleural effusion and  this shows improvement from yesterday's exam. There is no pneumothorax.  The left lung remains clear and the heart remains mildly enlarged.     This report was finalized on 2/16/2024 7:40 AM by Dr. Chavo Denny M.D  on Workstation: [employer]               Lab Results:     Lab Results (last 24 hours)       Procedure Component Value Units Date/Time    Basic Metabolic Panel [[id number]]  (Abnormal) Collected: 02/16/24 0318    Specimen: Blood Updated: 02/16/24 0418     Glucose 132 mg/dL      BUN 18 mg/dL      Creatinine 0.72 mg/dL      Sodium 141 mmol/L      Potassium 4.2 mmol/L      Chloride 108 mmol/L      CO2 25.9 mmol/L      Calcium 8.0 mg/dL      BUN/Creatinine Ratio 25.0     Anion Gap 7.1 mmol/L      eGFR 90.7 mL/min/1.73     Narrative:      GFR Normal >60  Chronic Kidney Disease <60  Kidney Failure <15    The GFR formula is only valid for adults with stable renal function between ages 18 and 70.    CBC (No Diff) [[id number]]  (Abnormal) Collected: 02/16/24 0318    Specimen: Blood Updated: 02/16/24 0358     WBC 7.19 10*3/mm3      RBC 4.77 10*6/mm3      Hemoglobin 11.5 g/dL      Hematocrit 37.4 %      MCV 78.4 fL      MCH 24.1 pg      MCHC 30.7 g/dL      RDW 17.1 %      RDW-SD 47.7 fl      MPV 10.2 fL      Platelets 215 10*3/mm3              Assessment & Plan       Pleural effusion    Dyslipidemia    A-fib    Hyperthyroidism    Chronic diastolic CHF (congestive heart failure)    Dyspnea       Assessment & Plan  Am CXR reviewed which demonstrates improvement of the right effusion with only a very minimal amount if any residual.  Right-sided chest tube is seen in position.  Patient denies any shortness of breath orthopnea and is on room air.  Eager to discharge.   Denies any pain.    Large right pleural effusion: S/p chest tube placement and lytic therapy x 4 with excellent response. 710ml of serous fluid drained overnight. Follow-up chest x-ray performed today demonstrates improvement.  Will transition chest tube to waterseal and attempt to wean.  Possible removal of chest tube tomorrow contingent on stable a.m. chest x-ray. Encourage good pulmonary hygiene.  Increase activity.    A-fib: Management per cardiology, please continue to hold anticoagulation with chest tube in place.    CHARLEE Pascal  [facility]  02/16/24  12:08 EST    Greater than 35 minutes was spent reviewing the patient's chart, radiographic imaging, assessing the patient and developing a plan of care which was discussed with the patient and RN.  This excludes any other billable procedures which will be dictated separately.

## 2024-02-16 NOTE — PROGRESS NOTES
LOS: 4 days   Patient Care Team:  Taiwo Powers MD as PCP - General (Internal Medicine)  Self, Bess YO MD as Consulting Physician (Endocrinology)  ChanHuang henley MD as Cardiologist (Cardiology)    Subjective     .  Following patient for pleural effusion and dyspnea on exertion.  Patient has a history of congestive heart failure, fibrillation, hypertension, hyperlipidemia peripheral vascular disease with right AKA and he has had prior pleural effusion.    Still not real happy but denies shortness of breath or significant cough no chest pain    Review of Systems:          Objective     Vital Signs  Vital Sign Min/Max for last 24 hours  Temp  Min: 97.5 °F (36.4 °C)  Max: 98.6 °F (37 °C)   BP  Min: 101/72  Max: 109/78   Pulse  Min: 70  Max: 94   Resp  Min: 16  Max: 16   SpO2  Min: 95 %  Max: 99 %   Flow (L/min)  Min: 1  Max: 1   Weight  Min: 61.7 kg (136 lb 0.4 oz)  Max: 61.7 kg (136 lb 0.4 oz)        Ventilator/Non-Invasive Ventilation Settings admission, onward)      None                         Body mass index is 19.52 kg/m².  I/O last 3 completed shifts:  In: 480 [P.O.:480]  Out: 610 [Chest Tube:610]  No intake/output data recorded.        Physical Exam:  General Appearance: Well-developed older white male resting in bed does not look in acute distress saturations are 95% on room air  Eyes: Conjunctiva are clear and anicteric  ENT: Mucous membranes are moist no exudates  Neck: Trachea midline no visible jugular venous distention  Lungs: He has a few crackles in the right base the left is pretty clear no dullness left there is a little bit on the right.  chest tube with serous fluid no airleak just to gravity  Cardiac: Regular rate rhythm no murmur  Abdomen: Soft no hepatosplenomegaly or masses  : Not examined  Musculoskeletal: Grossly normal aside from the right AKA  Skin: Warm and dry no jaundice no petechiae  Neuro: He is awake and alert  Extremities/P Vascular: No clubbing no cyanosis no  edema palpable radial and left dorsalis pedis pulses  MSE: Irritable    Labs:  Results from last 7 days   Lab Units 02/13/24 0310 02/12/24  0755 02/11/24  0315 02/10/24  0532 02/09/24  1114   GLUCOSE mg/dL 147* 73 88 114* 115*   SODIUM mmol/L 141 137 139 135* 142   POTASSIUM mmol/L 4.4 4.1 4.6 4.7 5.0   MAGNESIUM mg/dL 2.4  --   --   --   --    CO2 mmol/L 25.5 23.9 24.8 21.4* 29.0   CHLORIDE mmol/L 108* 106 106 102 103   ANION GAP mmol/L 7.5 7.1 8.2 11.6 10.0   CREATININE mg/dL 0.80 0.52* 0.81 0.87 0.94   BUN mg/dL 18 15 22 27* 21   BUN / CREAT RATIO  22.5 28.8* 27.2* 31.0* 22.3   CALCIUM mg/dL 7.9* 7.9* 7.8* 8.2* 9.1   ALK PHOS U/L  --   --   --  116 145*   TOTAL PROTEIN g/dL  --   --   --  5.4* 6.2   ALT (SGPT) U/L  --   --   --  19 26   AST (SGOT) U/L  --   --   --  18 25   BILIRUBIN mg/dL  --   --   --  1.2 0.9   ALBUMIN g/dL 1.9*  --   --  2.5* 3.4*   GLOBULIN gm/dL  --   --   --   --  2.8     Estimated Creatinine Clearance: 61.1 mL/min (by C-G formula based on SCr of 0.8 mg/dL).      Results from last 7 days   Lab Units 02/13/24 0310 02/12/24  0427 02/11/24  0315 02/10/24  0532 02/09/24  1114   WBC 10*3/mm3 6.32 5.76 6.94 12.14* 7.58   RBC 10*6/mm3 4.74 4.76 4.83 5.86* 5.92*   HEMOGLOBIN g/dL 11.6* 11.9* 11.9* 14.2 14.7   HEMATOCRIT % 37.4* 38.3 37.8 45.0 46.6   MCV fL 78.9* 80.5 78.3* 76.8* 78.7*   MCH pg 24.5* 25.0* 24.6* 24.2* 24.8*   MCHC g/dL 31.0* 31.1* 31.5 31.6 31.5   RDW % 17.1* 17.4* 17.8* 18.4* 18.7*   RDW-SD fl 48.8 49.9 49.8 47.3 49.8   MPV fL 10.6 11.1 11.2 11.2 10.7   PLATELETS 10*3/mm3 189 167 179 233 199   NEUTROPHIL % %  --  68.6 75.3 85.9* 78.1*   LYMPHOCYTE % %  --  16.3* 10.7* 7.5* 12.8*   MONOCYTES % %  --  10.9 12.4* 6.0 7.8   EOSINOPHIL % %  --  3.5 1.0 0.0* 0.7   BASOPHIL % %  --  0.5 0.3 0.2 0.5   IMM GRAN % %  --  0.2 0.3 0.4 0.1   NEUTROS ABS 10*3/mm3  --  3.95 5.23 10.42* 5.92   LYMPHS ABS 10*3/mm3  --  0.94 0.74 0.91 0.97   MONOS ABS 10*3/mm3  --  0.63 0.86 0.73 0.59   EOS ABS  10*3/mm3  --  0.20 0.07 0.00 0.05   BASOS ABS 10*3/mm3  --  0.03 0.02 0.03 0.04   IMMATURE GRANS (ABS) 10*3/mm3  --  0.01 0.02 0.05 0.01   NRBC /100 WBC  --  0.0 0.0 0.1 0.0     Results from last 7 days   Lab Units 02/09/24  1907   PH, ARTERIAL pH units 7.423   PO2 ART mm Hg 52.9*   PCO2, ARTERIAL mm Hg 38.1   HCO3 ART mmol/L 24.9     Results from last 7 days   Lab Units 02/09/24  1401 02/09/24  1114   HSTROP T ng/L 60* 65*     Results from last 7 days   Lab Units 02/09/24  1114   PROBNP pg/mL 4,357.0*     Results from last 7 days   Lab Units 02/09/24  1914 02/09/24  1401   TSH uIU/mL  --  0.679   T3 FREE pg/mL 1.6*  --    FREE T4 ng/dL  --  1.66     Results from last 7 days   Lab Units 02/10/24  0532 02/09/24  1401 02/09/24  1114   LACTATE mmol/L 2.0 2.0 2.2*   PROCALCITONIN ng/mL  --   --  0.08         Microbiology Results (last 10 days)       Procedure Component Value - Date/Time    Legionella Antigen, Urine - Urine, Urine, Clean Catch [[id number]]  (Normal) Collected: 02/09/24 2233    Lab Status: Final result Specimen: Urine, Clean Catch Updated: 02/09/24 2307     LEGIONELLA ANTIGEN, URINE Negative    S. Pneumo Ag Urine or CSF - Urine, Urine, Clean Catch [[id number]]  (Normal) Collected: 02/09/24 2233    Lab Status: Final result Specimen: Urine, Clean Catch Updated: 02/09/24 2307     Strep Pneumo Ag Negative    Respiratory Panel PCR w/COVID-19(SARS-CoV-2) [facility] In-House, NP Swab in UTM/VTM, 2 HR TAT - Swab, Nasopharynx [[id number]]  (Normal) Collected: 02/09/24 1740    Lab Status: Final result Specimen: Swab from Nasopharynx Updated: 02/09/24 3325     ADENOVIRUS, PCR Not Detected     Coronavirus 229E Not Detected     Coronavirus HKU1 Not Detected     Coronavirus NL63 Not Detected     Coronavirus OC43 Not Detected     COVID19 Not Detected     Human Metapneumovirus Not Detected     Human Rhinovirus/Enterovirus Not Detected     Influenza A PCR Not Detected     Influenza B PCR Not Detected      Parainfluenza Virus 1 Not Detected     Parainfluenza Virus 2 Not Detected     Parainfluenza Virus 3 Not Detected     Parainfluenza Virus 4 Not Detected     RSV, PCR Not Detected     Bordetella pertussis pcr Not Detected     Bordetella parapertussis PCR Not Detected     Chlamydophila pneumoniae PCR Not Detected     Mycoplasma pneumo by PCR Not Detected    Narrative:      In the setting of a positive respiratory panel with a viral infection PLUS a negative procalcitonin without other underlying concern for bacterial infection, consider observing off antibiotics or discontinuation of antibiotics and continue supportive care. If the respiratory panel is positive for atypical bacterial infection (Bordetella pertussis, Chlamydophila pneumoniae, or Mycoplasma pneumoniae), consider antibiotic de-escalation to target atypical bacterial infection.    Respiratory Culture - Sputum, Cough [[id number]] Collected: 02/09/24 1733    Lab Status: Final result Specimen: Sputum from Cough Updated: 02/09/24 1958     Respiratory Culture Rejected     Gram Stain Few (2+) Epithelial cells seen      Few (2+) WBCs seen      Few (2+) Gram negative bacilli      Few (2+) Gram positive cocci in pairs    Narrative:      Specimen rejected due to oropharyngeal contamination. Please reorder and recollect specimen if clinically necessary.    Body Fluid Culture - Body Fluid, Pleural Cavity [[id number]] Collected: 02/09/24 1532    Lab Status: Final result Specimen: Body Fluid from Pleural Cavity Updated: 02/14/24 0731     Body Fluid Culture No growth at 5 days     Gram Stain Rare (1+) WBCs seen      No organisms seen    AFB Culture - Drainage, Lung, R [[id number]] Collected: 02/09/24 1532    Lab Status: Preliminary result Specimen: Drainage from Lung, R Updated: 02/10/24 1438     AFB Stain No acid fast bacilli seen on direct smear    Fungus Culture - Drainage, Lung, R [[id number]] Collected: 02/09/24 1532    Lab Status: Preliminary result Specimen: Drainage  "from Lung, R Updated: 02/14/24 1601     Fungus Culture No fungus isolated at less than 1 week    Blood Culture - Blood, Arm, Right [[id number]]  (Normal) Collected: 02/09/24 1154    Lab Status: Final result Specimen: Blood from Arm, Right Updated: 02/14/24 1201     Blood Culture No growth at 5 days    Blood Culture - Blood, Arm, Left [[id number]]  (Normal) Collected: 02/09/24 1154    Lab Status: Final result Specimen: Blood from Arm, Left Updated: 02/14/24 1201     Blood Culture No growth at 5 days                alteplase (ACTIVASE) 10 mg in sodium chloride 0.9 % 30 mL Syringe, 10 mg, Intrapleural, Once   And  dornase alpha (PULMOZYME) 5 mg in sterile water (preservative free) 30 mL intrapleural syringe, 5 mg, Intrapleural, Once  castor oil-balsam peru, 1 Application, Topical, Q12H  cefTRIAXone, 1,000 mg, Intravenous, Q24H  famotidine, 40 mg, Oral, Daily  gabapentin, 100 mg, Oral, Nightly  guaiFENesin, 1,200 mg, Oral, Q12H  Menthol-Zinc Oxide, 1 Application, Topical, Q12H  methIMAzole, 5 mg, Oral, Once per day on Monday Wednesday Friday  metoprolol tartrate, 25 mg, Oral, Q12H  sodium chloride, 10 mL, Intravenous, Q12H      hold,         Diagnostics:  XR Chest 1 View    Result Date: 2/9/2024  XR CHEST 1 VW-  HISTORY: Male who is 83 years-old, chest tube management  TECHNIQUE: Frontal view of the chest  COMPARISON: 2/9/2024  FINDINGS: Right chest tube appears stable. Heart size is borderline. Aorta is calcified. Pulmonary vasculature is unremarkable. Opacity in the right mid to lower lung is increased, suggesting increased infiltrate/atelectasis. Residual right pleural effusion appears similar to prior exam. There appears to be 2 cm right apical pneumothorax, appearance could reflect \"trapped lung\" phenomenon. No left pneumothorax. Otherwise stable.      As described.    This report was finalized on 2/9/2024 7:34 PM by Dr. Jun Maldonado M.D on Workstation: [facility]      CT Angiogram Chest    Result Date: " 2/9/2024  CT ANGIOGRAM OF THE CHEST WITH CONTRAST INCLUDING RECONSTRUCTION IMAGES 02/09/2024  HISTORY: Shortness of breath.  Following the intravenous contrast injection CT angiography was performed through the chest. Sagittal, coronal and 3D reconstruction images were reviewed.  The pulmonary arterial system is somewhat heterogeneous but no definite pulmonary embolus is seen. The heterogeneity is probably artifact.  There is a large right pleural effusion with atelectasis of the right lung. There is a small left pleural effusion.  Small amount of fluid is seen in pericardial recesses. There is some aortic and coronary calcification.      1. Large right pleural effusion with atelectasis of the right lung. The right hemithorax is completely opacified. 2. Small left pleural effusion. 3. The pulmonary arterial system is somewhat heterogeneous but no definite pulmonary embolus is seen.    Radiation dose reduction techniques were utilized, including automated exposure control and exposure modulation based on body size.   This report was finalized on 2/9/2024 5:41 PM by Dr. Romeo Cummings M.D on Workstation: [id number]      CT Guided Chest Tube    Result Date: 2/9/2024  CT-GUIDED RIGHT CHEST TUBE PLACEMENT 02/09/2024  HISTORY: Large right pleural effusion.  TECHNIQUE: After signed informed consent was obtained the patient was prepped and draped in the right posterior oblique position. Lidocaine was used for local anesthesia.  CT guidance was used to place a 14 [place] pigtail catheter into the right pleural fluid collection. Approximately 1600 cc of nonpurulent serous fluid was removed.  The catheter was left in place and connected to the atrium suction.  Confirmatory images were obtained.  Patient tolerated the procedure well with no complications.      1. Successful placement of 14 [place] pigtail catheter into the right pleural fluid collection with removal of 1600 cc of nonpurulent fluid.   Radiation dose reduction  techniques were utilized, including automated exposure control and exposure modulation based on body size.   This report was finalized on 2/9/2024 5:40 PM by Dr. Romeo Cummings M.D on Workstation: [id number]      XR Chest 1 View    Result Date: 2/9/2024  AP CHEST  HISTORY: Evaluate pleural effusion  COMPARISON: Earlier today  FINDINGS: Status post placement of right chest tube. Significant decrease in size of right pleural effusion with significant improved aeration of the right lung. Heart size stable and left lung remains clear.      Significant increase in size of right pleural effusion    This report was finalized on 2/9/2024 5:03 PM by Dr. Randy Villa M.D on Workstation: [id number]      XR Chest 1 View    Result Date: 2/9/2024  CHEST SINGLE VIEW  HISTORY: Chest pain. Shortness of air and cough.  COMPARISON: AP chest 11/09/2023, CT chest 11/12/2023.  FINDINGS: There has developed complete white out of the right thorax due to large right pleural effusion and associated right basilar atelectasis or infiltrate. Left lung appears clear. There appears to be mild right to left cardiomediastinal shift that is likely related to the presence of pleural fluid.      New white out of the right lung appears to be mostly related to a large pleural effusion, though there is also a suspected component of atelectasis or infiltrate. There is mild right to left cardiomediastinal shift. CT would be the best means to further evaluate.  This report was finalized on 2/9/2024 11:09 AM by Dr. Ciro Casey M.D on Workstation: [id number]      Results for orders placed during the hospital encounter of 02/09/24    Adult Transthoracic Echo Complete W/ Cont if Necessary Per Protocol    Interpretation Summary    Left ventricular systolic function is hyperdynamic (EF > 70%).    Left ventricular wall thickness is consistent with mild concentric hypertrophy.    Left ventricular diastolic function was indeterminate.    Normal right  ventricular cavity size and systolic function noted.    The left atrial cavity is moderately dilated.    There is moderate aortic valve calcification    Mild to moderate mitral valve regurgitation is present.    Insufficient TR velocity profile to estimate the right ventricular systolic pressure.    There is a small (<1cm) circumferential pericardial effusion. There is no evidence of cardiac tamponade.      Chest x-ray reviewed and compared to yesterday looks to be improved effusion almost completely resolved no pneumothorax  Active Hospital Problems    Diagnosis  POA    **Pleural effusion [J90]  Yes    Dyspnea [R06.00]  Yes    Chronic diastolic CHF (congestive heart failure) [I50.32]  Yes    Hyperthyroidism [E05.90]  Yes    A-fib [I48.91]  Yes    Dyslipidemia [E78.5]  Yes      Resolved Hospital Problems   No resolved problems to display.         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    Ridge Garcia Jr, MD  02/16/24  11:59 EST    Time:

## 2024-02-16 NOTE — PLAN OF CARE
Problem: Adult Inpatient Plan of Care  Goal: Plan of Care Review  Outcome: Ongoing, Progressing  Flowsheets (Taken 2/16/2024 0313)  Outcome Evaluation: VSS. CT to -20 sx. AO x 4 but has periods of forgetfulness and irritibility. Pt not voiding throughout the night via pure wick bladder scanned and 540 in bladder. Pt refusing cath and educated pt on the importance of going. NSR. No c/o of pain. RA.   Goal: Patient-Specific Goal (Individualized)  Outcome: Ongoing, Progressing  Goal: Absence of Hospital-Acquired Illness or Injury  Outcome: Ongoing, Progressing  Intervention: Identify and Manage Fall Risk  Recent Flowsheet Documentation  Taken 2/16/2024 0215 by Tanvi Sanchez, RN  Safety Promotion/Fall Prevention:   clutter free environment maintained   assistive device/personal items within reach   activity supervised   lighting adjusted   mobility aid in reach   nonskid shoes/slippers when out of bed   safety round/check completed   toileting scheduled  Taken 2/16/2024 0003 by Tanvi Sanchez, RN  Safety Promotion/Fall Prevention:   activity supervised   assistive device/personal items within reach   clutter free environment maintained   lighting adjusted   mobility aid in reach   nonskid shoes/slippers when out of bed   toileting scheduled   safety round/check completed  Taken 2/15/2024 2205 by Tanvi Sanchez, RN  Safety Promotion/Fall Prevention:   activity supervised   assistive device/personal items within reach   clutter free environment maintained   lighting adjusted   mobility aid in reach   nonskid shoes/slippers when out of bed   safety round/check completed   toileting scheduled  Taken 2/15/2024 2020 by Tanvi Sanchez, RN  Safety Promotion/Fall Prevention:   activity supervised   assistive device/personal items within reach   clutter free environment maintained   lighting adjusted   mobility aid in reach   nonskid shoes/slippers when out of bed   safety round/check completed    toileting scheduled  Intervention: Prevent Skin Injury  Recent Flowsheet Documentation  Taken 2/15/2024 2020 by Tanvi Sanchez, RN  Skin Protection:   adhesive use limited   tubing/devices free from skin contact  Intervention: Prevent and Manage VTE (Venous Thromboembolism) Risk  Recent Flowsheet Documentation  Taken 2/16/2024 0215 by Tanvi Sanchez, RN  Activity Management: activity encouraged  Taken 2/16/2024 0003 by Tanvi Sanchez, RN  Activity Management: activity encouraged  Taken 2/15/2024 2205 by Tanvi Sanchez, RN  Activity Management: activity encouraged  Taken 2/15/2024 2020 by Tanvi Sanchez, RN  Activity Management: activity encouraged  Goal: Optimal Comfort and Wellbeing  Outcome: Ongoing, Progressing  Intervention: Provide Person-Centered Care  Recent Flowsheet Documentation  Taken 2/16/2024 0215 by Tanvi Sanchez, SWAPNA  Trust Relationship/Rapport:   care explained   choices provided   emotional support provided   questions answered   questions encouraged   reassurance provided   thoughts/feelings acknowledged  Taken 2/15/2024 2020 by Tanvi Sanchez RN  Trust Relationship/Rapport:   care explained   choices provided   emotional support provided   questions answered   questions encouraged   thoughts/feelings acknowledged   reassurance provided  Goal: Readiness for Transition of Care  Outcome: Ongoing, Progressing   Goal Outcome Evaluation:              Outcome Evaluation: VSS. CT to -20 sx. AO x 4 but has periods of forgetfulness and irritibility. NSR. No c/o of pain. RA.  Pt refusing turns and drsg changes.

## 2024-02-16 NOTE — PLAN OF CARE
Problem: Adult Inpatient Plan of Care  Goal: Plan of Care Review  Outcome: Ongoing, Progressing  Flowsheets (Taken 2/16/2024 1718)  Progress: improving  Plan of Care Reviewed With: patient  Outcome Evaluation: VSS, room air, chest tube to waterseal, NSR, turn q2h,  Goal: Patient-Specific Goal (Individualized)  Outcome: Ongoing, Progressing  Goal: Absence of Hospital-Acquired Illness or Injury  Outcome: Ongoing, Progressing  Intervention: Identify and Manage Fall Risk  Recent Flowsheet Documentation  Taken 2/16/2024 1600 by Kari Avelar RN  Safety Promotion/Fall Prevention:   activity supervised   assistive device/personal items within reach   clutter free environment maintained   fall prevention program maintained   nonskid shoes/slippers when out of bed   room organization consistent   safety round/check completed  Taken 2/16/2024 1346 by Kari Avelar RN  Safety Promotion/Fall Prevention:   activity supervised   assistive device/personal items within reach   clutter free environment maintained   fall prevention program maintained   nonskid shoes/slippers when out of bed   room organization consistent   safety round/check completed  Taken 2/16/2024 1200 by Kari Avelar RN  Safety Promotion/Fall Prevention:   activity supervised   assistive device/personal items within reach   clutter free environment maintained   fall prevention program maintained   nonskid shoes/slippers when out of bed   room organization consistent   safety round/check completed  Taken 2/16/2024 1014 by Kari Avelar RN  Safety Promotion/Fall Prevention:   activity supervised   assistive device/personal items within reach   clutter free environment maintained   fall prevention program maintained   nonskid shoes/slippers when out of bed   room organization consistent   safety round/check completed  Taken 2/16/2024 0834 by Kari Avelar RN  Safety Promotion/Fall Prevention:   activity supervised   assistive  device/personal items within reach   clutter free environment maintained   fall prevention program maintained   nonskid shoes/slippers when out of bed   room organization consistent   safety round/check completed  Intervention: Prevent Skin Injury  Recent Flowsheet Documentation  Taken 2/16/2024 1600 by Kari Avelar RN  Body Position: supine, legs elevated  Taken 2/16/2024 1346 by Kari Avelar RN  Body Position:   tilted   left   legs elevated  Taken 2/16/2024 1200 by Kari Avelar RN  Body Position: supine, legs elevated  Taken 2/16/2024 1014 by Kari Avelar RN  Body Position:   tilted   right  Taken 2/16/2024 0834 by Kari Avelar RN  Body Position:   tilted   left   legs elevated  Intervention: Prevent and Manage VTE (Venous Thromboembolism) Risk  Recent Flowsheet Documentation  Taken 2/16/2024 1014 by Kari Avelar RN  Activity Management: activity minimized  Goal: Optimal Comfort and Wellbeing  Outcome: Ongoing, Progressing  Goal: Readiness for Transition of Care  Outcome: Ongoing, Progressing     Problem: Skin Injury Risk Increased  Goal: Skin Health and Integrity  Outcome: Ongoing, Progressing  Intervention: Optimize Skin Protection  Recent Flowsheet Documentation  Taken 2/16/2024 1600 by Kari Avelar RN  Head of Bed (HOB) Positioning: HOB at 20-30 degrees  Taken 2/16/2024 1346 by Kari Avelar RN  Head of Bed (HOB) Positioning: HOB at 30-45 degrees  Taken 2/16/2024 1200 by Kari Avelar RN  Head of Bed (HOB) Positioning: HOB at 30-45 degrees  Taken 2/16/2024 1014 by Kari Avelar RN  Head of Bed (HOB) Positioning: HOB at 30-45 degrees  Taken 2/16/2024 0834 by Kari Avelar RN  Pressure Reduction Techniques:   frequent weight shift encouraged   heels elevated off bed   weight shift assistance provided  Head of Bed (HOB) Positioning: HOB at 30-45 degrees  Pressure Reduction Devices: specialty bed utilized     Problem: Heart Failure  Comorbidity  Goal: Maintenance of Heart Failure Symptom Control  Outcome: Ongoing, Progressing     Problem: Hypertension Comorbidity  Goal: Blood Pressure in Desired Range  Outcome: Ongoing, Progressing     Problem: Fall Injury Risk  Goal: Absence of Fall and Fall-Related Injury  Outcome: Ongoing, Progressing  Intervention: Promote Injury-Free Environment  Recent Flowsheet Documentation  Taken 2/16/2024 1600 by Kari Avelar RN  Safety Promotion/Fall Prevention:   activity supervised   assistive device/personal items within reach   clutter free environment maintained   fall prevention program maintained   nonskid shoes/slippers when out of bed   room organization consistent   safety round/check completed  Taken 2/16/2024 1346 by Kari Avelar RN  Safety Promotion/Fall Prevention:   activity supervised   assistive device/personal items within reach   clutter free environment maintained   fall prevention program maintained   nonskid shoes/slippers when out of bed   room organization consistent   safety round/check completed  Taken 2/16/2024 1200 by Kari Avelar RN  Safety Promotion/Fall Prevention:   activity supervised   assistive device/personal items within reach   clutter free environment maintained   fall prevention program maintained   nonskid shoes/slippers when out of bed   room organization consistent   safety round/check completed  Taken 2/16/2024 1014 by Kari Avelar RN  Safety Promotion/Fall Prevention:   activity supervised   assistive device/personal items within reach   clutter free environment maintained   fall prevention program maintained   nonskid shoes/slippers when out of bed   room organization consistent   safety round/check completed  Taken 2/16/2024 0834 by Kari Avelar RN  Safety Promotion/Fall Prevention:   activity supervised   assistive device/personal items within reach   clutter free environment maintained   fall prevention program maintained   nonskid  shoes/slippers when out of bed   room organization consistent   safety round/check completed   Goal Outcome Evaluation:  Plan of Care Reviewed With: patient        Progress: improving  Outcome Evaluation: VSS, room air, chest tube to waterseal, NSR, turn q2h,

## 2024-02-17 ENCOUNTER — APPOINTMENT (OUTPATIENT)
Dept: GENERAL RADIOLOGY | Facility: HOSPITAL | Age: 84
DRG: 186 | End: 2024-02-17
Payer: MEDICARE

## 2024-02-17 LAB
ANION GAP SERPL CALCULATED.3IONS-SCNC: 6 MMOL/L (ref 5–15)
BUN SERPL-MCNC: 15 MG/DL (ref 8–23)
BUN/CREAT SERPL: 25.4 (ref 7–25)
CALCIUM SPEC-SCNC: 8 MG/DL (ref 8.6–10.5)
CHLORIDE SERPL-SCNC: 108 MMOL/L (ref 98–107)
CO2 SERPL-SCNC: 27 MMOL/L (ref 22–29)
CREAT SERPL-MCNC: 0.59 MG/DL (ref 0.76–1.27)
DEPRECATED RDW RBC AUTO: 49.7 FL (ref 37–54)
EGFRCR SERPLBLD CKD-EPI 2021: 96.3 ML/MIN/1.73
ERYTHROCYTE [DISTWIDTH] IN BLOOD BY AUTOMATED COUNT: 17.3 % (ref 12.3–15.4)
GLUCOSE SERPL-MCNC: 77 MG/DL (ref 65–99)
HCT VFR BLD AUTO: 36.5 % (ref 37.5–51)
HGB BLD-MCNC: 11.6 G/DL (ref 13–17.7)
MCH RBC QN AUTO: 25.2 PG (ref 26.6–33)
MCHC RBC AUTO-ENTMCNC: 31.8 G/DL (ref 31.5–35.7)
MCV RBC AUTO: 79.3 FL (ref 79–97)
PLATELET # BLD AUTO: 221 10*3/MM3 (ref 140–450)
PMV BLD AUTO: 10.3 FL (ref 6–12)
POTASSIUM SERPL-SCNC: 4.5 MMOL/L (ref 3.5–5.2)
RBC # BLD AUTO: 4.6 10*6/MM3 (ref 4.14–5.8)
SODIUM SERPL-SCNC: 141 MMOL/L (ref 136–145)
WBC NRBC COR # BLD AUTO: 7.53 10*3/MM3 (ref 3.4–10.8)

## 2024-02-17 PROCEDURE — 99232 SBSQ HOSP IP/OBS MODERATE 35: CPT | Performed by: STUDENT IN AN ORGANIZED HEALTH CARE EDUCATION/TRAINING PROGRAM

## 2024-02-17 PROCEDURE — 25010000002 FUROSEMIDE PER 20 MG: Performed by: NURSE PRACTITIONER

## 2024-02-17 PROCEDURE — 99232 SBSQ HOSP IP/OBS MODERATE 35: CPT | Performed by: INTERNAL MEDICINE

## 2024-02-17 PROCEDURE — 85027 COMPLETE CBC AUTOMATED: CPT | Performed by: NURSE PRACTITIONER

## 2024-02-17 PROCEDURE — 71045 X-RAY EXAM CHEST 1 VIEW: CPT

## 2024-02-17 PROCEDURE — 80048 BASIC METABOLIC PNL TOTAL CA: CPT | Performed by: NURSE PRACTITIONER

## 2024-02-17 PROCEDURE — 25010000002 CEFTRIAXONE PER 250 MG: Performed by: INTERNAL MEDICINE

## 2024-02-17 RX ORDER — FUROSEMIDE 10 MG/ML
20 INJECTION INTRAMUSCULAR; INTRAVENOUS ONCE
Status: COMPLETED | OUTPATIENT
Start: 2024-02-17 | End: 2024-02-17

## 2024-02-17 RX ADMIN — CEFTRIAXONE SODIUM 1000 MG: 1 INJECTION, POWDER, FOR SOLUTION INTRAMUSCULAR; INTRAVENOUS at 09:39

## 2024-02-17 RX ADMIN — GUAIFENESIN 1200 MG: 600 TABLET, EXTENDED RELEASE ORAL at 20:24

## 2024-02-17 RX ADMIN — POLYETHYLENE GLYCOL 3350 17 G: 17 POWDER, FOR SOLUTION ORAL at 14:10

## 2024-02-17 RX ADMIN — CASTOR OIL AND BALSAM, PERU 1 APPLICATION: 788; 87 OINTMENT TOPICAL at 20:25

## 2024-02-17 RX ADMIN — Medication 10 ML: at 20:25

## 2024-02-17 RX ADMIN — Medication 1 APPLICATION: at 09:39

## 2024-02-17 RX ADMIN — METOPROLOL TARTRATE 25 MG: 25 TABLET, FILM COATED ORAL at 05:34

## 2024-02-17 RX ADMIN — GUAIFENESIN 1200 MG: 600 TABLET, EXTENDED RELEASE ORAL at 12:48

## 2024-02-17 RX ADMIN — FUROSEMIDE 20 MG: 10 INJECTION, SOLUTION INTRAMUSCULAR; INTRAVENOUS at 09:38

## 2024-02-17 RX ADMIN — Medication 10 ML: at 09:39

## 2024-02-17 RX ADMIN — FAMOTIDINE 40 MG: 20 TABLET, FILM COATED ORAL at 09:38

## 2024-02-17 RX ADMIN — CASTOR OIL AND BALSAM, PERU 1 APPLICATION: 788; 87 OINTMENT TOPICAL at 10:20

## 2024-02-17 RX ADMIN — Medication 1 APPLICATION: at 20:25

## 2024-02-17 RX ADMIN — DOCUSATE SODIUM 50MG AND SENNOSIDES 8.6MG 2 TABLET: 8.6; 5 TABLET, FILM COATED ORAL at 14:10

## 2024-02-17 RX ADMIN — GABAPENTIN 100 MG: 100 CAPSULE ORAL at 20:24

## 2024-02-17 RX ADMIN — METOPROLOL TARTRATE 25 MG: 25 TABLET, FILM COATED ORAL at 18:37

## 2024-02-17 RX ADMIN — OXYCODONE HYDROCHLORIDE 5 MG: 5 TABLET ORAL at 20:34

## 2024-02-17 NOTE — PLAN OF CARE
Problem: Adult Inpatient Plan of Care  Goal: Plan of Care Review  Outcome: Ongoing, Progressing  Flowsheets (Taken 2/17/2024 0357)  Outcome Evaluation: VSS. RA. CT to WS. Refusing turns. Drsg changed. No c/o of pain. AO x 4 but forgetful and confused at times.  Goal: Patient-Specific Goal (Individualized)  Outcome: Ongoing, Progressing  Goal: Absence of Hospital-Acquired Illness or Injury  Outcome: Ongoing, Progressing  Intervention: Identify and Manage Fall Risk  Recent Flowsheet Documentation  Taken 2/17/2024 0225 by Tanvi Sanchez, RN  Safety Promotion/Fall Prevention:   activity supervised   assistive device/personal items within reach   clutter free environment maintained   lighting adjusted   mobility aid in reach   nonskid shoes/slippers when out of bed   safety round/check completed   toileting scheduled  Taken 2/17/2024 0016 by Tanvi Sanchez, RN  Safety Promotion/Fall Prevention:   activity supervised   clutter free environment maintained   assistive device/personal items within reach   lighting adjusted   mobility aid in reach   nonskid shoes/slippers when out of bed   safety round/check completed   toileting scheduled  Taken 2/16/2024 2230 by Tanvi Sanchez, RN  Safety Promotion/Fall Prevention:   activity supervised   assistive device/personal items within reach   clutter free environment maintained   lighting adjusted   mobility aid in reach   nonskid shoes/slippers when out of bed   safety round/check completed   toileting scheduled  Taken 2/16/2024 2030 by Tanvi Sanchez, RN  Safety Promotion/Fall Prevention:   activity supervised   assistive device/personal items within reach   clutter free environment maintained   lighting adjusted   mobility aid in reach   nonskid shoes/slippers when out of bed   safety round/check completed   toileting scheduled  Intervention: Prevent Skin Injury  Recent Flowsheet Documentation  Taken 2/16/2024 2030 by Tanvi Sanchez, RN  Skin  Protection:   adhesive use limited   tubing/devices free from skin contact  Intervention: Prevent and Manage VTE (Venous Thromboembolism) Risk  Recent Flowsheet Documentation  Taken 2/17/2024 0225 by Tanvi Sanchez, RN  Activity Management: activity encouraged  Taken 2/17/2024 0016 by Tanvi Sanchez, RN  Activity Management: activity minimized  Taken 2/16/2024 2230 by Tanvi Sanchez, RN  Activity Management: activity encouraged  Taken 2/16/2024 2030 by Tanvi Sanchez, RN  Activity Management: activity encouraged  Goal: Optimal Comfort and Wellbeing  Outcome: Ongoing, Progressing  Intervention: Provide Person-Centered Care  Recent Flowsheet Documentation  Taken 2/17/2024 0225 by Tanvi Sanchez, SWAPNA  Trust Relationship/Rapport:   care explained   emotional support provided   choices provided   questions answered   questions encouraged   reassurance provided   thoughts/feelings acknowledged  Taken 2/16/2024 2030 by Tanvi Sanchez, SWAPNA  Trust Relationship/Rapport:   care explained   choices provided   emotional support provided   questions answered   reassurance provided   questions encouraged   thoughts/feelings acknowledged  Goal: Readiness for Transition of Care  Outcome: Ongoing, Progressing   Goal Outcome Evaluation:              Outcome Evaluation: VSS. RA. CT to WS. Refusing turns. Drsg changed. No c/o of pain. AO x 4 but forgetful and confused at times.

## 2024-02-17 NOTE — PROGRESS NOTES
"    Chief Complaint: Right pleural effusion  S/P: CT-guided chest tube placement; lytics x4      Subjective:  Symptoms:  Improved.  No shortness of breath, cough or chest pain.    Diet:  Poor intake.  No nausea or vomiting.    Activity level: Impaired due to weakness.    Pain:  He reports no pain.    No events noted.  Resting comfortably at bed.  Denies shortness of breath.    Vital Signs:  Temp:  [97.3 °F (36.3 °C)-98.6 °F (37 °C)] 98.3 °F (36.8 °C)  Heart Rate:  [70-93] 93  Resp:  [16-17] 17  BP: ()/(54-95) 110/69    Intake & Output (last day)         02/16 0701  02/17 0700 02/17 0701  02/18 0700    P.O. 1080     Total Intake(mL/kg) 1080 (16.9)     Urine (mL/kg/hr) 0 (0)     Chest Tube 350     Total Output 350     Net +730           Urine Unmeasured Occurrence 4 x 1 x            Objective:  General Appearance:  Comfortable, well-appearing and in no acute distress.    Vital signs: (most recent): Blood pressure 110/69, pulse 93, temperature 98.3 °F (36.8 °C), temperature source Oral, resp. rate 17, height 177.8 cm (70\"), weight 63.8 kg (140 lb 10.5 oz), SpO2 97%.    HEENT: Normal HEENT exam.    Lungs:  Normal effort.  He is not in respiratory distress.    Heart: Tachycardia.  Irregular rhythm.    Chest: Symmetric chest wall expansion. Chest wall tenderness present.    Extremities: Decreased range of motion.  (S/p Right AKA)  Neurological: Patient is alert.    Skin:  Warm and dry.                Chest tube:   Site: Right, Clean, Dry, Intact, and Securement device intact  Suction: -20 cm  Air Leak: negative  24 Hour Total: 350ml    Results Review:     I reviewed the patient's new clinical results.  I reviewed the patient's new imaging results and agree with the interpretation.  Discussed with patient, RN    Imaging reviewed independently.  CT chest is improved.  Imaging Results (Last 24 Hours)       Procedure Component Value Units Date/Time    XR Chest 1 View [[id number]] Collected: 02/17/24 0552     Updated: " 02/17/24 0556    Narrative:      SINGLE VIEW OF THE CHEST     HISTORY: Chest tube management     COMPARISON: February 16, 2024     FINDINGS:  Right-sided pleural drainage catheter is noted. The patient does have a  right-sided pneumothorax, which wasn't clearly identified on the prior  exam. There is cardiomegaly. No pneumothorax is seen on the left. There  is some hazy opacity within the right midlung.       Impression:      Small right-sided pneumothorax, new when compared to prior study.     This report was finalized on 2/17/2024 5:53 AM by Dr. Cassy Pyle M.D on Workstation: [id number]               Lab Results:     Lab Results (last 24 hours)       Procedure Component Value Units Date/Time    Basic Metabolic Panel [[id number]]  (Abnormal) Collected: 02/17/24 0503    Specimen: Blood Updated: 02/17/24 0548     Glucose 77 mg/dL      BUN 15 mg/dL      Creatinine 0.59 mg/dL      Sodium 141 mmol/L      Potassium 4.5 mmol/L      Comment: Slight hemolysis detected by analyzer. Result may be falsely elevated.        Chloride 108 mmol/L      CO2 27.0 mmol/L      Calcium 8.0 mg/dL      BUN/Creatinine Ratio 25.4     Anion Gap 6.0 mmol/L      eGFR 96.3 mL/min/1.73     Narrative:      GFR Normal >60  Chronic Kidney Disease <60  Kidney Failure <15    The GFR formula is only valid for adults with stable renal function between ages 18 and 70.    CBC (No Diff) [[id number]]  (Abnormal) Collected: 02/17/24 0503    Specimen: Blood Updated: 02/17/24 0533     WBC 7.53 10*3/mm3      RBC 4.60 10*6/mm3      Hemoglobin 11.6 g/dL      Hematocrit 36.5 %      MCV 79.3 fL      MCH 25.2 pg      MCHC 31.8 g/dL      RDW 17.3 %      RDW-SD 49.7 fl      MPV 10.3 fL      Platelets 221 10*3/mm3     Fungus Culture - Drainage, Lung, R [[id number]] Collected: 02/09/24 1532    Specimen: Drainage from Lung, R Updated: 02/16/24 1600     Fungus Culture No fungus isolated at 1 week             Assessment & Plan       Pleural effusion     Dyslipidemia    A-fib    Hyperthyroidism    Chronic diastolic CHF (congestive heart failure)    Dyspnea       Assessment & Plan  Mr. Silva is a 83-year-old gentleman who presented with a right-sided pleural effusion.  He is status post 4 rounds of intrapleural lytic therapy.  Symptomatically he has drastically improved.  He denies any shortness of breath and is on room air.  His chest x-ray continues to improve daily.  He was placed to [facility] on his chest tube yesterday and his output is still slightly high for chest tube removal today, it was 350 cc in the last 24 hours.  We have given him 1 dose of Lasix as he has a appropriate renal function and can tolerate this.  We will continue to trend his output of his chest tube, I would anticipate possible removal of chest tube tomorrow.    Regulo Rincon MD PhD  [facility]  02/17/24  11:33 EST    Greater than 35 minutes was spent reviewing the patient's chart, radiographic imaging, assessing the patient and developing a plan of care which was discussed with the patient and RN.  This excludes any other billable procedures which will be dictated separately.

## 2024-02-17 NOTE — PROGRESS NOTES
Name: Mehreen Silva IV ADMIT: 2024   : 1940  PCP: Taiwo Powers MD    MRN: [id number] LOS: 8 days   AGE/SEX: 83 y.o. male  ROOM: [facility]     Subjective   Subjective   Chief Complaint   Patient presents with    Chest Pain     Tolerating the chest tube. No NVD or abodminal pain reported.     Objective   Objective   Vital Signs  Temp:  [97.3 °F (36.3 °C)-98.6 °F (37 °C)] 97.3 °F (36.3 °C)  Heart Rate:  [70-84] 75  Resp:  [16] 16  BP: ()/(54-95) 111/54  SpO2:  [90 %-97 %] 97 %  on   ;   Device (Oxygen Therapy): room air  Body mass index is 20.18 kg/m².    Physical Exam  Vitals and nursing note reviewed.   Constitutional:       General: He is not in acute distress.     Appearance: He is not diaphoretic.   HENT:      Head: Atraumatic.   Cardiovascular:      Rate and Rhythm: Normal rate. Rhythm irregular.   Pulmonary:      Effort: Pulmonary effort is normal.      Breath sounds: No wheezing.      Comments: Chest tube  Abdominal:      General: There is no distension.      Palpations: Abdomen is soft.      Tenderness: There is no abdominal tenderness. There is no guarding or rebound.   Musculoskeletal:         General: Deformity (r aka) present. No tenderness.   Skin:     General: Skin is warm and dry.   Neurological:      General: No focal deficit present.      Mental Status: He is alert.   Psychiatric:         Mood and Affect: Mood normal.         Behavior: Behavior normal.       Results Review  I reviewed the patient's new clinical results.    Results from last 7 days   Lab Units 24  0503 24  0318 24  0316 24  0310   WBC 10*3/mm3 7.53 7.19 7.29 6.32   HEMOGLOBIN g/dL 11.6* 11.5* 11.8* 11.6*   PLATELETS 10*3/mm3 221 215 198 189     Results from last 7 days   Lab Units 24  0503 24  0318 24  0316 24  0310   SODIUM mmol/L 141 141 137 141   POTASSIUM mmol/L 4.5 4.2 4.3 4.4   CHLORIDE mmol/L 108* 108* 106 108*   CO2 mmol/L 27.0 25.9 26.0 25.5   BUN  "mg/dL 15 18 14 18   CREATININE mg/dL 0.59* 0.72* 0.56* 0.80   GLUCOSE mg/dL 77 132* 90 147*   EGFR mL/min/1.73 96.3 90.7 97.8 87.8     Results from last 7 days   Lab Units 02/13/24  0310   ALBUMIN g/dL 1.9*     Results from last 7 days   Lab Units 02/17/24  0503 02/16/24  0318 02/14/24  0316 02/13/24  0310   CALCIUM mg/dL 8.0* 8.0* 7.9* 7.9*   ALBUMIN g/dL  --   --   --  1.9*   MAGNESIUM mg/dL  --   --   --  2.4   PHOSPHORUS mg/dL  --   --   --  2.5           No results found for: \"HGBA1C\", \"POCGLU\"      XR Chest 1 View    Result Date: 2/17/2024  Small right-sided pneumothorax, new when compared to prior study.  This report was finalized on 2/17/2024 5:53 AM by Dr. Cassy Pyle M.D on Workstation: [id number]       I have personally reviewed all medications:  Scheduled Medications  castor oil-balsam peru, 1 Application, Topical, Q12H  cefTRIAXone, 1,000 mg, Intravenous, Q24H  famotidine, 40 mg, Oral, Daily  furosemide, 20 mg, Intravenous, Once  gabapentin, 100 mg, Oral, Nightly  guaiFENesin, 1,200 mg, Oral, Q12H  Menthol-Zinc Oxide, 1 Application, Topical, Q12H  methIMAzole, 5 mg, Oral, Once per day on Monday Wednesday Friday  metoprolol tartrate, 25 mg, Oral, Q12H  sodium chloride, 10 mL, Intravenous, Q12H      Infusions  hold,       Diet  Diet: Cardiac Diets; Healthy Heart (2-3 Na+); No Straw; Texture: Regular Texture (IDDSI 7); Fluid Consistency: Thin (IDDSI 0)    I have personally reviewed:  [x]  Laboratory   []  Microbiology   [x]  Radiology   [x]  EKG/Telemetry  []  Cardiology/Vascular   []  Pathology    []  Records       Assessment/Plan     Active Hospital Problems    Diagnosis  POA    **Pleural effusion [J90]  Yes    Dyspnea [R06.00]  Yes    Chronic diastolic CHF (congestive heart failure) [I50.32]  Yes    Hyperthyroidism [E05.90]  Yes    A-fib [I48.91]  Yes    Dyslipidemia [E78.5]  Yes      Resolved Hospital Problems   No resolved problems to display.       83 y.o. male admitted with Pleural " effusion.    Large right pleural effusion/right lung atelectasis: Had opacification of right hemithorax.  Status post chest tube placement with improvement.  Body fluid culture no growth to date.  Cytology negative.  He is on Rocephin currently.  Received intrapleural tPA.  Pulmonology and thoracic surgery following.  PTX: See above  Chronic diastolic heart failure/A-fib: Anticoagulation has been held with chest tube.  Metoprolol. Lasix and jardiance held.  Cardiology following.  Hypertension: Not grossly elevated.  Plan to monitor.  Hyperthyroidism: TSH and free T4 were okay.  Continue Tapazole  PPX: AC on hold for chest tube.  Resume eventually.  Disposition: Home health/TBD    Expected Discharge Date: 2/14/2024; Expected Discharge Time:      Feliciano Torres MD  [facility]  02/17/24  09:24 EST    Dictated portions of note using Dragon dictation software.  Copied text in this note has been reviewed by me and remains accurate as of 02/17/24

## 2024-02-17 NOTE — PROGRESS NOTES
LOS: 4 days   Patient Care Team:  Taiwo Powers MD as PCP - General (Internal Medicine)  Self, Bess YO MD as Consulting Physician (Endocrinology)  ChanHuang henley MD as Cardiologist (Cardiology)    Subjective     .  Following patient for pleural effusion and dyspnea on exertion.  Patient has a history of congestive heart failure, fibrillation, hypertension, hyperlipidemia peripheral vascular disease with right AKA and he has had prior pleural effusion.    Still not real happy but denies shortness of breath or significant cough no chest pain says he has not been up out of bed.    Review of Systems:          Objective     Vital Signs  Vital Sign Min/Max for last 24 hours  Temp  Min: 97.5 °F (36.4 °C)  Max: 98.6 °F (37 °C)   BP  Min: 101/72  Max: 109/78   Pulse  Min: 70  Max: 94   Resp  Min: 16  Max: 16   SpO2  Min: 95 %  Max: 99 %   Flow (L/min)  Min: 1  Max: 1   Weight  Min: 61.7 kg (136 lb 0.4 oz)  Max: 61.7 kg (136 lb 0.4 oz)        Ventilator/Non-Invasive Ventilation Settings admission, onward)      None                         Body mass index is 19.52 kg/m².  I/O last 3 completed shifts:  In: 480 [P.O.:480]  Out: 610 [Chest Tube:610]  No intake/output data recorded.        Physical Exam:  General Appearance: Well-developed older white male resting in bed does not look in acute distress saturations are 98% on room air  Eyes: Conjunctiva are clear and anicteric  ENT: Mucous membranes are moist no exudates  Neck: Trachea midline no visible jugular venous distention  Lungs: Crackles no wheezes no rales chest tube with a little bit of serous fluid no airleak.  Nonlabored he is sort of not taking real deep breaths today I do not know whether he is splinting or exactly what he denies pain  Cardiac: Regular rate rhythm no murmur  Abdomen: Soft no hepatosplenomegaly or masses  : Not examined  Musculoskeletal: Grossly normal aside from the right AKA  Skin: Warm and dry no jaundice no petechiae  Neuro:  He is awake and alert  Extremities/P Vascular: No clubbing no cyanosis no edema palpable radial and left dorsalis pedis pulses  MSE: Irritable    Labs:  Results from last 7 days   Lab Units 02/13/24  0310 02/12/24  0755 02/11/24  0315 02/10/24  0532 02/09/24  1114   GLUCOSE mg/dL 147* 73 88 114* 115*   SODIUM mmol/L 141 137 139 135* 142   POTASSIUM mmol/L 4.4 4.1 4.6 4.7 5.0   MAGNESIUM mg/dL 2.4  --   --   --   --    CO2 mmol/L 25.5 23.9 24.8 21.4* 29.0   CHLORIDE mmol/L 108* 106 106 102 103   ANION GAP mmol/L 7.5 7.1 8.2 11.6 10.0   CREATININE mg/dL 0.80 0.52* 0.81 0.87 0.94   BUN mg/dL 18 15 22 27* 21   BUN / CREAT RATIO  22.5 28.8* 27.2* 31.0* 22.3   CALCIUM mg/dL 7.9* 7.9* 7.8* 8.2* 9.1   ALK PHOS U/L  --   --   --  116 145*   TOTAL PROTEIN g/dL  --   --   --  5.4* 6.2   ALT (SGPT) U/L  --   --   --  19 26   AST (SGOT) U/L  --   --   --  18 25   BILIRUBIN mg/dL  --   --   --  1.2 0.9   ALBUMIN g/dL 1.9*  --   --  2.5* 3.4*   GLOBULIN gm/dL  --   --   --   --  2.8     Estimated Creatinine Clearance: 61.1 mL/min (by C-G formula based on SCr of 0.8 mg/dL).      Results from last 7 days   Lab Units 02/13/24 0310 02/12/24  0427 02/11/24  0315 02/10/24  0532 02/09/24  1114   WBC 10*3/mm3 6.32 5.76 6.94 12.14* 7.58   RBC 10*6/mm3 4.74 4.76 4.83 5.86* 5.92*   HEMOGLOBIN g/dL 11.6* 11.9* 11.9* 14.2 14.7   HEMATOCRIT % 37.4* 38.3 37.8 45.0 46.6   MCV fL 78.9* 80.5 78.3* 76.8* 78.7*   MCH pg 24.5* 25.0* 24.6* 24.2* 24.8*   MCHC g/dL 31.0* 31.1* 31.5 31.6 31.5   RDW % 17.1* 17.4* 17.8* 18.4* 18.7*   RDW-SD fl 48.8 49.9 49.8 47.3 49.8   MPV fL 10.6 11.1 11.2 11.2 10.7   PLATELETS 10*3/mm3 189 167 179 233 199   NEUTROPHIL % %  --  68.6 75.3 85.9* 78.1*   LYMPHOCYTE % %  --  16.3* 10.7* 7.5* 12.8*   MONOCYTES % %  --  10.9 12.4* 6.0 7.8   EOSINOPHIL % %  --  3.5 1.0 0.0* 0.7   BASOPHIL % %  --  0.5 0.3 0.2 0.5   IMM GRAN % %  --  0.2 0.3 0.4 0.1   NEUTROS ABS 10*3/mm3  --  3.95 5.23 10.42* 5.92   LYMPHS ABS 10*3/mm3  --   0.94 0.74 0.91 0.97   MONOS ABS 10*3/mm3  --  0.63 0.86 0.73 0.59   EOS ABS 10*3/mm3  --  0.20 0.07 0.00 0.05   BASOS ABS 10*3/mm3  --  0.03 0.02 0.03 0.04   IMMATURE GRANS (ABS) 10*3/mm3  --  0.01 0.02 0.05 0.01   NRBC /100 WBC  --  0.0 0.0 0.1 0.0     Results from last 7 days   Lab Units 02/09/24  1907   PH, ARTERIAL pH units 7.423   PO2 ART mm Hg 52.9*   PCO2, ARTERIAL mm Hg 38.1   HCO3 ART mmol/L 24.9     Results from last 7 days   Lab Units 02/09/24  1401 02/09/24  1114   HSTROP T ng/L 60* 65*     Results from last 7 days   Lab Units 02/09/24  1114   PROBNP pg/mL 4,357.0*     Results from last 7 days   Lab Units 02/09/24  1914 02/09/24  1401   TSH uIU/mL  --  0.679   T3 FREE pg/mL 1.6*  --    FREE T4 ng/dL  --  1.66     Results from last 7 days   Lab Units 02/10/24  0532 02/09/24  1401 02/09/24  1114   LACTATE mmol/L 2.0 2.0 2.2*   PROCALCITONIN ng/mL  --   --  0.08         Microbiology Results (last 10 days)       Procedure Component Value - Date/Time    Legionella Antigen, Urine - Urine, Urine, Clean Catch [[id number]]  (Normal) Collected: 02/09/24 2233    Lab Status: Final result Specimen: Urine, Clean Catch Updated: 02/09/24 2307     LEGIONELLA ANTIGEN, URINE Negative    S. Pneumo Ag Urine or CSF - Urine, Urine, Clean Catch [[id number]]  (Normal) Collected: 02/09/24 2233    Lab Status: Final result Specimen: Urine, Clean Catch Updated: 02/09/24 2307     Strep Pneumo Ag Negative    Respiratory Panel PCR w/COVID-19(SARS-CoV-2) [facility] In-House, NP Swab in UTM/VTM, 2 HR TAT - Swab, Nasopharynx [[id number]]  (Normal) Collected: 02/09/24 1740    Lab Status: Final result Specimen: Swab from Nasopharynx Updated: 02/09/24 1905     ADENOVIRUS, PCR Not Detected     Coronavirus 229E Not Detected     Coronavirus HKU1 Not Detected     Coronavirus NL63 Not Detected     Coronavirus OC43 Not Detected     COVID19 Not Detected     Human Metapneumovirus Not Detected     Human Rhinovirus/Enterovirus Not Detected      Influenza A PCR Not Detected     Influenza B PCR Not Detected     Parainfluenza Virus 1 Not Detected     Parainfluenza Virus 2 Not Detected     Parainfluenza Virus 3 Not Detected     Parainfluenza Virus 4 Not Detected     RSV, PCR Not Detected     Bordetella pertussis pcr Not Detected     Bordetella parapertussis PCR Not Detected     Chlamydophila pneumoniae PCR Not Detected     Mycoplasma pneumo by PCR Not Detected    Narrative:      In the setting of a positive respiratory panel with a viral infection PLUS a negative procalcitonin without other underlying concern for bacterial infection, consider observing off antibiotics or discontinuation of antibiotics and continue supportive care. If the respiratory panel is positive for atypical bacterial infection (Bordetella pertussis, Chlamydophila pneumoniae, or Mycoplasma pneumoniae), consider antibiotic de-escalation to target atypical bacterial infection.    Respiratory Culture - Sputum, Cough [[id number]] Collected: 02/09/24 1733    Lab Status: Final result Specimen: Sputum from Cough Updated: 02/09/24 1958     Respiratory Culture Rejected     Gram Stain Few (2+) Epithelial cells seen      Few (2+) WBCs seen      Few (2+) Gram negative bacilli      Few (2+) Gram positive cocci in pairs    Narrative:      Specimen rejected due to oropharyngeal contamination. Please reorder and recollect specimen if clinically necessary.    Body Fluid Culture - Body Fluid, Pleural Cavity [[id number]] Collected: 02/09/24 1532    Lab Status: Final result Specimen: Body Fluid from Pleural Cavity Updated: 02/14/24 0731     Body Fluid Culture No growth at 5 days     Gram Stain Rare (1+) WBCs seen      No organisms seen    AFB Culture - Drainage, Lung, R [[id number]] Collected: 02/09/24 1532    Lab Status: Preliminary result Specimen: Drainage from Lung, R Updated: 02/10/24 1438     AFB Stain No acid fast bacilli seen on direct smear    Fungus Culture - Drainage, Lung, R [[id number]]  "Collected: 02/09/24 1532    Lab Status: Preliminary result Specimen: Drainage from Lung, R Updated: 02/16/24 1600     Fungus Culture No fungus isolated at 1 week    Blood Culture - Blood, Arm, Right [[id number]]  (Normal) Collected: 02/09/24 1154    Lab Status: Final result Specimen: Blood from Arm, Right Updated: 02/14/24 1201     Blood Culture No growth at 5 days    Blood Culture - Blood, Arm, Left [[id number]]  (Normal) Collected: 02/09/24 1154    Lab Status: Final result Specimen: Blood from Arm, Left Updated: 02/14/24 1201     Blood Culture No growth at 5 days                alteplase (ACTIVASE) 10 mg in sodium chloride 0.9 % 30 mL Syringe, 10 mg, Intrapleural, Once   And  dornase alpha (PULMOZYME) 5 mg in sterile water (preservative free) 30 mL intrapleural syringe, 5 mg, Intrapleural, Once  castor oil-balsam peru, 1 Application, Topical, Q12H  cefTRIAXone, 1,000 mg, Intravenous, Q24H  famotidine, 40 mg, Oral, Daily  gabapentin, 100 mg, Oral, Nightly  guaiFENesin, 1,200 mg, Oral, Q12H  Menthol-Zinc Oxide, 1 Application, Topical, Q12H  methIMAzole, 5 mg, Oral, Once per day on Monday Wednesday Friday  metoprolol tartrate, 25 mg, Oral, Q12H  sodium chloride, 10 mL, Intravenous, Q12H      hold,         Diagnostics:  XR Chest 1 View    Result Date: 2/9/2024  XR CHEST 1 VW-  HISTORY: Male who is 83 years-old, chest tube management  TECHNIQUE: Frontal view of the chest  COMPARISON: 2/9/2024  FINDINGS: Right chest tube appears stable. Heart size is borderline. Aorta is calcified. Pulmonary vasculature is unremarkable. Opacity in the right mid to lower lung is increased, suggesting increased infiltrate/atelectasis. Residual right pleural effusion appears similar to prior exam. There appears to be 2 cm right apical pneumothorax, appearance could reflect \"trapped lung\" phenomenon. No left pneumothorax. Otherwise stable.      As described.    This report was finalized on 2/9/2024 7:34 PM by Dr. Jun Maldonado M.D on " Workstation: [id number]      CT Angiogram Chest    Result Date: 2/9/2024  CT ANGIOGRAM OF THE CHEST WITH CONTRAST INCLUDING RECONSTRUCTION IMAGES 02/09/2024  HISTORY: Shortness of breath.  Following the intravenous contrast injection CT angiography was performed through the chest. Sagittal, coronal and 3D reconstruction images were reviewed.  The pulmonary arterial system is somewhat heterogeneous but no definite pulmonary embolus is seen. The heterogeneity is probably artifact.  There is a large right pleural effusion with atelectasis of the right lung. There is a small left pleural effusion.  Small amount of fluid is seen in pericardial recesses. There is some aortic and coronary calcification.      1. Large right pleural effusion with atelectasis of the right lung. The right hemithorax is completely opacified. 2. Small left pleural effusion. 3. The pulmonary arterial system is somewhat heterogeneous but no definite pulmonary embolus is seen.    Radiation dose reduction techniques were utilized, including automated exposure control and exposure modulation based on body size.   This report was finalized on 2/9/2024 5:41 PM by Dr. Romeo Cummings M.D on Workstation: [id number]      CT Guided Chest Tube    Result Date: 2/9/2024  CT-GUIDED RIGHT CHEST TUBE PLACEMENT 02/09/2024  HISTORY: Large right pleural effusion.  TECHNIQUE: After signed informed consent was obtained the patient was prepped and draped in the right posterior oblique position. Lidocaine was used for local anesthesia.  CT guidance was used to place a 14 [place] pigtail catheter into the right pleural fluid collection. Approximately 1600 cc of nonpurulent serous fluid was removed.  The catheter was left in place and connected to the atrium suction.  Confirmatory images were obtained.  Patient tolerated the procedure well with no complications.      1. Successful placement of 14 [place] pigtail catheter into the right pleural fluid collection with removal  of 1600 cc of nonpurulent fluid.   Radiation dose reduction techniques were utilized, including automated exposure control and exposure modulation based on body size.   This report was finalized on 2/9/2024 5:40 PM by Dr. Romeo Cummings M.D on Workstation: [id number]      XR Chest 1 View    Result Date: 2/9/2024  AP CHEST  HISTORY: Evaluate pleural effusion  COMPARISON: Earlier today  FINDINGS: Status post placement of right chest tube. Significant decrease in size of right pleural effusion with significant improved aeration of the right lung. Heart size stable and left lung remains clear.      Significant increase in size of right pleural effusion    This report was finalized on 2/9/2024 5:03 PM by Dr. Randy Villa M.D on Workstation: [id number]      XR Chest 1 View    Result Date: 2/9/2024  CHEST SINGLE VIEW  HISTORY: Chest pain. Shortness of air and cough.  COMPARISON: AP chest 11/09/2023, CT chest 11/12/2023.  FINDINGS: There has developed complete white out of the right thorax due to large right pleural effusion and associated right basilar atelectasis or infiltrate. Left lung appears clear. There appears to be mild right to left cardiomediastinal shift that is likely related to the presence of pleural fluid.      New white out of the right lung appears to be mostly related to a large pleural effusion, though there is also a suspected component of atelectasis or infiltrate. There is mild right to left cardiomediastinal shift. CT would be the best means to further evaluate.  This report was finalized on 2/9/2024 11:09 AM by Dr. Ciro Casey M.D on Workstation: [id number]      Results for orders placed during the hospital encounter of 02/09/24    Adult Transthoracic Echo Complete W/ Cont if Necessary Per Protocol    Interpretation Summary    Left ventricular systolic function is hyperdynamic (EF > 70%).    Left ventricular wall thickness is consistent with mild concentric hypertrophy.    Left ventricular  diastolic function was indeterminate.    Normal right ventricular cavity size and systolic function noted.    The left atrial cavity is moderately dilated.    There is moderate aortic valve calcification    Mild to moderate mitral valve regurgitation is present.    Insufficient TR velocity profile to estimate the right ventricular systolic pressure.    There is a small (<1cm) circumferential pericardial effusion. There is no evidence of cardiac tamponade.      Chest x-ray reviewed and compared to yesterday right apical pneumothorax is developed and there is some increased fluid I think in the fissure on the right again.  Active Hospital Problems    Diagnosis  POA    **Pleural effusion [J90]  Yes    Dyspnea [R06.00]  Yes    Chronic diastolic CHF (congestive heart failure) [I50.32]  Yes    Hyperthyroidism [E05.90]  Yes    A-fib [I48.91]  Yes    Dyslipidemia [E78.5]  Yes      Resolved Hospital Problems   No resolved problems to display.         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    Ridge Garcia Jr, MD  02/17/24  09:03 EST    Time:

## 2024-02-17 NOTE — PROGRESS NOTES
"CC: Persistent atrial fibrillation no new acute events overnight    Interval History:       Vital Signs  Temp:  [97.3 °F (36.3 °C)-98.6 °F (37 °C)] 97.3 °F (36.3 °C)  Heart Rate:  [70-84] 75  Resp:  [16] 16  BP: ()/(54-95) 111/54    Intake/Output Summary (Last 24 hours) at 2/17/2024 0920  Last data filed at 2/17/2024 0536  Gross per 24 hour   Intake 600 ml   Output 350 ml   Net 250 ml     Flowsheet Rows      Flowsheet Row First Filed Value   Admission Height 177.8 cm (70\") Documented at 02/09/2024 1102   Admission Weight 72.6 kg (160 lb) Documented at 02/09/2024 1102            PHYSICAL EXAM:  General: No acute distress  Resp:NL Rate, symmetric chest expansion,unlabored, clear  CV: Irregularly irregular, NL PMI, NL S1 and S2, no Murmur, no gallop, no rub, No JVD.   ABD:Nl sounds, no masses or tenderness, nondistended, no guarding or rebound  Neuro: alert,cooperative and oriented  Extr:Normal pedal pulses, No edema or cyanosis, moves all extremities      Results Review:    Results from last 7 days   Lab Units 02/17/24  0503   SODIUM mmol/L 141   POTASSIUM mmol/L 4.5   CHLORIDE mmol/L 108*   CO2 mmol/L 27.0   BUN mg/dL 15   CREATININE mg/dL 0.59*   GLUCOSE mg/dL 77   CALCIUM mg/dL 8.0*         Results from last 7 days   Lab Units 02/17/24  0503   WBC 10*3/mm3 7.53   HEMOGLOBIN g/dL 11.6*   HEMATOCRIT % 36.5*   PLATELETS 10*3/mm3 221             Results from last 7 days   Lab Units 02/13/24  0310   MAGNESIUM mg/dL 2.4         I reviewed the patient's new clinical results.  I personally viewed and interpreted the patient's EKG/Telemetry data        Medication Review:   Meds reviewed    hold,         Assessment/Plan    1.  Recurrent large right pleural effusion (exudative) with associated near right lung atelectasis, status post CT-guided chest tube placement on 2/9/2024  2.  Baseline hypertension, now with hypotension  3.  Persistent atrial fibrillation with RVR  4.  Chronic diastolic CHF  5.  Status post right " AKA on 12/13/2023 secondary to osteomyelitis and contracture of the right knee  6.  Nonspecific high-sensitivity troponin elevation  7.  Multifactorial weakness  8.  Hyperthyroidism, on methimazole    Heart rate fairly controlled with current regimen  No new acute events overnight  Resume anticoagulation once chest tube is removed and patient stable.  Cardiology will see patient on Monday.    Kingsley Perla MD  02/17/24  09:20 EST

## 2024-02-18 ENCOUNTER — APPOINTMENT (OUTPATIENT)
Dept: GENERAL RADIOLOGY | Facility: HOSPITAL | Age: 84
DRG: 186 | End: 2024-02-18
Payer: MEDICARE

## 2024-02-18 LAB
ANION GAP SERPL CALCULATED.3IONS-SCNC: 8.1 MMOL/L (ref 5–15)
BUN SERPL-MCNC: 17 MG/DL (ref 8–23)
BUN/CREAT SERPL: 25.4 (ref 7–25)
CALCIUM SPEC-SCNC: 8.5 MG/DL (ref 8.6–10.5)
CHLORIDE SERPL-SCNC: 105 MMOL/L (ref 98–107)
CO2 SERPL-SCNC: 29.9 MMOL/L (ref 22–29)
CREAT SERPL-MCNC: 0.67 MG/DL (ref 0.76–1.27)
DEPRECATED RDW RBC AUTO: 49.4 FL (ref 37–54)
EGFRCR SERPLBLD CKD-EPI 2021: 92.6 ML/MIN/1.73
ERYTHROCYTE [DISTWIDTH] IN BLOOD BY AUTOMATED COUNT: 17.3 % (ref 12.3–15.4)
GLUCOSE SERPL-MCNC: 82 MG/DL (ref 65–99)
HCT VFR BLD AUTO: 40.6 % (ref 37.5–51)
HGB BLD-MCNC: 12.9 G/DL (ref 13–17.7)
MCH RBC QN AUTO: 25.1 PG (ref 26.6–33)
MCHC RBC AUTO-ENTMCNC: 31.8 G/DL (ref 31.5–35.7)
MCV RBC AUTO: 79.1 FL (ref 79–97)
PLATELET # BLD AUTO: 240 10*3/MM3 (ref 140–450)
PMV BLD AUTO: 10.4 FL (ref 6–12)
POTASSIUM SERPL-SCNC: 4.4 MMOL/L (ref 3.5–5.2)
RBC # BLD AUTO: 5.13 10*6/MM3 (ref 4.14–5.8)
SODIUM SERPL-SCNC: 143 MMOL/L (ref 136–145)
WBC NRBC COR # BLD AUTO: 7.47 10*3/MM3 (ref 3.4–10.8)

## 2024-02-18 PROCEDURE — 25010000002 FUROSEMIDE PER 20 MG: Performed by: INTERNAL MEDICINE

## 2024-02-18 PROCEDURE — 85027 COMPLETE CBC AUTOMATED: CPT | Performed by: INTERNAL MEDICINE

## 2024-02-18 PROCEDURE — 80048 BASIC METABOLIC PNL TOTAL CA: CPT | Performed by: INTERNAL MEDICINE

## 2024-02-18 PROCEDURE — 99232 SBSQ HOSP IP/OBS MODERATE 35: CPT | Performed by: NURSE PRACTITIONER

## 2024-02-18 PROCEDURE — 71045 X-RAY EXAM CHEST 1 VIEW: CPT

## 2024-02-18 RX ORDER — FUROSEMIDE 10 MG/ML
40 INJECTION INTRAMUSCULAR; INTRAVENOUS EVERY 12 HOURS
Status: COMPLETED | OUTPATIENT
Start: 2024-02-18 | End: 2024-02-18

## 2024-02-18 RX ADMIN — CASTOR OIL AND BALSAM, PERU 1 APPLICATION: 788; 87 OINTMENT TOPICAL at 20:10

## 2024-02-18 RX ADMIN — OXYCODONE HYDROCHLORIDE 5 MG: 5 TABLET ORAL at 20:09

## 2024-02-18 RX ADMIN — GUAIFENESIN 1200 MG: 600 TABLET, EXTENDED RELEASE ORAL at 08:13

## 2024-02-18 RX ADMIN — FAMOTIDINE 40 MG: 20 TABLET, FILM COATED ORAL at 08:13

## 2024-02-18 RX ADMIN — FUROSEMIDE 40 MG: 10 INJECTION, SOLUTION INTRAMUSCULAR; INTRAVENOUS at 09:46

## 2024-02-18 RX ADMIN — GUAIFENESIN 1200 MG: 600 TABLET, EXTENDED RELEASE ORAL at 20:09

## 2024-02-18 RX ADMIN — Medication 10 ML: at 08:15

## 2024-02-18 RX ADMIN — METOPROLOL TARTRATE 25 MG: 25 TABLET, FILM COATED ORAL at 06:49

## 2024-02-18 RX ADMIN — FUROSEMIDE 40 MG: 10 INJECTION, SOLUTION INTRAMUSCULAR; INTRAVENOUS at 23:34

## 2024-02-18 RX ADMIN — CASTOR OIL AND BALSAM, PERU 1 APPLICATION: 788; 87 OINTMENT TOPICAL at 08:10

## 2024-02-18 RX ADMIN — METOPROLOL TARTRATE 25 MG: 25 TABLET, FILM COATED ORAL at 18:29

## 2024-02-18 RX ADMIN — Medication 10 ML: at 20:10

## 2024-02-18 RX ADMIN — GABAPENTIN 100 MG: 100 CAPSULE ORAL at 20:09

## 2024-02-18 RX ADMIN — Medication 1 APPLICATION: at 20:10

## 2024-02-18 RX ADMIN — Medication 1 APPLICATION: at 08:10

## 2024-02-18 NOTE — PROGRESS NOTES
LOS: 4 days   Patient Care Team:  Taiwo Powers MD as PCP - General (Internal Medicine)  Self, Bess YO MD as Consulting Physician (Endocrinology)  Huang Chan MD as Cardiologist (Cardiology)    Subjective     .  Following patient for pleural effusion and dyspnea on exertion.  Patient has a history of congestive heart failure, fibrillation, hypertension, hyperlipidemia peripheral vascular disease with right AKA and he has had prior pleural effusion.    He denies shortness of breath.  Says he really did not urinate much more with the Lasix yesterday.    Review of Systems:          Objective     Vital Signs  Vital Sign Min/Max for last 24 hours  Temp  Min: 97.5 °F (36.4 °C)  Max: 98.6 °F (37 °C)   BP  Min: 101/72  Max: 109/78   Pulse  Min: 70  Max: 94   Resp  Min: 16  Max: 16   SpO2  Min: 95 %  Max: 99 %   Flow (L/min)  Min: 1  Max: 1   Weight  Min: 61.7 kg (136 lb 0.4 oz)  Max: 61.7 kg (136 lb 0.4 oz)        Ventilator/Non-Invasive Ventilation Settings admission, onward)      None                         Body mass index is 19.52 kg/m².  I/O last 3 completed shifts:  In: 480 [P.O.:480]  Out: 610 [Chest Tube:610]  No intake/output data recorded.        Physical Exam:  General Appearance: Well-developed older white male resting in bed does not look in acute distress saturations are 98% on room air  Eyes: Conjunctiva are clear and anicteric  ENT: Mucous membranes are moist no exudates  Neck: Trachea midline no visible jugular venous distention  Lungs: Crackles no wheezes no rales chest tube with a little bit of serous fluid no airleak.  Nonlabored is taking deeper breaths today.  Cardiac: Regular rate rhythm no murmur  Abdomen: Soft no hepatosplenomegaly or masses  : Not examined  Musculoskeletal: Grossly normal aside from the right AKA  Skin: Warm and dry no jaundice no petechiae  Neuro: He is awake and alert  Extremities/P Vascular: No clubbing no cyanosis no edema palpable radial and left  dorsalis pedis pulses  MSE: He is not in a great mood.    Labs:  Results from last 7 days   Lab Units 02/13/24  0310 02/12/24  0755 02/11/24  0315 02/10/24  0532 02/09/24  1114   GLUCOSE mg/dL 147* 73 88 114* 115*   SODIUM mmol/L 141 137 139 135* 142   POTASSIUM mmol/L 4.4 4.1 4.6 4.7 5.0   MAGNESIUM mg/dL 2.4  --   --   --   --    CO2 mmol/L 25.5 23.9 24.8 21.4* 29.0   CHLORIDE mmol/L 108* 106 106 102 103   ANION GAP mmol/L 7.5 7.1 8.2 11.6 10.0   CREATININE mg/dL 0.80 0.52* 0.81 0.87 0.94   BUN mg/dL 18 15 22 27* 21   BUN / CREAT RATIO  22.5 28.8* 27.2* 31.0* 22.3   CALCIUM mg/dL 7.9* 7.9* 7.8* 8.2* 9.1   ALK PHOS U/L  --   --   --  116 145*   TOTAL PROTEIN g/dL  --   --   --  5.4* 6.2   ALT (SGPT) U/L  --   --   --  19 26   AST (SGOT) U/L  --   --   --  18 25   BILIRUBIN mg/dL  --   --   --  1.2 0.9   ALBUMIN g/dL 1.9*  --   --  2.5* 3.4*   GLOBULIN gm/dL  --   --   --   --  2.8     Estimated Creatinine Clearance: 61.1 mL/min (by C-G formula based on SCr of 0.8 mg/dL).      Results from last 7 days   Lab Units 02/13/24  0310 02/12/24  0427 02/11/24  0315 02/10/24  0532 02/09/24  1114   WBC 10*3/mm3 6.32 5.76 6.94 12.14* 7.58   RBC 10*6/mm3 4.74 4.76 4.83 5.86* 5.92*   HEMOGLOBIN g/dL 11.6* 11.9* 11.9* 14.2 14.7   HEMATOCRIT % 37.4* 38.3 37.8 45.0 46.6   MCV fL 78.9* 80.5 78.3* 76.8* 78.7*   MCH pg 24.5* 25.0* 24.6* 24.2* 24.8*   MCHC g/dL 31.0* 31.1* 31.5 31.6 31.5   RDW % 17.1* 17.4* 17.8* 18.4* 18.7*   RDW-SD fl 48.8 49.9 49.8 47.3 49.8   MPV fL 10.6 11.1 11.2 11.2 10.7   PLATELETS 10*3/mm3 189 167 179 233 199   NEUTROPHIL % %  --  68.6 75.3 85.9* 78.1*   LYMPHOCYTE % %  --  16.3* 10.7* 7.5* 12.8*   MONOCYTES % %  --  10.9 12.4* 6.0 7.8   EOSINOPHIL % %  --  3.5 1.0 0.0* 0.7   BASOPHIL % %  --  0.5 0.3 0.2 0.5   IMM GRAN % %  --  0.2 0.3 0.4 0.1   NEUTROS ABS 10*3/mm3  --  3.95 5.23 10.42* 5.92   LYMPHS ABS 10*3/mm3  --  0.94 0.74 0.91 0.97   MONOS ABS 10*3/mm3  --  0.63 0.86 0.73 0.59   EOS ABS 10*3/mm3  --   0.20 0.07 0.00 0.05   BASOS ABS 10*3/mm3  --  0.03 0.02 0.03 0.04   IMMATURE GRANS (ABS) 10*3/mm3  --  0.01 0.02 0.05 0.01   NRBC /100 WBC  --  0.0 0.0 0.1 0.0     Results from last 7 days   Lab Units 02/09/24  1907   PH, ARTERIAL pH units 7.423   PO2 ART mm Hg 52.9*   PCO2, ARTERIAL mm Hg 38.1   HCO3 ART mmol/L 24.9     Results from last 7 days   Lab Units 02/09/24  1401 02/09/24  1114   HSTROP T ng/L 60* 65*     Results from last 7 days   Lab Units 02/09/24  1114   PROBNP pg/mL 4,357.0*     Results from last 7 days   Lab Units 02/09/24  1914 02/09/24  1401   TSH uIU/mL  --  0.679   T3 FREE pg/mL 1.6*  --    FREE T4 ng/dL  --  1.66     Results from last 7 days   Lab Units 02/10/24  0532 02/09/24  1401 02/09/24  1114   LACTATE mmol/L 2.0 2.0 2.2*   PROCALCITONIN ng/mL  --   --  0.08         Microbiology Results (last 10 days)       Procedure Component Value - Date/Time    Legionella Antigen, Urine - Urine, Urine, Clean Catch [[id number]]  (Normal) Collected: 02/09/24 2233    Lab Status: Final result Specimen: Urine, Clean Catch Updated: 02/09/24 2307     LEGIONELLA ANTIGEN, URINE Negative    S. Pneumo Ag Urine or CSF - Urine, Urine, Clean Catch [[id number]]  (Normal) Collected: 02/09/24 2233    Lab Status: Final result Specimen: Urine, Clean Catch Updated: 02/09/24 2307     Strep Pneumo Ag Negative    Respiratory Panel PCR w/COVID-19(SARS-CoV-2) [facility] In-House, NP Swab in UTM/VTM, 2 HR TAT - Swab, Nasopharynx [[id number]]  (Normal) Collected: 02/09/24 1740    Lab Status: Final result Specimen: Swab from Nasopharynx Updated: 02/09/24 1905     ADENOVIRUS, PCR Not Detected     Coronavirus 229E Not Detected     Coronavirus HKU1 Not Detected     Coronavirus NL63 Not Detected     Coronavirus OC43 Not Detected     COVID19 Not Detected     Human Metapneumovirus Not Detected     Human Rhinovirus/Enterovirus Not Detected     Influenza A PCR Not Detected     Influenza B PCR Not Detected     Parainfluenza Virus 1  Not Detected     Parainfluenza Virus 2 Not Detected     Parainfluenza Virus 3 Not Detected     Parainfluenza Virus 4 Not Detected     RSV, PCR Not Detected     Bordetella pertussis pcr Not Detected     Bordetella parapertussis PCR Not Detected     Chlamydophila pneumoniae PCR Not Detected     Mycoplasma pneumo by PCR Not Detected    Narrative:      In the setting of a positive respiratory panel with a viral infection PLUS a negative procalcitonin without other underlying concern for bacterial infection, consider observing off antibiotics or discontinuation of antibiotics and continue supportive care. If the respiratory panel is positive for atypical bacterial infection (Bordetella pertussis, Chlamydophila pneumoniae, or Mycoplasma pneumoniae), consider antibiotic de-escalation to target atypical bacterial infection.    Respiratory Culture - Sputum, Cough [[id number]] Collected: 02/09/24 1733    Lab Status: Final result Specimen: Sputum from Cough Updated: 02/09/24 1958     Respiratory Culture Rejected     Gram Stain Few (2+) Epithelial cells seen      Few (2+) WBCs seen      Few (2+) Gram negative bacilli      Few (2+) Gram positive cocci in pairs    Narrative:      Specimen rejected due to oropharyngeal contamination. Please reorder and recollect specimen if clinically necessary.    Body Fluid Culture - Body Fluid, Pleural Cavity [[id number]] Collected: 02/09/24 1532    Lab Status: Final result Specimen: Body Fluid from Pleural Cavity Updated: 02/14/24 0731     Body Fluid Culture No growth at 5 days     Gram Stain Rare (1+) WBCs seen      No organisms seen    AFB Culture - Drainage, Lung, R [[id number]] Collected: 02/09/24 1532    Lab Status: Preliminary result Specimen: Drainage from Lung, R Updated: 02/10/24 1438     AFB Stain No acid fast bacilli seen on direct smear    Fungus Culture - Drainage, Lung, R [[id number]] Collected: 02/09/24 1532    Lab Status: Preliminary result Specimen: Drainage from Lung, R Updated:  "02/16/24 1600     Fungus Culture No fungus isolated at 1 week    Blood Culture - Blood, Arm, Right [[id number]]  (Normal) Collected: 02/09/24 1154    Lab Status: Final result Specimen: Blood from Arm, Right Updated: 02/14/24 1201     Blood Culture No growth at 5 days    Blood Culture - Blood, Arm, Left [[id number]]  (Normal) Collected: 02/09/24 1154    Lab Status: Final result Specimen: Blood from Arm, Left Updated: 02/14/24 1201     Blood Culture No growth at 5 days                alteplase (ACTIVASE) 10 mg in sodium chloride 0.9 % 30 mL Syringe, 10 mg, Intrapleural, Once   And  dornase alpha (PULMOZYME) 5 mg in sterile water (preservative free) 30 mL intrapleural syringe, 5 mg, Intrapleural, Once  castor oil-balsam peru, 1 Application, Topical, Q12H  cefTRIAXone, 1,000 mg, Intravenous, Q24H  famotidine, 40 mg, Oral, Daily  gabapentin, 100 mg, Oral, Nightly  guaiFENesin, 1,200 mg, Oral, Q12H  Menthol-Zinc Oxide, 1 Application, Topical, Q12H  methIMAzole, 5 mg, Oral, Once per day on Monday Wednesday Friday  metoprolol tartrate, 25 mg, Oral, Q12H  sodium chloride, 10 mL, Intravenous, Q12H      hold,         Diagnostics:  XR Chest 1 View    Result Date: 2/9/2024  XR CHEST 1 VW-  HISTORY: Male who is 83 years-old, chest tube management  TECHNIQUE: Frontal view of the chest  COMPARISON: 2/9/2024  FINDINGS: Right chest tube appears stable. Heart size is borderline. Aorta is calcified. Pulmonary vasculature is unremarkable. Opacity in the right mid to lower lung is increased, suggesting increased infiltrate/atelectasis. Residual right pleural effusion appears similar to prior exam. There appears to be 2 cm right apical pneumothorax, appearance could reflect \"trapped lung\" phenomenon. No left pneumothorax. Otherwise stable.      As described.    This report was finalized on 2/9/2024 7:34 PM by Dr. Jun Maldonado M.D on Workstation: [id number]      CT Angiogram Chest    Result Date: 2/9/2024  CT ANGIOGRAM OF THE CHEST WITH " CONTRAST INCLUDING RECONSTRUCTION IMAGES 02/09/2024  HISTORY: Shortness of breath.  Following the intravenous contrast injection CT angiography was performed through the chest. Sagittal, coronal and 3D reconstruction images were reviewed.  The pulmonary arterial system is somewhat heterogeneous but no definite pulmonary embolus is seen. The heterogeneity is probably artifact.  There is a large right pleural effusion with atelectasis of the right lung. There is a small left pleural effusion.  Small amount of fluid is seen in pericardial recesses. There is some aortic and coronary calcification.      1. Large right pleural effusion with atelectasis of the right lung. The right hemithorax is completely opacified. 2. Small left pleural effusion. 3. The pulmonary arterial system is somewhat heterogeneous but no definite pulmonary embolus is seen.    Radiation dose reduction techniques were utilized, including automated exposure control and exposure modulation based on body size.   This report was finalized on 2/9/2024 5:41 PM by Dr. Romeo Cummings M.D on Workstation: [id number]      CT Guided Chest Tube    Result Date: 2/9/2024  CT-GUIDED RIGHT CHEST TUBE PLACEMENT 02/09/2024  HISTORY: Large right pleural effusion.  TECHNIQUE: After signed informed consent was obtained the patient was prepped and draped in the right posterior oblique position. Lidocaine was used for local anesthesia.  CT guidance was used to place a 14 Haitian pigtail catheter into the right pleural fluid collection. Approximately 1600 cc of nonpurulent serous fluid was removed.  The catheter was left in place and connected to the atrium suction.  Confirmatory images were obtained.  Patient tolerated the procedure well with no complications.      1. Successful placement of 14 Haitian pigtail catheter into the right pleural fluid collection with removal of 1600 cc of nonpurulent fluid.   Radiation dose reduction techniques were utilized, including  automated exposure control and exposure modulation based on body size.   This report was finalized on 2/9/2024 5:40 PM by Dr. Romeo Cummings M.D on Workstation: [id number]      XR Chest 1 View    Result Date: 2/9/2024  AP CHEST  HISTORY: Evaluate pleural effusion  COMPARISON: Earlier today  FINDINGS: Status post placement of right chest tube. Significant decrease in size of right pleural effusion with significant improved aeration of the right lung. Heart size stable and left lung remains clear.      Significant increase in size of right pleural effusion    This report was finalized on 2/9/2024 5:03 PM by Dr. Randy Villa M.D on Workstation: [id number]      XR Chest 1 View    Result Date: 2/9/2024  CHEST SINGLE VIEW  HISTORY: Chest pain. Shortness of air and cough.  COMPARISON: AP chest 11/09/2023, CT chest 11/12/2023.  FINDINGS: There has developed complete white out of the right thorax due to large right pleural effusion and associated right basilar atelectasis or infiltrate. Left lung appears clear. There appears to be mild right to left cardiomediastinal shift that is likely related to the presence of pleural fluid.      New white out of the right lung appears to be mostly related to a large pleural effusion, though there is also a suspected component of atelectasis or infiltrate. There is mild right to left cardiomediastinal shift. CT would be the best means to further evaluate.  This report was finalized on 2/9/2024 11:09 AM by Dr. Ciro Casey M.D on Workstation: [id number]      Results for orders placed during the hospital encounter of 02/09/24    Adult Transthoracic Echo Complete W/ Cont if Necessary Per Protocol    Interpretation Summary    Left ventricular systolic function is hyperdynamic (EF > 70%).    Left ventricular wall thickness is consistent with mild concentric hypertrophy.    Left ventricular diastolic function was indeterminate.    Normal right ventricular cavity size and systolic  function noted.    The left atrial cavity is moderately dilated.    There is moderate aortic valve calcification    Mild to moderate mitral valve regurgitation is present.    Insufficient TR velocity profile to estimate the right ventricular systolic pressure.    There is a small (<1cm) circumferential pericardial effusion. There is no evidence of cardiac tamponade.      Chest x-ray reviewed and compared to yesterday the right apical pneumothorax looks even smaller the fluid that look like it is increased in the fissure yesterday seems to have resolved very minimal residual right effusion  Active Hospital Problems    Diagnosis  POA    **Pleural effusion [J90]  Yes    Dyspnea [R06.00]  Yes    Chronic diastolic CHF (congestive heart failure) [I50.32]  Yes    Hyperthyroidism [E05.90]  Yes    A-fib [I48.91]  Yes    Dyslipidemia [E78.5]  Yes      Resolved Hospital Problems   No resolved problems to display.         Assessment & Plan     Large right pleural effusion with complete right lung atelectasis status post chest tube placement with drainage of effusion residual multiloculated effusion received lytic therapy has had significant improvement chest tube output remains significant over 380 cc out yesterday he was given some Lasix yesterday to try and cut down output unfortunately did not work for renal function and blood pressure look adequate we will try couple doses of 40 mg Lasix today see if this impacts his output.  One of my concerns as we still do not know the etiology of this effusion cytology was negative for malignancy did show a lot of acute inflammatory changes.  If fluid output does not decrease we may want to send fluid again for cytology.  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    Ridge Garcia Jr, MD  02/18/24  08:02 EST    Time:

## 2024-02-18 NOTE — PROGRESS NOTES
Name: Mehreen Silva IV ADMIT: 2024   : 1940  PCP: Taiwo Powers MD    MRN: [id number] LOS: 9 days   AGE/SEX: 83 y.o. male  ROOM: [facility]     Subjective   Subjective   Patient seen this morning,  no acute events overnight.  Reports he is feeling better, denies chest pain or loss of breath, asking when he can be discharged home.  Chest tube remains in place  Review of Systems   As above  Objective   Objective   Vital Signs  Temp:  [97.3 °F (36.3 °C)-98.1 °F (36.7 °C)] 97.3 °F (36.3 °C)  Heart Rate:  [69-92] 69  Resp:  [16] 16  BP: (101-145)/(68-78) 124/77  SpO2:  [96 %-97 %] 96 %  on   ;   Device (Oxygen Therapy): room air  Body mass index is 18.66 kg/m².  Physical Exam    General: Alert and oriented x3, no acute distress  HEENT: Normocephalic, atraumatic  CV: Irregular rhythm, normal rate,  Lungs: CTA anteriorly, no wheezing, chest tube in place,  Abdomen: Soft, nontender, nondistended  Extremities: No significant peripheral edema , right AKA    Results Review     I reviewed the patient's new clinical results.  Results from last 7 days   Lab Units 24  0503 246   WBC 10*3/mm3 7.47 7.53 7.19 7.29   HEMOGLOBIN g/dL 12.9* 11.6* 11.5* 11.8*   PLATELETS 10*3/mm3 240 221 215 198     Results from last 7 days   Lab Units 24  04224  0503 24  0318 24  0316   SODIUM mmol/L 143 141 141 137   POTASSIUM mmol/L 4.4 4.5 4.2 4.3   CHLORIDE mmol/L 105 108* 108* 106   CO2 mmol/L 29.9* 27.0 25.9 26.0   BUN mg/dL 17 15 18 14   CREATININE mg/dL 0.67* 0.59* 0.72* 0.56*   GLUCOSE mg/dL 82 77 132* 90   Estimated Creatinine Clearance: 69.7 mL/min (A) (by C-G formula based on SCr of 0.67 mg/dL (L)).  Results from last 7 days   Lab Units 24  0310   ALBUMIN g/dL 1.9*     Results from last 7 days   Lab Units 24  0421 24  0503 24  0318 24  0316 24  0310   CALCIUM mg/dL 8.5* 8.0* 8.0* 7.9* 7.9*   ALBUMIN g/dL  --   --   " --   --  1.9*   MAGNESIUM mg/dL  --   --   --   --  2.4   PHOSPHORUS mg/dL  --   --   --   --  2.5       COVID19   Date Value Ref Range Status   02/09/2024 Not Detected Not Detected - Ref. Range Final     No results found for: \"HGBA1C\", \"POCGLU\"        XR Chest 1 View  ONE-VIEW PORTABLE CHEST AT 5:07 A.M.     HISTORY: Follow-up of right-sided chest tube.     FINDINGS: A small gauge pigtail chest tube is present in the lower right  chest laterally without change from yesterday. There is a suspicion of  an extremely tiny right apical pneumothorax showing further decrease in  size from yesterday's exam. The lungs are clear and the heart remains  mildly enlarged.     This report was finalized on 2/18/2024 6:26 AM by Dr. Chavo Denny M.D  on Workstation: [id number]       Scheduled Medications  castor oil-balsam peru, 1 Application, Topical, Q12H  famotidine, 40 mg, Oral, Daily  furosemide, 40 mg, Intravenous, Q12H  gabapentin, 100 mg, Oral, Nightly  guaiFENesin, 1,200 mg, Oral, Q12H  Menthol-Zinc Oxide, 1 Application, Topical, Q12H  methIMAzole, 5 mg, Oral, Once per day on Monday Wednesday Friday  metoprolol tartrate, 25 mg, Oral, Q12H  sodium chloride, 10 mL, Intravenous, Q12H    Infusions   Diet  Diet: Cardiac Diets; Healthy Heart (2-3 Na+); No Straw; Texture: Regular Texture (IDDSI 7); Fluid Consistency: Thin (IDDSI 0)    I have personally reviewed     [x]  Laboratory   [x]  Microbiology   [x]  Radiology   [x]  EKG/Telemetry  []  Cardiology/Vascular   []  Pathology    []  Records       Assessment/Plan     Active Hospital Problems    Diagnosis  POA    **Pleural effusion [J90]  Yes    Dyspnea [R06.00]  Yes    Chronic diastolic CHF (congestive heart failure) [I50.32]  Yes    Hyperthyroidism [E05.90]  Yes    A-fib [I48.91]  Yes    Dyslipidemia [E78.5]  Yes      Resolved Hospital Problems   No resolved problems to display.       83 y.o. male admitted with Pleural effusion.    Recurrent large right pleural effusion/right " lung atelectasis:  -Had opacification of right hemithorax.    -Status post chest tube placement with improvement.    -Body fluid culture no growth to date.    -Cytology negative.   -Status post IV ceftriaxone 9 days  -Received intrapleural tPA.   -X-ray showing improvement however patient continues to have high output from chest tube.  -Cardiothoracic surgery/pulmonology following  -On IV Lasix      Persistent atrial fibrillation/chronic diastolic heart failure  -On metoprolol titrate 25 twice daily, heart rate stable  -Home Eliquis on hold due to above  -IV Lasix  -Cardiology following      Hypertension:   -BP stable, monitor    Hyperthyroidism:   -TSH and free T4 were normal 02/09/2024  -Continue Tapazole      Status post right AKA on 12/13/2023 secondary to osteomyelitis       DVT prophylaxis: SCDs  CODE STATUS: Full code  Disposition: Home health/TBD      Copied text in this note has been reviewed and is accurate as of 02/18/24.         Dictated utilizing Dragon dictation        Yamile Bauer MD  [facility]  02/18/24  11:36 EST

## 2024-02-18 NOTE — PROGRESS NOTES
"    Chief Complaint: Right pleural effusion  S/P: CT-guided chest tube placement; lytics x4      Subjective:  Symptoms:  Improved.  No shortness of breath, cough or chest pain.    Diet:  Poor intake.  No nausea or vomiting.    Activity level: Impaired due to weakness.    Pain:  He reports no pain.    Resting comfortably at bed.  Denies shortness of breath.  Eager to discharge home.    Vital Signs:  Temp:  [97.3 °F (36.3 °C)-98.3 °F (36.8 °C)] 97.3 °F (36.3 °C)  Heart Rate:  [69-93] 69  Resp:  [16-17] 16  BP: (101-145)/(68-78) 124/77    Intake & Output (last day)         02/17 0701  02/18 0700 02/18 0701  02/19 0700    P.O. 560     Total Intake(mL/kg) 560 (9.5)     Urine (mL/kg/hr) 0 (0)     Stool 0     Chest Tube 380     Total Output 380     Net +180           Urine Unmeasured Occurrence 4 x     Stool Unmeasured Occurrence 4 x             Objective:  General Appearance:  Comfortable, well-appearing, in no acute distress and not in pain.    Vital signs: (most recent): Blood pressure 124/77, pulse 69, temperature 97.3 °F (36.3 °C), resp. rate 16, height 177.8 cm (70\"), weight 59 kg (130 lb 1.1 oz), SpO2 96%.    Output: Producing urine.    HEENT: Normal HEENT exam.    Lungs:  Normal effort.  He is not in respiratory distress.    Heart: Tachycardia.  Irregular rhythm.    Chest: Symmetric chest wall expansion. Chest wall tenderness present.    Extremities: Decreased range of motion.  (S/p Right AKA)  Neurological: Patient is alert.    Skin:  Warm and dry.                Chest tube:   Site: Right, Clean, Dry, Intact, and Securement device intact  Suction: waterseal  Air Leak: negative  24 Hour Total: 380ml    Results Review:     I reviewed the patient's new clinical results.  I reviewed the patient's new imaging results and agree with the interpretation.  Discussed with patient, RN    Imaging reviewed independently.  CT chest is improved.  Imaging Results (Last 24 Hours)       Procedure Component Value Units Date/Time    " XR Chest 1 View [[id number]] Collected: 02/18/24 0625     Updated: 02/18/24 0629    Narrative:      ONE-VIEW PORTABLE CHEST AT 5:07 A.M.     HISTORY: Follow-up of right-sided chest tube.     FINDINGS: A small gauge pigtail chest tube is present in the lower right  chest laterally without change from yesterday. There is a suspicion of  an extremely tiny right apical pneumothorax showing further decrease in  size from yesterday's exam. The lungs are clear and the heart remains  mildly enlarged.     This report was finalized on 2/18/2024 6:26 AM by Dr. Chavo Denny M.D  on Workstation: [employer]               Lab Results:     Lab Results (last 24 hours)       Procedure Component Value Units Date/Time    Basic Metabolic Panel [[id number]]  (Abnormal) Collected: 02/18/24 0421    Specimen: Blood Updated: 02/18/24 0531     Glucose 82 mg/dL      BUN 17 mg/dL      Creatinine 0.67 mg/dL      Sodium 143 mmol/L      Potassium 4.4 mmol/L      Chloride 105 mmol/L      CO2 29.9 mmol/L      Calcium 8.5 mg/dL      BUN/Creatinine Ratio 25.4     Anion Gap 8.1 mmol/L      eGFR 92.6 mL/min/1.73     Narrative:      GFR Normal >60  Chronic Kidney Disease <60  Kidney Failure <15    The GFR formula is only valid for adults with stable renal function between ages 18 and 70.    CBC (No Diff) [[id number]]  (Abnormal) Collected: 02/18/24 0421    Specimen: Blood Updated: 02/18/24 0519     WBC 7.47 10*3/mm3      RBC 5.13 10*6/mm3      Hemoglobin 12.9 g/dL      Hematocrit 40.6 %      MCV 79.1 fL      MCH 25.1 pg      MCHC 31.8 g/dL      RDW 17.3 %      RDW-SD 49.4 fl      MPV 10.4 fL      Platelets 240 10*3/mm3              Assessment & Plan       Pleural effusion    Dyslipidemia    A-fib    Hyperthyroidism    Chronic diastolic CHF (congestive heart failure)    Dyspnea       Assessment & Plan    Mr. Silva is a 83-year-old gentleman who presented with a right-sided pleural effusion.  He is status post 4 rounds of intrapleural lytic  therapy.  Symptomatically he has drastically improved.  He denies any shortness of breath and is on room air.  His chest x-ray continues to improve daily.  Chest tube continues to waterseal today.  Output still too high for chest tube removal.  Agree with Lasix ordered per pulmonary medicine.  Appreciate their assistance.   Repeat chest x-ray in AM.  Hopefully drainage will drop off and we can get the tube out tomorrow.    CHARLEE Bishop  [facility]  02/18/24  10:28 EST    Greater than 34 minutes was spent reviewing the patient's chart, radiographic imaging, assessing the patient and developing a plan of care which was discussed with the patient and RN.  This excludes any other billable procedures which will be dictated separately.

## 2024-02-18 NOTE — PLAN OF CARE
Goal Outcome Evaluation:           Progress: improving  Outcome Evaluation: Hemodynamically stable. A/O x3; forgetful. CT to WS. Compliant with repositioning. pain controlled.

## 2024-02-18 NOTE — PLAN OF CARE
Problem: Adult Inpatient Plan of Care  Goal: Plan of Care Review  Outcome: Ongoing, Progressing  Flowsheets (Taken 2/17/2024 2032)  Progress: improving  Plan of Care Reviewed With: patient  Goal: Patient-Specific Goal (Individualized)  Outcome: Ongoing, Progressing  Goal: Absence of Hospital-Acquired Illness or Injury  Outcome: Ongoing, Progressing  Intervention: Identify and Manage Fall Risk  Recent Flowsheet Documentation  Taken 2/17/2024 1800 by Felicita Almanzar RN  Safety Promotion/Fall Prevention:   activity supervised   assistive device/personal items within reach   clutter free environment maintained   fall prevention program maintained   nonskid shoes/slippers when out of bed   safety round/check completed  Taken 2/17/2024 1600 by Felicita Almanzar RN  Safety Promotion/Fall Prevention:   activity supervised   assistive device/personal items within reach   clutter free environment maintained   fall prevention program maintained   nonskid shoes/slippers when out of bed  Taken 2/17/2024 1200 by Felicita Almanzar RN  Safety Promotion/Fall Prevention:   assistive device/personal items within reach   activity supervised   clutter free environment maintained   fall prevention program maintained   nonskid shoes/slippers when out of bed   safety round/check completed  Taken 2/17/2024 1000 by Felicita Almanzar RN  Safety Promotion/Fall Prevention:   activity supervised   clutter free environment maintained   assistive device/personal items within reach   fall prevention program maintained   nonskid shoes/slippers when out of bed  Taken 2/17/2024 0800 by Felicita Almanzar RN  Safety Promotion/Fall Prevention:   activity supervised   assistive device/personal items within reach   clutter free environment maintained   fall prevention program maintained   nonskid shoes/slippers when out of bed   safety round/check completed  Intervention: Prevent Skin Injury  Recent Flowsheet Documentation  Taken 2/17/2024 1400 by Yohan  Felicita OJEDA RN  Skin Protection: adhesive use limited  Intervention: Prevent and Manage VTE (Venous Thromboembolism) Risk  Recent Flowsheet Documentation  Taken 2/17/2024 1800 by Felicita Almanzar RN  Activity Management: activity encouraged  Taken 2/17/2024 1600 by Felicita Almanzar RN  Activity Management: activity encouraged  Taken 2/17/2024 1400 by Felicita Almanzar RN  Activity Management: activity encouraged  Taken 2/17/2024 1200 by Felicita Almanzar RN  Activity Management: activity encouraged  Taken 2/17/2024 1000 by Felicita Almanzar RN  Activity Management: activity encouraged  Taken 2/17/2024 0800 by Felicita Almanzar RN  Activity Management: activity encouraged  Intervention: Prevent Infection  Recent Flowsheet Documentation  Taken 2/17/2024 1800 by Felicita Almanzar RN  Infection Prevention: single patient room provided  Taken 2/17/2024 1600 by Felicita Almanzar RN  Infection Prevention: single patient room provided  Taken 2/17/2024 1200 by Felicita Almanzar RN  Infection Prevention: single patient room provided  Taken 2/17/2024 1000 by Felicita Almanzar RN  Infection Prevention: single patient room provided  Taken 2/17/2024 0800 by Felicita Almanzar RN  Infection Prevention: single patient room provided  Goal: Optimal Comfort and Wellbeing  Outcome: Ongoing, Progressing  Intervention: Provide Person-Centered Care  Recent Flowsheet Documentation  Taken 2/17/2024 1400 by Felicita Almanzar RN  Trust Relationship/Rapport:   care explained   choices provided  Taken 2/17/2024 0800 by Felicita Almanzar RN  Trust Relationship/Rapport:   care explained   choices provided  Goal: Readiness for Transition of Care  Outcome: Ongoing, Progressing   Goal Outcome Evaluation:  Plan of Care Reviewed With: patient        Progress: improving

## 2024-02-19 ENCOUNTER — TELEPHONE (OUTPATIENT)
Dept: CARDIOLOGY | Facility: CLINIC | Age: 84
End: 2024-02-19
Payer: MEDICARE

## 2024-02-19 ENCOUNTER — APPOINTMENT (OUTPATIENT)
Dept: GENERAL RADIOLOGY | Facility: HOSPITAL | Age: 84
DRG: 186 | End: 2024-02-19
Payer: MEDICARE

## 2024-02-19 PROBLEM — N17.9 AKI (ACUTE KIDNEY INJURY): Status: ACTIVE | Noted: 2024-02-19

## 2024-02-19 LAB
ANION GAP SERPL CALCULATED.3IONS-SCNC: 9 MMOL/L (ref 5–15)
BUN SERPL-MCNC: 29 MG/DL (ref 8–23)
BUN/CREAT SERPL: 18.6 (ref 7–25)
CALCIUM SPEC-SCNC: 8.6 MG/DL (ref 8.6–10.5)
CHLORIDE SERPL-SCNC: 104 MMOL/L (ref 98–107)
CO2 SERPL-SCNC: 28 MMOL/L (ref 22–29)
CREAT SERPL-MCNC: 1.56 MG/DL (ref 0.76–1.27)
DEPRECATED RDW RBC AUTO: 47.1 FL (ref 37–54)
EGFRCR SERPLBLD CKD-EPI 2021: 43.8 ML/MIN/1.73
ERYTHROCYTE [DISTWIDTH] IN BLOOD BY AUTOMATED COUNT: 17 % (ref 12.3–15.4)
FERRITIN SERPL-MCNC: 226 NG/ML (ref 30–400)
GLUCOSE SERPL-MCNC: 87 MG/DL (ref 65–99)
HCT VFR BLD AUTO: 40.1 % (ref 37.5–51)
HGB BLD-MCNC: 12.5 G/DL (ref 13–17.7)
IRON 24H UR-MRATE: 30 MCG/DL (ref 59–158)
IRON SATN MFR SERPL: 14 % (ref 20–50)
MCH RBC QN AUTO: 24 PG (ref 26.6–33)
MCHC RBC AUTO-ENTMCNC: 31.2 G/DL (ref 31.5–35.7)
MCV RBC AUTO: 77 FL (ref 79–97)
PLATELET # BLD AUTO: 241 10*3/MM3 (ref 140–450)
PMV BLD AUTO: 9.8 FL (ref 6–12)
POTASSIUM SERPL-SCNC: 4.5 MMOL/L (ref 3.5–5.2)
RBC # BLD AUTO: 5.21 10*6/MM3 (ref 4.14–5.8)
SODIUM SERPL-SCNC: 141 MMOL/L (ref 136–145)
TIBC SERPL-MCNC: 222 MCG/DL (ref 298–536)
TRANSFERRIN SERPL-MCNC: 149 MG/DL (ref 200–360)
WBC NRBC COR # BLD AUTO: 10.77 10*3/MM3 (ref 3.4–10.8)

## 2024-02-19 PROCEDURE — 99232 SBSQ HOSP IP/OBS MODERATE 35: CPT | Performed by: NURSE PRACTITIONER

## 2024-02-19 PROCEDURE — 84466 ASSAY OF TRANSFERRIN: CPT | Performed by: STUDENT IN AN ORGANIZED HEALTH CARE EDUCATION/TRAINING PROGRAM

## 2024-02-19 PROCEDURE — 82728 ASSAY OF FERRITIN: CPT | Performed by: STUDENT IN AN ORGANIZED HEALTH CARE EDUCATION/TRAINING PROGRAM

## 2024-02-19 PROCEDURE — 80048 BASIC METABOLIC PNL TOTAL CA: CPT | Performed by: STUDENT IN AN ORGANIZED HEALTH CARE EDUCATION/TRAINING PROGRAM

## 2024-02-19 PROCEDURE — 88305 TISSUE EXAM BY PATHOLOGIST: CPT | Performed by: INTERNAL MEDICINE

## 2024-02-19 PROCEDURE — 25810000003 SODIUM CHLORIDE 0.9 % SOLUTION: Performed by: STUDENT IN AN ORGANIZED HEALTH CARE EDUCATION/TRAINING PROGRAM

## 2024-02-19 PROCEDURE — 71045 X-RAY EXAM CHEST 1 VIEW: CPT

## 2024-02-19 PROCEDURE — 88112 CYTOPATH CELL ENHANCE TECH: CPT | Performed by: INTERNAL MEDICINE

## 2024-02-19 PROCEDURE — 99232 SBSQ HOSP IP/OBS MODERATE 35: CPT

## 2024-02-19 PROCEDURE — 85027 COMPLETE CBC AUTOMATED: CPT | Performed by: STUDENT IN AN ORGANIZED HEALTH CARE EDUCATION/TRAINING PROGRAM

## 2024-02-19 PROCEDURE — 83540 ASSAY OF IRON: CPT | Performed by: STUDENT IN AN ORGANIZED HEALTH CARE EDUCATION/TRAINING PROGRAM

## 2024-02-19 RX ORDER — SODIUM CHLORIDE 9 MG/ML
75 INJECTION, SOLUTION INTRAVENOUS CONTINUOUS
Status: DISCONTINUED | OUTPATIENT
Start: 2024-02-19 | End: 2024-02-20 | Stop reason: HOSPADM

## 2024-02-19 RX ADMIN — GABAPENTIN 100 MG: 100 CAPSULE ORAL at 20:41

## 2024-02-19 RX ADMIN — GUAIFENESIN 1200 MG: 600 TABLET, EXTENDED RELEASE ORAL at 08:44

## 2024-02-19 RX ADMIN — Medication 10 ML: at 08:45

## 2024-02-19 RX ADMIN — METOPROLOL TARTRATE 25 MG: 25 TABLET, FILM COATED ORAL at 17:24

## 2024-02-19 RX ADMIN — SODIUM CHLORIDE 100 ML/HR: 9 INJECTION, SOLUTION INTRAVENOUS at 08:45

## 2024-02-19 RX ADMIN — Medication 1 APPLICATION: at 08:45

## 2024-02-19 RX ADMIN — SODIUM CHLORIDE 75 ML/HR: 9 INJECTION, SOLUTION INTRAVENOUS at 21:30

## 2024-02-19 RX ADMIN — METOPROLOL TARTRATE 25 MG: 25 TABLET, FILM COATED ORAL at 06:54

## 2024-02-19 RX ADMIN — GUAIFENESIN 1200 MG: 600 TABLET, EXTENDED RELEASE ORAL at 20:41

## 2024-02-19 RX ADMIN — CASTOR OIL AND BALSAM, PERU 1 APPLICATION: 788; 87 OINTMENT TOPICAL at 08:44

## 2024-02-19 RX ADMIN — Medication 1 APPLICATION: at 20:41

## 2024-02-19 RX ADMIN — METHIMAZOLE 5 MG: 5 TABLET ORAL at 08:44

## 2024-02-19 RX ADMIN — Medication 10 ML: at 20:45

## 2024-02-19 RX ADMIN — OXYCODONE HYDROCHLORIDE 5 MG: 5 TABLET ORAL at 17:24

## 2024-02-19 RX ADMIN — CASTOR OIL AND BALSAM, PERU 1 APPLICATION: 788; 87 OINTMENT TOPICAL at 20:41

## 2024-02-19 NOTE — CASE MANAGEMENT/SOCIAL WORK
Continued Stay Note  [facility]     Patient Name: Mehreen Silva IV  MRN: [id number]  Today's Date: 2/19/2024    Admit Date: 2/9/2024    Plan: Home with wife, caregiver, and HH   Discharge Plan       Row Name 02/19/24 1559       Plan    Plan Comments Wife now states she would like stretcher/ambulance transport at discharge.  Will arrange ambulance when pt ready for d/c.  CKiana Hackett RN                   Discharge Codes    No documentation.                 Expected Discharge Date and Time       Expected Discharge Date Expected Discharge Time    Feb 14, 2024               Milagros Hackett, RN

## 2024-02-19 NOTE — TELEPHONE ENCOUNTER
Caller: Belem Silva    Relationship to patient: Emergency Contact    Best call back number: [phone]    Patient is needing: WIFE OF PT WANTS DR. LAO TO BE INFORMED THAT HE IS CURRENTLY ADMITTED INTO THE HOSPITAL AND WOULD LIKE HER OR THE APRN TO COME AND SEE HIM TO DISCUSS A PLAN OF TREATMENT MOVING FORWARD.

## 2024-02-19 NOTE — PROGRESS NOTES
LOS: 4 days   Patient Care Team:  Taiwo Powers MD as PCP - General (Internal Medicine)  Self, Bess YO MD as Consulting Physician (Endocrinology)  Huang Chan MD as Cardiologist (Cardiology)    Subjective     .  Following patient for pleural effusion and dyspnea on exertion.  Patient has a history of congestive heart failure, fibrillation, hypertension, hyperlipidemia peripheral vascular disease with right AKA and he has had prior pleural effusion.    He denies shortness of breath.  Says he really did not urinate much more with the Lasix yesterday.    Review of Systems:          Objective     Vital Signs  Vital Sign Min/Max for last 24 hours  Temp  Min: 97.5 °F (36.4 °C)  Max: 98.6 °F (37 °C)   BP  Min: 101/72  Max: 109/78   Pulse  Min: 70  Max: 94   Resp  Min: 16  Max: 16   SpO2  Min: 95 %  Max: 99 %   Flow (L/min)  Min: 1  Max: 1   Weight  Min: 61.7 kg (136 lb 0.4 oz)  Max: 61.7 kg (136 lb 0.4 oz)        Ventilator/Non-Invasive Ventilation Settings admission, onward)      None                         Body mass index is 19.52 kg/m².  I/O last 3 completed shifts:  In: 480 [P.O.:480]  Out: 610 [Chest Tube:610]  No intake/output data recorded.        Physical Exam:  General Appearance: Well-developed older white male resting in bed does not look in acute distress saturations are 98% on room air  Eyes: Conjunctiva are clear and anicteric  ENT: Mucous membranes are moist no exudates  Neck: Trachea midline no visible jugular venous distention  Lungs: Crackles no wheezes no rales chest tube with a little bit of serous fluid no airleak.  Nonlabored is taking deeper breaths today.  Cardiac: Regular rate rhythm no murmur  Abdomen: Soft no hepatosplenomegaly or masses  : Not examined  Musculoskeletal: Grossly normal aside from the right AKA  Skin: Warm and dry no jaundice no petechiae  Neuro: He is awake and alert  Extremities/P Vascular: No clubbing no cyanosis no edema palpable radial and left  dorsalis pedis pulses  MSE: He is not in a great mood.    Labs:  Results from last 7 days   Lab Units 02/13/24  0310 02/12/24  0755 02/11/24  0315 02/10/24  0532 02/09/24  1114   GLUCOSE mg/dL 147* 73 88 114* 115*   SODIUM mmol/L 141 137 139 135* 142   POTASSIUM mmol/L 4.4 4.1 4.6 4.7 5.0   MAGNESIUM mg/dL 2.4  --   --   --   --    CO2 mmol/L 25.5 23.9 24.8 21.4* 29.0   CHLORIDE mmol/L 108* 106 106 102 103   ANION GAP mmol/L 7.5 7.1 8.2 11.6 10.0   CREATININE mg/dL 0.80 0.52* 0.81 0.87 0.94   BUN mg/dL 18 15 22 27* 21   BUN / CREAT RATIO  22.5 28.8* 27.2* 31.0* 22.3   CALCIUM mg/dL 7.9* 7.9* 7.8* 8.2* 9.1   ALK PHOS U/L  --   --   --  116 145*   TOTAL PROTEIN g/dL  --   --   --  5.4* 6.2   ALT (SGPT) U/L  --   --   --  19 26   AST (SGOT) U/L  --   --   --  18 25   BILIRUBIN mg/dL  --   --   --  1.2 0.9   ALBUMIN g/dL 1.9*  --   --  2.5* 3.4*   GLOBULIN gm/dL  --   --   --   --  2.8     Estimated Creatinine Clearance: 61.1 mL/min (by C-G formula based on SCr of 0.8 mg/dL).      Results from last 7 days   Lab Units 02/13/24  0310 02/12/24  0427 02/11/24  0315 02/10/24  0532 02/09/24  1114   WBC 10*3/mm3 6.32 5.76 6.94 12.14* 7.58   RBC 10*6/mm3 4.74 4.76 4.83 5.86* 5.92*   HEMOGLOBIN g/dL 11.6* 11.9* 11.9* 14.2 14.7   HEMATOCRIT % 37.4* 38.3 37.8 45.0 46.6   MCV fL 78.9* 80.5 78.3* 76.8* 78.7*   MCH pg 24.5* 25.0* 24.6* 24.2* 24.8*   MCHC g/dL 31.0* 31.1* 31.5 31.6 31.5   RDW % 17.1* 17.4* 17.8* 18.4* 18.7*   RDW-SD fl 48.8 49.9 49.8 47.3 49.8   MPV fL 10.6 11.1 11.2 11.2 10.7   PLATELETS 10*3/mm3 189 167 179 233 199   NEUTROPHIL % %  --  68.6 75.3 85.9* 78.1*   LYMPHOCYTE % %  --  16.3* 10.7* 7.5* 12.8*   MONOCYTES % %  --  10.9 12.4* 6.0 7.8   EOSINOPHIL % %  --  3.5 1.0 0.0* 0.7   BASOPHIL % %  --  0.5 0.3 0.2 0.5   IMM GRAN % %  --  0.2 0.3 0.4 0.1   NEUTROS ABS 10*3/mm3  --  3.95 5.23 10.42* 5.92   LYMPHS ABS 10*3/mm3  --  0.94 0.74 0.91 0.97   MONOS ABS 10*3/mm3  --  0.63 0.86 0.73 0.59   EOS ABS 10*3/mm3  --   0.20 0.07 0.00 0.05   BASOS ABS 10*3/mm3  --  0.03 0.02 0.03 0.04   IMMATURE GRANS (ABS) 10*3/mm3  --  0.01 0.02 0.05 0.01   NRBC /100 WBC  --  0.0 0.0 0.1 0.0     Results from last 7 days   Lab Units 02/09/24  1907   PH, ARTERIAL pH units 7.423   PO2 ART mm Hg 52.9*   PCO2, ARTERIAL mm Hg 38.1   HCO3 ART mmol/L 24.9     Results from last 7 days   Lab Units 02/09/24  1401 02/09/24  1114   HSTROP T ng/L 60* 65*     Results from last 7 days   Lab Units 02/09/24  1114   PROBNP pg/mL 4,357.0*     Results from last 7 days   Lab Units 02/09/24  1914 02/09/24  1401   TSH uIU/mL  --  0.679   T3 FREE pg/mL 1.6*  --    FREE T4 ng/dL  --  1.66     Results from last 7 days   Lab Units 02/10/24  0532 02/09/24  1401 02/09/24  1114   LACTATE mmol/L 2.0 2.0 2.2*   PROCALCITONIN ng/mL  --   --  0.08         Microbiology Results (last 10 days)       Procedure Component Value - Date/Time    Legionella Antigen, Urine - Urine, Urine, Clean Catch [[id number]]  (Normal) Collected: 02/09/24 2233    Lab Status: Final result Specimen: Urine, Clean Catch Updated: 02/09/24 2307     LEGIONELLA ANTIGEN, URINE Negative    S. Pneumo Ag Urine or CSF - Urine, Urine, Clean Catch [[id number]]  (Normal) Collected: 02/09/24 2233    Lab Status: Final result Specimen: Urine, Clean Catch Updated: 02/09/24 2307     Strep Pneumo Ag Negative    Respiratory Panel PCR w/COVID-19(SARS-CoV-2) [facility] In-House, NP Swab in UTM/VTM, 2 HR TAT - Swab, Nasopharynx [[id number]]  (Normal) Collected: 02/09/24 1740    Lab Status: Final result Specimen: Swab from Nasopharynx Updated: 02/09/24 1905     ADENOVIRUS, PCR Not Detected     Coronavirus 229E Not Detected     Coronavirus HKU1 Not Detected     Coronavirus NL63 Not Detected     Coronavirus OC43 Not Detected     COVID19 Not Detected     Human Metapneumovirus Not Detected     Human Rhinovirus/Enterovirus Not Detected     Influenza A PCR Not Detected     Influenza B PCR Not Detected     Parainfluenza Virus 1  Not Detected     Parainfluenza Virus 2 Not Detected     Parainfluenza Virus 3 Not Detected     Parainfluenza Virus 4 Not Detected     RSV, PCR Not Detected     Bordetella pertussis pcr Not Detected     Bordetella parapertussis PCR Not Detected     Chlamydophila pneumoniae PCR Not Detected     Mycoplasma pneumo by PCR Not Detected    Narrative:      In the setting of a positive respiratory panel with a viral infection PLUS a negative procalcitonin without other underlying concern for bacterial infection, consider observing off antibiotics or discontinuation of antibiotics and continue supportive care. If the respiratory panel is positive for atypical bacterial infection (Bordetella pertussis, Chlamydophila pneumoniae, or Mycoplasma pneumoniae), consider antibiotic de-escalation to target atypical bacterial infection.    Respiratory Culture - Sputum, Cough [[id number]] Collected: 02/09/24 1733    Lab Status: Final result Specimen: Sputum from Cough Updated: 02/09/24 1958     Respiratory Culture Rejected     Gram Stain Few (2+) Epithelial cells seen      Few (2+) WBCs seen      Few (2+) Gram negative bacilli      Few (2+) Gram positive cocci in pairs    Narrative:      Specimen rejected due to oropharyngeal contamination. Please reorder and recollect specimen if clinically necessary.    Body Fluid Culture - Body Fluid, Pleural Cavity [[id number]] Collected: 02/09/24 1532    Lab Status: Final result Specimen: Body Fluid from Pleural Cavity Updated: 02/14/24 0731     Body Fluid Culture No growth at 5 days     Gram Stain Rare (1+) WBCs seen      No organisms seen    AFB Culture - Drainage, Lung, R [[id number]] Collected: 02/09/24 1532    Lab Status: Preliminary result Specimen: Drainage from Lung, R Updated: 02/10/24 1438     AFB Stain No acid fast bacilli seen on direct smear    Fungus Culture - Drainage, Lung, R [[id number]] Collected: 02/09/24 1532    Lab Status: Preliminary result Specimen: Drainage from Lung, R Updated:  "02/16/24 1600     Fungus Culture No fungus isolated at 1 week    Blood Culture - Blood, Arm, Right [[id number]]  (Normal) Collected: 02/09/24 1154    Lab Status: Final result Specimen: Blood from Arm, Right Updated: 02/14/24 1201     Blood Culture No growth at 5 days    Blood Culture - Blood, Arm, Left [[id number]]  (Normal) Collected: 02/09/24 1154    Lab Status: Final result Specimen: Blood from Arm, Left Updated: 02/14/24 1201     Blood Culture No growth at 5 days                alteplase (ACTIVASE) 10 mg in sodium chloride 0.9 % 30 mL Syringe, 10 mg, Intrapleural, Once   And  dornase alpha (PULMOZYME) 5 mg in sterile water (preservative free) 30 mL intrapleural syringe, 5 mg, Intrapleural, Once  castor oil-balsam peru, 1 Application, Topical, Q12H  cefTRIAXone, 1,000 mg, Intravenous, Q24H  famotidine, 40 mg, Oral, Daily  gabapentin, 100 mg, Oral, Nightly  guaiFENesin, 1,200 mg, Oral, Q12H  Menthol-Zinc Oxide, 1 Application, Topical, Q12H  methIMAzole, 5 mg, Oral, Once per day on Monday Wednesday Friday  metoprolol tartrate, 25 mg, Oral, Q12H  sodium chloride, 10 mL, Intravenous, Q12H      hold,         Diagnostics:  XR Chest 1 View    Result Date: 2/9/2024  XR CHEST 1 VW-  HISTORY: Male who is 83 years-old, chest tube management  TECHNIQUE: Frontal view of the chest  COMPARISON: 2/9/2024  FINDINGS: Right chest tube appears stable. Heart size is borderline. Aorta is calcified. Pulmonary vasculature is unremarkable. Opacity in the right mid to lower lung is increased, suggesting increased infiltrate/atelectasis. Residual right pleural effusion appears similar to prior exam. There appears to be 2 cm right apical pneumothorax, appearance could reflect \"trapped lung\" phenomenon. No left pneumothorax. Otherwise stable.      As described.    This report was finalized on 2/9/2024 7:34 PM by Dr. Jun Maldonado M.D on Workstation: [facility]      CT Angiogram Chest    Result Date: 2/9/2024  CT ANGIOGRAM OF THE CHEST WITH " CONTRAST INCLUDING RECONSTRUCTION IMAGES 02/09/2024  HISTORY: Shortness of breath.  Following the intravenous contrast injection CT angiography was performed through the chest. Sagittal, coronal and 3D reconstruction images were reviewed.  The pulmonary arterial system is somewhat heterogeneous but no definite pulmonary embolus is seen. The heterogeneity is probably artifact.  There is a large right pleural effusion with atelectasis of the right lung. There is a small left pleural effusion.  Small amount of fluid is seen in pericardial recesses. There is some aortic and coronary calcification.      1. Large right pleural effusion with atelectasis of the right lung. The right hemithorax is completely opacified. 2. Small left pleural effusion. 3. The pulmonary arterial system is somewhat heterogeneous but no definite pulmonary embolus is seen.    Radiation dose reduction techniques were utilized, including automated exposure control and exposure modulation based on body size.   This report was finalized on 2/9/2024 5:41 PM by Dr. Romeo Cummings M.D on Workstation: [id number]      CT Guided Chest Tube    Result Date: 2/9/2024  CT-GUIDED RIGHT CHEST TUBE PLACEMENT 02/09/2024  HISTORY: Large right pleural effusion.  TECHNIQUE: After signed informed consent was obtained the patient was prepped and draped in the right posterior oblique position. Lidocaine was used for local anesthesia.  CT guidance was used to place a 14 Portuguese pigtail catheter into the right pleural fluid collection. Approximately 1600 cc of nonpurulent serous fluid was removed.  The catheter was left in place and connected to the atrium suction.  Confirmatory images were obtained.  Patient tolerated the procedure well with no complications.      1. Successful placement of 14 Portuguese pigtail catheter into the right pleural fluid collection with removal of 1600 cc of nonpurulent fluid.   Radiation dose reduction techniques were utilized, including  automated exposure control and exposure modulation based on body size.   This report was finalized on 2/9/2024 5:40 PM by Dr. Romeo Cummings M.D on Workstation: [id number]      XR Chest 1 View    Result Date: 2/9/2024  AP CHEST  HISTORY: Evaluate pleural effusion  COMPARISON: Earlier today  FINDINGS: Status post placement of right chest tube. Significant decrease in size of right pleural effusion with significant improved aeration of the right lung. Heart size stable and left lung remains clear.      Significant increase in size of right pleural effusion    This report was finalized on 2/9/2024 5:03 PM by Dr. Randy Villa M.D on Workstation: [id number]      XR Chest 1 View    Result Date: 2/9/2024  CHEST SINGLE VIEW  HISTORY: Chest pain. Shortness of air and cough.  COMPARISON: AP chest 11/09/2023, CT chest 11/12/2023.  FINDINGS: There has developed complete white out of the right thorax due to large right pleural effusion and associated right basilar atelectasis or infiltrate. Left lung appears clear. There appears to be mild right to left cardiomediastinal shift that is likely related to the presence of pleural fluid.      New white out of the right lung appears to be mostly related to a large pleural effusion, though there is also a suspected component of atelectasis or infiltrate. There is mild right to left cardiomediastinal shift. CT would be the best means to further evaluate.  This report was finalized on 2/9/2024 11:09 AM by Dr. Ciro Casey M.D on Workstation: [id number]      Results for orders placed during the hospital encounter of 02/09/24    Adult Transthoracic Echo Complete W/ Cont if Necessary Per Protocol    Interpretation Summary    Left ventricular systolic function is hyperdynamic (EF > 70%).    Left ventricular wall thickness is consistent with mild concentric hypertrophy.    Left ventricular diastolic function was indeterminate.    Normal right ventricular cavity size and systolic  function noted.    The left atrial cavity is moderately dilated.    There is moderate aortic valve calcification    Mild to moderate mitral valve regurgitation is present.    Insufficient TR velocity profile to estimate the right ventricular systolic pressure.    There is a small (<1cm) circumferential pericardial effusion. There is no evidence of cardiac tamponade.      Chest x-ray reviewed do not see definite pneumothorax pleural effusion looks to be very well-drained, chest tube persist on the right  Active Hospital Problems    Diagnosis  POA    **Pleural effusion [J90]  Yes    Dyspnea [R06.00]  Yes    Chronic diastolic CHF (congestive heart failure) [I50.32]  Yes    Hyperthyroidism [E05.90]  Yes    A-fib [I48.91]  Yes    Dyslipidemia [E78.5]  Yes      Resolved Hospital Problems   No resolved problems to display.         Assessment & Plan     Large right pleural effusion with complete right lung atelectasis status post chest tube placement with drainage of effusion residual multiloculated effusion received lytic therapy has had significant improvement chest tube output remains significant at 220 cc out yesterday significantly decreased from the day before.  One of my concerns as we still do not know the etiology of this effusion cytology was negative for malignancy did show a lot of acute inflammatory changes.  Send the pleural fluid down again for cytology.  Patient's blood pressure is in the 90s and his creatinine jumped up quite a bit with 2 doses of Lasix yesterday I do not think we can push diuretics any further.  Hold diuretics today follow-up BMP in a.m.  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    Ridge Garcia Jr, MD  02/19/24  10:01 EST    Time:

## 2024-02-19 NOTE — PLAN OF CARE
Goal Outcome Evaluation:  Plan of Care Reviewed With: patient        Progress: improving  Outcome Evaluation: VSS, room air, Afib on monitor, A/O x3, forgetful. Right CT to [facility] - possible removal today. Turns as allowed. Pain controlled with scheduled and PRN medications.  Will continue to monitor.

## 2024-02-19 NOTE — PROGRESS NOTES
"    Chief Complaint: Right pleural effusion  S/P: CT-guided chest tube placement; lytics x4      Subjective:  Symptoms:  Improved.  No shortness of breath, cough or chest pain.    Diet:  Poor intake.  No nausea or vomiting.    Activity level: Impaired due to weakness.    Pain:  He reports no pain.    Resting comfortably at bed.  Denies shortness of breath.  Eager to discharge home.    Vital Signs:  Temp:  [97.4 °F (36.3 °C)-97.8 °F (36.6 °C)] 97.8 °F (36.6 °C)  Heart Rate:  [75-87] 75  Resp:  [16-18] 18  BP: ()/(49-93) 96/49    Intake & Output (last day)         02/18 0701  02/19 0700 02/19 0701  02/20 0700    P.O. 60     Total Intake(mL/kg) 60 (1.1)     Urine (mL/kg/hr) 0 (0)     Stool 0     Chest Tube 220     Total Output 220     Net -160           Urine Unmeasured Occurrence 5 x 1 x    Stool Unmeasured Occurrence 3 x             Objective:  General Appearance:  Comfortable, well-appearing, in no acute distress and not in pain.    Vital signs: (most recent): Blood pressure 96/49, pulse 75, temperature 97.8 °F (36.6 °C), temperature source Oral, resp. rate 18, height 177.8 cm (70\"), weight 54.4 kg (119 lb 14.9 oz), SpO2 100%.    Output: Producing urine.    HEENT: Normal HEENT exam.    Lungs:  Normal effort.  He is not in respiratory distress.    Heart: Tachycardia.  Irregular rhythm.    Chest: Symmetric chest wall expansion. Chest wall tenderness present.    Extremities: Decreased range of motion.  (S/p Right AKA)  Neurological: Patient is alert.    Skin:  Warm and dry.                Chest tube:   Site: Right, Clean, Dry, Intact, and Securement device intact  Suction: waterseal  Air Leak: negative  24 Hour Total: 220ml    Results Review:     I reviewed the patient's new clinical results.  I reviewed the patient's new imaging results and agree with the interpretation.  Discussed with patient, RN    Imaging reviewed independently.  Agree with interpretation.  Right pleural effusion is much improved.  Imaging " Results (Last 24 Hours)       Procedure Component Value Units Date/Time    XR Chest 1 View [[id number]] Collected: 02/19/24 1031     Updated: 02/19/24 1037    Narrative:      PORTABLE CHEST X-RAY     HISTORY: Chest tube management.     TECHNIQUE: AP portable chest x-ray correlated with chest x-ray from  02/18/2024.     FINDINGS: Pigtail chest drain projects over the lateral right lower  hemithorax. There is reverse arthroplasty hardware at the left shoulder.     The cardiomediastinal contours are normal and the lungs appear clear. No  pneumothorax is evident today.       Impression:      Hardware as described. No pneumothorax.     This report was finalized on 2/19/2024 10:34 AM by Dr. Johnson Almaazn M.D on Workstation: [email or web address]               Lab Results:     Lab Results (last 24 hours)       Procedure Component Value Units Date/Time    Non-gynecologic Cytology [[id number]] Collected: 02/19/24 1039    Specimen: Body Fluid from Pleural Cavity Updated: 02/19/24 1226    Ferritin [[id number]]  (Normal) Collected: 02/19/24 0313    Specimen: Blood Updated: 02/19/24 0702     Ferritin 226.00 ng/mL     Narrative:      Results may be falsely decreased if patient taking Biotin.      Iron Profile [[id number]]  (Abnormal) Collected: 02/19/24 0313    Specimen: Blood Updated: 02/19/24 0656     Iron 30 mcg/dL      Iron Saturation (TSAT) 14 %      Transferrin 149 mg/dL      TIBC 222 mcg/dL     Basic Metabolic Panel [[id number]]  (Abnormal) Collected: 02/19/24 0313    Specimen: Blood Updated: 02/19/24 0528     Glucose 87 mg/dL      BUN 29 mg/dL      Creatinine 1.56 mg/dL      Sodium 141 mmol/L      Potassium 4.5 mmol/L      Chloride 104 mmol/L      CO2 28.0 mmol/L      Calcium 8.6 mg/dL      BUN/Creatinine Ratio 18.6     Anion Gap 9.0 mmol/L      eGFR 43.8 mL/min/1.73     Narrative:      GFR Normal >60  Chronic Kidney Disease <60  Kidney Failure <15    The GFR formula is only valid for adults with stable renal function between ages 18  and 70.    CBC (No Diff) [[id number]]  (Abnormal) Collected: 02/19/24 0313    Specimen: Blood Updated: 02/19/24 0354     WBC 10.77 10*3/mm3      RBC 5.21 10*6/mm3      Hemoglobin 12.5 g/dL      Hematocrit 40.1 %      MCV 77.0 fL      MCH 24.0 pg      MCHC 31.2 g/dL      RDW 17.0 %      RDW-SD 47.1 fl      MPV 9.8 fL      Platelets 241 10*3/mm3              Assessment & Plan       Pleural effusion    Dyslipidemia    A-fib    Hyperthyroidism    Chronic diastolic CHF (congestive heart failure)    Dyspnea    JOSEPH (acute kidney injury)       Assessment & Plan    Mr. Silva is a 83-year-old gentleman who presented with a right-sided pleural effusion of uncertain etiology.  He is status post 4 rounds of intrapleural lytic therapy.  Symptomatically he has drastically improved.  He denies any shortness of breath and is on room air.  No significant pneumothorax on chest x-ray.  Output has tapered off and chest tube was removed at the bedside without difficulty.  We will check a final chest x-ray in the morning.        Mary Augustin, NOHEMY, APRN  [facility]  02/19/24  14:01 EST    Greater than 34 minutes was spent reviewing the patient's chart, radiographic imaging, assessing the patient and developing a plan of care which was discussed with the patient and RN.  This excludes any other billable procedures which will be dictated separately.

## 2024-02-19 NOTE — PROGRESS NOTES
"Nutrition Services    Patient Name:  Mehreen Silav IV  YOB: 1940  MRN: [id number]  Admit Date:  2/9/2024    Assessment Date:  02/19/24    NUTRITION SCREENING      Reason for Encounter Follow-up protocol   Diagnosis/Problem Pleural effusion, dyspnea, CHF  Acute kidney injury associated probably with volume depletion may be overdiuresis.  IVF's at 75ml/hr ordered         PO Diet Diet: Cardiac Diets; Healthy Heart (2-3 Na+); No Straw; Texture: Regular Texture (IDDSI 7); Fluid Consistency: Thin (IDDSI 0)   Supplements Boost plus daily   PO Intake % %, pt states appetite is fine       Medications MAR reviewed by RD   Labs  Listed below, reviewed   Physical Findings Alert, oriented, Chest tube in place, AKA   GI Function BM 2/18   Skin Status Left posterior heel pressure injury stage 1,  coccyx pressure injury red,non blanchable       Height  Weight  BMI  Weight Trend     Height: 177.8 cm (70\")  Weight: 54.4 kg (119 lb 14.9 oz) (02/19/24 0500)  Body mass index is 17.21 kg/m². (Adj BMI 22)  Loss From AKA in 12/2023       Nutrition Problem (PES) Nutrition appropriate for condition at this time as evidenced by good po intake.       Intervention/Plan Pt reports good appetite. Encouraged good nutrition. Labs, meds, skin reviewed.    RD to follow up per protocol.     Results from last 7 days   Lab Units 02/19/24  0313 02/18/24  0421 02/17/24  0503   SODIUM mmol/L 141 143 141   POTASSIUM mmol/L 4.5 4.4 4.5   CHLORIDE mmol/L 104 105 108*   CO2 mmol/L 28.0 29.9* 27.0   BUN mg/dL 29* 17 15   CREATININE mg/dL 1.56* 0.67* 0.59*   CALCIUM mg/dL 8.6 8.5* 8.0*   GLUCOSE mg/dL 87 82 77     Results from last 7 days   Lab Units 02/19/24  0313 02/14/24  0316 02/13/24  0310   MAGNESIUM mg/dL  --   --  2.4   PHOSPHORUS mg/dL  --   --  2.5   HEMOGLOBIN g/dL 12.5*   < > 11.6*   HEMATOCRIT % 40.1   < > 37.4*    < > = values in this interval not displayed.     Lab Results   Component Value Date    HGBA1C 5.40 02/09/2024 "         Electronically signed by:  Naz Calhoun RD  02/19/24 12:11 EST

## 2024-02-19 NOTE — CONSULTS
[facility] Consult Note      Patient Name: Mehreen Silva IV  : 1940  MRN: [id number]  Primary Care Physician:  Taiwo Powers MD  Referring Physician: Sherwin Geller MD  Date of admission: 2024    Subjective     Reason for Consult: Acute kidney injury    HPI:   Mehreen Silva IV is a 83 y.o. male patient was admitted on 2024 with shortness of breath was found to have recurrent large pleural effusion and he had chest tube in place.  He was diuresed and now he has increased creatinine hence nephrology consult was requested, on admission creatinine was 0.94 and dropped as low as 0.56 and it has been in the range of 0.6-0.7 today up to 1.56 hence nephrology consult Kvng's request  Patient has history of diastolic congestive heart failure, chronic atrial fibrillation, dyslipidemia, hypothyroidism, multinodular goiter, pulmonary hypertension, and he has right AKA also he has coccygeal decubitus ulcer and left heel ulcer.    The patient shortness of breath improved since admission, he has no chest pain, no nausea or vomiting, no abdominal pain, no dysuria or gross hematuria.    Review of Systems:   14 point review of systems is otherwise negative except for mentioned above on HPI    Personal History     Past Medical History:   Diagnosis Date    (HFpEF) heart failure with preserved ejection fraction 2022    Arthritis     Atrial fibrillation     Atrial fibrillation, persistent 2022    Cervical spondylosis with myelopathy     CHF (congestive heart failure)     Disease of thyroid gland     HLD (hyperlipidemia) 2022    Hx of toxic multinodular goiter 2022    Lumbar radiculopathy     Pulmonary hypertension     Ulcer with gangrene     right heel       Past Surgical History:   Procedure Laterality Date    ABOVE KNEE AMPUTATION Right 2023    Procedure: ABOVE KNEE AMPUTATION RIGHT, proveena wound vac placement;  Surgeon: Issa Nieves MD;  Location:  [facility] MAIN OR;  Service: Vascular;  Laterality: Right;    APPENDECTOMY      BACK SURGERY      COLONOSCOPY      EXCISION MASS TRUNK N/A 6/8/2016    Procedure: Excision soft tissue neoplasm on abdominal wall and left neck;  Surgeon: Shaji Barahona Jr., MD;  Location: [facility] OR;  Service:     KNEE SURGERY      NECK SURGERY  1974    SHOULDER SURGERY         Family History: family history includes Aneurysm in his father; Cancer in his mother; Heart disease in his father.    Social History:  reports that he quit smoking about 40 years ago. His smoking use included cigarettes. He has never used smokeless tobacco. He reports current alcohol use. He reports that he does not use drugs.    Home Medications:  Prior to Admission medications    Medication Sig Start Date End Date Taking? Authorizing Provider   cholecalciferol (VITAMIN D3) 25 MCG (1000 UT) tablet Take 1 tablet by mouth Daily.   Yes Vijaya Montez MD   Eliquis 5 MG tablet tablet TAKE ONE TABLET BY MOUTH EVERY 12 HOURS 12/18/23  Yes Priya Mustafa APRN   empagliflozin (JARDIANCE) 10 MG tablet tablet Take 1 tablet by mouth Daily. 11/20/23  Yes Priya Mustafa APRN   ezetimibe (ZETIA) 10 MG tablet Take 1 tablet by mouth Daily. 7/22/13  Yes Vijaya Montez MD   furosemide (LASIX) 20 MG tablet Take 1 tablet by mouth Daily.   Yes Vijaya Montez MD   gabapentin (NEURONTIN) 100 MG capsule Take 1 capsule by mouth every night at bedtime.   Yes Vijaya Montez MD   methIMAzole (TAPAZOLE) 5 MG tablet Take 1 tablet by mouth 3 (Three) Times a Week. Monday, Wednesday, and Friday   Yes Vijaya Montez MD   metoprolol tartrate (LOPRESSOR) 25 MG tablet TAKE ONE TABLET BY MOUTH EVERY 12 HOURS  Patient taking differently: Take 1 tablet by mouth 2 (Two) Times a Day. 1/22/24  Yes Priya Mustafa APRN   multivitamin with minerals tablet tablet Take 1 tablet by mouth Daily.   Yes Vijaya Montez MD       Allergies:  No Known  Allergies    Objective     Vitals:   Temp:  [97.4 °F (36.3 °C)-97.8 °F (36.6 °C)] 97.8 °F (36.6 °C)  Heart Rate:  [76-87] 77  Resp:  [16-18] 18  BP: ()/(57-93) 95/60    Intake/Output Summary (Last 24 hours) at 2/19/2024 1150  Last data filed at 2/19/2024 0645  Gross per 24 hour   Intake 60 ml   Output 220 ml   Net -160 ml       Physical Exam:   Constitutional: Awake, alert, no acute distress.  Chronically ill and appears to be frail  HEENT: Sclera anicteric, no conjunctival injection  Neck: No JVD  Respiratory: Clear to auscultation bilaterally, nonlabored respiration he has left chest tube  Cardiovascular: Irregularly irregular, no carotid bruit  Gastrointestinal: Positive bowel sounds, abdomen is soft, nontender and nondistended  : No palpable bladder  Musculoskeletal: Right AKA, left foot is bandaged, no peripheral edema noted  Psychiatric: Appropriate affect, cooperative  Neurologic: Oriented x3, moving all extremities, normal speech and mental status  Skin: Warm and dry       Scheduled Meds:     castor oil-balsam peru, 1 Application, Topical, Q12H  gabapentin, 100 mg, Oral, Nightly  guaiFENesin, 1,200 mg, Oral, Q12H  Menthol-Zinc Oxide, 1 Application, Topical, Q12H  methIMAzole, 5 mg, Oral, Once per day on Monday Wednesday Friday  metoprolol tartrate, 25 mg, Oral, Q12H  sodium chloride, 10 mL, Intravenous, Q12H      IV Meds:   sodium chloride, 75 mL/hr, Last Rate: 75 mL/hr (02/19/24 1145)        Results Reviewed:   I have personally reviewed the results from the time of this admission to 2/19/2024 11:50 EST     Lab Results   Component Value Date    GLUCOSE 87 02/19/2024    CALCIUM 8.6 02/19/2024     02/19/2024    K 4.5 02/19/2024    CO2 28.0 02/19/2024     02/19/2024    BUN 29 (H) 02/19/2024    CREATININE 1.56 (H) 02/19/2024    EGFRIFAFRI >60 03/28/2022    EGFRIFNONA 74 06/06/2016    BCR 18.6 02/19/2024    ANIONGAP 9.0 02/19/2024      Lab Results   Component Value Date    MG 2.4 02/13/2024     PHOS 2.5 02/13/2024    ALBUMIN 1.9 (L) 02/13/2024           Assessment / Plan       Pleural effusion    Dyslipidemia    A-fib    Hyperthyroidism    Chronic diastolic CHF (congestive heart failure)    Dyspnea    JOSEPH (acute kidney injury)      ASSESSMENT:  Acute kidney injury associated probably with volume depletion may be overdiuresis  Diastolic dysfunction no evidence of congestive heart failure  Chronic A-fib.  Pleural effusion with chest tube placement  Multinodular goiter treated with methimazole    PLAN:  Check random urine for sodium, chloride and protein to creatinine ratio  I agree with the IV fluid as ordered  Will consider restarting diuretics after he is back to his baseline renal function  Surveillance labs      I reviewed the chart and other providers notes, reviewed labs.  I discussed the case with the patient also with     Thank you for involving us in the care of Mehreen Silva IV.  Please feel free to call with any questions.    Jose Wright MD  02/19/24  11:50 [facility]    [facility]  [phone]      Please note that portions of this note were completed with a voice recognition program.

## 2024-02-19 NOTE — CASE MANAGEMENT/SOCIAL WORK
Continued Stay Note  [facility]     Patient Name: Mehreen Silva IV  MRN: [id number]  Today's Date: 2/19/2024    Admit Date: 2/9/2024    Plan: Home with wife, caregiver, and HH   Discharge Plan       Row Name 02/19/24 1122       Plan    Plan Home with wife, caregiver, and HH    Patient/Family in Agreement with Plan yes    Plan Comments Met with pt at bedside who confirms plan to return home at discharge.  [employer]aretenders following and has caregiver at home.  Caregiver or son will likely transport home.  CCP continues to follow.  BENOIT Hackett RN                   Discharge Codes    No documentation.                 Expected Discharge Date and Time       Expected Discharge Date Expected Discharge Time    Feb 14, 2024               Milagros Hackett RN

## 2024-02-19 NOTE — PROGRESS NOTES
LOS: 10 days   Patient Care Team:  Taiwo Powers MD as PCP - General (Internal Medicine)  Self, Bess YO MD as Consulting Physician (Endocrinology)  Huang Chan MD as Cardiologist (Cardiology)    Chief Complaint: follow-up recurrent large right exudative pleural effusion, persistent atrial fibrillation with RVR, chronic diastolic CHF     Interval History:  He was having an IV placed when I saw him. He denies chest pain or discomfort, palpitations, or shortness of breath.     Vital Signs:  Temp:  [97.4 °F (36.3 °C)-97.8 °F (36.6 °C)] 97.8 °F (36.6 °C)  Heart Rate:  [76-87] 77  Resp:  [16-18] 18  BP: ()/(57-93) 95/60    Intake/Output Summary (Last 24 hours) at 2/19/2024 1253  Last data filed at 2/19/2024 0645  Gross per 24 hour   Intake 60 ml   Output 220 ml   Net -160 ml      Physical Exam  Vitals reviewed.   Constitutional:       General: He is not in acute distress.  HENT:      Head: Normocephalic.   Eyes:      Extraocular Movements: Extraocular movements intact.      Pupils: Pupils are equal, round, and reactive to light.   Cardiovascular:      Rate and Rhythm: Rhythm irregularly irregular.      Pulses: Normal pulses.      Heart sounds: Normal heart sounds, S1 normal and S2 normal. Heart sounds not distant. No murmur heard.     No friction rub. No gallop. No S3 or S4 sounds.   Pulmonary:      Effort: Pulmonary effort is normal.      Breath sounds: Normal breath sounds.   Abdominal:      General: Abdomen is flat. Bowel sounds are normal.      Palpations: Abdomen is soft.      Tenderness: There is no abdominal tenderness.   Skin:     General: Skin is warm and dry.   Neurological:      General: No focal deficit present.      Mental Status: He is alert and oriented to person, place, and time.   Psychiatric:         Mood and Affect: Mood normal.         Behavior: Behavior normal.         Results Review:    Results from last 7 days   Lab Units 02/19/24  0313   SODIUM mmol/L 141   POTASSIUM mmol/L 4.5    CHLORIDE mmol/L 104   CO2 mmol/L 28.0   BUN mg/dL 29*   CREATININE mg/dL 1.56*   GLUCOSE mg/dL 87   CALCIUM mg/dL 8.6           Results from last 7 days   Lab Units 02/19/24  0313   WBC 10*3/mm3 10.77   HEMOGLOBIN g/dL 12.5*   HEMATOCRIT % 40.1   PLATELETS 10*3/mm3 241             Results from last 7 days   Lab Units 02/13/24  0310   MAGNESIUM mg/dL 2.4           I reviewed the patient's new clinical results.        Assessment & Plan:  Recurrent large right pleural effusion (exudative) with associated near right lung atelectasis  S/p CT-guided chest tube placement on 2/9/24  Intermittently getting intrapleural tPA, anticoagulation held while chest tube is in place   Repeat cytology of pleural fluid pending   Baseline hypertension - now with hypotension  Persistent atrial fibrillation with RVR  Heart rate controlled today  Continue metoprolol 25 mg every 12 hours  Anticoagulation held secondary to chest tube   Chronic diastolic CHF  S/p two doses of IV Lasix. Creatinine has increased and further diuretics are currently held. Nephrology has been consulted.   Right AKA on 12/13/23 secondary to osteomyelitis and contracture of the right knee  Nonspecific high-sensitivity troponin elevation  Multifactorial weakness  Hyperthyroidism - on methimazole     Cathie Clemons, APRN  02/19/24  12:53 EST

## 2024-02-19 NOTE — PROGRESS NOTES
Name: Mehreen Silva IV ADMIT: 2024   : 1940  PCP: Taiwo Powers MD    MRN: [id number] LOS: 10 days   AGE/SEX: 83 y.o. male  ROOM: [facility]     Subjective   Subjective   No new events overnight.  Patient is on room air, denies shortness of breath or chest pain.  Chest tube remains in place, to waterseal.  No significant urine output with IV Lasix yesterday, creatinine increased up to 1.56 from 0.67 yesterday.  Denies urinary symptoms.  Patient wants to be discharged home.      Review of Systems   As above  Objective   Objective   Vital Signs  Temp:  [97.4 °F (36.3 °C)-97.8 °F (36.6 °C)] 97.8 °F (36.6 °C)  Heart Rate:  [76-87] 77  Resp:  [16-18] 18  BP: ()/(57-93) 95/60  SpO2:  [93 %-98 %] 93 %  on   ;   Device (Oxygen Therapy): room air  Body mass index is 17.21 kg/m².  Physical Exam    General: Alert, laying in bed, not in distress,  HEENT: Normocephalic, atraumatic  CV: Irregular rhythm, normal rate,  Lungs: CTA anteriorly, no wheezing, chest tube in place,  Abdomen: Soft, nontender, nondistended  Extremities: No significant peripheral edema , right AKA    Results Review     I reviewed the patient's new clinical results.  Results from last 7 days   Lab Units 24  0503 02/16/24  0318   WBC 10*3/mm3 10.77 7.47 7.53 7.19   HEMOGLOBIN g/dL 12.5* 12.9* 11.6* 11.5*   PLATELETS 10*3/mm3 241 240 221 215     Results from last 7 days   Lab Units 24  0503 02/16/24  0318   SODIUM mmol/L 141 143 141 141   POTASSIUM mmol/L 4.5 4.4 4.5 4.2   CHLORIDE mmol/L 104 105 108* 108*   CO2 mmol/L 28.0 29.9* 27.0 25.9   BUN mg/dL 29* 17 15 18   CREATININE mg/dL 1.56* 0.67* 0.59* 0.72*   GLUCOSE mg/dL 87 82 77 132*   Estimated Creatinine Clearance: 27.6 mL/min (A) (by C-G formula based on SCr of 1.56 mg/dL (H)).  Results from last 7 days   Lab Units 24  0310   ALBUMIN g/dL 1.9*     Results from last 7 days   Lab Units 24  0313  "02/18/24  0421 02/17/24  0503 02/16/24  0318 02/14/24  0316 02/13/24  0310   CALCIUM mg/dL 8.6 8.5* 8.0* 8.0*   < > 7.9*   ALBUMIN g/dL  --   --   --   --   --  1.9*   MAGNESIUM mg/dL  --   --   --   --   --  2.4   PHOSPHORUS mg/dL  --   --   --   --   --  2.5    < > = values in this interval not displayed.       COVID19   Date Value Ref Range Status   02/09/2024 Not Detected Not Detected - Ref. Range Final     No results found for: \"HGBA1C\", \"POCGLU\"        XR Chest 1 View  Narrative: PORTABLE CHEST X-RAY     HISTORY: Chest tube management.     TECHNIQUE: AP portable chest x-ray correlated with chest x-ray from  02/18/2024.     FINDINGS: Pigtail chest drain projects over the lateral right lower  hemithorax. There is reverse arthroplasty hardware at the left shoulder.     The cardiomediastinal contours are normal and the lungs appear clear. No  pneumothorax is evident today.     Impression: Hardware as described. No pneumothorax.     This report was finalized on 2/19/2024 10:34 AM by Dr. Johnson Almazan M.D on Workstation: [id number]       Scheduled Medications  castor oil-balsam peru, 1 Application, Topical, Q12H  gabapentin, 100 mg, Oral, Nightly  guaiFENesin, 1,200 mg, Oral, Q12H  Menthol-Zinc Oxide, 1 Application, Topical, Q12H  methIMAzole, 5 mg, Oral, Once per day on Monday Wednesday Friday  metoprolol tartrate, 25 mg, Oral, Q12H  sodium chloride, 10 mL, Intravenous, Q12H    Infusions  sodium chloride, 75 mL/hr, Last Rate: 100 mL/hr (02/19/24 0845)    Diet  Diet: Cardiac Diets; Healthy Heart (2-3 Na+); No Straw; Texture: Regular Texture (IDDSI 7); Fluid Consistency: Thin (IDDSI 0)    I have personally reviewed     [x]  Laboratory   []  Microbiology   []  Radiology   [x]  EKG/Telemetry  []  Cardiology/Vascular   []  Pathology    []  Records       Assessment/Plan     Active Hospital Problems    Diagnosis  POA    **Pleural effusion [J90]  Yes    JOSEPH (acute kidney injury) [N17.9]  Unknown    Dyspnea [R06.00]  Yes "    Chronic diastolic CHF (congestive heart failure) [I50.32]  Yes    Hyperthyroidism [E05.90]  Yes    A-fib [I48.91]  Yes    Dyslipidemia [E78.5]  Yes      Resolved Hospital Problems   No resolved problems to display.       83 y.o. male admitted with Pleural effusion.    Recurrent large right pleural effusion/right lung atelectasis:  -Had opacification of right hemithorax.    -Status post chest tube placement with improvement.    -Body fluid culture no growth to date.    -Initial cytology negative.  Repeat cytology pending.  -Status post IV ceftriaxone  for 9 days  -Received intrapleural tPA   -Repeat chest x-ray 02/19/2024 improved, no findings of pneumothorax  -Cardiothoracic surgery and pulmonology following      Acute kidney injury  -Creatinine increased up to 1.56 02/19 from 0.67 02/18  -Discontinued p.o. Lasix, initiated on IV fluids normal saline 75 cc an hour  -PVR to rule out obstruction  -Nephrology consult    Persistent atrial fibrillation/chronic diastolic heart failure  -On metoprolol titrate 25 twice daily, heart rate stable  -Home Eliquis on hold due to above  -Cardiology following  -Lasix on hold due to JOSEPH, currently on drier side      Hypertension:   -BP stable, monitor    Hyperthyroidism:   -TSH and free T4 were normal 02/09/2024  -Continue methimazole      Status post right AKA on 12/13/2023 secondary to osteomyelitis       DVT prophylaxis: SCDs  CODE STATUS: Full code  Disposition: Home health/TBD      Copied text in this note has been reviewed and is accurate as of 02/19/24.         Dictated utilizing Dragon dictation        Yamile Bauer MD  [facility]  02/19/24  11:37 EST

## 2024-02-19 NOTE — PLAN OF CARE
Problem: Adult Inpatient Plan of Care  Goal: Plan of Care Review  Outcome: Ongoing, Progressing  Flowsheets (Taken 2/19/2024 1755)  Progress: improving  Plan of Care Reviewed With: patient  Outcome Evaluation: right ct removed, ivf infusing, turn as patient allows, pain controlled,  Goal: Patient-Specific Goal (Individualized)  Outcome: Ongoing, Progressing  Goal: Absence of Hospital-Acquired Illness or Injury  Outcome: Ongoing, Progressing  Intervention: Identify and Manage Fall Risk  Recent Flowsheet Documentation  Taken 2/19/2024 1609 by Yeni Veronica RN  Safety Promotion/Fall Prevention:   safety round/check completed   room organization consistent   nonskid shoes/slippers when out of bed   lighting adjusted   fall prevention program maintained   clutter free environment maintained   assistive device/personal items within reach  Taken 2/19/2024 1445 by Yeni Veronica RN  Safety Promotion/Fall Prevention:   safety round/check completed   room organization consistent   nonskid shoes/slippers when out of bed   lighting adjusted   fall prevention program maintained   clutter free environment maintained   assistive device/personal items within reach  Taken 2/19/2024 1200 by Yeni Veronica RN  Safety Promotion/Fall Prevention:   safety round/check completed   room organization consistent   nonskid shoes/slippers when out of bed   lighting adjusted   fall prevention program maintained   clutter free environment maintained   assistive device/personal items within reach  Taken 2/19/2024 1039 by Yeni Veronica RN  Safety Promotion/Fall Prevention:   safety round/check completed   room organization consistent   nonskid shoes/slippers when out of bed   lighting adjusted   fall prevention program maintained   clutter free environment maintained   assistive device/personal items within reach  Taken 2/19/2024 0845 by Yeni Veronica RN  Safety Promotion/Fall Prevention: safety round/check  completed  Intervention: Prevent Skin Injury  Recent Flowsheet Documentation  Taken 2/19/2024 1445 by Yeni Veronica RN  Skin Protection:   adhesive use limited   tubing/devices free from skin contact  Taken 2/19/2024 1039 by Yeni Veronica RN  Body Position:   right   turned  Taken 2/19/2024 0845 by Yeni Veronica RN  Skin Protection: adhesive use limited  Intervention: Prevent and Manage VTE (Venous Thromboembolism) Risk  Recent Flowsheet Documentation  Taken 2/19/2024 0845 by Yeni Veronica RN  VTE Prevention/Management: sequential compression devices off  Goal: Optimal Comfort and Wellbeing  Outcome: Ongoing, Progressing  Intervention: Provide Person-Centered Care  Recent Flowsheet Documentation  Taken 2/19/2024 0845 by Yeni Veronica RN  Trust Relationship/Rapport:   care explained   reassurance provided   thoughts/feelings acknowledged  Goal: Readiness for Transition of Care  Outcome: Ongoing, Progressing   Goal Outcome Evaluation:  Plan of Care Reviewed With: patient        Progress: improving  Outcome Evaluation: right ct removed, ivf infusing, turn as patient allows, pain controlled,

## 2024-02-20 ENCOUNTER — READMISSION MANAGEMENT (OUTPATIENT)
Dept: CALL CENTER | Facility: HOSPITAL | Age: 84
End: 2024-02-20
Payer: MEDICARE

## 2024-02-20 ENCOUNTER — APPOINTMENT (OUTPATIENT)
Dept: GENERAL RADIOLOGY | Facility: HOSPITAL | Age: 84
DRG: 186 | End: 2024-02-20
Payer: MEDICARE

## 2024-02-20 VITALS
RESPIRATION RATE: 16 BRPM | SYSTOLIC BLOOD PRESSURE: 109 MMHG | HEIGHT: 70 IN | BODY MASS INDEX: 16.85 KG/M2 | HEART RATE: 101 BPM | WEIGHT: 117.73 LBS | OXYGEN SATURATION: 95 % | TEMPERATURE: 98 F | DIASTOLIC BLOOD PRESSURE: 55 MMHG

## 2024-02-20 PROBLEM — N17.9 AKI (ACUTE KIDNEY INJURY): Status: RESOLVED | Noted: 2024-02-19 | Resolved: 2024-02-20

## 2024-02-20 LAB
ALBUMIN SERPL-MCNC: 2.1 G/DL (ref 3.5–5.2)
ANION GAP SERPL CALCULATED.3IONS-SCNC: 11.6 MMOL/L (ref 5–15)
BASOPHILS # BLD AUTO: 0.03 10*3/MM3 (ref 0–0.2)
BASOPHILS NFR BLD AUTO: 0.3 % (ref 0–1.5)
BUN SERPL-MCNC: 23 MG/DL (ref 8–23)
BUN/CREAT SERPL: 31.5 (ref 7–25)
CALCIUM SPEC-SCNC: 8 MG/DL (ref 8.6–10.5)
CHLORIDE SERPL-SCNC: 104 MMOL/L (ref 98–107)
CO2 SERPL-SCNC: 24.4 MMOL/L (ref 22–29)
CREAT SERPL-MCNC: 0.73 MG/DL (ref 0.76–1.27)
CYTO UR: NORMAL
DEPRECATED RDW RBC AUTO: 46 FL (ref 37–54)
EGFRCR SERPLBLD CKD-EPI 2021: 90.3 ML/MIN/1.73
EOSINOPHIL # BLD AUTO: 0.19 10*3/MM3 (ref 0–0.4)
EOSINOPHIL NFR BLD AUTO: 1.9 % (ref 0.3–6.2)
ERYTHROCYTE [DISTWIDTH] IN BLOOD BY AUTOMATED COUNT: 17 % (ref 12.3–15.4)
GLUCOSE SERPL-MCNC: 89 MG/DL (ref 65–99)
HCT VFR BLD AUTO: 35.5 % (ref 37.5–51)
HGB BLD-MCNC: 11.4 G/DL (ref 13–17.7)
IMM GRANULOCYTES # BLD AUTO: 0.05 10*3/MM3 (ref 0–0.05)
IMM GRANULOCYTES NFR BLD AUTO: 0.5 % (ref 0–0.5)
LAB AP CASE REPORT: NORMAL
LAB AP CLINICAL INFORMATION: NORMAL
LYMPHOCYTES # BLD AUTO: 1 10*3/MM3 (ref 0.7–3.1)
LYMPHOCYTES NFR BLD AUTO: 10.2 % (ref 19.6–45.3)
MAGNESIUM SERPL-MCNC: 2.1 MG/DL (ref 1.6–2.4)
MCH RBC QN AUTO: 24.4 PG (ref 26.6–33)
MCHC RBC AUTO-ENTMCNC: 32.1 G/DL (ref 31.5–35.7)
MCV RBC AUTO: 76 FL (ref 79–97)
MONOCYTES # BLD AUTO: 0.85 10*3/MM3 (ref 0.1–0.9)
MONOCYTES NFR BLD AUTO: 8.7 % (ref 5–12)
NEUTROPHILS NFR BLD AUTO: 7.68 10*3/MM3 (ref 1.7–7)
NEUTROPHILS NFR BLD AUTO: 78.4 % (ref 42.7–76)
NRBC BLD AUTO-RTO: 0 /100 WBC (ref 0–0.2)
PATH REPORT.FINAL DX SPEC: NORMAL
PATH REPORT.GROSS SPEC: NORMAL
PHOSPHATE SERPL-MCNC: 3 MG/DL (ref 2.5–4.5)
PLATELET # BLD AUTO: 206 10*3/MM3 (ref 140–450)
PMV BLD AUTO: 10.2 FL (ref 6–12)
POTASSIUM SERPL-SCNC: 3.6 MMOL/L (ref 3.5–5.2)
POTASSIUM SERPL-SCNC: 4.5 MMOL/L (ref 3.5–5.2)
RBC # BLD AUTO: 4.67 10*6/MM3 (ref 4.14–5.8)
SODIUM SERPL-SCNC: 140 MMOL/L (ref 136–145)
URATE SERPL-MCNC: 4.8 MG/DL (ref 3.4–7)
WBC NRBC COR # BLD AUTO: 9.8 10*3/MM3 (ref 3.4–10.8)

## 2024-02-20 PROCEDURE — 84550 ASSAY OF BLOOD/URIC ACID: CPT | Performed by: INTERNAL MEDICINE

## 2024-02-20 PROCEDURE — 99232 SBSQ HOSP IP/OBS MODERATE 35: CPT

## 2024-02-20 PROCEDURE — 85025 COMPLETE CBC W/AUTO DIFF WBC: CPT | Performed by: INTERNAL MEDICINE

## 2024-02-20 PROCEDURE — 83735 ASSAY OF MAGNESIUM: CPT | Performed by: INTERNAL MEDICINE

## 2024-02-20 PROCEDURE — 80069 RENAL FUNCTION PANEL: CPT | Performed by: INTERNAL MEDICINE

## 2024-02-20 PROCEDURE — 71045 X-RAY EXAM CHEST 1 VIEW: CPT

## 2024-02-20 PROCEDURE — 84132 ASSAY OF SERUM POTASSIUM: CPT | Performed by: HOSPITALIST

## 2024-02-20 RX ORDER — POTASSIUM CHLORIDE 750 MG/1
40 TABLET, FILM COATED, EXTENDED RELEASE ORAL EVERY 4 HOURS
Status: COMPLETED | OUTPATIENT
Start: 2024-02-20 | End: 2024-02-20

## 2024-02-20 RX ADMIN — METOPROLOL TARTRATE 25 MG: 25 TABLET, FILM COATED ORAL at 05:13

## 2024-02-20 RX ADMIN — POLYETHYLENE GLYCOL 3350 17 G: 17 POWDER, FOR SOLUTION ORAL at 09:10

## 2024-02-20 RX ADMIN — DOCUSATE SODIUM 50MG AND SENNOSIDES 8.6MG 2 TABLET: 8.6; 5 TABLET, FILM COATED ORAL at 09:10

## 2024-02-20 RX ADMIN — CASTOR OIL AND BALSAM, PERU 1 APPLICATION: 788; 87 OINTMENT TOPICAL at 09:04

## 2024-02-20 RX ADMIN — POTASSIUM CHLORIDE 40 MEQ: 750 TABLET, EXTENDED RELEASE ORAL at 09:04

## 2024-02-20 RX ADMIN — GUAIFENESIN 1200 MG: 600 TABLET, EXTENDED RELEASE ORAL at 09:04

## 2024-02-20 RX ADMIN — Medication 1 APPLICATION: at 09:04

## 2024-02-20 RX ADMIN — POTASSIUM CHLORIDE 40 MEQ: 750 TABLET, EXTENDED RELEASE ORAL at 16:12

## 2024-02-20 RX ADMIN — Medication 10 ML: at 09:04

## 2024-02-20 NOTE — PROGRESS NOTES
[facility] Progress Note      Patient Name: Mehreen Silva IV  : 1940  MRN: [id number]  Primary Care Physician:  Taiwo Powers MD  Date of admission: 2024    Subjective     Interval History:   Follow-up acute kidney injury    Patient is feeling much better today, denies any chest pain or shortness of air, no orthopnea or PND, no nausea or vomiting.  He is receiving IV fluid.    Review of Systems:   As noted above    Objective     Vitals:   Temp:  [97.3 °F (36.3 °C)-97.8 °F (36.6 °C)] 97.7 °F (36.5 °C)  Heart Rate:  [74-84] 84  Resp:  [16-18] 16  BP: ()/(45-67) 105/58    Intake/Output Summary (Last 24 hours) at 2024 0939  Last data filed at 2024 0500  Gross per 24 hour   Intake 1293 ml   Output --   Net 1293 ml       Physical Exam:    General Appearance: alert, awake, chronically ill, no acute distress   Skin: warm and dry  HEENT: oral mucosa normal, nonicteric sclera  Neck: supple, no JVD  Lungs: Bilateral rhonchi, breathing effort not labored and he has left chest tube  Heart: Irregularly irregular, no rub  Abdomen: soft, nontender, nondistended  : no palpable bladder  Extremities: no edema, cyanosis or clubbing  Neuro: normal speech and mental status     Scheduled Meds:     castor oil-balsam peru, 1 Application, Topical, Q12H  gabapentin, 100 mg, Oral, Nightly  guaiFENesin, 1,200 mg, Oral, Q12H  Menthol-Zinc Oxide, 1 Application, Topical, Q12H  methIMAzole, 5 mg, Oral, Once per day on   metoprolol tartrate, 25 mg, Oral, Q12H  potassium chloride ER, 40 mEq, Oral, Q4H  sodium chloride, 10 mL, Intravenous, Q12H      IV Meds:   sodium chloride, 75 mL/hr, Last Rate: 75 mL/hr (24 2130)        Results Reviewed:   I have personally reviewed the results from the time of this admission to 2024 09:39 EST     Results from last 7 days   Lab Units 24  0350 24  0313 24  0421   SODIUM mmol/L 140 141 143    POTASSIUM mmol/L 3.6 4.5 4.4   CHLORIDE mmol/L 104 104 105   CO2 mmol/L 24.4 28.0 29.9*   BUN mg/dL 23 29* 17   CREATININE mg/dL 0.73* 1.56* 0.67*   CALCIUM mg/dL 8.0* 8.6 8.5*   GLUCOSE mg/dL 89 87 82       Estimated Creatinine Clearance: 57.9 mL/min (A) (by C-G formula based on SCr of 0.73 mg/dL (L)).    Results from last 7 days   Lab Units 02/20/24  0350   MAGNESIUM mg/dL 2.1   PHOSPHORUS mg/dL 3.0       Results from last 7 days   Lab Units 02/20/24  0350   URIC ACID mg/dL 4.8       Results from last 7 days   Lab Units 02/20/24  0350 02/19/24  0313 02/18/24  0421 02/17/24  0503 02/16/24  0318   WBC 10*3/mm3 9.80 10.77 7.47 7.53 7.19   HEMOGLOBIN g/dL 11.4* 12.5* 12.9* 11.6* 11.5*   PLATELETS 10*3/mm3 206 241 240 221 215             Assessment / Plan     ASSESSMENT:  Acute kidney injury associated with volume depletion may be related to overdiuresis, resolved with IV fluid, creatinine down to 0.73  Diastolic dysfunction but no evidence of congestive heart failure today.  Chronic A-fib with controlled rate   Pleural effusion with chest tube placement  Multinodular goiter treated with methimazole.    PLAN:  Since the renal function has improved there is nothing else to add to the current treatment  I will sign off and I will be happy to see him again if needed.    I reviewed the chart and other providers notes, I reviewed labs.  I discussed the case with the patient and he voiced good understanding.    Thank you for involving us in the care of Mehreen Silva IV.  Please feel free to call with any questions.    Jose Wright MD  02/20/24  09:39 [facility]    [facility]  [phone]    Please note that portions of this note were completed with a voice recognition program.

## 2024-02-20 NOTE — PROGRESS NOTES
LOS: 11 days   Patient Care Team:  Taiwo Powers MD as PCP - General (Internal Medicine)  Self, Bess YO MD as Consulting Physician (Endocrinology)  Huang Chan MD as Cardiologist (Cardiology)    Chief Complaint: follow-up recurrent large right exudative pleural effusion, persistent atrial fibrillation with RVR, chronic diastolic CHF     Interval History: He was resting in bed with no complaints of chest pain or discomfort, palpitations, or shortness of breath. He is anxious to be discharged.     Vital Signs:  Temp:  [97.7 °F (36.5 °C)-97.8 °F (36.6 °C)] 97.7 °F (36.5 °C)  Heart Rate:  [74-84] 84  Resp:  [16-18] 16  BP: ()/(45-67) 105/58    Intake/Output Summary (Last 24 hours) at 2/20/2024 1103  Last data filed at 2/20/2024 0500  Gross per 24 hour   Intake 1293 ml   Output --   Net 1293 ml      Physical Exam  Vitals reviewed.   Constitutional:       General: He is not in acute distress.  HENT:      Head: Normocephalic.   Eyes:      Extraocular Movements: Extraocular movements intact.      Pupils: Pupils are equal, round, and reactive to light.   Cardiovascular:      Rate and Rhythm: Rhythm irregularly irregular.      Pulses: Normal pulses.      Heart sounds: Normal heart sounds, S1 normal and S2 normal. Heart sounds not distant. No murmur heard.     No friction rub. No gallop. No S3 or S4 sounds.   Pulmonary:      Effort: Pulmonary effort is normal.      Breath sounds: Normal breath sounds.   Abdominal:      General: Abdomen is flat. Bowel sounds are normal.      Palpations: Abdomen is soft.      Tenderness: There is no abdominal tenderness.   Skin:     General: Skin is warm and dry.   Neurological:      General: No focal deficit present.      Mental Status: He is alert and oriented to person, place, and time.   Psychiatric:         Mood and Affect: Mood normal.         Behavior: Behavior normal.         Results Review:    Results from last 7 days   Lab Units 02/20/24  0350   SODIUM mmol/L 140    POTASSIUM mmol/L 3.6   CHLORIDE mmol/L 104   CO2 mmol/L 24.4   BUN mg/dL 23   CREATININE mg/dL 0.73*   GLUCOSE mg/dL 89   CALCIUM mg/dL 8.0*           Results from last 7 days   Lab Units 02/20/24  0350   WBC 10*3/mm3 9.80   HEMOGLOBIN g/dL 11.4*   HEMATOCRIT % 35.5*   PLATELETS 10*3/mm3 206             Results from last 7 days   Lab Units 02/20/24  0350   MAGNESIUM mg/dL 2.1           I reviewed the patient's new clinical results.        Assessment & Plan:  Recurrent large right pleural effusion (exudative) with associated near right lung atelectasis  S/p CT-guided chest tube placement on 2/9/24 requiring intermittent intrapleural tPA, chest tube was removed yesterday without complication  Will restart anticoagulation when okay from thoracic surgery   Repeat cytology of pleural fluid was negative for malignant cells  Baseline hypertension - now with hypotension  Persistent atrial fibrillation with RVR  Heart rate controlled today  Continue metoprolol 25 mg every 12 hours  Anticoagulation held secondary to chest tube, will restart when okay from thoracic surgery standpoint   Chronic diastolic CHF  S/p two doses of IV Lasix. Creatinine improved today. Nephrology is following.   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

## 2024-02-20 NOTE — TELEPHONE ENCOUNTER
Spoke to patient's wife who states patient is still in the hospital. Patient has been rescheduled to 3/5/24 with TK

## 2024-02-20 NOTE — NURSING NOTE
This RN educated wife and patient thoroughly on pulmonary hygiene with use of IS and flutter valve when at home post hospital stay. Discharge AVS explained in detail all appointments explained and confirmed with wife and patient. Both voice understanding of this RN's teaching.

## 2024-02-20 NOTE — PLAN OF CARE
Problem: Adult Inpatient Plan of Care  Goal: Plan of Care Review  Outcome: Ongoing, Progressing  Flowsheets  Taken 2/20/2024 0528 by Lupillo Marcum, RN  Outcome Evaluation: VSS.  ivf infusing. pain controlled.  pt rested through shift.  Taken 2/19/2024 1755 by Yeni Veronica RN  Plan of Care Reviewed With: patient   Goal Outcome Evaluation:           Progress: improving  Outcome Evaluation: VSS.  ivf infusing. pain controlled.  pt rested through shift.

## 2024-02-20 NOTE — DISCHARGE INSTRUCTIONS
Chest Tube Discharge Instructions    1. Activity:  Climb stairs as tolerated  May drive car when no longer taking pain medication.  Walk at least 3-4 times a day  Limit lifting for first week. No lifting, pushing, or pulling greater than 10 pounds till seen by MD.  Continue to use your incentive spirometer at least 4 times per day.    2. Nutrition:  Resume previous diet  Eat well balanced meals  Avoid constipation by eating fresh fruits, vegetables, whole grain foods and increasing fluid intake.    3. Incision (wound) Care:  Remove dressing after 48 hours  If continued drainage, change dressing every 24 hours and as needed.  May shower after discharge.  Wash around incision area with soap and water daily.  No lotions or creams on incision area.  Take temperature daily for the first week after discharge.     4. When to call for Medical Advise:  Fever greater than 101 degrees  Unusual or severe pain  Difficulty breathing  Incision changes (redness, swelling, or increased drainage)  Any questions or problems    5. Medication Instructions:  Take Pain medications as directed to stay comfortable  No driving or drinking alcohol when taking prescribed pain meds  Laxative of choice if needed for constipation.    6. Medication and dosages:  See discharge medication instruction form

## 2024-02-20 NOTE — PROGRESS NOTES
"    Chief Complaint: Right pleural effusion  S/P: CT-guided chest tube placement; lytics x4    Subjective:  Symptoms:  Improved.  No shortness of breath, cough or chest pain.    Diet:  Poor intake.  No nausea or vomiting.    Activity level: Impaired due to weakness.    Pain:  He reports no pain.    Resting comfortably at bed.  Denies shortness of breath.  Eager to discharge home. No new complaints.     Vital Signs:  Temp:  [97.7 °F (36.5 °C)-98 °F (36.7 °C)] 98 °F (36.7 °C)  Heart Rate:  [74-84] 84  Resp:  [16-18] 16  BP: ()/(45-73) 114/73    Intake & Output (last day)         02/19 0701  02/20 0700 02/20 0701  02/21 0700    P.O.  240    I.V. (mL/kg) 1293 (24.2)     Total Intake(mL/kg) 1293 (24.2) 240 (4.5)    Urine (mL/kg/hr)      Stool      Chest Tube      Total Output      Net +1293 +240          Urine Unmeasured Occurrence 13 x     Stool Unmeasured Occurrence 1 x             Objective:  General Appearance:  Comfortable, well-appearing, in no acute distress and not in pain.    Vital signs: (most recent): Blood pressure 114/73, pulse 84, temperature 98 °F (36.7 °C), temperature source Oral, resp. rate 16, height 177.8 cm (70\"), weight 53.4 kg (117 lb 11.6 oz), SpO2 98%.    Output: Producing urine.    HEENT: Normal HEENT exam.    Lungs:  Normal effort.  He is not in respiratory distress.    Heart: Tachycardia.  Irregular rhythm.    Chest: Symmetric chest wall expansion. Chest wall tenderness present.    Extremities: Decreased range of motion.  (S/p Right AKA)  Neurological: Patient is alert.    Skin:  Warm and dry.              Results Review:     I reviewed the patient's new clinical results.  I reviewed the patient's new imaging results and agree with the interpretation.  Discussed with patient, RN, Dr. Rincon    Imaging reviewed independently.  Agree with interpretation.  Right pleural effusion is much improved.  Imaging Results (Last 24 Hours)       Procedure Component Value Units Date/Time    XR Chest 1 " View [[id number]] Collected: 02/20/24 0740     Updated: 02/20/24 0748    Narrative:      XR CHEST 1 VW-     INDICATION: Chest tube management     COMPARISON: Chest radiographs dating back to 9/23/2022. CT chest  2/14/2024       Impression:      Interval removal of right-sided chest tube. Suspected very  small remaining right-sided pneumothorax and unchanged remaining small  right pleural effusion. No new focal consolidation. Stable mildly  enlarged cardiac silhouette. No focal osseous abnormality.     This report was finalized on 2/20/2024 7:45 AM by Dr. Spencer Wood M.D on Workstation: [id number]               Lab Results:     Lab Results (last 24 hours)       Procedure Component Value Units Date/Time    Non-gynecologic Cytology [[id number]] Collected: 02/19/24 1039    Specimen: Body Fluid from Pleural Cavity Updated: 02/20/24 1019     Case Report --     Non-gynecologic Cytology                          Case: [id number]                                  Authorizing Provider:  Ridge Garcia Jr.,   Collected:           02/19/2024 10:39 AM                                 MD                                                                           Ordering Location:     [facility]  Received:            02/19/2024 12:26 PM                                 [address]                                                                       Pathologist:           Mague Corbin MD                                                    Specimen:    Pleural Cavity, Right chest tube                                                            Final Diagnosis --      Pleural Fluid, Right Chest tube:   A.  Negative for malignant cells.   B.  Rare benign mesothelial cells, histiocytes and mixed inflammation present.    [facility]       Clinical Information --     Please collect the pleural fluid and send it down for cytology again       Gross Description --     1. Pleural Cavity.  Right chest tube: 1 cup containing 10 ml  of yellow fluid received. ThinPrep(1), cell block- in formalin @ 13:28 on 2/19/24. 0 ml of fluid remaining.            Microscopic Description --     Screened by [facility].      Renal Function Panel [[id number]]  (Abnormal) Collected: 02/20/24 0350    Specimen: Blood Updated: 02/20/24 0524     Glucose 89 mg/dL      BUN 23 mg/dL      Creatinine 0.73 mg/dL      Sodium 140 mmol/L      Potassium 3.6 mmol/L      Chloride 104 mmol/L      CO2 24.4 mmol/L      Calcium 8.0 mg/dL      Albumin 2.1 g/dL      Phosphorus 3.0 mg/dL      Anion Gap 11.6 mmol/L      BUN/Creatinine Ratio 31.5     eGFR 90.3 mL/min/1.73     Narrative:      GFR Normal >60  Chronic Kidney Disease <60  Kidney Failure <15    The GFR formula is only valid for adults with stable renal function between ages 18 and 70.    Magnesium [[id number]]  (Normal) Collected: 02/20/24 0350    Specimen: Blood Updated: 02/20/24 0512     Magnesium 2.1 mg/dL     Uric Acid [[id number]]  (Normal) Collected: 02/20/24 0350    Specimen: Blood Updated: 02/20/24 0512     Uric Acid 4.8 mg/dL     CBC & Differential [[id number]]  (Abnormal) Collected: 02/20/24 0350    Specimen: Blood Updated: 02/20/24 0453    Narrative:      The following orders were created for panel order CBC & Differential.  Procedure                               Abnormality         Status                     ---------                               -----------         ------                     CBC Auto Differential[[id number]]        Abnormal            Final result                 Please view results for these tests on the individual orders.    CBC Auto Differential [[id number]]  (Abnormal) Collected: 02/20/24 0350    Specimen: Blood Updated: 02/20/24 0453     WBC 9.80 10*3/mm3      RBC 4.67 10*6/mm3      Hemoglobin 11.4 g/dL      Hematocrit 35.5 %      MCV 76.0 fL      MCH 24.4 pg      MCHC 32.1 g/dL      RDW 17.0 %      RDW-SD 46.0 fl      MPV 10.2 fL      Platelets 206 10*3/mm3      Neutrophil % 78.4 %      Lymphocyte  % 10.2 %      Monocyte % 8.7 %      Eosinophil % 1.9 %      Basophil % 0.3 %      Immature Grans % 0.5 %      Neutrophils, Absolute 7.68 10*3/mm3      Lymphocytes, Absolute 1.00 10*3/mm3      Monocytes, Absolute 0.85 10*3/mm3      Eosinophils, Absolute 0.19 10*3/mm3      Basophils, Absolute 0.03 10*3/mm3      Immature Grans, Absolute 0.05 10*3/mm3      nRBC 0.0 /100 WBC              Assessment & Plan       Pleural effusion    Dyslipidemia    A-fib    Hyperthyroidism    Chronic diastolic CHF (congestive heart failure)    Chronic heart failure with preserved ejection fraction (HFpEF)    Dyspnea       Assessment & Plan    Mr. Silva is a 83-year-old gentleman who presented with a right-sided pleural effusion of uncertain etiology. Patient underwent chest tube placement and serial intrapleural lytic therapy.  He had excellent response.  Cytology from pleural fluid negative for malignant cells.  He is on room air and denies any shortness of breath or pain.  A.m. chest x-ray reviewed and demonstrates stability from the previous.  No objections for discharge with thoracic surgery standpoint.  We will see him in the office in a few weeks with a chest x-ray to ensure continued resolution of his right pleural effusion.  Discussed with the patient at length.    CHARLEE Pascal  [facility]  02/20/24  12:37 EST    Greater than 34 minutes was spent reviewing the patient's chart, radiographic imaging, assessing the patient and developing a plan of care which was discussed with the patient and RN.  This excludes any other billable procedures which will be dictated separately.

## 2024-02-20 NOTE — DISCHARGE SUMMARY
Patient Name: Mehreen Silva IV  : 1940  MRN: [id number]    Date of Admission: 2024  Date of Discharge:  2024  Primary Care Physician: Taiwo Powers MD      Chief Complaint:   Chest Pain      Discharge Diagnoses     Active Hospital Problems    Diagnosis  POA    **Pleural effusion [J90]  Yes    Dyspnea [R06.00]  Yes    Chronic heart failure with preserved ejection fraction (HFpEF) [I50.32]  Yes    Chronic diastolic CHF (congestive heart failure) [I50.32]  Yes    Hyperthyroidism [E05.90]  Yes    A-fib [I48.91]  Yes    Dyslipidemia [E78.5]  Yes      Resolved Hospital Problems    Diagnosis Date Resolved POA    JOSEPH (acute kidney injury) [N17.9] 2024 No        Hospital Course     Mr. Silva is a 83 y.o. male with a history of right AKA, A-fib on Eliquis, hyperthyroidism and diastolic CHF who presented to [facility] initially complaining of progressive shortness of breath and cough.  Please see the admitting history and physical for further details.  He was found to have large right pleural effusion with atelectasis and right to left mediastinal shift.  Patient was seen by pulmonology and thoracic surgery and was recommended to have chest tube placement which was done by interventional radiology.  He was started empirically on antibiotics.  TSH was in normal limits so was continued on regular dose of Tapazole.  Eliquis was held with chest tube in place.  Thrombolytics were infused through the chest tube daily for several days.  Interestingly no etiology was ever found regards to the pleural effusion including negative cytology.  The fluid studies that were done actually looked transudative, with low LDH and protein.  Chest tube was finally able to be removed yesterday and chest x-ray this morning shows stability.  He is okay to resume Eliquis today per thoracic surgery and is okay for discharge.  Should be noted he did develop a mild acute kidney injury in the days just  prior to discharge, likely related to attempts at diuresis which has now resolved.  He is safe to resume his very low home dose of Lasix.  He will follow-up with thoracic surgery in about 9 days with repeat chest x-ray.    Day of Discharge     Subjective:  Adamantly wants to discharge home today    Physical Exam:  Temp:  [97.7 °F (36.5 °C)-98 °F (36.7 °C)] 98 °F (36.7 °C)  Heart Rate:  [74-84] 84  Resp:  [16-18] 16  BP: ()/(45-73) 114/73  Body mass index is 16.89 kg/m².  Physical Exam  Vitals reviewed.   Constitutional:       General: He is not in acute distress.  Cardiovascular:      Rate and Rhythm: Normal rate and regular rhythm.   Pulmonary:      Effort: No respiratory distress.      Breath sounds: Normal breath sounds.   Abdominal:      General: There is no distension.      Palpations: Abdomen is soft.      Tenderness: There is no abdominal tenderness.   Musculoskeletal:      Comments: AKA.  Other extremity unremarkable   Skin:     General: Skin is warm and dry.   Neurological:      General: No focal deficit present.      Mental Status: He is alert and oriented to person, place, and time.   Psychiatric:         Mood and Affect: Mood normal.         Consultants     Consult Orders (all) (From admission, onward)       Start     Ordered    02/19/24 0632  Inpatient Nephrology Consult  Once        Specialty:  Nephrology  Provider:  Jose Wright MD    02/19/24 0631    02/09/24 1727  Inpatient Cardiology Consult  Once        Specialty:  Cardiology  Provider:  Huang Chan MD    02/09/24 1726    02/09/24 1258  [facility] (on-call MD unless specified) Details  Once        Specialty:  Hospitalist  Provider:  (Not yet assigned)    02/09/24 1257    02/09/24 1258  Inpatient Thoracic Surgery Consult  Once        Specialty:  Thoracic Surgery  Provider:  Rolando Santoro MD    02/09/24 1258    02/09/24 1215  Pulmonology (on-call MD unless specified)  Once        Specialty:  Pulmonary Disease  Provider:  (Not yet assigned)     02/09/24 1214                  Procedures     Imaging Results (All)       Procedure Component Value Units Date/Time    XR Chest 1 View [[id number]] Collected: 02/20/24 0740     Updated: 02/20/24 0748    Narrative:      XR CHEST 1 VW-     INDICATION: Chest tube management     COMPARISON: Chest radiographs dating back to 9/23/2022. CT chest  2/14/2024       Impression:      Interval removal of right-sided chest tube. Suspected very  small remaining right-sided pneumothorax and unchanged remaining small  right pleural effusion. No new focal consolidation. Stable mildly  enlarged cardiac silhouette. No focal osseous abnormality.     This report was finalized on 2/20/2024 7:45 AM by Dr. Spencer Wood M.D on Workstation: [id number]       XR Chest 1 View [[id number]] Collected: 02/19/24 1031     Updated: 02/19/24 1037    Narrative:      PORTABLE CHEST X-RAY     HISTORY: Chest tube management.     TECHNIQUE: AP portable chest x-ray correlated with chest x-ray from  02/18/2024.     FINDINGS: Pigtail chest drain projects over the lateral right lower  hemithorax. There is reverse arthroplasty hardware at the left shoulder.     The cardiomediastinal contours are normal and the lungs appear clear. No  pneumothorax is evident today.       Impression:      Hardware as described. No pneumothorax.     This report was finalized on 2/19/2024 10:34 AM by Dr. Johnson Almazan M.D on Workstation: [id number]       XR Chest 1 View [[id number]] Collected: 02/18/24 0625     Updated: 02/18/24 0629    Narrative:      ONE-VIEW PORTABLE CHEST AT 5:07 A.M.     HISTORY: Follow-up of right-sided chest tube.     FINDINGS: A small gauge pigtail chest tube is present in the lower right  chest laterally without change from yesterday. There is a suspicion of  an extremely tiny right apical pneumothorax showing further decrease in  size from yesterday's exam. The lungs are clear and the heart remains  mildly enlarged.     This report was finalized on  2/18/2024 6:26 AM by Dr. Chavo Denny M.D  on Workstation: [id number]       XR Chest 1 View [[id number]] Collected: 02/17/24 0552     Updated: 02/17/24 0556    Narrative:      SINGLE VIEW OF THE CHEST     HISTORY: Chest tube management     COMPARISON: February 16, 2024     FINDINGS:  Right-sided pleural drainage catheter is noted. The patient does have a  right-sided pneumothorax, which wasn't clearly identified on the prior  exam. There is cardiomegaly. No pneumothorax is seen on the left. There  is some hazy opacity within the right midlung.       Impression:      Small right-sided pneumothorax, new when compared to prior study.     This report was finalized on 2/17/2024 5:53 AM by Dr. Cassy Pyle M.D on Workstation: [id number]       XR Chest 1 View [[id number]] Collected: 02/16/24 0733     Updated: 02/16/24 0743    Narrative:      ONE-VIEW PORTABLE CHEST AT 5:38 A.M.     HISTORY: Right-sided chest tube. Follow-up of pleural effusion.     FINDINGS: A small gauge pigtail pleural drain is present in the lower  right chest with very minimal if any residual right pleural effusion and  this shows improvement from yesterday's exam. There is no pneumothorax.  The left lung remains clear and the heart remains mildly enlarged.     This report was finalized on 2/16/2024 7:40 AM by Dr. Chavo Denny M.D  on Workstation: [id number]       XR Chest 1 View [[id number]] Collected: 02/15/24 0705     Updated: 02/15/24 0711    Narrative:      XR CHEST 1 VW-     Clinical: Chest tube management     COMPARISON examination 2/14/2024     FINDINGS: Pigtail chest tube demonstrated at the right base, position is  similar to the previous examination. No pneumothorax. There is some  increasing opacity along the right mid and lower lung zone which likely  represents pleural fluid tracking into the major fissure. The  cardiomediastinal silhouette is stable and the left lung is clear. No  other interval change has occurred.     This report  was finalized on 2/15/2024 7:08 AM by Dr. Gregory Dang M.D  on Workstation: [id number]       XR Chest 1 View [[id number]] Collected: 02/14/24 0906     Updated: 02/14/24 1516    Narrative:      XR CHEST 1 VW-     HISTORY: 83-year-old male with right chest tube for pleural effusion.     FINDINGS: There is mild improved aeration at the right lower lobe with  decrease in the small remaining right pleural effusion and decrease in  atelectasis. There are no new opacities and there is no CHF.     This report was finalized on 2/14/2024 3:13 PM by Dr. Aubree Cosme M.D on  Workstation: [id number]       CT Chest Without Contrast Diagnostic [[id number]] Collected: 02/14/24 0943     Updated: 02/14/24 1001    Narrative:      CT CHEST WO CONTRAST DIAGNOSTIC-     INDICATION: Follow-up pleural effusion     COMPARISON: CT chest February 11, 2024     TECHNIQUE:  Routine CT chest without IV contrast. Coronal and sagittal reformats.  Radiation dose reduction techniques were utilized, including automated  exposure control and exposure modulation based on body size.     FINDINGS:      Chest wall: Fatty atrophy of the right rotator cuff muscles. No  lymphadenopathy. Thyromegaly with possible thyroid nodules.     Mediastinum: Coronary artery atherosclerotic calcifications with  three-vessel involvement. Heart is at upper limits in size. No  pericardial effusion. Mildly enlarged main pulmonary artery. Calcified  mediastinal and right hilar lymph nodes, consistent with prior  granulomatous infection. No mediastinal or hilar lymphadenopathy.     Lungs/pleura: Right pigtail pleural drain seen at the right base.  Minimal fluid seen adjacent to the pigtail drain catheter. Small  suspected partially loculated right pleural effusion, extends into the  major fissure and to the lung apex, similar in size to prior. Small left  pleural effusion, slightly larger in the interval. Small right  pneumothorax. Patent central airways. Mild airways wall  thickening.  Reticular and groundglass opacities in the right lung. Posterior  dependent and bibasilar subsegmental atelectasis. Subpleural reticular  opacities in the left lung.     Upper abdomen: Cholelithiasis.     Osseous structures: Severe right glenohumeral osteoarthritis with  posterior subluxation. Total left shoulder arthroplasty, incompletely  imaged.       Impression:         1. Small partially loculated right pleural effusion, similar in size to  prior. Minimal fluid seen surrounding the right pigtail pleural drain.  2. Small left pleural effusion, slightly larger in the interval.  3. Small right pneumothorax, not significantly changed, could be related  to trapped lung.  4. Improved aeration of the right lower lobe.  5. Subpleural reticular opacities. Question some underlying interstitial  lung disease. Follow-up high-resolution chest CT following Hospital and  treatment course and resolution of effusions.   6. Mild enlarged main pulmonary artery, can be seen with pulmonary  artery hypertension.     This report was finalized on 2/14/2024 9:57 AM by Dr. Taiwo Espinosa M.D on Workstation: [id number]       XR Chest 1 View [[id number]] Collected: 02/13/24 0807     Updated: 02/13/24 0812    Narrative:      XR CHEST 1 VW-2/13/2024 at 6:01 a.m.     HISTORY: Chest tube management.     Heart size is at the upper limits of normal. A pigtail catheter  terminates over the right base.     No significant pneumothorax is seen on the current study. There is mild  vascular congestion. Left shoulder prosthesis is seen in good position.  There may be minimal right pleural effusion blunting the right  costophrenic sulcus.       Impression:      1. No significant pneumothorax is seen on the current study.     This report was finalized on 2/13/2024 8:09 AM by Dr. Romeo Cummings M.D on Workstation: [id number]       XR Chest 1 View [[id number]] Collected: 02/12/24 1052     Updated: 02/12/24 1122    Narrative:       Portable chest radiograph     HISTORY: Chest tube management     TECHNIQUE: Single AP portable radiograph of the chest     COMPARISON: Chest radiograph dating back 2/10/2024       Impression:      Findings and impression:  Small-caliber right thoracostomy tube is present. Small right pleural  effusion with adjacent pulmonary pacification within the right mid and  lower lung is present, as before. There appears to be a small right  apical pneumothorax resulting in approximately 1.2 cm of  pleural-parenchymal separation, as before. Cardiac silhouette is mildly  enlarged. Mild left basilar pulmonary pacification appears to have  worsened since 2/11/2024 suggestive atelectasis versus pneumonia and/or  pulmonary edema in the appropriate clinical context and correlation with  patient history is recommended with continued attention on follow-up to  resolution. Left shoulder arthroplasty is incompletely visualized and  cannot be characterized.        This report was finalized on 2/12/2024 11:19 AM by Dr. Matias Fischer M.D on Workstation: [facility]       CT Chest Without Contrast Diagnostic [[id number]] Collected: 02/11/24 0913     Updated: 02/11/24 0926    Narrative:      CT CHEST WO CONTRAST DIAGNOSTIC-     INDICATIONS: Pleural effusion, malignancy suspected     TECHNIQUE: Radiation dose reduction techniques were utilized, including  automated exposure control and exposure modulation based on body size.  Unenhanced CHEST CT     COMPARISON: 2/9/2024     FINDINGS:           The heart size is borderline with minimal pericardial effusion. Coronary  arterial calcification is apparent. A few small subcentimeter short axis  mediastinal lymph nodes are seen that are not significant by size  criteria. Assessment of vascular, hilar, and mediastinal structures is  limited without intravenous contrast material. The thyroid appears  enlarged and somewhat heterogeneous, ultrasound correlation could be  obtained as indicated.     The  airways appear clear.     A chest tube is seen in the right posterior basilar pleural space with  interval decrease in right pleural effusion, and some right basilar  pleural gas noted, medially and laterally. Markedly improved aeration of  the right lung is seen, with residual consolidation in the right lower  lobe suggest pneumonia (possibility of underlying lesions not excluded,  continued follow-up recommended) and some hazy groundglass opacity  predominantly at the right mid to lower lung that may represent edema  an/or atypical pneumonia. Minimal left pleural effusion is also seen.     Upper abdominal structures show no acute findings. Nonspecific  thickening of the adrenal glands is noted..     Degenerative changes are seen in the spine. No acute fracture is  identified.       Impression:         Partial improvement, as described, continued follow-up advised.     This report was finalized on 2/11/2024 9:22 AM by Dr. Jun Maldonado M.D on Workstation: [id number]       XR Chest 1 View [[id number]] Collected: 02/11/24 0658     Updated: 02/11/24 0703    Narrative:      XR CHEST 1 VW-     HISTORY: Male who is 83 years-old, chest tube management     TECHNIQUE: Frontal views of the chest     COMPARISON: 2/10/2024     FINDINGS:  Right chest tube appears stable. The heart size appears  borderline. Pulmonary vasculature is unremarkable. Opacity at the right  mid to lower lung appears similar to prior exam, with persistent right  pleural effusion. Small right apical pneumothorax is again apparent,  mildly decreased. No left pneumothorax is seen. Otherwise stable.       Impression:      As described.     This report was finalized on 2/11/2024 7:00 AM by Dr. Jnu Maldonado M.D on Workstation: [id number]       XR Chest 1 View [[id number]] Collected: 02/10/24 1507     Updated: 02/10/24 1512    Narrative:      XR CHEST 1 VW-     HISTORY: Male who is 83 years-old, chest tube management     TECHNIQUE: Frontal views of  "the chest     COMPARISON: 2/9/2024     FINDINGS: Right chest tube appears stable. The heart is mildly enlarged.  Pulmonary vasculature is unremarkable. Opacity at the right mid to lower  lung appears slightly decreased with persistent right pleural effusion.  Small right apical pneumothorax is again apparent, not significantly  changed. No left pneumothorax is seen. Otherwise stable.       Impression:      As described.     This report was finalized on 2/10/2024 3:09 PM by Dr. Jun Maldonado M.D on Workstation: [id number]       XR Chest 1 View [[id number]] Collected: 02/09/24 1930     Updated: 02/09/24 1937    Narrative:      XR CHEST 1 VW-     HISTORY: Male who is 83 years-old, chest tube management     TECHNIQUE: Frontal view of the chest     COMPARISON: 2/9/2024     FINDINGS: Right chest tube appears stable. Heart size is borderline.  Aorta is calcified. Pulmonary vasculature is unremarkable. Opacity in  the right mid to lower lung is increased, suggesting increased  infiltrate/atelectasis. Residual right pleural effusion appears similar  to prior exam. There appears to be 2 cm right apical pneumothorax,  appearance could reflect \"trapped lung\" phenomenon. No left  pneumothorax. Otherwise stable.       Impression:      As described.           This report was finalized on 2/9/2024 7:34 PM by Dr. Jun Maldonado M.D on Workstation: [id number]       CT Angiogram Chest [[id number]] Collected: 02/09/24 1355     Updated: 02/09/24 1744    Narrative:      CT ANGIOGRAM OF THE CHEST WITH CONTRAST INCLUDING RECONSTRUCTION IMAGES  02/09/2024     HISTORY: Shortness of breath.     Following the intravenous contrast injection CT angiography was  performed through the chest. Sagittal, coronal and 3D reconstruction  images were reviewed.     The pulmonary arterial system is somewhat heterogeneous but no definite  pulmonary embolus is seen. The heterogeneity is probably artifact.     There is a large right pleural effusion " with atelectasis of the right  lung. There is a small left pleural effusion.     Small amount of fluid is seen in pericardial recesses. There is some  aortic and coronary calcification.       Impression:      1. Large right pleural effusion with atelectasis of the right lung. The  right hemithorax is completely opacified.  2. Small left pleural effusion.  3. The pulmonary arterial system is somewhat heterogeneous but no  definite pulmonary embolus is seen.           Radiation dose reduction techniques were utilized, including automated  exposure control and exposure modulation based on body size.        This report was finalized on 2/9/2024 5:41 PM by Dr. Romeo Cummings M.D on Workstation: [id number]       CT Guided Chest Tube [[id number]] Collected: 02/09/24 1724     Updated: 02/09/24 1743    Narrative:      CT-GUIDED RIGHT CHEST TUBE PLACEMENT 02/09/2024     HISTORY: Large right pleural effusion.     TECHNIQUE: After signed informed consent was obtained the patient was  prepped and draped in the right posterior oblique position. Lidocaine  was used for local anesthesia.     CT guidance was used to place a 14 [place] pigtail catheter into the  right pleural fluid collection. Approximately 1600 cc of nonpurulent  serous fluid was removed.     The catheter was left in place and connected to the atrium suction.     Confirmatory images were obtained.     Patient tolerated the procedure well with no complications.       Impression:      1. Successful placement of 14 [place] pigtail catheter into the right  pleural fluid collection with removal of 1600 cc of nonpurulent fluid.        Radiation dose reduction techniques were utilized, including automated  exposure control and exposure modulation based on body size.        This report was finalized on 2/9/2024 5:40 PM by Dr. Romeo Cummings M.D on Workstation: [id number]       XR Chest 1 View [[id number]] Collected: 02/09/24 1702     Updated: 02/09/24 1706    Narrative:       AP CHEST     HISTORY: Evaluate pleural effusion     COMPARISON: Earlier today     FINDINGS: Status post placement of right chest tube. Significant  decrease in size of right pleural effusion with significant improved  aeration of the right lung. Heart size stable and left lung remains  clear.       Impression:      Significant increase in size of right pleural effusion           This report was finalized on 2/9/2024 5:03 PM by Dr. Randy Villa M.D  on Workstation: [id number]       XR Chest 1 View [[id number]] Collected: 02/09/24 1106     Updated: 02/09/24 1112    Narrative:      CHEST SINGLE VIEW     HISTORY: Chest pain. Shortness of air and cough.     COMPARISON: AP chest 11/09/2023, CT chest 11/12/2023.     FINDINGS: There has developed complete white out of the right thorax due  to large right pleural effusion and associated right basilar atelectasis  or infiltrate. Left lung appears clear. There appears to be mild right  to left cardiomediastinal shift that is likely related to the presence  of pleural fluid.       Impression:      New white out of the right lung appears to be mostly related  to a large pleural effusion, though there is also a suspected component  of atelectasis or infiltrate. There is mild right to left  cardiomediastinal shift. CT would be the best means to further evaluate.     This report was finalized on 2/9/2024 11:09 AM by Dr. Ciro Casey M.D on Workstation: [id number]             Results for orders placed during the hospital encounter of 08/23/23    Duplex Venous Lower Extremity - Right CAR    Interpretation Summary    Normal right lower extremity venous duplex scan.    Results for orders placed during the hospital encounter of 02/09/24    Adult Transthoracic Echo Complete W/ Cont if Necessary Per Protocol    Interpretation Summary    Left ventricular systolic function is hyperdynamic (EF > 70%).    Left ventricular wall thickness is consistent with mild concentric  "hypertrophy.    Left ventricular diastolic function was indeterminate.    Normal right ventricular cavity size and systolic function noted.    The left atrial cavity is moderately dilated.    There is moderate aortic valve calcification    Mild to moderate mitral valve regurgitation is present.    Insufficient TR velocity profile to estimate the right ventricular systolic pressure.    There is a small (<1cm) circumferential pericardial effusion. There is no evidence of cardiac tamponade.    Pertinent Labs     Results from last 7 days   Lab Units 02/20/24  0350 02/19/24  0313 02/18/24  0421 02/17/24  0503   WBC 10*3/mm3 9.80 10.77 7.47 7.53   HEMOGLOBIN g/dL 11.4* 12.5* 12.9* 11.6*   PLATELETS 10*3/mm3 206 241 240 221     Results from last 7 days   Lab Units 02/20/24  0350 02/19/24  0313 02/18/24  0421 02/17/24  0503   SODIUM mmol/L 140 141 143 141   POTASSIUM mmol/L 3.6 4.5 4.4 4.5   CHLORIDE mmol/L 104 104 105 108*   CO2 mmol/L 24.4 28.0 29.9* 27.0   BUN mg/dL 23 29* 17 15   CREATININE mg/dL 0.73* 1.56* 0.67* 0.59*   GLUCOSE mg/dL 89 87 82 77   EGFR mL/min/1.73 90.3 43.8* 92.6 96.3     Results from last 7 days   Lab Units 02/20/24  0350   ALBUMIN g/dL 2.1*     Results from last 7 days   Lab Units 02/20/24  0350 02/19/24  0313 02/18/24  0421 02/17/24  0503   CALCIUM mg/dL 8.0* 8.6 8.5* 8.0*   ALBUMIN g/dL 2.1*  --   --   --    MAGNESIUM mg/dL 2.1  --   --   --    PHOSPHORUS mg/dL 3.0  --   --   --        Results from last 7 days   Lab Units 02/14/24  0316   DIGOXIN LVL ng/mL 1.10     Results from last 7 days   Lab Units 02/20/24  0350   URIC ACID mg/dL 4.8         Invalid input(s): \"LDLCALC\"          Test Results Pending at Discharge     Pending Labs       Order Current Status    Non-gynecologic Cytology Collected (02/19/24 1039)    Tissue Pathology Exam Collected (02/09/24 0421)    AFB Culture - Drainage, Lung, R Preliminary result    Fungus Culture - Drainage, Lung, R Preliminary result            Discharge " Details        Discharge Medications        Changes to Medications        Instructions Start Date   metoprolol tartrate 25 MG tablet  Commonly known as: LOPRESSOR  What changed: when to take this   TAKE ONE TABLET BY MOUTH EVERY 12 HOURS             Continue These Medications        Instructions Start Date   cholecalciferol 25 MCG (1000 UT) tablet  Commonly known as: VITAMIN D3   1,000 Units, Oral, Daily      Eliquis 5 MG tablet tablet  Generic drug: apixaban   5 mg, Oral, Every 12 Hours      empagliflozin 10 MG tablet tablet  Commonly known as: JARDIANCE   10 mg, Oral, Daily      ezetimibe 10 MG tablet  Commonly known as: ZETIA   10 mg, Oral, Daily      furosemide 20 MG tablet  Commonly known as: LASIX   20 mg, Oral, Daily      gabapentin 100 MG capsule  Commonly known as: NEURONTIN   100 mg, Oral, Every Night at Bedtime      methIMAzole 5 MG tablet  Commonly known as: TAPAZOLE   5 mg, Oral, 3 Times Weekly, Monday, Wednesday, and Friday       multivitamin with minerals tablet tablet   1 tablet, Oral, Daily               No Known Allergies    Discharge Disposition:  Home-Health Care [facility]      Discharge Diet:  Diet Order   Procedures    Diet: Cardiac Diets; Healthy Heart (2-3 Na+); No Straw; Texture: Regular Texture (IDDSI 7); Fluid Consistency: Thin (IDDSI 0)       Discharge Activity:       CODE STATUS:    Code Status and Medical Interventions:   Ordered at: 02/09/24 1716     Code Status (Patient has no pulse and is not breathing):    CPR (Attempt to Resuscitate)     Medical Interventions (Patient has pulse or is breathing):    Full Support       Future Appointments   Date Time Provider Department Center   2/21/2024 10:15 AM Huang Chan MD [facility] CD [facility]   2/27/2024 11:00 AM [facility] US NIVAS ARTERIAL CRT 1 BH [facility] NI [facility]   2/29/2024  1:30 PM Mary Augustin, DNP, APRN [facility] TS [facility]     Additional Instructions for the Follow-ups that You Need to Schedule       XR Chest 2 View    Mar 01, 2024 (Approximate)       Exam reason: pleural effusion   Release to patient: Routine Release               Contact information for follow-up providers       Mary Augustin, NOHEMY, APRN. Go in 1 week(s).    Specialties: Thoracic Surgery, Nurse Practitioner  Why: Please go to the main hospital for a chest x-ray 30 min prior to your appointment  Contact information:  [address] [id number] [phone]               Taiwo Powers MD Follow up in 1 week(s).    Specialty: Internal Medicine  Contact information:  [address] [id number] [phone]                       Contact information for after-discharge care       Home Medical Care       [facility] .    Service: Home Health Services  Contact information:  [address] , [address]  [facility] [address] [phone]                                   Additional Instructions for the Follow-ups that You Need to Schedule       XR Chest 2 View    Mar 01, 2024 (Approximate)      Exam reason: pleural effusion   Release to patient: Routine Release            Time Spent on Discharge:  Greater than 30 minutes      Spencer Manuel MD  [facility]  02/20/24  13:28 EST

## 2024-02-21 NOTE — OUTREACH NOTE
Prep Survey      Flowsheet Row Responses   [facility] facility patient discharged from? [facility]   Is LACE score < 7 ? No   Eligibility Readm Mgmt   Discharge diagnosis *Pleural effusion   Does the patient have one of the following disease processes/diagnoses(primary or secondary)? Other   Does the patient have Home health ordered? Yes   What is the Home health agency?  [facility]   Is there a DME ordered? No   Prep survey completed? Yes            KATHERYN YO - Registered Nurse

## 2024-02-21 NOTE — CASE MANAGEMENT/SOCIAL WORK
Case Management Discharge Note      Final Note: Discharged home with Annemarietannerjeannette HERNANDEZ  Transported by [facility]         Selected Continued Care - Discharged on 2/20/2024 Admission date: 2/9/2024 - Discharge disposition: Home-Health Care [facility]      Destination    No services have been selected for the patient.                Durable Medical Equipment    No services have been selected for the patient.                Dialysis/Infusion    No services have been selected for the patient.                Home Medical Care       Service Provider Selected Services Address Phone Fax Patient Preferred    [facility] [address], [address], [place] [address] [phone] --              Therapy    No services have been selected for the patient.                Community Resources    No services have been selected for the patient.                Community & DME    No services have been selected for the patient.                    Selected Continued Care - Prior Encounters Includes continued care and service providers with selected services from prior encounters from 11/11/2023 to 2/20/2024      Discharged on 12/16/2023 Admission date: 12/13/2023 - Discharge disposition: Home-Health Care [facility]      Home Medical Care       Service Provider Selected Services Address Phone Fax Patient Preferred    [facility] [address], [address], [place] [address] [phone] --                      Discharged on 11/14/2023 Admission date: 11/8/2023 - Discharge disposition: Home or Self Care      Home Medical Care       Service Provider Selected Services Address Phone Fax Patient Preferred    [facility] [address], [address], [place] [address] [phone] --                          Transportation Services  Ambulance: [facility]    Final  Discharge Disposition Code: 06 - home with home health care

## 2024-02-22 ENCOUNTER — READMISSION MANAGEMENT (OUTPATIENT)
Dept: CALL CENTER | Facility: HOSPITAL | Age: 84
End: 2024-02-22
Payer: MEDICARE

## 2024-02-22 NOTE — OUTREACH NOTE
Medical Week 1 Survey      Flowsheet Row Responses   [facility] patient discharged from? [place]   Does the patient have one of the following disease processes/diagnoses(primary or secondary)? Other   Week 1 attempt successful? Yes   Call start time 0830   Call end time 0841   Is patient permission given to speak with other caregiver? Yes   Person spoke with today (if not patient) and relationship Belem-spouse.   Meds reviewed with patient/caregiver? Yes   Is the patient having any side effects they believe may be caused by any medication additions or changes? No   Does the patient have all medications ordered at discharge? N/A   Is the patient taking all medications as directed (includes completed medication regime)? Yes   Comments regarding appointments Doppler U/S [id number], CT surgery appt 02/29/24.   Does the patient have a primary care provider?  Yes   Does the patient have an appointment with their PCP within 7 days of discharge? No   What is preventing the patient from scheduling follow up appointments within 7 days of discharge? Haven't had time  [Wife states PCP is out of town, and will schedule f/u when he returns.]   Nursing Interventions Advised patient to make appointment, Educated patient on importance of making appointment   Has the patient kept scheduled appointments due by today? N/A   Has home health visited the patient within 72 hours of discharge? Yes   Home health comments Wife states HH nurse visited yesterday and PT will visit today.   DME comments Has w/c for now due to weakness.   Psychosocial issues? No   Psychosocial comments Wife states has in-home caregiver, Andres, who assists patient as needed.   Did the patient receive a copy of their discharge instructions? Yes   Nursing interventions Reviewed instructions with patient   What is the patient's perception of their health status since discharge? Returned to baseline/stable   Is the patient/caregiver able to teach back signs and  symptoms related to disease process for when to call PCP? Yes   Is the patient/caregiver able to teach back signs and symptoms related to disease process for when to call 911? Yes   Is the patient/caregiver able to teach back the hierarchy of who to call/visit for symptoms/problems? PCP, Specialist, Home health nurse, Urgent Care, ED, 911 Yes   If the patient is a current smoker, are they able to teach back resources for cessation? Not a smoker   Week 1 call completed? Yes   Would this patient benefit from a Referral to [facility] Social Work? No   Is the patient interested in additional calls from an ambulatory ? No   Wrap up additional comments Spouse states patient is stable, but still weak. States caregiver, Andres, assists patient up to recliner and wheelchair. Denies any edema, SOA, or chest pain. States  PT will visit today, nurse visited yesterday. Discussed where to purchase pulse oximeter if desired. Denies any needs today.   Call end time 0841            Ani HERNANDEZ - Registered Nurse

## 2024-02-23 LAB
FUNGUS WND CULT: NORMAL
MYCOBACTERIUM SPEC CULT: NORMAL
NIGHT BLUE STAIN TISS: NORMAL

## 2024-02-26 RX ORDER — EMPAGLIFLOZIN 10 MG/1
10 TABLET, FILM COATED ORAL DAILY
Qty: 90 TABLET | Refills: 1 | Status: SHIPPED | OUTPATIENT
Start: 2024-02-26

## 2024-02-27 ENCOUNTER — HOSPITAL ENCOUNTER (OUTPATIENT)
Dept: CARDIOLOGY | Facility: HOSPITAL | Age: 84
Discharge: HOME | End: 2024-02-27
Admitting: SURGERY
Payer: MEDICARE

## 2024-02-27 DIAGNOSIS — I73.9 PAD (PERIPHERAL ARTERY DISEASE): ICD-10-CM

## 2024-02-27 LAB
BH CV LOWER ARTERIAL LEFT ABI RATIO: 1.47
BH CV LOWER ARTERIAL LEFT DORSALIS PEDIS SYS MAX: 108
BH CV LOWER ARTERIAL LEFT POST TIBIAL SYS MAX: 174
UPPER ARTERIAL LEFT ARM BRACHIAL SYS MAX: 118
UPPER ARTERIAL RIGHT ARM BRACHIAL SYS MAX: 108

## 2024-02-27 PROCEDURE — 93922 UPR/L XTREMITY ART 2 LEVELS: CPT

## 2024-02-28 NOTE — PROGRESS NOTES
"Chief Complaint  Follow up, right pleural effusion    Subjective        Mehreen Silva IV presents to [facility] THORACIC SURGERY  History of Present Illness    Mr. Silva is a pleasant 83-year-old gentleman who we saw in February 2023 while hospitalized for a right pleural effusion of uncertain etiology.  He underwent CT-guided chest tube placement with intrapleural lytic therapy and his pleural effusion improved.  Cytology was negative for malignancy.  He presents today with chest x-ray. He has no new pulmonary complaints.         Objective   Vital Signs:  BP 98/62 (BP Location: Left arm, Patient Position: Sitting, Cuff Size: Adult)   Pulse 74   Ht 177.8 cm (70\")   Wt 53.1 kg (117 lb)   SpO2 97%   BMI 16.79 kg/m²   Estimated body mass index is 16.79 kg/m² as calculated from the following:    Height as of this encounter: 177.8 cm (70\").    Weight as of this encounter: 53.1 kg (117 lb).             Physical Exam  Constitutional:       General: He is not in acute distress.     Appearance: Normal appearance. He is not ill-appearing.      Comments: Appears a little fatigued   HENT:      Head: Normocephalic and atraumatic.   Cardiovascular:      Rate and Rhythm: Normal rate.   Pulmonary:      Effort: Pulmonary effort is normal. No respiratory distress.   Musculoskeletal:         General: Normal range of motion.      Cervical back: Normal range of motion and neck supple.      Comments: In wheelchair status post right AKA   Skin:     General: Skin is warm and dry.   Neurological:      General: No focal deficit present.      Mental Status: He is alert.      Motor: Weakness present.   Psychiatric:         Mood and Affect: Mood normal.         Thought Content: Thought content normal.        Result Review :            I have independently reviewed the chest x-ray performed prior to his appointment today which demonstrates adequate aeration and expansion of the lungs bilaterally.  No significant right " pleural effusion.           Assessment and Plan     Diagnoses and all orders for this visit:    1. Pleural effusion (Primary)  -     CT Chest Without Contrast; Future    2. Former smoker  -     CT Chest Without Contrast; Future    Mr. Silva is status post right chest tube placement with intrapleural tPA in early February 2024 secondary to loculated pleural effusion.  This effusion could be related to his history of A-fib but also could be secondary to a recent viral illness.  Cytology was negative, but given his smoking history, I would like to continue to monitor this.  There does not appear to be any significant reaccumulation on chest x-ray performed prior to his appointment today.  We will order CT chest in 4 months and have him follow-up with us in the office at that time.  Patient and his wife agree to the plan.       I spent 32 minutes caring for Mehreen on this date of service. This time includes time spent by me in the following activities:preparing for the visit, reviewing tests, obtaining and/or reviewing a separately obtained history, performing a medically appropriate examination and/or evaluation , ordering medications, tests, or procedures, documenting information in the medical record, independently interpreting results and communicating that information with the patient/family/caregiver, and care coordination  Follow Up     Return in about 4 months (around 6/29/2024) for Next scheduled follow up.  Patient was given instructions and counseling regarding his condition or for health maintenance advice. Please see specific information pulled into the AVS if appropriate.

## 2024-02-29 ENCOUNTER — OFFICE VISIT (OUTPATIENT)
Dept: SURGERY | Facility: CLINIC | Age: 84
End: 2024-02-29
Payer: MEDICARE

## 2024-02-29 ENCOUNTER — HOSPITAL ENCOUNTER (OUTPATIENT)
Dept: GENERAL RADIOLOGY | Facility: HOSPITAL | Age: 84
Discharge: HOME | End: 2024-02-29
Admitting: NURSE PRACTITIONER
Payer: MEDICARE

## 2024-02-29 VITALS
SYSTOLIC BLOOD PRESSURE: 98 MMHG | HEART RATE: 74 BPM | DIASTOLIC BLOOD PRESSURE: 62 MMHG | BODY MASS INDEX: 16.75 KG/M2 | OXYGEN SATURATION: 97 % | HEIGHT: 70 IN | WEIGHT: 117 LBS

## 2024-02-29 DIAGNOSIS — Z87.891 FORMER SMOKER: ICD-10-CM

## 2024-02-29 DIAGNOSIS — J90 PLEURAL EFFUSION: Primary | ICD-10-CM

## 2024-02-29 DIAGNOSIS — J90 PLEURAL EFFUSION: ICD-10-CM

## 2024-02-29 PROCEDURE — 71046 X-RAY EXAM CHEST 2 VIEWS: CPT

## 2024-02-29 PROCEDURE — 1159F MED LIST DOCD IN RCRD: CPT | Performed by: NURSE PRACTITIONER

## 2024-02-29 PROCEDURE — 1160F RVW MEDS BY RX/DR IN RCRD: CPT | Performed by: NURSE PRACTITIONER

## 2024-02-29 PROCEDURE — 99214 OFFICE O/P EST MOD 30 MIN: CPT | Performed by: NURSE PRACTITIONER

## 2024-02-29 RX ORDER — ESCITALOPRAM OXALATE 5 MG/1
TABLET ORAL
COMMUNITY
Start: 2024-02-26

## 2024-03-01 LAB
FUNGUS WND CULT: NORMAL
MYCOBACTERIUM SPEC CULT: NORMAL
NIGHT BLUE STAIN TISS: NORMAL

## 2024-03-05 ENCOUNTER — OFFICE VISIT (OUTPATIENT)
Dept: CARDIOLOGY | Facility: CLINIC | Age: 84
End: 2024-03-05
Payer: MEDICARE

## 2024-03-05 VITALS
DIASTOLIC BLOOD PRESSURE: 70 MMHG | WEIGHT: 113 LBS | BODY MASS INDEX: 16.18 KG/M2 | SYSTOLIC BLOOD PRESSURE: 90 MMHG | HEIGHT: 70 IN | HEART RATE: 79 BPM | OXYGEN SATURATION: 100 %

## 2024-03-05 DIAGNOSIS — I50.32 CHRONIC DIASTOLIC CHF (CONGESTIVE HEART FAILURE): ICD-10-CM

## 2024-03-05 DIAGNOSIS — E05.90 HYPERTHYROIDISM: ICD-10-CM

## 2024-03-05 DIAGNOSIS — I48.11 LONGSTANDING PERSISTENT ATRIAL FIBRILLATION: ICD-10-CM

## 2024-03-05 DIAGNOSIS — J90 PLEURAL EFFUSION: Primary | ICD-10-CM

## 2024-03-05 NOTE — Clinical Note
Do you think these recurrent pleural effusions are from diastolic heart failure?  Or something else is going on?  He was very well compensated when I saw him the other day.  Please advise anything further.

## 2024-03-05 NOTE — PROGRESS NOTES
Date of Office Visit: 2024  Encounter Provider: CHARLEE Lopez  Place of Service: [facility] CARDIOLOGY  Patient Name: Mehreen Silva IV  :1940  Primary Cardiologist: Dr. Huang Chan     Chief Complaint   Patient presents with    Atrial Fibrillation    Diastolic Heart Failure   :     HPI: Mehreen Silva IV is a 83 y.o. male who presents today for hospital follow up visit. I have reviewed his medical records.      He has been diagnosed with thyroid disease, permanent atrial fibrillation (2022), and acute on chronic diastolic heart failure (2022).  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    In 2023, he was hospitalized for anxiety, shortness of breath, hypoxia, and large pleural effusions.  He underwent thoracentesis with improvement.  In 2023, he was hospitalized for right above-the-knee amputation for chronic osteomyelitis and contracture of right knee.    In 2024, he presented to the [facility] ED with shortness of breath and cough.  He was found to have a large right pleural effusion with atelectasis and right to left mediastinal shift of unknown etiology.  Pulmonology and thoracic surgery were consulted and he had a chest tube placed.  He was treated with antibiotics.  TSH was normal.  Apixaban was temporally held and then restarted upon discharge.  He had mild acute kidney injury, but was recommended to resume very low-dose of furosemide.  He was to follow-up with thoracic surgery outpatient and have a repeat chest x-ray.  Cardiology consulted for permanent atrial fibrillation with RVR and he was recommended continue with metoprolol. Echocardiogram showed EF 70%, mild LVH, diastolic function indeterminate, left atrial cavity moderately dilated, moderate aortic calcification, mild to moderate mitral regurgitation, and small pericardial effusion.     He presents today for follow-up visit with his wife  and caregiver accompanying him.  He is in a wheelchair today.  He denies chest pain, shortness of air, palpitations, edema, dizziness, or syncope.  He feels fatigued.  He is tolerating furosemide 20 mg daily.  Blood pressure and heart rate are normal.  His wife is concerned that he has lost 40 pounds since November.      Past Medical History:   Diagnosis Date    (HFpEF) heart failure with preserved ejection fraction 2022    Arthritis     Atrial fibrillation     Atrial fibrillation, persistent 2022    Cervical spondylosis with myelopathy     CHF (congestive heart failure)     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    Disease of thyroid gland     HLD (hyperlipidemia) 2022    Hx of toxic multinodular goiter 2022    Lumbar radiculopathy     Pulmonary hypertension     Ulcer with gangrene     right heel       Past Surgical History:   Procedure Laterality Date    ABOVE KNEE AMPUTATION Right 2023    Procedure: ABOVE KNEE AMPUTATION RIGHT, proveena wound vac placement;  Surgeon: Issa Nieves MD;  [facility];  Service: Vascular;  Laterality: Right;    APPENDECTOMY      BACK SURGERY      COLONOSCOPY      EXCISION MASS TRUNK N/A 2016    Procedure: Excision soft tissue neoplasm on abdominal wall and left neck;  Surgeon: Shaji Barahona Jr., MD;  [facility];  Service:     KNEE SURGERY      NECK SURGERY  1974    SHOULDER SURGERY         Social History     Socioeconomic History    Marital status:     Number of children: 1    Highest education level: Professional school degree (e.g., MD, DDS, DVM, BRITNEY)   Tobacco Use    Smoking status: Former     Current packs/day: 0.00     Types: Cigarettes     Quit date: 1983     Years since quittin.7     Passive exposure: Past    Smokeless tobacco: Never   Vaping Use    Vaping status: Never Used   Substance and Sexual Activity    Alcohol use: Yes     Comment: SOCIAL    Drug use: No     "Sexual activity: Defer       Family History   Problem Relation Age of Onset    Cancer Mother     Heart disease Father     Aneurysm Father     Malig Hyperthermia Neg Hx        The following portion of the patient's history were reviewed and updated as appropriate: past medical history, past surgical history, past social history, past family history, allergies, current medications, and problem list.    Review of Systems   Constitutional: Positive for malaise/fatigue.   Cardiovascular: Negative.    Respiratory: Negative.     Hematologic/Lymphatic: Negative.    Neurological: Negative.        No Known Allergies      Current Outpatient Medications:     cholecalciferol (VITAMIN D3) 25 MCG (1000 UT) tablet, Take 1 tablet by mouth Daily., Disp: , Rfl:     Eliquis 5 MG tablet tablet, TAKE ONE TABLET BY MOUTH EVERY 12 HOURS, Disp: 180 tablet, Rfl: 3    ezetimibe (ZETIA) 10 MG tablet, Take 1 tablet by mouth Daily., Disp: , Rfl:     furosemide (LASIX) 20 MG tablet, Take 1 tablet by mouth Daily., Disp: , Rfl:     gabapentin (NEURONTIN) 100 MG capsule, Take 1 capsule by mouth every night at bedtime., Disp: , Rfl:     Jardiance 10 MG tablet tablet, TAKE 1 TABLET BY MOUTH DAILY, Disp: 90 tablet, Rfl: 1    methIMAzole (TAPAZOLE) 5 MG tablet, Take 1 tablet by mouth 3 (Three) Times a Week. Monday, Wednesday, and Friday, Disp: , Rfl:     metoprolol tartrate (LOPRESSOR) 25 MG tablet, TAKE ONE TABLET BY MOUTH EVERY 12 HOURS (Patient taking differently: Take 1 tablet by mouth 2 (Two) Times a Day.), Disp: 180 tablet, Rfl: 3    multivitamin with minerals tablet tablet, Take 1 tablet by mouth Daily., Disp: , Rfl:          Objective:     Vitals:    03/05/24 1305   BP: 90/70   BP Location: Left arm   Patient Position: Sitting   Cuff Size: Adult   Pulse: 79   SpO2: 100%   Weight: 51.3 kg (113 lb)   Height: 177.8 cm (70\")     Body mass index is 16.21 kg/m².    PHYSICAL EXAM:    Vitals Reviewed.   General Appearance: No acute distress, well " developed and well nourished.  Thin.  Appears acutely ill.   HENT: No hearing loss noted.    Respiratory: No signs of respiratory distress. Respiration rhythm and depth normal.  Clear on left; diminished sounds on right.  Cardiovascular:  Jugular Venous Pressure: Normal  Heart Rate and Rhythm: Irregular, irregular.  Heart Sounds: Normal S1 and S2. No S3 or S4 noted.  Murmurs: No murmurs noted. No rubs, thrills, or gallops.   Lower Extremities: Right above-the-knee amputation.  Musculoskeletal: Generalized weakness.  Skin: General appearance normal.    Psychiatric: Patient alert and oriented to person, place, and time. Speech and behavior appropriate. Normal mood and affect.       ECG 12 Lead    Date/Time: 3/5/2024 1:08 PM  Performed by: Clarita Armenta APRN    Authorized by: Clarita Armenta APRN  Comparison: compared with previous ECG from 2/9/2024  Comparison to previous ECG: Atrial fibrillation with a rate of 124  Rhythm: atrial fibrillation  Rate: normal  BPM: 79  Conduction: conduction normal  ST Segments: ST segments normal  T Waves: T waves normal  QRS axis: normal    Clinical impression: abnormal EKG            Assessment:       Diagnosis Plan   1. Pleural effusion        2. Chronic diastolic CHF (congestive heart failure)        3. Longstanding persistent atrial fibrillation        4. Hyperthyroidism               Plan:       1.  In November 2023, he was hospitalized for pleural effusions felt to be due to diastolic heart failure and underwent thoracentesis.  In February 2024, he had a recurrent large right pleural effusion and the etiology was unknown.  He once again underwent thoracentesis and chest tube placement.  He will be following up with pulmonology.  He remains on furosemide.    2.  Diastolic Heart Failure: He really appears well compensated today.  Recent echocardiogram showed indeterminate diastolic function and hyperdynamic EF 70%.    3.  Persistent Atrial Fibrillation: Currently rate  "controlled on metoprolol tartrate. [id number] score of 3 and remains on apixaban.    4.  Hyperthyroidism.    5.  Status post AKA of right leg from osteomyelitis and contracted knee.    6.  I will further discuss with Dr. Chan if we need to do anything further from a cardiac standpoint.  He is currently scheduled to see Dr. Chan in June.    ADDENDUM 3/8/2024:  Per Dr. Chan \"Yes, I think the etiology was HFpEf due to the fluid studies, but once the fluid occurs it makes it more likely to recur. Hopefully we can keep him compensated with oral diuretics.\"  Mrs. Silva was informed of results and verbalized understanding.      As always, it has been a pleasure to participate in your patient's care. Thank you.         Sincerely,         CHARLEE Jackson  [facility] Cardiology      Dictated utilizing Dragon Dictation    "

## 2024-03-06 ENCOUNTER — TELEPHONE (OUTPATIENT)
Dept: SURGERY | Facility: CLINIC | Age: 84
End: 2024-03-06

## 2024-03-06 ENCOUNTER — TELEPHONE (OUTPATIENT)
Dept: SURGERY | Facility: CLINIC | Age: 84
End: 2024-03-06
Payer: MEDICARE

## 2024-03-06 NOTE — TELEPHONE ENCOUNTER
I left a detailed message regarding appointment information. I left my name, medical group, and call back number.    DB 10:45  3/6/2024

## 2024-03-06 NOTE — TELEPHONE ENCOUNTER
Caller: Belem Silva    Relationship to patient: Emergency Contact    Best call back number: [phone]     Chief complaint: PT NEEDS TO DYLAN       Type of visit: F/U W CT CHEST W/O CONT      Requested date: PTS WIFE STATES PT HAS APPT WITH URIEL LAO 6/10/24 AND SHE WOULD LIKE TO DYL[facility] F/U AND TESTING PRIOR TO THAT APPT SO SHILO CAN SEE RESULTS AS WELL-SHE WANTS THE TESTING AND THE APPT BEFORE HIS APPT WITH CARDIOLOGY    If rescheduling, when is the original appointment:      Additional notes:APPT AND TESTING NEED TO TAKE PLACE PRIOR TO 6/10/24 PER PT

## 2024-03-07 ENCOUNTER — TELEPHONE (OUTPATIENT)
Dept: SURGERY | Facility: CLINIC | Age: 84
End: 2024-03-07
Payer: MEDICARE

## 2024-03-07 NOTE — TELEPHONE ENCOUNTER
I left a detailed message regarding important appointment information. I left my name, medical group and telephone number. I have also mailed out the appointment reminder.    LUCY 7:32  3/7/2024

## 2024-03-08 ENCOUNTER — TELEPHONE (OUTPATIENT)
Dept: CARDIOLOGY | Facility: CLINIC | Age: 84
End: 2024-03-08
Payer: MEDICARE

## 2024-03-08 PROBLEM — M86.471: Status: RESOLVED | Noted: 2023-12-13 | Resolved: 2024-03-08

## 2024-03-08 PROBLEM — I48.91 A-FIB: Status: RESOLVED | Noted: 2022-09-24 | Resolved: 2024-03-08

## 2024-03-08 PROBLEM — M86.9: Status: RESOLVED | Noted: 2023-12-13 | Resolved: 2024-03-08

## 2024-03-08 PROBLEM — R06.00 DYSPNEA: Status: RESOLVED | Noted: 2024-02-09 | Resolved: 2024-03-08

## 2024-03-08 PROBLEM — I50.30 (HFPEF) HEART FAILURE WITH PRESERVED EJECTION FRACTION: Status: RESOLVED | Noted: 2022-09-24 | Resolved: 2024-03-08

## 2024-03-08 PROBLEM — G95.9 CERVICAL MYELOPATHY: Status: RESOLVED | Noted: 2023-11-08 | Resolved: 2024-03-08

## 2024-03-08 PROBLEM — I50.32 CHRONIC HEART FAILURE WITH PRESERVED EJECTION FRACTION (HFPEF): Status: RESOLVED | Noted: 2023-11-12 | Resolved: 2024-03-08

## 2024-03-08 LAB
FUNGUS WND CULT: NORMAL
MYCOBACTERIUM SPEC CULT: NORMAL
NIGHT BLUE STAIN TISS: NORMAL

## 2024-03-08 NOTE — TELEPHONE ENCOUNTER
"  Per Dr. Chan \"Yes, I think the etiology was HFpEf due to the fluid studies, but once the fluid occurs it makes it more likely to recur. Hopefully we can keep him compensated with oral diuretics.\"    I tried calling patient. No answer. I will try back next week.   "

## 2024-03-08 NOTE — TELEPHONE ENCOUNTER
Mrs. Silva returned my call.  I just tried calling again and there was no answer.  I left a message and told her I will try her back next week.

## 2024-03-08 NOTE — PROGRESS NOTES
Yes, I think the etiology was HFpEf due to the fluid studies, but once the fluid occurs it makes it more likely to recur. Hopefully we can keep him compensated with oral diuretics.    sapna

## 2024-03-14 NOTE — TELEPHONE ENCOUNTER
Wife was informed of Dr. Chan's opinion on heart failure and the pleural effusion. He is doing well.

## 2024-03-15 LAB
MYCOBACTERIUM SPEC CULT: NORMAL
NIGHT BLUE STAIN TISS: NORMAL

## 2024-03-20 ENCOUNTER — OFFICE VISIT (OUTPATIENT)
Dept: WOUND CARE | Facility: HOSPITAL | Age: 84
End: 2024-03-20
Payer: MEDICARE

## 2024-03-22 LAB
MYCOBACTERIUM SPEC CULT: NORMAL
NIGHT BLUE STAIN TISS: NORMAL

## 2024-03-27 ENCOUNTER — OFFICE VISIT (OUTPATIENT)
Dept: WOUND CARE | Facility: HOSPITAL | Age: 84
End: 2024-03-27
Payer: MEDICARE

## 2024-03-27 ENCOUNTER — HOSPITAL ENCOUNTER (OUTPATIENT)
Dept: GENERAL RADIOLOGY | Facility: HOSPITAL | Age: 84
Discharge: HOME | End: 2024-03-27
Payer: MEDICARE

## 2024-03-27 DIAGNOSIS — R22.2 MASS IN CHEST: ICD-10-CM

## 2024-03-27 DIAGNOSIS — L89.150 UNSTAGEABLE PRESSURE ULCER OF SACRAL REGION: ICD-10-CM

## 2024-03-27 PROCEDURE — 71046 X-RAY EXAM CHEST 2 VIEWS: CPT

## 2024-03-27 PROCEDURE — 73502 X-RAY EXAM HIP UNI 2-3 VIEWS: CPT

## 2024-03-27 PROCEDURE — G0463 HOSPITAL OUTPT CLINIC VISIT: HCPCS

## 2024-04-02 PROBLEM — L97.429 ULCER OF LEFT HEEL: Status: ACTIVE | Noted: 2024-04-02

## 2024-04-03 ENCOUNTER — OFFICE VISIT (OUTPATIENT)
Dept: WOUND CARE | Facility: HOSPITAL | Age: 84
End: 2024-04-03
Payer: MEDICARE

## 2024-04-09 DIAGNOSIS — Z89.611 S/P AKA (ABOVE KNEE AMPUTATION), RIGHT: Primary | ICD-10-CM

## 2024-04-16 ENCOUNTER — HOSPITAL ENCOUNTER (OUTPATIENT)
Dept: PHYSICAL THERAPY | Facility: HOSPITAL | Age: 84
Setting detail: THERAPIES SERIES
Discharge: HOME | End: 2024-04-16
Payer: MEDICARE

## 2024-04-16 DIAGNOSIS — Z74.09 IMPAIRED TRANSFERS: ICD-10-CM

## 2024-04-16 DIAGNOSIS — Z74.09 IMPAIRED MOBILITY: ICD-10-CM

## 2024-04-16 DIAGNOSIS — Z89.611 HISTORY OF RIGHT ABOVE KNEE AMPUTATION: Primary | ICD-10-CM

## 2024-04-16 PROCEDURE — 97163 PT EVAL HIGH COMPLEX 45 MIN: CPT | Performed by: PHYSICAL THERAPIST

## 2024-04-16 PROCEDURE — 97761 PROSTHETIC TRAING 1ST ENC: CPT | Performed by: PHYSICAL THERAPIST

## 2024-04-16 NOTE — THERAPY EVALUATION
.Outpatient Physical Therapy Neuro Initial Evaluation  [facility]     Patient Name: Mehreen Silva IV  : 1940  MRN: [id number]  Today's Date: 2024      Visit Date: 2024    Patient Active Problem List   Diagnosis    Cervical spondylosis with myelopathy    Lumbar radiculopathy    Hx of toxic multinodular goiter    Dyslipidemia    Hyperthyroidism    Chronic diastolic CHF (congestive heart failure)    Pleural effusion    Longstanding persistent atrial fibrillation    Ulcer of left heel        Past Medical History:   Diagnosis Date    (HFpEF) heart failure with preserved ejection fraction 2022    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    Arthritis     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, right leg 2023    Atrial fibrillation     Atrial fibrillation, persistent 2022    Cervical spondylosis with myelopathy     CHF (congestive heart failure)     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    Disease of thyroid gland     HLD (hyperlipidemia) 2022    Hx of toxic multinodular goiter 2022    Lumbar radiculopathy     Peripheral vascular disease, unspecified 2024    Pulmonary hypertension     Ulcer with gangrene     right heel        Past Surgical History:   Procedure Laterality Date    ABOVE KNEE AMPUTATION Right 2023    Procedure: ABOVE KNEE AMPUTATION RIGHT, proveena wound vac placement;  Surgeon: Issa Nieves MD;  Location: [facility];  Service: Vascular;  Laterality: Right;    APPENDECTOMY      BACK SURGERY      x4    COLONOSCOPY      EXCISION MASS TRUNK N/A 2016    Procedure: Excision soft tissue neoplasm on abdominal wall and left neck;  Surgeon: Shaji Barahona Jr., MD;  Location: [facility];  Service:     KNEE SURGERY      NECK SURGERY  1974    QUADRICEPS REPAIR      ROTATOR CUFF REPAIR      SHOULDER SURGERY      SMALL INTESTINE SURGERY      Bowel Obstruction         Visit  Dx:     ICD-10-CM ICD-9-CM   1. History of right above knee amputation  Z89.611 V49.76   2. Impaired transfers  Z74.09 781.99   3. Impaired mobility  Z74.09 799.89        Patient History       Row Name 04/16/24 1015             History    Chief Complaint Difficulty with daily activities;Difficulty Walking  -JK      Date Current Problem(s) Began --  december 2023  -JK      Brief Description of Current Complaint R AK amputation 12/23  -JK         Services    Prior Rehab/Home Health Experiences Yes  -JK      Are you currently receiving Home Health services No  -JK      Do you plan to receive Home Health services in the near future No  -JK         Daily Activities    Primary Language English  -JK         Safety    Are you being hurt, hit, or frightened by anyone at home or in your life? No  -JK      Are you being neglected by a caregiver No  -JK      Have you had any of the following issues with N/A  -JK                User Key  (r) = Recorded By, (t) = Taken By, (c) = Cosigned By      Initials Name Provider Type    Miryam Sandoval, PT Physical Therapist                         PT Neuro       Row Name 04/16/24 1015             Subjective    Subjective Comments Pt reports that his caregiver Don picks him up and does the transfer in/out of bed, in/out of WC, on/off commode  -JK         Precautions and Contraindications    Precautions/Limitations fall precautions  -JK      Precautions sacral ulcer; cognitive deficits  -JK         Subjective Pain    Able to rate subjective pain? yes  -JK      Pre-Treatment Pain Level 0  -JK      Post-Treatment Pain Level 0  -JK         Home Living    Current Living Arrangements home  -JK      Home Accessibility stairs to enter home  ramp entrance  -JK      Home Equipment Wheelchair-manual;Walker  slide board  -JK         Cognition    Overall Cognitive Status Impaired  -JK      Memory Decreased recall of biographical information;Decreased short term memory  -JK          "Posture/Observations    Posture/Observations Comments pt arrived to PT in WC; not wearing ; brought by Caregiver Don  -JK         General ROM    LT Lower Ext Lt Knee Extension/Flexion  -JK      GENERAL ROM COMMENTS pt lacking full L knee extension  -JK         Bed Mobility    Bed Mobility supine-sit;sit-supine  -JK      Supine-Sit Reynolds (Bed Mobility) moderate assist (50% patient effort);maximum assist (25% patient effort)  -JK      Bed Mobility, Safety Issues decreased use of arms for pushing/pulling;decreased use of legs for bridging/pushing;impaired trunk control for bed mobility  -JK      Comment, (Bed Mobility) pt positioned to lie back on mat with legs dangling off mat to don/doff gel liner and prosthesis and pt just fell back into supine without controling tsf  -JK         Transfers    Bed-Chair Reynolds (Transfers) maximum assist (25% patient effort);dependent (less than 25% patient effort);2 person assist  plus third person  -JK      Chair-Bed Reynolds (Transfers) maximum assist (25% patient effort);dependent (less than 25% patient effort);2 person assist  -JK      Transfers, Bed-Chair-Bed, Assist Device sliding board  -JK      Sit-Stand Reynolds (Transfers) dependent (less than 25% patient effort);2 person assist  -JK      Stand-Sit Reynolds (Transfers) dependent (less than 25% patient effort);2 person assist  -JK      Transfers, Sit-Stand-Sit, Assist Device parallel bars  -JK      Comment, (Transfers) pt able to only get 4\" lift of bottom up off WC in // bars with dep x 2 assist   -JK         LE Prosthetic Management    Additional Documentation LE Prosthetic Management (Group)  -JK         LE Residual Limb    Additional Documentation Residual Limb Evaluation (Lower Extremity) (Group)  -JK         Residual Limb Evaluation (Lower Extremity)    Amputation Level (LE Residual Limb) medial transfemoral  -JK      Laterality, Amputation (LE Residual Limb) right side  -JK      " Incision Management (LE Residual Limb) incision line adequately closed for scar management  -JK      Shape (LE Residual Limb) distal circumference of residual limb greater than proximal  -JK      Skin Assessment (LE Residual Limb) no signs of pressure or friction present  -JK         LE Prosthetic Management    Skin Care, LE Residual Limb (Prosthetic Management) patient requires caregiver assist for skin assessment/hygiene  -JK      Proper Care/Use of LE Prosthesis (Prosthetic Management) other (see comments)  pt instructed to wear  daily  -JK                User Key  (r) = Recorded By, (t) = Taken By, (c) = Cosigned By      Initials Name Provider Type    Miryam Sandoval, PT Physical Therapist                            Therapy Education  Education Details: Discussed plan of care with patient and his caregiver.  Patient was encouraged to start wearing his  every day at home. Pt was encouraged to start assisting his caregiver with all transfers.     PT OP Goals       Row Name 04/16/24 1100          PT Short Term Goals    STG Date to Achieve 05/14/24  -JK     STG 1 Pt to report he is wearing his  90% of the day at home.  -JK     STG 2 Pt complete 50% of job to don/doff gel liner and prosthesis  -JK     STG 3 Pt to come to stand with prosthesis on in parallel bars with Mod A of 2.  -JK     STG 4 Pt to  // bars with prosthesis on for 2 minutes and min A of 2.  -JK        Long Term Goals    LTG Date to Achieve 06/11/24  -JK     LTG 1 Pt to be wearing his prosthesis 2-3 hours BID at home.  -JK     LTG 2 Pt complete 75% of job to don/doff gel liner and prosthesis  -JK     LTG 3 Pt to come to stand with prosthesis on in parallel bars with Min A of 1.  -JK     LTG 4 Pt to stand with rolling walker wearing prosthesis on for 2 minutes and min A of 2.  -JK        Time Calculation    PT Goal Re-Cert Due Date 07/09/24  -JK               User Key  (r) = Recorded By, (t) = Taken By, (c) = Cosigned  "By      Initials Name Provider Type    Miryam Sandoval, PT Physical Therapist                     PT Assessment/Plan       Row Name 04/16/24 1142          PT Assessment    Functional Limitations Decreased safety during functional activities;Impaired gait;Impaired locomotion;Limitation in home management;Performance in self-care ADL;Performance in leisure activities  -LEEANNA     Impairments Balance;Endurance;Gait;Coordination;Locomotion;Muscle strength;Range of motion;Poor body mechanics  -LEEANNA     Assessment Comments Pt is a 83 year old WM who presents to PT for prosthetic training with R above knee prosthesis. Pt's PMHx includes R AKA Dec 2023, acute admit  2/9/2024 to 2/20/2024 due to large right pleural effusion with atelectasis and a right to left mediastinal shift of unknown etiology, left quad tendon repair about 10 years ago, left shoulder replacement, thyroid disease, permanent A-fib, acute on chronic diastolic heart failure, idiopathic neuropathy, cervical spondylosis with myelopathy. Pt lives with his wife in a home with a ramp entrance.  Patient has a caregiver Don present 5 days a week.  Patient reports that Don does \"all of the transfer\" to get in and out of bed, to the wheelchair, on and off toilet.  Pt's goals for therapy are to use the prosthesis to stand and possibly take steps. Pt required max/dependent assist of 2-3 to transfer from the chair to a mat and back today; pt is fearful during the tsf and does little to no effort to assist therapists during tst. Pt reuired dependent assist of 2 to attempt to  the parallel bars and was only able to lift buttocks about 4\" off the seat of WC only. Pt was told that in order to use prosthesis successfully to assist with transfers, pt needs to assist and do a lot more during transfers now (with and without prosthesis on). Pt has impaired balance, impaired gait, impaired transfers, impaired tolerance to functional tasks, and decreased LE strength and ROM. " Pt may benefit from skilled PT to address these deficits.  -URIELK     Please refer to paper survey for additional self-reported information Yes  -JK     Rehab Potential Poor  -JK     Patient/caregiver participated in establishment of treatment plan and goals Yes  -JK     Patient would benefit from skilled therapy intervention Yes  -JK        PT Plan    PT Frequency 2x/week  -JK     Predicted Duration of Therapy Intervention (PT) 12 weeks  -JK     Planned CPT's? PT EVAL HIGH COMPLEXITY: 96468;PT THER PROC EA 15 MIN: 04322;PT THER ACT EA 15 MIN: 50643;PT INITIAL PROSTHETIC TRAIN EA 15 MIN: 05206;PT SUBSEQUENT ORTHOTIC/PROSTHETIC TRAIN: 71494  -JK     Physical Therapy Interventions (Optional Details) balance training;bed mobility training;gait training;home exercise program;neuromuscular re-education;patient/family education;prosthetic fitting/training;ROM (Range of Motion);strengthening;stretching;transfer training;wheelchair management/propulsion training  -JK               User Key  (r) = Recorded By, (t) = Taken By, (c) = Cosigned By      Initials Name Provider Type    Miryam Sandoval, PT Physical Therapist                        OP Exercises       Row Name 04/16/24 1015             Subjective    Subjective Comments Pt reports that his caregiver Don picks him up and does the transfer in/out of bed, in/out of WC, on/off commode  -JK         Subjective Pain    Able to rate subjective pain? yes  -JK      Pre-Treatment Pain Level 0  -JK      Post-Treatment Pain Level 0  -JK                User Key  (r) = Recorded By, (t) = Taken By, (c) = Cosigned By      Initials Name Provider Type    Miryam Sandoval PT Physical Therapist                                        Time Calculation:   Start Time: 1015  Stop Time: 1115  Time Calculation (min): 60 min  Timed Charges  86546 - PT Initial Prosthetic Encounter: 30  Untimed Charges  PT Eval/Re-eval Minutes: 30  Total Minutes  Timed Charges Total Minutes: 30  Untimed Charges  Total Minutes: 30   Total Minutes: 60   Therapy Charges for Today       Code Description Service Date Service Provider Modifiers Qty    32435240111 HC PT EVAL HIGH COMPLEXITY 3 4/16/2024 Miryam Fatima, PT GP 1    [id number]  PT PROSTHETIC (S) TRAIN INITIAL ENCOUNTER, EACH 15 MIN 4/16/2024 Miryam Fatima, PT GP 2                      Miryam Fatima, PT  4/16/2024

## 2024-04-17 ENCOUNTER — OFFICE VISIT (OUTPATIENT)
Dept: WOUND CARE | Facility: HOSPITAL | Age: 84
End: 2024-04-17
Payer: MEDICARE

## 2024-04-18 ENCOUNTER — HOSPITAL ENCOUNTER (OUTPATIENT)
Dept: PHYSICAL THERAPY | Facility: HOSPITAL | Age: 84
Setting detail: THERAPIES SERIES
Discharge: HOME | End: 2024-04-18
Payer: MEDICARE

## 2024-04-18 DIAGNOSIS — Z89.611 HISTORY OF RIGHT ABOVE KNEE AMPUTATION: Primary | ICD-10-CM

## 2024-04-18 DIAGNOSIS — Z74.09 IMPAIRED TRANSFERS: ICD-10-CM

## 2024-04-18 DIAGNOSIS — Z74.09 IMPAIRED MOBILITY: ICD-10-CM

## 2024-04-18 PROCEDURE — 97530 THERAPEUTIC ACTIVITIES: CPT | Performed by: PHYSICAL THERAPIST

## 2024-04-18 PROCEDURE — 97763 ORTHC/PROSTC MGMT SBSQ ENC: CPT | Performed by: PHYSICAL THERAPIST

## 2024-04-18 NOTE — THERAPY TREATMENT NOTE
Outpatient Physical Therapy Neuro Treatment Note  [facility]     Patient Name: Mehreen Silva IV  : 1940  MRN: [id number]  Today's Date: 2024      Visit Date: 2024    Visit Dx:    ICD-10-CM ICD-9-CM   1. History of right above knee amputation  Z89.611 V49.76   2. Impaired transfers  Z74.09 781.99   3. Impaired mobility  Z74. 799.89       Patient Active Problem List   Diagnosis    Cervical spondylosis with myelopathy    Lumbar radiculopathy    Hx of toxic multinodular goiter    Dyslipidemia    Hyperthyroidism    Chronic diastolic CHF (congestive heart failure)    Pleural effusion    Longstanding persistent atrial fibrillation    Ulcer of left heel            PT Neuro       Row Name 24 1015             Subjective    Subjective Comments Pt reported he did not like the feeling he got in the standing frame today.  -JK         Precautions and Contraindications    Precautions/Limitations fall precautions  -JK      Precautions sacral ulcer; cognitive deficits  -JK         Subjective Pain    Able to rate subjective pain? yes  -JK      Pre-Treatment Pain Level 0  -JK      Post-Treatment Pain Level 0  -JK         Cognition    Overall Cognitive Status Impaired  -JK         Posture/Observations    Posture/Observations Comments pt arrived to PT in WC; not wearing ; brought by Caregiver Don  -JK         Transfers    Chair-Bed Moody (Transfers) maximum assist (25% patient effort);dependent (less than 25% patient effort);2 person assist  -JK      Transfers, Bed-Chair-Bed, Assist Device sliding board  -JK      Sit-Stand Moody (Transfers) dependent (less than 25% patient effort);2 person assist;1 person assist;verbal cues;nonverbal cues (demo/gesture)  3 people asssist  -JK      Stand-Sit Moody (Transfers) dependent (less than 25% patient effort);2 person assist;1 person assist;verbal cues;nonverbal cues (demo/gesture)  3 people  -JK      Transfers, Sit-Stand-Sit, Assist Device  parallel bars  -JK      Comment, (Transfers) Pt able to get bottom off the chair but stood with forward flexed posture; one PT blocking L knee; other PT attempting to lock R knee into extension of prosthesis but pt was too flexed for this to work; pt with head down and completely flexed at the waist  -JK         Balance Skills Training    Sitting-Level of Assistance Minimum assistance  -JK      Sitting-Balance Support Feet supported  B UE support except while reaching  -JK      Sitting-Balance Activities Lateral lean;Forward lean  -JK      Sitting # of Minutes pt sits leaning to the L and resists WS to the R  -JK      Standing Balance # of Minutes Pt placed in standing frame with prosthesis on R LE. Once pt raised to standing, he complained that he did not like that and was returned to seated position.  Patient raised in standing again in the standing frame and tolerated this for about 60 seconds before he was to return to a seated position.  Patient had difficulty holding his head up and was resting his head forward on the upper extremity platform.  -JK                User Key  (r) = Recorded By, (t) = Taken By, (c) = Cosigned By      Initials Name Provider Type    Miryam Sandoval, PT Physical Therapist                             PT Assessment/Plan       Row Name 04/18/24 9038          PT Assessment    Functional Limitations Decreased safety during functional activities;Impaired gait;Impaired locomotion;Limitation in home management;Performance in self-care ADL;Performance in leisure activities  -J     Impairments Balance;Endurance;Gait;Coordination;Locomotion;Muscle strength;Range of motion;Poor body mechanics  -JK     Assessment Comments Patient continues to transfer from the wheelchair to the gym mat max to dependent up to on a sliding board.  Patient has a lack of understanding and ability to execute forward weight shift to assist with this transfer.  Patient worked on sitting balance activities on the gym  "mat and needed min assist to maintain static and dynamic posture.  Patient tends to sit leaning to the left and resists cues to lean towards the right or reach laterally to the right.  Patient dependent to don and doff right prosthesis.  Patient placed in standing frame to attempt to come to stand with the prosthesis on the right.  Once in an upright position patient complained that he did not like the standing position. (Patient did not report pain in a specific location but just said \"I do not like this\") Patient returned to standing position and tolerated this for about 60 seconds but could not maintain upright posture and leaned his head forward on the upper extremity tray.  Patient is dependent for assistance to don and doff prosthesis.  Will attempt standing frame again if patient can tolerate this and will begin educating patient's caregiver on prosthetic use.  -JK               User Key  (r) = Recorded By, (t) = Taken By, (c) = Cosigned By      Initials Name Provider Type    Miryam Sandoval, YANG Physical Therapist                        OP Exercises       Row Name 04/18/24 1251 04/18/24 1015          Subjective    Subjective Comments -- Pt reported he did not like the feeling he got in the standing frame today.  -JK        Subjective Pain    Able to rate subjective pain? -- yes  -JK     Pre-Treatment Pain Level -- 0  -JK     Post-Treatment Pain Level -- 0  -JK        Total Minutes    00154 - PT Therapeutic Activity Minutes 30  -JK --               User Key  (r) = Recorded By, (t) = Taken By, (c) = Cosigned By      Initials Name Provider Type    Miryam Sandoval, PT Physical Therapist                                    Therapy Education  Education Details: Patient encouraged to continue to help his caregiver during transfers at home.  Given: Mobility training, Symptoms/condition management, Posture/body mechanics, Fall prevention and home safety  Program: Reinforced, Progressed  How Provided: Verbal, " Demonstration  Provided to: Patient  Level of Understanding: Demonstrated              Time Calculation:   Start Time: 1015  Stop Time: 1100  Time Calculation (min): 45 min  Total Timed Code Minutes- PT: 45 minute(s)  Timed Charges  86594 - PT Therapeutic Activity Minutes: 30  18429 - PT Subsequent Orthotic/Prosthetic Encounter: 15  Total Minutes  Timed Charges Total Minutes: 45   Total Minutes: 45   Therapy Charges for Today       Code Description Service Date Service Provider Modifiers Qty    34756065044 HC PT THER SUPP EA 15 MIN 4/18/2024 Miryam Fatima, PT GP 3    74111950645 HC PT THERAPEUTIC ACT EA 15 MIN 4/18/2024 Miryam Fatima, PT GP 2    [id number]  PT ORTHOTIC(S)/PROSTHETIC(S) ENCOUNTER, EACH 15 MIN 4/18/2024 Miryam Fatima, PT  1                      Miryam Fatima, PT  4/18/2024

## 2024-04-23 ENCOUNTER — OFFICE VISIT (OUTPATIENT)
Age: 84
End: 2024-04-23
Payer: MEDICARE

## 2024-04-23 ENCOUNTER — HOSPITAL ENCOUNTER (OUTPATIENT)
Dept: PHYSICAL THERAPY | Facility: HOSPITAL | Age: 84
Setting detail: THERAPIES SERIES
Discharge: HOME | End: 2024-04-23
Payer: MEDICARE

## 2024-04-23 VITALS
HEIGHT: 70 IN | WEIGHT: 145 LBS | DIASTOLIC BLOOD PRESSURE: 80 MMHG | BODY MASS INDEX: 20.76 KG/M2 | SYSTOLIC BLOOD PRESSURE: 140 MMHG

## 2024-04-23 DIAGNOSIS — Z89.611 HISTORY OF RIGHT ABOVE KNEE AMPUTATION: Primary | ICD-10-CM

## 2024-04-23 DIAGNOSIS — Z74.09 IMPAIRED MOBILITY: ICD-10-CM

## 2024-04-23 DIAGNOSIS — Z89.611 HX OF AKA (ABOVE KNEE AMPUTATION), RIGHT: Primary | ICD-10-CM

## 2024-04-23 DIAGNOSIS — Z74.09 IMPAIRED TRANSFERS: ICD-10-CM

## 2024-04-23 PROCEDURE — 97110 THERAPEUTIC EXERCISES: CPT | Performed by: PHYSICAL THERAPIST

## 2024-04-23 PROCEDURE — 97530 THERAPEUTIC ACTIVITIES: CPT | Performed by: PHYSICAL THERAPIST

## 2024-04-23 PROCEDURE — 97763 ORTHC/PROSTC MGMT SBSQ ENC: CPT | Performed by: PHYSICAL THERAPIST

## 2024-04-23 RX ORDER — LORAZEPAM 0.5 MG/1
TABLET ORAL
Status: ON HOLD | COMMUNITY
Start: 2024-04-22

## 2024-04-23 RX ORDER — ESCITALOPRAM OXALATE 5 MG/1
1 TABLET ORAL DAILY
Status: ON HOLD | COMMUNITY
Start: 2024-03-25

## 2024-04-23 RX ORDER — CEPHALEXIN 500 MG/1
1 CAPSULE ORAL 3 TIMES DAILY
Status: ON HOLD | COMMUNITY
Start: 2024-03-25

## 2024-04-23 RX ORDER — COLLAGENASE SANTYL 250 [ARB'U]/G
1 OINTMENT TOPICAL DAILY
Status: ON HOLD | COMMUNITY

## 2024-04-23 NOTE — PROGRESS NOTES
"Chief Complaint  Peripheral Vascular Disease (Follow up )    Subjective        Mehreen Silva IV presents to [facility] VASCULAR SURGERY  History of Present Illness  Follow-up for amputation check.  Overall continues to slowly regain strength.  Has sacral decub getting ready to see general surgery.  Objective   Vital Signs:  /80 (BP Location: Right arm, Patient Position: Sitting, Cuff Size: Small Adult)   Ht 177.8 cm (70\")   Wt 65.8 kg (145 lb)   BMI 20.81 kg/m²   Estimated body mass index is 20.81 kg/m² as calculated from the following:    Height as of this encounter: 177.8 cm (70\").    Weight as of this encounter: 65.8 kg (145 lb).     BMI is within normal parameters. No other follow-up for BMI required.    Mehreen Silva IV  reports that he quit smoking about 40 years ago. His smoking use included cigarettes. He has been exposed to tobacco smoke. He has never used smokeless tobacco.      Physical Exam   Result Review :    The following data was reviewed by: Issa Nieves MD on 04/23/2024:  CBC          2/19/2024    03:13 2/20/2024    03:50 3/7/2024    11:15   CBC   WBC 10.77  9.80  7.36       RBC 5.21  4.67  4.77       Hemoglobin 12.5  11.4  12.0       Hematocrit 40.1  35.5  40.0       MCV 77.0  76.0  83.9       MCH 24.0  24.4  25.2       MCHC 31.2  32.1  30.0       RDW 17.0  17.0  19.9       Platelets 241  206  234          Details          This result is from an external source.              BMP          2/18/2024    04:21 2/19/2024    03:13 2/20/2024    03:50 2/20/2024    16:21   BMP   BUN 17  29  23     Creatinine 0.67  1.56  0.73     Sodium 143  141  140     Potassium 4.4  4.5  3.6  4.5    Chloride 105  104  104     CO2 29.9  28.0  24.4     Calcium 8.5  8.6  8.0        A1C Last 3 Results          11/9/2023    06:44 2/9/2024    19:14   HGBA1C Last 3 Results   Hemoglobin A1C 5.40  5.40        Vascular Surgical History:  12/13/2023: Right above-knee amputation  Vascular Imaging " History:  Ankle-brachial indices:  2/27/2024: Normal left leg perfusion with mild digital insufficiency.         Assessment and Plan     Diagnoses and all orders for this visit:    1. Hx of AKA (above knee amputation), right (Primary)      Patient overall doing well.  Right above-knee amputation is healed.  He is working with the rehab medicine and prosthetics team to get fitted.  His left ankle/heel wound is all but healed.  He has normal perfusion there.  I have extended limitation for him to see me as needed.       Follow Up     Return if symptoms worsen or fail to improve.  Patient was given instructions and counseling regarding his condition or for health maintenance advice. Please see specific information pulled into the AVS if appropriate.

## 2024-04-23 NOTE — THERAPY TREATMENT NOTE
Outpatient Physical Therapy Neuro Treatment Note  [facility]     Patient Name: Mehreen Silva IV  : 1940  MRN: [id number]  Today's Date: 2024      Visit Date: 2024    Visit Dx:    ICD-10-CM ICD-9-CM   1. History of right above knee amputation  Z89.611 V49.76   2. Impaired transfers  Z74.09 781.99   3. Impaired mobility  Z74. 799.89       Patient Active Problem List   Diagnosis    Cervical spondylosis with myelopathy    Lumbar radiculopathy    Hx of toxic multinodular goiter    Dyslipidemia    Hyperthyroidism    Chronic diastolic CHF (congestive heart failure)    Pleural effusion    Longstanding persistent atrial fibrillation    Ulcer of left heel            PT Neuro       Row Name 24 1015             Subjective    Subjective Comments Pt reports he is wearing the leg 1/2 hour a day. Caregiver reports he knows how to don/doff prosthesis  -JK         Precautions and Contraindications    Precautions/Limitations fall precautions  -JK      Precautions sacral ulcer; cognitive deficits  -JK         Subjective Pain    Able to rate subjective pain? yes  -JK      Pre-Treatment Pain Level 0  -JK      Post-Treatment Pain Level 0  -JK         Cognition    Overall Cognitive Status Impaired  -JK         Posture/Observations    Posture/Observations Comments pt arrived to PT in WC; not wearing ; brought by Caregiver Don  -JK         Mobility    Additional Documentation Wheelchair Mobility/Management (Group)  -JK         Transfers    Bed-Chair Zavala (Transfers) maximum assist (25% patient effort);dependent (less than 25% patient effort);2 person assist;verbal cues;nonverbal cues (demo/gesture)  -JK      Chair-Bed Zavala (Transfers) maximum assist (25% patient effort);dependent (less than 25% patient effort);2 person assist;verbal cues;nonverbal cues (demo/gesture)  -JK      Transfers, Bed-Chair-Bed, Assist Device sliding board  -JK         Wheelchair Mobility/Management    Mobility  Activities (Wheelchair) forward propulsion;steering  -JK      Forward Propulsion Hendry (Wheelchair) supervision  -JK      Steering Hendry (Wheelchair) supervision  -JK      Distance Propelled in Feet (Wheelchair) 20' x 2  -JK      Comment, Wheelchair Mobility pt needed to rest in between 20' distances  -JK         Balance Skills Training    Sitting-Level of Assistance Minimum assistance  -JK      Sitting-Balance Support Right upper extremity supported;Left upper extremity supported;Feet supported  varying one to two UE support  -JK      Sitting-Balance Activities Lateral lean;Forward lean;Reaching across midline;Trunk control activities  -JK      Sitting # of Minutes pt sits leaning to the L and resists WS to the R  -JK                User Key  (r) = Recorded By, (t) = Taken By, (c) = Cosigned By      Initials Name Provider Type    Miryam Sandoval, PT Physical Therapist                             PT Assessment/Plan       Row Name 04/23/24 1053          PT Assessment    Functional Limitations Decreased safety during functional activities;Impaired gait;Impaired locomotion;Limitation in home management;Performance in self-care ADL;Performance in leisure activities  -JK     Impairments Balance;Endurance;Gait;Coordination;Locomotion;Muscle strength;Range of motion;Poor body mechanics  -JK     Assessment Comments Pt continues  to struggle with trunk control activities, weight shifting, and transfers. Pt is dependedent to don/doff prosthesis due to cognitive issues. Pt has significant weaknees in B shoulders, UEs and L LE which negatively impact pt's mobility and use of prosthesis. UE strengthening began today.  -JK               User Key  (r) = Recorded By, (t) = Taken By, (c) = Cosigned By      Initials Name Provider Type    Miryam Sandoval, PT Physical Therapist                        OP Exercises       Row Name 04/23/24 1242 04/23/24 1015          Subjective    Subjective Comments -- Pt reports he  is wearing the leg 1/2 hour a day. Caregiver reports he knows how to don/doff prosthesis  -JK        Subjective Pain    Able to rate subjective pain? -- yes  -JK     Pre-Treatment Pain Level -- 0  -JK     Post-Treatment Pain Level -- 0  -JK        Total Minutes    02427 - PT Therapeutic Exercise Minutes 15  -JK --     96296 - PT Therapeutic Activity Minutes 15  -JK --        Exercise 1    Exercise Name 1 -- Rickshaw  -JK     Cueing 1 -- Verbal  -JK     Sets 1 -- 3  -JK     Reps 1 -- 10  -JK     Additional Comments -- 20#  -JK        Exercise 2    Exercise Name 2 -- forward trunk reach with 2# bar  -JK     Cueing 2 -- Verbal;Demo  -JK     Sets 2 -- 2  -JK     Reps 2 -- 5  -JK     Additional Comments -- holding 2# bar  -JK               User Key  (r) = Recorded By, (t) = Taken By, (c) = Cosigned By      Initials Name Provider Type    Miryam Sandoval, PT Physical Therapist                                    Therapy Education  Education Details: Pt and caregiver encouraged to wear prosthesis 1 hour 2-3 time a day; encouraged to come to PT wearing prosthesis  Given: Mobility training, Symptoms/condition management, Posture/body mechanics, Fall prevention and home safety  Program: Reinforced, Progressed  How Provided: Verbal, Demonstration  Provided to: Patient  Level of Understanding: Demonstrated              Time Calculation:   Start Time: 1015  Stop Time: 1100  Time Calculation (min): 45 min  Total Timed Code Minutes- PT: 45 minute(s)  Timed Charges  69902 - PT Therapeutic Exercise Minutes: 15  97530 - PT Therapeutic Activity Minutes: 15  42011 - PT Subsequent Orthotic/Prosthetic Encounter: 15  Total Minutes  Timed Charges Total Minutes: 45   Total Minutes: 45   Therapy Charges for Today       Code Description Service Date Service Provider Modifiers Qty    69778626212 HC PT THER SUPP EA 15 MIN 4/23/2024 Miryam Fatima, PT GP 2    38769321032 HC PT THER PROC EA 15 MIN 4/23/2024 Miryam Fatima, PT GP 1     30804803692 HC PT THERAPEUTIC ACT EA 15 MIN 4/23/2024 Miryam Fatima, PT GP 1    83064181843 HC PT ORTHOTIC(S)/PROSTHETIC(S) ENCOUNTER, EACH 15 MIN 4/23/2024 Miryam Fatima, PT  1                      Miryam Fatima, PT  4/23/2024

## 2024-04-25 ENCOUNTER — HOSPITAL ENCOUNTER (OUTPATIENT)
Dept: PHYSICAL THERAPY | Facility: HOSPITAL | Age: 84
Setting detail: THERAPIES SERIES
Discharge: HOME | End: 2024-04-25
Payer: MEDICARE

## 2024-04-25 DIAGNOSIS — Z89.611 HISTORY OF RIGHT ABOVE KNEE AMPUTATION: Primary | ICD-10-CM

## 2024-04-25 DIAGNOSIS — Z74.09 IMPAIRED TRANSFERS: ICD-10-CM

## 2024-04-25 DIAGNOSIS — Z74.09 IMPAIRED MOBILITY: ICD-10-CM

## 2024-04-25 PROCEDURE — 97530 THERAPEUTIC ACTIVITIES: CPT | Performed by: PHYSICAL THERAPIST

## 2024-04-25 PROCEDURE — 97763 ORTHC/PROSTC MGMT SBSQ ENC: CPT | Performed by: PHYSICAL THERAPIST

## 2024-04-25 PROCEDURE — 97110 THERAPEUTIC EXERCISES: CPT | Performed by: PHYSICAL THERAPIST

## 2024-04-25 NOTE — THERAPY TREATMENT NOTE
Outpatient Physical Therapy Neuro Treatment Note  [facility]     Patient Name: Mehreen Silva IV  : 1940  MRN: [id number]  Today's Date: 2024      Visit Date: 2024    Visit Dx:    ICD-10-CM ICD-9-CM   1. History of right above knee amputation  Z89.611 V49.76   2. Impaired transfers  Z74.09 781.99   3. Impaired mobility  Z74. 799.89       Patient Active Problem List   Diagnosis    Cervical spondylosis with myelopathy    Lumbar radiculopathy    Hx of toxic multinodular goiter    Dyslipidemia    Hyperthyroidism    Chronic diastolic CHF (congestive heart failure)    Pleural effusion    Longstanding persistent atrial fibrillation    Ulcer of left heel            PT Neuro       Row Name 24 1015             Subjective    Subjective Comments Pt reports he wore the leg once or twice at home.  -JK         Precautions and Contraindications    Precautions/Limitations fall precautions  -JK      Precautions sacral ulcer; cognitive deficits  -JK         Subjective Pain    Able to rate subjective pain? yes  -JK      Pre-Treatment Pain Level 0  -JK      Post-Treatment Pain Level 0  -JK         Cognition    Overall Cognitive Status Impaired  -JK         Posture/Observations    Posture/Observations Comments pt arrived to PT in WC; not wearing ; brought by Caregiver Don  -JK         Wheelchair Mobility/Management    Mobility Activities (Wheelchair) forward propulsion;steering  -JK      Forward Propulsion Potter (Wheelchair) supervision  -JK      Steering Potter (Wheelchair) supervision  -JK      Distance Propelled in Feet (Wheelchair) 15' x 2  -JK      Comment, Wheelchair Mobility pt cued to keep propelling as he propels a little bit and stops to rest  -JK         LE Prosthetic Management    Additional Documentation LE Prosthetic Management (Group)  -JK         Residual Limb Evaluation (Lower Extremity)    Amputation Level (LE Residual Limb) medial transfemoral  -JK      Laterality,  Amputation (LE Residual Limb) right side  -JK      Incision Management (LE Residual Limb) incision line adequately closed for scar management  -JK      Shape (LE Residual Limb) distal circumference of residual limb greater than proximal  -JK      Skin Assessment (LE Residual Limb) no signs of pressure or friction present  -JK         LE Prosthetic Management    Proper Care/Use of LE Prosthesis (Prosthetic Management) training in prosthesis use provided;training in sock/liner use and care provided;wearing schedule compliance facilitated;wearing schedule monitored and adjusted;prosthesis donning/doffing skills promoted;adjustment of liners and socks facilitated  -JK                User Key  (r) = Recorded By, (t) = Taken By, (c) = Cosigned By      Initials Name Provider Type    Miryam Sandoval, PT Physical Therapist                             PT Assessment/Plan       Row Name 04/25/24 1311          PT Assessment    Functional Limitations Decreased safety during functional activities;Impaired gait;Impaired locomotion;Limitation in home management;Performance in self-care ADL;Performance in leisure activities  -JK     Impairments Balance;Endurance;Gait;Coordination;Locomotion;Muscle strength;Range of motion;Poor body mechanics  -JK     Assessment Comments PT donned prosthesis today with patient in the wheelchair.  This is difficult to get the liner into place as it comes up very high and patient wears a brief which hinders getting the liner in place.  Patient's prosthesis was very loose and rotated easily throughout treatment.  Patient did not bring any socks to PT and was encouraged to start bringing those to each session as he will need to add these to get a better prosthetic fit.  Treatment today consisted of trunk activities and unsupported sitting, lower extremity (left lower extremity) strengthening, WC propulsion and upper extremity strengthening.  Patient again encouraged to come to PT wearing his gel liner  and even the prosthetic leg.  Patient is dependent for donning and doffing gel liner and prosthetic.  -JK               User Key  (r) = Recorded By, (t) = Taken By, (c) = Cosigned By      Initials Name Provider Type    Miryam Sandoval, PT Physical Therapist                        OP Exercises       Row Name 04/25/24 1316 04/25/24 1015          Subjective    Subjective Comments -- Pt reports he wore the leg once or twice at home.  -JK        Subjective Pain    Able to rate subjective pain? -- yes  -JK     Pre-Treatment Pain Level -- 0  -JK     Post-Treatment Pain Level -- 0  -JK        Total Minutes    75692 - PT Therapeutic Exercise Minutes 20  -JK --     37483 - PT Therapeutic Activity Minutes 10  -JK --        Exercise 1    Exercise Name 1 -- Rickshaw  -JK     Cueing 1 -- Verbal  -JK     Sets 1 -- 3  -JK     Reps 1 -- 10  -JK     Additional Comments -- 20#  -JK        Exercise 2    Exercise Name 2 -- forward trunk lean with hands across chest  -JK     Cueing 2 -- Verbal;Demo  -JK     Sets 2 -- 2  -JK     Reps 2 -- 8  -JK     Additional Comments -- scooted forward in WC so trunk is unsupported  -JK        Exercise 3    Exercise Name 3 -- side lean with arms across chest  -JK     Cueing 3 -- Verbal;Demo  -JK     Sets 3 -- 1  -JK     Reps 3 -- 10  -JK     Additional Comments -- scooted forward in WC so trunk is unsupported  -JK        Exercise 4    Exercise Name 4 -- side rotation R/L with arms across chest  -JK     Cueing 4 -- Verbal;Demo  -JK     Sets 4 -- 1  -JK     Reps 4 -- 10  -JK     Additional Comments -- scooted forward in WC so trunk is unsupported  -JK        Exercise 5    Exercise Name 5 -- passive stretch L knee flexors and heelcord  -JK     Cueing 5 -- Verbal  -JK     Sets 5 -- 3  -JK     Reps 5 -- 15 sec hold  -JK               User Key  (r) = Recorded By, (t) = Taken By, (c) = Cosigned By      Initials Name Provider Type    Miryam Sandoval, PT Physical Therapist                                     Therapy Education  Education Details: Pt and caregiver instructed to bring socks to next appt  Given: Mobility training, Symptoms/condition management, Posture/body mechanics, Fall prevention and home safety  Program: Reinforced, Progressed  How Provided: Verbal, Demonstration  Provided to: Patient, Caregiver  Level of Understanding: Demonstrated              Time Calculation:   Start Time: 1015  Stop Time: 1100  Time Calculation (min): 45 min  Total Timed Code Minutes- PT: 45 minute(s)  Timed Charges  42348 - PT Therapeutic Exercise Minutes: 20 97530 - PT Therapeutic Activity Minutes: 10  20894 - PT Subsequent Orthotic/Prosthetic Encounter: 15  Total Minutes  Timed Charges Total Minutes: 45   Total Minutes: 45   Therapy Charges for Today       Code Description Service Date Service Provider Modifiers Qty    48905794558 HC PT THER PROC EA 15 MIN 4/25/2024 Miryam Fatima, PT GP 1    74546390575  PT THERAPEUTIC ACT EA 15 MIN 4/25/2024 Miryam Fatima, PT GP 1    [id number]  PT ORTHOTIC(S)/PROSTHETIC(S) ENCOUNTER, EACH 15 MIN 4/25/2024 Miryam Fatima, PT  1                      Miryam Fatima, PT  4/25/2024

## 2024-04-27 ENCOUNTER — APPOINTMENT (OUTPATIENT)
Dept: GENERAL RADIOLOGY | Facility: HOSPITAL | Age: 84
DRG: 186 | End: 2024-04-27
Payer: MEDICARE

## 2024-04-27 ENCOUNTER — APPOINTMENT (OUTPATIENT)
Dept: CT IMAGING | Facility: HOSPITAL | Age: 84
DRG: 186 | End: 2024-04-27
Payer: MEDICARE

## 2024-04-27 ENCOUNTER — HOSPITAL ENCOUNTER (INPATIENT)
Facility: HOSPITAL | Age: 84
LOS: 8 days | Discharge: HOME HEALTH | DRG: 186 | End: 2024-05-06
Attending: STUDENT IN AN ORGANIZED HEALTH CARE EDUCATION/TRAINING PROGRAM | Admitting: HOSPITALIST
Payer: MEDICARE

## 2024-04-27 DIAGNOSIS — J90 RECURRENT PLEURAL EFFUSION ON RIGHT: ICD-10-CM

## 2024-04-27 DIAGNOSIS — J94.8: Primary | ICD-10-CM

## 2024-04-27 DIAGNOSIS — E87.5 HYPERKALEMIA: ICD-10-CM

## 2024-04-27 LAB
ALBUMIN SERPL-MCNC: 3.1 G/DL (ref 3.5–5.2)
ALBUMIN/GLOB SERPL: 1 G/DL
ALP SERPL-CCNC: 118 U/L (ref 39–117)
ALT SERPL W P-5'-P-CCNC: 18 U/L (ref 1–41)
ANION GAP SERPL CALCULATED.3IONS-SCNC: 11 MMOL/L (ref 5–15)
AST SERPL-CCNC: 12 U/L (ref 1–40)
BASOPHILS # BLD AUTO: 0.02 10*3/MM3 (ref 0–0.2)
BASOPHILS NFR BLD AUTO: 0.2 % (ref 0–1.5)
BILIRUB SERPL-MCNC: 1.1 MG/DL (ref 0–1.2)
BUN SERPL-MCNC: 23 MG/DL (ref 8–23)
BUN/CREAT SERPL: 22.3 (ref 7–25)
CALCIUM SPEC-SCNC: 9.1 MG/DL (ref 8.6–10.5)
CHLORIDE SERPL-SCNC: 100 MMOL/L (ref 98–107)
CO2 SERPL-SCNC: 30 MMOL/L (ref 22–29)
CREAT SERPL-MCNC: 0.82 MG/DL (ref 0.76–1.27)
CREAT SERPL-MCNC: 1.03 MG/DL (ref 0.76–1.27)
DEPRECATED RDW RBC AUTO: 51.4 FL (ref 37–54)
EGFRCR SERPLBLD CKD-EPI 2021: 72.1 ML/MIN/1.73
EGFRCR SERPLBLD CKD-EPI 2021: 87.2 ML/MIN/1.73
EOSINOPHIL # BLD AUTO: 0.01 10*3/MM3 (ref 0–0.4)
EOSINOPHIL NFR BLD AUTO: 0.1 % (ref 0.3–6.2)
ERYTHROCYTE [DISTWIDTH] IN BLOOD BY AUTOMATED COUNT: 16.9 % (ref 12.3–15.4)
GEN 5 2HR TROPONIN T REFLEX: 72 NG/L
GLOBULIN UR ELPH-MCNC: 3.2 GM/DL
GLUCOSE SERPL-MCNC: 209 MG/DL (ref 65–99)
HCT VFR BLD AUTO: 37.2 % (ref 37.5–51)
HGB BLD-MCNC: 11.5 G/DL (ref 13–17.7)
HOLD SPECIMEN: NORMAL
HOLD SPECIMEN: NORMAL
IMM GRANULOCYTES # BLD AUTO: 0.03 10*3/MM3 (ref 0–0.05)
IMM GRANULOCYTES NFR BLD AUTO: 0.3 % (ref 0–0.5)
LYMPHOCYTES # BLD AUTO: 0.76 10*3/MM3 (ref 0.7–3.1)
LYMPHOCYTES NFR BLD AUTO: 8.6 % (ref 19.6–45.3)
MAGNESIUM SERPL-MCNC: 2.2 MG/DL (ref 1.6–2.4)
MAGNESIUM SERPL-MCNC: 2.3 MG/DL (ref 1.6–2.4)
MCH RBC QN AUTO: 26 PG (ref 26.6–33)
MCHC RBC AUTO-ENTMCNC: 30.9 G/DL (ref 31.5–35.7)
MCV RBC AUTO: 84.2 FL (ref 79–97)
MONOCYTES # BLD AUTO: 0.71 10*3/MM3 (ref 0.1–0.9)
MONOCYTES NFR BLD AUTO: 8 % (ref 5–12)
NEUTROPHILS NFR BLD AUTO: 7.34 10*3/MM3 (ref 1.7–7)
NEUTROPHILS NFR BLD AUTO: 82.8 % (ref 42.7–76)
NRBC BLD AUTO-RTO: 0 /100 WBC (ref 0–0.2)
NT-PROBNP SERPL-MCNC: 5603 PG/ML (ref 0–1800)
PLATELET # BLD AUTO: 242 10*3/MM3 (ref 140–450)
PMV BLD AUTO: 10.5 FL (ref 6–12)
POTASSIUM SERPL-SCNC: 4.4 MMOL/L (ref 3.5–5.2)
POTASSIUM SERPL-SCNC: 5.7 MMOL/L (ref 3.5–5.2)
PROT SERPL-MCNC: 6.3 G/DL (ref 6–8.5)
QT INTERVAL: 364 MS
QTC INTERVAL: 502 MS
RBC # BLD AUTO: 4.42 10*6/MM3 (ref 4.14–5.8)
SODIUM SERPL-SCNC: 141 MMOL/L (ref 136–145)
TROPONIN T DELTA: -7 NG/L
TROPONIN T SERPL HS-MCNC: 79 NG/L
TROPONIN T SERPL HS-MCNC: 79 NG/L
TSH SERPL DL<=0.05 MIU/L-ACNC: 0.82 UIU/ML (ref 0.27–4.2)
WBC NRBC COR # BLD AUTO: 8.87 10*3/MM3 (ref 3.4–10.8)
WHOLE BLOOD HOLD COAG: NORMAL
WHOLE BLOOD HOLD SPECIMEN: NORMAL

## 2024-04-27 PROCEDURE — 93005 ELECTROCARDIOGRAM TRACING: CPT

## 2024-04-27 PROCEDURE — 71045 X-RAY EXAM CHEST 1 VIEW: CPT

## 2024-04-27 PROCEDURE — 80053 COMPREHEN METABOLIC PANEL: CPT

## 2024-04-27 PROCEDURE — G0378 HOSPITAL OBSERVATION PER HR: HCPCS

## 2024-04-27 PROCEDURE — 93010 ELECTROCARDIOGRAM REPORT: CPT | Performed by: INTERNAL MEDICINE

## 2024-04-27 PROCEDURE — 83735 ASSAY OF MAGNESIUM: CPT | Performed by: PHYSICIAN ASSISTANT

## 2024-04-27 PROCEDURE — 84443 ASSAY THYROID STIM HORMONE: CPT | Performed by: PHYSICIAN ASSISTANT

## 2024-04-27 PROCEDURE — 36415 COLL VENOUS BLD VENIPUNCTURE: CPT

## 2024-04-27 PROCEDURE — 84484 ASSAY OF TROPONIN QUANT: CPT | Performed by: HOSPITALIST

## 2024-04-27 PROCEDURE — 84484 ASSAY OF TROPONIN QUANT: CPT | Performed by: STUDENT IN AN ORGANIZED HEALTH CARE EDUCATION/TRAINING PROGRAM

## 2024-04-27 PROCEDURE — 85025 COMPLETE CBC W/AUTO DIFF WBC: CPT

## 2024-04-27 PROCEDURE — 71250 CT THORAX DX C-: CPT

## 2024-04-27 PROCEDURE — 99285 EMERGENCY DEPT VISIT HI MDM: CPT

## 2024-04-27 PROCEDURE — 82565 ASSAY OF CREATININE: CPT | Performed by: HOSPITALIST

## 2024-04-27 PROCEDURE — 93005 ELECTROCARDIOGRAM TRACING: CPT | Performed by: HOSPITALIST

## 2024-04-27 PROCEDURE — 93005 ELECTROCARDIOGRAM TRACING: CPT | Performed by: STUDENT IN AN ORGANIZED HEALTH CARE EDUCATION/TRAINING PROGRAM

## 2024-04-27 PROCEDURE — 83880 ASSAY OF NATRIURETIC PEPTIDE: CPT

## 2024-04-27 PROCEDURE — 83735 ASSAY OF MAGNESIUM: CPT | Performed by: HOSPITALIST

## 2024-04-27 PROCEDURE — 84132 ASSAY OF SERUM POTASSIUM: CPT | Performed by: INTERNAL MEDICINE

## 2024-04-27 RX ORDER — AMOXICILLIN 250 MG
2 CAPSULE ORAL 2 TIMES DAILY PRN
Status: DISCONTINUED | OUTPATIENT
Start: 2024-04-27 | End: 2024-05-06 | Stop reason: HOSPADM

## 2024-04-27 RX ORDER — ACETAMINOPHEN 160 MG/5ML
650 SOLUTION ORAL EVERY 4 HOURS PRN
Status: DISCONTINUED | OUTPATIENT
Start: 2024-04-27 | End: 2024-05-06

## 2024-04-27 RX ORDER — SODIUM CHLORIDE 9 MG/ML
40 INJECTION, SOLUTION INTRAVENOUS AS NEEDED
Status: DISCONTINUED | OUTPATIENT
Start: 2024-04-27 | End: 2024-05-06 | Stop reason: HOSPADM

## 2024-04-27 RX ORDER — BISACODYL 5 MG/1
5 TABLET, DELAYED RELEASE ORAL DAILY PRN
Status: DISCONTINUED | OUTPATIENT
Start: 2024-04-27 | End: 2024-05-06 | Stop reason: HOSPADM

## 2024-04-27 RX ORDER — BISACODYL 10 MG
10 SUPPOSITORY, RECTAL RECTAL DAILY PRN
Status: DISCONTINUED | OUTPATIENT
Start: 2024-04-27 | End: 2024-05-06 | Stop reason: HOSPADM

## 2024-04-27 RX ORDER — SODIUM CHLORIDE 0.9 % (FLUSH) 0.9 %
10 SYRINGE (ML) INJECTION EVERY 12 HOURS SCHEDULED
Status: DISCONTINUED | OUTPATIENT
Start: 2024-04-27 | End: 2024-05-06 | Stop reason: HOSPADM

## 2024-04-27 RX ORDER — FUROSEMIDE 20 MG/1
20 TABLET ORAL DAILY
Status: DISCONTINUED | OUTPATIENT
Start: 2024-04-27 | End: 2024-04-28

## 2024-04-27 RX ORDER — ONDANSETRON 4 MG/1
4 TABLET, ORALLY DISINTEGRATING ORAL EVERY 6 HOURS PRN
Status: DISCONTINUED | OUTPATIENT
Start: 2024-04-27 | End: 2024-05-06 | Stop reason: HOSPADM

## 2024-04-27 RX ORDER — POLYETHYLENE GLYCOL 3350 17 G/17G
17 POWDER, FOR SOLUTION ORAL DAILY PRN
Status: DISCONTINUED | OUTPATIENT
Start: 2024-04-27 | End: 2024-05-06 | Stop reason: HOSPADM

## 2024-04-27 RX ORDER — METHIMAZOLE 5 MG/1
5 TABLET ORAL 3 TIMES WEEKLY
Status: DISCONTINUED | OUTPATIENT
Start: 2024-04-29 | End: 2024-05-06 | Stop reason: HOSPADM

## 2024-04-27 RX ORDER — SODIUM CHLORIDE 0.9 % (FLUSH) 0.9 %
10 SYRINGE (ML) INJECTION AS NEEDED
Status: DISCONTINUED | OUTPATIENT
Start: 2024-04-27 | End: 2024-05-06 | Stop reason: HOSPADM

## 2024-04-27 RX ORDER — GABAPENTIN 100 MG/1
100 CAPSULE ORAL NIGHTLY
Status: DISCONTINUED | OUTPATIENT
Start: 2024-04-27 | End: 2024-05-06 | Stop reason: HOSPADM

## 2024-04-27 RX ORDER — ACETAMINOPHEN 650 MG/1
650 SUPPOSITORY RECTAL EVERY 4 HOURS PRN
Status: DISCONTINUED | OUTPATIENT
Start: 2024-04-27 | End: 2024-05-06

## 2024-04-27 RX ORDER — ACETAMINOPHEN 325 MG/1
650 TABLET ORAL EVERY 4 HOURS PRN
Status: DISCONTINUED | OUTPATIENT
Start: 2024-04-27 | End: 2024-05-06 | Stop reason: HOSPADM

## 2024-04-27 RX ORDER — ONDANSETRON 2 MG/ML
4 INJECTION INTRAMUSCULAR; INTRAVENOUS EVERY 6 HOURS PRN
Status: DISCONTINUED | OUTPATIENT
Start: 2024-04-27 | End: 2024-05-06 | Stop reason: HOSPADM

## 2024-04-27 RX ADMIN — METOPROLOL TARTRATE 25 MG: 25 TABLET, FILM COATED ORAL at 21:28

## 2024-04-27 RX ADMIN — GABAPENTIN 100 MG: 100 CAPSULE ORAL at 21:28

## 2024-04-27 RX ADMIN — COLLAGENASE SANTYL 1 APPLICATION: 250 OINTMENT TOPICAL at 17:51

## 2024-04-27 RX ADMIN — Medication 10 ML: at 21:30

## 2024-04-27 NOTE — CONSULTS
[facility]  [phone]  Dr. Henrique Monterroso      Subjective   LOS: 0 days         This consult patient.  83-year-old male who was here recently and discharged on 2/20/2024.  Had drainage of right pleural effusion that is felt to be due to HFpEF.  Has underlying A-fib and therefore on Eliquis.  Comes in with worsening shortness of breath for 1 week.  Likely that symptoms have been ongoing for a while.  Patient is right above-knee amputation.  He is wheelchair-bound.  He reports that when he lays flat in bed he gets more short of breath.  Chest x-ray and CT showed large right pleural effusion that is compressing and collapsing the right lung.  There is mediastinal shift to the left.  Patient states he is dyspneic and worse when laying flat in bed.  However he is on room air.  He denies any fever or chills.  No chest pain.  He quit smoking in 1982 and is a 10-pack-year smoking history.  He has no history of malignancy.  He denies any leg swelling and takes his diuretics as prescribed.    Mehreen Silva IV  reports current alcohol use.,  reports that he quit smoking about 40 years ago. His smoking use included cigarettes. He has been exposed to tobacco smoke. He has never used smokeless tobacco.     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  Surg Hx:  has a past  surgical history that includes Colonoscopy; Appendectomy; Shoulder surgery; Back surgery; Knee surgery; Excision Mass Trunk (N/A, 2016); Neck surgery (); Leg amputation through knee (Right, 2023); Rotator cuff repair; Quadriceps repair; and Small intestine surgery ().  FH: family history includes Aneurysm in his father; Cancer in his mother; Heart disease in his father.  SH:  reports that he quit smoking about 40 years ago. His smoking use included cigarettes. He has been exposed to tobacco smoke. He has never used smokeless tobacco. He reports current alcohol use. He reports that he does not use drugs.    (Not in a hospital admission)    No Known Allergies    Review of Systems   Constitutional:  Negative for chills and fever.   HENT:  Negative for congestion and sore throat.    Respiratory:  Positive for shortness of breath. Negative for cough and wheezing.    Cardiovascular:  Negative for chest pain and leg swelling.   Gastrointestinal:  Negative for abdominal pain, nausea and vomiting.   Genitourinary:  Negative for dysuria and hematuria.   Musculoskeletal:  Negative for arthralgias and back pain.   Skin:  Negative for pallor and rash.   Neurological:  Negative for seizures and headaches.   Psychiatric/Behavioral:  Negative for agitation and confusion.        Vital Signs past 24hrs  BP range: BP: (108-117)/(82-84) 117/82  Pulse range: Heart Rate:  [] 100  Resp rate range: Resp:  [20] 20  Temp range: Temp (24hrs), Av.4 °F (35.8 °C), Min:96.4 °F (35.8 °C), Max:96.4 °F (35.8 °C)    Oxygen range: SpO2:  [94 %-95 %] 94 %;  ;   Device (Oxygen Therapy): room air   ; There is no height or weight on file to calculate BMI.  Net IO Since Admission: No IO data has been entered for this period [24 1256]      Mechanical Ventilator:     Adult male who is semirecumbent in bed.  No acute distress.  Pupils equal and reactive to light.  Oropharynx somewhat dry.  JVP appears mildly engorged.   Trachea appears deviated to the left.  Right lung field sounds absent and dull to percussion.  Left lung fields clear to auscultation.  Heart examination S1-S2 present rhythm irregular with no murmurs.  No edema lower extremity.  Abdomen is soft nontender bowel sounds present no liver spleen enlargement.  No peripheral cyanosis clubbing.  Right above-knee amputation noted.  No focal deficit.  No obvious lymphadenopathy.    Results Review:    I have reviewed the laboratory and imaging data from current admission. My annotations are as noted in assessment and plan.  Result Review:  I have personally reviewed the results from the time of this admission to 4/27/2024 12:56 EDT and agree with these findings:  [x]  Laboratory list / accordion  [x]  Microbiology  [x]  Radiology  []  EKG/Telemetry   []  Cardiology/Vascular   []  Pathology  []  Old records  []  Other:      Medication Review:  I have reviewed the current MAR. My annotations are as noted in assessment and plan.       Lines, Drains & Airways       Active LDAs       Name Placement date Placement time Site Days    Peripheral IV 04/27/24 1050 Right Antecubital 04/27/24  1050  Antecubital  less than 1                  Isolation status: No active isolations    Dietary Orders (From admission, onward)       Start     Ordered    04/27/24 1021  NPO Diet NPO Type: Strict NPO  (SOA > 35 (Standing Order))  Diet Effective Now        Question:  NPO Type  Answer:  Strict NPO    04/27/24 1020                    Isolation:  No active isolations    PCCM Problems  Large right pleural effusion causing lung atelectasis  Chronic HFpEF  Chronic anticoagulation for A-fib  Status post right above-knee amputation  Hyperthyroidism on methimazole  Sacral decubitus ulcer      Plan of Treatment    Discussed with ED staff and with patient family at bedside.  Effusion requires a tap clearly.  This is his third go around.  At this point he likely needs a Pleurx catheter.  He does not appear to  be in any extreme distress.  He is on room air.  Will see if thoracic surgery can schedule for early Pleurx catheter.  Otherwise a therapeutic tap by interventional radiology for interim relief may be ideal.  Timing will need to be determined based on last use of Eliquis.    Electronically signed by Henrique Monterroso MD, 04/27/24, 12:56 PM EDT.      Part of this note may be an electronic transcription/translation of spoken language to printed text using the Dragon Dictation System.

## 2024-04-27 NOTE — ED TRIAGE NOTES
Pt from home with family w/reports of soa, fluid in chest, dizziness, started in the past week but worse overnight

## 2024-04-27 NOTE — ED PROVIDER NOTES
EMERGENCY DEPARTMENT ENCOUNTER      PCP: Taiwo Powers MD  Patient Care Team:  Taiwo Powers MD as PCP - General (Internal Medicine)  Self, Bess YO MD as Consulting Physician (Endocrinology)  Huang Chan MD as Cardiologist (Cardiology)   Independent Historians: Patient    HPI:  Chief Complaint: SOA, dizziness   A complete HPI/ROS/PMH/PSH/SH/FH are unobtainable due to: None    Chronic or social conditions impacting patient care (social determinants of health): None    Context: Mehreen Silva IV is a 83 y.o. male w/ hx of atrial fibrillation on eliquis, chronic diastolic heart failure, R AKA who presents to the ED c/o palpitations and soa that began last night. Pt reports feeling that his symptoms are worse with sitting still and he feels anxious. He reports mild orthopnea. Denies significant worsening with exertion. Denies fevers, increased cough, leg swelling. He has not missed doses of blood thinner.    Review of prior external notes and/or external test results outside of this encounter:   Echocardiogram on 2/10/2024 showed left ventricular systolic function hyperdynamic greater than 70%.  Mild concentric hypertrophy of left ventricular wall.  Left atrial cavity moderately dilated.  Moderate aortic valve calcification.  Mild to moderate mitral valve regurgitation.  Less than 1 cm circumferential pericardial effusion, no evidence of cardiac tamponade.    Encounter was cardiology on 3/5/2024.  History of A-fib on Eliquis.  Low-dose furosemide due to pleural effusions.  A-fib RVR recommended to continue metoprolol.      PAST MEDICAL HISTORY  Active Ambulatory Problems     Diagnosis Date Noted    Cervical spondylosis with myelopathy 06/06/2016    Lumbar radiculopathy 06/06/2016    Hx of toxic multinodular goiter 09/23/2022    Dyslipidemia 09/23/2022    Hyperthyroidism     Chronic diastolic CHF (congestive heart failure) 11/08/2023    Pleural effusion 02/09/2024    Longstanding persistent atrial  fibrillation     Ulcer of left heel 04/02/2024     Resolved Ambulatory Problems     Diagnosis Date Noted    (HFpEF) heart failure with preserved ejection fraction 09/24/2022    A-fib 09/24/2022    Cervical myelopathy 11/08/2023    Hypoxia 11/08/2023    Hyperkalemia 11/08/2023    Hyperglycemia 11/08/2023    Chronic heart failure with preserved ejection fraction (HFpEF) 11/12/2023    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 12/13/2023    Ankle osteomyelitis, left 12/13/2023    Dyspnea 02/09/2024    JOSEPH (acute kidney injury) 02/19/2024     Past Medical History:   Diagnosis Date    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    PAST SURGICAL HISTORY  Past Surgical History:   Procedure Laterality Date    ABOVE KNEE AMPUTATION Right 12/13/2023    Procedure: ABOVE KNEE AMPUTATION RIGHT, proveena wound vac placement;  Surgeon: Issa Nieves MD;  Location: [facility];  Service: Vascular;  Laterality: Right;    APPENDECTOMY      BACK SURGERY      x4    COLONOSCOPY      EXCISION MASS TRUNK N/A 06/08/2016    Procedure: Excision soft tissue neoplasm on abdominal wall and left neck;  Surgeon: Shaji Barahona Jr., MD;  Location: [facility];  Service:     KNEE SURGERY      NECK SURGERY  1974    QUADRICEPS REPAIR      ROTATOR CUFF REPAIR      SHOULDER SURGERY      SMALL INTESTINE SURGERY  2021    Bowel Obstruction         FAMILY HISTORY  Family History   Problem Relation Age of Onset    Cancer Mother     Heart disease Father     Aneurysm Father          Abdominal Aortic Aneurysm    Malig Hyperthermia Neg Hx          SOCIAL HISTORY  Social History     Socioeconomic History    Marital status:     Number of children: 1    Highest education level: Professional school degree (e.g., MD, DDS, DVM, BRITNEY)   Tobacco Use    Smoking status: Former     Current packs/day: 0.00     Types: Cigarettes     Quit date: 1983     Years since quittin.9     Passive exposure: Past    Smokeless tobacco: Never    Tobacco comments:        Vaping Use    Vaping status: Never Used   Substance and Sexual Activity    Alcohol use: Yes     Comment: SOCIAL; Patient is non drinker.    Drug use: No     Comment: Drug Abuse: Not a drug user    Sexual activity: Defer         ALLERGIES  Patient has no known allergies.        REVIEW OF SYSTEMS  Review of Systems   Constitutional:  Negative for chills and fever.   Respiratory:  Positive for shortness of breath.    Cardiovascular:  Positive for palpitations. Negative for chest pain and leg swelling.   Gastrointestinal:  Negative for abdominal pain.        All systems reviewed and negative except for those discussed in HPI.       PHYSICAL EXAM    I have reviewed the triage vital signs and nursing notes.    ED Triage Vitals [24 1018]   Temp Heart Rate Resp BP SpO2   96.4 °F (35.8 °C) 84 20 108/84 --      Temp src Heart Rate Source Patient Position BP Location FiO2 (%)   -- -- -- -- --       Physical Exam  GENERAL: alert, no acute distress  SKIN: Warm, dry, non-tender cyst like lesion to R upper chest wall/clavicle  HENT: Normocephalic, atraumatic  EYES: no scleral icterus  CV: irregularly irregular w rate variable up to 110s  RESPIRATORY: normal effort, absent breath sounds on right  ABDOMEN: soft, nontender, nondistended  MUSCULOSKELETAL: no deformity, right AKA, no significant edema  NEURO: alert, moves all extremities, follows commands          LAB RESULTS  Recent Results (from the past 24 hour(s))   ECG 12 Lead ED Triage Standing  Order; SOA    Collection Time: 04/27/24 10:25 AM   Result Value Ref Range    QT Interval 364 ms    QTC Interval 502 ms   Comprehensive Metabolic Panel    Collection Time: 04/27/24 10:33 AM    Specimen: Blood   Result Value Ref Range    Glucose 209 (H) 65 - 99 mg/dL    BUN 23 8 - 23 mg/dL    Creatinine 1.03 0.76 - 1.27 mg/dL    Sodium 141 136 - 145 mmol/L    Potassium 5.7 (H) 3.5 - 5.2 mmol/L    Chloride 100 98 - 107 mmol/L    CO2 30.0 (H) 22.0 - 29.0 mmol/L    Calcium 9.1 8.6 - 10.5 mg/dL    Total Protein 6.3 6.0 - 8.5 g/dL    Albumin 3.1 (L) 3.5 - 5.2 g/dL    ALT (SGPT) 18 1 - 41 U/L    AST (SGOT) 12 1 - 40 U/L    Alkaline Phosphatase 118 (H) 39 - 117 U/L    Total Bilirubin 1.1 0.0 - 1.2 mg/dL    Globulin 3.2 gm/dL    A/G Ratio 1.0 g/dL    BUN/Creatinine Ratio 22.3 7.0 - 25.0    Anion Gap 11.0 5.0 - 15.0 mmol/L    eGFR 72.1 >60.0 mL/min/1.73   BNP    Collection Time: 04/27/24 10:33 AM    Specimen: Blood   Result Value Ref Range    proBNP 5,603.0 (H) 0.0 - 1,800.0 pg/mL   Single High Sensitivity Troponin T    Collection Time: 04/27/24 10:33 AM    Specimen: Blood   Result Value Ref Range    HS Troponin T 79 (C) <22 ng/L   Green Top (Gel)    Collection Time: 04/27/24 10:33 AM   Result Value Ref Range    Extra Tube Hold for add-ons.    Lavender Top    Collection Time: 04/27/24 10:33 AM   Result Value Ref Range    Extra Tube hold for add-on    Gold Top - SST    Collection Time: 04/27/24 10:33 AM   Result Value Ref Range    Extra Tube Hold for add-ons.    Light Blue Top    Collection Time: 04/27/24 10:33 AM   Result Value Ref Range    Extra Tube Hold for add-ons.    CBC Auto Differential    Collection Time: 04/27/24 10:33 AM    Specimen: Blood   Result Value Ref Range    WBC 8.87 3.40 - 10.80 10*3/mm3    RBC 4.42 4.14 - 5.80 10*6/mm3    Hemoglobin 11.5 (L) 13.0 - 17.7 g/dL    Hematocrit 37.2 (L) 37.5 - 51.0 %    MCV 84.2 79.0 - 97.0 fL    MCH 26.0 (L) 26.6 - 33.0 pg    MCHC 30.9 (L) 31.5 - 35.7 g/dL    RDW 16.9 (H) 12.3  - 15.4 %    RDW-SD 51.4 37.0 - 54.0 fl    MPV 10.5 6.0 - 12.0 fL    Platelets 242 140 - 450 10*3/mm3    Neutrophil % 82.8 (H) 42.7 - 76.0 %    Lymphocyte % 8.6 (L) 19.6 - 45.3 %    Monocyte % 8.0 5.0 - 12.0 %    Eosinophil % 0.1 (L) 0.3 - 6.2 %    Basophil % 0.2 0.0 - 1.5 %    Immature Grans % 0.3 0.0 - 0.5 %    Neutrophils, Absolute 7.34 (H) 1.70 - 7.00 10*3/mm3    Lymphocytes, Absolute 0.76 0.70 - 3.10 10*3/mm3    Monocytes, Absolute 0.71 0.10 - 0.90 10*3/mm3    Eosinophils, Absolute 0.01 0.00 - 0.40 10*3/mm3    Basophils, Absolute 0.02 0.00 - 0.20 10*3/mm3    Immature Grans, Absolute 0.03 0.00 - 0.05 10*3/mm3    nRBC 0.0 0.0 - 0.2 /100 WBC   Magnesium    Collection Time: 04/27/24 10:33 AM    Specimen: Blood   Result Value Ref Range    Magnesium 2.3 1.6 - 2.4 mg/dL   TSH Rfx On Abnormal To Free T4    Collection Time: 04/27/24 10:33 AM    Specimen: Blood   Result Value Ref Range    TSH 0.819 0.270 - 4.200 uIU/mL   High Sensitivity Troponin T 2Hr    Collection Time: 04/27/24 12:46 PM    Specimen: Blood   Result Value Ref Range    HS Troponin T 72 (C) <22 ng/L    Troponin T Delta -7 (L) >=-4 - <+4 ng/L       Ordered the above labs and independently reviewed and interpreted the results.        RADIOLOGY  CT Chest Without Contrast Diagnostic    Result Date: 4/27/2024  CT CHEST WO CONTRAST DIAGNOSTIC-  INDICATION: Complicated right pleural effusion  COMPARISON: CT chest February 14, 2024  TECHNIQUE: Routine CT chest with IV contrast. Coronal and sagittal reformats. Radiation dose reduction techniques were utilized, including automated exposure control and exposure modulation based on body size.  FINDINGS:  Chest wall: No lymphadenopathy.  Mediastinum: Shift to the left. Coronary artery atherosclerotic calcifications. Heart is at upper limits in size. No pericardial effusion. Mildly enlarged main pulmonary artery. Calcified right hilar lymph nodes, consistent with prior granulomatous infection. No mediastinal or hilar  lymphadenopathy.  Lungs/pleura: Large right pleural effusion with associated inferior displacement of the right hemidiaphragm and leftward mediastinal shift, with some focal irregular isodensity seen anteroinferiorly along the pleura, series 2, axial mage 102 that is nonspecific, measuring 1.7 cm. Small left pleural effusion. Patent left central airways. Right lung central and peripheral airways are collapsed. Right lung collapse. Posterior dependent and basilar subsegmental atelectasis in the left lung.  Upper abdomen: Small amount of perihepatic free fluid. Cholelithiasis. Mild to moderate left hydronephrosis.  Osseous structures: Severe right glenohumeral osteoarthritis with bone-on-bone articulation. Total left shoulder arthroplasty.  Other: Body wall edema seen in the flanks       1. Large right pleural effusion with tension component with leftward displacement of the mediastinum and right diaphragm and version. Focal irregular soft tissue attenuating material seen along the anteroinferior margin of the effusion/pleura is indeterminate 2. Small left pleural effusion. 3. Right lung collapse. 4. Mild to moderate left hydronephrosis, incompletely imaged  Call Report: Dr. Rodriguez was notified of tension effusion by telephone of the above findings on April 27, 2024 at 12:20 p.m.  This report was finalized on 4/27/2024 12:21 PM by Dr. Taiwo Espinosa M.D on Workstation: [id number]      XR Chest 1 View    Result Date: 4/27/2024  XR CHEST 1 VW-   INDICATION: Shortness of air  COMPARISON: Chest radiograph March 27, 2024  TECHNIQUE: 1 view chest  FINDINGS:  Opacification of the right hemithorax. Cardiomediastinal shift to the left. Total left shoulder arthroplasty. Right glenohumeral osteoarthritis with suspected rotator cuff tear.      Complete opacification the right hemithorax with cardiomediastinal shift to the left. Suspect large pleural effusion with passive collapse of the right lung.  This report was finalized  on 4/27/2024 11:09 AM by Dr. Taiwo Espinosa M.D on Workstation: [id number]       I ordered the above noted radiological studies. Independently reviewed and interpreted by me.  See dictation for official radiology interpretation.      PROCEDURES    Procedures      MEDICATIONS GIVEN IN ER    Medications   sodium chloride 0.9 % flush 10 mL (has no administration in time range)   furosemide (LASIX) tablet 20 mg (20 mg Oral Not Given 4/27/24 1625)   gabapentin (NEURONTIN) capsule 100 mg (has no administration in time range)   empagliflozin (JARDIANCE) tablet 10 mg (10 mg Oral Not Given 4/27/24 1624)   methIMAzole (TAPAZOLE) tablet 5 mg (has no administration in time range)   metoprolol tartrate (LOPRESSOR) tablet 25 mg (has no administration in time range)   collagenase ointment 1 Application (has no administration in time range)   sodium chloride 0.9 % flush 10 mL (has no administration in time range)   sodium chloride 0.9 % flush 10 mL (has no administration in time range)   sodium chloride 0.9 % infusion 40 mL (has no administration in time range)   sennosides-docusate (PERICOLACE) 8.6-50 MG per tablet 2 tablet (has no administration in time range)     And   polyethylene glycol (MIRALAX) packet 17 g (has no administration in time range)     And   bisacodyl (DULCOLAX) EC tablet 5 mg (has no administration in time range)     And   bisacodyl (DULCOLAX) suppository 10 mg (has no administration in time range)   ondansetron ODT (ZOFRAN-ODT) disintegrating tablet 4 mg (has no administration in time range)     Or   ondansetron (ZOFRAN) injection 4 mg (has no administration in time range)   acetaminophen (TYLENOL) tablet 650 mg (has no administration in time range)     Or   acetaminophen (TYLENOL) 160 MG/5ML oral solution 650 mg (has no administration in time range)     Or   acetaminophen (TYLENOL) suppository 650 mg (has no administration in time range)         PROGRESS, DATA ANALYSIS, CONSULTS, AND MEDICAL DECISION  MAKING    All labs have been independently reviewed and interpreted by me.  All radiology studies have been independently reviewed and interpreted by me and discussed with radiologist dictating the report.   EKG's independently reviewed and interpreted by me.  Discussion below represents my analysis of pertinent findings related to patient's condition, differential diagnosis, treatment plan and final disposition.    My differential diagnosis for dyspnea includes but is not limited to:    Asthma, COPD, pneumonia, pulmonary embolism, acute respiratory distress syndrome, pneumothorax, hemothorax, pleural effusion, pulmonary fibrosis, congestive heart failure, myocardial infarction, DKA, uremia, acidosis, sepsis, anemia, drug related, hyperventilation, CNS disease, inhalation exposure, airway obstruction, aspiration, electrolyte abnormalities, myasthenia gravis, panic attack, anaphylaxis      ED Course as of 04/27/24 1734   Sat Apr 27, 2024   1051 WBC: 8.87 [DC]   1051 Hemoglobin(!): 11.5  Chronic, no significant change [DC]   1051 Platelets: 242 [DC]   1105 Chest x-ray interpreted myself:  Right-sided pleural effusion with mass effect and some tracheal deviation to the left [FS]   1110 proBNP(!): 5,603.0 [DC]   1110 HS Troponin T(!!): 79 [DC]   1208 EKG interpreted myself:  1025, A-fib at rate of 114, no acute ST segment changes or T wave inversions. [FS]      ED Course User Index  [DC] Eli Raymond PA  [FS] Bhaskar Wright MD             AS OF 17:34 EDT VITALS:    BP - 107/80  HR - 104  TEMP - 97.3 °F (36.3 °C) (Oral)  O2 SATS - 94%        DIAGNOSIS  Final diagnoses:   Tension hydrothorax   Recurrent pleural effusion on right   Hyperkalemia         DISPOSITION  ED Disposition       ED Disposition   Decision to Admit    Condition   --    Comment   Level of Care: Telemetry [5]   Diagnosis: Recurrent pleural effusion on right [468235]   Admitting Physician: DENZEL HERRERA [[id number]]   Attending Physician: DENZEL HERRERA  [[id number]]                    Note Disclaimer: At [facility], we believe that sharing information builds trust and better relationships. You are receiving this note because you recently visited [facility]. It is possible you will see health information before a provider has talked with you about it. This kind of information can be easy to misunderstand. To help you fully understand what it means for your health, we urge you to discuss this note with your provider.         Eli Raymond PA  04/27/24 1738

## 2024-04-27 NOTE — H&P
HISTORY AND PHYSICAL   [facility]        Patient Identification:  Name: Mehreen Silva IV  Age: 83 y.o.  Sex: male  :  1940  MRN: [id number]                     Primary Care Physician: Taiwo Powers MD    Chief Complaint: Shortness of breath    History of Present Illness:        The patient  is a 83 y.o. male w/ hx of atrial fibrillation on eliquis, chronic diastolic heart failure, R AKA who presents to the ED c/o palpitations and soa that began last night. Pt reports feeling that his symptoms are worse with sitting still and he feels anxious. He reports mild orthopnea. Denies significant worsening with exertion. Denies fevers, increased cough, leg swelling. He has not missed doses of blood thinner.  The patient was evaluated in the ER and found to have worsening right pleural effusion with symptoms of increasing shortness of air.  The patient was admitted for further evaluation treatment and pulmonary has been consulted by the ER.  Pulmonary wants thoracic surgery involved as well.    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    Disease of thyroid gland     HLD (hyperlipidemia) 2022    Hx of toxic multinodular goiter 2022    Lumbar radiculopathy     Peripheral vascular disease, unspecified 2024    Pulmonary hypertension     Ulcer with gangrene     right heel     Past Surgical History:  Past Surgical History:   Procedure Laterality Date    ABOVE KNEE AMPUTATION Right 2023    Procedure: ABOVE KNEE AMPUTATION  RIGHT, proveena wound vac placement;  Surgeon: Issa Nieves MD;  Location: [facility] MAIN OR;  Service: Vascular;  Laterality: Right;    APPENDECTOMY      BACK SURGERY      x4    COLONOSCOPY      EXCISION MASS TRUNK N/A 06/08/2016    Procedure: Excision soft tissue neoplasm on abdominal wall and left neck;  Surgeon: Shaji Barahona Jr., MD;  Location: [facility] MAIN OR;  Service:     KNEE SURGERY      NECK SURGERY  1974    QUADRICEPS REPAIR      ROTATOR CUFF REPAIR      SHOULDER SURGERY      SMALL INTESTINE SURGERY  2021    Bowel Obstruction      Home Meds:  Medications Prior to Admission   Medication Sig Dispense Refill Last Dose    cholecalciferol (VITAMIN D3) 25 MCG (1000 UT) tablet Take 1 tablet by mouth Daily.       Eliquis 5 MG tablet tablet TAKE ONE TABLET BY MOUTH EVERY 12 HOURS 180 tablet 3     ezetimibe (ZETIA) 10 MG tablet Take 1 tablet by mouth Daily.       furosemide (LASIX) 20 MG tablet Take 1 tablet by mouth Daily.       gabapentin (NEURONTIN) 100 MG capsule Take 1 capsule by mouth every night at bedtime.       Jardiance 10 MG tablet tablet TAKE 1 TABLET BY MOUTH DAILY 90 tablet 1     methIMAzole (TAPAZOLE) 5 MG tablet Take 1 tablet by mouth 3 (Three) Times a Week. Monday, Wednesday, and Friday       metoprolol tartrate (LOPRESSOR) 25 MG tablet TAKE ONE TABLET BY MOUTH EVERY 12 HOURS (Patient taking differently: Take 1 tablet by mouth 2 (Two) Times a Day.) 180 tablet 3     multivitamin with minerals tablet tablet Take 1 tablet by mouth Daily.       Santyl 250 UNIT/GM ointment Apply 1 Application topically to the appropriate area as directed Daily. To sacrum       cephalexin (KEFLEX) 500 MG capsule Take 1 capsule by mouth 3 (Three) Times a Day.       escitalopram (LEXAPRO) 5 MG tablet Take 1 tablet by mouth Daily.       LORazepam (ATIVAN) 0.5 MG tablet         Current meds    Current Facility-Administered Medications:     sodium chloride 0.9 % flush 10 mL, 10 mL, Intravenous, PRN, Bhaskar Wright,  MD  Allergies:  No Known Allergies  Immunizations:  Immunization History   Administered Date(s) Administered    COVID-19 (PFIZER) Purple Cap Monovalent 2021, 2021, 2021    FLUAD TRI 65YR+ 10/25/2019    Fluad Quad 65+ 10/07/2020    Fluzone High Dose =>65 Years ([employer] ONLY) 2016, 10/28/2017    Fluzone High-Dose 65+yrs 2023    Hepatitis B 2 Dose Vaccine Heplisav-B 05/10/2022, 06/10/2022    Influenza Injectable Mdck Pf Quad 10/31/2018    TD Preservative Free (Tenivac) 2023     Social History:   Social History     Social History Narrative    Not on file     Social History     Socioeconomic History    Marital status:     Number of children: 1    Highest education level: Professional school degree (e.g., MD, DDS, DVM, BRITNEY)   Tobacco Use    Smoking status: Former     Current packs/day: 0.00     Types: Cigarettes     Quit date: 1983     Years since quittin.9     Passive exposure: Past    Smokeless tobacco: Never    Tobacco comments:        Vaping Use    Vaping status: Never Used   Substance and Sexual Activity    Alcohol use: Yes     Comment: SOCIAL; Patient is non drinker.    Drug use: No     Comment: Drug Abuse: Not a drug user    Sexual activity: Defer       Family History:  Family History   Problem Relation Age of Onset    Cancer Mother     Heart disease Father     Aneurysm Father         Abdominal Aortic Aneurysm    Malig Hyperthermia Neg Hx         Review of Systems  See history of present illness and past medical history.  Patient denies headache, dizziness, syncope, falls, trauma, change in vision, change in hearing, change in taste, changes in weight, changes in appetite, focal weakness, numbness, or paresthesia.  Patient denies chest pain, palpitations, dyspnea, orthopnea, PND, cough, sinus pressure, rhinorrhea, epistaxis, hemoptysis, nausea, vomiting, hematemesis, diarrhea, constipation or hematochezia. Denies cold or heat intolerance, polydipsia, polyuria,  polyphagia. Denies hematuria, pyuria, dysuria, hesitancy, frequency or urgency.   Denies fever, chills, sweats, night sweats.  Denies missing any routine medications. Remainder of ROS is negative.    Objective:  tMax 24 hrs: Temp (24hrs), Av.4 °F (35.8 °C), Min:96.4 °F (35.8 °C), Max:96.4 °F (35.8 °C)    Vitals Ranges:   Temp:  [96.4 °F (35.8 °C)] 96.4 °F (35.8 °C)  Heart Rate:  [] 105  Resp:  [20] 20  BP: (103-117)/(75-84) 108/75      Exam:  /75   Pulse 105   Temp 96.4 °F (35.8 °C)   Resp 20   SpO2 97%     General Appearance:    Alert, cooperative, no distress, appears stated age   Head:    Normocephalic, without obvious abnormality, atraumatic   Eyes:    PERRL, conjunctivae/corneas clear, EOM's intact, both eyes   Ears:    Normal external ear canals, both ears   Nose:   Nares normal, septum midline, mucosa normal, no drainage    or sinus tenderness   Throat:   Lips, mucosa, and tongue normal   Neck:   Supple, symmetrical, trachea midline, no adenopathy;     thyroid:  no enlargement/tenderness/nodules; no carotid    bruit or JVD   Back:     Symmetric, no curvature, ROM normal, no CVA tenderness   Lungs:   Decreased breath sounds on the right, respirations unlabored   Chest Wall:    No tenderness or deformity    Heart:    Regular rate and rhythm, S1 and S2 normal, no murmur, rub   or gallop   Abdomen:     Soft, nontender, bowel sounds active all four quadrants,     no masses, no hepatomegaly, no splenomegaly   Extremities:   Extremities normal, right AKA, no cyanosis or edema   Pulses:   2+ and symmetric all extremities   Skin:   Skin color, texture, t sacral decubitus ulcer   Lymph nodes:   Cervical, supraclavicular, and axillary nodes normal   Neurologic:   CNII-XII intact, normal strength, sensation intact throughout      .    Data Review:  Lab Results (last 72 hours)       Procedure Component Value Units Date/Time    High Sensitivity Troponin T 2Hr [[id number]]  (Abnormal) Collected: 24  1246    Specimen: Blood Updated: 04/27/24 1323     HS Troponin T 72 ng/L      Troponin T Delta -7 ng/L     Narrative:      High Sensitive Troponin T Reference Range:  <14.0 ng/L- Negative Female for AMI  <22.0 ng/L- Negative Male for AMI  >=14 - Abnormal Female indicating possible myocardial injury.  >=22 - Abnormal Male indicating possible myocardial injury.   Clinicians would have to utilize clinical acumen, EKG, Troponin, and serial changes to determine if it is an Acute Myocardial Infarction or myocardial injury due to an underlying chronic condition.         TSH Rfx On Abnormal To Free T4 [[id number]]  (Normal) Collected: 04/27/24 1033    Specimen: Blood Updated: 04/27/24 1126     TSH 0.819 uIU/mL     Single High Sensitivity Troponin T [[id number]]  (Abnormal) Collected: 04/27/24 1033    Specimen: Blood Updated: 04/27/24 1107     HS Troponin T 79 ng/L     Narrative:      High Sensitive Troponin T Reference Range:  <14.0 ng/L- Negative Female for AMI  <22.0 ng/L- Negative Male for AMI  >=14 - Abnormal Female indicating possible myocardial injury.  >=22 - Abnormal Male indicating possible myocardial injury.   Clinicians would have to utilize clinical acumen, EKG, Troponin, and serial changes to determine if it is an Acute Myocardial Infarction or myocardial injury due to an underlying chronic condition.         Comprehensive Metabolic Panel [[id number]]  (Abnormal) Collected: 04/27/24 1033    Specimen: Blood Updated: 04/27/24 1103     Glucose 209 mg/dL      BUN 23 mg/dL      Creatinine 1.03 mg/dL      Sodium 141 mmol/L      Potassium 5.7 mmol/L      Chloride 100 mmol/L      CO2 30.0 mmol/L      Calcium 9.1 mg/dL      Total Protein 6.3 g/dL      Albumin 3.1 g/dL      ALT (SGPT) 18 U/L      AST (SGOT) 12 U/L      Alkaline Phosphatase 118 U/L      Total Bilirubin 1.1 mg/dL      Globulin 3.2 gm/dL      A/G Ratio 1.0 g/dL      BUN/Creatinine Ratio 22.3     Anion Gap 11.0 mmol/L      eGFR 72.1 mL/min/1.73     Narrative:       GFR Normal >60  Chronic Kidney Disease <60  Kidney Failure <15    The GFR formula is only valid for adults with stable renal function between ages 18 and 70.    BNP [[id number]]  (Abnormal) Collected: 04/27/24 1033    Specimen: Blood Updated: 04/27/24 1103     proBNP 5,603.0 pg/mL     Narrative:      This assay is used as an aid in the diagnosis of individuals suspected of having heart failure. It can be used as an aid in the diagnosis of acute decompensated heart failure (ADHF) in patients presenting with signs and symptoms of ADHF to the emergency department (ED). In addition, NT-proBNP of <300 pg/mL indicates ADHF is not likely.    Age Range Result Interpretation  NT-proBNP Concentration (pg/mL:      <50             Positive            >450                   Gray                 300-450                    Negative             <300    50-75           Positive            >900                  Gray                300-900                  Negative            <300      >75             Positive            >1800                  Gray                300-1800                  Negative            <300    Magnesium [[id number]]  (Normal) Collected: 04/27/24 1033    Specimen: Blood Updated: 04/27/24 1103     Magnesium 2.3 mg/dL     CBC & Differential [[id number]]  (Abnormal) Collected: 04/27/24 1033    Specimen: Blood Updated: 04/27/24 1045    Narrative:      The following orders were created for panel order CBC & Differential.  Procedure                               Abnormality         Status                     ---------                               -----------         ------                     CBC Auto Differential[[id number]]        Abnormal            Final result                 Please view results for these tests on the individual orders.    CBC Auto Differential [[id number]]  (Abnormal) Collected: 04/27/24 1033    Specimen: Blood Updated: 04/27/24 1045     WBC 8.87 10*3/mm3      RBC 4.42 10*6/mm3      Hemoglobin  11.5 g/dL      Hematocrit 37.2 %      MCV 84.2 fL      MCH 26.0 pg      MCHC 30.9 g/dL      RDW 16.9 %      RDW-SD 51.4 fl      MPV 10.5 fL      Platelets 242 10*3/mm3      Neutrophil % 82.8 %      Lymphocyte % 8.6 %      Monocyte % 8.0 %      Eosinophil % 0.1 %      Basophil % 0.2 %      Immature Grans % 0.3 %      Neutrophils, Absolute 7.34 10*3/mm3      Lymphocytes, Absolute 0.76 10*3/mm3      Monocytes, Absolute 0.71 10*3/mm3      Eosinophils, Absolute 0.01 10*3/mm3      Basophils, Absolute 0.02 10*3/mm3      Immature Grans, Absolute 0.03 10*3/mm3      nRBC 0.0 /100 WBC     [place] Draw [[id number]] Collected: 04/27/24 1033    Specimen: Blood Updated: 04/27/24 1045    Narrative:      The following orders were created for panel order [place] Draw.  Procedure                               Abnormality         Status                     ---------                               -----------         ------                     Green Top (Gel)[[id number]]                                  Final result               Lavender Top[[id number]]                                     Final result               Gold Top - SST[[id number]]                                   Final result               Light Blue Top[[id number]]                                   Final result                 Please view results for these tests on the individual orders.    Green Top (Gel) [[id number]] Collected: 04/27/24 1033    Specimen: Blood Updated: 04/27/24 1045     Extra Tube Hold for add-ons.     Comment: Auto resulted.       Lavender Top [[id number]] Collected: 04/27/24 1033    Specimen: Blood Updated: 04/27/24 1045     Extra Tube hold for add-on     Comment: Auto resulted       Gold Top - SST [[id number]] Collected: 04/27/24 1033    Specimen: Blood Updated: 04/27/24 1045     Extra Tube Hold for add-ons.     Comment: Auto resulted.       Light Blue Top [[id number]] Collected: 04/27/24 1033    Specimen: Blood Updated: 04/27/24 1045     Extra Tube Hold for  add-ons.     Comment: Auto resulted               Results from last 7 days   Lab Units 04/27/24  1033   TSH uIU/mL 0.819          Imaging Results (All)       Procedure Component Value Units Date/Time    CT Chest Without Contrast Diagnostic [[id number]] Collected: 04/27/24 1208     Updated: 04/27/24 1224    Narrative:      CT CHEST WO CONTRAST DIAGNOSTIC-     INDICATION: Complicated right pleural effusion     COMPARISON: CT chest February 14, 2024     TECHNIQUE:  Routine CT chest with IV contrast. Coronal and sagittal reformats.  Radiation dose reduction techniques were utilized, including automated  exposure control and exposure modulation based on body size.     FINDINGS:      Chest wall: No lymphadenopathy.     Mediastinum: Shift to the left. Coronary artery atherosclerotic  calcifications. Heart is at upper limits in size. No pericardial  effusion. Mildly enlarged main pulmonary artery. Calcified right hilar  lymph nodes, consistent with prior granulomatous infection. No  mediastinal or hilar lymphadenopathy.     Lungs/pleura: Large right pleural effusion with associated inferior  displacement of the right hemidiaphragm and leftward mediastinal shift,  with some focal irregular isodensity seen anteroinferiorly along the  pleura, series 2, axial mage 102 that is nonspecific, measuring 1.7 cm.  Small left pleural effusion. Patent left central airways. Right lung  central and peripheral airways are collapsed. Right lung collapse.  Posterior dependent and basilar subsegmental atelectasis in the left  lung.     Upper abdomen: Small amount of perihepatic free fluid. Cholelithiasis.  Mild to moderate left hydronephrosis.     Osseous structures: Severe right glenohumeral osteoarthritis with  bone-on-bone articulation. Total left shoulder arthroplasty.     Other: Body wall edema seen in the flanks       Impression:         1. Large right pleural effusion with tension component with leftward  displacement of the mediastinum  and right diaphragm and version. Focal  irregular soft tissue attenuating material seen along the anteroinferior  margin of the effusion/pleura is indeterminate  2. Small left pleural effusion.  3. Right lung collapse.  4. Mild to moderate left hydronephrosis, incompletely imaged     Call Report: Dr. Rodriguez was notified of tension effusion by telephone of  the above findings on April 27, 2024 at 12:20 p.m.     This report was finalized on 4/27/2024 12:21 PM by Dr. Taiwo Espinosa M.D on Workstation: [id number]       XR Chest 1 View [[id number]] Collected: 04/27/24 1108     Updated: 04/27/24 1112    Narrative:      XR CHEST 1 VW-        INDICATION: Shortness of air     COMPARISON: Chest radiograph March 27, 2024     TECHNIQUE: 1 view chest     FINDINGS:      Opacification of the right hemithorax. Cardiomediastinal shift to the  left. Total left shoulder arthroplasty. Right glenohumeral  osteoarthritis with suspected rotator cuff tear.       Impression:      Complete opacification the right hemithorax with cardiomediastinal shift  to the left. Suspect large pleural effusion with passive collapse of the  right lung.     This report was finalized on 4/27/2024 11:09 AM by Dr. 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 12/13/2023    Disease of thyroid gland     HLD (hyperlipidemia) 09/23/2022    Hx of toxic multinodular goiter 09/23/2022    Lumbar radiculopathy     Peripheral vascular disease, unspecified 02/02/2024    Pulmonary  hypertension     Ulcer with gangrene     right heel       Assessment:  Active Hospital Problems    Diagnosis  POA    **Recurrent pleural effusion on right [J90]  Yes    Longstanding persistent atrial fibrillation [I48.11]  Yes    Chronic diastolic CHF (congestive heart failure) [I50.32]  Yes    Hyperthyroidism [E05.90]  Yes    Dyslipidemia [E78.5]  Yes    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       Plan:  Patient admitted to the hospital with consultation with pulmonary and thoracic surgery.  Will hold anticoagulation patient will need some sort of procedure to remove fluid from the right chest.    Dayron Fowler MD  4/27/2024  15:42 EDT

## 2024-04-27 NOTE — ED NOTES
Nursing report ED to floor  Mehreen Silva IV  83 y.o.  male    HPI :  HPI (Adult)  Stated Reason for Visit: soa  History Obtained From: patient, family    Chief Complaint  Chief Complaint   Patient presents with    Shortness of Breath    Dizziness       Admitting doctor:   Dayron Fowler MD    Admitting diagnosis:   The primary encounter diagnosis was Tension hydrothorax. Diagnoses of Recurrent pleural effusion on right and Hyperkalemia were also pertinent to this visit.    Code status:   Current Code Status       Date Active Code Status Order ID Comments User Context       Prior            Allergies:   Patient has no known allergies.    Isolation:   No active isolations    Intake and Output  No intake or output data in the 24 hours ending 04/27/24 1341    Weight:   There were no vitals filed for this visit.    Most recent vitals:   Vitals:    04/27/24 1051 04/27/24 1153 04/27/24 1231 04/27/24 1258   BP:  117/82 103/82    Pulse:  100 94 101   Resp:       Temp:       SpO2: 95% 94%  95%       Active LDAs/IV Access:   Lines, Drains & Airways       Active LDAs       Name Placement date Placement time Site Days    Peripheral IV 04/27/24 1050 Right Antecubital 04/27/24  1050  Antecubital  less than 1                    Labs (abnormal labs have a star):   Labs Reviewed   COMPREHENSIVE METABOLIC PANEL - Abnormal; Notable for the following components:       Result Value    Glucose 209 (*)     Potassium 5.7 (*)     CO2 30.0 (*)     Albumin 3.1 (*)     Alkaline Phosphatase 118 (*)     All other components within normal limits    Narrative:     GFR Normal >60  Chronic Kidney Disease <60  Kidney Failure <15    The GFR formula is only valid for adults with stable renal function between ages 18 and 70.   BNP (IN-HOUSE) - Abnormal; Notable for the following components:    proBNP 5,603.0 (*)     All other components within normal limits    Narrative:     This assay is used as an aid in the diagnosis of individuals suspected of having  heart failure. It can be used as an aid in the diagnosis of acute decompensated heart failure (ADHF) in patients presenting with signs and symptoms of ADHF to the emergency department (ED). In addition, NT-proBNP of <300 pg/mL indicates ADHF is not likely.    Age Range Result Interpretation  NT-proBNP Concentration (pg/mL:      <50             Positive            >450                   Gray                 300-450                    Negative             <300    50-75           Positive            >900                  Gray                300-900                  Negative            <300      >75             Positive            >1800                  Gray                300-1800                  Negative            <300   SINGLE HS TROPONIN T - Abnormal; Notable for the following components:    HS Troponin T 79 (*)     All other components within normal limits    Narrative:     High Sensitive Troponin T Reference Range:  <14.0 ng/L- Negative Female for AMI  <22.0 ng/L- Negative Male for AMI  >=14 - Abnormal Female indicating possible myocardial injury.  >=22 - Abnormal Male indicating possible myocardial injury.   Clinicians would have to utilize clinical acumen, EKG, Troponin, and serial changes to determine if it is an Acute Myocardial Infarction or myocardial injury due to an underlying chronic condition.        CBC WITH AUTO DIFFERENTIAL - Abnormal; Notable for the following components:    Hemoglobin 11.5 (*)     Hematocrit 37.2 (*)     MCH 26.0 (*)     MCHC 30.9 (*)     RDW 16.9 (*)     Neutrophil % 82.8 (*)     Lymphocyte % 8.6 (*)     Eosinophil % 0.1 (*)     Neutrophils, Absolute 7.34 (*)     All other components within normal limits   HIGH SENSITIVITIY TROPONIN T 2HR - Abnormal; Notable for the following components:    HS Troponin T 72 (*)     Troponin T Delta -7 (*)     All other components within normal limits    Narrative:     High Sensitive Troponin T Reference Range:  <14.0 ng/L- Negative Female for  AMI  <22.0 ng/L- Negative Male for AMI  >=14 - Abnormal Female indicating possible myocardial injury.  >=22 - Abnormal Male indicating possible myocardial injury.   Clinicians would have to utilize clinical acumen, EKG, Troponin, and serial changes to determine if it is an Acute Myocardial Infarction or myocardial injury due to an underlying chronic condition.        MAGNESIUM - Normal   TSH RFX ON ABNORMAL TO FREE T4 - Normal   RAINBOW DRAW    Narrative:     The following orders were created for panel order [place] Draw.  Procedure                               Abnormality         Status                     ---------                               -----------         ------                     Green Top (Gel)[[id number]]                                  Final result               Lavender Top[[id number]]                                     Final result               Gold Top - SST[[id number]]                                   Final result               Light Blue Top[[id number]]                                   Final result                 Please view results for these tests on the individual orders.   CBC AND DIFFERENTIAL    Narrative:     The following orders were created for panel order CBC & Differential.  Procedure                               Abnormality         Status                     ---------                               -----------         ------                     CBC Auto Differential[[id number]]        Abnormal            Final result                 Please view results for these tests on the individual orders.   GREEN TOP   LAVENDER TOP   GOLD TOP - SST   LIGHT BLUE TOP       EKG:   ECG 12 Lead ED Triage Standing Order; SOA   Preliminary Result   HEART RATE= 114  bpm   RR Interval= 526  ms   [place] Interval=   ms   P Horizontal Axis=   deg   P Front Axis=   deg   QRSD Interval= 87  ms   QT Interval= 364  ms   QTcB= 502  ms   QRS Axis= 66  deg   T Wave Axis= 69  deg   - ABNORMAL ECG -   Atrial  fibrillation   Ventricular premature complex   Anterior infarct, old   ST elevation, consider inferior injury   Electronically Signed By:    Date and Time of Study: 2024 10:25:48          Meds given in ED:   Medications   sodium chloride 0.9 % flush 10 mL (has no administration in time range)       Imaging results:  CT Chest Without Contrast Diagnostic    Result Date: 2024   1. Large right pleural effusion with tension component with leftward displacement of the mediastinum and right diaphragm and version. Focal irregular soft tissue attenuating material seen along the anteroinferior margin of the effusion/pleura is indeterminate 2. Small left pleural effusion. 3. Right lung collapse. 4. Mild to moderate left hydronephrosis, incompletely imaged  Call Report: Dr. Rodriguez was notified of tension effusion by telephone of the above findings on 2024 at 12:20 p.m.  This report was finalized on 2024 12:21 PM by Dr. Taiwo Espinosa M.D on Workstation: [id number]      XR Chest 1 View    Result Date: 2024  Complete opacification the right hemithorax with cardiomediastinal shift to the left. Suspect large pleural effusion with passive collapse of the right lung.  This report was finalized on 2024 11:09 AM by Dr. Taiwo Espinosa M.D on Workstation: [id number]       Ambulatory status:   - w/ assist x2    Social issues:   Social History     Socioeconomic History    Marital status:     Number of children: 1    Highest education level: Professional school degree (e.g., MD, DDS, DVM, BRITNEY)   Tobacco Use    Smoking status: Former     Current packs/day: 0.00     Types: Cigarettes     Quit date: 1983     Years since quittin.9     Passive exposure: Past    Smokeless tobacco: Never    Tobacco comments:        Vaping Use    Vaping status: Never Used   Substance and Sexual Activity    Alcohol use: Yes     Comment: SOCIAL; Patient is non drinker.    Drug use: No     Comment: Drug Abuse:  Not a drug user    Sexual activity: Defer       Peripheral Neurovascular  Peripheral Neurovascular (Adult)  Peripheral Neurovascular WDL: .WDL except (AKA RLE)    Neuro Cognitive  Neuro Cognitive (Adult)  Cognitive/Neuro/Behavioral WDL: WDL  Pupils  Pupil PERRLA: yes    Learning  Learning Assessment (Adult)  Learning Readiness and Ability: no barriers identified    Respiratory  Respiratory (Adult)  Additional Documentation: Breath Sounds (Group)  Respiratory WDL  Respiratory WDL: rhythm/pattern, expansion/retractions, cough  Rhythm/Pattern, Respiratory: tachypneic, shortness of breath  Expansion/Accessory Muscles/Retractions: accessory muscle use  Cough Frequency: infrequent  Cough Type: dry  Breath Sounds  Breath Sounds: RUL, RLL, RML, LLL  LLL Breath Sounds: Posterior:, diminished  RUL Breath Sounds: Posterior:  RML Breath Sounds: Posterior:, diminished  RLL Breath Sounds: Posterior:, diminished    Abdominal Pain       Pain Assessments  Pain (Adult)  (0-10) Pain Rating: Rest: 0    NIH Stroke Scale       Amy Abdi RN  04/27/24 13:41 EDT

## 2024-04-27 NOTE — PLAN OF CARE
Problem: Adult Inpatient Plan of Care  Goal: Plan of Care Review  Outcome: Ongoing, Not Progressing  Flowsheets (Taken 4/27/2024 1809)  Progress: no change  Plan of Care Reviewed With: patient  Outcome Evaluation: Patient admitted to [facility] from ER with right pleural effusion. Upon arrival to unit, patient and his spouse were greeted and any questions regarding care were addressed. Pulmonary, cardiology, and thoracic surgery consults called. Patient had a BM. Mobility very limited. Wound care completed to sacrum.

## 2024-04-27 NOTE — ED PROVIDER NOTES
MD ATTESTATION NOTE    The JENNIE and I have discussed this patient's history, physical exam, and treatment plan.  I have reviewed the documentation and personally had a face to face interaction with the patient. I affirm the documentation and agree with the treatment and plan.  The attached note describes my personal findings.      I provided a substantive portion of the care of the patient.  I personally performed the physical exam in its entirety, and below are my findings.        Brief HPI: Patient presented emergency department with increasing shortness of breath for the last 3 to 4 days.  No fever.  No nausea or vomiting.  Last night felt he could not breathe when he is lying down.    PHYSICAL EXAM  ED Triage Vitals   Temp Heart Rate Resp BP SpO2   04/27/24 1018 04/27/24 1018 04/27/24 1018 04/27/24 1018 04/27/24 1051   96.4 °F (35.8 °C) 84 20 108/84 95 %      Temp src Heart Rate Source Patient Position BP Location FiO2 (%)   -- -- -- -- --                GENERAL: no acute distress  HENT: nares patent  EYES: no scleral icterus  CV: Irregular rhythm, tachycardic rate  RESPIRATORY: normal effort  ABDOMEN: soft  MUSCULOSKELETAL: Previous right AKA  NEURO: alert, moves all extremities, follows commands  PSYCH:  calm, cooperative  SKIN: warm, dry    Vital signs and nursing notes reviewed.        Plan: Patient presented emergency department with shortness of breath, history of pleural effusion.  Otherwise well-appearing, vitals otherwise stable.  Stable on room air.  Labs significant for elevated BNP and troponin, not significantly changed from baseline.  Imaging demonstrating large right-sided pleural effusion with mass effect on the mediastinum.  No evidence of hemodynamic compromise at this time.  Patient took his Eliquis this morning.  Discussed with pulmonology and cardiothoracic surgery, will hold Eliquis for now and plan for drainage likely tomorrow.  Admitted to [facility] for additional management evaluation of his  symptoms.    ED Course as of 04/27/24 1340   Sat Apr 27, 2024   1051 WBC: 8.87 [DC]   1051 Hemoglobin(!): 11.5  Chronic, no significant change [DC]   1051 Platelets: 242 [DC]   1105 Chest x-ray interpreted myself:  Right-sided pleural effusion with mass effect and some tracheal deviation to the left [FS]   1110 proBNP(!): 5,603.0 [DC]   1110 HS Troponin T(!!): 79 [DC]   1208 EKG interpreted myself:  1025, A-fib at rate of 114, no acute ST segment changes or T wave inversions. [FS]      ED Course User Index  [DC] Eli Raymond PA  [FS] Bhaskar Wright MD       SHARED VISIT: This visit was performed by BOTH a physician and an APC. The substantive portion of the medical decision making was performed by this attesting physician who made or approved the management plan and takes responsibility for patient management. All studies in the APC note (if performed) were independently interpreted by me.        Bhaskar Wright MD  04/27/24 1341

## 2024-04-28 ENCOUNTER — APPOINTMENT (OUTPATIENT)
Dept: CARDIOLOGY | Facility: HOSPITAL | Age: 84
DRG: 186 | End: 2024-04-28
Payer: MEDICARE

## 2024-04-28 LAB
ANION GAP SERPL CALCULATED.3IONS-SCNC: 9.2 MMOL/L (ref 5–15)
BASOPHILS # BLD AUTO: 0.02 10*3/MM3 (ref 0–0.2)
BASOPHILS NFR BLD AUTO: 0.3 % (ref 0–1.5)
BH CV ECHO MEAS - EDV(CUBED): 16 ML
BH CV ECHO MEAS - EDV(MOD-SP2): 44 ML
BH CV ECHO MEAS - EDV(MOD-SP4): 52 ML
BH CV ECHO MEAS - EF(MOD-BP): 62.4 %
BH CV ECHO MEAS - EF(MOD-SP2): 68.2 %
BH CV ECHO MEAS - EF(MOD-SP4): 59.6 %
BH CV ECHO MEAS - ESV(CUBED): 4.8 ML
BH CV ECHO MEAS - ESV(MOD-SP2): 14 ML
BH CV ECHO MEAS - ESV(MOD-SP4): 21 ML
BH CV ECHO MEAS - FS: 32.8 %
BH CV ECHO MEAS - IVS/LVPW: 1.5 CM
BH CV ECHO MEAS - IVSD: 2.07 CM
BH CV ECHO MEAS - LV DIASTOLIC VOL/BSA (35-75): 28.8 CM2
BH CV ECHO MEAS - LV MASS(C)D: 164.6 GRAMS
BH CV ECHO MEAS - LV SYSTOLIC VOL/BSA (12-30): 11.6 CM2
BH CV ECHO MEAS - LVIDD: 2.5 CM
BH CV ECHO MEAS - LVIDS: 1.69 CM
BH CV ECHO MEAS - LVPWD: 1.38 CM
BH CV ECHO MEAS - MR MAX PG: 72.1 MMHG
BH CV ECHO MEAS - MR MAX VEL: 424.5 CM/SEC
BH CV ECHO MEAS - RAP SYSTOLE: 15 MMHG
BH CV ECHO MEAS - RVSP: 51 MMHG
BH CV ECHO MEAS - SV(MOD-SP2): 30 ML
BH CV ECHO MEAS - SV(MOD-SP4): 31 ML
BH CV ECHO MEAS - SVI(MOD-SP2): 16.6 ML/M2
BH CV ECHO MEAS - SVI(MOD-SP4): 17.2 ML/M2
BH CV ECHO MEAS - TR MAX PG: 36.5 MMHG
BH CV ECHO MEAS - TR MAX VEL: 302.1 CM/SEC
BUN SERPL-MCNC: 22 MG/DL (ref 8–23)
BUN/CREAT SERPL: 27.8 (ref 7–25)
CALCIUM SPEC-SCNC: 8.6 MG/DL (ref 8.6–10.5)
CHLORIDE SERPL-SCNC: 101 MMOL/L (ref 98–107)
CHOLEST SERPL-MCNC: 148 MG/DL (ref 0–200)
CO2 SERPL-SCNC: 29.8 MMOL/L (ref 22–29)
CREAT SERPL-MCNC: 0.79 MG/DL (ref 0.76–1.27)
DEPRECATED RDW RBC AUTO: 49 FL (ref 37–54)
EGFRCR SERPLBLD CKD-EPI 2021: 88.1 ML/MIN/1.73
EOSINOPHIL # BLD AUTO: 0.02 10*3/MM3 (ref 0–0.4)
EOSINOPHIL NFR BLD AUTO: 0.3 % (ref 0.3–6.2)
ERYTHROCYTE [DISTWIDTH] IN BLOOD BY AUTOMATED COUNT: 16.8 % (ref 12.3–15.4)
GLUCOSE SERPL-MCNC: 122 MG/DL (ref 65–99)
HCT VFR BLD AUTO: 31.8 % (ref 37.5–51)
HDLC SERPL-MCNC: 35 MG/DL (ref 40–60)
HGB BLD-MCNC: 10.1 G/DL (ref 13–17.7)
IMM GRANULOCYTES # BLD AUTO: 0.03 10*3/MM3 (ref 0–0.05)
IMM GRANULOCYTES NFR BLD AUTO: 0.4 % (ref 0–0.5)
LDLC SERPL CALC-MCNC: 100 MG/DL (ref 0–100)
LDLC/HDLC SERPL: 2.85 {RATIO}
LYMPHOCYTES # BLD AUTO: 0.72 10*3/MM3 (ref 0.7–3.1)
LYMPHOCYTES NFR BLD AUTO: 9.7 % (ref 19.6–45.3)
MCH RBC QN AUTO: 25.8 PG (ref 26.6–33)
MCHC RBC AUTO-ENTMCNC: 31.8 G/DL (ref 31.5–35.7)
MCV RBC AUTO: 81.1 FL (ref 79–97)
MONOCYTES # BLD AUTO: 0.81 10*3/MM3 (ref 0.1–0.9)
MONOCYTES NFR BLD AUTO: 10.9 % (ref 5–12)
NEUTROPHILS NFR BLD AUTO: 5.83 10*3/MM3 (ref 1.7–7)
NEUTROPHILS NFR BLD AUTO: 78.4 % (ref 42.7–76)
NRBC BLD AUTO-RTO: 0 /100 WBC (ref 0–0.2)
PLATELET # BLD AUTO: 189 10*3/MM3 (ref 140–450)
PMV BLD AUTO: 10.7 FL (ref 6–12)
POTASSIUM SERPL-SCNC: 4.4 MMOL/L (ref 3.5–5.2)
QT INTERVAL: 327 MS
QTC INTERVAL: 453 MS
RBC # BLD AUTO: 3.92 10*6/MM3 (ref 4.14–5.8)
SODIUM SERPL-SCNC: 140 MMOL/L (ref 136–145)
TRIGL SERPL-MCNC: 66 MG/DL (ref 0–150)
VLDLC SERPL-MCNC: 13 MG/DL (ref 5–40)
WBC NRBC COR # BLD AUTO: 7.43 10*3/MM3 (ref 3.4–10.8)

## 2024-04-28 PROCEDURE — 25010000002 FUROSEMIDE PER 20 MG: Performed by: INTERNAL MEDICINE

## 2024-04-28 PROCEDURE — 93308 TTE F-UP OR LMTD: CPT

## 2024-04-28 PROCEDURE — 99223 1ST HOSP IP/OBS HIGH 75: CPT | Performed by: INTERNAL MEDICINE

## 2024-04-28 PROCEDURE — 85025 COMPLETE CBC W/AUTO DIFF WBC: CPT | Performed by: HOSPITALIST

## 2024-04-28 PROCEDURE — 93321 DOPPLER ECHO F-UP/LMTD STD: CPT | Performed by: INTERNAL MEDICINE

## 2024-04-28 PROCEDURE — 93325 DOPPLER ECHO COLOR FLOW MAPG: CPT

## 2024-04-28 PROCEDURE — 93321 DOPPLER ECHO F-UP/LMTD STD: CPT

## 2024-04-28 PROCEDURE — 99222 1ST HOSP IP/OBS MODERATE 55: CPT | Performed by: THORACIC SURGERY (CARDIOTHORACIC VASCULAR SURGERY)

## 2024-04-28 PROCEDURE — 93325 DOPPLER ECHO COLOR FLOW MAPG: CPT | Performed by: INTERNAL MEDICINE

## 2024-04-28 PROCEDURE — 80048 BASIC METABOLIC PNL TOTAL CA: CPT | Performed by: HOSPITALIST

## 2024-04-28 PROCEDURE — 80061 LIPID PANEL: CPT | Performed by: INTERNAL MEDICINE

## 2024-04-28 PROCEDURE — 93308 TTE F-UP OR LMTD: CPT | Performed by: INTERNAL MEDICINE

## 2024-04-28 RX ORDER — ATORVASTATIN CALCIUM 20 MG/1
10 TABLET, FILM COATED ORAL NIGHTLY
Status: DISCONTINUED | OUTPATIENT
Start: 2024-04-28 | End: 2024-05-06 | Stop reason: HOSPADM

## 2024-04-28 RX ORDER — FUROSEMIDE 40 MG/1
40 TABLET ORAL DAILY
Status: DISCONTINUED | OUTPATIENT
Start: 2024-04-29 | End: 2024-05-06 | Stop reason: HOSPADM

## 2024-04-28 RX ORDER — FUROSEMIDE 10 MG/ML
40 INJECTION INTRAMUSCULAR; INTRAVENOUS ONCE
Status: COMPLETED | OUTPATIENT
Start: 2024-04-28 | End: 2024-04-28

## 2024-04-28 RX ADMIN — FUROSEMIDE 40 MG: 10 INJECTION, SOLUTION INTRAMUSCULAR; INTRAVENOUS at 18:21

## 2024-04-28 RX ADMIN — GABAPENTIN 100 MG: 100 CAPSULE ORAL at 21:59

## 2024-04-28 RX ADMIN — Medication 10 ML: at 09:40

## 2024-04-28 RX ADMIN — FUROSEMIDE 20 MG: 20 TABLET ORAL at 09:39

## 2024-04-28 RX ADMIN — COLLAGENASE SANTYL 1 APPLICATION: 250 OINTMENT TOPICAL at 09:39

## 2024-04-28 RX ADMIN — Medication 10 ML: at 21:59

## 2024-04-28 RX ADMIN — METOPROLOL TARTRATE 25 MG: 25 TABLET, FILM COATED ORAL at 09:39

## 2024-04-28 RX ADMIN — METOPROLOL TARTRATE 25 MG: 25 TABLET, FILM COATED ORAL at 21:58

## 2024-04-28 RX ADMIN — EMPAGLIFLOZIN 10 MG: 10 TABLET, FILM COATED ORAL at 09:39

## 2024-04-28 RX ADMIN — ATORVASTATIN CALCIUM 10 MG: 20 TABLET, FILM COATED ORAL at 21:58

## 2024-04-28 NOTE — PROGRESS NOTES
[facility]  [phone]  Dr. Henrique Monterroso    Subjective:  LOS: 0    Chief Complaint: Pleural effusion    Continues to feel short of breath despite supplemental O2.  Denies other, plans.    Objective   Vital Signs past 24hrs  Temp range: Temp (24hrs), Av.3 °F (36.3 °C), Min:96.4 °F (35.8 °C), Max:98.1 °F (36.7 °C)    BP range: BP: ()/(62-84) 103/72  Pulse range: Heart Rate:  [] 112  Resp rate range: Resp:  [18-20] 18  Device (Oxygen Therapy): room air   Oxygen range:SpO2:  [93 %-99 %] 97 %   Mechanical Ventilator:     Physical Exam  Eyes:      Pupils: Pupils are equal, round, and reactive to light.   Cardiovascular:      Rate and Rhythm: Normal rate and regular rhythm.      Heart sounds: No murmur heard.  Pulmonary:      Comments: Absent breath sounds right lung  Dull to percussion right lung  Left lung sounds clear  Abdominal:      General: Bowel sounds are normal.      Palpations: Abdomen is soft. There is no mass.      Tenderness: There is no abdominal tenderness.   Musculoskeletal:         General: No swelling.   Neurological:      Mental Status: He is alert.       Results Review:    I have reviewed the laboratory and imaging data since the last note by [facility] physician.  My annotations are noted in assessment and plan.      Result Review:  I have personally reviewed the results from last note by [facility] physician to 2024 10:14 EDT and agree with these findings:  [x]  Laboratory list / accordion  [x]  Microbiology  [x]  Radiology  []  EKG/Telemetry   []  Cardiology/Vascular   []  Pathology  []  Old records  []  Other:    Medication Review:  I have reviewed the current MAR.  My annotations are noted in assessment and plan.    collagenase, 1 Application, Topical, Daily  empagliflozin, 10 mg, Oral, Daily  furosemide, 20 mg, Oral, Daily  gabapentin, 100 mg, Oral, Nightly  [START ON 2024] methIMAzole, 5 mg, Oral, Once per day on   metoprolol tartrate, 25 mg,  Oral, BID  sodium chloride, 10 mL, Intravenous, Q12H           Lines, Drains & Airways       Active LDAs       Name Placement date Placement time Site Days    Peripheral IV 04/27/24 1050 Right Antecubital 04/27/24  1050  Antecubital  less than 1                  Isolation status: No active isolations    Dietary Orders (From admission, onward)       Start     Ordered    04/27/24 1609  Diet: Cardiac; Healthy Heart (2-3 Na+); Fluid Consistency: Thin (IDDSI 0)  Diet Effective Now        References:    Diet Order [place]   Question Answer Comment   Diets: Cardiac    Cardiac Diet: Healthy Heart (2-3 Na+)    Fluid Consistency: Thin (IDDSI 0)        04/27/24 1610                    PCCM Problems  Large right pleural effusion causing lung atelectasis  Chronic HFpEF  Chronic anticoagulation for A-fib  Status post right above-knee amputation  Hyperthyroidism on methimazole  Sacral decubitus ulcer    Plan of Treatment    I think this patient would be best served with a Pleurx catheter.  Pending thoracic surgery input.  Otherwise he will need chest tube placement by interventional radiology.  No anticoagulation and antiplatelet therapy is on hold.    Henrique Monterroso MD  04/28/24  10:14 EDT      Part of this note may be an electronic transcription/translation of spoken language to printed text using the Dragon Dictation System.

## 2024-04-28 NOTE — OR NURSING
Pt still Afib,HR up to 150s but does not sustain.ECG,K,Mag & trop ordered last night per protocol(see result).Notified Cardiology rounding nurse this morning.Pt  asymptomatic.Q2H turns,wound care done,WCTM.

## 2024-04-28 NOTE — CONSULTS
Initial Hospital Consult    Reason for consult: Recurrent right pleural effusion  Requesting physician: Dr. Fowler    Chief complaint: Shortness of breath    Subjective     History of Present Illness  Mr. Silva is a pleasant 83-year-old gentleman who presents with a recurrent right pleural effusion.  He was initially seen last year and underwent a chest tube and lytics with a repeat chest tube and lytics in January.  His pleural effusion is likely secondary to heart failure.  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 12/13/2023    Disease of thyroid gland     HLD (hyperlipidemia) 09/23/2022    Hx of toxic multinodular goiter 09/23/2022    Lumbar radiculopathy     Peripheral vascular disease, unspecified 02/02/2024    Pulmonary hypertension     Ulcer with gangrene     right heel   ,   Past Surgical History:   Procedure Laterality Date    ABOVE KNEE AMPUTATION Right 12/13/2023    Procedure: ABOVE KNEE AMPUTATION RIGHT, proveena wound vac placement;  Surgeon: Issa Nieves MD;  Location: [facility];  Service: Vascular;  Laterality: Right;    APPENDECTOMY      BACK SURGERY      x4    COLONOSCOPY      EXCISION MASS TRUNK N/A 06/08/2016    Procedure: Excision soft tissue neoplasm on abdominal wall and left neck;  Surgeon: Shaji Barahona Jr., MD;  Location: [facility] OR;  Service:     KNEE SURGERY      NECK SURGERY  1974    QUADRICEPS REPAIR      ROTATOR CUFF REPAIR      SHOULDER SURGERY      SMALL INTESTINE SURGERY  2021    Bowel Obstruction   ,   Family History    Problem Relation Age of Onset    Cancer Mother     Heart disease Father     Aneurysm Father         Abdominal Aortic Aneurysm    Malig Hyperthermia Neg Hx    ,   Social History     Socioeconomic History    Marital status:     Number of children: 1    Highest education level: Professional school degree (e.g., MD, DDS, DVM, BRITNEY)   Tobacco Use    Smoking status: Former     Current packs/day: 0.00     Types: Cigarettes     Quit date: 1983     Years since quittin.9     Passive exposure: Past    Smokeless tobacco: Never    Tobacco comments:        Vaping Use    Vaping status: Never Used   Substance and Sexual Activity    Alcohol use: Yes     Comment: SOCIAL; Patient is non drinker.    Drug use: No     Comment: Drug Abuse: Not a drug user    Sexual activity: Defer     E-cigarette/Vaping    E-cigarette/Vaping Use Never User     Passive Exposure No     Counseling Given No      E-cigarette/Vaping Substances    Nicotine No     THC No     CBD No     Flavoring No      E-cigarette/Vaping Devices    Disposable No     Pre-filled or Refillable Cartridge No     Refillable Tank No     Pre-filled Pod No          ,   Medications Prior to Admission   Medication Sig Dispense Refill Last Dose    cholecalciferol (VITAMIN D3) 25 MCG (1000 UT) tablet Take 1 tablet by mouth Daily.       Eliquis 5 MG tablet tablet TAKE ONE TABLET BY MOUTH EVERY 12 HOURS 180 tablet 3     ezetimibe (ZETIA) 10 MG tablet Take 1 tablet by mouth Daily.       furosemide (LASIX) 20 MG tablet Take 1 tablet by mouth Daily.       gabapentin (NEURONTIN) 100 MG capsule Take 1 capsule by mouth every night at bedtime.       Jardiance 10 MG tablet tablet TAKE 1 TABLET BY MOUTH DAILY 90 tablet 1     methIMAzole (TAPAZOLE) 5 MG tablet Take 1 tablet by mouth 3 (Three) Times a Week. Monday, Wednesday, and Friday       metoprolol tartrate (LOPRESSOR) 25 MG tablet TAKE ONE TABLET BY MOUTH EVERY 12 HOURS (Patient taking differently: Take 1 tablet by mouth 2 (Two)  Times a Day.) 180 tablet 3     multivitamin with minerals tablet tablet Take 1 tablet by mouth Daily.       Santyl 250 UNIT/GM ointment Apply 1 Application topically to the appropriate area as directed Daily. To sacrum       cephalexin (KEFLEX) 500 MG capsule Take 1 capsule by mouth 3 (Three) Times a Day.       escitalopram (LEXAPRO) 5 MG tablet Take 1 tablet by mouth Daily.       LORazepam (ATIVAN) 0.5 MG tablet       , and Allergies:  Patient has no known allergies.    Objective      Vital Signs  Temp:  [97.3 °F (36.3 °C)-98.1 °F (36.7 °C)] 98.1 °F (36.7 °C)  Heart Rate:  [] 110  Resp:  [18-20] 18  BP: ()/(62-81) 104/70    Physical Exam  Vitals and nursing note reviewed.   Constitutional:       Appearance: He is well-developed.   HENT:      Head: Normocephalic and atraumatic.      Nose: Nose normal.   Eyes:      Conjunctiva/sclera: Conjunctivae normal.   Pulmonary:      Effort: Pulmonary effort is normal.   Musculoskeletal:         General: Normal range of motion.      Cervical back: Normal range of motion and neck supple.   Skin:     General: Skin is warm and dry.   Neurological:      Mental Status: He is alert and oriented to person, place, and time.   Psychiatric:         Behavior: Behavior normal.         Thought Content: Thought content normal.         Judgment: Judgment normal.         Results Review:    I reviewed the patient's new clinical results.  I reviewed the patient's new imaging results and agree with the interpretation.  I reviewed the patient's other test results and agree with the interpretation    Lab Results:                WBC   Date Value Ref Range Status   04/28/2024 7.43 3.40 - 10.80 10*3/mm3 Final     Hemoglobin   Date Value Ref Range Status   04/28/2024 10.1 (L) 13.0 - 17.7 g/dL Final     Hematocrit   Date Value Ref Range Status   04/28/2024 31.8 (L) 37.5 - 51.0 % Final     Platelets   Date Value Ref Range Status   04/28/2024 189 140 - 450 10*3/mm3 Final     Sodium   Date  Value Ref Range Status   04/28/2024 140 136 - 145 mmol/L Final     Potassium   Date Value Ref Range Status   04/28/2024 4.4 3.5 - 5.2 mmol/L Final     Chloride   Date Value Ref Range Status   04/28/2024 101 98 - 107 mmol/L Final     CO2   Date Value Ref Range Status   04/28/2024 29.8 (H) 22.0 - 29.0 mmol/L Final     BUN   Date Value Ref Range Status   04/28/2024 22 8 - 23 mg/dL Final     Creatinine   Date Value Ref Range Status   04/28/2024 0.79 0.76 - 1.27 mg/dL Final     Glucose   Date Value Ref Range Status   04/28/2024 122 (H) 65 - 99 mg/dL Final         Assessment & Plan       Recurrent pleural effusion on right    Hx of toxic multinodular goiter    Dyslipidemia    Hyperthyroidism    Chronic diastolic CHF (congestive heart failure)    Longstanding persistent atrial fibrillation      Assessment & Plan  Mr. Silva is a pleasant 83-year-old gentleman with a recurrent nonloculated right pleural effusion.  Given his state of health and the likely etiology being heart failure, I would recommend a Pleurx catheter.  His last dose of Eliquis was yesterday morning.  We will plan Pleurx catheter next week.  Risks and benefits of the procedure were discussed with the patient today.  I discussed the patients findings and my recommendations with patient    Thank you for this consult and allowing us to participate in the care of your patient.  We will follow along with you during this hospitalization.       Nataliya Noyola MD  [facility]  04/28/24  15:41 EDT

## 2024-04-28 NOTE — PROGRESS NOTES
"DAILY PROGRESS NOTE  [facility]    Patient Identification:  Name: Mehreen Silva IV  Age: 83 y.o.  Sex: male  :  1940  MRN: [id number]         Primary Care Physician: Taiwo Powers MD    Subjective:  Interval History: He complains of shortness of air.    Objective:    Scheduled Meds:collagenase, 1 Application, Topical, Daily  empagliflozin, 10 mg, Oral, Daily  furosemide, 20 mg, Oral, Daily  gabapentin, 100 mg, Oral, Nightly  [START ON 2024] methIMAzole, 5 mg, Oral, Once per day on   metoprolol tartrate, 25 mg, Oral, BID  sodium chloride, 10 mL, Intravenous, Q12H      Continuous Infusions:     Vital signs in last 24 hours:  Temp:  [97.3 °F (36.3 °C)-98.1 °F (36.7 °C)] 98.1 °F (36.7 °C)  Heart Rate:  [] 110  Resp:  [18-20] 18  BP: ()/(62-81) 104/70    Intake/Output:    Intake/Output Summary (Last 24 hours) at 2024 1413  Last data filed at 2024 0500  Gross per 24 hour   Intake 120 ml   Output --   Net 120 ml       Exam:  /70   Pulse 110   Temp 98.1 °F (36.7 °C) (Oral)   Resp 18   Ht 177.8 cm (70\")   Wt 64.4 kg (142 lb)   SpO2 97%   BMI 20.37 kg/m²     General Appearance:    Alert, cooperative, no distress   Head:    Normocephalic, without obvious abnormality, atraumatic   Eyes:       Throat:   Lips, tongue, gums normal   Neck:   Supple, symmetrical, trachea midline, no JVD   Lungs:   Decreased breath sounds on the right, respirations unlabored   Chest Wall:    No tenderness or deformity    Heart:    Regular rate and rhythm, S1 and S2 normal, no murmur,no  Rub or gallop   Abdomen:     Soft, nontender, bowel sounds active, no masses, no organomegaly    Extremities:   Extremities normal, atraumatic, no cyanosis or edema   Pulses:      Skin:   Skin is warm and dry,  no rashes or palpable lesions   Neurologic:   no focal deficits noted      Lab Results (last 72 hours)       Procedure Component Value Units Date/Time    Lipid " Panel [[id number]]  (Abnormal) Collected: 04/28/24 0338    Specimen: Blood Updated: 04/28/24 0723     Total Cholesterol 148 mg/dL      Triglycerides 66 mg/dL      HDL Cholesterol 35 mg/dL      LDL Cholesterol  100 mg/dL      VLDL Cholesterol 13 mg/dL      LDL/HDL Ratio 2.85    Narrative:      Cholesterol Reference Ranges  (U.S. Department of Health and Human Services ATP III Classifications)    Desirable          <200 mg/dL  Borderline High    200-239 mg/dL  High Risk          >240 mg/dL      Triglyceride Reference Ranges  (U.S. Department of Health and Human Services ATP III Classifications)    Normal           <150 mg/dL  Borderline High  150-199 mg/dL  High             200-499 mg/dL  Very High        >500 mg/dL    HDL Reference Ranges  (U.S. Department of Health and Human Services ATP III Classifications)    Low     <40 mg/dl (major risk factor for CHD)  High    >60 mg/dl ('negative' risk factor for CHD)        LDL Reference Ranges  (U.S. Department of Health and Human Services ATP III Classifications)    Optimal          <100 mg/dL  Near Optimal     100-129 mg/dL  Borderline High  130-159 mg/dL  High             160-189 mg/dL  Very High        >189 mg/dL    Basic Metabolic Panel [[id number]]  (Abnormal) Collected: 04/28/24 0338    Specimen: Blood Updated: 04/28/24 0427     Glucose 122 mg/dL      BUN 22 mg/dL      Creatinine 0.79 mg/dL      Sodium 140 mmol/L      Potassium 4.4 mmol/L      Chloride 101 mmol/L      CO2 29.8 mmol/L      Calcium 8.6 mg/dL      BUN/Creatinine Ratio 27.8     Anion Gap 9.2 mmol/L      eGFR 88.1 mL/min/1.73     Narrative:      GFR Normal >60  Chronic Kidney Disease <60  Kidney Failure <15    The GFR formula is only valid for adults with stable renal function between ages 18 and 70.    CBC & Differential [[id number]]  (Abnormal) Collected: 04/28/24 0337    Specimen: Blood Updated: 04/28/24 0409    Narrative:      The following orders were created for panel order CBC &  Differential.  Procedure                               Abnormality         Status                     ---------                               -----------         ------                     CBC Auto Differential[[id number]]        Abnormal            Final result                 Please view results for these tests on the individual orders.    CBC Auto Differential [[id number]]  (Abnormal) Collected: 04/28/24 0337    Specimen: Blood Updated: 04/28/24 0409     WBC 7.43 10*3/mm3      RBC 3.92 10*6/mm3      Hemoglobin 10.1 g/dL      Hematocrit 31.8 %      MCV 81.1 fL      MCH 25.8 pg      MCHC 31.8 g/dL      RDW 16.8 %      RDW-SD 49.0 fl      MPV 10.7 fL      Platelets 189 10*3/mm3      Neutrophil % 78.4 %      Lymphocyte % 9.7 %      Monocyte % 10.9 %      Eosinophil % 0.3 %      Basophil % 0.3 %      Immature Grans % 0.4 %      Neutrophils, Absolute 5.83 10*3/mm3      Lymphocytes, Absolute 0.72 10*3/mm3      Monocytes, Absolute 0.81 10*3/mm3      Eosinophils, Absolute 0.02 10*3/mm3      Basophils, Absolute 0.02 10*3/mm3      Immature Grans, Absolute 0.03 10*3/mm3      nRBC 0.0 /100 WBC     Magnesium [[id number]]  (Normal) Collected: 04/27/24 2238    Specimen: Blood Updated: 04/27/24 2321     Magnesium 2.2 mg/dL     Potassium [[id number]]  (Normal) Collected: 04/27/24 2238    Specimen: Blood Updated: 04/27/24 2321     Potassium 4.4 mmol/L     High Sensitivity Troponin T [[id number]]  (Abnormal) Collected: 04/27/24 2238    Specimen: Blood Updated: 04/27/24 2321     HS Troponin T 79 ng/L     Narrative:      High Sensitive Troponin T Reference Range:  <14.0 ng/L- Negative Female for AMI  <22.0 ng/L- Negative Male for AMI  >=14 - Abnormal Female indicating possible myocardial injury.  >=22 - Abnormal Male indicating possible myocardial injury.   Clinicians would have to utilize clinical acumen, EKG, Troponin, and serial changes to determine if it is an Acute Myocardial Infarction or myocardial injury due to an underlying  chronic condition.         Creatinine Serum (kidney function) GFR component [[id number]]  (Normal) Collected: 04/27/24 2238    Specimen: Blood Updated: 04/27/24 2320     Creatinine 0.82 mg/dL      eGFR 87.2 mL/min/1.73     Narrative:      GFR Normal >60  Chronic Kidney Disease <60  Kidney Failure <15    The GFR formula is only valid for adults with stable renal function between ages 18 and 70.    High Sensitivity Troponin T 2Hr [[id number]]  (Abnormal) Collected: 04/27/24 1246    Specimen: Blood Updated: 04/27/24 1323     HS Troponin T 72 ng/L      Troponin T Delta -7 ng/L     Narrative:      High Sensitive Troponin T Reference Range:  <14.0 ng/L- Negative Female for AMI  <22.0 ng/L- Negative Male for AMI  >=14 - Abnormal Female indicating possible myocardial injury.  >=22 - Abnormal Male indicating possible myocardial injury.   Clinicians would have to utilize clinical acumen, EKG, Troponin, and serial changes to determine if it is an Acute Myocardial Infarction or myocardial injury due to an underlying chronic condition.         TSH Rfx On Abnormal To Free T4 [[id number]]  (Normal) Collected: 04/27/24 1033    Specimen: Blood Updated: 04/27/24 1126     TSH 0.819 uIU/mL     Single High Sensitivity Troponin T [[id number]]  (Abnormal) Collected: 04/27/24 1033    Specimen: Blood Updated: 04/27/24 1107     HS Troponin T 79 ng/L     Narrative:      High Sensitive Troponin T Reference Range:  <14.0 ng/L- Negative Female for AMI  <22.0 ng/L- Negative Male for AMI  >=14 - Abnormal Female indicating possible myocardial injury.  >=22 - Abnormal Male indicating possible myocardial injury.   Clinicians would have to utilize clinical acumen, EKG, Troponin, and serial changes to determine if it is an Acute Myocardial Infarction or myocardial injury due to an underlying chronic condition.         Comprehensive Metabolic Panel [[id number]]  (Abnormal) Collected: 04/27/24 1033    Specimen: Blood Updated: 04/27/24 1103     Glucose 209  mg/dL      BUN 23 mg/dL      Creatinine 1.03 mg/dL      Sodium 141 mmol/L      Potassium 5.7 mmol/L      Chloride 100 mmol/L      CO2 30.0 mmol/L      Calcium 9.1 mg/dL      Total Protein 6.3 g/dL      Albumin 3.1 g/dL      ALT (SGPT) 18 U/L      AST (SGOT) 12 U/L      Alkaline Phosphatase 118 U/L      Total Bilirubin 1.1 mg/dL      Globulin 3.2 gm/dL      A/G Ratio 1.0 g/dL      BUN/Creatinine Ratio 22.3     Anion Gap 11.0 mmol/L      eGFR 72.1 mL/min/1.73     Narrative:      GFR Normal >60  Chronic Kidney Disease <60  Kidney Failure <15    The GFR formula is only valid for adults with stable renal function between ages 18 and 70.    BNP [[id number]]  (Abnormal) Collected: 04/27/24 1033    Specimen: Blood Updated: 04/27/24 1103     proBNP 5,603.0 pg/mL     Narrative:      This assay is used as an aid in the diagnosis of individuals suspected of having heart failure. It can be used as an aid in the diagnosis of acute decompensated heart failure (ADHF) in patients presenting with signs and symptoms of ADHF to the emergency department (ED). In addition, NT-proBNP of <300 pg/mL indicates ADHF is not likely.    Age Range Result Interpretation  NT-proBNP Concentration (pg/mL:      <50             Positive            >450                   Gray                 300-450                    Negative             <300    50-75           Positive            >900                  Gray                300-900                  Negative            <300      >75             Positive            >1800                  Gray                300-1800                  Negative            <300    Magnesium [[id number]]  (Normal) Collected: 04/27/24 1033    Specimen: Blood Updated: 04/27/24 1103     Magnesium 2.3 mg/dL     CBC & Differential [[id number]]  (Abnormal) Collected: 04/27/24 1033    Specimen: Blood Updated: 04/27/24 1045    Narrative:      The following orders were created for panel order CBC & Differential.  Procedure                                Abnormality         Status                     ---------                               -----------         ------                     CBC Auto Differential[[id number]]        Abnormal            Final result                 Please view results for these tests on the individual orders.    CBC Auto Differential [[id number]]  (Abnormal) Collected: 04/27/24 1033    Specimen: Blood Updated: 04/27/24 1045     WBC 8.87 10*3/mm3      RBC 4.42 10*6/mm3      Hemoglobin 11.5 g/dL      Hematocrit 37.2 %      MCV 84.2 fL      MCH 26.0 pg      MCHC 30.9 g/dL      RDW 16.9 %      RDW-SD 51.4 fl      MPV 10.5 fL      Platelets 242 10*3/mm3      Neutrophil % 82.8 %      Lymphocyte % 8.6 %      Monocyte % 8.0 %      Eosinophil % 0.1 %      Basophil % 0.2 %      Immature Grans % 0.3 %      Neutrophils, Absolute 7.34 10*3/mm3      Lymphocytes, Absolute 0.76 10*3/mm3      Monocytes, Absolute 0.71 10*3/mm3      Eosinophils, Absolute 0.01 10*3/mm3      Basophils, Absolute 0.02 10*3/mm3      Immature Grans, Absolute 0.03 10*3/mm3      nRBC 0.0 /100 WBC     [place] Draw [[id number]] Collected: 04/27/24 1033    Specimen: Blood Updated: 04/27/24 1045    Narrative:      The following orders were created for panel order [place] Draw.  Procedure                               Abnormality         Status                     ---------                               -----------         ------                     Green Top (Gel)[[id number]]                                  Final result               Lavender Top[[id number]]                                     Final result               Gold Top - SST[[id number]]                                   Final result               Light Blue Top[[id number]]                                   Final result                 Please view results for these tests on the individual orders.    Green Top (Gel) [[id number]] Collected: 04/27/24 1033    Specimen: Blood Updated: 04/27/24 1045     Extra Tube Hold for  "add-ons.     Comment: Auto resulted.       Lavender Top [[id number]] Collected: 04/27/24 1033    Specimen: Blood Updated: 04/27/24 1045     Extra Tube hold for add-on     Comment: Auto resulted       Gold Top - SST [[id number]] Collected: 04/27/24 1033    Specimen: Blood Updated: 04/27/24 1045     Extra Tube Hold for add-ons.     Comment: Auto resulted.       Light Blue Top [[id number]] Collected: 04/27/24 1033    Specimen: Blood Updated: 04/27/24 1045     Extra Tube Hold for add-ons.     Comment: Auto resulted             Data Review:  Results from last 7 days   Lab Units 04/28/24  0338 04/27/24 2238 04/27/24  1033   SODIUM mmol/L 140  --  141   POTASSIUM mmol/L 4.4 4.4 5.7*   CHLORIDE mmol/L 101  --  100   CO2 mmol/L 29.8*  --  30.0*   BUN mg/dL 22  --  23   CREATININE mg/dL 0.79 0.82 1.03   GLUCOSE mg/dL 122*  --  209*   CALCIUM mg/dL 8.6  --  9.1     Results from last 7 days   Lab Units 04/28/24  0337 04/27/24  1033   WBC 10*3/mm3 7.43 8.87   HEMOGLOBIN g/dL 10.1* 11.5*   HEMATOCRIT % 31.8* 37.2*   PLATELETS 10*3/mm3 189 242     Results from last 7 days   Lab Units 04/27/24  1033   TSH uIU/mL 0.819         Lab Results   Lab Value Date/Time    TROPONINT 79 (C) 04/27/2024 2238    TROPONINT 72 (C) 04/27/2024 1246    TROPONINT 79 (C) 04/27/2024 1033    TROPONINT 60 (C) 02/09/2024 1401    TROPONINT 65 (C) 02/09/2024 1114    TROPONINT 64 (C) 11/08/2023 2048    TROPONINT 56 (C) 11/08/2023 1534    TROPONINT 60 (C) 11/08/2023 1003    TROPONINT <0.010 09/23/2022 1627     Results from last 7 days   Lab Units 04/28/24  0338   CHOLESTEROL mg/dL 148   TRIGLYCERIDES mg/dL 66   HDL CHOL mg/dL 35*   LDL CHOL mg/dL 100     Results from last 7 days   Lab Units 04/27/24  1033   ALK PHOS U/L 118*   BILIRUBIN mg/dL 1.1   ALT (SGPT) U/L 18   AST (SGOT) U/L 12     Results from last 7 days   Lab Units 04/27/24  1033   TSH uIU/mL 0.819         No results found for: \"POCGLU\"        Past Medical History:   Diagnosis Date    (HFpEF) heart " failure with preserved ejection fraction 09/24/2022    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    Arthritis     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, 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/2023    Atrial fibrillation     Atrial fibrillation, persistent 09/24/2022    Cervical spondylosis with myelopathy     CHF (congestive heart failure)     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 12/13/2023    Disease of thyroid gland     HLD (hyperlipidemia) 09/23/2022    Hx of toxic multinodular goiter 09/23/2022    Lumbar radiculopathy     Peripheral vascular disease, unspecified 02/02/2024    Pulmonary hypertension     Ulcer with gangrene     right heel       Assessment:  Active Hospital Problems    Diagnosis  POA    **Recurrent pleural effusion on right [J90]  Yes    Longstanding persistent atrial fibrillation [I48.11]  Yes    Chronic diastolic CHF (congestive heart failure) [I50.32]  Yes    Hyperthyroidism [E05.90]  Yes    Dyslipidemia [E78.5]  Yes    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       Plan:  Consult from pulmonary noted.  Await cardiology and thoracic surgery consults.  Follow-up on labs.    Dayron Fowler MD  4/28/2024  14:13 EDT

## 2024-04-28 NOTE — PLAN OF CARE
Problem: Adult Inpatient Plan of Care  Goal: Plan of Care Review  Outcome: Ongoing, Not Progressing  Flowsheets (Taken 4/28/2024 6548)  Progress: no change  Plan of Care Reviewed With:   patient   spouse  Outcome Evaluation: Wound care completed this morning. Patient had ECHO. Patient's spouse updated on plan of care. Consults following. IV Lasix given this evening.

## 2024-04-28 NOTE — CONSULTS
[facility]    Patient Name: Mehreen Silva IV  :1940  83 y.o.  LOS: 0  Encounter Provider: Mike Rockwell MD      Patient Care Team:  Taiwo Powers MD as PCP - General (Internal Medicine)  Self, Bess YO MD as Consulting Physician (Endocrinology)  Huang Chan MD as Cardiologist (Cardiology)    Chief Complaint/Consult question:  Dyspnea, R lung white-out; HFpEF, possible severe MR    Interval History:  Mehreen Silva is an 83 year old male with a history of AFib (),  HFpEF, hx of R AKA, idiopathic neuropathy. He was last seen in office by SRINATH Armenta for a hospital follow up post large pleural effusion for which he had a chest tube placed. During this visit cardiology was consulted for permanent atrial fibrillation with RVR and he was recommended continue with metoprolol. Echocardiogram showed EF 70%, mild LVH, diastolic function indeterminate, left atrial cavity moderately dilated, moderate aortic calcification, mild to moderate mitral regurgitation, and small pericardial effusion. At this visit his only complaint was fatigue. He is tolerating his medication therapy and no changes were made.     We are being asked to see for diastolic CHF exacerbation with recurrent large R pleural effusion after he was admitted to the hospital with increased SOB X 3-4 days. Labs: Troponin 79 w/ a delta of 7, BNP 5603, Hgb 10.1 hct 31.8    Chest Xray showed opacification the right hemithorax with cardiomediastinal shift to the left. Repeat echo today showed LVEF 60-65%, anterior mitral leaflet thickening with moderate-severe mitral regurgitation, moderate pulmonary hypertension with grossly normal RV function, normal IVC size.        Results for orders placed during the hospital encounter of 24    Adult Transthoracic Echo Limited W/ Cont if Necessary Per Protocol    Interpretation Summary    Limited study.    The left ventricular cavity is small in size. Left ventricular systolic function is  "normal. Calculated left ventricular EF = 62.4%    Left ventricular wall thickness is consistent with moderate concentric hypertrophy. Left ventricular diastolic function was indeterminate due to AFib    There is moderate anterior mitral valve thickening present. Moderate to severe mitral valve regurgitation is present.    Estimated right ventricular systolic pressure from tricuspid regurgitation is moderately elevated (45-55 mmHg).    There is a trivial pericardial effusion. There is a large right pleural effusion with likely consolidated right lung base.    Severe LA enlargement.  IVC normal in size and respirophasic variation.      Objective   Vital Signs  Temp:  [97.3 °F (36.3 °C)-98.1 °F (36.7 °C)] 98.1 °F (36.7 °C)  Heart Rate:  [] 110  Resp:  [18-20] 18  BP: ()/(62-81) 104/70    Intake/Output Summary (Last 24 hours) at 4/28/2024 1621  Last data filed at 4/28/2024 0500  Gross per 24 hour   Intake 120 ml   Output --   Net 120 ml     Flowsheet Rows      Flowsheet Row First Filed Value   Admission Height 177.8 cm (70\") Documented at 04/27/2024 1544   Admission Weight 64.5 kg (142 lb 3.2 oz) Documented at 04/27/2024 1544              Vitals and nursing note reviewed.   Constitutional:       Appearance: Not in distress. Frail. Chronically ill-appearing.   Neck:      Vascular: No JVR.   Pulmonary:      Effort: Pulmonary effort is normal.      Breath sounds: Examination of the left-upper field reveals decreased breath sounds. Examination of the left-middle field reveals decreased breath sounds. Examination of the left-lower field reveals decreased breath sounds. Decreased breath sounds present. No wheezing. No rhonchi. No rales.   Cardiovascular:      Normal rate. Regular rhythm.      Murmurs: There is no murmur.   Edema:     Peripheral edema absent.   Abdominal:      General: There is no distension.      Palpations: Abdomen is soft.      Tenderness: There is no abdominal tenderness.   Musculoskeletal:     "  Cervical back: Neck supple.      Comments: S/p R AKA Skin:     General: Skin is cool.   Neurological:      Mental Status: Alert and oriented to person, place and time.           Pertinent Test Results:  Results from last 7 days   Lab Units 04/28/24 0338 04/27/24 2238 04/27/24  1033   SODIUM mmol/L 140  --  141   POTASSIUM mmol/L 4.4 4.4 5.7*   CHLORIDE mmol/L 101  --  100   CO2 mmol/L 29.8*  --  30.0*   BUN mg/dL 22  --  23   CREATININE mg/dL 0.79 0.82 1.03   GLUCOSE mg/dL 122*  --  209*   CALCIUM mg/dL 8.6  --  9.1   AST (SGOT) U/L  --   --  12   ALT (SGPT) U/L  --   --  18     Results from last 7 days   Lab Units 04/27/24 2238 04/27/24  1246 04/27/24  1033   HSTROP T ng/L 79* 72* 79*     Results from last 7 days   Lab Units 04/28/24 0337 04/27/24  1033   WBC 10*3/mm3 7.43 8.87   HEMOGLOBIN g/dL 10.1* 11.5*   HEMATOCRIT % 31.8* 37.2*   PLATELETS 10*3/mm3 189 242         Results from last 7 days   Lab Units 04/27/24 2238 04/27/24  1033   MAGNESIUM mg/dL 2.2 2.3     Results from last 7 days   Lab Units 04/28/24  0338   CHOLESTEROL mg/dL 148   TRIGLYCERIDES mg/dL 66   HDL CHOL mg/dL 35*     Results from last 7 days   Lab Units 04/27/24  1033   PROBNP pg/mL 5,603.0*     Results from last 7 days   Lab Units 04/27/24  1033   TSH uIU/mL 0.819                 Medication Review:   collagenase, 1 Application, Topical, Daily  empagliflozin, 10 mg, Oral, Daily  furosemide, 20 mg, Oral, Daily  gabapentin, 100 mg, Oral, Nightly  [START ON 4/29/2024] methIMAzole, 5 mg, Oral, Once per day on Monday Wednesday Friday  metoprolol tartrate, 25 mg, Oral, BID  sodium chloride, 10 mL, Intravenous, Q12H              Assessment & Plan     Active Hospital Problems    Diagnosis  POA    **Recurrent pleural effusion on right [J90]  Yes    Longstanding persistent atrial fibrillation [I48.11]  Yes    Chronic diastolic CHF (congestive heart failure) [I50.32]  Yes    Hyperthyroidism [E05.90]  Yes    Dyslipidemia [E78.5]  Yes    Hx of toxic  multinodular goiter [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        Right lung pleural effusion: Recurrent and previously required chest tube placement in 2/2024, with fluid showing negative cytology and consistent with transudative. Pt presented with worsening dyspnea, and CXR and CT chest again showed near complete right lung white-out. Etiology unclear, cardiac causes possible given HFpEF and MR (see below) though pt grossly euvolemic on exam. Being planned for chest tube vs pleurX catheter placement by surgery tomorrow for therapeutic purpose, management per hospitalist, pulmonary medicine and CV surgery.  HFpEF/Mitral regurgitation: Limited echo today showed LVEF 60-65%, anterior mitral leaflet thickening with moderate-severe mitral regurgitation, moderate pulmonary hypertension with grossly normal RV function, normal IVC size.  ProBNP chronically elevated, 5600 this admission. On home Lasix 20 daily, Jardiance 10 daily; will increase Lasix to 40 daily with first dose IV to be given today. Depending on clinical course, we may consider further evaluation with RADHA +/- LHC & RHC and possible mitral valve intervention if no alternative etiology for his recurrent unilateral pleural effusion can be identified and if in line with his goals of care.  Troponin elevation/coronary atherosclerosis: Minimal troponin elevation on admission and flat, likely demand ischemia in the setting of pleural effusion.  Patient denies active chest pain. CT chest independently reviewed by me, which showed significant coronary calcification. Lipids this admission HDL 35, , will start atorvastatin 10 daily (on Zetia at home, unclear if pt had statin intolerance). May consider formal ischemic evaluation as outpatient depending on clinical course.  Atrial fibrillation: -130s. Continue Lopressor 25 bid; may consider up titration versus addition of digoxin if persistently tachycardic.   [id number] score 4. On Eliquis 5 bid at home (okay to hold for thoracentesis).  Anemia  History of R AKA    High risk due to age, frailty, HFpEF and severe MR in the setting of multiple comorbodities predisposing to major adverse events including myocardial infarction, stroke, hospitalization, and multi-agent drug therapy requiring intensive monitoring for toxicity (diuretic uptitration, chest tube versus Pleurx tomorrow)    Total visit time 61 minutes.    Mike Rockwell MD  [facility]  04/28/24  07:16 EDT

## 2024-04-28 NOTE — SIGNIFICANT NOTE
"   04/28/24 1004   OTHER   Discipline physical therapist   Rehab Time/Intention   Session Not Performed other (see comments);patient/family declined evaluation  (Pt declined need for PT in acute setting. He reports he no longer uses the [facility] lift as it is \"awkward\" but a friend dependently lifts him into his wc daily. PT to s/o.)   Therapy Assessment/Plan (PT)   Criteria for Skilled Interventions Met (PT) no problems identified which require skilled intervention       "

## 2024-04-29 LAB
ANION GAP SERPL CALCULATED.3IONS-SCNC: 9.9 MMOL/L (ref 5–15)
BASOPHILS # BLD AUTO: 0.02 10*3/MM3 (ref 0–0.2)
BASOPHILS NFR BLD AUTO: 0.2 % (ref 0–1.5)
BUN SERPL-MCNC: 20 MG/DL (ref 8–23)
BUN/CREAT SERPL: 23.8 (ref 7–25)
CALCIUM SPEC-SCNC: 8.5 MG/DL (ref 8.6–10.5)
CHLORIDE SERPL-SCNC: 99 MMOL/L (ref 98–107)
CO2 SERPL-SCNC: 32.1 MMOL/L (ref 22–29)
CREAT SERPL-MCNC: 0.84 MG/DL (ref 0.76–1.27)
DEPRECATED RDW RBC AUTO: 50.6 FL (ref 37–54)
EGFRCR SERPLBLD CKD-EPI 2021: 86.5 ML/MIN/1.73
EOSINOPHIL # BLD AUTO: 0.05 10*3/MM3 (ref 0–0.4)
EOSINOPHIL NFR BLD AUTO: 0.6 % (ref 0.3–6.2)
ERYTHROCYTE [DISTWIDTH] IN BLOOD BY AUTOMATED COUNT: 16.7 % (ref 12.3–15.4)
GLUCOSE SERPL-MCNC: 101 MG/DL (ref 65–99)
HCT VFR BLD AUTO: 32 % (ref 37.5–51)
HGB BLD-MCNC: 9.8 G/DL (ref 13–17.7)
IMM GRANULOCYTES # BLD AUTO: 0.02 10*3/MM3 (ref 0–0.05)
IMM GRANULOCYTES NFR BLD AUTO: 0.2 % (ref 0–0.5)
LYMPHOCYTES # BLD AUTO: 0.9 10*3/MM3 (ref 0.7–3.1)
LYMPHOCYTES NFR BLD AUTO: 11 % (ref 19.6–45.3)
MCH RBC QN AUTO: 25.5 PG (ref 26.6–33)
MCHC RBC AUTO-ENTMCNC: 30.6 G/DL (ref 31.5–35.7)
MCV RBC AUTO: 83.1 FL (ref 79–97)
MONOCYTES # BLD AUTO: 1.07 10*3/MM3 (ref 0.1–0.9)
MONOCYTES NFR BLD AUTO: 13.1 % (ref 5–12)
NEUTROPHILS NFR BLD AUTO: 6.13 10*3/MM3 (ref 1.7–7)
NEUTROPHILS NFR BLD AUTO: 74.9 % (ref 42.7–76)
NRBC BLD AUTO-RTO: 0 /100 WBC (ref 0–0.2)
PLATELET # BLD AUTO: 199 10*3/MM3 (ref 140–450)
PMV BLD AUTO: 11 FL (ref 6–12)
POTASSIUM SERPL-SCNC: 4 MMOL/L (ref 3.5–5.2)
RBC # BLD AUTO: 3.85 10*6/MM3 (ref 4.14–5.8)
SODIUM SERPL-SCNC: 141 MMOL/L (ref 136–145)
WBC NRBC COR # BLD AUTO: 8.19 10*3/MM3 (ref 3.4–10.8)

## 2024-04-29 PROCEDURE — 80048 BASIC METABOLIC PNL TOTAL CA: CPT | Performed by: HOSPITALIST

## 2024-04-29 PROCEDURE — 99232 SBSQ HOSP IP/OBS MODERATE 35: CPT | Performed by: NURSE PRACTITIONER

## 2024-04-29 PROCEDURE — 99232 SBSQ HOSP IP/OBS MODERATE 35: CPT | Performed by: INTERNAL MEDICINE

## 2024-04-29 PROCEDURE — 85025 COMPLETE CBC W/AUTO DIFF WBC: CPT | Performed by: HOSPITALIST

## 2024-04-29 PROCEDURE — 87040 BLOOD CULTURE FOR BACTERIA: CPT | Performed by: INTERNAL MEDICINE

## 2024-04-29 RX ORDER — SODIUM CHLORIDE 9 MG/ML
75 INJECTION, SOLUTION INTRAVENOUS ONCE
Status: COMPLETED | OUTPATIENT
Start: 2024-04-30 | End: 2024-04-30

## 2024-04-29 RX ORDER — DIGOXIN 125 MCG
125 TABLET ORAL
Status: DISCONTINUED | OUTPATIENT
Start: 2024-04-29 | End: 2024-05-06 | Stop reason: HOSPADM

## 2024-04-29 RX ADMIN — METOPROLOL TARTRATE 25 MG: 25 TABLET, FILM COATED ORAL at 21:35

## 2024-04-29 RX ADMIN — Medication 10 ML: at 21:36

## 2024-04-29 RX ADMIN — METHIMAZOLE 5 MG: 5 TABLET ORAL at 08:10

## 2024-04-29 RX ADMIN — ATORVASTATIN CALCIUM 10 MG: 20 TABLET, FILM COATED ORAL at 21:34

## 2024-04-29 RX ADMIN — Medication 10 ML: at 08:10

## 2024-04-29 RX ADMIN — FUROSEMIDE 40 MG: 40 TABLET ORAL at 08:10

## 2024-04-29 RX ADMIN — GABAPENTIN 100 MG: 100 CAPSULE ORAL at 21:35

## 2024-04-29 RX ADMIN — METOPROLOL TARTRATE 25 MG: 25 TABLET, FILM COATED ORAL at 08:10

## 2024-04-29 RX ADMIN — EMPAGLIFLOZIN 10 MG: 10 TABLET, FILM COATED ORAL at 08:10

## 2024-04-29 RX ADMIN — COLLAGENASE SANTYL 1 APPLICATION: 250 OINTMENT TOPICAL at 08:10

## 2024-04-29 RX ADMIN — DIGOXIN 125 MCG: 125 TABLET ORAL at 11:48

## 2024-04-29 NOTE — PROGRESS NOTES
LOS: 1 day   Patient Care Team:  Taiwo Powers MD as PCP - General (Internal Medicine)  Self, Bess YO MD as Consulting Physician (Endocrinology)  Huang Chan MD as Cardiologist (Cardiology)    Chief Complaint:     Follow-up mitral insufficiency, atrial fibrillation and HFpEF    Interval History:     He denies short windedness or cough.  He is essentially wheelchair bound and currently bedbound.  He has no dizziness or nausea.  He has had no vomiting.  He feels comfortable right now wants to know when his catheter will be done.    Objective   Vital Signs  Temp:  [97.3 °F (36.3 °C)] 97.3 °F (36.3 °C)  Heart Rate:  [107-112] 107  Resp:  [18] 18  BP: (103-121)/(65-79) 103/65  No intake or output data in the 24 hours ending 04/29/24 0727    Comfortable NAD cachexia  Neck supple, no JVD or thyromegaly appreciated  S1/S2 regular with mild tachycardia, no m/r/g  Lungs no lung sounds on the left, normal effort  Abdomen S/NT/ND (+) BS, no HSM appreciated  Extremities warm, no clubbing, cyanosis, or edema  No visible or palpable skin lesions  A/Ox4, mood and affect appropriate    Results Review:      Results from last 7 days   Lab Units 04/29/24 0401 04/28/24 0338 04/27/24 2238 04/27/24  1033   SODIUM mmol/L 141 140  --  141   POTASSIUM mmol/L 4.0 4.4 4.4 5.7*   CHLORIDE mmol/L 99 101  --  100   CO2 mmol/L 32.1* 29.8*  --  30.0*   BUN mg/dL 20 22  --  23   CREATININE mg/dL 0.84 0.79 0.82 1.03   GLUCOSE mg/dL 101* 122*  --  209*   CALCIUM mg/dL 8.5* 8.6  --  9.1     Results from last 7 days   Lab Units 04/27/24 2238 04/27/24  1246 04/27/24  1033   HSTROP T ng/L 79* 72* 79*     Results from last 7 days   Lab Units 04/29/24  0401 04/28/24 0337 04/27/24  1033   WBC 10*3/mm3 8.19 7.43 8.87   HEMOGLOBIN g/dL 9.8* 10.1* 11.5*   HEMATOCRIT % 32.0* 31.8* 37.2*   PLATELETS 10*3/mm3 199 189 242         Results from last 7 days   Lab Units 04/28/24  0338   CHOLESTEROL mg/dL 148     Results from last 7 days   Lab  Units 04/27/24  2238   MAGNESIUM mg/dL 2.2     Results from last 7 days   Lab Units 04/28/24  0338   CHOLESTEROL mg/dL 148   TRIGLYCERIDES mg/dL 66   HDL CHOL mg/dL 35*   LDL CHOL mg/dL 100       I reviewed the patient's new clinical results.  I personally viewed and interpreted the patient's EKG/Telemetry data        Medication Review:   atorvastatin, 10 mg, Oral, Nightly  collagenase, 1 Application, Topical, Daily  empagliflozin, 10 mg, Oral, Daily  furosemide, 40 mg, Oral, Daily  gabapentin, 100 mg, Oral, Nightly  methIMAzole, 5 mg, Oral, Once per day on Monday Wednesday Friday  metoprolol tartrate, 25 mg, Oral, BID  sodium chloride, 10 mL, Intravenous, Q12H             Assessment & Plan       Recurrent pleural effusion on right    Hx of toxic multinodular goiter    Dyslipidemia    Hyperthyroidism    Chronic diastolic CHF (congestive heart failure)    Longstanding persistent atrial fibrillation    Right pleural effusion, required chest tube 2/2024 with transudative fluid.  CT chest shows near complete right lung whiteout, plan for Pleurx catheter.  HFpEF, on echo-RV function is normal, no pericardial effusion, normal LV function.  Blood pressure low on low-dose Lopressor.  Mitral insufficiency - severe.  Mitral valve thickened-check blood cultures.  Slight troponin elevation with coronary artery calcification  Atrial fibrillation, on Eliquis-this has been held for Pleurx catheter, hold starting Saturday.  Heart rate is still high, add digoxin.  Anemia  H/o right AKA.   Cachexia with muscle wasting, consult for malnutrition assessment  History of toxic multinodular goiter, on methimazole, TSH normal.      He has not walked in 6 months, he has lost weight and has muscle wasting.  He does transfers at home with the help of a caregiver.  He lives at home with his wife and son.    His pleural effusion I think is likely multifactorial including HFpEF with severe mitral insufficiency and atrial fibrillation with  uncontrolled heart rates in addition to protein malnutrition and anemia.  He is going to struggle with recurrent pleural effusions due to all of these pathologies-agree with Pleurx catheter for intermittent draining of the right lung    Will follow.     Shanika Mejia MD  04/29/24  07:27 EDT

## 2024-04-29 NOTE — PROGRESS NOTES
"    Chief Complaint: Recurrent right pleural effusion      Subjective  Resting in bed. Not feeling well today.       Vital Signs:  Temp:  [97 °F (36.1 °C)-97.3 °F (36.3 °C)] 97.3 °F (36.3 °C)  Heart Rate:  [107-118] 118  Resp:  [16-18] 18  BP: ()/(62-82) 104/82    Intake & Output (last day)         04/28 0701  04/29 0700 04/29 0701  04/30 0700    P.O.      Total Intake(mL/kg)      Net            Urine Unmeasured Occurrence 3 x             Objective:  General Appearance:  Ill-appearing, in no acute distress and uncomfortable.    Vital signs: (most recent): Blood pressure 104/82, pulse 118, temperature 97.3 °F (36.3 °C), temperature source Axillary, resp. rate 18, height 177.8 cm (70\"), weight 64.4 kg (142 lb), SpO2 91%.    HEENT: Normal HEENT exam.  (Nasal cannula)    Lungs:  He is not in respiratory distress.  There are decreased breath sounds.    Heart: Tachycardia.    Extremities: (S/p Right AKA)  Neurological: Patient is alert.  (Some confused conversation).    Skin:  Warm and dry.                  Results Review:     I reviewed the patient's new clinical results.  I reviewed the patient's new imaging results and agree with the interpretation.  Discussed with patient RN and Dr. Noyola    Imaging Results (Last 24 Hours)       ** No results found for the last 24 hours. **            Lab Results:     Lab Results (last 24 hours)       Procedure Component Value Units Date/Time    Blood Culture - Blood, Arm, Left [[id number]] Collected: 04/29/24 1013    Specimen: Blood from Arm, Left Updated: 04/29/24 1024    Basic Metabolic Panel [[id number]]  (Abnormal) Collected: 04/29/24 0401    Specimen: Blood Updated: 04/29/24 0651     Glucose 101 mg/dL      BUN 20 mg/dL      Creatinine 0.84 mg/dL      Sodium 141 mmol/L      Potassium 4.0 mmol/L      Comment: Slight hemolysis detected by analyzer. Result may be falsely elevated.        Chloride 99 mmol/L      CO2 32.1 mmol/L      Calcium 8.5 mg/dL      BUN/Creatinine Ratio " 23.8     Anion Gap 9.9 mmol/L      eGFR 86.5 mL/min/1.73     Narrative:      GFR Normal >60  Chronic Kidney Disease <60  Kidney Failure <15    The GFR formula is only valid for adults with stable renal function between ages 18 and 70.    CBC & Differential [[id number]]  (Abnormal) Collected: 04/29/24 0401    Specimen: Blood Updated: 04/29/24 0455    Narrative:      The following orders were created for panel order CBC & Differential.  Procedure                               Abnormality         Status                     ---------                               -----------         ------                     CBC Auto Differential[[id number]]        Abnormal            Final result                 Please view results for these tests on the individual orders.    CBC Auto Differential [[id number]]  (Abnormal) Collected: 04/29/24 0401    Specimen: Blood Updated: 04/29/24 0455     WBC 8.19 10*3/mm3      RBC 3.85 10*6/mm3      Hemoglobin 9.8 g/dL      Hematocrit 32.0 %      MCV 83.1 fL      MCH 25.5 pg      MCHC 30.6 g/dL      RDW 16.7 %      RDW-SD 50.6 fl      MPV 11.0 fL      Platelets 199 10*3/mm3      Neutrophil % 74.9 %      Lymphocyte % 11.0 %      Monocyte % 13.1 %      Eosinophil % 0.6 %      Basophil % 0.2 %      Immature Grans % 0.2 %      Neutrophils, Absolute 6.13 10*3/mm3      Lymphocytes, Absolute 0.90 10*3/mm3      Monocytes, Absolute 1.07 10*3/mm3      Eosinophils, Absolute 0.05 10*3/mm3      Basophils, Absolute 0.02 10*3/mm3      Immature Grans, Absolute 0.02 10*3/mm3      nRBC 0.0 /100 WBC              Assessment & Plan       Recurrent pleural effusion on right    Hx of toxic multinodular goiter    Dyslipidemia    Hyperthyroidism    Chronic diastolic CHF (congestive heart failure)    Longstanding persistent atrial fibrillation         Assessment & Plan    Recurrent right pleural effusion: Status post CT-guided chest tube with lytic therapy in February 2024.  Effusion appears to have recurred and is likely  secondary to his heart failure.  Dr. Noyola has recommended to proceed with Pleurx catheter placement.  His last dose of Eliquis was on Saturday.  Please continue to hold oral anticoagulation. We will plan for Pleurx placement in the OR tomorrow.  N.p.o. at midnight.  Preoperative orders placed.  Discussed with patient.        Mary Augustin DNP, APRN  [facility]  04/29/24  13:58 EDT      Greater than 36 minutes was spent reviewing the patient's chart, radiographic imaging, assessing the patient and developing a plan of care which was discussed with the patient and RN.

## 2024-04-29 NOTE — PLAN OF CARE
Goal Outcome Evaluation:           VS WNL.Pt Afib on tele.HR ranging btw 110s to 130s atimes ,does not sustain.Pt  asymptomatic.Plan for Pleurx catheter today /this week .Q2H turns,wound care done,WCTM .

## 2024-04-29 NOTE — NURSING NOTE
04/29/24 0808   Oxygen Therapy   Device (Oxygen Therapy) nasal cannula   Flow (L/min) 2   Wound 12/13/23 1427 coccyx Pressure Injury   Placement Date/Time: 12/13/23 1427   Present on Original Admission: Yes  Location: coccyx  Primary Wound Type: (c) Pressure Injury   Pressure Injury Stage U   Closure None   Base non-blanchable;non-granulating;necrotic;slough;yellow   Periwound redness   Periwound Temperature warm   Periwound Skin Turgor soft   Edges open   Wound Length (cm) 2.5 cm   Wound Width (cm) 4.5 cm   Wound Surface Area (cm^2) 11.25 cm^2   Drainage Characteristics/Odor serous   Drainage Amount scant   Skin Interventions   Pressure Reduction Devices specialty bed utilized  (LALM ordered.)   Pressure Reduction Techniques heels elevated off bed;positioned off wounds;pressure points protected     Wound/Ostomy: Consult received regarding skin issue on Coccyx area. Patient is alert, able to turn with assistance, upon assessment is observed full-thickness skin and tissue loss on Coccyx area, Pressure injury Unstageable POA, Therahoney, Opticel dressing and Optifoam ordered.  Wound care order and pressure ulcer preventives  measures have been implemented into Epic.   Low air loss mattress for adequate pressure redistribution and pressure relief from Agility and frame from EVS will be requested, floor nurse will call.  Protective padding to facilitate cushioning is ordered to left heel. Please keep the heel off-loaded.  He is at risk for further skin problems, is completely bedridden, impaired mobility and is incontinent, repositioning him every two hours and minimizing any skin contact with urine or stool would be beneficial.  Please re-consult for any additional needs.

## 2024-04-29 NOTE — PLAN OF CARE
Problem: Malnutrition  Goal: Improved Nutritional Intake  Outcome: Ongoing, Progressing   Goal Outcome Evaluation:         RD to follow

## 2024-04-29 NOTE — PROGRESS NOTES
Nutrition Services    Patient Name:  Mehreen Silva IV  YOB: 1940  MRN: [id number]  Admit Date:  4/27/2024    Assessment Date:  04/29/24    Summary: Consult for MSA  Pt is a 82 y/o male who presented with SOB, dizziness. Pt has a hx of right AKA, HLD, HTN, CHF, A fib, JOSEPH.   Pt laying in bed when I visited. Pt reported he usually had a good appetite but over the past few weeks he has not been very hungry and has been eating less than half of 2 meals per day. Pt reported his usual weight is about 150 lbs and he has lost about 30 lbs recently. Per EMR, pt has had a 17.5% weight loss since 11/23 (significant). Pt denied any n/v or chew/swallow issues. RD performed NFPE which revealed moderate muscle wasting and fat loss. Pt has an unstageable pressure injury to coccyx. RD offered supplements and pt was agreeable. RD to initiate Boost QD and Mauricio BID. RD encouraged PO and supplement intake and will follow.     Patient meets ASPEN/AND criteria for nutrition diagnosis of moderate malnutrition of chronic illness based on: weight loss, muscle wasting, and fat loss.     NFPE results below    RECS:   Mauricio BID  Boost Chocolate QD      CLINICAL NUTRITION ASSESSMENT      Reason for Assessment Malnutrition Severity Assessment (MSA), Physician Consult     Diagnosis/Problem   SOB, dizziness. Pt has a hx of right AKA, HLD, HTN, CHF, A fib, JOSEPH.    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 12/13/2023    Disease of thyroid gland     HLD (hyperlipidemia) 09/23/2022    Hx of  "toxic multinodular goiter 09/23/2022    Lumbar radiculopathy     Peripheral vascular disease, unspecified 02/02/2024    Pulmonary hypertension     Ulcer with gangrene     right heel       Past Surgical History:   Procedure Laterality Date    ABOVE KNEE AMPUTATION Right 12/13/2023    Procedure: ABOVE KNEE AMPUTATION RIGHT, proveena wound vac placement;  Surgeon: Issa Nieves MD;  Location: [facility] OR;  Service: Vascular;  Laterality: Right;    APPENDECTOMY      BACK SURGERY      x4    COLONOSCOPY      EXCISION MASS TRUNK N/A 06/08/2016    Procedure: Excision soft tissue neoplasm on abdominal wall and left neck;  Surgeon: Shaji Barahona Jr., MD;  Location: [facility] OR;  Service:     KNEE SURGERY      NECK SURGERY  1974    QUADRICEPS REPAIR      ROTATOR CUFF REPAIR      SHOULDER SURGERY      SMALL INTESTINE SURGERY  2021    Bowel Obstruction        Anthropometrics        Current Height  Current Weight  BMI kg/m2 Height: 177.8 cm (70\")  Weight: 64.4 kg (142 lb) (04/28/24 1348)  Body mass index is 20.37 kg/m².   Adjusted BMI (if applicable) 22.7   BMI Category Normal/Healthy (18.4 - 24.9)   Ideal Body Weight (IBW) 160 lbs   Usual Body Weight (UBW) 150 lbs   Weight Trend Loss   Weight History Wt Readings from Last 30 Encounters:   04/28/24 1348 64.4 kg (142 lb)   04/27/24 1544 64.5 kg (142 lb 3.2 oz)   04/23/24 1231 65.8 kg (145 lb)   03/05/24 1305 51.3 kg (113 lb)   02/29/24 1331 53.1 kg (117 lb)   02/19/24 2313 53.4 kg (117 lb 11.6 oz)   02/19/24 0500 54.4 kg (119 lb 14.9 oz)   02/18/24 0300 59 kg (130 lb 1.1 oz)   02/17/24 0548 63.8 kg (140 lb 10.5 oz)   02/16/24 0435 60.8 kg (134 lb 0.6 oz)   02/15/24 0500 63 kg (138 lb 14.2 oz)   02/14/24 0440 60.7 kg (133 lb 13.1 oz)   02/13/24 0450 61.7 kg (136 lb 0.4 oz)   02/12/24 0600 57.3 kg (126 lb 5.2 oz)   02/11/24 0500 63.2 kg (139 lb 5.3 oz)   02/10/24 1500 61.2 kg (134 lb 14.7 oz)   02/10/24 0500 61.2 kg (134 lb 14.7 oz)   02/09/24 1728 60.7 kg (133 lb 13.1 " oz)   02/09/24 1102 72.6 kg (160 lb)   12/14/23 0521 71.4 kg (157 lb 6.5 oz)   11/20/23 0957 74.8 kg (165 lb)   11/14/23 0203 75 kg (165 lb 6.4 oz)   11/13/23 0500 74.4 kg (164 lb 0.4 oz)   11/12/23 0529 74.8 kg (164 lb 14.5 oz)   11/11/23 0310 74.9 kg (165 lb 2 oz)   11/10/23 0440 78.2 kg (172 lb 6.4 oz)   11/09/23 1304 77.6 kg (171 lb)   11/09/23 0634 77.6 kg (171 lb 1.2 oz)   11/09/23 1620 77.6 kg (171 lb)   11/08/23 0929 74.8 kg (165 lb)   08/23/23 0918 74.8 kg (165 lb)   08/14/23 1400 74.8 kg (165 lb)   06/05/23 1054 76.2 kg (168 lb)   05/22/23 0947 75.4 kg (166 lb 3.2 oz)   05/15/23 0811 75.3 kg (166 lb)   05/12/23 1504 75.3 kg (166 lb)   11/03/22 0939 73.5 kg (162 lb)   10/10/22 0936 68.5 kg (151 lb)   09/25/22 0500 66.2 kg (146 lb)   09/24/22 0400 74 kg (163 lb 1.6 oz)   09/23/22 1900 76.8 kg (169 lb 4.8 oz)   09/23/22 1537 77.1 kg (170 lb)   08/09/17 1136 88.5 kg (195 lb)   02/03/17 0640 84.8 kg (187 lb)   06/28/16 1320 89.4 kg (197 lb)   06/08/16 0902 87.5 kg (193 lb)   06/06/16 0734 88.9 kg (196 lb)   05/31/16 0907 88.6 kg (195 lb 6.4 oz)   06/08/15 1634 88.5 kg (195 lb)   04/23/15 0827 88.5 kg (195 lb)   07/25/13 1206 97.5 kg (214 lb 15.9 oz)        Estimated/Assessed Needs        Energy Requirements    Weight for Calculation 65 kg (IBW)   Method for Estimation  25-30 kcal/kg   EST Needs (kcal/day) 0345-5401       Protein Requirements    Weight for Calculation 65 kg (IBW)   EST Protein Needs (g/kg) 1.5 - 2.0 gm/kg   EST Daily Needs (g/day)        Fluid Requirements     Method for Estimation 1 mL/kcal    Estimated Needs (mL/day)        Fluid Deficit    Current Na Level (mEq/L)    Desired Na Level (mEq/L)    Estimated Fluid Deficit (L)       Labs       Pertinent Labs    Results from last 7 days   Lab Units 04/29/24  0401 04/28/24  0338 04/27/24 2238 04/27/24  1033   SODIUM mmol/L 141 140  --  141   POTASSIUM mmol/L 4.0 4.4 4.4 5.7*   CHLORIDE mmol/L 99 101  --  100   CO2 mmol/L 32.1* 29.8*  --   30.0*   BUN mg/dL 20 22  --  23   CREATININE mg/dL 0.84 0.79 0.82 1.03   CALCIUM mg/dL 8.5* 8.6  --  9.1   BILIRUBIN mg/dL  --   --   --  1.1   ALK PHOS U/L  --   --   --  118*   ALT (SGPT) U/L  --   --   --  18   AST (SGOT) U/L  --   --   --  12   GLUCOSE mg/dL 101* 122*  --  209*     Results from last 7 days   Lab Units 04/29/24  0401 04/28/24  0338 04/28/24 0337 04/27/24 2238 04/27/24  1033   MAGNESIUM mg/dL  --   --   --  2.2 2.3   HEMOGLOBIN g/dL 9.8*  --    < >  --  11.5*   HEMATOCRIT % 32.0*  --    < >  --  37.2*   WBC 10*3/mm3 8.19  --    < >  --  8.87   TRIGLYCERIDES mg/dL  --  66  --   --   --    ALBUMIN g/dL  --   --   --   --  3.1*    < > = values in this interval not displayed.     Results from last 7 days   Lab Units 04/29/24 0401 04/28/24 0337 04/27/24  1033   PLATELETS 10*3/mm3 199 189 242     COVID19   Date Value Ref Range Status   02/09/2024 Not Detected Not Detected - Ref. Range Final     Lab Results   Component Value Date    HGBA1C 5.40 02/09/2024          Medications           Scheduled Medications atorvastatin, 10 mg, Oral, Nightly  collagenase, 1 Application, Topical, Daily  digoxin, 125 mcg, Oral, Daily  empagliflozin, 10 mg, Oral, Daily  furosemide, 40 mg, Oral, Daily  gabapentin, 100 mg, Oral, Nightly  methIMAzole, 5 mg, Oral, Once per day on Monday Wednesday Friday  metoprolol tartrate, 25 mg, Oral, BID  sodium chloride, 10 mL, Intravenous, Q12H       Infusions     PRN Medications   acetaminophen **OR** acetaminophen **OR** acetaminophen    senna-docusate sodium **AND** polyethylene glycol **AND** bisacodyl **AND** bisacodyl    ondansetron ODT **OR** ondansetron    sodium chloride    sodium chloride    sodium chloride     Physical Findings          General Findings alert, frail, generalized wasting, loss of muscle mass, loss of subcutaneous fat, oriented, room air AKA   Oral/Mouth Cavity WDL   Edema  not assessed   Gastrointestinal WDL, last bowel movement: 4/27   Skin  pressure  injury: coccyx   Tubes/Drains/Lines none   NFPE See Malnutrition Severity Assessment     Malnutrition Severity Assessment      Patient meets criteria for : Moderate (non-severe) Malnutrition  Malnutrition Type (Last 8 Hours)       Malnutrition Severity Assessment       Row Name 04/29/24 1402       Malnutrition Severity Assessment    Malnutrition Type Chronic Disease - Related Malnutrition      Row Name 04/29/24 1402       Insufficient Energy Intake     Insufficient Energy Intake Findings Moderate    Insufficient Energy Intake  <75% of est. energy requirement for >7d)      Row Name 04/29/24 1402       Unintentional Weight Loss     Unintentional Weight Loss Findings Severe    Unintentional Weight Loss  Weight loss greater than 10% in six months      Row Name 04/29/24 1402       Muscle Loss    Loss of Muscle Mass Findings Moderate    [place] Region Moderate - slight depression    Clavicle Bone Region Moderate - some protrusion in females, visible in males    Acromion Bone Region Moderate - acromion may slightly protrude    Dorsal Hand Region Moderate - slight depression      Row Name 04/29/24 1402       Fat Loss    Subcutaneous Fat Loss Findings Moderate    Orbital Region  Moderate -  somewhat hollowness, slightly dark circles    Upper Arm Region Moderate - some fat tissue, not ample      Row Name 04/29/24 1402       Criteria Met (Must meet criteria for severity in at least 2 of these categories: M Wasting, Fat Loss, Fluid, Secondary Signs, Wt. Status, Intake)    Patient meets criteria for  Moderate (non-severe) Malnutrition                         Current Nutrition Orders & Evaluation of Intake       Oral Nutrition     Food Allergies NKFA   Current PO Diet Diet: Cardiac; Healthy Heart (2-3 Na+); Fluid Consistency: Thin (IDDSI 0)  NPO Diet NPO Type: Strict NPO   Supplement n/a   PO Evaluation     % PO Intake Less than half of 2 meals per day x 2 weeks per pt    Factors Affecting Intake: decreased appetite     PES  STATEMENT / NUTRITION DIAGNOSIS      Nutrition Dx Problem  Problem: Malnutrition (moderate) and Inadequate Oral Intake  Etiology: Medical Diagnosis - SOB, dizziness. Pt has a hx of right AKA, HLD, HTN, CHF, A fib, JOSEPH.     Signs/Symptoms: Report of Minimal PO Intake and NFPE Results   --  NUTRITION INTERVENTION / PLAN OF CARE      Intervention Goal(s) Maintain nutrition status, Establish goals of care, Meet estimated needs, Increase intake, Accepts oral nutrition supplement, Maintain weight, No significant weight loss, and PO intake goal %: 75%         RD Intervention/Action Encourage intake, Continue to monitor, Care plan reviewed, and Recommend/order: Supplements         Prescription/Orders:       PO Diet       Supplements Supplements      Enteral Nutrition       Parenteral Nutrition    New Prescription Ordered? Yes   --      Monitor/Evaluation Per protocol, PO intake, Supplement intake, Weight, Skin status, GI status, Symptoms, POC/GOC, Swallow function   Discharge Plan/Needs No discharge needs identified at this time   --    RD to follow per protocol.      Electronically signed by:  Germain Mclean  04/29/24 13:41 EDT

## 2024-04-29 NOTE — PROGRESS NOTES
"                                              LOS: 1 day   Patient Care Team:  Taiwo Powers MD as PCP - General (Internal Medicine)  Self, Bess YO MD as Consulting Physician (Endocrinology)  Huang Chan MD as Cardiologist (Cardiology)    Chief Complaint:  F/up pleural effusion and abnormal chest imaging.    Subjective   Interval History  I reviewed the admission note, progress notes, PMH, PSH, Family hx, social history, imagings and prior records on this admission, summarized the finding in my note and formulated a transition of care plan.      On 2 L oxygen.  Reported minimal dyspnea at rest.  No cough.    REVIEW OF SYSTEMS:   CARDIOVASCULAR: No chest pain, chest pressure or chest discomfort. No palpitations or edema.     GASTROINTESTINAL: No anorexia, nausea, vomiting or diarrhea. No abdominal pain.  CONSTITUTIONAL: No fever or chills.     Ventilator/Non-Invasive Ventilation Settings (From admission, onward)      None                  Physical Exam:     Vital Signs  Temp:  [97 °F (36.1 °C)-97.3 °F (36.3 °C)] 97 °F (36.1 °C)  Heart Rate:  [107-112] 112  Resp:  [16-18] 16  BP: ()/(62-79) 96/62  No intake or output data in the 24 hours ending 04/29/24 1313  Flowsheet Rows      Flowsheet Row First Filed Value   Admission Height 177.8 cm (70\") Documented at 04/27/2024 1544   Admission Weight 64.5 kg (142 lb 3.2 oz) Documented at 04/27/2024 1544            PPE used per hospital policy    General Appearance:   Alert, cooperative, in no acute distress   ENMT:  Mallampati score 2, moist mucous membrane   Eyes:  Pupils equal and reactive to light. EOMI   Neck:    Trachea midline. No thyromegaly.   Lungs:   Significantly diminished air entry on the right.  No audible crackles or wheezing.    Heart:   Regular rhythm and normal rate, normal S1 and S2, no         murmur   Skin:   No rash or ecchymosis   Abdomen:    Soft. No tenderness. No HSM.   Neuro/psych:  Conscious, alert, oriented x3. Strength 5/5 in " upper and lower  ext.  Appropriate mood and affect   Extremities:  No cyanosis, clubbing or edema.  Warm extremities and well-perfused          Results Review:        Results from last 7 days   Lab Units 04/29/24 0401 04/28/24 0338 04/27/24 2238 04/27/24  1033   SODIUM mmol/L 141 140  --  141   POTASSIUM mmol/L 4.0 4.4 4.4 5.7*   CHLORIDE mmol/L 99 101  --  100   CO2 mmol/L 32.1* 29.8*  --  30.0*   BUN mg/dL 20 22  --  23   CREATININE mg/dL 0.84 0.79 0.82 1.03   GLUCOSE mg/dL 101* 122*  --  209*   CALCIUM mg/dL 8.5* 8.6  --  9.1     Results from last 7 days   Lab Units 04/27/24 2238 04/27/24  1246 04/27/24  1033   HSTROP T ng/L 79* 72* 79*     Results from last 7 days   Lab Units 04/29/24 0401 04/28/24 0337 04/27/24  1033   WBC 10*3/mm3 8.19 7.43 8.87   HEMOGLOBIN g/dL 9.8* 10.1* 11.5*   HEMATOCRIT % 32.0* 31.8* 37.2*   PLATELETS 10*3/mm3 199 189 242         Results from last 7 days   Lab Units 04/27/24  1033   PROBNP pg/mL 5,603.0*                           I reviewed the patient's new clinical results.        Medication Review:   atorvastatin, 10 mg, Oral, Nightly  collagenase, 1 Application, Topical, Daily  digoxin, 125 mcg, Oral, Daily  empagliflozin, 10 mg, Oral, Daily  furosemide, 40 mg, Oral, Daily  gabapentin, 100 mg, Oral, Nightly  methIMAzole, 5 mg, Oral, Once per day on Monday Wednesday Friday  metoprolol tartrate, 25 mg, Oral, BID  sodium chloride, 10 mL, Intravenous, Q12H             Diagnostic imaging:  I personally and independently reviewed the following images:  CT chest 4/27/2024: Bilateral pleural effusion much more prominent on the right and occupying the whole lung with complete right lung atelectasis.  Only mild pleural effusion on the left.    Assessment     Recurrent nonloculated right pleural effusion.  Right lung compressive atelectasis  Bilateral pleural effusion, R >>L, secondary to CHF  Chronic HFpEF  A-fib on AC    All problems new to me    Plan       Noted plan for Pleurx  catheter per thoracic surgery.  AC on hold awaiting Pleurx catheter.  Lasix 40 mg p.o. daily.  Rate control for A-fib with dig.      Jennifer Newton MD  04/29/24  13:13 EDT          This note was dictated utilizing Dragon dictation

## 2024-04-29 NOTE — PLAN OF CARE
Goal Outcome Evaluation:  Plan of Care Reviewed With: patient        Progress: no change  Outcome Evaluation: vss. 2L O2 via NC. r aka. q2t. HH diet. meds whole with thins. denies pain this shift. dsg changed per order. npo after mn - pleurx cath scheduled for tomorrow 4/30. educated on bp monitoring. dc plan tbd at this time. con't plan of care.

## 2024-04-29 NOTE — PROGRESS NOTES
"DAILY PROGRESS NOTE  [facility]    Patient Identification:  Name: Mehreen Silva IV  Age: 83 y.o.  Sex: male  :  1940  MRN: [id number]         Primary Care Physician: Taiwo Powers MD    Subjective:  Interval History: He complains of shortness of air.    Objective:    Scheduled Meds:atorvastatin, 10 mg, Oral, Nightly  collagenase, 1 Application, Topical, Daily  digoxin, 125 mcg, Oral, Daily  empagliflozin, 10 mg, Oral, Daily  furosemide, 40 mg, Oral, Daily  gabapentin, 100 mg, Oral, Nightly  methIMAzole, 5 mg, Oral, Once per day on   metoprolol tartrate, 25 mg, Oral, BID  sodium chloride, 10 mL, Intravenous, Q12H      Continuous Infusions:     Vital signs in last 24 hours:  Temp:  [97 °F (36.1 °C)-97.3 °F (36.3 °C)] 97 °F (36.1 °C)  Heart Rate:  [107-112] 112  Resp:  [16-18] 16  BP: ()/(62-79) 96/62    Intake/Output:  No intake or output data in the 24 hours ending 24 1313      Exam:  BP 96/62 (BP Location: Left arm, Patient Position: Lying)   Pulse 112   Temp 97 °F (36.1 °C) (Axillary)   Resp 16   Ht 177.8 cm (70\")   Wt 64.4 kg (142 lb)   SpO2 98%   BMI 20.37 kg/m²     General Appearance:    Alert, cooperative, no distress   Head:    Normocephalic, without obvious abnormality, atraumatic   Eyes:       Throat:   Lips, tongue, gums normal   Neck:   Supple, symmetrical, trachea midline, no JVD   Lungs:   Decreased breath sounds on the right, respirations unlabored   Chest Wall:    No tenderness or deformity    Heart:    Regular rate and rhythm, S1 and S2 normal, no murmur,no  Rub or gallop   Abdomen:     Soft, nontender, bowel sounds active, no masses, no organomegaly    Extremities:   Extremities normal, right AKA, no cyanosis or edema   Pulses:      Skin:   Skin is warm and dry,  no rashes or palpable lesions   Neurologic:   no focal deficits noted      Lab Results (last 72 hours)       Procedure Component Value Units Date/Time    Lipid Panel " [[id number]]  (Abnormal) Collected: 04/28/24 0338    Specimen: Blood Updated: 04/28/24 0723     Total Cholesterol 148 mg/dL      Triglycerides 66 mg/dL      HDL Cholesterol 35 mg/dL      LDL Cholesterol  100 mg/dL      VLDL Cholesterol 13 mg/dL      LDL/HDL Ratio 2.85    Narrative:      Cholesterol Reference Ranges  (U.S. Department of Health and Human Services ATP III Classifications)    Desirable          <200 mg/dL  Borderline High    200-239 mg/dL  High Risk          >240 mg/dL      Triglyceride Reference Ranges  (U.S. Department of Health and Human Services ATP III Classifications)    Normal           <150 mg/dL  Borderline High  150-199 mg/dL  High             200-499 mg/dL  Very High        >500 mg/dL    HDL Reference Ranges  (U.S. Department of Health and Human Services ATP III Classifications)    Low     <40 mg/dl (major risk factor for CHD)  High    >60 mg/dl ('negative' risk factor for CHD)        LDL Reference Ranges  (U.S. Department of Health and Human Services ATP III Classifications)    Optimal          <100 mg/dL  Near Optimal     100-129 mg/dL  Borderline High  130-159 mg/dL  High             160-189 mg/dL  Very High        >189 mg/dL    Basic Metabolic Panel [[id number]]  (Abnormal) Collected: 04/28/24 0338    Specimen: Blood Updated: 04/28/24 0427     Glucose 122 mg/dL      BUN 22 mg/dL      Creatinine 0.79 mg/dL      Sodium 140 mmol/L      Potassium 4.4 mmol/L      Chloride 101 mmol/L      CO2 29.8 mmol/L      Calcium 8.6 mg/dL      BUN/Creatinine Ratio 27.8     Anion Gap 9.2 mmol/L      eGFR 88.1 mL/min/1.73     Narrative:      GFR Normal >60  Chronic Kidney Disease <60  Kidney Failure <15    The GFR formula is only valid for adults with stable renal function between ages 18 and 70.    CBC & Differential [[id number]]  (Abnormal) Collected: 04/28/24 0337    Specimen: Blood Updated: 04/28/24 0409    Narrative:      The following orders were created for panel order CBC & Differential.  Procedure                                Abnormality         Status                     ---------                               -----------         ------                     CBC Auto Differential[[id number]]        Abnormal            Final result                 Please view results for these tests on the individual orders.    CBC Auto Differential [[id number]]  (Abnormal) Collected: 04/28/24 0337    Specimen: Blood Updated: 04/28/24 0409     WBC 7.43 10*3/mm3      RBC 3.92 10*6/mm3      Hemoglobin 10.1 g/dL      Hematocrit 31.8 %      MCV 81.1 fL      MCH 25.8 pg      MCHC 31.8 g/dL      RDW 16.8 %      RDW-SD 49.0 fl      MPV 10.7 fL      Platelets 189 10*3/mm3      Neutrophil % 78.4 %      Lymphocyte % 9.7 %      Monocyte % 10.9 %      Eosinophil % 0.3 %      Basophil % 0.3 %      Immature Grans % 0.4 %      Neutrophils, Absolute 5.83 10*3/mm3      Lymphocytes, Absolute 0.72 10*3/mm3      Monocytes, Absolute 0.81 10*3/mm3      Eosinophils, Absolute 0.02 10*3/mm3      Basophils, Absolute 0.02 10*3/mm3      Immature Grans, Absolute 0.03 10*3/mm3      nRBC 0.0 /100 WBC     Magnesium [[id number]]  (Normal) Collected: 04/27/24 2238    Specimen: Blood Updated: 04/27/24 2321     Magnesium 2.2 mg/dL     Potassium [[id number]]  (Normal) Collected: 04/27/24 2238    Specimen: Blood Updated: 04/27/24 2321     Potassium 4.4 mmol/L     High Sensitivity Troponin T [[id number]]  (Abnormal) Collected: 04/27/24 2238    Specimen: Blood Updated: 04/27/24 2321     HS Troponin T 79 ng/L     Narrative:      High Sensitive Troponin T Reference Range:  <14.0 ng/L- Negative Female for AMI  <22.0 ng/L- Negative Male for AMI  >=14 - Abnormal Female indicating possible myocardial injury.  >=22 - Abnormal Male indicating possible myocardial injury.   Clinicians would have to utilize clinical acumen, EKG, Troponin, and serial changes to determine if it is an Acute Myocardial Infarction or myocardial injury due to an underlying chronic condition.          Creatinine Serum (kidney function) GFR component [[id number]]  (Normal) Collected: 04/27/24 2238    Specimen: Blood Updated: 04/27/24 2320     Creatinine 0.82 mg/dL      eGFR 87.2 mL/min/1.73     Narrative:      GFR Normal >60  Chronic Kidney Disease <60  Kidney Failure <15    The GFR formula is only valid for adults with stable renal function between ages 18 and 70.    High Sensitivity Troponin T 2Hr [[id number]]  (Abnormal) Collected: 04/27/24 1246    Specimen: Blood Updated: 04/27/24 1323     HS Troponin T 72 ng/L      Troponin T Delta -7 ng/L     Narrative:      High Sensitive Troponin T Reference Range:  <14.0 ng/L- Negative Female for AMI  <22.0 ng/L- Negative Male for AMI  >=14 - Abnormal Female indicating possible myocardial injury.  >=22 - Abnormal Male indicating possible myocardial injury.   Clinicians would have to utilize clinical acumen, EKG, Troponin, and serial changes to determine if it is an Acute Myocardial Infarction or myocardial injury due to an underlying chronic condition.         TSH Rfx On Abnormal To Free T4 [[id number]]  (Normal) Collected: 04/27/24 1033    Specimen: Blood Updated: 04/27/24 1126     TSH 0.819 uIU/mL     Single High Sensitivity Troponin T [[id number]]  (Abnormal) Collected: 04/27/24 1033    Specimen: Blood Updated: 04/27/24 1107     HS Troponin T 79 ng/L     Narrative:      High Sensitive Troponin T Reference Range:  <14.0 ng/L- Negative Female for AMI  <22.0 ng/L- Negative Male for AMI  >=14 - Abnormal Female indicating possible myocardial injury.  >=22 - Abnormal Male indicating possible myocardial injury.   Clinicians would have to utilize clinical acumen, EKG, Troponin, and serial changes to determine if it is an Acute Myocardial Infarction or myocardial injury due to an underlying chronic condition.         Comprehensive Metabolic Panel [[id number]]  (Abnormal) Collected: 04/27/24 1033    Specimen: Blood Updated: 04/27/24 1103     Glucose 209 mg/dL      BUN 23 mg/dL       Creatinine 1.03 mg/dL      Sodium 141 mmol/L      Potassium 5.7 mmol/L      Chloride 100 mmol/L      CO2 30.0 mmol/L      Calcium 9.1 mg/dL      Total Protein 6.3 g/dL      Albumin 3.1 g/dL      ALT (SGPT) 18 U/L      AST (SGOT) 12 U/L      Alkaline Phosphatase 118 U/L      Total Bilirubin 1.1 mg/dL      Globulin 3.2 gm/dL      A/G Ratio 1.0 g/dL      BUN/Creatinine Ratio 22.3     Anion Gap 11.0 mmol/L      eGFR 72.1 mL/min/1.73     Narrative:      GFR Normal >60  Chronic Kidney Disease <60  Kidney Failure <15    The GFR formula is only valid for adults with stable renal function between ages 18 and 70.    BNP [[id number]]  (Abnormal) Collected: 04/27/24 1033    Specimen: Blood Updated: 04/27/24 1103     proBNP 5,603.0 pg/mL     Narrative:      This assay is used as an aid in the diagnosis of individuals suspected of having heart failure. It can be used as an aid in the diagnosis of acute decompensated heart failure (ADHF) in patients presenting with signs and symptoms of ADHF to the emergency department (ED). In addition, NT-proBNP of <300 pg/mL indicates ADHF is not likely.    Age Range Result Interpretation  NT-proBNP Concentration (pg/mL:      <50             Positive            >450                   Gray                 300-450                    Negative             <300    50-75           Positive            >900                  Gray                300-900                  Negative            <300      >75             Positive            >1800                  Gray                300-1800                  Negative            <300    Magnesium [[id number]]  (Normal) Collected: 04/27/24 1033    Specimen: Blood Updated: 04/27/24 1103     Magnesium 2.3 mg/dL     CBC & Differential [[id number]]  (Abnormal) Collected: 04/27/24 1033    Specimen: Blood Updated: 04/27/24 1045    Narrative:      The following orders were created for panel order CBC & Differential.  Procedure                               Abnormality          Status                     ---------                               -----------         ------                     CBC Auto Differential[[id number]]        Abnormal            Final result                 Please view results for these tests on the individual orders.    CBC Auto Differential [[id number]]  (Abnormal) Collected: 04/27/24 1033    Specimen: Blood Updated: 04/27/24 1045     WBC 8.87 10*3/mm3      RBC 4.42 10*6/mm3      Hemoglobin 11.5 g/dL      Hematocrit 37.2 %      MCV 84.2 fL      MCH 26.0 pg      MCHC 30.9 g/dL      RDW 16.9 %      RDW-SD 51.4 fl      MPV 10.5 fL      Platelets 242 10*3/mm3      Neutrophil % 82.8 %      Lymphocyte % 8.6 %      Monocyte % 8.0 %      Eosinophil % 0.1 %      Basophil % 0.2 %      Immature Grans % 0.3 %      Neutrophils, Absolute 7.34 10*3/mm3      Lymphocytes, Absolute 0.76 10*3/mm3      Monocytes, Absolute 0.71 10*3/mm3      Eosinophils, Absolute 0.01 10*3/mm3      Basophils, Absolute 0.02 10*3/mm3      Immature Grans, Absolute 0.03 10*3/mm3      nRBC 0.0 /100 WBC     Terrell Draw [[id number]] Collected: 04/27/24 1033    Specimen: Blood Updated: 04/27/24 1045    Narrative:      The following orders were created for panel order Terrell Draw.  Procedure                               Abnormality         Status                     ---------                               -----------         ------                     Green Top (Gel)[[id number]]                                  Final result               Lavender Top[[id number]]                                     Final result               Gold Top - SST[[id number]]                                   Final result               Light Blue Top[[id number]]                                   Final result                 Please view results for these tests on the individual orders.    Green Top (Gel) [[id number]] Collected: 04/27/24 1033    Specimen: Blood Updated: 04/27/24 1045     Extra Tube Hold for add-ons.     Comment: Auto  "resulted.       Lavender Top [[id number]] Collected: 04/27/24 1033    Specimen: Blood Updated: 04/27/24 1045     Extra Tube hold for add-on     Comment: Auto resulted       Gold Top - SST [[id number]] Collected: 04/27/24 1033    Specimen: Blood Updated: 04/27/24 1045     Extra Tube Hold for add-ons.     Comment: Auto resulted.       Light Blue Top [[id number]] Collected: 04/27/24 1033    Specimen: Blood Updated: 04/27/24 1045     Extra Tube Hold for add-ons.     Comment: Auto resulted             Data Review:  Results from last 7 days   Lab Units 04/29/24  0401 04/28/24  0338 04/27/24 2238 04/27/24  1033   SODIUM mmol/L 141 140  --  141   POTASSIUM mmol/L 4.0 4.4 4.4 5.7*   CHLORIDE mmol/L 99 101  --  100   CO2 mmol/L 32.1* 29.8*  --  30.0*   BUN mg/dL 20 22  --  23   CREATININE mg/dL 0.84 0.79 0.82 1.03   GLUCOSE mg/dL 101* 122*  --  209*   CALCIUM mg/dL 8.5* 8.6  --  9.1     Results from last 7 days   Lab Units 04/29/24  0401 04/28/24 0337 04/27/24  1033   WBC 10*3/mm3 8.19 7.43 8.87   HEMOGLOBIN g/dL 9.8* 10.1* 11.5*   HEMATOCRIT % 32.0* 31.8* 37.2*   PLATELETS 10*3/mm3 199 189 242     Results from last 7 days   Lab Units 04/27/24  1033   TSH uIU/mL 0.819         Lab Results   Lab Value Date/Time    TROPONINT 79 (C) 04/27/2024 2238    TROPONINT 72 (C) 04/27/2024 1246    TROPONINT 79 (C) 04/27/2024 1033    TROPONINT 60 (C) 02/09/2024 1401    TROPONINT 65 (C) 02/09/2024 1114    TROPONINT 64 (C) 11/08/2023 2048    TROPONINT 56 (C) 11/08/2023 1534    TROPONINT 60 (C) 11/08/2023 1003    TROPONINT <0.010 09/23/2022 1627     Results from last 7 days   Lab Units 04/28/24  0338   CHOLESTEROL mg/dL 148   TRIGLYCERIDES mg/dL 66   HDL CHOL mg/dL 35*   LDL CHOL mg/dL 100     Results from last 7 days   Lab Units 04/27/24  1033   ALK PHOS U/L 118*   BILIRUBIN mg/dL 1.1   ALT (SGPT) U/L 18   AST (SGOT) U/L 12     Results from last 7 days   Lab Units 04/27/24  1033   TSH uIU/mL 0.819         No results found for: \"POCGLU\"    "     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    Arthritis     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, 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/2023    Atrial fibrillation     Atrial fibrillation, persistent 09/24/2022    Cervical spondylosis with myelopathy     CHF (congestive heart failure)     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 12/13/2023    Disease of thyroid gland     HLD (hyperlipidemia) 09/23/2022    Hx of toxic multinodular goiter 09/23/2022    Lumbar radiculopathy     Peripheral vascular disease, unspecified 02/02/2024    Pulmonary hypertension     Ulcer with gangrene     right heel       Assessment:  Active Hospital Problems    Diagnosis  POA    **Recurrent pleural effusion on right [J90]  Yes    Longstanding persistent atrial fibrillation [I48.11]  Yes    Chronic diastolic CHF (congestive heart failure) [I50.32]  Yes    Hyperthyroidism [E05.90]  Yes    Dyslipidemia [E78.5]  Yes    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       Plan:  Consult from pulmonary, thoracic surgery and cardiology noted.  Follow-up on labs.  Plans for surgery tomorrow with Pleurx catheter placement.  Anticoagulation is on hold.    Dayron Fowler MD  4/29/2024  13:13 EDT

## 2024-04-30 ENCOUNTER — ANESTHESIA (OUTPATIENT)
Dept: PERIOP | Facility: HOSPITAL | Age: 84
End: 2024-04-30
Payer: MEDICARE

## 2024-04-30 ENCOUNTER — APPOINTMENT (OUTPATIENT)
Dept: GENERAL RADIOLOGY | Facility: HOSPITAL | Age: 84
DRG: 186 | End: 2024-04-30
Payer: MEDICARE

## 2024-04-30 ENCOUNTER — APPOINTMENT (OUTPATIENT)
Dept: PHYSICAL THERAPY | Facility: HOSPITAL | Age: 84
End: 2024-04-30
Payer: MEDICARE

## 2024-04-30 ENCOUNTER — ANESTHESIA EVENT (OUTPATIENT)
Dept: PERIOP | Facility: HOSPITAL | Age: 84
End: 2024-04-30
Payer: MEDICARE

## 2024-04-30 PROBLEM — E44.0 MODERATE MALNUTRITION: Status: ACTIVE | Noted: 2024-04-30

## 2024-04-30 LAB
ANION GAP SERPL CALCULATED.3IONS-SCNC: 10 MMOL/L (ref 5–15)
APTT PPP: 32.2 SECONDS (ref 22.7–35.4)
BASOPHILS # BLD AUTO: 0.01 10*3/MM3 (ref 0–0.2)
BASOPHILS NFR BLD AUTO: 0.2 % (ref 0–1.5)
BUN SERPL-MCNC: 19 MG/DL (ref 8–23)
BUN/CREAT SERPL: 27.5 (ref 7–25)
CALCIUM SPEC-SCNC: 8.1 MG/DL (ref 8.6–10.5)
CHLORIDE SERPL-SCNC: 101 MMOL/L (ref 98–107)
CO2 SERPL-SCNC: 31 MMOL/L (ref 22–29)
CREAT SERPL-MCNC: 0.69 MG/DL (ref 0.76–1.27)
DEPRECATED RDW RBC AUTO: 49.9 FL (ref 37–54)
EGFRCR SERPLBLD CKD-EPI 2021: 91.8 ML/MIN/1.73
EOSINOPHIL # BLD AUTO: 0.07 10*3/MM3 (ref 0–0.4)
EOSINOPHIL NFR BLD AUTO: 1.1 % (ref 0.3–6.2)
ERYTHROCYTE [DISTWIDTH] IN BLOOD BY AUTOMATED COUNT: 16.4 % (ref 12.3–15.4)
GLUCOSE SERPL-MCNC: 81 MG/DL (ref 65–99)
HCT VFR BLD AUTO: 34.2 % (ref 37.5–51)
HGB BLD-MCNC: 10.7 G/DL (ref 13–17.7)
IMM GRANULOCYTES # BLD AUTO: 0.03 10*3/MM3 (ref 0–0.05)
IMM GRANULOCYTES NFR BLD AUTO: 0.5 % (ref 0–0.5)
INR PPP: 1.22 (ref 0.9–1.1)
LYMPHOCYTES # BLD AUTO: 0.76 10*3/MM3 (ref 0.7–3.1)
LYMPHOCYTES NFR BLD AUTO: 12.3 % (ref 19.6–45.3)
MCH RBC QN AUTO: 25.8 PG (ref 26.6–33)
MCHC RBC AUTO-ENTMCNC: 31.3 G/DL (ref 31.5–35.7)
MCV RBC AUTO: 82.6 FL (ref 79–97)
MONOCYTES # BLD AUTO: 0.82 10*3/MM3 (ref 0.1–0.9)
MONOCYTES NFR BLD AUTO: 13.2 % (ref 5–12)
NEUTROPHILS NFR BLD AUTO: 4.51 10*3/MM3 (ref 1.7–7)
NEUTROPHILS NFR BLD AUTO: 72.7 % (ref 42.7–76)
NRBC BLD AUTO-RTO: 0 /100 WBC (ref 0–0.2)
PLATELET # BLD AUTO: 164 10*3/MM3 (ref 140–450)
PMV BLD AUTO: 10.9 FL (ref 6–12)
POTASSIUM SERPL-SCNC: 3.5 MMOL/L (ref 3.5–5.2)
PROTHROMBIN TIME: 15.6 SECONDS (ref 11.7–14.2)
RBC # BLD AUTO: 4.14 10*6/MM3 (ref 4.14–5.8)
SODIUM SERPL-SCNC: 142 MMOL/L (ref 136–145)
WBC NRBC COR # BLD AUTO: 6.2 10*3/MM3 (ref 3.4–10.8)

## 2024-04-30 PROCEDURE — 25810000003 LACTATED RINGERS PER 1000 ML

## 2024-04-30 PROCEDURE — 25010000002 ESMOLOL 100 MG/10ML SOLUTION

## 2024-04-30 PROCEDURE — 25010000002 CEFAZOLIN PER 500 MG: Performed by: NURSE PRACTITIONER

## 2024-04-30 PROCEDURE — 94640 AIRWAY INHALATION TREATMENT: CPT

## 2024-04-30 PROCEDURE — 94799 UNLISTED PULMONARY SVC/PX: CPT

## 2024-04-30 PROCEDURE — 85610 PROTHROMBIN TIME: CPT | Performed by: NURSE PRACTITIONER

## 2024-04-30 PROCEDURE — 25010000002 PROPOFOL 200 MG/20ML EMULSION

## 2024-04-30 PROCEDURE — C1729 CATH, DRAINAGE: HCPCS | Performed by: THORACIC SURGERY (CARDIOTHORACIC VASCULAR SURGERY)

## 2024-04-30 PROCEDURE — 87206 SMEAR FLUORESCENT/ACID STAI: CPT | Performed by: THORACIC SURGERY (CARDIOTHORACIC VASCULAR SURGERY)

## 2024-04-30 PROCEDURE — 25810000003 SODIUM CHLORIDE 0.9 % SOLUTION: Performed by: NURSE PRACTITIONER

## 2024-04-30 PROCEDURE — 87075 CULTR BACTERIA EXCEPT BLOOD: CPT | Performed by: THORACIC SURGERY (CARDIOTHORACIC VASCULAR SURGERY)

## 2024-04-30 PROCEDURE — 99232 SBSQ HOSP IP/OBS MODERATE 35: CPT | Performed by: NURSE PRACTITIONER

## 2024-04-30 PROCEDURE — 85025 COMPLETE CBC W/AUTO DIFF WBC: CPT | Performed by: HOSPITALIST

## 2024-04-30 PROCEDURE — 87116 MYCOBACTERIA CULTURE: CPT | Performed by: THORACIC SURGERY (CARDIOTHORACIC VASCULAR SURGERY)

## 2024-04-30 PROCEDURE — 94760 N-INVAS EAR/PLS OXIMETRY 1: CPT

## 2024-04-30 PROCEDURE — 85730 THROMBOPLASTIN TIME PARTIAL: CPT | Performed by: NURSE PRACTITIONER

## 2024-04-30 PROCEDURE — 71045 X-RAY EXAM CHEST 1 VIEW: CPT

## 2024-04-30 PROCEDURE — 25010000002 DIGOXIN PER 500 MCG: Performed by: NURSE PRACTITIONER

## 2024-04-30 PROCEDURE — 87205 SMEAR GRAM STAIN: CPT | Performed by: THORACIC SURGERY (CARDIOTHORACIC VASCULAR SURGERY)

## 2024-04-30 PROCEDURE — 25010000002 SUGAMMADEX 200 MG/2ML SOLUTION

## 2024-04-30 PROCEDURE — 0W9B40Z DRAINAGE OF LEFT PLEURAL CAVITY WITH DRAINAGE DEVICE, PERCUTANEOUS ENDOSCOPIC APPROACH: ICD-10-PCS | Performed by: THORACIC SURGERY (CARDIOTHORACIC VASCULAR SURGERY)

## 2024-04-30 PROCEDURE — 25010000002 PROPOFOL 1000 MG/100ML EMULSION

## 2024-04-30 PROCEDURE — 87176 TISSUE HOMOGENIZATION CULTR: CPT | Performed by: THORACIC SURGERY (CARDIOTHORACIC VASCULAR SURGERY)

## 2024-04-30 PROCEDURE — 76000 FLUOROSCOPY <1 HR PHYS/QHP: CPT

## 2024-04-30 PROCEDURE — 87102 FUNGUS ISOLATION CULTURE: CPT | Performed by: THORACIC SURGERY (CARDIOTHORACIC VASCULAR SURGERY)

## 2024-04-30 PROCEDURE — 87070 CULTURE OTHR SPECIMN AEROBIC: CPT | Performed by: THORACIC SURGERY (CARDIOTHORACIC VASCULAR SURGERY)

## 2024-04-30 PROCEDURE — 25010000002 LIDOCAINE 1 % SOLUTION: Performed by: THORACIC SURGERY (CARDIOTHORACIC VASCULAR SURGERY)

## 2024-04-30 PROCEDURE — 25810000003 LACTATED RINGERS PER 1000 ML: Performed by: ANESTHESIOLOGY

## 2024-04-30 PROCEDURE — 25010000002 ONDANSETRON PER 1 MG

## 2024-04-30 PROCEDURE — 94664 DEMO&/EVAL PT USE INHALER: CPT

## 2024-04-30 PROCEDURE — 80048 BASIC METABOLIC PNL TOTAL CA: CPT | Performed by: HOSPITALIST

## 2024-04-30 PROCEDURE — 0BCN4ZZ EXTIRPATION OF MATTER FROM RIGHT PLEURA, PERCUTANEOUS ENDOSCOPIC APPROACH: ICD-10-PCS | Performed by: THORACIC SURGERY (CARDIOTHORACIC VASCULAR SURGERY)

## 2024-04-30 PROCEDURE — 94761 N-INVAS EAR/PLS OXIMETRY MLT: CPT

## 2024-04-30 PROCEDURE — 25010000002 PHENYLEPHRINE 10 MG/ML SOLUTION

## 2024-04-30 RX ORDER — ACETAMINOPHEN 500 MG
1000 TABLET ORAL 3 TIMES DAILY
Status: DISCONTINUED | OUTPATIENT
Start: 2024-04-30 | End: 2024-05-06 | Stop reason: HOSPADM

## 2024-04-30 RX ORDER — PROMETHAZINE HYDROCHLORIDE 25 MG/1
25 SUPPOSITORY RECTAL ONCE AS NEEDED
Status: DISCONTINUED | OUTPATIENT
Start: 2024-04-30 | End: 2024-04-30 | Stop reason: HOSPADM

## 2024-04-30 RX ORDER — IPRATROPIUM BROMIDE AND ALBUTEROL SULFATE 2.5; .5 MG/3ML; MG/3ML
3 SOLUTION RESPIRATORY (INHALATION)
Status: COMPLETED | OUTPATIENT
Start: 2024-04-30 | End: 2024-05-02

## 2024-04-30 RX ORDER — PROPOFOL 10 MG/ML
INJECTION, EMULSION INTRAVENOUS CONTINUOUS PRN
Status: DISCONTINUED | OUTPATIENT
Start: 2024-04-30 | End: 2024-04-30 | Stop reason: SURG

## 2024-04-30 RX ORDER — ONDANSETRON 2 MG/ML
INJECTION INTRAMUSCULAR; INTRAVENOUS AS NEEDED
Status: DISCONTINUED | OUTPATIENT
Start: 2024-04-30 | End: 2024-04-30 | Stop reason: SURG

## 2024-04-30 RX ORDER — SODIUM CHLORIDE, SODIUM LACTATE, POTASSIUM CHLORIDE, CALCIUM CHLORIDE 600; 310; 30; 20 MG/100ML; MG/100ML; MG/100ML; MG/100ML
9 INJECTION, SOLUTION INTRAVENOUS CONTINUOUS
Status: DISCONTINUED | OUTPATIENT
Start: 2024-04-30 | End: 2024-05-06

## 2024-04-30 RX ORDER — EPHEDRINE SULFATE 50 MG/ML
5 INJECTION, SOLUTION INTRAVENOUS ONCE AS NEEDED
Status: DISCONTINUED | OUTPATIENT
Start: 2024-04-30 | End: 2024-04-30 | Stop reason: HOSPADM

## 2024-04-30 RX ORDER — PROMETHAZINE HYDROCHLORIDE 25 MG/1
25 TABLET ORAL ONCE AS NEEDED
Status: DISCONTINUED | OUTPATIENT
Start: 2024-04-30 | End: 2024-04-30 | Stop reason: HOSPADM

## 2024-04-30 RX ORDER — NITROGLYCERIN 0.4 MG/1
0.4 TABLET SUBLINGUAL
Status: DISCONTINUED | OUTPATIENT
Start: 2024-04-30 | End: 2024-05-06 | Stop reason: HOSPADM

## 2024-04-30 RX ORDER — BISACODYL 10 MG
10 SUPPOSITORY, RECTAL RECTAL DAILY PRN
Status: DISCONTINUED | OUTPATIENT
Start: 2024-04-30 | End: 2024-04-30 | Stop reason: SDUPTHER

## 2024-04-30 RX ORDER — AMOXICILLIN 250 MG
2 CAPSULE ORAL NIGHTLY
Status: DISCONTINUED | OUTPATIENT
Start: 2024-04-30 | End: 2024-05-06 | Stop reason: HOSPADM

## 2024-04-30 RX ORDER — NALOXONE HCL 0.4 MG/ML
0.2 VIAL (ML) INJECTION AS NEEDED
Status: DISCONTINUED | OUTPATIENT
Start: 2024-04-30 | End: 2024-04-30 | Stop reason: HOSPADM

## 2024-04-30 RX ORDER — DEXTROSE MONOHYDRATE, SODIUM CHLORIDE, AND POTASSIUM CHLORIDE 50; 1.49; 4.5 G/1000ML; G/1000ML; G/1000ML
75 INJECTION, SOLUTION INTRAVENOUS CONTINUOUS
Status: DISCONTINUED | OUTPATIENT
Start: 2024-04-30 | End: 2024-05-01

## 2024-04-30 RX ORDER — MIDODRINE HYDROCHLORIDE 5 MG/1
5 TABLET ORAL 3 TIMES DAILY PRN
Status: DISCONTINUED | OUTPATIENT
Start: 2024-04-30 | End: 2024-05-06 | Stop reason: HOSPADM

## 2024-04-30 RX ORDER — GUAIFENESIN 600 MG/1
1200 TABLET, EXTENDED RELEASE ORAL EVERY 12 HOURS SCHEDULED
Status: DISCONTINUED | OUTPATIENT
Start: 2024-04-30 | End: 2024-05-06 | Stop reason: HOSPADM

## 2024-04-30 RX ORDER — OXYCODONE HYDROCHLORIDE 5 MG/1
5 TABLET ORAL EVERY 4 HOURS PRN
Status: DISCONTINUED | OUTPATIENT
Start: 2024-04-30 | End: 2024-05-06 | Stop reason: HOSPADM

## 2024-04-30 RX ORDER — SODIUM CHLORIDE 0.9 % (FLUSH) 0.9 %
3-10 SYRINGE (ML) INJECTION AS NEEDED
Status: DISCONTINUED | OUTPATIENT
Start: 2024-04-30 | End: 2024-04-30 | Stop reason: HOSPADM

## 2024-04-30 RX ORDER — HYDROMORPHONE HYDROCHLORIDE 1 MG/ML
0.25 INJECTION, SOLUTION INTRAMUSCULAR; INTRAVENOUS; SUBCUTANEOUS
Status: DISCONTINUED | OUTPATIENT
Start: 2024-04-30 | End: 2024-04-30 | Stop reason: HOSPADM

## 2024-04-30 RX ORDER — LIDOCAINE HYDROCHLORIDE 20 MG/ML
INJECTION, SOLUTION INFILTRATION; PERINEURAL AS NEEDED
Status: DISCONTINUED | OUTPATIENT
Start: 2024-04-30 | End: 2024-04-30 | Stop reason: SURG

## 2024-04-30 RX ORDER — DIGOXIN 0.25 MG/ML
500 INJECTION INTRAMUSCULAR; INTRAVENOUS ONCE
Status: COMPLETED | OUTPATIENT
Start: 2024-04-30 | End: 2024-04-30

## 2024-04-30 RX ORDER — SODIUM CHLORIDE, SODIUM LACTATE, POTASSIUM CHLORIDE, CALCIUM CHLORIDE 600; 310; 30; 20 MG/100ML; MG/100ML; MG/100ML; MG/100ML
INJECTION, SOLUTION INTRAVENOUS CONTINUOUS PRN
Status: DISCONTINUED | OUTPATIENT
Start: 2024-04-30 | End: 2024-04-30 | Stop reason: SURG

## 2024-04-30 RX ORDER — FENTANYL CITRATE 50 UG/ML
25 INJECTION, SOLUTION INTRAMUSCULAR; INTRAVENOUS
Status: DISCONTINUED | OUTPATIENT
Start: 2024-04-30 | End: 2024-04-30 | Stop reason: HOSPADM

## 2024-04-30 RX ORDER — ESMOLOL HYDROCHLORIDE 10 MG/ML
INJECTION INTRAVENOUS AS NEEDED
Status: DISCONTINUED | OUTPATIENT
Start: 2024-04-30 | End: 2024-04-30 | Stop reason: SURG

## 2024-04-30 RX ORDER — HYDRALAZINE HYDROCHLORIDE 20 MG/ML
5 INJECTION INTRAMUSCULAR; INTRAVENOUS
Status: DISCONTINUED | OUTPATIENT
Start: 2024-04-30 | End: 2024-04-30 | Stop reason: HOSPADM

## 2024-04-30 RX ORDER — LIDOCAINE HYDROCHLORIDE 10 MG/ML
0.5 INJECTION, SOLUTION INFILTRATION; PERINEURAL ONCE AS NEEDED
Status: DISCONTINUED | OUTPATIENT
Start: 2024-04-30 | End: 2024-04-30 | Stop reason: HOSPADM

## 2024-04-30 RX ORDER — IPRATROPIUM BROMIDE AND ALBUTEROL SULFATE 2.5; .5 MG/3ML; MG/3ML
3 SOLUTION RESPIRATORY (INHALATION) ONCE AS NEEDED
Status: COMPLETED | OUTPATIENT
Start: 2024-04-30 | End: 2024-04-30

## 2024-04-30 RX ORDER — HYDROCODONE BITARTRATE AND ACETAMINOPHEN 5; 325 MG/1; MG/1
1 TABLET ORAL ONCE AS NEEDED
Status: DISCONTINUED | OUTPATIENT
Start: 2024-04-30 | End: 2024-04-30 | Stop reason: HOSPADM

## 2024-04-30 RX ORDER — METOPROLOL TARTRATE 1 MG/ML
INJECTION, SOLUTION INTRAVENOUS AS NEEDED
Status: DISCONTINUED | OUTPATIENT
Start: 2024-04-30 | End: 2024-04-30 | Stop reason: SURG

## 2024-04-30 RX ORDER — SODIUM CHLORIDE 0.9 % (FLUSH) 0.9 %
3 SYRINGE (ML) INJECTION EVERY 12 HOURS SCHEDULED
Status: DISCONTINUED | OUTPATIENT
Start: 2024-04-30 | End: 2024-04-30 | Stop reason: HOSPADM

## 2024-04-30 RX ORDER — HEPARIN SODIUM 5000 [USP'U]/ML
5000 INJECTION, SOLUTION INTRAVENOUS; SUBCUTANEOUS EVERY 8 HOURS SCHEDULED
Status: DISCONTINUED | OUTPATIENT
Start: 2024-05-01 | End: 2024-05-03

## 2024-04-30 RX ORDER — ROCURONIUM BROMIDE 10 MG/ML
INJECTION, SOLUTION INTRAVENOUS AS NEEDED
Status: DISCONTINUED | OUTPATIENT
Start: 2024-04-30 | End: 2024-04-30 | Stop reason: SURG

## 2024-04-30 RX ORDER — HYDROMORPHONE HYDROCHLORIDE 1 MG/ML
0.5 INJECTION, SOLUTION INTRAMUSCULAR; INTRAVENOUS; SUBCUTANEOUS
Status: ACTIVE | OUTPATIENT
Start: 2024-04-30 | End: 2024-05-05

## 2024-04-30 RX ORDER — DIPHENHYDRAMINE HYDROCHLORIDE 50 MG/ML
12.5 INJECTION INTRAMUSCULAR; INTRAVENOUS
Status: DISCONTINUED | OUTPATIENT
Start: 2024-04-30 | End: 2024-04-30 | Stop reason: HOSPADM

## 2024-04-30 RX ORDER — LIDOCAINE HYDROCHLORIDE 10 MG/ML
INJECTION, SOLUTION INFILTRATION; PERINEURAL AS NEEDED
Status: DISCONTINUED | OUTPATIENT
Start: 2024-04-30 | End: 2024-04-30 | Stop reason: HOSPADM

## 2024-04-30 RX ORDER — FLUMAZENIL 0.1 MG/ML
0.2 INJECTION INTRAVENOUS AS NEEDED
Status: DISCONTINUED | OUTPATIENT
Start: 2024-04-30 | End: 2024-04-30 | Stop reason: HOSPADM

## 2024-04-30 RX ORDER — DROPERIDOL 2.5 MG/ML
0.62 INJECTION, SOLUTION INTRAMUSCULAR; INTRAVENOUS
Status: DISCONTINUED | OUTPATIENT
Start: 2024-04-30 | End: 2024-04-30 | Stop reason: HOSPADM

## 2024-04-30 RX ORDER — PHENYLEPHRINE HYDROCHLORIDE 10 MG/ML
INJECTION INTRAVENOUS AS NEEDED
Status: DISCONTINUED | OUTPATIENT
Start: 2024-04-30 | End: 2024-04-30 | Stop reason: SURG

## 2024-04-30 RX ORDER — LABETALOL HYDROCHLORIDE 5 MG/ML
5 INJECTION, SOLUTION INTRAVENOUS
Status: DISCONTINUED | OUTPATIENT
Start: 2024-04-30 | End: 2024-04-30 | Stop reason: HOSPADM

## 2024-04-30 RX ORDER — PROPOFOL 10 MG/ML
INJECTION, EMULSION INTRAVENOUS AS NEEDED
Status: DISCONTINUED | OUTPATIENT
Start: 2024-04-30 | End: 2024-04-30 | Stop reason: SURG

## 2024-04-30 RX ORDER — ONDANSETRON 2 MG/ML
4 INJECTION INTRAMUSCULAR; INTRAVENOUS ONCE AS NEEDED
Status: DISCONTINUED | OUTPATIENT
Start: 2024-04-30 | End: 2024-04-30 | Stop reason: HOSPADM

## 2024-04-30 RX ORDER — HYDROCODONE BITARTRATE AND ACETAMINOPHEN 7.5; 325 MG/1; MG/1
1 TABLET ORAL EVERY 4 HOURS PRN
Status: DISCONTINUED | OUTPATIENT
Start: 2024-04-30 | End: 2024-04-30 | Stop reason: HOSPADM

## 2024-04-30 RX ADMIN — Medication 10 ML: at 20:42

## 2024-04-30 RX ADMIN — PHENYLEPHRINE HYDROCHLORIDE 100 MCG: 10 INJECTION INTRAVENOUS at 07:45

## 2024-04-30 RX ADMIN — DIGOXIN 500 MCG: 0.25 INJECTION INTRAMUSCULAR; INTRAVENOUS at 13:42

## 2024-04-30 RX ADMIN — ROCURONIUM BROMIDE 30 MG: 10 INJECTION, SOLUTION INTRAVENOUS at 07:43

## 2024-04-30 RX ADMIN — POTASSIUM CHLORIDE, DEXTROSE MONOHYDRATE AND SODIUM CHLORIDE 75 ML/HR: 150; 5; 450 INJECTION, SOLUTION INTRAVENOUS at 15:18

## 2024-04-30 RX ADMIN — PROPOFOL 50 MCG/KG/MIN: 10 INJECTION, EMULSION INTRAVENOUS at 07:30

## 2024-04-30 RX ADMIN — GABAPENTIN 100 MG: 100 CAPSULE ORAL at 20:38

## 2024-04-30 RX ADMIN — SODIUM CHLORIDE 75 ML/HR: 9 INJECTION, SOLUTION INTRAVENOUS at 00:00

## 2024-04-30 RX ADMIN — MIDODRINE HYDROCHLORIDE 5 MG: 5 TABLET ORAL at 20:39

## 2024-04-30 RX ADMIN — SUGAMMADEX 200 MG: 100 INJECTION, SOLUTION INTRAVENOUS at 09:19

## 2024-04-30 RX ADMIN — SODIUM CHLORIDE 500 ML: 9 INJECTION, SOLUTION INTRAVENOUS at 13:18

## 2024-04-30 RX ADMIN — SODIUM CHLORIDE, POTASSIUM CHLORIDE, SODIUM LACTATE AND CALCIUM CHLORIDE: 600; 310; 30; 20 INJECTION, SOLUTION INTRAVENOUS at 07:24

## 2024-04-30 RX ADMIN — METOPROLOL TARTRATE 5 MG: 1 INJECTION, SOLUTION INTRAVENOUS at 18:15

## 2024-04-30 RX ADMIN — DIGOXIN 125 MCG: 125 TABLET ORAL at 16:37

## 2024-04-30 RX ADMIN — PHENYLEPHRINE HYDROCHLORIDE 100 MCG: 10 INJECTION INTRAVENOUS at 09:16

## 2024-04-30 RX ADMIN — PHENYLEPHRINE HYDROCHLORIDE 100 MCG: 10 INJECTION INTRAVENOUS at 08:26

## 2024-04-30 RX ADMIN — SODIUM CHLORIDE 2000 MG: 900 INJECTION INTRAVENOUS at 07:15

## 2024-04-30 RX ADMIN — SODIUM CHLORIDE, POTASSIUM CHLORIDE, SODIUM LACTATE AND CALCIUM CHLORIDE 9 ML/HR: 600; 310; 30; 20 INJECTION, SOLUTION INTRAVENOUS at 06:41

## 2024-04-30 RX ADMIN — PHENYLEPHRINE HYDROCHLORIDE 100 MCG: 10 INJECTION INTRAVENOUS at 08:48

## 2024-04-30 RX ADMIN — ATORVASTATIN CALCIUM 10 MG: 20 TABLET, FILM COATED ORAL at 20:39

## 2024-04-30 RX ADMIN — IPRATROPIUM BROMIDE AND ALBUTEROL SULFATE 3 ML: .5; 3 SOLUTION RESPIRATORY (INHALATION) at 23:40

## 2024-04-30 RX ADMIN — ACETAMINOPHEN 1000 MG: 500 TABLET ORAL at 15:18

## 2024-04-30 RX ADMIN — GUAIFENESIN 1200 MG: 600 TABLET, EXTENDED RELEASE ORAL at 20:38

## 2024-04-30 RX ADMIN — IPRATROPIUM BROMIDE AND ALBUTEROL SULFATE 3 ML: .5; 3 SOLUTION RESPIRATORY (INHALATION) at 15:03

## 2024-04-30 RX ADMIN — METOPROLOL TARTRATE 2 MG: 1 INJECTION, SOLUTION INTRAVENOUS at 07:55

## 2024-04-30 RX ADMIN — METOPROLOL TARTRATE 25 MG: 25 TABLET, FILM COATED ORAL at 04:59

## 2024-04-30 RX ADMIN — PHENYLEPHRINE HYDROCHLORIDE 100 MCG: 10 INJECTION INTRAVENOUS at 08:58

## 2024-04-30 RX ADMIN — PROPOFOL INJECTABLE EMULSION 40 MG: 10 INJECTION, EMULSION INTRAVENOUS at 07:30

## 2024-04-30 RX ADMIN — ESMOLOL HYDROCHLORIDE 20 MG: 10 INJECTION, SOLUTION INTRAVENOUS at 09:05

## 2024-04-30 RX ADMIN — ONDANSETRON 4 MG: 2 INJECTION INTRAMUSCULAR; INTRAVENOUS at 08:30

## 2024-04-30 RX ADMIN — ACETAMINOPHEN 1000 MG: 500 TABLET ORAL at 20:39

## 2024-04-30 RX ADMIN — IPRATROPIUM BROMIDE AND ALBUTEROL SULFATE 3 ML: .5; 3 SOLUTION RESPIRATORY (INHALATION) at 11:35

## 2024-04-30 RX ADMIN — LIDOCAINE HYDROCHLORIDE 40 MG: 20 INJECTION, SOLUTION INFILTRATION; PERINEURAL at 07:30

## 2024-04-30 NOTE — PROGRESS NOTES
"                                              LOS: 2 days   Patient Care Team:  Taiwo Powers MD as PCP - General (Internal Medicine)  Self, Bess YO MD as Consulting Physician (Endocrinology)  Huang Chan MD as Cardiologist (Cardiology)    Chief Complaint:  F/up pleural effusion and abnormal chest imaging.    Subjective   Interval History      Underwent VATS and Pleurx catheter placement today.  Currently on oxygen 2 L/min.  Reported some chest pain at the site of the chest tube but otherwise doing well.    REVIEW OF SYSTEMS:   CARDIOVASCULAR: No chest pain, chest pressure or chest discomfort. No palpitations or edema.     GASTROINTESTINAL: No anorexia, nausea, vomiting or diarrhea. No abdominal pain.  CONSTITUTIONAL: No fever or chills.     Ventilator/Non-Invasive Ventilation Settings (From admission, onward)      None                  Physical Exam:     Vital Signs  Temp:  [97.7 °F (36.5 °C)] 97.7 °F (36.5 °C)  Heart Rate:  [] 125  Resp:  [16-28] 18  BP: ()/(38-91) 97/55    Intake/Output Summary (Last 24 hours) at 4/30/2024 1848  Last data filed at 4/30/2024 1350  Gross per 24 hour   Intake 600 ml   Output 780 ml   Net -180 ml     Flowsheet Rows      Flowsheet Row First Filed Value   Admission Height 177.8 cm (70\") Documented at 04/27/2024 1544   Admission Weight 64.5 kg (142 lb 3.2 oz) Documented at 04/27/2024 1544            PPE used per hospital policy    General Appearance:   Alert, cooperative, in no acute distress   ENMT:  Mallampati score 2, moist mucous membrane   Eyes:  Pupils equal and reactive to light. EOMI   Neck:    Trachea midline. No thyromegaly.   Lungs:    diminished air entry on the right with mild crackles.    Heart:   Regular rhythm and normal rate, normal S1 and S2, no         murmur   Skin:   No rash or ecchymosis   Abdomen:    Soft. No tenderness. No HSM.   Neuro/psych:  Conscious, alert, oriented x3. Strength 5/5 in upper and lower  ext.  Appropriate mood and " affect   Extremities:  No cyanosis, clubbing or edema.  Warm extremities and well-perfused          Results Review:        Results from last 7 days   Lab Units 04/30/24  0435 04/29/24  0401 04/28/24  0338   SODIUM mmol/L 142 141 140   POTASSIUM mmol/L 3.5 4.0 4.4   CHLORIDE mmol/L 101 99 101   CO2 mmol/L 31.0* 32.1* 29.8*   BUN mg/dL 19 20 22   CREATININE mg/dL 0.69* 0.84 0.79   GLUCOSE mg/dL 81 101* 122*   CALCIUM mg/dL 8.1* 8.5* 8.6     Results from last 7 days   Lab Units 04/27/24  2238 04/27/24  1246 04/27/24  1033   HSTROP T ng/L 79* 72* 79*     Results from last 7 days   Lab Units 04/30/24  0436 04/29/24  0401 04/28/24  0337   WBC 10*3/mm3 6.20 8.19 7.43   HEMOGLOBIN g/dL 10.7* 9.8* 10.1*   HEMATOCRIT % 34.2* 32.0* 31.8*   PLATELETS 10*3/mm3 164 199 189     Results from last 7 days   Lab Units 04/30/24  0436   INR  1.22*   APTT seconds 32.2     Results from last 7 days   Lab Units 04/27/24  1033   PROBNP pg/mL 5,603.0*                           I reviewed the patient's new clinical results.        Medication Review:   acetaminophen, 1,000 mg, Oral, TID  atorvastatin, 10 mg, Oral, Nightly  collagenase, 1 Application, Topical, Daily  digoxin, 125 mcg, Oral, Daily  empagliflozin, 10 mg, Oral, Daily  furosemide, 40 mg, Oral, Daily  gabapentin, 100 mg, Oral, Nightly  guaiFENesin, 1,200 mg, Oral, Q12H  [START ON 5/1/2024] heparin (porcine), 5,000 Units, Subcutaneous, Q8H  ipratropium-albuterol, 3 mL, Nebulization, Q8H - RT  methIMAzole, 5 mg, Oral, Once per day on Monday Wednesday Friday  metoprolol tartrate, 25 mg, Oral, BID  senna-docusate sodium, 2 tablet, Oral, Nightly  sodium chloride, 10 mL, Intravenous, Q12H        dextrose 5 % and sodium chloride 0.45 % with KCl 20 mEq/L, 75 mL/hr, Last Rate: 75 mL/hr (04/30/24 8368)  lactated ringers, 9 mL/hr, Last Rate: 9 mL/hr (04/30/24 0641)        Diagnostic imaging:  I personally and independently reviewed the following images:  CT chest 4/27/2024: Bilateral pleural  effusion much more prominent on the right and occupying the whole lung with complete right lung atelectasis.  Only mild pleural effusion on the left.      CXR 4/30/2024: Right chest tubes noted.  Pulmonary infiltrates on the right.    Assessment     Recurrent nonloculated right pleural effusion s/p VATS with partial decortication and Pleurx catheter placement 4/30.  Right lung compressive atelectasis  Suspect reexpansion pulmonary edema on the right  Bilateral pleural effusion, R >>L, secondary to CHF  Chronic HFpEF  A-fib on AC        Plan       Incentive spirometry.  Encouraged to use.  DuoNeb 3 times a day  DVT prophylaxis with heparin subcu 3 times daily when okay with thoracic surgery  Lasix 40 mg p.o. daily.  Rate control for A-fib with dig.      Jennifer Newton MD  04/30/24  18:48 EDT          This note was dictated utilizing Dragon dictation

## 2024-04-30 NOTE — ANESTHESIA POSTPROCEDURE EVALUATION
Patient: Mehreen Silva IV    Procedure Summary       Date: 04/30/24 Room / Location: [facility] OR [address] [place] [address] MAIN OR    Anesthesia Start: 0719 Anesthesia Stop: 0946    Procedure: RIGHT VIDEO ASSISTED THORACOSCOPY, PARTIAL DECORTICATION, PLEURX CATHETER INSERTION (Right) Diagnosis:       Recurrent pleural effusion on right      (Recurrent pleural effusion on right [J90])    Surgeons: Nataliya Noyola MD Provider: Piyush Ball MD    Anesthesia Type: MAC ASA Status: 4            Anesthesia Type: MAC    Vitals  Vitals Value Taken Time   /57 04/30/24 1033   Temp 36.5 °C (97.7 °F) 04/30/24 0945   Pulse 129 04/30/24 1040   Resp 24 04/30/24 1033   SpO2 96 % 04/30/24 1040   Vitals shown include unfiled device data.        Anesthesia Post Evaluation

## 2024-04-30 NOTE — ANESTHESIA PROCEDURE NOTES
Airway  Date/Time: 4/30/2024 7:44 AM    General Information and Staff    Anesthesiologist: Piyush Ball MD  CRNA/CAA: Jessica Ulloa CRNA    Indications and Patient Condition  Indications for airway management: airway protection    Preoxygenated: yes  Mask difficulty assessment: 2 - vent by mask + OA or adjuvant +/- NMBA    Final Airway Details  Final airway type: endotracheal airway      Successful airway: ETT  Cuffed: yes   Successful intubation technique: direct laryngoscopy  Facilitating devices/methods: intubating stylet  Endotracheal tube insertion site: oral  Blade size: 4  ETT size (mm): 7.5  Cormack-Lehane Classification: grade IIa - partial view of glottis  Placement verified by: chest auscultation and capnometry   Measured from: lips  ETT/EBT  to lips (cm): 23  Number of attempts at approach: 1  Assessment: lips, teeth, and gum same as pre-op and atraumatic intubation

## 2024-04-30 NOTE — PROGRESS NOTES
I have been made aware patient is in PACU with systolic blood pressure in the 70s.  I will give a bolus of normal saline 500 cc now.  I went down to see patient while in PACU.  Patient was tachycardic with a heart rate bouncing between 110-130.  IV digoxin 500 mcg given    Full note to follow

## 2024-04-30 NOTE — PROGRESS NOTES
" CC: Follow-up mitral valve insufficiency, atrial fibrillation heart failure preserved ejection fraction    Interval History: Patient had low blood pressure and high heart rate in PACU.  He was given IV digoxin and 500 cc fluid bolus.      Vital Signs  Temp:  [97.7 °F (36.5 °C)] 97.7 °F (36.5 °C)  Heart Rate:  [] 108  Resp:  [16-28] 18  BP: ()/(38-91) 113/65    Intake/Output Summary (Last 24 hours) at 4/30/2024 1601  Last data filed at 4/30/2024 1350  Gross per 24 hour   Intake 600 ml   Output 780 ml   Net -180 ml     Flowsheet Rows      Flowsheet Row First Filed Value   Admission Height 177.8 cm (70\") Documented at 04/27/2024 1544   Admission Weight 64.5 kg (142 lb 3.2 oz) Documented at 04/27/2024 1544            PHYSICAL EXAM:  General: No acute distress  Resp:NL Rate, unlabored, rhonchi  CV: Tachycardic rate and irregular rhythm, NL PMI, Nl S1 and S2, no Murmur, no gallop, no rub, No JVD. Normal pedal pulses  ABD:Nl sounds, no masses or tenderness, nondistended, no guarding or rebound  Neuro: alert,cooperative and oriented  Extr: No edema or cyanosis, moves all extremities      Results Review:    Results from last 7 days   Lab Units 04/30/24  0435   SODIUM mmol/L 142   POTASSIUM mmol/L 3.5   CHLORIDE mmol/L 101   CO2 mmol/L 31.0*   BUN mg/dL 19   CREATININE mg/dL 0.69*   GLUCOSE mg/dL 81   CALCIUM mg/dL 8.1*     Results from last 7 days   Lab Units 04/27/24  2238 04/27/24  1246 04/27/24  1033   HSTROP T ng/L 79* 72* 79*     Results from last 7 days   Lab Units 04/30/24  0436   WBC 10*3/mm3 6.20   HEMOGLOBIN g/dL 10.7*   HEMATOCRIT % 34.2*   PLATELETS 10*3/mm3 164     Results from last 7 days   Lab Units 04/30/24  0436   INR  1.22*   APTT seconds 32.2     Results from last 7 days   Lab Units 04/28/24  0338   CHOLESTEROL mg/dL 148     Results from last 7 days   Lab Units 04/27/24  2238   MAGNESIUM mg/dL 2.2     Results from last 7 days   Lab Units 04/28/24  0338   CHOLESTEROL mg/dL 148   TRIGLYCERIDES " mg/dL 66   HDL CHOL mg/dL 35*   LDL CHOL mg/dL 100     I reviewed the patient's new clinical results.  I personally viewed and interpreted the patient's EKG/Telemetry data-atrial fibrillation        Medication Review:   Meds reviewed    dextrose 5 % and sodium chloride 0.45 % with KCl 20 mEq/L, 75 mL/hr, Last Rate: 75 mL/hr (04/30/24 1518)  lactated ringers, 9 mL/hr, Last Rate: 9 mL/hr (04/30/24 0641)        Assessment/Plan    Right pleural effusion, required chest tube 2/2024 with transudative fluid.   -Now status post VATS, partial decortication and right Pleurx catheter.  HFpEF, on echo-RV function is normal, no pericardial effusion, normal LV function.  Blood pressure low on low-dose Lopressor.  Mitral insufficiency - severe.  Mitral valve thickened-normal blood culture  Slight troponin elevation with coronary artery calcification-no angina  Atrial fibrillation, on Eliquis-this has been held for Pleurx catheter  Anemia  H/o right AKA.   Cachexia with muscle wasting, consult for malnutrition assessment  History of toxic multinodular goiter, on methimazole, TSH normal.     Would like to resume Eliquis tomorrow if okay from thoracic surgery standpoint.  I have discussed with his primary nurse in person.  I came to see patient again on 5 E.  Heart rate still elevated systolic blood pressure 110s.  Patient was about to receive oral digoxin.  He will call after 5 PM if heart rate is still sustaining greater than 130.  Would try IV digoxin 250 mcg at that time.  He received 500 mcg IV digoxin around 1 PM.  CHARLEE Li  04/30/24  16:01 EDT

## 2024-04-30 NOTE — ANESTHESIA PREPROCEDURE EVALUATION
Anesthesia Evaluation     Patient summary reviewed and Nursing notes reviewed   NPO Solid Status: > 6 hours  NPO Liquid Status: > 2 hours           Airway   Mallampati: III  TM distance: >3 FB  Neck ROM: full  Anterior  Dental - normal exam     Pulmonary    (+) pleural effusion,  (-) COPD, asthma, not a smoker  Cardiovascular     ECG reviewed    (+) valvular problems/murmurs MR, dysrhythmias Atrial Fib, CHF Diastolic >=55%, PVD, hyperlipidemia  (-) past MI, angina    ROS comment: TTE 4/2024: Nl LV/RV systolic fxn, LV diastolic fxn indeterminate, mod-sev MR, mild-mod TR    Neuro/Psych  (-) seizures, CVA  GI/Hepatic/Renal/Endo    (+) thyroid problem (toxic goiter)   (-) GERD, liver disease, no renal disease, diabetes    Musculoskeletal     Abdominal    Substance History      OB/GYN          Other   arthritis,                       Anesthesia Plan    ASA 4     MAC       Anesthetic plan, risks, benefits, and alternatives have been provided, discussed and informed consent has been obtained with: patient.        CODE STATUS:    Level Of Support Discussed With: Patient  Code Status (Patient has no pulse and is not breathing): CPR (Attempt to Resuscitate)  Medical Interventions (Patient has pulse or is breathing): Full Support

## 2024-04-30 NOTE — BRIEF OP NOTE
PLEURX CATHETER INSERTION  Progress Note    Mehreen Silva IV  4/30/2024    Pre-op Diagnosis:   Recurrent pleural effusion on right [J90]       Post-Op Diagnosis Codes:     * Recurrent pleural effusion on right [J90]    Procedure/CPT® Codes:        Procedure(s):  RIGHT VIDEO ASSISTED THORACOSCOPY, PARTIAL DECORTICATION, PLEURX CATHETER INSERTION              Surgeon(s):  Nataliya Noyola MD    Anesthesia: General    Staff:   Circulator: Ann Sawyer RN  Radiology Technologist: Jaz Fan  Scrub Person: Sree Villa  Other: Vaishnavi Rice RN         Estimated Blood Loss: minimal    Urine Voided: * No values recorded between 4/30/2024  7:19 AM and 4/30/2024  9:16 AM *    Specimens:                Specimens       ID Source Type Tests Collected By Collected At Frozen?    1 Lung, R Tissue ANAEROBIC CULTURE  FUNGAL CULTURE  TISSUE / BONE CULTURE  AFB CULTURE   Nataliya Noyola MD 4/30/24 0833     Description: right lung pleura for cultures                  Drains:   Chest Tube Right Midaxillary (Active)       Chest Tube Right Midaxillary (Active)       [REMOVED] External Urinary Catheter (Removed)       Findings: Loculated pleural effusion with a large amount of gelatinous material throughout the entire chest cavity with entrapped lung.        Complications: None apparent          Nataliya Noyola MD     Date: 4/30/2024  Time: 09:21 EDT

## 2024-04-30 NOTE — PROGRESS NOTES
"DAILY PROGRESS NOTE  [facility]    Patient Identification:  Name: Mehreen Silva IV  Age: 83 y.o.  Sex: male  :  1940  MRN: [id number]         Primary Care Physician: Taiwo Powers MD    Subjective:  Interval History: He complains of shortness of air.  He had surgery today with VATS, decortication and placement of Pleurx catheter.    Objective:    Scheduled Meds:acetaminophen, 1,000 mg, Oral, TID  atorvastatin, 10 mg, Oral, Nightly  collagenase, 1 Application, Topical, Daily  digoxin, 125 mcg, Oral, Daily  empagliflozin, 10 mg, Oral, Daily  furosemide, 40 mg, Oral, Daily  gabapentin, 100 mg, Oral, Nightly  guaiFENesin, 1,200 mg, Oral, Q12H  [START ON 2024] heparin (porcine), 5,000 Units, Subcutaneous, Q8H  ipratropium-albuterol, 3 mL, Nebulization, Q8H - RT  methIMAzole, 5 mg, Oral, Once per day on   metoprolol tartrate, 25 mg, Oral, BID  senna-docusate sodium, 2 tablet, Oral, Nightly  sodium chloride, 10 mL, Intravenous, Q12H      Continuous Infusions:dextrose 5 % and sodium chloride 0.45 % with KCl 20 mEq/L, 75 mL/hr, Last Rate: 75 mL/hr (24 1518)  lactated ringers, 9 mL/hr, Last Rate: 9 mL/hr (24 0641)        Vital signs in last 24 hours:  Temp:  [97.7 °F (36.5 °C)] 97.7 °F (36.5 °C)  Heart Rate:  [] 141  Resp:  [16-28] 18  BP: ()/(38-91) 113/65    Intake/Output:    Intake/Output Summary (Last 24 hours) at 2024 1719  Last data filed at 2024 1350  Gross per 24 hour   Intake 600 ml   Output 780 ml   Net -180 ml         Exam:  /65 (BP Location: Left arm, Patient Position: Sitting)   Pulse (!) 141   Temp 97.7 °F (36.5 °C) (Oral)   Resp 18   Ht 177.8 cm (70\")   Wt 64.4 kg (142 lb)   SpO2 100%   BMI 20.37 kg/m²     General Appearance:    Alert, cooperative, no distress   Head:    Normocephalic, without obvious abnormality, atraumatic   Eyes:       Throat:   Lips, tongue, gums normal   Neck:   Supple, symmetrical, " trachea midline, no JVD   Lungs:   Decreased breath sounds on the right, respirations unlabored   Chest Wall:  Surgical changes with Pleurx catheter in place    Heart:    Regular rate and rhythm, S1 and S2 normal, no murmur,no  Rub or gallop   Abdomen:     Soft, nontender, bowel sounds active, no masses, no organomegaly    Extremities:   Extremities normal, right AKA, no cyanosis or edema   Pulses:      Skin:   Skin is warm and dry,  no rashes or palpable lesions   Neurologic:   no focal deficits noted      Lab Results (last 72 hours)       Procedure Component Value Units Date/Time    Lipid Panel [[id number]]  (Abnormal) Collected: 04/28/24 0338    Specimen: Blood Updated: 04/28/24 0723     Total Cholesterol 148 mg/dL      Triglycerides 66 mg/dL      HDL Cholesterol 35 mg/dL      LDL Cholesterol  100 mg/dL      VLDL Cholesterol 13 mg/dL      LDL/HDL Ratio 2.85    Narrative:      Cholesterol Reference Ranges  (U.S. Department of Health and Human Services ATP III Classifications)    Desirable          <200 mg/dL  Borderline High    200-239 mg/dL  High Risk          >240 mg/dL      Triglyceride Reference Ranges  (U.S. Department of Health and Human Services ATP III Classifications)    Normal           <150 mg/dL  Borderline High  150-199 mg/dL  High             200-499 mg/dL  Very High        >500 mg/dL    HDL Reference Ranges  (U.S. Department of Health and Human Services ATP III Classifications)    Low     <40 mg/dl (major risk factor for CHD)  High    >60 mg/dl ('negative' risk factor for CHD)        LDL Reference Ranges  (U.S. Department of Health and Human Services ATP III Classifications)    Optimal          <100 mg/dL  Near Optimal     100-129 mg/dL  Borderline High  130-159 mg/dL  High             160-189 mg/dL  Very High        >189 mg/dL    Basic Metabolic Panel [[id number]]  (Abnormal) Collected: 04/28/24 0338    Specimen: Blood Updated: 04/28/24 0427     Glucose 122 mg/dL      BUN 22 mg/dL      Creatinine  0.79 mg/dL      Sodium 140 mmol/L      Potassium 4.4 mmol/L      Chloride 101 mmol/L      CO2 29.8 mmol/L      Calcium 8.6 mg/dL      BUN/Creatinine Ratio 27.8     Anion Gap 9.2 mmol/L      eGFR 88.1 mL/min/1.73     Narrative:      GFR Normal >60  Chronic Kidney Disease <60  Kidney Failure <15    The GFR formula is only valid for adults with stable renal function between ages 18 and 70.    CBC & Differential [[id number]]  (Abnormal) Collected: 04/28/24 0337    Specimen: Blood Updated: 04/28/24 0409    Narrative:      The following orders were created for panel order CBC & Differential.  Procedure                               Abnormality         Status                     ---------                               -----------         ------                     CBC Auto Differential[[id number]]        Abnormal            Final result                 Please view results for these tests on the individual orders.    CBC Auto Differential [[id number]]  (Abnormal) Collected: 04/28/24 0337    Specimen: Blood Updated: 04/28/24 0409     WBC 7.43 10*3/mm3      RBC 3.92 10*6/mm3      Hemoglobin 10.1 g/dL      Hematocrit 31.8 %      MCV 81.1 fL      MCH 25.8 pg      MCHC 31.8 g/dL      RDW 16.8 %      RDW-SD 49.0 fl      MPV 10.7 fL      Platelets 189 10*3/mm3      Neutrophil % 78.4 %      Lymphocyte % 9.7 %      Monocyte % 10.9 %      Eosinophil % 0.3 %      Basophil % 0.3 %      Immature Grans % 0.4 %      Neutrophils, Absolute 5.83 10*3/mm3      Lymphocytes, Absolute 0.72 10*3/mm3      Monocytes, Absolute 0.81 10*3/mm3      Eosinophils, Absolute 0.02 10*3/mm3      Basophils, Absolute 0.02 10*3/mm3      Immature Grans, Absolute 0.03 10*3/mm3      nRBC 0.0 /100 WBC     Magnesium [[id number]]  (Normal) Collected: 04/27/24 2238    Specimen: Blood Updated: 04/27/24 2321     Magnesium 2.2 mg/dL     Potassium [[id number]]  (Normal) Collected: 04/27/24 2238    Specimen: Blood Updated: 04/27/24 2321     Potassium 4.4 mmol/L     High  Sensitivity Troponin T [[id number]]  (Abnormal) Collected: 04/27/24 2238    Specimen: Blood Updated: 04/27/24 2321     HS Troponin T 79 ng/L     Narrative:      High Sensitive Troponin T Reference Range:  <14.0 ng/L- Negative Female for AMI  <22.0 ng/L- Negative Male for AMI  >=14 - Abnormal Female indicating possible myocardial injury.  >=22 - Abnormal Male indicating possible myocardial injury.   Clinicians would have to utilize clinical acumen, EKG, Troponin, and serial changes to determine if it is an Acute Myocardial Infarction or myocardial injury due to an underlying chronic condition.         Creatinine Serum (kidney function) GFR component [[id number]]  (Normal) Collected: 04/27/24 2238    Specimen: Blood Updated: 04/27/24 2320     Creatinine 0.82 mg/dL      eGFR 87.2 mL/min/1.73     Narrative:      GFR Normal >60  Chronic Kidney Disease <60  Kidney Failure <15    The GFR formula is only valid for adults with stable renal function between ages 18 and 70.    High Sensitivity Troponin T 2Hr [[id number]]  (Abnormal) Collected: 04/27/24 1246    Specimen: Blood Updated: 04/27/24 1323     HS Troponin T 72 ng/L      Troponin T Delta -7 ng/L     Narrative:      High Sensitive Troponin T Reference Range:  <14.0 ng/L- Negative Female for AMI  <22.0 ng/L- Negative Male for AMI  >=14 - Abnormal Female indicating possible myocardial injury.  >=22 - Abnormal Male indicating possible myocardial injury.   Clinicians would have to utilize clinical acumen, EKG, Troponin, and serial changes to determine if it is an Acute Myocardial Infarction or myocardial injury due to an underlying chronic condition.         TSH Rfx On Abnormal To Free T4 [[id number]]  (Normal) Collected: 04/27/24 1033    Specimen: Blood Updated: 04/27/24 1126     TSH 0.819 uIU/mL     Single High Sensitivity Troponin T [[id number]]  (Abnormal) Collected: 04/27/24 1033    Specimen: Blood Updated: 04/27/24 1107     HS Troponin T 79 ng/L     Narrative:      High  Sensitive Troponin T Reference Range:  <14.0 ng/L- Negative Female for AMI  <22.0 ng/L- Negative Male for AMI  >=14 - Abnormal Female indicating possible myocardial injury.  >=22 - Abnormal Male indicating possible myocardial injury.   Clinicians would have to utilize clinical acumen, EKG, Troponin, and serial changes to determine if it is an Acute Myocardial Infarction or myocardial injury due to an underlying chronic condition.         Comprehensive Metabolic Panel [[id number]]  (Abnormal) Collected: 04/27/24 1033    Specimen: Blood Updated: 04/27/24 1103     Glucose 209 mg/dL      BUN 23 mg/dL      Creatinine 1.03 mg/dL      Sodium 141 mmol/L      Potassium 5.7 mmol/L      Chloride 100 mmol/L      CO2 30.0 mmol/L      Calcium 9.1 mg/dL      Total Protein 6.3 g/dL      Albumin 3.1 g/dL      ALT (SGPT) 18 U/L      AST (SGOT) 12 U/L      Alkaline Phosphatase 118 U/L      Total Bilirubin 1.1 mg/dL      Globulin 3.2 gm/dL      A/G Ratio 1.0 g/dL      BUN/Creatinine Ratio 22.3     Anion Gap 11.0 mmol/L      eGFR 72.1 mL/min/1.73     Narrative:      GFR Normal >60  Chronic Kidney Disease <60  Kidney Failure <15    The GFR formula is only valid for adults with stable renal function between ages 18 and 70.    BNP [[id number]]  (Abnormal) Collected: 04/27/24 1033    Specimen: Blood Updated: 04/27/24 1103     proBNP 5,603.0 pg/mL     Narrative:      This assay is used as an aid in the diagnosis of individuals suspected of having heart failure. It can be used as an aid in the diagnosis of acute decompensated heart failure (ADHF) in patients presenting with signs and symptoms of ADHF to the emergency department (ED). In addition, NT-proBNP of <300 pg/mL indicates ADHF is not likely.    Age Range Result Interpretation  NT-proBNP Concentration (pg/mL:      <50             Positive            >450                   Gray                 300-450                    Negative             <300    50-75           Positive             >900                  Marcum                300-900                  Negative            <300      >75             Positive            >1800                  Gray                300-1800                  Negative            <300    Magnesium [[id number]]  (Normal) Collected: 04/27/24 1033    Specimen: Blood Updated: 04/27/24 1103     Magnesium 2.3 mg/dL     CBC & Differential [[id number]]  (Abnormal) Collected: 04/27/24 1033    Specimen: Blood Updated: 04/27/24 1045    Narrative:      The following orders were created for panel order CBC & Differential.  Procedure                               Abnormality         Status                     ---------                               -----------         ------                     CBC Auto Differential[[id number]]        Abnormal            Final result                 Please view results for these tests on the individual orders.    CBC Auto Differential [[id number]]  (Abnormal) Collected: 04/27/24 1033    Specimen: Blood Updated: 04/27/24 1045     WBC 8.87 10*3/mm3      RBC 4.42 10*6/mm3      Hemoglobin 11.5 g/dL      Hematocrit 37.2 %      MCV 84.2 fL      MCH 26.0 pg      MCHC 30.9 g/dL      RDW 16.9 %      RDW-SD 51.4 fl      MPV 10.5 fL      Platelets 242 10*3/mm3      Neutrophil % 82.8 %      Lymphocyte % 8.6 %      Monocyte % 8.0 %      Eosinophil % 0.1 %      Basophil % 0.2 %      Immature Grans % 0.3 %      Neutrophils, Absolute 7.34 10*3/mm3      Lymphocytes, Absolute 0.76 10*3/mm3      Monocytes, Absolute 0.71 10*3/mm3      Eosinophils, Absolute 0.01 10*3/mm3      Basophils, Absolute 0.02 10*3/mm3      Immature Grans, Absolute 0.03 10*3/mm3      nRBC 0.0 /100 WBC     [place] Draw [[id number]] Collected: 04/27/24 1033    Specimen: Blood Updated: 04/27/24 1045    Narrative:      The following orders were created for panel order [place] Draw.  Procedure                               Abnormality         Status                     ---------                                -----------         ------                     Green Top (Gel)[[id number]]                                  Final result               Lavender Top[[id number]]                                     Final result               Gold Top - SST[[id number]]                                   Final result               Light Blue Top[[id number]]                                   Final result                 Please view results for these tests on the individual orders.    Green Top (Gel) [[id number]] Collected: 04/27/24 1033    Specimen: Blood Updated: 04/27/24 1045     Extra Tube Hold for add-ons.     Comment: Auto resulted.       Lavender Top [[id number]] Collected: 04/27/24 1033    Specimen: Blood Updated: 04/27/24 1045     Extra Tube hold for add-on     Comment: Auto resulted       Gold Top - SST [[id number]] Collected: 04/27/24 1033    Specimen: Blood Updated: 04/27/24 1045     Extra Tube Hold for add-ons.     Comment: Auto resulted.       Light Blue Top [[id number]] Collected: 04/27/24 1033    Specimen: Blood Updated: 04/27/24 1045     Extra Tube Hold for add-ons.     Comment: Auto resulted             Data Review:  Results from last 7 days   Lab Units 04/30/24  0435 04/29/24  0401 04/28/24  0338   SODIUM mmol/L 142 141 140   POTASSIUM mmol/L 3.5 4.0 4.4   CHLORIDE mmol/L 101 99 101   CO2 mmol/L 31.0* 32.1* 29.8*   BUN mg/dL 19 20 22   CREATININE mg/dL 0.69* 0.84 0.79   GLUCOSE mg/dL 81 101* 122*   CALCIUM mg/dL 8.1* 8.5* 8.6     Results from last 7 days   Lab Units 04/30/24  0436 04/29/24  0401 04/28/24  0337   WBC 10*3/mm3 6.20 8.19 7.43   HEMOGLOBIN g/dL 10.7* 9.8* 10.1*   HEMATOCRIT % 34.2* 32.0* 31.8*   PLATELETS 10*3/mm3 164 199 189     Results from last 7 days   Lab Units 04/27/24  1033   TSH uIU/mL 0.819         Lab Results   Lab Value Date/Time    TROPONINT 79 (C) 04/27/2024 2238    TROPONINT 72 (C) 04/27/2024 1246    TROPONINT 79 (C) 04/27/2024 1033    TROPONINT 60 (C) 02/09/2024 1401    TROPONINT 65 (C) 02/09/2024  "1114    TROPONINT 64 (C) 11/08/2023 2048    TROPONINT 56 (C) 11/08/2023 1534    TROPONINT 60 (C) 11/08/2023 1003    TROPONINT <0.010 09/23/2022 1627     Results from last 7 days   Lab Units 04/28/24  0338   CHOLESTEROL mg/dL 148   TRIGLYCERIDES mg/dL 66   HDL CHOL mg/dL 35*   LDL CHOL mg/dL 100     Results from last 7 days   Lab Units 04/27/24  1033   ALK PHOS U/L 118*   BILIRUBIN mg/dL 1.1   ALT (SGPT) U/L 18   AST (SGOT) U/L 12     Results from last 7 days   Lab Units 04/27/24  1033   TSH uIU/mL 0.819         No results found for: \"POCGLU\"  Results from last 7 days   Lab Units 04/30/24  0436   INR  1.22*       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    Arthritis     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, 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/2023    Atrial fibrillation     Atrial fibrillation, persistent 09/24/2022    Cervical spondylosis with myelopathy     CHF (congestive heart failure)     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5]  Yes    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       Plan:  Consult from pulmonary, thoracic surgery and cardiology noted.  Follow-up on labs.  Postop care and restart " anticoagulation when okay with surgery.  Dayron Fowler MD  4/30/2024  17:19 EDT

## 2024-04-30 NOTE — PLAN OF CARE
Goal Outcome Evaluation:              Outcome Evaluation: Patient a/o x4, cooperative with care. Pt on HH diet, NPO at 0000 in anticipation of R pleurx cath in the am. Pt in chronic a fib. No new issues noted. All meds given as ordered, whole, by mouth.  This RN wore appropriate PPE during care. See v/s and labs.

## 2024-05-01 ENCOUNTER — APPOINTMENT (OUTPATIENT)
Dept: GENERAL RADIOLOGY | Facility: HOSPITAL | Age: 84
DRG: 186 | End: 2024-05-01
Payer: MEDICARE

## 2024-05-01 LAB
ANION GAP SERPL CALCULATED.3IONS-SCNC: 9.4 MMOL/L (ref 5–15)
BUN SERPL-MCNC: 24 MG/DL (ref 8–23)
BUN/CREAT SERPL: 23.8 (ref 7–25)
CALCIUM SPEC-SCNC: 8.2 MG/DL (ref 8.6–10.5)
CHLORIDE SERPL-SCNC: 101 MMOL/L (ref 98–107)
CO2 SERPL-SCNC: 26.6 MMOL/L (ref 22–29)
CREAT SERPL-MCNC: 1.01 MG/DL (ref 0.76–1.27)
DEPRECATED RDW RBC AUTO: 47.4 FL (ref 37–54)
EGFRCR SERPLBLD CKD-EPI 2021: 73.8 ML/MIN/1.73
EOSINOPHIL # BLD MANUAL: 0.1 10*3/MM3 (ref 0–0.4)
EOSINOPHIL NFR BLD MANUAL: 1 % (ref 0.3–6.2)
ERYTHROCYTE [DISTWIDTH] IN BLOOD BY AUTOMATED COUNT: 16.3 % (ref 12.3–15.4)
GLUCOSE SERPL-MCNC: 124 MG/DL (ref 65–99)
HCT VFR BLD AUTO: 32.9 % (ref 37.5–51)
HGB BLD-MCNC: 10.4 G/DL (ref 13–17.7)
LYMPHOCYTES # BLD MANUAL: 1.05 10*3/MM3 (ref 0.7–3.1)
LYMPHOCYTES NFR BLD MANUAL: 5 % (ref 5–12)
MCH RBC QN AUTO: 25.2 PG (ref 26.6–33)
MCHC RBC AUTO-ENTMCNC: 31.6 G/DL (ref 31.5–35.7)
MCV RBC AUTO: 79.9 FL (ref 79–97)
MONOCYTES # BLD: 0.48 10*3/MM3 (ref 0.1–0.9)
NEUTROPHILS # BLD AUTO: 7.89 10*3/MM3 (ref 1.7–7)
NEUTROPHILS NFR BLD MANUAL: 83 % (ref 42.7–76)
NRBC BLD AUTO-RTO: 0 /100 WBC (ref 0–0.2)
PLAT MORPH BLD: NORMAL
PLATELET # BLD AUTO: 169 10*3/MM3 (ref 140–450)
PMV BLD AUTO: 11.2 FL (ref 6–12)
POTASSIUM SERPL-SCNC: 5.4 MMOL/L (ref 3.5–5.2)
RBC # BLD AUTO: 4.12 10*6/MM3 (ref 4.14–5.8)
RBC MORPH BLD: NORMAL
SODIUM SERPL-SCNC: 137 MMOL/L (ref 136–145)
VARIANT LYMPHS NFR BLD MANUAL: 11 % (ref 19.6–45.3)
WBC MORPH BLD: NORMAL
WBC NRBC COR # BLD AUTO: 9.51 10*3/MM3 (ref 3.4–10.8)

## 2024-05-01 PROCEDURE — 94664 DEMO&/EVAL PT USE INHALER: CPT

## 2024-05-01 PROCEDURE — 85025 COMPLETE CBC W/AUTO DIFF WBC: CPT | Performed by: THORACIC SURGERY (CARDIOTHORACIC VASCULAR SURGERY)

## 2024-05-01 PROCEDURE — 80048 BASIC METABOLIC PNL TOTAL CA: CPT | Performed by: THORACIC SURGERY (CARDIOTHORACIC VASCULAR SURGERY)

## 2024-05-01 PROCEDURE — 94760 N-INVAS EAR/PLS OXIMETRY 1: CPT

## 2024-05-01 PROCEDURE — 94761 N-INVAS EAR/PLS OXIMETRY MLT: CPT

## 2024-05-01 PROCEDURE — 94799 UNLISTED PULMONARY SVC/PX: CPT

## 2024-05-01 PROCEDURE — 71045 X-RAY EXAM CHEST 1 VIEW: CPT

## 2024-05-01 PROCEDURE — 99024 POSTOP FOLLOW-UP VISIT: CPT | Performed by: NURSE PRACTITIONER

## 2024-05-01 PROCEDURE — 85007 BL SMEAR W/DIFF WBC COUNT: CPT | Performed by: THORACIC SURGERY (CARDIOTHORACIC VASCULAR SURGERY)

## 2024-05-01 PROCEDURE — 25010000002 HEPARIN (PORCINE) PER 1000 UNITS: Performed by: THORACIC SURGERY (CARDIOTHORACIC VASCULAR SURGERY)

## 2024-05-01 PROCEDURE — 25810000003 SODIUM CHLORIDE 0.9 % SOLUTION: Performed by: NURSE PRACTITIONER

## 2024-05-01 PROCEDURE — 99232 SBSQ HOSP IP/OBS MODERATE 35: CPT | Performed by: NURSE PRACTITIONER

## 2024-05-01 RX ORDER — MIDODRINE HYDROCHLORIDE 5 MG/1
5 TABLET ORAL
Status: DISCONTINUED | OUTPATIENT
Start: 2024-05-01 | End: 2024-05-06 | Stop reason: HOSPADM

## 2024-05-01 RX ADMIN — GUAIFENESIN 1200 MG: 600 TABLET, EXTENDED RELEASE ORAL at 20:52

## 2024-05-01 RX ADMIN — IPRATROPIUM BROMIDE AND ALBUTEROL SULFATE 3 ML: .5; 3 SOLUTION RESPIRATORY (INHALATION) at 19:04

## 2024-05-01 RX ADMIN — ACETAMINOPHEN 1000 MG: 500 TABLET ORAL at 20:53

## 2024-05-01 RX ADMIN — GUAIFENESIN 1200 MG: 600 TABLET, EXTENDED RELEASE ORAL at 09:02

## 2024-05-01 RX ADMIN — IPRATROPIUM BROMIDE AND ALBUTEROL SULFATE 3 ML: .5; 3 SOLUTION RESPIRATORY (INHALATION) at 06:56

## 2024-05-01 RX ADMIN — METOPROLOL TARTRATE 12.5 MG: 25 TABLET, FILM COATED ORAL at 20:52

## 2024-05-01 RX ADMIN — IPRATROPIUM BROMIDE AND ALBUTEROL SULFATE 3 ML: .5; 3 SOLUTION RESPIRATORY (INHALATION) at 15:34

## 2024-05-01 RX ADMIN — MIDODRINE HYDROCHLORIDE 5 MG: 5 TABLET ORAL at 18:57

## 2024-05-01 RX ADMIN — ACETAMINOPHEN 1000 MG: 500 TABLET ORAL at 16:32

## 2024-05-01 RX ADMIN — ACETAMINOPHEN 1000 MG: 500 TABLET ORAL at 09:02

## 2024-05-01 RX ADMIN — DIGOXIN 125 MCG: 125 TABLET ORAL at 09:13

## 2024-05-01 RX ADMIN — HEPARIN SODIUM 5000 UNITS: 5000 INJECTION INTRAVENOUS; SUBCUTANEOUS at 21:02

## 2024-05-01 RX ADMIN — HEPARIN SODIUM 5000 UNITS: 5000 INJECTION INTRAVENOUS; SUBCUTANEOUS at 16:33

## 2024-05-01 RX ADMIN — HEPARIN SODIUM 5000 UNITS: 5000 INJECTION INTRAVENOUS; SUBCUTANEOUS at 05:55

## 2024-05-01 RX ADMIN — MUPIROCIN 1 APPLICATION: 20 OINTMENT TOPICAL at 20:54

## 2024-05-01 RX ADMIN — Medication 10 ML: at 20:54

## 2024-05-01 RX ADMIN — METHIMAZOLE 5 MG: 5 TABLET ORAL at 09:01

## 2024-05-01 RX ADMIN — SODIUM CHLORIDE 500 ML: 9 INJECTION, SOLUTION INTRAVENOUS at 09:12

## 2024-05-01 RX ADMIN — SENNOSIDES AND DOCUSATE SODIUM 2 TABLET: 50; 8.6 TABLET ORAL at 20:53

## 2024-05-01 RX ADMIN — METOPROLOL TARTRATE 12.5 MG: 25 TABLET, FILM COATED ORAL at 16:33

## 2024-05-01 RX ADMIN — GABAPENTIN 100 MG: 100 CAPSULE ORAL at 20:52

## 2024-05-01 RX ADMIN — EMPAGLIFLOZIN 10 MG: 10 TABLET, FILM COATED ORAL at 09:01

## 2024-05-01 RX ADMIN — ATORVASTATIN CALCIUM 10 MG: 20 TABLET, FILM COATED ORAL at 20:52

## 2024-05-01 NOTE — PROGRESS NOTES
"  POST-OPERATIVE NOTE     Chief Complaint: Recurrent right pleural effusion  S/P: Right VATS with partial decortication and pleurx placement  POD # 1    Subjective    Resting in bed. No new complaints, asking about discharge.       Objective:  General Appearance:  Comfortable, well-appearing and in no acute distress.    Vital signs: (most recent): Blood pressure 93/63, pulse 120, temperature 97.6 °F (36.4 °C), temperature source Oral, resp. rate 16, height 177.8 cm (70\"), weight 64.4 kg (142 lb), SpO2 100%.    HEENT: (Nasal cannula)    Lungs:  Normal effort.  He is not in respiratory distress.  There are decreased breath sounds.    Heart: Tachycardia.  Irregular rhythm.    Abdomen: Abdomen is soft.    Extremities: Decreased range of motion.  (S/p right BKA)  Neurological: Patient is alert.    Skin:  Warm and dry.                Chest tube:   Site: Right, Clean, Dry, Intact, and Securement device intact  Suction: -20 cm  Air Leak: negative  24 Hour Total: 950ml    Results Review:     I reviewed the patient's new clinical results.  I reviewed the patient's new imaging results and agree with the interpretation.  Discussed with patient, RN and thoracic surgeon    Assessment & Plan     Recurrent right pleural effusion: secondary to heart failure. S/p right VATS with partial decortication and pleurx placement, POD 1.  4L of gelatinous material evacuated from the chest. Close to 1L of pleural fluid drained over the past 24h. Leave chest tube to -20cm and re-check a CXR in the morning. Continue to hold oral anticoagulation with large bore chest tube in place.    Hypotension: mgt per cardiology.      Mary Augustin DNP, APRN  [facility]  05/01/24  11:00 EDT    Patient was seen and assessed while wearing personal protective equipment including facemask, protective eyewear and gloves.  Hand hygiene performed prior to entering the room and upon exiting with doffing of gloves.       "

## 2024-05-01 NOTE — PROGRESS NOTES
"DAILY PROGRESS NOTE  [facility]    Patient Identification:  Name: Mehreen Silva IV  Age: 83 y.o.  Sex: male  :  1940  MRN: [id number]         Primary Care Physician: Taiwo Powers MD    Subjective:  Interval History: He complains of shortness of air.  He is still weak.  Objective:    Scheduled Meds:acetaminophen, 1,000 mg, Oral, TID  atorvastatin, 10 mg, Oral, Nightly  collagenase, 1 Application, Topical, Daily  digoxin, 125 mcg, Oral, Daily  empagliflozin, 10 mg, Oral, Daily  furosemide, 40 mg, Oral, Daily  gabapentin, 100 mg, Oral, Nightly  guaiFENesin, 1,200 mg, Oral, Q12H  heparin (porcine), 5,000 Units, Subcutaneous, Q8H  ipratropium-albuterol, 3 mL, Nebulization, Q8H - RT  methIMAzole, 5 mg, Oral, Once per day on   metoprolol tartrate, 12.5 mg, Oral, TID  midodrine, 5 mg, Oral, BID AC  mupirocin, 1 Application, Each Nare, Q12H  senna-docusate sodium, 2 tablet, Oral, Nightly  sodium chloride, 10 mL, Intravenous, Q12H      Continuous Infusions:lactated ringers, 9 mL/hr, Last Rate: 9 mL/hr (24 0641)        Vital signs in last 24 hours:  Temp:  [97.2 °F (36.2 °C)-98.5 °F (36.9 °C)] 97.7 °F (36.5 °C)  Heart Rate:  [] 144  Resp:  [14-18] 16  BP: ()/(44-88) 103/63    Intake/Output:    Intake/Output Summary (Last 24 hours) at 2024 1503  Last data filed at 2024 1300  Gross per 24 hour   Intake 960 ml   Output 170 ml   Net 790 ml         Exam:  /63   Pulse (!) 144   Temp 97.7 °F (36.5 °C) (Oral)   Resp 16   Ht 177.8 cm (70\")   Wt 64.4 kg (142 lb)   SpO2 (!) 82%   BMI 20.37 kg/m²     General Appearance:    Alert, cooperative, no distress   Head:    Normocephalic, without obvious abnormality, atraumatic   Eyes:       Throat:   Lips, tongue, gums normal   Neck:   Supple, symmetrical, trachea midline, no JVD   Lungs:   Decreased breath sounds on the right but starting to move a little bit of air, respirations unlabored   Chest " Wall:  Surgical changes with Pleurx catheter in place    Heart:    Regular rate and rhythm, S1 and S2 normal, no murmur,no  Rub or gallop   Abdomen:     Soft, nontender, bowel sounds active, no masses, no organomegaly    Extremities:   Extremities normal, right AKA, no cyanosis or edema   Pulses:      Skin:   Skin is warm and dry,  no rashes or palpable lesions   Neurologic:   no focal deficits noted      Lab Results (last 72 hours)       Procedure Component Value Units Date/Time    Lipid Panel [[id number]]  (Abnormal) Collected: 04/28/24 0338    Specimen: Blood Updated: 04/28/24 0723     Total Cholesterol 148 mg/dL      Triglycerides 66 mg/dL      HDL Cholesterol 35 mg/dL      LDL Cholesterol  100 mg/dL      VLDL Cholesterol 13 mg/dL      LDL/HDL Ratio 2.85    Narrative:      Cholesterol Reference Ranges  (U.S. Department of Health and Human Services ATP III Classifications)    Desirable          <200 mg/dL  Borderline High    200-239 mg/dL  High Risk          >240 mg/dL      Triglyceride Reference Ranges  (U.S. Department of Health and Human Services ATP III Classifications)    Normal           <150 mg/dL  Borderline High  150-199 mg/dL  High             200-499 mg/dL  Very High        >500 mg/dL    HDL Reference Ranges  (U.S. Department of Health and Human Services ATP III Classifications)    Low     <40 mg/dl (major risk factor for CHD)  High    >60 mg/dl ('negative' risk factor for CHD)        LDL Reference Ranges  (U.S. Department of Health and Human Services ATP III Classifications)    Optimal          <100 mg/dL  Near Optimal     100-129 mg/dL  Borderline High  130-159 mg/dL  High             160-189 mg/dL  Very High        >189 mg/dL    Basic Metabolic Panel [[id number]]  (Abnormal) Collected: 04/28/24 0338    Specimen: Blood Updated: 04/28/24 0427     Glucose 122 mg/dL      BUN 22 mg/dL      Creatinine 0.79 mg/dL      Sodium 140 mmol/L      Potassium 4.4 mmol/L      Chloride 101 mmol/L      CO2 29.8 mmol/L       Calcium 8.6 mg/dL      BUN/Creatinine Ratio 27.8     Anion Gap 9.2 mmol/L      eGFR 88.1 mL/min/1.73     Narrative:      GFR Normal >60  Chronic Kidney Disease <60  Kidney Failure <15    The GFR formula is only valid for adults with stable renal function between ages 18 and 70.    CBC & Differential [[id number]]  (Abnormal) Collected: 04/28/24 0337    Specimen: Blood Updated: 04/28/24 0409    Narrative:      The following orders were created for panel order CBC & Differential.  Procedure                               Abnormality         Status                     ---------                               -----------         ------                     CBC Auto Differential[[id number]]        Abnormal            Final result                 Please view results for these tests on the individual orders.    CBC Auto Differential [[id number]]  (Abnormal) Collected: 04/28/24 0337    Specimen: Blood Updated: 04/28/24 0409     WBC 7.43 10*3/mm3      RBC 3.92 10*6/mm3      Hemoglobin 10.1 g/dL      Hematocrit 31.8 %      MCV 81.1 fL      MCH 25.8 pg      MCHC 31.8 g/dL      RDW 16.8 %      RDW-SD 49.0 fl      MPV 10.7 fL      Platelets 189 10*3/mm3      Neutrophil % 78.4 %      Lymphocyte % 9.7 %      Monocyte % 10.9 %      Eosinophil % 0.3 %      Basophil % 0.3 %      Immature Grans % 0.4 %      Neutrophils, Absolute 5.83 10*3/mm3      Lymphocytes, Absolute 0.72 10*3/mm3      Monocytes, Absolute 0.81 10*3/mm3      Eosinophils, Absolute 0.02 10*3/mm3      Basophils, Absolute 0.02 10*3/mm3      Immature Grans, Absolute 0.03 10*3/mm3      nRBC 0.0 /100 WBC     Magnesium [[id number]]  (Normal) Collected: 04/27/24 2238    Specimen: Blood Updated: 04/27/24 2321     Magnesium 2.2 mg/dL     Potassium [[id number]]  (Normal) Collected: 04/27/24 2238    Specimen: Blood Updated: 04/27/24 2321     Potassium 4.4 mmol/L     High Sensitivity Troponin T [[id number]]  (Abnormal) Collected: 04/27/24 2238    Specimen: Blood Updated: 04/27/24 2321      HS Troponin T 79 ng/L     Narrative:      High Sensitive Troponin T Reference Range:  <14.0 ng/L- Negative Female for AMI  <22.0 ng/L- Negative Male for AMI  >=14 - Abnormal Female indicating possible myocardial injury.  >=22 - Abnormal Male indicating possible myocardial injury.   Clinicians would have to utilize clinical acumen, EKG, Troponin, and serial changes to determine if it is an Acute Myocardial Infarction or myocardial injury due to an underlying chronic condition.         Creatinine Serum (kidney function) GFR component [[id number]]  (Normal) Collected: 04/27/24 2238    Specimen: Blood Updated: 04/27/24 2320     Creatinine 0.82 mg/dL      eGFR 87.2 mL/min/1.73     Narrative:      GFR Normal >60  Chronic Kidney Disease <60  Kidney Failure <15    The GFR formula is only valid for adults with stable renal function between ages 18 and 70.    High Sensitivity Troponin T 2Hr [[id number]]  (Abnormal) Collected: 04/27/24 1246    Specimen: Blood Updated: 04/27/24 1323     HS Troponin T 72 ng/L      Troponin T Delta -7 ng/L     Narrative:      High Sensitive Troponin T Reference Range:  <14.0 ng/L- Negative Female for AMI  <22.0 ng/L- Negative Male for AMI  >=14 - Abnormal Female indicating possible myocardial injury.  >=22 - Abnormal Male indicating possible myocardial injury.   Clinicians would have to utilize clinical acumen, EKG, Troponin, and serial changes to determine if it is an Acute Myocardial Infarction or myocardial injury due to an underlying chronic condition.         TSH Rfx On Abnormal To Free T4 [[id number]]  (Normal) Collected: 04/27/24 1033    Specimen: Blood Updated: 04/27/24 1126     TSH 0.819 uIU/mL     Single High Sensitivity Troponin T [[id number]]  (Abnormal) Collected: 04/27/24 1033    Specimen: Blood Updated: 04/27/24 1107     HS Troponin T 79 ng/L     Narrative:      High Sensitive Troponin T Reference Range:  <14.0 ng/L- Negative Female for AMI  <22.0 ng/L- Negative Male for AMI  >=14  - Abnormal Female indicating possible myocardial injury.  >=22 - Abnormal Male indicating possible myocardial injury.   Clinicians would have to utilize clinical acumen, EKG, Troponin, and serial changes to determine if it is an Acute Myocardial Infarction or myocardial injury due to an underlying chronic condition.         Comprehensive Metabolic Panel [[id number]]  (Abnormal) Collected: 04/27/24 1033    Specimen: Blood Updated: 04/27/24 1103     Glucose 209 mg/dL      BUN 23 mg/dL      Creatinine 1.03 mg/dL      Sodium 141 mmol/L      Potassium 5.7 mmol/L      Chloride 100 mmol/L      CO2 30.0 mmol/L      Calcium 9.1 mg/dL      Total Protein 6.3 g/dL      Albumin 3.1 g/dL      ALT (SGPT) 18 U/L      AST (SGOT) 12 U/L      Alkaline Phosphatase 118 U/L      Total Bilirubin 1.1 mg/dL      Globulin 3.2 gm/dL      A/G Ratio 1.0 g/dL      BUN/Creatinine Ratio 22.3     Anion Gap 11.0 mmol/L      eGFR 72.1 mL/min/1.73     Narrative:      GFR Normal >60  Chronic Kidney Disease <60  Kidney Failure <15    The GFR formula is only valid for adults with stable renal function between ages 18 and 70.    BNP [[id number]]  (Abnormal) Collected: 04/27/24 1033    Specimen: Blood Updated: 04/27/24 1103     proBNP 5,603.0 pg/mL     Narrative:      This assay is used as an aid in the diagnosis of individuals suspected of having heart failure. It can be used as an aid in the diagnosis of acute decompensated heart failure (ADHF) in patients presenting with signs and symptoms of ADHF to the emergency department (ED). In addition, NT-proBNP of <300 pg/mL indicates ADHF is not likely.    Age Range Result Interpretation  NT-proBNP Concentration (pg/mL:      <50             Positive            >450                   Gray                 300-450                    Negative             <300    50-75           Positive            >900                  Gray                300-900                  Negative            <300      >75              Positive            >1800                  Gray                300-1800                  Negative            <300    Magnesium [[id number]]  (Normal) Collected: 04/27/24 1033    Specimen: Blood Updated: 04/27/24 1103     Magnesium 2.3 mg/dL     CBC & Differential [[id number]]  (Abnormal) Collected: 04/27/24 1033    Specimen: Blood Updated: 04/27/24 1045    Narrative:      The following orders were created for panel order CBC & Differential.  Procedure                               Abnormality         Status                     ---------                               -----------         ------                     CBC Auto Differential[[id number]]        Abnormal            Final result                 Please view results for these tests on the individual orders.    CBC Auto Differential [[id number]]  (Abnormal) Collected: 04/27/24 1033    Specimen: Blood Updated: 04/27/24 1045     WBC 8.87 10*3/mm3      RBC 4.42 10*6/mm3      Hemoglobin 11.5 g/dL      Hematocrit 37.2 %      MCV 84.2 fL      MCH 26.0 pg      MCHC 30.9 g/dL      RDW 16.9 %      RDW-SD 51.4 fl      MPV 10.5 fL      Platelets 242 10*3/mm3      Neutrophil % 82.8 %      Lymphocyte % 8.6 %      Monocyte % 8.0 %      Eosinophil % 0.1 %      Basophil % 0.2 %      Immature Grans % 0.3 %      Neutrophils, Absolute 7.34 10*3/mm3      Lymphocytes, Absolute 0.76 10*3/mm3      Monocytes, Absolute 0.71 10*3/mm3      Eosinophils, Absolute 0.01 10*3/mm3      Basophils, Absolute 0.02 10*3/mm3      Immature Grans, Absolute 0.03 10*3/mm3      nRBC 0.0 /100 WBC     [place] Draw [[id number]] Collected: 04/27/24 1033    Specimen: Blood Updated: 04/27/24 1045    Narrative:      The following orders were created for panel order [place] Draw.  Procedure                               Abnormality         Status                     ---------                               -----------         ------                     Green Top (Gel)[[id number]]                                  Final  result               Lavender Top[[id number]]                                     Final result               Gold Top - SST[[id number]]                                   Final result               Light Blue Top[[id number]]                                   Final result                 Please view results for these tests on the individual orders.    Green Top (Gel) [[id number]] Collected: 04/27/24 1033    Specimen: Blood Updated: 04/27/24 1045     Extra Tube Hold for add-ons.     Comment: Auto resulted.       Lavender Top [[id number]] Collected: 04/27/24 1033    Specimen: Blood Updated: 04/27/24 1045     Extra Tube hold for add-on     Comment: Auto resulted       Gold Top - SST [[id number]] Collected: 04/27/24 1033    Specimen: Blood Updated: 04/27/24 1045     Extra Tube Hold for add-ons.     Comment: Auto resulted.       Light Blue Top [[id number]] Collected: 04/27/24 1033    Specimen: Blood Updated: 04/27/24 1045     Extra Tube Hold for add-ons.     Comment: Auto resulted             Data Review:  Results from last 7 days   Lab Units 05/01/24  0508 04/30/24  0435 04/29/24  0401   SODIUM mmol/L 137 142 141   POTASSIUM mmol/L 5.4* 3.5 4.0   CHLORIDE mmol/L 101 101 99   CO2 mmol/L 26.6 31.0* 32.1*   BUN mg/dL 24* 19 20   CREATININE mg/dL 1.01 0.69* 0.84   GLUCOSE mg/dL 124* 81 101*   CALCIUM mg/dL 8.2* 8.1* 8.5*     Results from last 7 days   Lab Units 05/01/24  0508 04/30/24  0436 04/29/24  0401   WBC 10*3/mm3 9.51 6.20 8.19   HEMOGLOBIN g/dL 10.4* 10.7* 9.8*   HEMATOCRIT % 32.9* 34.2* 32.0*   PLATELETS 10*3/mm3 169 164 199     Results from last 7 days   Lab Units 04/27/24  1033   TSH uIU/mL 0.819         Lab Results   Lab Value Date/Time    TROPONINT 79 (C) 04/27/2024 2238    TROPONINT 72 (C) 04/27/2024 1246    TROPONINT 79 (C) 04/27/2024 1033    TROPONINT 60 (C) 02/09/2024 1401    TROPONINT 65 (C) 02/09/2024 1114    TROPONINT 64 (C) 11/08/2023 2048    TROPONINT 56 (C) 11/08/2023 1534    TROPONINT 60 (C) 11/08/2023 1003  "   TROPONINT <0.010 09/23/2022 1627     Results from last 7 days   Lab Units 04/28/24  0338   CHOLESTEROL mg/dL 148   TRIGLYCERIDES mg/dL 66   HDL CHOL mg/dL 35*   LDL CHOL mg/dL 100     Results from last 7 days   Lab Units 04/27/24  1033   ALK PHOS U/L 118*   BILIRUBIN mg/dL 1.1   ALT (SGPT) U/L 18   AST (SGOT) U/L 12     Results from last 7 days   Lab Units 04/27/24  1033   TSH uIU/mL 0.819         No results found for: \"POCGLU\"  Results from last 7 days   Lab Units 04/30/24  0436   INR  1.22*       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    Arthritis     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, 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/2023    Atrial fibrillation     Atrial fibrillation, persistent 09/24/2022    Cervical spondylosis with myelopathy     CHF (congestive heart failure)     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5]  Yes    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       Plan:  Consult from pulmonary, thoracic surgery and cardiology noted.  Follow-up on labs.  Postop care and restart anticoagulation when okay with surgery.  Cardiology making adjustment on medications for rate control and blood pressure.  DC " planning.  Disposition to be determined.  Dayron Fowler MD  5/1/2024  15:03 EDT

## 2024-05-01 NOTE — PROGRESS NOTES
"                                              LOS: 3 days   Patient Care Team:  Taiwo Powers MD as PCP - General (Internal Medicine)  Self, Bess YO MD as Consulting Physician (Endocrinology)  Huang Chan MD as Cardiologist (Cardiology)    Chief Complaint:  F/up pleural effusion and abnormal chest imaging.    Subjective   Interval History    On 2 L oxygen.  Blood pressure is borderline low.  Reported no chest pain, cough or trouble breathing.    REVIEW OF SYSTEMS:   CARDIOVASCULAR: No chest pain, chest pressure or chest discomfort. No palpitations or edema.     GASTROINTESTINAL: No anorexia, nausea, vomiting or diarrhea. No abdominal pain.  CONSTITUTIONAL: No fever or chills.     Ventilator/Non-Invasive Ventilation Settings (From admission, onward)      None                  Physical Exam:     Vital Signs  Temp:  [97.2 °F (36.2 °C)-98.5 °F (36.9 °C)] 97.7 °F (36.5 °C)  Heart Rate:  [] 120  Resp:  [14-24] 16  BP: ()/(44-88) 101/64    Intake/Output Summary (Last 24 hours) at 5/1/2024 1353  Last data filed at 5/1/2024 0900  Gross per 24 hour   Intake 480 ml   Output 170 ml   Net 310 ml     Flowsheet Rows      Flowsheet Row First Filed Value   Admission Height 177.8 cm (70\") Documented at 04/27/2024 1544   Admission Weight 64.5 kg (142 lb 3.2 oz) Documented at 04/27/2024 1544            PPE used per hospital policy    General Appearance:   Alert, cooperative, in no acute distress   ENMT:  Mallampati score 2, moist mucous membrane   Eyes:  Pupils equal and reactive to light. EOMI   Neck:    Trachea midline. No thyromegaly.   Lungs:    diminished air entry on the right with crackles.  No wheezing.  No use of accessory muscles.    Heart:   Regular rhythm and normal rate, normal S1 and S2, no         murmur   Skin:   No rash or ecchymosis   Abdomen:    Soft. No tenderness. No HSM.   Neuro/psych:  Conscious, alert, oriented x3. Strength 5/5 in upper and lower  ext.  Appropriate mood and affect "   Extremities:  No cyanosis, clubbing or edema.  Warm extremities and well-perfused          Results Review:        Results from last 7 days   Lab Units 05/01/24  0508 04/30/24  0435 04/29/24  0401   SODIUM mmol/L 137 142 141   POTASSIUM mmol/L 5.4* 3.5 4.0   CHLORIDE mmol/L 101 101 99   CO2 mmol/L 26.6 31.0* 32.1*   BUN mg/dL 24* 19 20   CREATININE mg/dL 1.01 0.69* 0.84   GLUCOSE mg/dL 124* 81 101*   CALCIUM mg/dL 8.2* 8.1* 8.5*     Results from last 7 days   Lab Units 04/27/24  2238 04/27/24  1246 04/27/24  1033   HSTROP T ng/L 79* 72* 79*     Results from last 7 days   Lab Units 05/01/24  0508 04/30/24  0436 04/29/24  0401   WBC 10*3/mm3 9.51 6.20 8.19   HEMOGLOBIN g/dL 10.4* 10.7* 9.8*   HEMATOCRIT % 32.9* 34.2* 32.0*   PLATELETS 10*3/mm3 169 164 199     Results from last 7 days   Lab Units 04/30/24  0436   INR  1.22*   APTT seconds 32.2     Results from last 7 days   Lab Units 04/27/24  1033   PROBNP pg/mL 5,603.0*                           I reviewed the patient's new clinical results.        Medication Review:   acetaminophen, 1,000 mg, Oral, TID  atorvastatin, 10 mg, Oral, Nightly  collagenase, 1 Application, Topical, Daily  digoxin, 125 mcg, Oral, Daily  empagliflozin, 10 mg, Oral, Daily  furosemide, 40 mg, Oral, Daily  gabapentin, 100 mg, Oral, Nightly  guaiFENesin, 1,200 mg, Oral, Q12H  heparin (porcine), 5,000 Units, Subcutaneous, Q8H  ipratropium-albuterol, 3 mL, Nebulization, Q8H - RT  methIMAzole, 5 mg, Oral, Once per day on Monday Wednesday Friday  metoprolol tartrate, 12.5 mg, Oral, TID  senna-docusate sodium, 2 tablet, Oral, Nightly  sodium chloride, 10 mL, Intravenous, Q12H        lactated ringers, 9 mL/hr, Last Rate: 9 mL/hr (04/30/24 0641)        Diagnostic imaging:  I personally and independently reviewed the following images:  CT chest 4/27/2024: Bilateral pleural effusion much more prominent on the right and occupying the whole lung with complete right lung atelectasis.  Only mild pleural  effusion on the left.      CXR 5/1/2024: Right pulmonary infiltrates and pleural effusion.  Chest tubes noted.    Assessment     Recurrent nonloculated right pleural effusion s/p VATS with partial decortication and Pleurx catheter placement 4/30.  Right lung compressive atelectasis  Suspect reexpansion pulmonary edema on the right  Bilateral pleural effusion, R >>L, secondary to CHF  Chronic HFpEF  A-fib with RVR   Hypertension        Plan       Incentive spirometry.  Encouraged to use.  DuoNeb 3 times a day  DVT prophylaxis with heparin subcu 3 times daily when okay with thoracic surgery  Lasix 40 mg p.o. daily.  Midodrine 5 mg 3 times daily as needed for hypotension  Rate control for A-fib with dig.      Jennifer Newton MD  05/01/24  13:53 EDT          This note was dictated utilizing Dragon dictation

## 2024-05-01 NOTE — NURSING NOTE
Wound/Ostomy: Consult received regarding skin issue on rt Clavicle area. Patient is alert, upon assessment is observed partial-thickness skin loss, and excoriated are to regina-wound, draining yellowish contents of small amount, which according to the patient is of unknown origin, Bactroban ointment is ordered.  Please re-consult for any additional needs.

## 2024-05-01 NOTE — PLAN OF CARE
Goal Outcome Evaluation:  Plan of Care Reviewed With: patient        Progress: improving     Pt a/o x3-4, RA when awake and 2L at night. BP not as soft as overnight and HR has improved. R chest tube to -20 sx. No complaints of pain. No acute changes. WCTM.

## 2024-05-01 NOTE — PROGRESS NOTES
"CC: Follow-up atrial fibrillation and hypotension.    Interval History: He is resting in bed and denies chest pain.  His breathing is \"okay\".      Vital Signs  Temp:  [97.2 °F (36.2 °C)-98.5 °F (36.9 °C)] 97.7 °F (36.5 °C)  Heart Rate:  [] 120  Resp:  [14-24] 16  BP: ()/(44-88) 101/64    Intake/Output Summary (Last 24 hours) at 5/1/2024 1334  Last data filed at 5/1/2024 0900  Gross per 24 hour   Intake 480 ml   Output 650 ml   Net -170 ml     Flowsheet Rows      Flowsheet Row First Filed Value   Admission Height 177.8 cm (70\") Documented at 04/27/2024 1544   Admission Weight 64.5 kg (142 lb 3.2 oz) Documented at 04/27/2024 1544            PHYSICAL EXAM:  General: No acute distress  Resp:NL Rate, unlabored, diminished, right chest tube remains  CV: Tachycardic rate and irregular rhythm, NL PMI, Nl S1 and S2, no Murmur, no gallop, no rub, No JVD. Normal pedal pulses  ABD:Nl sounds, no masses or tenderness, nondistended, no guarding or rebound  Neuro: alert,cooperative and oriented  Extr: No edema or cyanosis, moves all extremities      Results Review:    Results from last 7 days   Lab Units 05/01/24  0508   SODIUM mmol/L 137   POTASSIUM mmol/L 5.4*   CHLORIDE mmol/L 101   CO2 mmol/L 26.6   BUN mg/dL 24*   CREATININE mg/dL 1.01   GLUCOSE mg/dL 124*   CALCIUM mg/dL 8.2*     Results from last 7 days   Lab Units 04/27/24  2238 04/27/24  1246 04/27/24  1033   HSTROP T ng/L 79* 72* 79*     Results from last 7 days   Lab Units 05/01/24  0508   WBC 10*3/mm3 9.51   HEMOGLOBIN g/dL 10.4*   HEMATOCRIT % 32.9*   PLATELETS 10*3/mm3 169     Results from last 7 days   Lab Units 04/30/24  0436   INR  1.22*   APTT seconds 32.2     Results from last 7 days   Lab Units 04/28/24  0338   CHOLESTEROL mg/dL 148     Results from last 7 days   Lab Units 04/27/24  2238   MAGNESIUM mg/dL 2.2     Results from last 7 days   Lab Units 04/28/24  0338   CHOLESTEROL mg/dL 148   TRIGLYCERIDES mg/dL 66   HDL CHOL mg/dL 35*   LDL CHOL mg/dL " 100     I reviewed the patient's new clinical results.  I personally viewed and interpreted the patient's EKG/Telemetry data-A-fib        Medication Review:   Meds reviewed    lactated ringers, 9 mL/hr, Last Rate: 9 mL/hr (04/30/24 0641)        Assessment/Plan  Right pleural effusion, required chest tube 2/2024 with transudative fluid.   -Now status post VATS, partial decortication and right Pleurx catheter.  -Chest tube remains  HFpEF, on echo-RV function is normal, no pericardial effusion, normal LV function.  Blood pressure low on low-dose Lopressor.  Mitral insufficiency - severe.  Mitral valve thickened-normal blood culture  Slight troponin elevation with coronary artery calcification-no angina  Atrial fibrillation, on Eliquis-this has been held for Pleurx catheter  Anemia- stable  H/o right AKA.   Cachexia with muscle wasting  History of toxic multinodular goiter, on methimazole, TSH normal.    His heart rate is between .  Heart rate does not sustain elevated values very long.  I will add midodrine 5 mg twice daily.  He also has midodrine as needed.  Lopressor was adjusted.  We will try 12.5 mg 3 times daily as blood pressure allows.  Continue daily oral digoxin 125 mcg daily.  Eliquis remains on hold due to large bore chest tube in place.  Discussed plan with Dr. Fowler.     Rebeka Mendoza, CHARLEE  05/01/24  13:34 EDT

## 2024-05-01 NOTE — PROGRESS NOTES
Aware that patient is having high heart rate spikes up to 150.  Metoprolol was held last night due to order parameters not met/low blood pressure.  Would give oral digoxin now instead of waiting until noon when scheduled.  His blood pressure is 80 systolic.  500 cc fluid bolus ordered

## 2024-05-01 NOTE — CASE MANAGEMENT/SOCIAL WORK
Discharge Planning Assessment  [facility]     Patient Name: Mehreen Silva IV  MRN: [id number]  Today's Date: 5/1/2024    Admit Date: 4/27/2024    Plan: Inpatient rehab or home with HH   Discharge Needs Assessment       Row Name 05/01/24 1131       Living Environment    People in Home spouse    Name(s) of People in Home Belem Silva [phone]    Current Living Arrangements home    Potentially Unsafe Housing Conditions none    In the past 12 months has the electric, gas, oil, or water company threatened to shut off services in your home? No    Primary Care Provided by spouse/significant other;other (see comments)  private caregiver    Provides Primary Care For no one, unable/limited ability to care for self    Family Caregiver if Needed spouse;child(juventino), adult  Private caregiver    Quality of Family Relationships supportive    Able to Return to Prior Arrangements yes       Resource/Environmental Concerns    Resource/Environmental Concerns none    Transportation Concerns none       Transportation Needs    In the past 12 months, has lack of transportation kept you from medical appointments or from getting medications? no    In the past 12 months, has lack of transportation kept you from meetings, work, or from getting things needed for daily living? No       Food Insecurity    Within the past 12 months, you worried that your food would run out before you got the money to buy more. Never true    Within the past 12 months, the food you bought just didn't last and you didn't have money to get more. Never true       Transition Planning    Patient/Family Anticipates Transition to inpatient rehabilitation facility;home with family    Patient/Family Anticipated Services at Transition ;home health care;rehabilitation services    Transportation Anticipated health plan transportation       Discharge Needs Assessment    Equipment Currently Used at Home other (see comments);wheelchair  Sit to stand    Concerns to be  Addressed discharge planning    Outpatient/Agency/Support Group Needs inpatient rehabilitation facility                   Discharge Plan       Row Name 05/01/24 1136       Plan    Plan Inpatient rehab or home with HH    Patient/Family in Agreement with Plan yes    Plan Comments Met with pt in room, wife also on the phone. Introduced self, explained  role, verified face sheet. Pt and his wife would like rehab at discharge but if he does not qualify then he can go back home with her with HH. Will assess PT need when medically stable. Pt also has a private paid care giver in home. Pt has used [facility] in the past for both nursing and PT. Wife stated that pt will need transportation home when discharged, will probably need a wheelchair van. CCP to follow. GENEVA Kelly RN                  Continued Care and Services - Admitted Since 4/27/2024    No active coordination exists for this encounter.       Selected Continued Care - Prior Encounters Includes continued care and service providers with selected services from prior encounters from 1/28/2024 to 5/1/2024      Discharged on 2/20/2024 Admission date: 2/9/2024 - Discharge disposition: Home-Health Care [facility]      Home Medical Care       Service Provider Selected Services Address Phone Fax Patient Preferred    [facility] [address], [address], [facility] [address] [phone] --                          Expected Discharge Date and Time       Expected Discharge Date Expected Discharge Time    May 2, 2024            Demographic Summary       Row Name 05/01/24 1131       General Information    Admission Type inpatient    Arrived From home    Referral Source admission list    Reason for Consult discharge planning    Preferred Language English                   Functional Status    No documentation.                  Psychosocial    No documentation.                  Abuse/Neglect    No documentation.                   Legal    No documentation.                  Substance Abuse    No documentation.                  Patient Forms    No documentation.                     Marcos Clark RN

## 2024-05-01 NOTE — PLAN OF CARE
Goal Outcome Evaluation:    A&OX4, forgetful at times. Remains in a fib with rates 80's-140's. Not sustaining at an elevated rate. BP remains soft. Midodrine given with minimal improvement. Spoke with cardiology, expecting HR to be elevated with current situation. NNO at this time. Right chest tube to -20 suction. No air leak or fluctuation noted. Pleurx cath capped. Wound care provided. Waiting on speciality bed to be delivered. Call light in reach.

## 2024-05-02 ENCOUNTER — APPOINTMENT (OUTPATIENT)
Dept: GENERAL RADIOLOGY | Facility: HOSPITAL | Age: 84
DRG: 186 | End: 2024-05-02
Payer: MEDICARE

## 2024-05-02 LAB
ANION GAP SERPL CALCULATED.3IONS-SCNC: 5 MMOL/L (ref 5–15)
BASOPHILS # BLD AUTO: 0.01 10*3/MM3 (ref 0–0.2)
BASOPHILS NFR BLD AUTO: 0.2 % (ref 0–1.5)
BUN SERPL-MCNC: 33 MG/DL (ref 8–23)
BUN/CREAT SERPL: 41.3 (ref 7–25)
CALCIUM SPEC-SCNC: 7.3 MG/DL (ref 8.6–10.5)
CHLORIDE SERPL-SCNC: 103 MMOL/L (ref 98–107)
CO2 SERPL-SCNC: 29 MMOL/L (ref 22–29)
CREAT SERPL-MCNC: 0.8 MG/DL (ref 0.76–1.27)
DEPRECATED RDW RBC AUTO: 49.7 FL (ref 37–54)
EGFRCR SERPLBLD CKD-EPI 2021: 87.8 ML/MIN/1.73
EOSINOPHIL # BLD AUTO: 0.08 10*3/MM3 (ref 0–0.4)
EOSINOPHIL NFR BLD AUTO: 1.3 % (ref 0.3–6.2)
ERYTHROCYTE [DISTWIDTH] IN BLOOD BY AUTOMATED COUNT: 16.3 % (ref 12.3–15.4)
GLUCOSE SERPL-MCNC: 86 MG/DL (ref 65–99)
HCT VFR BLD AUTO: 29.8 % (ref 37.5–51)
HGB BLD-MCNC: 9.3 G/DL (ref 13–17.7)
IMM GRANULOCYTES # BLD AUTO: 0.01 10*3/MM3 (ref 0–0.05)
IMM GRANULOCYTES NFR BLD AUTO: 0.2 % (ref 0–0.5)
LYMPHOCYTES # BLD AUTO: 0.74 10*3/MM3 (ref 0.7–3.1)
LYMPHOCYTES NFR BLD AUTO: 11.8 % (ref 19.6–45.3)
MCH RBC QN AUTO: 25.8 PG (ref 26.6–33)
MCHC RBC AUTO-ENTMCNC: 31.2 G/DL (ref 31.5–35.7)
MCV RBC AUTO: 82.5 FL (ref 79–97)
MONOCYTES # BLD AUTO: 0.62 10*3/MM3 (ref 0.1–0.9)
MONOCYTES NFR BLD AUTO: 9.9 % (ref 5–12)
NEUTROPHILS NFR BLD AUTO: 4.79 10*3/MM3 (ref 1.7–7)
NEUTROPHILS NFR BLD AUTO: 76.6 % (ref 42.7–76)
NRBC BLD AUTO-RTO: 0 /100 WBC (ref 0–0.2)
PLATELET # BLD AUTO: 179 10*3/MM3 (ref 140–450)
PMV BLD AUTO: 10.3 FL (ref 6–12)
POTASSIUM SERPL-SCNC: 4 MMOL/L (ref 3.5–5.2)
RBC # BLD AUTO: 3.61 10*6/MM3 (ref 4.14–5.8)
SODIUM SERPL-SCNC: 137 MMOL/L (ref 136–145)
WBC NRBC COR # BLD AUTO: 6.25 10*3/MM3 (ref 3.4–10.8)

## 2024-05-02 PROCEDURE — 97162 PT EVAL MOD COMPLEX 30 MIN: CPT

## 2024-05-02 PROCEDURE — 97530 THERAPEUTIC ACTIVITIES: CPT

## 2024-05-02 PROCEDURE — 71045 X-RAY EXAM CHEST 1 VIEW: CPT

## 2024-05-02 PROCEDURE — 94761 N-INVAS EAR/PLS OXIMETRY MLT: CPT

## 2024-05-02 PROCEDURE — 99024 POSTOP FOLLOW-UP VISIT: CPT

## 2024-05-02 PROCEDURE — 99232 SBSQ HOSP IP/OBS MODERATE 35: CPT | Performed by: INTERNAL MEDICINE

## 2024-05-02 PROCEDURE — 80048 BASIC METABOLIC PNL TOTAL CA: CPT | Performed by: HOSPITALIST

## 2024-05-02 PROCEDURE — 94799 UNLISTED PULMONARY SVC/PX: CPT

## 2024-05-02 PROCEDURE — 25010000002 HEPARIN (PORCINE) PER 1000 UNITS: Performed by: THORACIC SURGERY (CARDIOTHORACIC VASCULAR SURGERY)

## 2024-05-02 PROCEDURE — 85025 COMPLETE CBC W/AUTO DIFF WBC: CPT | Performed by: HOSPITALIST

## 2024-05-02 PROCEDURE — 94664 DEMO&/EVAL PT USE INHALER: CPT

## 2024-05-02 RX ADMIN — HEPARIN SODIUM 5000 UNITS: 5000 INJECTION INTRAVENOUS; SUBCUTANEOUS at 06:11

## 2024-05-02 RX ADMIN — FUROSEMIDE 40 MG: 40 TABLET ORAL at 09:00

## 2024-05-02 RX ADMIN — GUAIFENESIN 1200 MG: 600 TABLET, EXTENDED RELEASE ORAL at 08:59

## 2024-05-02 RX ADMIN — METOPROLOL TARTRATE 12.5 MG: 25 TABLET, FILM COATED ORAL at 08:58

## 2024-05-02 RX ADMIN — COLLAGENASE SANTYL 1 APPLICATION: 250 OINTMENT TOPICAL at 09:01

## 2024-05-02 RX ADMIN — ACETAMINOPHEN 1000 MG: 500 TABLET ORAL at 21:21

## 2024-05-02 RX ADMIN — MIDODRINE HYDROCHLORIDE 5 MG: 5 TABLET ORAL at 06:11

## 2024-05-02 RX ADMIN — HEPARIN SODIUM 5000 UNITS: 5000 INJECTION INTRAVENOUS; SUBCUTANEOUS at 13:05

## 2024-05-02 RX ADMIN — Medication 10 ML: at 09:02

## 2024-05-02 RX ADMIN — GUAIFENESIN 1200 MG: 600 TABLET, EXTENDED RELEASE ORAL at 21:21

## 2024-05-02 RX ADMIN — MIDODRINE HYDROCHLORIDE 5 MG: 5 TABLET ORAL at 17:54

## 2024-05-02 RX ADMIN — EMPAGLIFLOZIN 10 MG: 10 TABLET, FILM COATED ORAL at 09:00

## 2024-05-02 RX ADMIN — ATORVASTATIN CALCIUM 10 MG: 20 TABLET, FILM COATED ORAL at 21:21

## 2024-05-02 RX ADMIN — METOPROLOL TARTRATE 12.5 MG: 25 TABLET, FILM COATED ORAL at 21:21

## 2024-05-02 RX ADMIN — IPRATROPIUM BROMIDE AND ALBUTEROL SULFATE 3 ML: .5; 3 SOLUTION RESPIRATORY (INHALATION) at 09:16

## 2024-05-02 RX ADMIN — HEPARIN SODIUM 5000 UNITS: 5000 INJECTION INTRAVENOUS; SUBCUTANEOUS at 21:21

## 2024-05-02 RX ADMIN — MUPIROCIN 1 APPLICATION: 20 OINTMENT TOPICAL at 21:22

## 2024-05-02 RX ADMIN — GABAPENTIN 100 MG: 100 CAPSULE ORAL at 21:21

## 2024-05-02 RX ADMIN — SENNOSIDES AND DOCUSATE SODIUM 2 TABLET: 50; 8.6 TABLET ORAL at 21:22

## 2024-05-02 RX ADMIN — ACETAMINOPHEN 1000 MG: 500 TABLET ORAL at 08:59

## 2024-05-02 RX ADMIN — Medication 10 ML: at 21:23

## 2024-05-02 RX ADMIN — DIGOXIN 125 MCG: 125 TABLET ORAL at 13:04

## 2024-05-02 NOTE — PROGRESS NOTES
"                                              LOS: 4 days   Patient Care Team:  Taiwo Powers MD as PCP - General (Internal Medicine)  Self, Bess YO MD as Consulting Physician (Endocrinology)  Huang Chan MD as Cardiologist (Cardiology)    Chief Complaint:  F/up pleural effusion and abnormal chest imaging.    Subjective   Interval History    On 2 L oxygen.  Blood pressure is borderline low.  Reported no chest pain or dyspnea.  He has mild cough.    REVIEW OF SYSTEMS:   CARDIOVASCULAR: No chest pain, chest pressure or chest discomfort. No palpitations or edema.     GASTROINTESTINAL: No anorexia, nausea, vomiting or diarrhea. No abdominal pain.  CONSTITUTIONAL: No fever or chills.     Ventilator/Non-Invasive Ventilation Settings (From admission, onward)      None                  Physical Exam:     Vital Signs  Temp:  [97.6 °F (36.4 °C)-98.2 °F (36.8 °C)] 98.2 °F (36.8 °C)  Heart Rate:  [] 116  Resp:  [16-20] 18  BP: ()/(45-79) 100/71    Intake/Output Summary (Last 24 hours) at 5/2/2024 1443  Last data filed at 5/2/2024 1300  Gross per 24 hour   Intake 1320 ml   Output 260 ml   Net 1060 ml     Flowsheet Rows      Flowsheet Row First Filed Value   Admission Height 177.8 cm (70\") Documented at 04/27/2024 1544   Admission Weight 64.5 kg (142 lb 3.2 oz) Documented at 04/27/2024 1544            PPE used per hospital policy    General Appearance:   Alert, cooperative, in no acute distress   ENMT:  Mallampati score 2, moist mucous membrane   Eyes:  Pupils equal and reactive to light. EOMI   Neck:    Trachea midline. No thyromegaly.   Lungs:   Crackles on the right.  No wheezing.  No use of accessory muscles.    Heart:   Regular rhythm and normal rate, normal S1 and S2, no         murmur   Skin:   No rash or ecchymosis   Abdomen:    Soft. No tenderness. No HSM.   Neuro/psych:  Conscious, alert, oriented x3. Strength 5/5 in upper and lower  ext.  Appropriate mood and affect   Extremities:  No " cyanosis, clubbing or edema.  Warm extremities and well-perfused          Results Review:        Results from last 7 days   Lab Units 05/02/24  0439 05/01/24  0508 04/30/24  0435   SODIUM mmol/L 137 137 142   POTASSIUM mmol/L 4.0 5.4* 3.5   CHLORIDE mmol/L 103 101 101   CO2 mmol/L 29.0 26.6 31.0*   BUN mg/dL 33* 24* 19   CREATININE mg/dL 0.80 1.01 0.69*   GLUCOSE mg/dL 86 124* 81   CALCIUM mg/dL 7.3* 8.2* 8.1*     Results from last 7 days   Lab Units 04/27/24  2238 04/27/24  1246 04/27/24  1033   HSTROP T ng/L 79* 72* 79*     Results from last 7 days   Lab Units 05/02/24  0439 05/01/24  0508 04/30/24  0436   WBC 10*3/mm3 6.25 9.51 6.20   HEMOGLOBIN g/dL 9.3* 10.4* 10.7*   HEMATOCRIT % 29.8* 32.9* 34.2*   PLATELETS 10*3/mm3 179 169 164     Results from last 7 days   Lab Units 04/30/24  0436   INR  1.22*   APTT seconds 32.2     Results from last 7 days   Lab Units 04/27/24  1033   PROBNP pg/mL 5,603.0*                           I reviewed the patient's new clinical results.        Medication Review:   acetaminophen, 1,000 mg, Oral, TID  atorvastatin, 10 mg, Oral, Nightly  collagenase, 1 Application, Topical, Daily  digoxin, 125 mcg, Oral, Daily  empagliflozin, 10 mg, Oral, Daily  furosemide, 40 mg, Oral, Daily  gabapentin, 100 mg, Oral, Nightly  guaiFENesin, 1,200 mg, Oral, Q12H  heparin (porcine), 5,000 Units, Subcutaneous, Q8H  methIMAzole, 5 mg, Oral, Once per day on Monday Wednesday Friday  metoprolol tartrate, 12.5 mg, Oral, TID  midodrine, 5 mg, Oral, BID AC  mupirocin, 1 Application, Each Nare, Q12H  senna-docusate sodium, 2 tablet, Oral, Nightly  sodium chloride, 10 mL, Intravenous, Q12H        lactated ringers, 9 mL/hr, Last Rate: 9 mL/hr (04/30/24 0641)        Diagnostic imaging:  I personally and independently reviewed the following images:  CT chest 4/27/2024: Bilateral pleural effusion much more prominent on the right and occupying the whole lung with complete right lung atelectasis.  Only mild pleural  effusion on the left.      CXR 5/2/2024: Right pulmonary infiltrates but improved aeration compared to yesterday    Assessment     Recurrent nonloculated right pleural effusion s/p VATS with partial decortication and Pleurx catheter placement 4/30.  Right lung compressive atelectasis  Suspect reexpansion pulmonary edema on the right  Bilateral pleural effusion, R >>L, secondary to CHF  Chronic HFpEF  A-fib with RVR   Hypotension        Plan       Incentive spirometry.  Encouraged to use.  DuoNeb 3 times a day  DVT prophylaxis with heparin subcu 3 times daily when okay with thoracic surgery  Lasix 40 mg p.o. daily.  Midodrine 5 mg 2 times daily  Neurontin 100 mg nightly for pain  Metoprolol 12.5 mg twice daily.  Digoxin for A-fib.    Jennifer Newton MD  05/02/24  14:43 EDT          This note was dictated utilizing Dragon dictation

## 2024-05-02 NOTE — DISCHARGE PLACEMENT REQUEST
"Ellen Silva IV (83 y.o. Male)       Date of Birth   1940    Social Security Number       Address   [address] [place] [address]    Home Phone   [phone]    MRN   [id number]       [employer]   None    Marital Status                               Admission Date   4/27/24    Admission Type   Emergency    Admitting Provider   Dayron Fowler MD    Attending Provider   Dayron Fowler MD    Department, Room/Bed   [address], [address]/1       Discharge Date       Discharge Disposition       Discharge Destination                                 Attending Provider: Dayron Fowler MD    Allergies: No Known Allergies    Isolation: None   Infection: None   Code Status: CPR    Ht: 177.8 cm (70\")   Wt: 64.4 kg (142 lb)    Admission Cmt: None   Principal Problem: Recurrent pleural effusion on right [J90]                   Active Insurance as of 4/27/2024       Primary Coverage       Payor Plan Insurance Group Employer/Plan Group    MEDICARE MEDICARE A & B        Payor Plan Address Payor Plan Phone Number Payor Plan Fax Number Effective Dates    [address] [phone]  5/1/2005 - None Entered    [facility] [id number]         Subscriber Name Subscriber Birth Date Member ID       ELLEN SILVA IV 1940 [id number]               Secondary Coverage       Payor Plan Insurance Group Employer/Plan Group    [facility] SUPP [id number]       Payor Plan Address Payor Plan Phone Number Payor Plan Fax Number Effective Dates    [address]   5/1/2005 - None Entered    [facility] [id number]         Subscriber Name Subscriber Birth Date Member ID       ELLEN SILVA IV 1940 [id number]                     Emergency Contacts        (Rel.) Home Phone Work Phone Mobile Phone    Belem Silva (Spouse) [phone] -- [phone]    ELLEN SILVA (Son) [phone] -- [phone]              Emergency Contact Information       Name Relation Home Work Mobile    " Belem Silva Spouse [phone]    ELLEN SILVA Son [phone]

## 2024-05-02 NOTE — PROGRESS NOTES
"  POST-OPERATIVE NOTE     Chief Complaint: Recurrent right pleural effusion  S/P: Right VATS with partial decortication and pleurx placement  POD # 2    Subjective:  Symptoms:  Improved.  He reports weakness.  No shortness of breath, cough or chest pain.    Diet:  Adequate intake.  No nausea or vomiting.    Activity level: Impaired due to weakness.    Pain:  He complains of pain that is mild.  Pain is well controlled.    Resting in bed.  No new complaints.  Eager to discharge.    Objective:  General Appearance:  Comfortable, well-appearing and in no acute distress.    Vital signs: (most recent): Blood pressure 107/60, pulse 87, temperature 97.5 °F (36.4 °C), temperature source Oral, resp. rate 16, height 177.8 cm (70\"), weight 64.4 kg (142 lb), SpO2 98%.    HEENT: (Nasal cannula)    Lungs:  Normal effort.  He is not in respiratory distress.  There are decreased breath sounds.    Heart: Tachycardia.  Irregular rhythm.    Chest: Symmetric chest wall expansion. Chest wall tenderness (Around chest tube, well-controlled.) present.    Abdomen: Abdomen is soft.    Extremities: Decreased range of motion.  (S/p right BKA)  Neurological: Patient is alert.    Skin:  Warm and dry.                Chest tube:   Site: Right, Clean, Dry, Intact, and Securement device intact  Suction: -20 cm  Air Leak: negative  24 Hour Total: 260ml    Results Review:     I reviewed the patient's new clinical results.  I reviewed the patient's new imaging results and agree with the interpretation.  Discussed with patient, RN and thoracic surgeon    Assessment & Plan     Recurrent right pleural effusion: secondary to heart failure. S/p right VATS with partial decortication and pleurx placement, POD 2.  4L of gelatinous material evacuated from the chest.  A.m. chest x-ray reviewed without significant change compared to the previous.  Chest remained to -20 cm suction overnight and output improved.  Will transition chest tube to waterseal today.  Repeat " a.m. chest x-ray.  Home health being arranged to drain Pleurx catheter every Monday, Wednesday and Friday.    Hypotension: mgt per cardiology.  Normotensive and heart rate better controlled today.    Addendum: Discussed case with Dr. Noyola.  No airleak on exam therefore the chest tube was removed without difficulty.  Repeat a.m. chest x-ray.  Plan to drain Pleurx catheter every Monday, Wednesday, Friday.     CHARLEE Pascal  [facility]  05/02/24  11:40 EDT    Patient was seen and assessed while wearing personal protective equipment including facemask, protective eyewear and gloves.  Hand hygiene performed prior to entering the room and upon exiting with doffing of gloves.

## 2024-05-02 NOTE — CASE MANAGEMENT/SOCIAL WORK
Continued Stay Note  [facility]     Patient Name: Mehreen Silva IV  MRN: [id number]  Today's Date: 5/2/2024    Admit Date: 4/27/2024    Plan: Rehab   Discharge Plan       Row Name 05/02/24 1341       Plan    Plan Rehab    Patient/Family in Agreement with Plan yes    Plan Comments Pts wife wants pt to go to rehab.  Referrals placed in Epic.  Pts wife states they can private pay if needed.  CCP continues to follow.  BENOIT Hackett RN                   Discharge Codes    No documentation.                 Expected Discharge Date and Time       Expected Discharge Date Expected Discharge Time    May 3, 2024               Milagros Hackett, RN

## 2024-05-02 NOTE — PLAN OF CARE
Problem: Adult Inpatient Plan of Care  Goal: Plan of Care Review  Outcome: Ongoing, Progressing  Goal: Patient-Specific Goal (Individualized)  Outcome: Ongoing, Progressing  Goal: Absence of Hospital-Acquired Illness or Injury  Outcome: Ongoing, Progressing  Intervention: Identify and Manage Fall Risk  Recent Flowsheet Documentation  Taken 5/2/2024 0400 by Jean Claude Bender RN  Safety Promotion/Fall Prevention:   activity supervised   assistive device/personal items within reach   clutter free environment maintained   fall prevention program maintained   nonskid shoes/slippers when out of bed   room organization consistent   safety round/check completed  Taken 5/2/2024 0212 by Jean Claude Bender, RN  Safety Promotion/Fall Prevention:   activity supervised   assistive device/personal items within reach   clutter free environment maintained   fall prevention program maintained   nonskid shoes/slippers when out of bed   room organization consistent   safety round/check completed  Taken 5/2/2024 0005 by Jean Claude Bender RN  Safety Promotion/Fall Prevention:   activity supervised   assistive device/personal items within reach   clutter free environment maintained   fall prevention program maintained   nonskid shoes/slippers when out of bed   room organization consistent   safety round/check completed  Taken 5/1/2024 2218 by Jean Claude Bender, RN  Safety Promotion/Fall Prevention:   activity supervised   assistive device/personal items within reach   clutter free environment maintained   fall prevention program maintained   nonskid shoes/slippers when out of bed   room organization consistent   safety round/check completed  Taken 5/1/2024 2045 by Jean Claude Bender, RN  Safety Promotion/Fall Prevention:   activity supervised   assistive device/personal items within reach   clutter free environment maintained   fall prevention program maintained   nonskid shoes/slippers when out of bed   room organization consistent   toileting  scheduled  Intervention: Prevent Skin Injury  Recent Flowsheet Documentation  Taken 5/2/2024 0400 by Jean Claude Bender RN  Body Position: patient/family refused  Taken 5/2/2024 0212 by Jean Claude Bender RN  Body Position:   turned   right  Skin Protection:   adhesive use limited   incontinence pads utilized   tubing/devices free from skin contact   transparent dressing maintained   skin sealant/moisture barrier applied  Taken 5/2/2024 0005 by Jean Claude Bender RN  Body Position: supine, legs elevated  Taken 5/1/2024 2218 by Jean Claude Bender RN  Body Position:   turned   left  Taken 5/1/2024 2045 by Jean Claude Bender RN  Body Position: supine, legs elevated  Skin Protection:   adhesive use limited   incontinence pads utilized   tubing/devices free from skin contact   transparent dressing maintained   silicone foam dressing in place   skin sealant/moisture barrier applied  Intervention: Prevent and Manage VTE (Venous Thromboembolism) Risk  Recent Flowsheet Documentation  Taken 5/2/2024 0400 by Jean Claude Bender RN  Activity Management: activity encouraged  Taken 5/2/2024 0212 by Jean Claude Bender RN  Activity Management: activity encouraged  Range of Motion: active ROM (range of motion) encouraged  Taken 5/2/2024 0005 by Jean Claude Bender RN  Activity Management: activity encouraged  Taken 5/1/2024 2218 by Jean Claude Bender RN  Activity Management: activity encouraged  Taken 5/1/2024 2045 by Jean Claude Bender RN  Activity Management: activity encouraged  VTE Prevention/Management: (SQ Heparin) other (see comments)  Range of Motion: active ROM (range of motion) encouraged  Goal: Optimal Comfort and Wellbeing  Outcome: Ongoing, Progressing  Intervention: Provide Person-Centered Care  Recent Flowsheet Documentation  Taken 5/2/2024 0212 by Jean Claude Bender RN  Trust Relationship/Rapport:   care explained   choices provided   emotional support provided   empathic listening provided   questions answered   reassurance provided    thoughts/feelings acknowledged  Taken 5/1/2024 2045 by Jean Claude Bender RN  Trust Relationship/Rapport:   care explained   choices provided   emotional support provided   empathic listening provided   questions answered   reassurance provided   thoughts/feelings acknowledged  Goal: Readiness for Transition of Care  Outcome: Ongoing, Progressing     Problem: Fall Injury Risk  Goal: Absence of Fall and Fall-Related Injury  Outcome: Ongoing, Progressing  Intervention: Identify and Manage Contributors  Recent Flowsheet Documentation  Taken 5/2/2024 0212 by Jean Claude Bender RN  Medication Review/Management: medications reviewed  Taken 5/2/2024 0005 by Jean Claude Bender RN  Medication Review/Management: medications reviewed  Taken 5/1/2024 2218 by Jean Claude Bender RN  Medication Review/Management: medications reviewed  Taken 5/1/2024 2045 by Jean Claude Bender RN  Medication Review/Management: medications reviewed  Intervention: Promote Injury-Free Environment  Recent Flowsheet Documentation  Taken 5/2/2024 0400 by Jean Claude Bender RN  Safety Promotion/Fall Prevention:   activity supervised   assistive device/personal items within reach   clutter free environment maintained   fall prevention program maintained   nonskid shoes/slippers when out of bed   room organization consistent   safety round/check completed  Taken 5/2/2024 0212 by Jean Claude Bender RN  Safety Promotion/Fall Prevention:   activity supervised   assistive device/personal items within reach   clutter free environment maintained   fall prevention program maintained   nonskid shoes/slippers when out of bed   room organization consistent   safety round/check completed  Taken 5/2/2024 0005 by Jean Claude Bender RN  Safety Promotion/Fall Prevention:   activity supervised   assistive device/personal items within reach   clutter free environment maintained   fall prevention program maintained   nonskid shoes/slippers when out of bed   room organization consistent    safety round/check completed  Taken 5/1/2024 2218 by Jean Claude Bender RN  Safety Promotion/Fall Prevention:   activity supervised   assistive device/personal items within reach   clutter free environment maintained   fall prevention program maintained   nonskid shoes/slippers when out of bed   room organization consistent   safety round/check completed  Taken 5/1/2024 2045 by Jean Claude Bender RN  Safety Promotion/Fall Prevention:   activity supervised   assistive device/personal items within reach   clutter free environment maintained   fall prevention program maintained   nonskid shoes/slippers when out of bed   room organization consistent   toileting scheduled     Problem: Skin Injury Risk Increased  Goal: Skin Health and Integrity  Outcome: Ongoing, Progressing  Intervention: Optimize Skin Protection  Recent Flowsheet Documentation  Taken 5/2/2024 0400 by Jean Claude Bender RN  Head of Bed (HOB) Positioning: HOB at 20-30 degrees  Taken 5/2/2024 0212 by Jean Claude Bender RN  Pressure Reduction Techniques:   frequent weight shift encouraged   weight shift assistance provided   heels elevated off bed   positioned off wounds   pressure points protected  Head of Bed (HOB) Positioning: HOB at 20-30 degrees  Pressure Reduction Devices: alternating pressure pump (ADD)  Skin Protection:   adhesive use limited   incontinence pads utilized   tubing/devices free from skin contact   transparent dressing maintained   skin sealant/moisture barrier applied  Taken 5/2/2024 0005 by Jean Claude Bender RN  Head of Bed (HOB) Positioning: HOB at 20-30 degrees  Taken 5/1/2024 2218 by Jean Claude Bender RN  Head of Bed (HOB) Positioning: HOB at 20-30 degrees  Taken 5/1/2024 2045 by Jean Claude Bender RN  Pressure Reduction Techniques:   frequent weight shift encouraged   weight shift assistance provided   pressure points protected   positioned off wounds   heels elevated off bed  Head of Bed (HOB) Positioning: HOB elevated  Pressure Reduction  Devices: alternating pressure pump (ADD)  Skin Protection:   adhesive use limited   incontinence pads utilized   tubing/devices free from skin contact   transparent dressing maintained   silicone foam dressing in place   skin sealant/moisture barrier applied     Problem: Heart Failure Comorbidity  Goal: Maintenance of Heart Failure Symptom Control  Outcome: Ongoing, Progressing  Intervention: Maintain Heart Failure-Management  Recent Flowsheet Documentation  Taken 5/2/2024 0212 by Jean Claude Bender, RN  Medication Review/Management: medications reviewed  Taken 5/2/2024 0005 by Jean Claude Bender, RN  Medication Review/Management: medications reviewed  Taken 5/1/2024 2218 by Jean Claude Bender, RN  Medication Review/Management: medications reviewed  Taken 5/1/2024 2045 by Jean Claude Bender, RN  Medication Review/Management: medications reviewed     Problem: Malnutrition  Goal: Improved Nutritional Intake  Outcome: Ongoing, Progressing   Goal Outcome Evaluation:

## 2024-05-02 NOTE — PROGRESS NOTES
LOS: 4 days   Patient Care Team:  Taiwo Powers MD as PCP - General (Internal Medicine)  Self, Bess YO MD as Consulting Physician (Endocrinology)  Huang Chan MD as Cardiologist (Cardiology)    Chief Complaint:     F/u a fib/HFpEF    Interval History:     He has no dizziness, headache, nausea, chest pain, difficulty breathing.    Objective   Vital Signs  Temp:  [97.6 °F (36.4 °C)-97.9 °F (36.6 °C)] 97.6 °F (36.4 °C)  Heart Rate:  [] 98  Resp:  [16-20] 18  BP: ()/(45-79) 116/79    Intake/Output Summary (Last 24 hours) at 5/2/2024 0952  Last data filed at 5/2/2024 0620  Gross per 24 hour   Intake 960 ml   Output 260 ml   Net 700 ml       Comfortable NAD  Neck supple, no JVD or thyromegaly appreciated  S1/S2 irregular, mild tachy no m/r/g  Lungs CTA B, normal effort, left chest tube  Abdomen S/NT/ND (+) BS, no HSM appreciated  Extremities warm, no clubbing, cyanosis, or edema, right AKA  No visible or palpable skin lesions  A/Ox4, mood and affect appropriate    Results Review:      Results from last 7 days   Lab Units 05/02/24 0439 05/01/24  0508 04/30/24  0435   SODIUM mmol/L 137 137 142   POTASSIUM mmol/L 4.0 5.4* 3.5   CHLORIDE mmol/L 103 101 101   CO2 mmol/L 29.0 26.6 31.0*   BUN mg/dL 33* 24* 19   CREATININE mg/dL 0.80 1.01 0.69*   GLUCOSE mg/dL 86 124* 81   CALCIUM mg/dL 7.3* 8.2* 8.1*     Results from last 7 days   Lab Units 04/27/24 2238 04/27/24  1246 04/27/24  1033   HSTROP T ng/L 79* 72* 79*     Results from last 7 days   Lab Units 05/02/24 0439 05/01/24  0508 04/30/24  0436   WBC 10*3/mm3 6.25 9.51 6.20   HEMOGLOBIN g/dL 9.3* 10.4* 10.7*   HEMATOCRIT % 29.8* 32.9* 34.2*   PLATELETS 10*3/mm3 179 169 164     Results from last 7 days   Lab Units 04/30/24  0436   INR  1.22*   APTT seconds 32.2     Results from last 7 days   Lab Units 04/28/24  0338   CHOLESTEROL mg/dL 148     Results from last 7 days   Lab Units 04/27/24  2238   MAGNESIUM mg/dL 2.2     Results from last 7 days    Lab Units 04/28/24  0338   CHOLESTEROL mg/dL 148   TRIGLYCERIDES mg/dL 66   HDL CHOL mg/dL 35*   LDL CHOL mg/dL 100       I reviewed the patient's new clinical results.  I personally viewed and interpreted the patient's EKG/Telemetry data        Medication Review:   acetaminophen, 1,000 mg, Oral, TID  atorvastatin, 10 mg, Oral, Nightly  collagenase, 1 Application, Topical, Daily  digoxin, 125 mcg, Oral, Daily  empagliflozin, 10 mg, Oral, Daily  furosemide, 40 mg, Oral, Daily  gabapentin, 100 mg, Oral, Nightly  guaiFENesin, 1,200 mg, Oral, Q12H  heparin (porcine), 5,000 Units, Subcutaneous, Q8H  methIMAzole, 5 mg, Oral, Once per day on Monday Wednesday Friday  metoprolol tartrate, 12.5 mg, Oral, TID  midodrine, 5 mg, Oral, BID AC  mupirocin, 1 Application, Each Nare, Q12H  senna-docusate sodium, 2 tablet, Oral, Nightly  sodium chloride, 10 mL, Intravenous, Q12H        lactated ringers, 9 mL/hr, Last Rate: 9 mL/hr (04/30/24 0641)        Assessment & Plan       Recurrent pleural effusion on right    Hx of toxic multinodular goiter    Dyslipidemia    Hyperthyroidism    Chronic diastolic CHF (congestive heart failure)    Longstanding persistent atrial fibrillation    Moderate malnutrition    Right pleural effusion, required chest tube 2/2024 with transudative fluid.  CT chest showed wtih near complete right lung whiteout, s/p VATS, partial decortication and right Pleurx catheter.  On waterseal chest tube today.  He will have Pleurx draining at home Monday Wednesday Friday.    HFpEF, on echo-RV function is normal, no pericardial effusion, normal LV function.  Blood pressure low on low-dose Lopressor.  Mitral insufficiency - severe.  Mitral valve thickened-normal blood cultures.  Slight troponin elevation with coronary artery calcification  Atrial fibrillation - permanent, was on Eliquis at home-this has been held for chest tube.   Anemia  H/o right AKA.   Cachexia with muscle wasting, consult for malnutrition  assessment  History of toxic multinodular goiter, on methimazole, TSH normal.    Midodrine added for hypotension -blood pressure is overall normal.  Continue midodrine and metoprolol.  Eliquis on hold.     Thoracic surgery-please let us know when Eliquis can be restarted.    No medication changes, will follow.    Shanika Mejia MD  05/02/24  09:52 EDT

## 2024-05-02 NOTE — PROGRESS NOTES
"DAILY PROGRESS NOTE  [facility]    Patient Identification:  Name: Mehreen Silva IV  Age: 83 y.o.  Sex: male  :  1940  MRN: [id number]         Primary Care Physician: Taiwo Powers MD    Subjective:  Interval History: He complains of shortness of air.  He is still weak.  O 2 at 2 L .  Objective:    Scheduled Meds:acetaminophen, 1,000 mg, Oral, TID  atorvastatin, 10 mg, Oral, Nightly  collagenase, 1 Application, Topical, Daily  digoxin, 125 mcg, Oral, Daily  empagliflozin, 10 mg, Oral, Daily  furosemide, 40 mg, Oral, Daily  gabapentin, 100 mg, Oral, Nightly  guaiFENesin, 1,200 mg, Oral, Q12H  heparin (porcine), 5,000 Units, Subcutaneous, Q8H  methIMAzole, 5 mg, Oral, Once per day on   metoprolol tartrate, 12.5 mg, Oral, TID  midodrine, 5 mg, Oral, BID AC  mupirocin, 1 Application, Each Nare, Q12H  senna-docusate sodium, 2 tablet, Oral, Nightly  sodium chloride, 10 mL, Intravenous, Q12H      Continuous Infusions:lactated ringers, 9 mL/hr, Last Rate: 9 mL/hr (24 0641)        Vital signs in last 24 hours:  Temp:  [97.6 °F (36.4 °C)-98.2 °F (36.8 °C)] 98.2 °F (36.8 °C)  Heart Rate:  [] 116  Resp:  [16-20] 18  BP: ()/(45-79) 100/71    Intake/Output:    Intake/Output Summary (Last 24 hours) at 2024 1225  Last data filed at 2024 0900  Gross per 24 hour   Intake 1320 ml   Output 260 ml   Net 1060 ml         Exam:  /71 (BP Location: Left arm, Patient Position: Lying)   Pulse 116   Temp 98.2 °F (36.8 °C) (Oral)   Resp 18   Ht 177.8 cm (70\")   Wt 64.4 kg (142 lb)   SpO2 99%   BMI 20.37 kg/m²     General Appearance:    Alert, cooperative, no distress   Head:    Normocephalic, without obvious abnormality, atraumatic   Eyes:       Throat:   Lips, tongue, gums normal   Neck:   Supple, symmetrical, trachea midline, no JVD   Lungs:   Decreased breath sounds on the right but starting to move a little bit of air, respirations unlabored "   Chest Wall:  Surgical changes with Pleurx catheter in place    Heart:    Regular rate and rhythm, S1 and S2 normal, no murmur,no  Rub or gallop   Abdomen:     Soft, nontender, bowel sounds active, no masses, no organomegaly    Extremities:   Extremities normal, right AKA, no cyanosis or edema   Pulses:      Skin:   Skin is warm and dry,  no rashes or palpable lesions   Neurologic:   no focal deficits noted      Lab Results (last 72 hours)       Procedure Component Value Units Date/Time    Lipid Panel [[id number]]  (Abnormal) Collected: 04/28/24 0338    Specimen: Blood Updated: 04/28/24 0723     Total Cholesterol 148 mg/dL      Triglycerides 66 mg/dL      HDL Cholesterol 35 mg/dL      LDL Cholesterol  100 mg/dL      VLDL Cholesterol 13 mg/dL      LDL/HDL Ratio 2.85    Narrative:      Cholesterol Reference Ranges  (U.S. Department of Health and Human Services ATP III Classifications)    Desirable          <200 mg/dL  Borderline High    200-239 mg/dL  High Risk          >240 mg/dL      Triglyceride Reference Ranges  (U.S. Department of Health and Human Services ATP III Classifications)    Normal           <150 mg/dL  Borderline High  150-199 mg/dL  High             200-499 mg/dL  Very High        >500 mg/dL    HDL Reference Ranges  (U.S. Department of Health and Human Services ATP III Classifications)    Low     <40 mg/dl (major risk factor for CHD)  High    >60 mg/dl ('negative' risk factor for CHD)        LDL Reference Ranges  (U.S. Department of Health and Human Services ATP III Classifications)    Optimal          <100 mg/dL  Near Optimal     100-129 mg/dL  Borderline High  130-159 mg/dL  High             160-189 mg/dL  Very High        >189 mg/dL    Basic Metabolic Panel [[id number]]  (Abnormal) Collected: 04/28/24 0338    Specimen: Blood Updated: 04/28/24 0427     Glucose 122 mg/dL      BUN 22 mg/dL      Creatinine 0.79 mg/dL      Sodium 140 mmol/L      Potassium 4.4 mmol/L      Chloride 101 mmol/L      CO2 29.8  mmol/L      Calcium 8.6 mg/dL      BUN/Creatinine Ratio 27.8     Anion Gap 9.2 mmol/L      eGFR 88.1 mL/min/1.73     Narrative:      GFR Normal >60  Chronic Kidney Disease <60  Kidney Failure <15    The GFR formula is only valid for adults with stable renal function between ages 18 and 70.    CBC & Differential [[id number]]  (Abnormal) Collected: 04/28/24 0337    Specimen: Blood Updated: 04/28/24 0409    Narrative:      The following orders were created for panel order CBC & Differential.  Procedure                               Abnormality         Status                     ---------                               -----------         ------                     CBC Auto Differential[[id number]]        Abnormal            Final result                 Please view results for these tests on the individual orders.    CBC Auto Differential [[id number]]  (Abnormal) Collected: 04/28/24 0337    Specimen: Blood Updated: 04/28/24 0409     WBC 7.43 10*3/mm3      RBC 3.92 10*6/mm3      Hemoglobin 10.1 g/dL      Hematocrit 31.8 %      MCV 81.1 fL      MCH 25.8 pg      MCHC 31.8 g/dL      RDW 16.8 %      RDW-SD 49.0 fl      MPV 10.7 fL      Platelets 189 10*3/mm3      Neutrophil % 78.4 %      Lymphocyte % 9.7 %      Monocyte % 10.9 %      Eosinophil % 0.3 %      Basophil % 0.3 %      Immature Grans % 0.4 %      Neutrophils, Absolute 5.83 10*3/mm3      Lymphocytes, Absolute 0.72 10*3/mm3      Monocytes, Absolute 0.81 10*3/mm3      Eosinophils, Absolute 0.02 10*3/mm3      Basophils, Absolute 0.02 10*3/mm3      Immature Grans, Absolute 0.03 10*3/mm3      nRBC 0.0 /100 WBC     Magnesium [[id number]]  (Normal) Collected: 04/27/24 2238    Specimen: Blood Updated: 04/27/24 2321     Magnesium 2.2 mg/dL     Potassium [[id number]]  (Normal) Collected: 04/27/24 2238    Specimen: Blood Updated: 04/27/24 2321     Potassium 4.4 mmol/L     High Sensitivity Troponin T [[id number]]  (Abnormal) Collected: 04/27/24 2238    Specimen: Blood Updated:  04/27/24 2321     HS Troponin T 79 ng/L     Narrative:      High Sensitive Troponin T Reference Range:  <14.0 ng/L- Negative Female for AMI  <22.0 ng/L- Negative Male for AMI  >=14 - Abnormal Female indicating possible myocardial injury.  >=22 - Abnormal Male indicating possible myocardial injury.   Clinicians would have to utilize clinical acumen, EKG, Troponin, and serial changes to determine if it is an Acute Myocardial Infarction or myocardial injury due to an underlying chronic condition.         Creatinine Serum (kidney function) GFR component [[id number]]  (Normal) Collected: 04/27/24 2238    Specimen: Blood Updated: 04/27/24 2320     Creatinine 0.82 mg/dL      eGFR 87.2 mL/min/1.73     Narrative:      GFR Normal >60  Chronic Kidney Disease <60  Kidney Failure <15    The GFR formula is only valid for adults with stable renal function between ages 18 and 70.    High Sensitivity Troponin T 2Hr [[id number]]  (Abnormal) Collected: 04/27/24 1246    Specimen: Blood Updated: 04/27/24 1323     HS Troponin T 72 ng/L      Troponin T Delta -7 ng/L     Narrative:      High Sensitive Troponin T Reference Range:  <14.0 ng/L- Negative Female for AMI  <22.0 ng/L- Negative Male for AMI  >=14 - Abnormal Female indicating possible myocardial injury.  >=22 - Abnormal Male indicating possible myocardial injury.   Clinicians would have to utilize clinical acumen, EKG, Troponin, and serial changes to determine if it is an Acute Myocardial Infarction or myocardial injury due to an underlying chronic condition.         TSH Rfx On Abnormal To Free T4 [[id number]]  (Normal) Collected: 04/27/24 1033    Specimen: Blood Updated: 04/27/24 1126     TSH 0.819 uIU/mL     Single High Sensitivity Troponin T [[id number]]  (Abnormal) Collected: 04/27/24 1033    Specimen: Blood Updated: 04/27/24 1107     HS Troponin T 79 ng/L     Narrative:      High Sensitive Troponin T Reference Range:  <14.0 ng/L- Negative Female for AMI  <22.0 ng/L- Negative Male  for AMI  >=14 - Abnormal Female indicating possible myocardial injury.  >=22 - Abnormal Male indicating possible myocardial injury.   Clinicians would have to utilize clinical acumen, EKG, Troponin, and serial changes to determine if it is an Acute Myocardial Infarction or myocardial injury due to an underlying chronic condition.         Comprehensive Metabolic Panel [[id number]]  (Abnormal) Collected: 04/27/24 1033    Specimen: Blood Updated: 04/27/24 1103     Glucose 209 mg/dL      BUN 23 mg/dL      Creatinine 1.03 mg/dL      Sodium 141 mmol/L      Potassium 5.7 mmol/L      Chloride 100 mmol/L      CO2 30.0 mmol/L      Calcium 9.1 mg/dL      Total Protein 6.3 g/dL      Albumin 3.1 g/dL      ALT (SGPT) 18 U/L      AST (SGOT) 12 U/L      Alkaline Phosphatase 118 U/L      Total Bilirubin 1.1 mg/dL      Globulin 3.2 gm/dL      A/G Ratio 1.0 g/dL      BUN/Creatinine Ratio 22.3     Anion Gap 11.0 mmol/L      eGFR 72.1 mL/min/1.73     Narrative:      GFR Normal >60  Chronic Kidney Disease <60  Kidney Failure <15    The GFR formula is only valid for adults with stable renal function between ages 18 and 70.    BNP [[id number]]  (Abnormal) Collected: 04/27/24 1033    Specimen: Blood Updated: 04/27/24 1103     proBNP 5,603.0 pg/mL     Narrative:      This assay is used as an aid in the diagnosis of individuals suspected of having heart failure. It can be used as an aid in the diagnosis of acute decompensated heart failure (ADHF) in patients presenting with signs and symptoms of ADHF to the emergency department (ED). In addition, NT-proBNP of <300 pg/mL indicates ADHF is not likely.    Age Range Result Interpretation  NT-proBNP Concentration (pg/mL:      <50             Positive            >450                   Gray                 300-450                    Negative             <300    50-75           Positive            >900                  Gray                300-900                  Negative            <300      >75              Positive            >1800                  Gray                300-1800                  Negative            <300    Magnesium [[id number]]  (Normal) Collected: 04/27/24 1033    Specimen: Blood Updated: 04/27/24 1103     Magnesium 2.3 mg/dL     CBC & Differential [[id number]]  (Abnormal) Collected: 04/27/24 1033    Specimen: Blood Updated: 04/27/24 1045    Narrative:      The following orders were created for panel order CBC & Differential.  Procedure                               Abnormality         Status                     ---------                               -----------         ------                     CBC Auto Differential[[id number]]        Abnormal            Final result                 Please view results for these tests on the individual orders.    CBC Auto Differential [[id number]]  (Abnormal) Collected: 04/27/24 1033    Specimen: Blood Updated: 04/27/24 1045     WBC 8.87 10*3/mm3      RBC 4.42 10*6/mm3      Hemoglobin 11.5 g/dL      Hematocrit 37.2 %      MCV 84.2 fL      MCH 26.0 pg      MCHC 30.9 g/dL      RDW 16.9 %      RDW-SD 51.4 fl      MPV 10.5 fL      Platelets 242 10*3/mm3      Neutrophil % 82.8 %      Lymphocyte % 8.6 %      Monocyte % 8.0 %      Eosinophil % 0.1 %      Basophil % 0.2 %      Immature Grans % 0.3 %      Neutrophils, Absolute 7.34 10*3/mm3      Lymphocytes, Absolute 0.76 10*3/mm3      Monocytes, Absolute 0.71 10*3/mm3      Eosinophils, Absolute 0.01 10*3/mm3      Basophils, Absolute 0.02 10*3/mm3      Immature Grans, Absolute 0.03 10*3/mm3      nRBC 0.0 /100 WBC     [place] Draw [[id number]] Collected: 04/27/24 1033    Specimen: Blood Updated: 04/27/24 1045    Narrative:      The following orders were created for panel order [place] Draw.  Procedure                               Abnormality         Status                     ---------                               -----------         ------                     Green Top (Gel)[[id number]]                                   Final result               Lavender Top[[id number]]                                     Final result               Gold Top - SST[[id number]]                                   Final result               Light Blue Top[[id number]]                                   Final result                 Please view results for these tests on the individual orders.    Green Top (Gel) [[id number]] Collected: 04/27/24 1033    Specimen: Blood Updated: 04/27/24 1045     Extra Tube Hold for add-ons.     Comment: Auto resulted.       Lavender Top [[id number]] Collected: 04/27/24 1033    Specimen: Blood Updated: 04/27/24 1045     Extra Tube hold for add-on     Comment: Auto resulted       Gold Top - SST [[id number]] Collected: 04/27/24 1033    Specimen: Blood Updated: 04/27/24 1045     Extra Tube Hold for add-ons.     Comment: Auto resulted.       Light Blue Top [[id number]] Collected: 04/27/24 1033    Specimen: Blood Updated: 04/27/24 1045     Extra Tube Hold for add-ons.     Comment: Auto resulted             Data Review:  Results from last 7 days   Lab Units 05/02/24  0439 05/01/24  0508 04/30/24  0435   SODIUM mmol/L 137 137 142   POTASSIUM mmol/L 4.0 5.4* 3.5   CHLORIDE mmol/L 103 101 101   CO2 mmol/L 29.0 26.6 31.0*   BUN mg/dL 33* 24* 19   CREATININE mg/dL 0.80 1.01 0.69*   GLUCOSE mg/dL 86 124* 81   CALCIUM mg/dL 7.3* 8.2* 8.1*     Results from last 7 days   Lab Units 05/02/24  0439 05/01/24  0508 04/30/24  0436   WBC 10*3/mm3 6.25 9.51 6.20   HEMOGLOBIN g/dL 9.3* 10.4* 10.7*   HEMATOCRIT % 29.8* 32.9* 34.2*   PLATELETS 10*3/mm3 179 169 164     Results from last 7 days   Lab Units 04/27/24  1033   TSH uIU/mL 0.819         Lab Results   Lab Value Date/Time    TROPONINT 79 (C) 04/27/2024 2238    TROPONINT 72 (C) 04/27/2024 1246    TROPONINT 79 (C) 04/27/2024 1033    TROPONINT 60 (C) 02/09/2024 1401    TROPONINT 65 (C) 02/09/2024 1114    TROPONINT 64 (C) 11/08/2023 2048    TROPONINT 56 (C) 11/08/2023 1534    TROPONINT 60 (C) 11/08/2023  "1003    TROPONINT <0.010 09/23/2022 1627     Results from last 7 days   Lab Units 04/28/24  0338   CHOLESTEROL mg/dL 148   TRIGLYCERIDES mg/dL 66   HDL CHOL mg/dL 35*   LDL CHOL mg/dL 100     Results from last 7 days   Lab Units 04/27/24  1033   ALK PHOS U/L 118*   BILIRUBIN mg/dL 1.1   ALT (SGPT) U/L 18   AST (SGOT) U/L 12     Results from last 7 days   Lab Units 04/27/24  1033   TSH uIU/mL 0.819         No results found for: \"POCGLU\"  Results from last 7 days   Lab Units 04/30/24  0436   INR  1.22*       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    Arthritis     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, 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/2023    Atrial fibrillation     Atrial fibrillation, persistent 09/24/2022    Cervical spondylosis with myelopathy     CHF (congestive heart failure)     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5]  Yes    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       Plan:  Consult from pulmonary, thoracic surgery and cardiology noted.  Follow-up on labs.  Postop care and restart anticoagulation when okay with surgery.  Cardiology making adjustment on medications for rate control and blood pressure. "  DC planning.  Disposition to be determined.  May need SNU for rehab.  Dayron Fowler MD  5/2/2024  12:25 EDT

## 2024-05-02 NOTE — PLAN OF CARE
Goal Outcome Evaluation:  Plan of Care Reviewed With: patient         Patient is an 83 y.o. male admitted to [facility] for inc'd SOA and palpitations on 4/27/2024. Underwent R THORACOSCOPY, partial decortication, and cath insertion on 4/30/24. 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 Patient is maxA at baseline with ADL's and lives with his wife. Pt has been living at home with help from caregiver for transfers and use of [employer] lift. He recently received his prosthesis and is current with OPPT at [facility]. A&O x4 today, although cognitive impairments noted with further discussion, as pt states he had his AKA performed 6 weeks ago. Today, patient performed bed mobility with maxA x2 and was able to sit EOB for ~ 15 min while RN in room to address dressing on R side. Pt's sitting balance ranges from [place] to maxA with brief periods of SBA. Tolerates session well today. Balance and strength deficits noted. Patient may benefit from skilled PT services acutely to address functional deficits as well as improve level of independence prior to discharge. Pt would benefit from rehab stay following D/C, rec D/C to IRF.       Anticipated Discharge Disposition (PT): inpatient rehabilitation facility

## 2024-05-02 NOTE — THERAPY EVALUATION
Patient Name: Mehreen Silva IV  : 1940    MRN: [id number]                              Today's Date: 2024       Admit Date: 2024    Visit Dx:     ICD-10-CM ICD-9-CM   1. Tension hydrothorax  J94.8 511.89   2. Recurrent pleural effusion on right  J90 511.9   3. 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, right leg 2023    Atrial fibrillation     Atrial fibrillation, persistent 2022    Cervical spondylosis with myelopathy     CHF (congestive heart failure)     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 OR;  Service: Vascular;  Laterality: Right;    APPENDECTOMY      BACK SURGERY      x4    COLONOSCOPY      EXCISION MASS TRUNK N/A 2016    Procedure: Excision soft tissue neoplasm on abdominal wall and left neck;  Surgeon: Shaji Barahona Jr., MD;  Location: [facility] OR;  Service:     KNEE  SURGERY      NECK SURGERY  1974    PLEURAL CATHETER INSERTION Right 4/30/2024    Procedure: RIGHT VIDEO ASSISTED THORACOSCOPY, PARTIAL DECORTICATION, PLEURX CATHETER INSERTION;  Surgeon: Nataliya Noyola MD;  Location: [facility] OR;  Service: Thoracic;  Laterality: Right;    QUADRICEPS REPAIR      ROTATOR CUFF REPAIR      SHOULDER SURGERY      SMALL INTESTINE SURGERY  2021    Bowel Obstruction      General Information       Row Name 05/02/24 1441          Physical Therapy Time and Intention    Document Type evaluation  -DB     Mode of Treatment individual therapy;physical therapy  -DB       Row Name 05/02/24 1441          General Information    Patient Profile Reviewed yes  -DB     Prior Level of Function max assist:;dependent:;ADL's  -DB     Existing Precautions/Restrictions fall;oxygen therapy device and L/min  R AKA  -DB     Barriers to Rehab medically complex;previous functional deficit  -DB       Row Name 05/02/24 1441          Living Environment    People in Home spouse  caregiver  -DB       Row Name 05/02/24 1441          Home Main Entrance    Number of Stairs, Main Entrance none  -DB       Row Name 05/02/24 1441          Stairs Within Home, Primary    Number of Stairs, Within Home, Primary none  -DB       Row Name 05/02/24 1441          Cognition    Orientation Status (Cognition) oriented x 4  pt able to answer orientation questions correctly, although seems cognitively impaired. answer question multiple times and pt repeitive in conversation  -DB       Row Name 05/02/24 1441          Safety Issues, Functional Mobility    Impairments Affecting Function (Mobility) balance;strength;endurance/activity tolerance;cognition;postural/trunk control  -DB     Cognitive Impairments, Mobility Safety/Performance insight into deficits/self-awareness;problem-solving/reasoning;sequencing abilities;awareness, need for assistance  -DB               User Key  (r) = Recorded By, (t) = Taken By, (c) = Cosigned By      Initials  Name Provider Type    DB Eli Odell PT Physical Therapist                   Mobility       Row Name 05/02/24 1444          Bed Mobility    Bed Mobility supine-sit;sit-supine;scooting/bridging  -DB     Scooting/Bridging [place] (Bed Mobility) dependent (less than 25% patient effort);2 person assist  -DB     Supine-Sit [place] (Bed Mobility) maximum assist (25% patient effort);2 person assist;verbal cues;nonverbal cues (demo/gesture)  -DB     Sit-Supine [place] (Bed Mobility) maximum assist (25% patient effort);2 person assist;verbal cues;nonverbal cues (demo/gesture)  -DB     Assistive Device (Bed Mobility) bed rails;draw sheet;head of bed elevated  -DB       Row Name 05/02/24 1444          Sit-Stand Transfer    Sit-Stand [place] (Transfers) unable to assess  -DB       Row Name 05/02/24 1444          Gait/Stairs (Locomotion)    [place] Level (Gait) unable to assess  -DB               User Key  (r) = Recorded By, (t) = Taken By, (c) = Cosigned By      Initials Name Provider Type    DB Eli Odell PT Physical Therapist                   Obj/Interventions       Row Name 05/02/24 1444          Range of Motion Comprehensive    Comment, General Range of Motion R AKA  -DB       Row Name 05/02/24 1444          Strength Comprehensive (MMT)    Comment, General Manual Muscle Testing (MMT) Assessment LLE < 3/5 MMT  -DB       Row Name 05/02/24 1444          Balance    Balance Assessment sitting static balance;sitting dynamic balance  -DB     Static Sitting Balance moderate assist;minimal assist;verbal cues  -DB     Dynamic Sitting Balance moderate assist;maximum assist;verbal cues  -DB     Position, Sitting Balance sitting edge of bed  -DB     Balance Interventions sitting  -DB     Comment, Balance able to sit EOB ~ 15 min while RN changed dressing over chest tube incision site  -DB               User Key  (r) = Recorded By, (t) = Taken By, (c) = Cosigned By      Initials Name Provider Type     DB Eli Odell PT Physical Therapist                   Goals/Plan       Row Name 05/02/24 1456          Bed Mobility Goal 1 (PT)    Activity/Assistive Device (Bed Mobility Goal 1, PT) bed mobility activities, all  -DB     [place]o Level/Cues Needed (Bed Mobility Goal 1, PT) maximum assist (25-49% patient effort)  -DB     Time Frame (Bed Mobility Goal 1, PT) 2 weeks  -DB       Row Name 05/02/24 1456          Transfer Goal 1 (PT)    Activity/Assistive Device (Transfer Goal 1, PT) transfers, all  -DB     [place] Level/Cues Needed (Transfer Goal 1, PT) maximum assist (25-49% patient effort)  -DB     Time Frame (Transfer Goal 1, PT) 2 weeks  -DB       Row Name 05/02/24 1456          Therapy Assessment/Plan (PT)    Planned Therapy Interventions (PT) balance training;bed mobility training;gait training;home exercise program;postural re-education;transfer training;stair training;neuromuscular re-education;strengthening;patient/family education;prosthetic fitting/training;motor coordination training;orthotic fitting/training;lumbar stabilization;ROM (range of motion);wheelchair management/propulsion training  -DB               User Key  (r) = Recorded By, (t) = Taken By, (c) = Cosigned By      Initials Name Provider Type    Eli Morales PT Physical Therapist                   Clinical Impression       Row Name 05/02/24 1445          Pain    Pain Intervention(s) Ambulation/increased activity;Repositioned  -DB       Row Name 05/02/24 1445          Plan of Care Review    Plan of Care Reviewed With patient  -DB     Outcome Evaluation Patient is an 83 y.o. male admitted to [facility] for inc'd SOA and palpitations on 4/27/2024. Underwent R THORACOSCOPY, partial decortication, and cath insertion on 4/30/24. 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 Patient is maxA at baseline with ADL's and lives with his wife. Pt has been living at home with help from caregiver for transfers and use of [employer] lift. He recently  received his prosthesis and is current with OPPT at [facility]. A&O x4 today, although cognitive impairments noted with further discussion, as pt states he had his AKA performed 6 weeks ago. Today, patient performed bed mobility with maxA x2 and was able to sit EOB for ~ 15 min while RN in room to address dressing on R side. Pt's sitting balance ranges from [place] to maxA with brief periods of SBA. Tolerates session well today. Balance and strength deficits noted. Patient may benefit from skilled PT services acutely to address functional deficits as well as improve level of independence prior to discharge. Pt would benefit from rehab stay following D/C, rec D/C to IRF.  -DB       Row Name 05/02/24 1445          Therapy Assessment/Plan (PT)    Rehab Potential (PT) good, to achieve stated therapy goals  -DB     Criteria for Skilled Interventions Met (PT) yes  -DB     Therapy Frequency (PT) 6 times/wk  -DB       Row Name 05/02/24 1445          Vital Signs    O2 Delivery Pre Treatment supplemental O2  -DB     O2 Delivery Intra Treatment supplemental O2  -DB     O2 Delivery Post Treatment supplemental O2  -DB     Pre Patient Position Supine  -DB     Intra Patient Position Sitting  -DB     Post Patient Position Supine  -DB       Row Name 05/02/24 1445          Positioning and Restraints    Pre-Treatment Position in bed  -DB     Post Treatment Position bed  -DB     In Bed supine;call light within reach;notified nsg;encouraged to call for assist;exit alarm on  -DB               User Key  (r) = Recorded By, (t) = Taken By, (c) = Cosigned By      Initials Name Provider Type    Eli Morales, PT Physical Therapist                   Outcome Measures       Row Name 05/02/24 1457 05/02/24 1400       How much help from another person do you currently need...    Turning from your back to your side while in flat bed without using bedrails? 2  -DB 3  -GG    Moving from lying on back to sitting on the side of a flat bed without bedrails?  2  -DB 3  -GG    Moving to and from a bed to a chair (including a wheelchair)? 1  -DB 2  -GG    Standing up from a chair using your arms (e.g., wheelchair, bedside chair)? 1  -DB 2  -GG    Climbing 3-5 steps with a railing? 1  -DB 1  -GG    To walk in hospital room? 1  -DB 1  -GG    AM-PAC 6 Clicks Score (PT) 8  -DB 12  -GG    Highest Level of Mobility Goal 3 --> Sit at edge of bed  -DB 4 --> Transfer to chair/commode  -GG      Row Name 05/02/24 0859          How much help from another person do you currently need...    Turning from your back to your side while in flat bed without using bedrails? 3  -GG     Moving from lying on back to sitting on the side of a flat bed without bedrails? 3  -GG     Moving to and from a bed to a chair (including a wheelchair)? 2  -GG     Standing up from a chair using your arms (e.g., wheelchair, bedside chair)? 2  -GG     Climbing 3-5 steps with a railing? 1  -GG     To walk in hospital room? 1  -GG     AM-PAC 6 Clicks Score (PT) 12  -GG     Highest Level of Mobility Goal 4 --> Transfer to chair/commode  -GG       Row Name 05/02/24 1457          Functional Assessment    Outcome Measure Options AM-PAC 6 Clicks Basic Mobility (PT)  -DB               User Key  (r) = Recorded By, (t) = Taken By, (c) = Cosigned By      Initials Name Provider Type    Eli Morales PT Physical Therapist    Jing Green RN Registered Nurse                                 Physical Therapy Education       Title: PT OT SLP Therapies (Done)       Topic: Physical Therapy (Done)       Point: Mobility training (Done)       Learning Progress Summary             Patient Acceptance, E, VU,NR by DB at 5/2/2024 1457                         Point: Home exercise program (Done)       Learning Progress Summary             Patient Acceptance, E, VU,NR by DB at 5/2/2024 1457                         Point: Body mechanics (Done)       Learning Progress Summary             Patient Acceptance, E, VU,NR by DB at  5/2/2024 1452                         Point: Precautions (Done)       Learning Progress Summary             Patient Acceptance, E, VU,NR by DB at 5/2/2024 2604                                         User Key       Initials Effective Dates Name Provider Type Discipline    DB 06/16/21 -  Eli Odell, PT Physical Therapist PT                  PT Recommendation and Plan  Planned Therapy Interventions (PT): balance training, bed mobility training, gait training, home exercise program, postural re-education, transfer training, stair training, neuromuscular re-education, strengthening, patient/family education, prosthetic fitting/training, motor coordination training, orthotic fitting/training, lumbar stabilization, ROM (range of motion), wheelchair management/propulsion training  Plan of Care Reviewed With: patient  Outcome Evaluation: Patient is an 83 y.o. male admitted to [facility] for inc'd SOA and palpitations on 4/27/2024. Underwent R THORACOSCOPY, partial decortication, and cath insertion on 4/30/24. 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 Patient is maxA at baseline with ADL's and lives with his wife. Pt has been living at home with help from caregiver for transfers and use of [employer] lift. He recently received his prosthesis and is current with OPPT at [facility]. A&O x4 today, although cognitive impairments noted with further discussion, as pt states he had his AKA performed 6 weeks ago. Today, patient performed bed mobility with maxA x2 and was able to sit EOB for ~ 15 min while RN in room to address dressing on R side. Pt's sitting balance ranges from [place] to maxA with brief periods of SBA. Tolerates session well today. Balance and strength deficits noted. Patient may benefit from skilled PT services acutely to address functional deficits as well as improve level of independence prior to discharge. Pt would benefit from rehab stay following D/C, rec D/C to IRF.     Time Calculation:         PT Charges       Row Name  05/02/24 1508             Time Calculation    Start Time 1401  -DB      Stop Time 1423  -DB      Time Calculation (min) 22 min  -DB      PT Received On 05/02/24  -DB      PT - Next Appointment 05/03/24  -DB      PT Goal Re-Cert Due Date 05/16/24  -DB         Time Calculation- PT    Total Timed Code Minutes- PT 10 minute(s)  -DB                User Key  (r) = Recorded By, (t) = Taken By, (c) = Cosigned By      Initials Name Provider Type    DB Eli Odell, PT Physical Therapist                  Therapy Charges for Today       Code Description Service Date Service Provider Modifiers Qty    93221599614  PT THERAPEUTIC ACT EA 15 MIN 5/2/2024 Eli Odell, PT GP 1    [id number]  PT EVAL MOD COMPLEXITY 3 5/2/2024 Eli Odell, PT GP 1            PT G-Codes  Outcome Measure Options: AM-PAC 6 Clicks Basic Mobility (PT)  AM-PAC 6 Clicks Score (PT): 8  PT Discharge Summary  Anticipated Discharge Disposition (PT): inpatient rehabilitation facility    Eli Odell PT  5/2/2024

## 2024-05-03 ENCOUNTER — APPOINTMENT (OUTPATIENT)
Dept: GENERAL RADIOLOGY | Facility: HOSPITAL | Age: 84
DRG: 186 | End: 2024-05-03
Payer: MEDICARE

## 2024-05-03 LAB
ANION GAP SERPL CALCULATED.3IONS-SCNC: 8.4 MMOL/L (ref 5–15)
BACTERIA SPEC AEROBE CULT: NORMAL
BASOPHILS # BLD AUTO: 0.01 10*3/MM3 (ref 0–0.2)
BASOPHILS NFR BLD AUTO: 0.2 % (ref 0–1.5)
BUN SERPL-MCNC: 29 MG/DL (ref 8–23)
BUN/CREAT SERPL: 42 (ref 7–25)
CALCIUM SPEC-SCNC: 7.8 MG/DL (ref 8.6–10.5)
CHLORIDE SERPL-SCNC: 105 MMOL/L (ref 98–107)
CO2 SERPL-SCNC: 29.6 MMOL/L (ref 22–29)
CREAT SERPL-MCNC: 0.69 MG/DL (ref 0.76–1.27)
DEPRECATED RDW RBC AUTO: 47.8 FL (ref 37–54)
EGFRCR SERPLBLD CKD-EPI 2021: 91.8 ML/MIN/1.73
EOSINOPHIL # BLD AUTO: 0.15 10*3/MM3 (ref 0–0.4)
EOSINOPHIL NFR BLD AUTO: 2.4 % (ref 0.3–6.2)
ERYTHROCYTE [DISTWIDTH] IN BLOOD BY AUTOMATED COUNT: 15.9 % (ref 12.3–15.4)
GLUCOSE SERPL-MCNC: 71 MG/DL (ref 65–99)
GRAM STN SPEC: NORMAL
HCT VFR BLD AUTO: 33.2 % (ref 37.5–51)
HGB BLD-MCNC: 10.4 G/DL (ref 13–17.7)
IMM GRANULOCYTES # BLD AUTO: 0.02 10*3/MM3 (ref 0–0.05)
IMM GRANULOCYTES NFR BLD AUTO: 0.3 % (ref 0–0.5)
LYMPHOCYTES # BLD AUTO: 0.74 10*3/MM3 (ref 0.7–3.1)
LYMPHOCYTES NFR BLD AUTO: 11.9 % (ref 19.6–45.3)
MCH RBC QN AUTO: 25.7 PG (ref 26.6–33)
MCHC RBC AUTO-ENTMCNC: 31.3 G/DL (ref 31.5–35.7)
MCV RBC AUTO: 82.2 FL (ref 79–97)
MONOCYTES # BLD AUTO: 0.56 10*3/MM3 (ref 0.1–0.9)
MONOCYTES NFR BLD AUTO: 9 % (ref 5–12)
NEUTROPHILS NFR BLD AUTO: 4.72 10*3/MM3 (ref 1.7–7)
NEUTROPHILS NFR BLD AUTO: 76.2 % (ref 42.7–76)
NRBC BLD AUTO-RTO: 0 /100 WBC (ref 0–0.2)
PLATELET # BLD AUTO: 188 10*3/MM3 (ref 140–450)
PMV BLD AUTO: 10.6 FL (ref 6–12)
POTASSIUM SERPL-SCNC: 3.7 MMOL/L (ref 3.5–5.2)
RBC # BLD AUTO: 4.04 10*6/MM3 (ref 4.14–5.8)
SODIUM SERPL-SCNC: 143 MMOL/L (ref 136–145)
WBC NRBC COR # BLD AUTO: 6.2 10*3/MM3 (ref 3.4–10.8)

## 2024-05-03 PROCEDURE — 25010000002 HEPARIN (PORCINE) PER 1000 UNITS: Performed by: THORACIC SURGERY (CARDIOTHORACIC VASCULAR SURGERY)

## 2024-05-03 PROCEDURE — 80048 BASIC METABOLIC PNL TOTAL CA: CPT | Performed by: HOSPITALIST

## 2024-05-03 PROCEDURE — 99024 POSTOP FOLLOW-UP VISIT: CPT | Performed by: NURSE PRACTITIONER

## 2024-05-03 PROCEDURE — 85025 COMPLETE CBC W/AUTO DIFF WBC: CPT | Performed by: HOSPITALIST

## 2024-05-03 PROCEDURE — 99232 SBSQ HOSP IP/OBS MODERATE 35: CPT | Performed by: NURSE PRACTITIONER

## 2024-05-03 PROCEDURE — 71045 X-RAY EXAM CHEST 1 VIEW: CPT

## 2024-05-03 RX ADMIN — MIDODRINE HYDROCHLORIDE 5 MG: 5 TABLET ORAL at 16:52

## 2024-05-03 RX ADMIN — ACETAMINOPHEN 1000 MG: 500 TABLET ORAL at 20:12

## 2024-05-03 RX ADMIN — GABAPENTIN 100 MG: 100 CAPSULE ORAL at 20:12

## 2024-05-03 RX ADMIN — COLLAGENASE SANTYL 1 APPLICATION: 250 OINTMENT TOPICAL at 13:42

## 2024-05-03 RX ADMIN — Medication 10 ML: at 20:13

## 2024-05-03 RX ADMIN — GUAIFENESIN 1200 MG: 600 TABLET, EXTENDED RELEASE ORAL at 08:18

## 2024-05-03 RX ADMIN — MUPIROCIN 1 APPLICATION: 20 OINTMENT TOPICAL at 20:13

## 2024-05-03 RX ADMIN — FUROSEMIDE 40 MG: 40 TABLET ORAL at 08:19

## 2024-05-03 RX ADMIN — HEPARIN SODIUM 5000 UNITS: 5000 INJECTION INTRAVENOUS; SUBCUTANEOUS at 06:37

## 2024-05-03 RX ADMIN — EMPAGLIFLOZIN 10 MG: 10 TABLET, FILM COATED ORAL at 08:19

## 2024-05-03 RX ADMIN — ACETAMINOPHEN 1000 MG: 500 TABLET ORAL at 16:52

## 2024-05-03 RX ADMIN — DIGOXIN 125 MCG: 125 TABLET ORAL at 12:28

## 2024-05-03 RX ADMIN — APIXABAN 5 MG: 5 TABLET, FILM COATED ORAL at 12:28

## 2024-05-03 RX ADMIN — ACETAMINOPHEN 1000 MG: 500 TABLET ORAL at 08:19

## 2024-05-03 RX ADMIN — METOPROLOL TARTRATE 12.5 MG: 25 TABLET, FILM COATED ORAL at 20:13

## 2024-05-03 RX ADMIN — SENNOSIDES AND DOCUSATE SODIUM 2 TABLET: 50; 8.6 TABLET ORAL at 20:12

## 2024-05-03 RX ADMIN — MUPIROCIN 1 APPLICATION: 20 OINTMENT TOPICAL at 08:19

## 2024-05-03 RX ADMIN — Medication 10 ML: at 08:19

## 2024-05-03 RX ADMIN — ATORVASTATIN CALCIUM 10 MG: 20 TABLET, FILM COATED ORAL at 20:13

## 2024-05-03 RX ADMIN — APIXABAN 5 MG: 5 TABLET, FILM COATED ORAL at 20:12

## 2024-05-03 RX ADMIN — MIDODRINE HYDROCHLORIDE 5 MG: 5 TABLET ORAL at 06:37

## 2024-05-03 RX ADMIN — METOPROLOL TARTRATE 12.5 MG: 25 TABLET, FILM COATED ORAL at 08:19

## 2024-05-03 RX ADMIN — GUAIFENESIN 1200 MG: 600 TABLET, EXTENDED RELEASE ORAL at 20:12

## 2024-05-03 RX ADMIN — METHIMAZOLE 5 MG: 5 TABLET ORAL at 08:19

## 2024-05-03 NOTE — PROGRESS NOTES
"CC: Follow-up A-fib, hypertension and heart failure preserved ejection fraction.    Interval History: Resting in bed.  He is asking when he can go home.  Chest tube removed yesterday.      Vital Signs  Temp:  [97.3 °F (36.3 °C)-98.2 °F (36.8 °C)] 97.7 °F (36.5 °C)  Heart Rate:  [] 101  Resp:  [16-18] 16  BP: ()/(44-72) 117/72    Intake/Output Summary (Last 24 hours) at 5/3/2024 0951  Last data filed at 5/2/2024 2200  Gross per 24 hour   Intake 710 ml   Output --   Net 710 ml     Flowsheet Rows      Flowsheet Row First Filed Value   Admission Height 177.8 cm (70\") Documented at 04/27/2024 1544   Admission Weight 64.5 kg (142 lb 3.2 oz) Documented at 04/27/2024 1544            PHYSICAL EXAM:  General: No acute distress  Resp:NL Rate, unlabored, diminished on right, diminished bases  CV:NL rate and irregular rhythm, NL PMI, Nl S1 and S2, no Murmur, no gallop, no rub, No JVD. Normal pedal pulses  ABD:Nl sounds, no masses or tenderness, nondistended, no guarding or rebound  Neuro: alert,cooperative and oriented  Extr: No edema or cyanosis, moves all extremities      Results Review:    Results from last 7 days   Lab Units 05/03/24  0508   SODIUM mmol/L 143   POTASSIUM mmol/L 3.7   CHLORIDE mmol/L 105   CO2 mmol/L 29.6*   BUN mg/dL 29*   CREATININE mg/dL 0.69*   GLUCOSE mg/dL 71   CALCIUM mg/dL 7.8*     Results from last 7 days   Lab Units 04/27/24  2238 04/27/24  1246 04/27/24  1033   HSTROP T ng/L 79* 72* 79*     Results from last 7 days   Lab Units 05/03/24  0508   WBC 10*3/mm3 6.20   HEMOGLOBIN g/dL 10.4*   HEMATOCRIT % 33.2*   PLATELETS 10*3/mm3 188     Results from last 7 days   Lab Units 04/30/24  0436   INR  1.22*   APTT seconds 32.2     Results from last 7 days   Lab Units 04/28/24  0338   CHOLESTEROL mg/dL 148     Results from last 7 days   Lab Units 04/27/24  2238   MAGNESIUM mg/dL 2.2     Results from last 7 days   Lab Units 04/28/24  0338   CHOLESTEROL mg/dL 148   TRIGLYCERIDES mg/dL 66   HDL CHOL " mg/dL 35*   LDL CHOL mg/dL 100     I reviewed the patient's new clinical results.  I personally viewed and interpreted the patient's EKG/Telemetry data-A-fib        Medication Review:   Meds reviewed    lactated ringers, 9 mL/hr, Last Rate: 9 mL/hr (04/30/24 0641)        Assessment/Plan  Right pleural effusion, required chest tube 2/2024 with transudative fluid.   -Now status post VATS, partial decortication and right Pleurx catheter.  -Chest tube removed 5/2/24  - note plans for Pleurx catheter to be drained by home health Monday, Wednesday and Friday.  HFpEF, on echo-RV function is normal, no pericardial effusion, normal LV function.  Blood pressure low/stable on low-dose Lopressor.  Mitral insufficiency - severe.  Mitral valve thickened-normal blood culture  Slight troponin elevation with coronary artery calcification-no angina  Atrial fibrillation, on Eliquis-this has been held for Pleurx catheter  Anemia- stable  H/o right AKA.   Cachexia with muscle wasting  History of toxic multinodular goiter, on methimazole, TSH normal.     -I will continue metoprolol tartrate 12.5 mg twice daily with midodrine 5 mg twice daily.  -Nursing staff has held a metoprolol dose at least once the past 2 days due to low blood pressure.  -Cardiology will resume Eliquis 5 mg twice daily starting today   -PleurX catheter to be drained at home Monday Wednesday Friday per [facility]  - I think it is ok to keep 6/10/24 office follow up with Dr. Chan as he will have  services in the interim.   -Okay to DC from cardiac standpoint      CHARLEE Li  05/03/24  09:51 EDT

## 2024-05-03 NOTE — PROGRESS NOTES
"  POST-OPERATIVE NOTE     Chief Complaint: Recurrent right pleural effusion  S/P: Right VATS with partial decortication and pleurx placement  POD # 3    Subjective:  Symptoms:  Improved.  He reports weakness.  No shortness of breath, cough or chest pain.    Diet:  Adequate intake.  No nausea or vomiting.    Activity level: Impaired due to weakness.    Pain:  He complains of pain that is mild.  Pain is well controlled.        Objective:  General Appearance:  Comfortable, well-appearing and in no acute distress.    Vital signs: (most recent): Blood pressure 109/51, pulse 93, temperature 97.1 °F (36.2 °C), temperature source Oral, resp. rate 16, height 177.8 cm (70\"), weight 64.4 kg (142 lb), SpO2 95%.    HEENT: (Nasal cannula)    Lungs:  Normal effort.  He is not in respiratory distress.  There are decreased breath sounds.    Heart: Tachycardia.  Irregular rhythm.    Chest: Symmetric chest wall expansion. Chest wall tenderness (Around chest tube, well-controlled.) present.    Abdomen: Abdomen is soft.    Extremities: Decreased range of motion.  (S/p right BKA)  Neurological: Patient is alert.    Skin:  Warm and dry.                    Results Review:     I reviewed the patient's new clinical results.  I reviewed the patient's new imaging results and agree with the interpretation.  Discussed with patient, RN and thoracic surgeon    Assessment & Plan     Recurrent right pleural effusion: secondary to heart failure. S/p right VATS with partial decortication and pleurx placement, POD 3.  4L of gelatinous material evacuated from the chest.  Some drainage from chest tube site status post chest tube removal yesterday.  Not unexpected.  Chest x-ray is improved.    May resume Eliquis from our standpoint.    Home health has been ordered to drain Pleurx every Monday, Wednesday and Friday.    Follow-up appointment scheduled in 1 month in our office.  Nothing further to add from thoracic.  Discharge per primary " service.    Mary Augustin DNP, APRN  [facility]  05/03/24  12:00 EDT    Patient was seen and assessed while wearing personal protective equipment including facemask, protective eyewear and gloves.  Hand hygiene performed prior to entering the room and upon exiting with doffing of gloves.

## 2024-05-03 NOTE — SIGNIFICANT NOTE
05/03/24 1609   OTHER   Discipline physical therapist   Rehab Time/Intention   Session Not Performed patient/family declined treatment  (pt declines PT this afternoon. He plans home tomorrow w family and caregivers. Will follow up if pt remains in hospital.)   Recommendation   PT - Next Appointment 05/06/24

## 2024-05-03 NOTE — PLAN OF CARE
Problem: Adult Inpatient Plan of Care  Goal: Plan of Care Review  Outcome: Ongoing, Progressing  Goal: Patient-Specific Goal (Individualized)  Outcome: Ongoing, Progressing  Goal: Absence of Hospital-Acquired Illness or Injury  Outcome: Ongoing, Progressing  Intervention: Identify and Manage Fall Risk  Recent Flowsheet Documentation  Taken 5/3/2024 0215 by Jean Claude Bender RN  Safety Promotion/Fall Prevention:   activity supervised   assistive device/personal items within reach   clutter free environment maintained   fall prevention program maintained   nonskid shoes/slippers when out of bed   room organization consistent   safety round/check completed  Taken 5/3/2024 0000 by Jean Claude Bender RN  Safety Promotion/Fall Prevention:   activity supervised   assistive device/personal items within reach   clutter free environment maintained   fall prevention program maintained   nonskid shoes/slippers when out of bed   safety round/check completed   room organization consistent  Taken 5/2/2024 2200 by Jean Claude Bender RN  Safety Promotion/Fall Prevention:   activity supervised   assistive device/personal items within reach   clutter free environment maintained   fall prevention program maintained   nonskid shoes/slippers when out of bed   room organization consistent   safety round/check completed  Taken 5/2/2024 2000 by Jean Claude Bender RN  Safety Promotion/Fall Prevention:   activity supervised   assistive device/personal items within reach   clutter free environment maintained   fall prevention program maintained   nonskid shoes/slippers when out of bed   room organization consistent   safety round/check completed  Intervention: Prevent Skin Injury  Recent Flowsheet Documentation  Taken 5/3/2024 0215 by Jean Claude Bender RN  Body Position: supine, legs elevated  Taken 5/3/2024 0000 by Jean Claude Bender RN  Body Position: supine, legs elevated  Taken 5/2/2024 2200 by Jean Claude Bender RN  Body Position:   tilted    left  Taken 5/2/2024 2000 by Jean Claude Bender RN  Body Position: supine, legs elevated  Skin Protection:   adhesive use limited   incontinence pads utilized   tubing/devices free from skin contact   transparent dressing maintained  Intervention: Prevent and Manage VTE (Venous Thromboembolism) Risk  Recent Flowsheet Documentation  Taken 5/3/2024 0215 by Jean Claude Bender RN  Activity Management: activity encouraged  Taken 5/3/2024 0000 by Jean Claude Bender RN  Activity Management: activity encouraged  Taken 5/2/2024 2200 by Jean Claude Bender RN  Activity Management: activity encouraged  Taken 5/2/2024 2000 by Jean Claude Bender RN  Activity Management: activity encouraged  VTE Prevention/Management: (SQ Hepain) other (see comments)  Range of Motion: active ROM (range of motion) encouraged  Goal: Optimal Comfort and Wellbeing  Outcome: Ongoing, Progressing  Intervention: Provide Person-Centered Care  Recent Flowsheet Documentation  Taken 5/3/2024 0215 by Jean Claude Bender RN  Trust Relationship/Rapport:   care explained   choices provided   emotional support provided   empathic listening provided   questions answered   reassurance provided   thoughts/feelings acknowledged  Taken 5/2/2024 2000 by Jean Claude Bender RN  Trust Relationship/Rapport:   care explained   choices provided   emotional support provided   empathic listening provided   questions answered   reassurance provided   thoughts/feelings acknowledged  Goal: Readiness for Transition of Care  Outcome: Ongoing, Progressing     Problem: Fall Injury Risk  Goal: Absence of Fall and Fall-Related Injury  Outcome: Ongoing, Progressing  Intervention: Identify and Manage Contributors  Recent Flowsheet Documentation  Taken 5/3/2024 0215 by Jean Claude Bender RN  Medication Review/Management: medications reviewed  Taken 5/3/2024 0000 by Jean Claude Bender RN  Medication Review/Management: medications reviewed  Taken 5/2/2024 2200 by Jean Claude Bender RN  Medication  Review/Management: medications reviewed  Taken 5/2/2024 2000 by Jean Claude Bender RN  Medication Review/Management: medications reviewed  Intervention: Promote Injury-Free Environment  Recent Flowsheet Documentation  Taken 5/3/2024 0215 by Jean Claude Bender RN  Safety Promotion/Fall Prevention:   activity supervised   assistive device/personal items within reach   clutter free environment maintained   fall prevention program maintained   nonskid shoes/slippers when out of bed   room organization consistent   safety round/check completed  Taken 5/3/2024 0000 by Jean Claude Bender RN  Safety Promotion/Fall Prevention:   activity supervised   assistive device/personal items within reach   clutter free environment maintained   fall prevention program maintained   nonskid shoes/slippers when out of bed   safety round/check completed   room organization consistent  Taken 5/2/2024 2200 by Jean Claude Bender RN  Safety Promotion/Fall Prevention:   activity supervised   assistive device/personal items within reach   clutter free environment maintained   fall prevention program maintained   nonskid shoes/slippers when out of bed   room organization consistent   safety round/check completed  Taken 5/2/2024 2000 by Jean Claude Bender RN  Safety Promotion/Fall Prevention:   activity supervised   assistive device/personal items within reach   clutter free environment maintained   fall prevention program maintained   nonskid shoes/slippers when out of bed   room organization consistent   safety round/check completed     Problem: Skin Injury Risk Increased  Goal: Skin Health and Integrity  Outcome: Ongoing, Progressing  Intervention: Optimize Skin Protection  Recent Flowsheet Documentation  Taken 5/3/2024 0215 by Jean Claude Bender RN  Head of Bed (HOB) Positioning: HOB at 20-30 degrees  Taken 5/3/2024 0000 by Jean Claude Bender RN  Head of Bed (HOB) Positioning: HOB at 20-30 degrees  Taken 5/2/2024 2200 by Jean Claude Bender RN  Head of Bed  (HOB) Positioning: HOB at 20-30 degrees  Taken 5/2/2024 2000 by Jean Claude Bender RN  Pressure Reduction Techniques:   frequent weight shift encouraged   weight shift assistance provided   heels elevated off bed   positioned off wounds  Head of Bed (HOB) Positioning: HOB at 20-30 degrees  Pressure Reduction Devices: alternating pressure pump (ADD)  Skin Protection:   adhesive use limited   incontinence pads utilized   tubing/devices free from skin contact   transparent dressing maintained     Problem: Heart Failure Comorbidity  Goal: Maintenance of Heart Failure Symptom Control  Outcome: Ongoing, Progressing  Intervention: Maintain Heart Failure-Management  Recent Flowsheet Documentation  Taken 5/3/2024 0215 by Jean Claude Bender RN  Medication Review/Management: medications reviewed  Taken 5/3/2024 0000 by Jean Claude Bender RN  Medication Review/Management: medications reviewed  Taken 5/2/2024 2200 by Jean Claude Bender RN  Medication Review/Management: medications reviewed  Taken 5/2/2024 2000 by Jean Claude Bender RN  Medication Review/Management: medications reviewed     Problem: Malnutrition  Goal: Improved Nutritional Intake  Outcome: Ongoing, Progressing   Goal Outcome Evaluation:

## 2024-05-03 NOTE — DISCHARGE PLACEMENT REQUEST
"Ellen Silva IV (83 y.o. Male)       Date of Birth   1940    Social Security Number       Address   [address] [place] [address]    Home Phone   [phone]    MRN   [id number]       [employer]   None    Marital Status                               Admission Date   4/27/24    Admission Type   Emergency    Admitting Provider   Dayron Fowler MD    Attending Provider   Dayron Fowler MD    Department, Room/Bed   [address], [address]       Discharge Date       Discharge Disposition       Discharge Destination                                 Attending Provider: Dayron Fowler MD    Allergies: No Known Allergies    Isolation: None   Infection: None   Code Status: CPR    Ht: 177.8 cm (70\")   Wt: 64.4 kg (142 lb)    Admission Cmt: None   Principal Problem: Recurrent pleural effusion on right [J90]                   Active Insurance as of 4/27/2024       Primary Coverage       Payor Plan Insurance Group Employer/Plan Group    MEDICARE MEDICARE A & B        Payor Plan Address Payor Plan Phone Number Payor Plan Fax Number Effective Dates    [address] [phone]  5/1/2005 - None Entered    [facility] [id number]         Subscriber Name Subscriber Birth Date Member ID       ELLEN SILVA IV 1940 [id number]               Secondary Coverage       Payor Plan Insurance Group Employer/Plan Group    [facility] SUPP [id number]       Payor Plan Address Payor Plan Phone Number Payor Plan Fax Number Effective Dates    [address]   5/1/2005 - None Entered    [facility] [id number]         Subscriber Name Subscriber Birth Date Member ID       ELLEN SILVA IV 1940 [id number]                     Emergency Contacts        (Rel.) Home Phone Work Phone Mobile Phone    Belem Sivla (Spouse) [phone] -- [phone]    ELLEN SILVA (Son) [phone] -- [phone]              Emergency Contact Information       Name Relation Home Work Mobile    " Belem Silva Spouse [phone]    ELLEN SILVA Son [phone]

## 2024-05-03 NOTE — CASE MANAGEMENT/SOCIAL WORK
Continued Stay Note  [facility]     Patient Name: Mehreen Silva IV  MRN: [id number]  Today's Date: 5/3/2024    Admit Date: 4/27/2024    Plan: Home   Discharge Plan       Row Name 05/03/24 1248       Plan    Plan Home    Patient/Family in Agreement with Plan yes    Plan [facility] wife now wants pt to return home at discharge with [facility] and private caregiver.  No ambulance available tomorrw.  Discussed with wife who states they will have their carevier transfer pt to wheelchair and transport home.  Offered [email or web address] van but pt and wife state they prefer caregiver to transport.  No further needs identified.  BENOIT Hackett RN                   Discharge Codes    No documentation.                 Expected Discharge Date and Time       Expected Discharge Date Expected Discharge Time    May 3, 2024               Milagros Hackett, RN

## 2024-05-03 NOTE — PROGRESS NOTES
"DAILY PROGRESS NOTE  [facility]    Patient Identification:  Name: Mehreen Silva IV  Age: 83 y.o.  Sex: male  :  1940  MRN: [id number]         Primary Care Physician: Taiwo Powers MD    Subjective:  Interval History: He complains of shortness of air.  He is still weak.  O 2 at room air now.  Objective:    Scheduled Meds:acetaminophen, 1,000 mg, Oral, TID  apixaban, 5 mg, Oral, Q12H  atorvastatin, 10 mg, Oral, Nightly  collagenase, 1 Application, Topical, Daily  digoxin, 125 mcg, Oral, Daily  empagliflozin, 10 mg, Oral, Daily  furosemide, 40 mg, Oral, Daily  gabapentin, 100 mg, Oral, Nightly  guaiFENesin, 1,200 mg, Oral, Q12H  methIMAzole, 5 mg, Oral, Once per day on   metoprolol tartrate, 12.5 mg, Oral, Q12H  midodrine, 5 mg, Oral, BID AC  mupirocin, 1 Application, Each Nare, Q12H  senna-docusate sodium, 2 tablet, Oral, Nightly  sodium chloride, 10 mL, Intravenous, Q12H      Continuous Infusions:lactated ringers, 9 mL/hr, Last Rate: 9 mL/hr (24 0641)        Vital signs in last 24 hours:  Temp:  [97.1 °F (36.2 °C)-97.7 °F (36.5 °C)] 97.1 °F (36.2 °C)  Heart Rate:  [] 84  Resp:  [16] 16  BP: ()/(44-72) 109/51    Intake/Output:    Intake/Output Summary (Last 24 hours) at 5/3/2024 1345  Last data filed at 5/3/2024 1300  Gross per 24 hour   Intake 710 ml   Output 450 ml   Net 260 ml         Exam:  /51 (BP Location: Left arm, Patient Position: Lying)   Pulse 84   Temp 97.1 °F (36.2 °C) (Oral)   Resp 16   Ht 177.8 cm (70\")   Wt 64.4 kg (142 lb)   SpO2 95%   BMI 20.37 kg/m²     General Appearance:    Alert, cooperative, no distress   Head:    Normocephalic, without obvious abnormality, atraumatic   Eyes:       Throat:   Lips, tongue, gums normal   Neck:   Supple, symmetrical, trachea midline, no JVD   Lungs:   Decreased breath sounds on the right but starting to move a little bit of air, respirations unlabored   Chest Wall:  Surgical " changes with Pleurx catheter in place    Heart:    Regular rate and rhythm, S1 and S2 normal, no murmur,no  Rub or gallop   Abdomen:     Soft, nontender, bowel sounds active, no masses, no organomegaly    Extremities:   Extremities normal, right AKA, no cyanosis or edema   Pulses:      Skin:   Skin is warm and dry,  no rashes or palpable lesions   Neurologic:   no focal deficits noted      Lab Results (last 72 hours)       Procedure Component Value Units Date/Time    Lipid Panel [[id number]]  (Abnormal) Collected: 04/28/24 0338    Specimen: Blood Updated: 04/28/24 0723     Total Cholesterol 148 mg/dL      Triglycerides 66 mg/dL      HDL Cholesterol 35 mg/dL      LDL Cholesterol  100 mg/dL      VLDL Cholesterol 13 mg/dL      LDL/HDL Ratio 2.85    Narrative:      Cholesterol Reference Ranges  (U.S. Department of Health and Human Services ATP III Classifications)    Desirable          <200 mg/dL  Borderline High    200-239 mg/dL  High Risk          >240 mg/dL      Triglyceride Reference Ranges  (U.S. Department of Health and Human Services ATP III Classifications)    Normal           <150 mg/dL  Borderline High  150-199 mg/dL  High             200-499 mg/dL  Very High        >500 mg/dL    HDL Reference Ranges  (U.S. Department of Health and Human Services ATP III Classifications)    Low     <40 mg/dl (major risk factor for CHD)  High    >60 mg/dl ('negative' risk factor for CHD)        LDL Reference Ranges  (U.S. Department of Health and Human Services ATP III Classifications)    Optimal          <100 mg/dL  Near Optimal     100-129 mg/dL  Borderline High  130-159 mg/dL  High             160-189 mg/dL  Very High        >189 mg/dL    Basic Metabolic Panel [[id number]]  (Abnormal) Collected: 04/28/24 0338    Specimen: Blood Updated: 04/28/24 0427     Glucose 122 mg/dL      BUN 22 mg/dL      Creatinine 0.79 mg/dL      Sodium 140 mmol/L      Potassium 4.4 mmol/L      Chloride 101 mmol/L      CO2 29.8 mmol/L      Calcium 8.6  mg/dL      BUN/Creatinine Ratio 27.8     Anion Gap 9.2 mmol/L      eGFR 88.1 mL/min/1.73     Narrative:      GFR Normal >60  Chronic Kidney Disease <60  Kidney Failure <15    The GFR formula is only valid for adults with stable renal function between ages 18 and 70.    CBC & Differential [[id number]]  (Abnormal) Collected: 04/28/24 0337    Specimen: Blood Updated: 04/28/24 0409    Narrative:      The following orders were created for panel order CBC & Differential.  Procedure                               Abnormality         Status                     ---------                               -----------         ------                     CBC Auto Differential[[id number]]        Abnormal            Final result                 Please view results for these tests on the individual orders.    CBC Auto Differential [[id number]]  (Abnormal) Collected: 04/28/24 0337    Specimen: Blood Updated: 04/28/24 0409     WBC 7.43 10*3/mm3      RBC 3.92 10*6/mm3      Hemoglobin 10.1 g/dL      Hematocrit 31.8 %      MCV 81.1 fL      MCH 25.8 pg      MCHC 31.8 g/dL      RDW 16.8 %      RDW-SD 49.0 fl      MPV 10.7 fL      Platelets 189 10*3/mm3      Neutrophil % 78.4 %      Lymphocyte % 9.7 %      Monocyte % 10.9 %      Eosinophil % 0.3 %      Basophil % 0.3 %      Immature Grans % 0.4 %      Neutrophils, Absolute 5.83 10*3/mm3      Lymphocytes, Absolute 0.72 10*3/mm3      Monocytes, Absolute 0.81 10*3/mm3      Eosinophils, Absolute 0.02 10*3/mm3      Basophils, Absolute 0.02 10*3/mm3      Immature Grans, Absolute 0.03 10*3/mm3      nRBC 0.0 /100 WBC     Magnesium [[id number]]  (Normal) Collected: 04/27/24 2238    Specimen: Blood Updated: 04/27/24 2321     Magnesium 2.2 mg/dL     Potassium [[id number]]  (Normal) Collected: 04/27/24 2238    Specimen: Blood Updated: 04/27/24 2321     Potassium 4.4 mmol/L     High Sensitivity Troponin T [[id number]]  (Abnormal) Collected: 04/27/24 2238    Specimen: Blood Updated: 04/27/24 2321     HS Troponin T  79 ng/L     Narrative:      High Sensitive Troponin T Reference Range:  <14.0 ng/L- Negative Female for AMI  <22.0 ng/L- Negative Male for AMI  >=14 - Abnormal Female indicating possible myocardial injury.  >=22 - Abnormal Male indicating possible myocardial injury.   Clinicians would have to utilize clinical acumen, EKG, Troponin, and serial changes to determine if it is an Acute Myocardial Infarction or myocardial injury due to an underlying chronic condition.         Creatinine Serum (kidney function) GFR component [[id number]]  (Normal) Collected: 04/27/24 2238    Specimen: Blood Updated: 04/27/24 2320     Creatinine 0.82 mg/dL      eGFR 87.2 mL/min/1.73     Narrative:      GFR Normal >60  Chronic Kidney Disease <60  Kidney Failure <15    The GFR formula is only valid for adults with stable renal function between ages 18 and 70.    High Sensitivity Troponin T 2Hr [[id number]]  (Abnormal) Collected: 04/27/24 1246    Specimen: Blood Updated: 04/27/24 1323     HS Troponin T 72 ng/L      Troponin T Delta -7 ng/L     Narrative:      High Sensitive Troponin T Reference Range:  <14.0 ng/L- Negative Female for AMI  <22.0 ng/L- Negative Male for AMI  >=14 - Abnormal Female indicating possible myocardial injury.  >=22 - Abnormal Male indicating possible myocardial injury.   Clinicians would have to utilize clinical acumen, EKG, Troponin, and serial changes to determine if it is an Acute Myocardial Infarction or myocardial injury due to an underlying chronic condition.         TSH Rfx On Abnormal To Free T4 [[id number]]  (Normal) Collected: 04/27/24 1033    Specimen: Blood Updated: 04/27/24 1126     TSH 0.819 uIU/mL     Single High Sensitivity Troponin T [[id number]]  (Abnormal) Collected: 04/27/24 1033    Specimen: Blood Updated: 04/27/24 1107     HS Troponin T 79 ng/L     Narrative:      High Sensitive Troponin T Reference Range:  <14.0 ng/L- Negative Female for AMI  <22.0 ng/L- Negative Male for AMI  >=14 - Abnormal Female  indicating possible myocardial injury.  >=22 - Abnormal Male indicating possible myocardial injury.   Clinicians would have to utilize clinical acumen, EKG, Troponin, and serial changes to determine if it is an Acute Myocardial Infarction or myocardial injury due to an underlying chronic condition.         Comprehensive Metabolic Panel [[id number]]  (Abnormal) Collected: 04/27/24 1033    Specimen: Blood Updated: 04/27/24 1103     Glucose 209 mg/dL      BUN 23 mg/dL      Creatinine 1.03 mg/dL      Sodium 141 mmol/L      Potassium 5.7 mmol/L      Chloride 100 mmol/L      CO2 30.0 mmol/L      Calcium 9.1 mg/dL      Total Protein 6.3 g/dL      Albumin 3.1 g/dL      ALT (SGPT) 18 U/L      AST (SGOT) 12 U/L      Alkaline Phosphatase 118 U/L      Total Bilirubin 1.1 mg/dL      Globulin 3.2 gm/dL      A/G Ratio 1.0 g/dL      BUN/Creatinine Ratio 22.3     Anion Gap 11.0 mmol/L      eGFR 72.1 mL/min/1.73     Narrative:      GFR Normal >60  Chronic Kidney Disease <60  Kidney Failure <15    The GFR formula is only valid for adults with stable renal function between ages 18 and 70.    BNP [[id number]]  (Abnormal) Collected: 04/27/24 1033    Specimen: Blood Updated: 04/27/24 1103     proBNP 5,603.0 pg/mL     Narrative:      This assay is used as an aid in the diagnosis of individuals suspected of having heart failure. It can be used as an aid in the diagnosis of acute decompensated heart failure (ADHF) in patients presenting with signs and symptoms of ADHF to the emergency department (ED). In addition, NT-proBNP of <300 pg/mL indicates ADHF is not likely.    Age Range Result Interpretation  NT-proBNP Concentration (pg/mL:      <50             Positive            >450                   Gray                 300-450                    Negative             <300    50-75           Positive            >900                  Gray                300-900                  Negative            <300      >75             Positive             >1800                  Marcum                300-1800                  Negative            <300    Magnesium [[id number]]  (Normal) Collected: 04/27/24 1033    Specimen: Blood Updated: 04/27/24 1103     Magnesium 2.3 mg/dL     CBC & Differential [[id number]]  (Abnormal) Collected: 04/27/24 1033    Specimen: Blood Updated: 04/27/24 1045    Narrative:      The following orders were created for panel order CBC & Differential.  Procedure                               Abnormality         Status                     ---------                               -----------         ------                     CBC Auto Differential[[id number]]        Abnormal            Final result                 Please view results for these tests on the individual orders.    CBC Auto Differential [[id number]]  (Abnormal) Collected: 04/27/24 1033    Specimen: Blood Updated: 04/27/24 1045     WBC 8.87 10*3/mm3      RBC 4.42 10*6/mm3      Hemoglobin 11.5 g/dL      Hematocrit 37.2 %      MCV 84.2 fL      MCH 26.0 pg      MCHC 30.9 g/dL      RDW 16.9 %      RDW-SD 51.4 fl      MPV 10.5 fL      Platelets 242 10*3/mm3      Neutrophil % 82.8 %      Lymphocyte % 8.6 %      Monocyte % 8.0 %      Eosinophil % 0.1 %      Basophil % 0.2 %      Immature Grans % 0.3 %      Neutrophils, Absolute 7.34 10*3/mm3      Lymphocytes, Absolute 0.76 10*3/mm3      Monocytes, Absolute 0.71 10*3/mm3      Eosinophils, Absolute 0.01 10*3/mm3      Basophils, Absolute 0.02 10*3/mm3      Immature Grans, Absolute 0.03 10*3/mm3      nRBC 0.0 /100 WBC     [place] Draw [[id number]] Collected: 04/27/24 1033    Specimen: Blood Updated: 04/27/24 1045    Narrative:      The following orders were created for panel order [place] Draw.  Procedure                               Abnormality         Status                     ---------                               -----------         ------                     Green Top (Gel)[[id number]]                                  Final result                Lavender Top[[id number]]                                     Final result               Gold Top - SST[[id number]]                                   Final result               Light Blue Top[[id number]]                                   Final result                 Please view results for these tests on the individual orders.    Green Top (Gel) [[id number]] Collected: 04/27/24 1033    Specimen: Blood Updated: 04/27/24 1045     Extra Tube Hold for add-ons.     Comment: Auto resulted.       Lavender Top [[id number]] Collected: 04/27/24 1033    Specimen: Blood Updated: 04/27/24 1045     Extra Tube hold for add-on     Comment: Auto resulted       Gold Top - SST [[id number]] Collected: 04/27/24 1033    Specimen: Blood Updated: 04/27/24 1045     Extra Tube Hold for add-ons.     Comment: Auto resulted.       Light Blue Top [[id number]] Collected: 04/27/24 1033    Specimen: Blood Updated: 04/27/24 1045     Extra Tube Hold for add-ons.     Comment: Auto resulted             Data Review:  Results from last 7 days   Lab Units 05/03/24  0508 05/02/24  0439 05/01/24  0508   SODIUM mmol/L 143 137 137   POTASSIUM mmol/L 3.7 4.0 5.4*   CHLORIDE mmol/L 105 103 101   CO2 mmol/L 29.6* 29.0 26.6   BUN mg/dL 29* 33* 24*   CREATININE mg/dL 0.69* 0.80 1.01   GLUCOSE mg/dL 71 86 124*   CALCIUM mg/dL 7.8* 7.3* 8.2*     Results from last 7 days   Lab Units 05/03/24  0508 05/02/24  0439 05/01/24  0508   WBC 10*3/mm3 6.20 6.25 9.51   HEMOGLOBIN g/dL 10.4* 9.3* 10.4*   HEMATOCRIT % 33.2* 29.8* 32.9*   PLATELETS 10*3/mm3 188 179 169     Results from last 7 days   Lab Units 04/27/24  1033   TSH uIU/mL 0.819         Lab Results   Lab Value Date/Time    TROPONINT 79 (C) 04/27/2024 2238    TROPONINT 72 (C) 04/27/2024 1246    TROPONINT 79 (C) 04/27/2024 1033    TROPONINT 60 (C) 02/09/2024 1401    TROPONINT 65 (C) 02/09/2024 1114    TROPONINT 64 (C) 11/08/2023 2048    TROPONINT 56 (C) 11/08/2023 1534    TROPONINT 60 (C) 11/08/2023 1003    TROPONINT <0.010  "09/23/2022 1627     Results from last 7 days   Lab Units 04/28/24  0338   CHOLESTEROL mg/dL 148   TRIGLYCERIDES mg/dL 66   HDL CHOL mg/dL 35*   LDL CHOL mg/dL 100     Results from last 7 days   Lab Units 04/27/24  1033   ALK PHOS U/L 118*   BILIRUBIN mg/dL 1.1   ALT (SGPT) U/L 18   AST (SGOT) U/L 12     Results from last 7 days   Lab Units 04/27/24  1033   TSH uIU/mL 0.819         No results found for: \"POCGLU\"  Results from last 7 days   Lab Units 04/30/24  0436   INR  1.22*       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    Arthritis     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, 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/2023    Atrial fibrillation     Atrial fibrillation, persistent 09/24/2022    Cervical spondylosis with myelopathy     CHF (congestive heart failure)     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5]  Yes    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       Plan:  Consult from pulmonary, thoracic surgery and cardiology noted.  Follow-up on labs.  Postop care and restart anticoagulation when okay with surgery.  Cardiology making adjustment on medications for rate control and blood pressure.  DC planning.  " Disposition to be determined.  Family plans to take him home with home health services probably tomorrow.      Dayron Fowler MD  5/3/2024  13:45 EDT

## 2024-05-04 LAB
ANION GAP SERPL CALCULATED.3IONS-SCNC: 6.3 MMOL/L (ref 5–15)
BACTERIA SPEC AEROBE CULT: NORMAL
BASOPHILS # BLD AUTO: 0.02 10*3/MM3 (ref 0–0.2)
BASOPHILS NFR BLD AUTO: 0.3 % (ref 0–1.5)
BUN SERPL-MCNC: 29 MG/DL (ref 8–23)
BUN/CREAT SERPL: 37.2 (ref 7–25)
CALCIUM SPEC-SCNC: 8 MG/DL (ref 8.6–10.5)
CHLORIDE SERPL-SCNC: 105 MMOL/L (ref 98–107)
CO2 SERPL-SCNC: 31.7 MMOL/L (ref 22–29)
CREAT SERPL-MCNC: 0.78 MG/DL (ref 0.76–1.27)
DEPRECATED RDW RBC AUTO: 48.7 FL (ref 37–54)
EGFRCR SERPLBLD CKD-EPI 2021: 88.5 ML/MIN/1.73
EOSINOPHIL # BLD AUTO: 0.15 10*3/MM3 (ref 0–0.4)
EOSINOPHIL NFR BLD AUTO: 2.4 % (ref 0.3–6.2)
ERYTHROCYTE [DISTWIDTH] IN BLOOD BY AUTOMATED COUNT: 16 % (ref 12.3–15.4)
GLUCOSE SERPL-MCNC: 73 MG/DL (ref 65–99)
HCT VFR BLD AUTO: 33.3 % (ref 37.5–51)
HGB BLD-MCNC: 10.3 G/DL (ref 13–17.7)
IMM GRANULOCYTES # BLD AUTO: 0.03 10*3/MM3 (ref 0–0.05)
IMM GRANULOCYTES NFR BLD AUTO: 0.5 % (ref 0–0.5)
LYMPHOCYTES # BLD AUTO: 1.01 10*3/MM3 (ref 0.7–3.1)
LYMPHOCYTES NFR BLD AUTO: 16.4 % (ref 19.6–45.3)
MCH RBC QN AUTO: 25.5 PG (ref 26.6–33)
MCHC RBC AUTO-ENTMCNC: 30.9 G/DL (ref 31.5–35.7)
MCV RBC AUTO: 82.4 FL (ref 79–97)
MONOCYTES # BLD AUTO: 0.45 10*3/MM3 (ref 0.1–0.9)
MONOCYTES NFR BLD AUTO: 7.3 % (ref 5–12)
NEUTROPHILS NFR BLD AUTO: 4.48 10*3/MM3 (ref 1.7–7)
NEUTROPHILS NFR BLD AUTO: 73.1 % (ref 42.7–76)
NRBC BLD AUTO-RTO: 0 /100 WBC (ref 0–0.2)
PLATELET # BLD AUTO: 189 10*3/MM3 (ref 140–450)
PMV BLD AUTO: 10 FL (ref 6–12)
POTASSIUM SERPL-SCNC: 3.6 MMOL/L (ref 3.5–5.2)
RBC # BLD AUTO: 4.04 10*6/MM3 (ref 4.14–5.8)
SODIUM SERPL-SCNC: 143 MMOL/L (ref 136–145)
WBC NRBC COR # BLD AUTO: 6.14 10*3/MM3 (ref 3.4–10.8)

## 2024-05-04 PROCEDURE — 85025 COMPLETE CBC W/AUTO DIFF WBC: CPT | Performed by: HOSPITALIST

## 2024-05-04 PROCEDURE — 80048 BASIC METABOLIC PNL TOTAL CA: CPT | Performed by: HOSPITALIST

## 2024-05-04 RX ADMIN — MIDODRINE HYDROCHLORIDE 5 MG: 5 TABLET ORAL at 18:16

## 2024-05-04 RX ADMIN — GABAPENTIN 100 MG: 100 CAPSULE ORAL at 20:47

## 2024-05-04 RX ADMIN — Medication 10 ML: at 15:41

## 2024-05-04 RX ADMIN — ACETAMINOPHEN 1000 MG: 500 TABLET ORAL at 15:39

## 2024-05-04 RX ADMIN — Medication 10 ML: at 20:47

## 2024-05-04 RX ADMIN — FUROSEMIDE 40 MG: 40 TABLET ORAL at 08:42

## 2024-05-04 RX ADMIN — MIDODRINE HYDROCHLORIDE 5 MG: 5 TABLET ORAL at 06:30

## 2024-05-04 RX ADMIN — METOPROLOL TARTRATE 12.5 MG: 25 TABLET, FILM COATED ORAL at 20:45

## 2024-05-04 RX ADMIN — GUAIFENESIN 1200 MG: 600 TABLET, EXTENDED RELEASE ORAL at 08:42

## 2024-05-04 RX ADMIN — DIGOXIN 125 MCG: 125 TABLET ORAL at 12:00

## 2024-05-04 RX ADMIN — ACETAMINOPHEN 1000 MG: 500 TABLET ORAL at 20:47

## 2024-05-04 RX ADMIN — COLLAGENASE SANTYL 1 APPLICATION: 250 OINTMENT TOPICAL at 15:39

## 2024-05-04 RX ADMIN — ACETAMINOPHEN 1000 MG: 500 TABLET ORAL at 08:48

## 2024-05-04 RX ADMIN — GUAIFENESIN 1200 MG: 600 TABLET, EXTENDED RELEASE ORAL at 20:46

## 2024-05-04 RX ADMIN — APIXABAN 5 MG: 5 TABLET, FILM COATED ORAL at 20:47

## 2024-05-04 RX ADMIN — EMPAGLIFLOZIN 10 MG: 10 TABLET, FILM COATED ORAL at 08:48

## 2024-05-04 RX ADMIN — ATORVASTATIN CALCIUM 10 MG: 20 TABLET, FILM COATED ORAL at 20:46

## 2024-05-04 RX ADMIN — METOPROLOL TARTRATE 5 MG: 1 INJECTION, SOLUTION INTRAVENOUS at 08:43

## 2024-05-04 RX ADMIN — SENNOSIDES AND DOCUSATE SODIUM 2 TABLET: 50; 8.6 TABLET ORAL at 20:45

## 2024-05-04 RX ADMIN — APIXABAN 5 MG: 5 TABLET, FILM COATED ORAL at 08:42

## 2024-05-04 NOTE — PROGRESS NOTES
"                                              LOS: 6 days   Patient Care Team:  Taiwo Powers MD as PCP - General (Internal Medicine)  Self, Bess YO MD as Consulting Physician (Endocrinology)  Huang Chan MD as Cardiologist (Cardiology)    Chief Complaint:  F/up pleural effusion and abnormal chest imaging.    Subjective   Interval History    On RA.  Blood pressure is in normal range.  Intermittently tachycardic.      REVIEW OF SYSTEMS:   CARDIOVASCULAR: No chest pain, chest pressure or chest discomfort. No palpitations or edema.     GASTROINTESTINAL: No anorexia, nausea, vomiting or diarrhea. No abdominal pain.  CONSTITUTIONAL: No fever or chills.     Ventilator/Non-Invasive Ventilation Settings (From admission, onward)      None                  Physical Exam:     Vital Signs  Temp:  [97.5 °F (36.4 °C)-98.4 °F (36.9 °C)] 97.7 °F (36.5 °C)  Heart Rate:  [] 95  Resp:  [16-17] 17  BP: (104-132)/(58-78) 112/77    Intake/Output Summary (Last 24 hours) at 5/4/2024 1549  Last data filed at 5/4/2024 0839  Gross per 24 hour   Intake 480 ml   Output --   Net 480 ml     Flowsheet Rows      Flowsheet Row First Filed Value   Admission Height 177.8 cm (70\") Documented at 04/27/2024 1544   Admission Weight 64.5 kg (142 lb 3.2 oz) Documented at 04/27/2024 1544            PPE used per hospital policy    General Appearance:   Alert, cooperative, in no acute distress   ENMT:  Mallampati score 2, moist mucous membrane   Eyes:  Pupils equal and reactive to light. EOMI   Neck:    Trachea midline. No thyromegaly.   Lungs:   Crackles on the right, improved again today.  No wheezing.  Nonlabored breathing    Heart:   Regular rhythm and normal rate, normal S1 and S2, no         murmur   Skin:   No rash or ecchymosis   Abdomen:    Soft. No tenderness. No HSM.   Neuro/psych:  Conscious, alert, oriented x3. Strength 5/5 in upper and lower  ext.  Appropriate mood and affect   Extremities:  No cyanosis, clubbing or edema.  " Warm extremities and well-perfused          Results Review:        Results from last 7 days   Lab Units 05/04/24  0425 05/03/24  0508 05/02/24  0439   SODIUM mmol/L 143 143 137   POTASSIUM mmol/L 3.6 3.7 4.0   CHLORIDE mmol/L 105 105 103   CO2 mmol/L 31.7* 29.6* 29.0   BUN mg/dL 29* 29* 33*   CREATININE mg/dL 0.78 0.69* 0.80   GLUCOSE mg/dL 73 71 86   CALCIUM mg/dL 8.0* 7.8* 7.3*     Results from last 7 days   Lab Units 04/27/24  2238   HSTROP T ng/L 79*     Results from last 7 days   Lab Units 05/04/24 0425 05/03/24  0508 05/02/24  0439   WBC 10*3/mm3 6.14 6.20 6.25   HEMOGLOBIN g/dL 10.3* 10.4* 9.3*   HEMATOCRIT % 33.3* 33.2* 29.8*   PLATELETS 10*3/mm3 189 188 179     Results from last 7 days   Lab Units 04/30/24  0436   INR  1.22*   APTT seconds 32.2                                 I reviewed the patient's new clinical results.        Medication Review:   acetaminophen, 1,000 mg, Oral, TID  apixaban, 5 mg, Oral, Q12H  atorvastatin, 10 mg, Oral, Nightly  collagenase, 1 Application, Topical, Daily  digoxin, 125 mcg, Oral, Daily  empagliflozin, 10 mg, Oral, Daily  furosemide, 40 mg, Oral, Daily  gabapentin, 100 mg, Oral, Nightly  guaiFENesin, 1,200 mg, Oral, Q12H  methIMAzole, 5 mg, Oral, Once per day on Monday Wednesday Friday  metoprolol tartrate, 12.5 mg, Oral, Q12H  midodrine, 5 mg, Oral, BID AC  mupirocin, 1 Application, Each Nare, Q12H  senna-docusate sodium, 2 tablet, Oral, Nightly  sodium chloride, 10 mL, Intravenous, Q12H        lactated ringers, 9 mL/hr, Last Rate: 9 mL/hr (04/30/24 0641)        Diagnostic imaging:  I personally and independently reviewed the following images:  CT chest 4/27/2024: Bilateral pleural effusion much more prominent on the right and occupying the whole lung with complete right lung atelectasis.  Only mild pleural effusion on the left.      CXR 5/3/2024: Right pulmonary infiltrates and mild pleural effusion.     Assessment     Recurrent nonloculated right pleural effusion s/p  VATS with partial decortication and Pleurx catheter placement 4/30.  Right lung compressive atelectasis, improving  Suspect reexpansion pulmonary edema on the right  Bilateral pleural effusion, R >>L, secondary to CHF  Chronic HFpEF  A-fib with RVR   Hypotension        Plan       Incentive spirometry.  Encouraged to use.  DuoNeb PRN.   Mucinex 1200 mg BID  DVT prophylaxis with heparin subcu 3 times daily when okay with thoracic surgery  Lasix 40 mg p.o. daily.  Midodrine 5 mg 2 times daily  Neurontin 100 mg nightly for pain  Eliquis 5 mg BID  Empagliflozin  Education for Pleurx catheter drainage on [facility]  Metoprolol 12.5 mg twice daily.  Digoxin for A-fib.    Walking oximetry prior to discharge.    Dispo: Okay to discharge home.    Jennifer Newton MD  05/04/24  15:49 EDT          This note was dictated utilizing Dragon dictation

## 2024-05-04 NOTE — PLAN OF CARE
Problem: Adult Inpatient Plan of Care  Goal: Plan of Care Review  Outcome: Ongoing, Progressing  Goal: Patient-Specific Goal (Individualized)  Outcome: Ongoing, Progressing  Goal: Absence of Hospital-Acquired Illness or Injury  Outcome: Ongoing, Progressing  Intervention: Identify and Manage Fall Risk  Recent Flowsheet Documentation  Taken 5/4/2024 0607 by Jean Claude Bender RN  Safety Promotion/Fall Prevention:   activity supervised   assistive device/personal items within reach   clutter free environment maintained   fall prevention program maintained   room organization consistent   safety round/check completed   nonskid shoes/slippers when out of bed  Taken 5/4/2024 0416 by Jean Claude Bender, RN  Safety Promotion/Fall Prevention:   assistive device/personal items within reach   activity supervised   clutter free environment maintained   fall prevention program maintained   nonskid shoes/slippers when out of bed   room organization consistent   safety round/check completed  Taken 5/4/2024 0200 by Jean Claude Bender, RN  Safety Promotion/Fall Prevention:   activity supervised   assistive device/personal items within reach   clutter free environment maintained   fall prevention program maintained   nonskid shoes/slippers when out of bed   room organization consistent   safety round/check completed  Taken 5/4/2024 0000 by Jean Claude Bender, RN  Safety Promotion/Fall Prevention:   activity supervised   assistive device/personal items within reach   clutter free environment maintained   fall prevention program maintained   nonskid shoes/slippers when out of bed   room organization consistent   safety round/check completed  Taken 5/3/2024 2209 by Jean Claude Bender, RN  Safety Promotion/Fall Prevention:   activity supervised   assistive device/personal items within reach   clutter free environment maintained   fall prevention program maintained   nonskid shoes/slippers when out of bed   room organization consistent   safety  round/check completed  Taken 5/3/2024 2002 by Jean Claude Bender RN  Safety Promotion/Fall Prevention:   assistive device/personal items within reach   activity supervised   clutter free environment maintained   fall prevention program maintained   nonskid shoes/slippers when out of bed   room organization consistent   safety round/check completed  Intervention: Prevent Skin Injury  Recent Flowsheet Documentation  Taken 5/4/2024 0607 by Jean Claude Bender RN  Body Position: supine, legs elevated  Taken 5/4/2024 0416 by Jean Claude Bender RN  Body Position: supine, legs elevated  Taken 5/4/2024 0200 by Jean Claude Bender RN  Body Position: supine, legs elevated  Taken 5/4/2024 0000 by Jean Claude Bender RN  Body Position: supine, legs elevated  Taken 5/3/2024 2209 by Jean Claude Bender RN  Body Position: supine, legs elevated  Taken 5/3/2024 2002 by Jean Claude Bender RN  Body Position: supine, legs elevated  Taken 5/3/2024 1956 by Jean Claude Bender RN  Skin Protection:   adhesive use limited   incontinence pads utilized   tubing/devices free from skin contact   transparent dressing maintained  Intervention: Prevent and Manage VTE (Venous Thromboembolism) Risk  Recent Flowsheet Documentation  Taken 5/4/2024 0607 by Jean Claude Bender RN  Activity Management: activity minimized  Taken 5/4/2024 0416 by Jean Claude Bender RN  Activity Management: activity minimized  Taken 5/4/2024 0200 by Jean Claude Bender RN  Activity Management: activity minimized  Taken 5/4/2024 0000 by Jean Claude Bender RN  Activity Management: activity minimized  Taken 5/3/2024 2209 by Jean Claude Bender RN  Activity Management: activity encouraged  Taken 5/3/2024 2002 by Jean Claude Bender RN  Activity Management: activity encouraged  Taken 5/3/2024 1956 by Jean Claude Bender RN  VTE Prevention/Management: (Eliquis) other (see comments)  Range of Motion: active ROM (range of motion) encouraged  Goal: Optimal Comfort and Wellbeing  Outcome: Ongoing,  Progressing  Intervention: Provide Person-Centered Care  Recent Flowsheet Documentation  Taken 5/3/2024 1956 by Jean Claude Bender RN  Trust Relationship/Rapport:   care explained   choices provided   emotional support provided   empathic listening provided   questions answered   reassurance provided   thoughts/feelings acknowledged  Goal: Readiness for Transition of Care  Outcome: Ongoing, Progressing     Problem: Fall Injury Risk  Goal: Absence of Fall and Fall-Related Injury  Outcome: Ongoing, Progressing  Intervention: Identify and Manage Contributors  Recent Flowsheet Documentation  Taken 5/4/2024 0607 by Jean Claude Bender RN  Medication Review/Management: medications reviewed  Taken 5/4/2024 0416 by Jean Claude Bender RN  Medication Review/Management: medications reviewed  Taken 5/4/2024 0200 by Jean Claude Bender RN  Medication Review/Management: medications reviewed  Taken 5/4/2024 0000 by Jean Claude Bender RN  Medication Review/Management: medications reviewed  Taken 5/3/2024 2209 by Jean Claude Bender RN  Medication Review/Management: medications reviewed  Taken 5/3/2024 2002 by Jean Claude Bender RN  Medication Review/Management: medications reviewed  Intervention: Promote Injury-Free Environment  Recent Flowsheet Documentation  Taken 5/4/2024 0607 by Jean Claude Bender RN  Safety Promotion/Fall Prevention:   activity supervised   assistive device/personal items within reach   clutter free environment maintained   fall prevention program maintained   room organization consistent   safety round/check completed   nonskid shoes/slippers when out of bed  Taken 5/4/2024 0416 by Jean Claude Bender RN  Safety Promotion/Fall Prevention:   assistive device/personal items within reach   activity supervised   clutter free environment maintained   fall prevention program maintained   nonskid shoes/slippers when out of bed   room organization consistent   safety round/check completed  Taken 5/4/2024 0200 by Jean Claude Bender  RN  Safety Promotion/Fall Prevention:   activity supervised   assistive device/personal items within reach   clutter free environment maintained   fall prevention program maintained   nonskid shoes/slippers when out of bed   room organization consistent   safety round/check completed  Taken 5/4/2024 0000 by Jean Claude Bender RN  Safety Promotion/Fall Prevention:   activity supervised   assistive device/personal items within reach   clutter free environment maintained   fall prevention program maintained   nonskid shoes/slippers when out of bed   room organization consistent   safety round/check completed  Taken 5/3/2024 2209 by Jean Claude Bender RN  Safety Promotion/Fall Prevention:   activity supervised   assistive device/personal items within reach   clutter free environment maintained   fall prevention program maintained   nonskid shoes/slippers when out of bed   room organization consistent   safety round/check completed  Taken 5/3/2024 2002 by Jean Claude Bender RN  Safety Promotion/Fall Prevention:   assistive device/personal items within reach   activity supervised   clutter free environment maintained   fall prevention program maintained   nonskid shoes/slippers when out of bed   room organization consistent   safety round/check completed     Problem: Skin Injury Risk Increased  Goal: Skin Health and Integrity  Outcome: Ongoing, Progressing  Intervention: Optimize Skin Protection  Recent Flowsheet Documentation  Taken 5/4/2024 0607 by Jean Claude Bender RN  Head of Bed (HOB) Positioning: HOB at 20 degrees  Taken 5/4/2024 0416 by Jean Claude Bender RN  Head of Bed (HOB) Positioning: HOB at 20 degrees  Taken 5/4/2024 0200 by Jean Claude Bender RN  Head of Bed (HOB) Positioning: HOB at 20 degrees  Taken 5/4/2024 0000 by Jean Claude Bender RN  Head of Bed (HOB) Positioning: HOB at 20 degrees  Taken 5/3/2024 2209 by Jean Claude Bender RN  Head of Bed (HOB) Positioning: HOB at 20 degrees  Taken 5/3/2024 2002 by Jean Claude Bender  RN  Head of Bed (HOB) Positioning: HOB at 20 degrees  Taken 5/3/2024 1956 by Jean Claude Bender RN  Pressure Reduction Techniques:   frequent weight shift encouraged   weight shift assistance provided   heels elevated off bed   positioned off wounds   pressure points protected  Pressure Reduction Devices: alternating pressure pump (ADD)  Skin Protection:   adhesive use limited   incontinence pads utilized   tubing/devices free from skin contact   transparent dressing maintained     Problem: Heart Failure Comorbidity  Goal: Maintenance of Heart Failure Symptom Control  Outcome: Ongoing, Progressing  Intervention: Maintain Heart Failure-Management  Recent Flowsheet Documentation  Taken 5/4/2024 0607 by Jean Claude Bender RN  Medication Review/Management: medications reviewed  Taken 5/4/2024 0416 by Jean Claude Bender RN  Medication Review/Management: medications reviewed  Taken 5/4/2024 0200 by Jean Claude Bender RN  Medication Review/Management: medications reviewed  Taken 5/4/2024 0000 by Jean Claude Bender RN  Medication Review/Management: medications reviewed  Taken 5/3/2024 2209 by Jean Claude Bender RN  Medication Review/Management: medications reviewed  Taken 5/3/2024 2002 by Jean Claude Bender RN  Medication Review/Management: medications reviewed     Problem: Malnutrition  Goal: Improved Nutritional Intake  Outcome: Ongoing, Progressing   Goal Outcome Evaluation:

## 2024-05-04 NOTE — PROGRESS NOTES
"DAILY PROGRESS NOTE  [facility]    Patient Identification:  Name: Mehreen Silva IV  Age: 83 y.o.  Sex: male  :  1940  MRN: [id number]         Primary Care Physician: Taiwo Powers MD    Subjective:  Interval History: He is feeling better today but had some episode of tachycardia this morning and required some IV Lopressor.    Objective:    Scheduled Meds:acetaminophen, 1,000 mg, Oral, TID  apixaban, 5 mg, Oral, Q12H  atorvastatin, 10 mg, Oral, Nightly  collagenase, 1 Application, Topical, Daily  digoxin, 125 mcg, Oral, Daily  empagliflozin, 10 mg, Oral, Daily  furosemide, 40 mg, Oral, Daily  gabapentin, 100 mg, Oral, Nightly  guaiFENesin, 1,200 mg, Oral, Q12H  methIMAzole, 5 mg, Oral, Once per day on   metoprolol tartrate, 12.5 mg, Oral, Q12H  midodrine, 5 mg, Oral, BID AC  mupirocin, 1 Application, Each Nare, Q12H  senna-docusate sodium, 2 tablet, Oral, Nightly  sodium chloride, 10 mL, Intravenous, Q12H      Continuous Infusions:lactated ringers, 9 mL/hr, Last Rate: 9 mL/hr (24 0641)        Vital signs in last 24 hours:  Temp:  [97 °F (36.1 °C)-98.4 °F (36.9 °C)] 97.7 °F (36.5 °C)  Heart Rate:  [] 95  Resp:  [16-17] 17  BP: (104-132)/(58-78) 112/77    Intake/Output:    Intake/Output Summary (Last 24 hours) at 2024 1203  Last data filed at 2024 0839  Gross per 24 hour   Intake 720 ml   Output --   Net 720 ml         Exam:  /77 (BP Location: Right arm, Patient Position: Lying)   Pulse 95   Temp 97.7 °F (36.5 °C) (Axillary)   Resp 17   Ht 177.8 cm (70\")   Wt 64.4 kg (142 lb)   SpO2 95%   BMI 20.37 kg/m²     General Appearance:    Alert, cooperative, no distress   Head:    Normocephalic, without obvious abnormality, atraumatic   Eyes:       Throat:   Lips, tongue, gums normal   Neck:   Supple, symmetrical, trachea midline, no JVD   Lungs:   Decreased breath sounds on the right but starting to move a little bit of air, respirations " unlabored   Chest Wall:  Surgical changes with Pleurx catheter in place    Heart:    Regular rate and rhythm, S1 and S2 normal, no murmur,no  Rub or gallop   Abdomen:     Soft, nontender, bowel sounds active, no masses, no organomegaly    Extremities:   Extremities normal, right AKA, no cyanosis or edema   Pulses:      Skin:   Skin is warm and dry,  no rashes or palpable lesions   Neurologic:   no focal deficits noted      Lab Results (last 72 hours)       Procedure Component Value Units Date/Time    Lipid Panel [[id number]]  (Abnormal) Collected: 04/28/24 0338    Specimen: Blood Updated: 04/28/24 0723     Total Cholesterol 148 mg/dL      Triglycerides 66 mg/dL      HDL Cholesterol 35 mg/dL      LDL Cholesterol  100 mg/dL      VLDL Cholesterol 13 mg/dL      LDL/HDL Ratio 2.85    Narrative:      Cholesterol Reference Ranges  (U.S. Department of Health and Human Services ATP III Classifications)    Desirable          <200 mg/dL  Borderline High    200-239 mg/dL  High Risk          >240 mg/dL      Triglyceride Reference Ranges  (U.S. Department of Health and Human Services ATP III Classifications)    Normal           <150 mg/dL  Borderline High  150-199 mg/dL  High             200-499 mg/dL  Very High        >500 mg/dL    HDL Reference Ranges  (U.S. Department of Health and Human Services ATP III Classifications)    Low     <40 mg/dl (major risk factor for CHD)  High    >60 mg/dl ('negative' risk factor for CHD)        LDL Reference Ranges  (U.S. Department of Health and Human Services ATP III Classifications)    Optimal          <100 mg/dL  Near Optimal     100-129 mg/dL  Borderline High  130-159 mg/dL  High             160-189 mg/dL  Very High        >189 mg/dL    Basic Metabolic Panel [[id number]]  (Abnormal) Collected: 04/28/24 0338    Specimen: Blood Updated: 04/28/24 0427     Glucose 122 mg/dL      BUN 22 mg/dL      Creatinine 0.79 mg/dL      Sodium 140 mmol/L      Potassium 4.4 mmol/L      Chloride 101 mmol/L       CO2 29.8 mmol/L      Calcium 8.6 mg/dL      BUN/Creatinine Ratio 27.8     Anion Gap 9.2 mmol/L      eGFR 88.1 mL/min/1.73     Narrative:      GFR Normal >60  Chronic Kidney Disease <60  Kidney Failure <15    The GFR formula is only valid for adults with stable renal function between ages 18 and 70.    CBC & Differential [[id number]]  (Abnormal) Collected: 04/28/24 0337    Specimen: Blood Updated: 04/28/24 0409    Narrative:      The following orders were created for panel order CBC & Differential.  Procedure                               Abnormality         Status                     ---------                               -----------         ------                     CBC Auto Differential[[id number]]        Abnormal            Final result                 Please view results for these tests on the individual orders.    CBC Auto Differential [[id number]]  (Abnormal) Collected: 04/28/24 0337    Specimen: Blood Updated: 04/28/24 0409     WBC 7.43 10*3/mm3      RBC 3.92 10*6/mm3      Hemoglobin 10.1 g/dL      Hematocrit 31.8 %      MCV 81.1 fL      MCH 25.8 pg      MCHC 31.8 g/dL      RDW 16.8 %      RDW-SD 49.0 fl      MPV 10.7 fL      Platelets 189 10*3/mm3      Neutrophil % 78.4 %      Lymphocyte % 9.7 %      Monocyte % 10.9 %      Eosinophil % 0.3 %      Basophil % 0.3 %      Immature Grans % 0.4 %      Neutrophils, Absolute 5.83 10*3/mm3      Lymphocytes, Absolute 0.72 10*3/mm3      Monocytes, Absolute 0.81 10*3/mm3      Eosinophils, Absolute 0.02 10*3/mm3      Basophils, Absolute 0.02 10*3/mm3      Immature Grans, Absolute 0.03 10*3/mm3      nRBC 0.0 /100 WBC     Magnesium [[id number]]  (Normal) Collected: 04/27/24 2238    Specimen: Blood Updated: 04/27/24 2321     Magnesium 2.2 mg/dL     Potassium [[id number]]  (Normal) Collected: 04/27/24 2238    Specimen: Blood Updated: 04/27/24 2321     Potassium 4.4 mmol/L     High Sensitivity Troponin T [[id number]]  (Abnormal) Collected: 04/27/24 2238    Specimen: Blood  Updated: 04/27/24 2321     HS Troponin T 79 ng/L     Narrative:      High Sensitive Troponin T Reference Range:  <14.0 ng/L- Negative Female for AMI  <22.0 ng/L- Negative Male for AMI  >=14 - Abnormal Female indicating possible myocardial injury.  >=22 - Abnormal Male indicating possible myocardial injury.   Clinicians would have to utilize clinical acumen, EKG, Troponin, and serial changes to determine if it is an Acute Myocardial Infarction or myocardial injury due to an underlying chronic condition.         Creatinine Serum (kidney function) GFR component [[id number]]  (Normal) Collected: 04/27/24 2238    Specimen: Blood Updated: 04/27/24 2320     Creatinine 0.82 mg/dL      eGFR 87.2 mL/min/1.73     Narrative:      GFR Normal >60  Chronic Kidney Disease <60  Kidney Failure <15    The GFR formula is only valid for adults with stable renal function between ages 18 and 70.    High Sensitivity Troponin T 2Hr [[id number]]  (Abnormal) Collected: 04/27/24 1246    Specimen: Blood Updated: 04/27/24 1323     HS Troponin T 72 ng/L      Troponin T Delta -7 ng/L     Narrative:      High Sensitive Troponin T Reference Range:  <14.0 ng/L- Negative Female for AMI  <22.0 ng/L- Negative Male for AMI  >=14 - Abnormal Female indicating possible myocardial injury.  >=22 - Abnormal Male indicating possible myocardial injury.   Clinicians would have to utilize clinical acumen, EKG, Troponin, and serial changes to determine if it is an Acute Myocardial Infarction or myocardial injury due to an underlying chronic condition.         TSH Rfx On Abnormal To Free T4 [[id number]]  (Normal) Collected: 04/27/24 1033    Specimen: Blood Updated: 04/27/24 1126     TSH 0.819 uIU/mL     Single High Sensitivity Troponin T [[id number]]  (Abnormal) Collected: 04/27/24 1033    Specimen: Blood Updated: 04/27/24 1107     HS Troponin T 79 ng/L     Narrative:      High Sensitive Troponin T Reference Range:  <14.0 ng/L- Negative Female for AMI  <22.0 ng/L-  Negative Male for AMI  >=14 - Abnormal Female indicating possible myocardial injury.  >=22 - Abnormal Male indicating possible myocardial injury.   Clinicians would have to utilize clinical acumen, EKG, Troponin, and serial changes to determine if it is an Acute Myocardial Infarction or myocardial injury due to an underlying chronic condition.         Comprehensive Metabolic Panel [[id number]]  (Abnormal) Collected: 04/27/24 1033    Specimen: Blood Updated: 04/27/24 1103     Glucose 209 mg/dL      BUN 23 mg/dL      Creatinine 1.03 mg/dL      Sodium 141 mmol/L      Potassium 5.7 mmol/L      Chloride 100 mmol/L      CO2 30.0 mmol/L      Calcium 9.1 mg/dL      Total Protein 6.3 g/dL      Albumin 3.1 g/dL      ALT (SGPT) 18 U/L      AST (SGOT) 12 U/L      Alkaline Phosphatase 118 U/L      Total Bilirubin 1.1 mg/dL      Globulin 3.2 gm/dL      A/G Ratio 1.0 g/dL      BUN/Creatinine Ratio 22.3     Anion Gap 11.0 mmol/L      eGFR 72.1 mL/min/1.73     Narrative:      GFR Normal >60  Chronic Kidney Disease <60  Kidney Failure <15    The GFR formula is only valid for adults with stable renal function between ages 18 and 70.    BNP [[id number]]  (Abnormal) Collected: 04/27/24 1033    Specimen: Blood Updated: 04/27/24 1103     proBNP 5,603.0 pg/mL     Narrative:      This assay is used as an aid in the diagnosis of individuals suspected of having heart failure. It can be used as an aid in the diagnosis of acute decompensated heart failure (ADHF) in patients presenting with signs and symptoms of ADHF to the emergency department (ED). In addition, NT-proBNP of <300 pg/mL indicates ADHF is not likely.    Age Range Result Interpretation  NT-proBNP Concentration (pg/mL:      <50             Positive            >450                   Gray                 300-450                    Negative             <300    50-75           Positive            >900                  Gray                300-900                  Negative             <300      >75             Positive            >1800                  Gray                300-1800                  Negative            <300    Magnesium [[id number]]  (Normal) Collected: 04/27/24 1033    Specimen: Blood Updated: 04/27/24 1103     Magnesium 2.3 mg/dL     CBC & Differential [[id number]]  (Abnormal) Collected: 04/27/24 1033    Specimen: Blood Updated: 04/27/24 1045    Narrative:      The following orders were created for panel order CBC & Differential.  Procedure                               Abnormality         Status                     ---------                               -----------         ------                     CBC Auto Differential[[id number]]        Abnormal            Final result                 Please view results for these tests on the individual orders.    CBC Auto Differential [[id number]]  (Abnormal) Collected: 04/27/24 1033    Specimen: Blood Updated: 04/27/24 1045     WBC 8.87 10*3/mm3      RBC 4.42 10*6/mm3      Hemoglobin 11.5 g/dL      Hematocrit 37.2 %      MCV 84.2 fL      MCH 26.0 pg      MCHC 30.9 g/dL      RDW 16.9 %      RDW-SD 51.4 fl      MPV 10.5 fL      Platelets 242 10*3/mm3      Neutrophil % 82.8 %      Lymphocyte % 8.6 %      Monocyte % 8.0 %      Eosinophil % 0.1 %      Basophil % 0.2 %      Immature Grans % 0.3 %      Neutrophils, Absolute 7.34 10*3/mm3      Lymphocytes, Absolute 0.76 10*3/mm3      Monocytes, Absolute 0.71 10*3/mm3      Eosinophils, Absolute 0.01 10*3/mm3      Basophils, Absolute 0.02 10*3/mm3      Immature Grans, Absolute 0.03 10*3/mm3      nRBC 0.0 /100 WBC     [place] Draw [[id number]] Collected: 04/27/24 1033    Specimen: Blood Updated: 04/27/24 1045    Narrative:      The following orders were created for panel order [place] Draw.  Procedure                               Abnormality         Status                     ---------                               -----------         ------                     Green Top (Gel)[[id number]]                                   Final result               Lavender Top[[id number]]                                     Final result               Gold Top - SST[[id number]]                                   Final result               Light Blue Top[[id number]]                                   Final result                 Please view results for these tests on the individual orders.    Green Top (Gel) [[id number]] Collected: 04/27/24 1033    Specimen: Blood Updated: 04/27/24 1045     Extra Tube Hold for add-ons.     Comment: Auto resulted.       Lavender Top [[id number]] Collected: 04/27/24 1033    Specimen: Blood Updated: 04/27/24 1045     Extra Tube hold for add-on     Comment: Auto resulted       Gold Top - SST [[id number]] Collected: 04/27/24 1033    Specimen: Blood Updated: 04/27/24 1045     Extra Tube Hold for add-ons.     Comment: Auto resulted.       Light Blue Top [[id number]] Collected: 04/27/24 1033    Specimen: Blood Updated: 04/27/24 1045     Extra Tube Hold for add-ons.     Comment: Auto resulted             Data Review:  Results from last 7 days   Lab Units 05/04/24  0425 05/03/24  0508 05/02/24  0439   SODIUM mmol/L 143 143 137   POTASSIUM mmol/L 3.6 3.7 4.0   CHLORIDE mmol/L 105 105 103   CO2 mmol/L 31.7* 29.6* 29.0   BUN mg/dL 29* 29* 33*   CREATININE mg/dL 0.78 0.69* 0.80   GLUCOSE mg/dL 73 71 86   CALCIUM mg/dL 8.0* 7.8* 7.3*     Results from last 7 days   Lab Units 05/04/24  0425 05/03/24  0508 05/02/24  0439   WBC 10*3/mm3 6.14 6.20 6.25   HEMOGLOBIN g/dL 10.3* 10.4* 9.3*   HEMATOCRIT % 33.3* 33.2* 29.8*   PLATELETS 10*3/mm3 189 188 179               Lab Results   Lab Value Date/Time    TROPONINT 79 (C) 04/27/2024 2238    TROPONINT 72 (C) 04/27/2024 1246    TROPONINT 79 (C) 04/27/2024 1033    TROPONINT 60 (C) 02/09/2024 1401    TROPONINT 65 (C) 02/09/2024 1114    TROPONINT 64 (C) 11/08/2023 2048    TROPONINT 56 (C) 11/08/2023 1534    TROPONINT 60 (C) 11/08/2023 1003    TROPONINT <0.010 09/23/2022 1627     Results  "from last 7 days   Lab Units 04/28/24  0338   CHOLESTEROL mg/dL 148   TRIGLYCERIDES mg/dL 66   HDL CHOL mg/dL 35*   LDL CHOL mg/dL 100           Invalid input(s): \"PROT\", \"LABALBU\"              No results found for: \"POCGLU\"  Results from last 7 days   Lab Units 04/30/24  0436   INR  1.22*       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    Arthritis     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, 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/2023    Atrial fibrillation     Atrial fibrillation, persistent 09/24/2022    Cervical spondylosis with myelopathy     CHF (congestive heart failure)     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5]  Yes    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       Plan:  Consult from pulmonary, thoracic surgery and cardiology noted.  Follow-up on labs.  Postop care and restart anticoagulation when okay with surgery.  Cardiology making adjustment on medications for rate control and blood pressure.  DC planning.  Disposition to be determined.  Family plans to take him home with home health services probably tomorrow.  Will reconsult cardiology for the tachycardia.    Dayron Fowler MD  5/4/2024  12:03 EDT   "

## 2024-05-04 NOTE — PROGRESS NOTES
"                                              LOS: 5 days   Patient Care Team:  Taiwo Powers MD as PCP - General (Internal Medicine)  Self, Bess YO MD as Consulting Physician (Endocrinology)  Huang Chan MD as Cardiologist (Cardiology)    Chief Complaint:  F/up pleural effusion and abnormal chest imaging.    Subjective   Interval History    On RA.  Blood pressure improved.  No dyspnea or cough.     REVIEW OF SYSTEMS:   CARDIOVASCULAR: No chest pain, chest pressure or chest discomfort. No palpitations or edema.     GASTROINTESTINAL: No anorexia, nausea, vomiting or diarrhea. No abdominal pain.  CONSTITUTIONAL: No fever or chills.     Ventilator/Non-Invasive Ventilation Settings (From admission, onward)      None                  Physical Exam:     Vital Signs  Temp:  [97 °F (36.1 °C)-97.7 °F (36.5 °C)] 97.5 °F (36.4 °C)  Heart Rate:  [] 81  Resp:  [16] 16  BP: (107-117)/(51-72) 107/69    Intake/Output Summary (Last 24 hours) at 5/3/2024 2253  Last data filed at 5/3/2024 1700  Gross per 24 hour   Intake 720 ml   Output 450 ml   Net 270 ml     Flowsheet Rows      Flowsheet Row First Filed Value   Admission Height 177.8 cm (70\") Documented at 04/27/2024 1544   Admission Weight 64.5 kg (142 lb 3.2 oz) Documented at 04/27/2024 1544            PPE used per hospital policy    General Appearance:   Alert, cooperative, in no acute distress   ENMT:  Mallampati score 2, moist mucous membrane   Eyes:  Pupils equal and reactive to light. EOMI   Neck:    Trachea midline. No thyromegaly.   Lungs:   Crackles on the right, improved.  No wheezing.  No use of accessory muscles.    Heart:   Regular rhythm and normal rate, normal S1 and S2, no         murmur   Skin:   No rash or ecchymosis   Abdomen:    Soft. No tenderness. No HSM.   Neuro/psych:  Conscious, alert, oriented x3. Strength 5/5 in upper and lower  ext.  Appropriate mood and affect   Extremities:  No cyanosis, clubbing or edema.  Warm extremities and " well-perfused          Results Review:        Results from last 7 days   Lab Units 05/03/24  0508 05/02/24  0439 05/01/24  0508   SODIUM mmol/L 143 137 137   POTASSIUM mmol/L 3.7 4.0 5.4*   CHLORIDE mmol/L 105 103 101   CO2 mmol/L 29.6* 29.0 26.6   BUN mg/dL 29* 33* 24*   CREATININE mg/dL 0.69* 0.80 1.01   GLUCOSE mg/dL 71 86 124*   CALCIUM mg/dL 7.8* 7.3* 8.2*     Results from last 7 days   Lab Units 04/27/24  2238 04/27/24  1246 04/27/24  1033   HSTROP T ng/L 79* 72* 79*     Results from last 7 days   Lab Units 05/03/24  0508 05/02/24  0439 05/01/24  0508   WBC 10*3/mm3 6.20 6.25 9.51   HEMOGLOBIN g/dL 10.4* 9.3* 10.4*   HEMATOCRIT % 33.2* 29.8* 32.9*   PLATELETS 10*3/mm3 188 179 169     Results from last 7 days   Lab Units 04/30/24  0436   INR  1.22*   APTT seconds 32.2     Results from last 7 days   Lab Units 04/27/24  1033   PROBNP pg/mL 5,603.0*                           I reviewed the patient's new clinical results.        Medication Review:   acetaminophen, 1,000 mg, Oral, TID  apixaban, 5 mg, Oral, Q12H  atorvastatin, 10 mg, Oral, Nightly  collagenase, 1 Application, Topical, Daily  digoxin, 125 mcg, Oral, Daily  empagliflozin, 10 mg, Oral, Daily  furosemide, 40 mg, Oral, Daily  gabapentin, 100 mg, Oral, Nightly  guaiFENesin, 1,200 mg, Oral, Q12H  methIMAzole, 5 mg, Oral, Once per day on Monday Wednesday Friday  metoprolol tartrate, 12.5 mg, Oral, Q12H  midodrine, 5 mg, Oral, BID AC  mupirocin, 1 Application, Each Nare, Q12H  senna-docusate sodium, 2 tablet, Oral, Nightly  sodium chloride, 10 mL, Intravenous, Q12H        lactated ringers, 9 mL/hr, Last Rate: 9 mL/hr (04/30/24 0641)        Diagnostic imaging:  I personally and independently reviewed the following images:  CT chest 4/27/2024: Bilateral pleural effusion much more prominent on the right and occupying the whole lung with complete right lung atelectasis.  Only mild pleural effusion on the left.      CXR 5/3/2024: Right pulmonary infiltrates and  mild pleural effusion.     Assessment     Recurrent nonloculated right pleural effusion s/p VATS with partial decortication and Pleurx catheter placement 4/30.  Right lung compressive atelectasis, improving  Suspect reexpansion pulmonary edema on the right  Bilateral pleural effusion, R >>L, secondary to CHF  Chronic HFpEF  A-fib with RVR   Hypotension        Plan       Incentive spirometry.  Encouraged to use.  DuoNeb PRN.   Mucinex 1200 mg BID  DVT prophylaxis with heparin subcu 3 times daily when okay with thoracic surgery  Lasix 40 mg p.o. daily.  Midodrine 5 mg 2 times daily  Neurontin 100 mg nightly for pain  Eliquis 5 mg BID  Metoprolol 12.5 mg twice daily.  Digoxin for A-fib.    Jennifer Newton MD  05/03/24  22:53 EDT          This note was dictated utilizing Dragon dictation

## 2024-05-05 LAB
ANION GAP SERPL CALCULATED.3IONS-SCNC: 5.6 MMOL/L (ref 5–15)
BACTERIA SPEC ANAEROBE CULT: NORMAL
BASOPHILS # BLD AUTO: 0.02 10*3/MM3 (ref 0–0.2)
BASOPHILS NFR BLD AUTO: 0.3 % (ref 0–1.5)
BUN SERPL-MCNC: 24 MG/DL (ref 8–23)
BUN/CREAT SERPL: 27.9 (ref 7–25)
CALCIUM SPEC-SCNC: 8 MG/DL (ref 8.6–10.5)
CHLORIDE SERPL-SCNC: 106 MMOL/L (ref 98–107)
CO2 SERPL-SCNC: 30.4 MMOL/L (ref 22–29)
CREAT SERPL-MCNC: 0.86 MG/DL (ref 0.76–1.27)
DEPRECATED RDW RBC AUTO: 47.4 FL (ref 37–54)
EGFRCR SERPLBLD CKD-EPI 2021: 85.9 ML/MIN/1.73
EOSINOPHIL # BLD AUTO: 0.14 10*3/MM3 (ref 0–0.4)
EOSINOPHIL NFR BLD AUTO: 2 % (ref 0.3–6.2)
ERYTHROCYTE [DISTWIDTH] IN BLOOD BY AUTOMATED COUNT: 16 % (ref 12.3–15.4)
GLUCOSE SERPL-MCNC: 72 MG/DL (ref 65–99)
HCT VFR BLD AUTO: 34.4 % (ref 37.5–51)
HGB BLD-MCNC: 10.8 G/DL (ref 13–17.7)
IMM GRANULOCYTES # BLD AUTO: 0.02 10*3/MM3 (ref 0–0.05)
IMM GRANULOCYTES NFR BLD AUTO: 0.3 % (ref 0–0.5)
LYMPHOCYTES # BLD AUTO: 1.03 10*3/MM3 (ref 0.7–3.1)
LYMPHOCYTES NFR BLD AUTO: 15 % (ref 19.6–45.3)
MCH RBC QN AUTO: 25.7 PG (ref 26.6–33)
MCHC RBC AUTO-ENTMCNC: 31.4 G/DL (ref 31.5–35.7)
MCV RBC AUTO: 81.9 FL (ref 79–97)
MONOCYTES # BLD AUTO: 0.59 10*3/MM3 (ref 0.1–0.9)
MONOCYTES NFR BLD AUTO: 8.6 % (ref 5–12)
NEUTROPHILS NFR BLD AUTO: 5.08 10*3/MM3 (ref 1.7–7)
NEUTROPHILS NFR BLD AUTO: 73.8 % (ref 42.7–76)
NRBC BLD AUTO-RTO: 0 /100 WBC (ref 0–0.2)
PLATELET # BLD AUTO: 196 10*3/MM3 (ref 140–450)
PMV BLD AUTO: 10.3 FL (ref 6–12)
POTASSIUM SERPL-SCNC: 3.5 MMOL/L (ref 3.5–5.2)
RBC # BLD AUTO: 4.2 10*6/MM3 (ref 4.14–5.8)
SODIUM SERPL-SCNC: 142 MMOL/L (ref 136–145)
WBC NRBC COR # BLD AUTO: 6.88 10*3/MM3 (ref 3.4–10.8)

## 2024-05-05 PROCEDURE — 85025 COMPLETE CBC W/AUTO DIFF WBC: CPT | Performed by: HOSPITALIST

## 2024-05-05 PROCEDURE — 80048 BASIC METABOLIC PNL TOTAL CA: CPT | Performed by: HOSPITALIST

## 2024-05-05 RX ADMIN — GUAIFENESIN 1200 MG: 600 TABLET, EXTENDED RELEASE ORAL at 08:05

## 2024-05-05 RX ADMIN — EMPAGLIFLOZIN 10 MG: 10 TABLET, FILM COATED ORAL at 08:06

## 2024-05-05 RX ADMIN — GABAPENTIN 100 MG: 100 CAPSULE ORAL at 20:31

## 2024-05-05 RX ADMIN — ACETAMINOPHEN 1000 MG: 500 TABLET ORAL at 20:33

## 2024-05-05 RX ADMIN — MUPIROCIN 1 APPLICATION: 20 OINTMENT TOPICAL at 08:07

## 2024-05-05 RX ADMIN — COLLAGENASE SANTYL 1 APPLICATION: 250 OINTMENT TOPICAL at 08:06

## 2024-05-05 RX ADMIN — APIXABAN 5 MG: 5 TABLET, FILM COATED ORAL at 08:05

## 2024-05-05 RX ADMIN — Medication 10 ML: at 20:33

## 2024-05-05 RX ADMIN — DIGOXIN 125 MCG: 125 TABLET ORAL at 11:18

## 2024-05-05 RX ADMIN — FUROSEMIDE 40 MG: 40 TABLET ORAL at 08:07

## 2024-05-05 RX ADMIN — METOPROLOL TARTRATE 12.5 MG: 25 TABLET, FILM COATED ORAL at 08:04

## 2024-05-05 RX ADMIN — APIXABAN 5 MG: 5 TABLET, FILM COATED ORAL at 20:32

## 2024-05-05 RX ADMIN — GUAIFENESIN 1200 MG: 600 TABLET, EXTENDED RELEASE ORAL at 20:32

## 2024-05-05 RX ADMIN — Medication 10 ML: at 08:08

## 2024-05-05 RX ADMIN — MIDODRINE HYDROCHLORIDE 5 MG: 5 TABLET ORAL at 08:06

## 2024-05-05 RX ADMIN — ATORVASTATIN CALCIUM 10 MG: 20 TABLET, FILM COATED ORAL at 20:32

## 2024-05-05 RX ADMIN — ACETAMINOPHEN 1000 MG: 500 TABLET ORAL at 16:51

## 2024-05-05 RX ADMIN — MIDODRINE HYDROCHLORIDE 5 MG: 5 TABLET ORAL at 16:50

## 2024-05-05 RX ADMIN — METOPROLOL TARTRATE 12.5 MG: 25 TABLET, FILM COATED ORAL at 20:32

## 2024-05-05 RX ADMIN — ACETAMINOPHEN 1000 MG: 500 TABLET ORAL at 08:05

## 2024-05-05 NOTE — PLAN OF CARE
Goal Outcome Evaluation:  Plan of Care Reviewed With: patient           Outcome Evaluation: VSS and on RA. Afib on monitor; no ectopy noted. Refused IS and drsg change this am. [facility] with private caregiver at [place]. No c/o pain overnight. Pt did have a small BM in bed, Particular and demanding of staff.

## 2024-05-05 NOTE — PLAN OF CARE
Problem: Adult Inpatient Plan of Care  Goal: Plan of Care Review  Outcome: Ongoing, Progressing  Goal: Patient-Specific Goal (Individualized)  Outcome: Ongoing, Progressing  Goal: Absence of Hospital-Acquired Illness or Injury  Outcome: Ongoing, Progressing  Intervention: Identify and Manage Fall Risk  Recent Flowsheet Documentation  Taken 5/5/2024 1250 by Odalis Naylor RN  Safety Promotion/Fall Prevention:   fall prevention program maintained   safety round/check completed   room organization consistent  Taken 5/5/2024 1050 by Odalis Naylor RN  Safety Promotion/Fall Prevention:   room organization consistent   safety round/check completed  Taken 5/5/2024 0814 by Odalis Naylor RN  Safety Promotion/Fall Prevention:   safety round/check completed   room organization consistent  Taken 5/5/2024 0720 by Odalis Naylor RN  Safety Promotion/Fall Prevention:   clutter free environment maintained   toileting scheduled   safety round/check completed  Intervention: Prevent Skin Injury  Recent Flowsheet Documentation  Taken 5/5/2024 1250 by Odalis Naylor RN  Body Position:   position changed independently   30 degrees   turned   right  Taken 5/5/2024 1050 by Odalis Naylor RN  Body Position: position changed independently  Taken 5/5/2024 0814 by Odalis Naylor RN  Body Position:   30 degrees   turned  Taken 5/5/2024 0720 by Odalis Naylor RN  Body Position: supine  Intervention: Prevent and Manage VTE (Venous Thromboembolism) Risk  Recent Flowsheet Documentation  Taken 5/5/2024 1250 by Odalis Naylor RN  Activity Management: activity minimized  Taken 5/5/2024 1050 by Odalis Naylor RN  Activity Management: activity minimized  Goal: Optimal Comfort and Wellbeing  Outcome: Ongoing, Progressing  Goal: Readiness for Transition of Care  Outcome: Ongoing, Progressing     Problem: Fall Injury Risk  Goal: Absence of Fall and Fall-Related Injury  Outcome: Ongoing, Progressing  Intervention: Identify  and Manage Contributors  Recent Flowsheet Documentation  Taken 5/5/2024 1050 by Odalis Naylor RN  Medication Review/Management: medications reviewed  Intervention: Promote Injury-Free Environment  Recent Flowsheet Documentation  Taken 5/5/2024 1250 by Odalis Naylor RN  Safety Promotion/Fall Prevention:   fall prevention program maintained   safety round/check completed   room organization consistent  Taken 5/5/2024 1050 by Odalis Naylor RN  Safety Promotion/Fall Prevention:   room organization consistent   safety round/check completed  Taken 5/5/2024 0814 by Odalis Naylor RN  Safety Promotion/Fall Prevention:   safety round/check completed   room organization consistent  Taken 5/5/2024 0720 by Odalis Naylor RN  Safety Promotion/Fall Prevention:   clutter free environment maintained   toileting scheduled   safety round/check completed     Problem: Skin Injury Risk Increased  Goal: Skin Health and Integrity  Outcome: Ongoing, Progressing  Intervention: Optimize Skin Protection  Recent Flowsheet Documentation  Taken 5/5/2024 1250 by Odalis Naylor RN  Head of Bed (HOB) Positioning: HOB at 30-45 degrees  Taken 5/5/2024 1050 by Odalis Naylor RN  Head of Bed (HOB) Positioning: HOB elevated  Taken 5/5/2024 0814 by Odalis Naylor RN  Head of Bed (HOB) Positioning: HOB at 30-45 degrees  Taken 5/5/2024 0720 by Odalis Naylor RN  Head of Bed (HOB) Positioning: HOB at 30-45 degrees     Problem: Heart Failure Comorbidity  Goal: Maintenance of Heart Failure Symptom Control  Outcome: Ongoing, Progressing  Intervention: Maintain Heart Failure-Management  Recent Flowsheet Documentation  Taken 5/5/2024 1050 by Odalis Naylor RN  Medication Review/Management: medications reviewed     Problem: Malnutrition  Goal: Improved Nutritional Intake  Outcome: Ongoing, Progressing   Goal Outcome Evaluation:

## 2024-05-05 NOTE — PROGRESS NOTES
"                                              LOS: 7 days   Patient Care Team:  Taiwo Powers MD as PCP - General (Internal Medicine)  Self, Bess YO MD as Consulting Physician (Endocrinology)  Huang Chan MD as Cardiologist (Cardiology)    Chief Complaint:  F/up pleural effusion and abnormal chest imaging.    Subjective   Interval History    On RA.  Denies dyspnea at rest.  No cough.      REVIEW OF SYSTEMS:   CARDIOVASCULAR: No chest pain, chest pressure or chest discomfort. No palpitations or edema.     GASTROINTESTINAL: No anorexia, nausea, vomiting or diarrhea. No abdominal pain.  CONSTITUTIONAL: No fever or chills.     Ventilator/Non-Invasive Ventilation Settings (From admission, onward)      None                  Physical Exam:     Vital Signs  Temp:  [97.7 °F (36.5 °C)-97.9 °F (36.6 °C)] 97.8 °F (36.6 °C)  Heart Rate:  [85-98] 88  Resp:  [16-17] 16  BP: (105-120)/(63-77) 120/66    Intake/Output Summary (Last 24 hours) at 5/5/2024 1018  Last data filed at 5/4/2024 2100  Gross per 24 hour   Intake 480 ml   Output --   Net 480 ml     Flowsheet Rows      Flowsheet Row First Filed Value   Admission Height 177.8 cm (70\") Documented at 04/27/2024 1544   Admission Weight 64.5 kg (142 lb 3.2 oz) Documented at 04/27/2024 1544            PPE used per hospital policy    General Appearance:   Alert, cooperative, in no acute distress   ENMT:  Mallampati score 2, moist mucous membrane   Eyes:  Pupils equal and reactive to light. EOMI   Neck:    Trachea midline. No thyromegaly.   Lungs:   Crackles on the right, improved.  No wheezing.  No use of accessory muscles    Heart:   Regular rhythm and normal rate, normal S1 and S2, no         murmur   Skin:   No rash or ecchymosis   Abdomen:    Soft. No tenderness. No HSM.   Neuro/psych:  Conscious, alert, oriented x3. Strength 5/5 in upper and lower  ext.  Appropriate mood and affect   Extremities:  No cyanosis, clubbing or edema.  Warm extremities and " well-perfused          Results Review:        Results from last 7 days   Lab Units 05/05/24  0530 05/04/24  0425 05/03/24  0508   SODIUM mmol/L 142 143 143   POTASSIUM mmol/L 3.5 3.6 3.7   CHLORIDE mmol/L 106 105 105   CO2 mmol/L 30.4* 31.7* 29.6*   BUN mg/dL 24* 29* 29*   CREATININE mg/dL 0.86 0.78 0.69*   GLUCOSE mg/dL 72 73 71   CALCIUM mg/dL 8.0* 8.0* 7.8*           Results from last 7 days   Lab Units 05/05/24  0530 05/04/24  0425 05/03/24  0508   WBC 10*3/mm3 6.88 6.14 6.20   HEMOGLOBIN g/dL 10.8* 10.3* 10.4*   HEMATOCRIT % 34.4* 33.3* 33.2*   PLATELETS 10*3/mm3 196 189 188     Results from last 7 days   Lab Units 04/30/24  0436   INR  1.22*   APTT seconds 32.2                                 I reviewed the patient's new clinical results.        Medication Review:   acetaminophen, 1,000 mg, Oral, TID  apixaban, 5 mg, Oral, Q12H  atorvastatin, 10 mg, Oral, Nightly  collagenase, 1 Application, Topical, Daily  digoxin, 125 mcg, Oral, Daily  empagliflozin, 10 mg, Oral, Daily  furosemide, 40 mg, Oral, Daily  gabapentin, 100 mg, Oral, Nightly  guaiFENesin, 1,200 mg, Oral, Q12H  methIMAzole, 5 mg, Oral, Once per day on Monday Wednesday Friday  metoprolol tartrate, 12.5 mg, Oral, Q12H  midodrine, 5 mg, Oral, BID AC  mupirocin, 1 Application, Each Nare, Q12H  senna-docusate sodium, 2 tablet, Oral, Nightly  sodium chloride, 10 mL, Intravenous, Q12H        lactated ringers, 9 mL/hr, Last Rate: 9 mL/hr (04/30/24 0641)          Assessment     Recurrent nonloculated right pleural effusion s/p VATS with partial decortication and Pleurx catheter placement 4/30.  Right lung compressive atelectasis, improving  Suspect reexpansion pulmonary edema on the right  Bilateral pleural effusion, R >>L, secondary to CHF  Chronic HFpEF  A-fib with RVR   Hypotension        Plan       Incentive spirometry.  Encouraged to use.  DuoNeb PRN.   Mucinex 1200 mg BID  CHF tt: Lasix 40 mg p.o. daily. Empagliflozin  Midodrine 5 mg 2 times  daily  Neurontin 100 mg nightly for pain  Eliquis 5 mg BID  Education for Pleurx catheter drainage on MWF  Metoprolol 12.5 mg twice daily.  Digoxin for A-fib.  Ex ox in AM to determine oxygen needs        Dispo: Okay to discharge home. Will sign off for now. Please call back if needed. Pt has a f/up with CTS and no need to see pulm on DC.    Jennifer Newton MD  05/05/24  10:18 EDT          This note was dictated utilizing [employer] dictation

## 2024-05-05 NOTE — PROGRESS NOTES
"DAILY PROGRESS NOTE  [facility]    Patient Identification:  Name: Mehreen Silva IV  Age: 83 y.o.  Sex: male  :  1940  MRN: [id number]         Primary Care Physician: Taiwo Powers MD    Subjective:  Interval History: He is feeling better today but had some episode of tachycardia yesterday morning and required some IV Lopressor.    Objective:    Scheduled Meds:acetaminophen, 1,000 mg, Oral, TID  apixaban, 5 mg, Oral, Q12H  atorvastatin, 10 mg, Oral, Nightly  collagenase, 1 Application, Topical, Daily  digoxin, 125 mcg, Oral, Daily  empagliflozin, 10 mg, Oral, Daily  furosemide, 40 mg, Oral, Daily  gabapentin, 100 mg, Oral, Nightly  guaiFENesin, 1,200 mg, Oral, Q12H  methIMAzole, 5 mg, Oral, Once per day on   metoprolol tartrate, 12.5 mg, Oral, Q12H  midodrine, 5 mg, Oral, BID AC  mupirocin, 1 Application, Each Nare, Q12H  senna-docusate sodium, 2 tablet, Oral, Nightly  sodium chloride, 10 mL, Intravenous, Q12H      Continuous Infusions:lactated ringers, 9 mL/hr, Last Rate: 9 mL/hr (24 0641)        Vital signs in last 24 hours:  Temp:  [97.8 °F (36.6 °C)-97.9 °F (36.6 °C)] 97.9 °F (36.6 °C)  Heart Rate:  [80-98] 90  Resp:  [16] 16  BP: (105-127)/(63-77) 127/68    Intake/Output:    Intake/Output Summary (Last 24 hours) at 2024 1331  Last data filed at 2024 2100  Gross per 24 hour   Intake 480 ml   Output --   Net 480 ml         Exam:  /68 (BP Location: Right arm, Patient Position: Lying)   Pulse 90   Temp 97.9 °F (36.6 °C) (Oral)   Resp 16   Ht 177.8 cm (70\")   Wt 64.4 kg (142 lb)   SpO2 95%   BMI 20.37 kg/m²     General Appearance:    Alert, cooperative, no distress   Head:    Normocephalic, without obvious abnormality, atraumatic   Eyes:       Throat:   Lips, tongue, gums normal   Neck:   Supple, symmetrical, trachea midline, no JVD   Lungs:   Decreased breath sounds on the right but starting to move a little bit of air, respirations " unlabored   Chest Wall:  Surgical changes with Pleurx catheter in place    Heart:    Regular rate and rhythm, S1 and S2 normal, no murmur,no  Rub or gallop   Abdomen:     Soft, nontender, bowel sounds active, no masses, no organomegaly    Extremities:   Extremities normal, right AKA, no cyanosis or edema   Pulses:      Skin:   Skin is warm and dry,  no rashes or palpable lesions   Neurologic:   no focal deficits noted      Lab Results (last 72 hours)       Procedure Component Value Units Date/Time    Lipid Panel [[id number]]  (Abnormal) Collected: 04/28/24 0338    Specimen: Blood Updated: 04/28/24 0723     Total Cholesterol 148 mg/dL      Triglycerides 66 mg/dL      HDL Cholesterol 35 mg/dL      LDL Cholesterol  100 mg/dL      VLDL Cholesterol 13 mg/dL      LDL/HDL Ratio 2.85    Narrative:      Cholesterol Reference Ranges  (U.S. Department of Health and Human Services ATP III Classifications)    Desirable          <200 mg/dL  Borderline High    200-239 mg/dL  High Risk          >240 mg/dL      Triglyceride Reference Ranges  (U.S. Department of Health and Human Services ATP III Classifications)    Normal           <150 mg/dL  Borderline High  150-199 mg/dL  High             200-499 mg/dL  Very High        >500 mg/dL    HDL Reference Ranges  (U.S. Department of Health and Human Services ATP III Classifications)    Low     <40 mg/dl (major risk factor for CHD)  High    >60 mg/dl ('negative' risk factor for CHD)        LDL Reference Ranges  (U.S. Department of Health and Human Services ATP III Classifications)    Optimal          <100 mg/dL  Near Optimal     100-129 mg/dL  Borderline High  130-159 mg/dL  High             160-189 mg/dL  Very High        >189 mg/dL    Basic Metabolic Panel [[id number]]  (Abnormal) Collected: 04/28/24 0338    Specimen: Blood Updated: 04/28/24 0427     Glucose 122 mg/dL      BUN 22 mg/dL      Creatinine 0.79 mg/dL      Sodium 140 mmol/L      Potassium 4.4 mmol/L      Chloride 101 mmol/L       CO2 29.8 mmol/L      Calcium 8.6 mg/dL      BUN/Creatinine Ratio 27.8     Anion Gap 9.2 mmol/L      eGFR 88.1 mL/min/1.73     Narrative:      GFR Normal >60  Chronic Kidney Disease <60  Kidney Failure <15    The GFR formula is only valid for adults with stable renal function between ages 18 and 70.    CBC & Differential [[id number]]  (Abnormal) Collected: 04/28/24 0337    Specimen: Blood Updated: 04/28/24 0409    Narrative:      The following orders were created for panel order CBC & Differential.  Procedure                               Abnormality         Status                     ---------                               -----------         ------                     CBC Auto Differential[[id number]]        Abnormal            Final result                 Please view results for these tests on the individual orders.    CBC Auto Differential [[id number]]  (Abnormal) Collected: 04/28/24 0337    Specimen: Blood Updated: 04/28/24 0409     WBC 7.43 10*3/mm3      RBC 3.92 10*6/mm3      Hemoglobin 10.1 g/dL      Hematocrit 31.8 %      MCV 81.1 fL      MCH 25.8 pg      MCHC 31.8 g/dL      RDW 16.8 %      RDW-SD 49.0 fl      MPV 10.7 fL      Platelets 189 10*3/mm3      Neutrophil % 78.4 %      Lymphocyte % 9.7 %      Monocyte % 10.9 %      Eosinophil % 0.3 %      Basophil % 0.3 %      Immature Grans % 0.4 %      Neutrophils, Absolute 5.83 10*3/mm3      Lymphocytes, Absolute 0.72 10*3/mm3      Monocytes, Absolute 0.81 10*3/mm3      Eosinophils, Absolute 0.02 10*3/mm3      Basophils, Absolute 0.02 10*3/mm3      Immature Grans, Absolute 0.03 10*3/mm3      nRBC 0.0 /100 WBC     Magnesium [[id number]]  (Normal) Collected: 04/27/24 2238    Specimen: Blood Updated: 04/27/24 2321     Magnesium 2.2 mg/dL     Potassium [[id number]]  (Normal) Collected: 04/27/24 2238    Specimen: Blood Updated: 04/27/24 2321     Potassium 4.4 mmol/L     High Sensitivity Troponin T [[id number]]  (Abnormal) Collected: 04/27/24 2238    Specimen: Blood  Updated: 04/27/24 2321     HS Troponin T 79 ng/L     Narrative:      High Sensitive Troponin T Reference Range:  <14.0 ng/L- Negative Female for AMI  <22.0 ng/L- Negative Male for AMI  >=14 - Abnormal Female indicating possible myocardial injury.  >=22 - Abnormal Male indicating possible myocardial injury.   Clinicians would have to utilize clinical acumen, EKG, Troponin, and serial changes to determine if it is an Acute Myocardial Infarction or myocardial injury due to an underlying chronic condition.         Creatinine Serum (kidney function) GFR component [[id number]]  (Normal) Collected: 04/27/24 2238    Specimen: Blood Updated: 04/27/24 2320     Creatinine 0.82 mg/dL      eGFR 87.2 mL/min/1.73     Narrative:      GFR Normal >60  Chronic Kidney Disease <60  Kidney Failure <15    The GFR formula is only valid for adults with stable renal function between ages 18 and 70.    High Sensitivity Troponin T 2Hr [[id number]]  (Abnormal) Collected: 04/27/24 1246    Specimen: Blood Updated: 04/27/24 1323     HS Troponin T 72 ng/L      Troponin T Delta -7 ng/L     Narrative:      High Sensitive Troponin T Reference Range:  <14.0 ng/L- Negative Female for AMI  <22.0 ng/L- Negative Male for AMI  >=14 - Abnormal Female indicating possible myocardial injury.  >=22 - Abnormal Male indicating possible myocardial injury.   Clinicians would have to utilize clinical acumen, EKG, Troponin, and serial changes to determine if it is an Acute Myocardial Infarction or myocardial injury due to an underlying chronic condition.         TSH Rfx On Abnormal To Free T4 [[id number]]  (Normal) Collected: 04/27/24 1033    Specimen: Blood Updated: 04/27/24 1126     TSH 0.819 uIU/mL     Single High Sensitivity Troponin T [[id number]]  (Abnormal) Collected: 04/27/24 1033    Specimen: Blood Updated: 04/27/24 1107     HS Troponin T 79 ng/L     Narrative:      High Sensitive Troponin T Reference Range:  <14.0 ng/L- Negative Female for AMI  <22.0 ng/L-  Negative Male for AMI  >=14 - Abnormal Female indicating possible myocardial injury.  >=22 - Abnormal Male indicating possible myocardial injury.   Clinicians would have to utilize clinical acumen, EKG, Troponin, and serial changes to determine if it is an Acute Myocardial Infarction or myocardial injury due to an underlying chronic condition.         Comprehensive Metabolic Panel [[id number]]  (Abnormal) Collected: 04/27/24 1033    Specimen: Blood Updated: 04/27/24 1103     Glucose 209 mg/dL      BUN 23 mg/dL      Creatinine 1.03 mg/dL      Sodium 141 mmol/L      Potassium 5.7 mmol/L      Chloride 100 mmol/L      CO2 30.0 mmol/L      Calcium 9.1 mg/dL      Total Protein 6.3 g/dL      Albumin 3.1 g/dL      ALT (SGPT) 18 U/L      AST (SGOT) 12 U/L      Alkaline Phosphatase 118 U/L      Total Bilirubin 1.1 mg/dL      Globulin 3.2 gm/dL      A/G Ratio 1.0 g/dL      BUN/Creatinine Ratio 22.3     Anion Gap 11.0 mmol/L      eGFR 72.1 mL/min/1.73     Narrative:      GFR Normal >60  Chronic Kidney Disease <60  Kidney Failure <15    The GFR formula is only valid for adults with stable renal function between ages 18 and 70.    BNP [[id number]]  (Abnormal) Collected: 04/27/24 1033    Specimen: Blood Updated: 04/27/24 1103     proBNP 5,603.0 pg/mL     Narrative:      This assay is used as an aid in the diagnosis of individuals suspected of having heart failure. It can be used as an aid in the diagnosis of acute decompensated heart failure (ADHF) in patients presenting with signs and symptoms of ADHF to the emergency department (ED). In addition, NT-proBNP of <300 pg/mL indicates ADHF is not likely.    Age Range Result Interpretation  NT-proBNP Concentration (pg/mL:      <50             Positive            >450                   Gray                 300-450                    Negative             <300    50-75           Positive            >900                  Gray                300-900                  Negative             <300      >75             Positive            >1800                  Gray                300-1800                  Negative            <300    Magnesium [[id number]]  (Normal) Collected: 04/27/24 1033    Specimen: Blood Updated: 04/27/24 1103     Magnesium 2.3 mg/dL     CBC & Differential [[id number]]  (Abnormal) Collected: 04/27/24 1033    Specimen: Blood Updated: 04/27/24 1045    Narrative:      The following orders were created for panel order CBC & Differential.  Procedure                               Abnormality         Status                     ---------                               -----------         ------                     CBC Auto Differential[[id number]]        Abnormal            Final result                 Please view results for these tests on the individual orders.    CBC Auto Differential [[id number]]  (Abnormal) Collected: 04/27/24 1033    Specimen: Blood Updated: 04/27/24 1045     WBC 8.87 10*3/mm3      RBC 4.42 10*6/mm3      Hemoglobin 11.5 g/dL      Hematocrit 37.2 %      MCV 84.2 fL      MCH 26.0 pg      MCHC 30.9 g/dL      RDW 16.9 %      RDW-SD 51.4 fl      MPV 10.5 fL      Platelets 242 10*3/mm3      Neutrophil % 82.8 %      Lymphocyte % 8.6 %      Monocyte % 8.0 %      Eosinophil % 0.1 %      Basophil % 0.2 %      Immature Grans % 0.3 %      Neutrophils, Absolute 7.34 10*3/mm3      Lymphocytes, Absolute 0.76 10*3/mm3      Monocytes, Absolute 0.71 10*3/mm3      Eosinophils, Absolute 0.01 10*3/mm3      Basophils, Absolute 0.02 10*3/mm3      Immature Grans, Absolute 0.03 10*3/mm3      nRBC 0.0 /100 WBC     [place] Draw [[id number]] Collected: 04/27/24 1033    Specimen: Blood Updated: 04/27/24 1045    Narrative:      The following orders were created for panel order [place] Draw.  Procedure                               Abnormality         Status                     ---------                               -----------         ------                     Green Top (Gel)[[id number]]                                   Final result               Lavender Top[[id number]]                                     Final result               Gold Top - SST[[id number]]                                   Final result               Light Blue Top[[id number]]                                   Final result                 Please view results for these tests on the individual orders.    Green Top (Gel) [[id number]] Collected: 04/27/24 1033    Specimen: Blood Updated: 04/27/24 1045     Extra Tube Hold for add-ons.     Comment: Auto resulted.       Lavender Top [[id number]] Collected: 04/27/24 1033    Specimen: Blood Updated: 04/27/24 1045     Extra Tube hold for add-on     Comment: Auto resulted       Gold Top - SST [[id number]] Collected: 04/27/24 1033    Specimen: Blood Updated: 04/27/24 1045     Extra Tube Hold for add-ons.     Comment: Auto resulted.       Light Blue Top [[id number]] Collected: 04/27/24 1033    Specimen: Blood Updated: 04/27/24 1045     Extra Tube Hold for add-ons.     Comment: Auto resulted             Data Review:  Results from last 7 days   Lab Units 05/05/24  0530 05/04/24  0425 05/03/24  0508   SODIUM mmol/L 142 143 143   POTASSIUM mmol/L 3.5 3.6 3.7   CHLORIDE mmol/L 106 105 105   CO2 mmol/L 30.4* 31.7* 29.6*   BUN mg/dL 24* 29* 29*   CREATININE mg/dL 0.86 0.78 0.69*   GLUCOSE mg/dL 72 73 71   CALCIUM mg/dL 8.0* 8.0* 7.8*     Results from last 7 days   Lab Units 05/05/24  0530 05/04/24  0425 05/03/24  0508   WBC 10*3/mm3 6.88 6.14 6.20   HEMOGLOBIN g/dL 10.8* 10.3* 10.4*   HEMATOCRIT % 34.4* 33.3* 33.2*   PLATELETS 10*3/mm3 196 189 188               Lab Results   Lab Value Date/Time    TROPONINT 79 (C) 04/27/2024 2238    TROPONINT 72 (C) 04/27/2024 1246    TROPONINT 79 (C) 04/27/2024 1033    TROPONINT 60 (C) 02/09/2024 1401    TROPONINT 65 (C) 02/09/2024 1114    TROPONINT 64 (C) 11/08/2023 2048    TROPONINT 56 (C) 11/08/2023 1534    TROPONINT 60 (C) 11/08/2023 1003    TROPONINT <0.010 09/23/2022 6614              "    Invalid input(s): \"PROT\", \"LABALBU\"              No results found for: \"POCGLU\"  Results from last 7 days   Lab Units 04/30/24  0436   INR  1.22*       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    Arthritis     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, 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/2023    Atrial fibrillation     Atrial fibrillation, persistent 09/24/2022    Cervical spondylosis with myelopathy     CHF (congestive heart failure)     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5]  Yes    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       Plan:  Consult from pulmonary, thoracic surgery and cardiology noted.  Follow-up on labs.  Postop care .  Cardiology making adjustment on medications for rate control and blood pressure.  DC planning.  Disposition to be determined.  Family plans to take him home with home health services probably Monday.  Family needs education on how to drain the Pleurx catheter.  Will reconsult cardiology for the tachycardia and they said they would see for follow-up on Monday but really did not want to change any medicine right now..    Dayron Fowler MD  5/5/2024  13:31 EDT   "

## 2024-05-06 ENCOUNTER — READMISSION MANAGEMENT (OUTPATIENT)
Dept: CALL CENTER | Facility: HOSPITAL | Age: 84
End: 2024-05-06
Payer: MEDICARE

## 2024-05-06 ENCOUNTER — TELEPHONE (OUTPATIENT)
Dept: CARDIOLOGY | Facility: CLINIC | Age: 84
End: 2024-05-06
Payer: MEDICARE

## 2024-05-06 VITALS
RESPIRATION RATE: 16 BRPM | WEIGHT: 142 LBS | BODY MASS INDEX: 20.33 KG/M2 | HEIGHT: 70 IN | OXYGEN SATURATION: 98 % | DIASTOLIC BLOOD PRESSURE: 66 MMHG | HEART RATE: 81 BPM | TEMPERATURE: 97.4 F | SYSTOLIC BLOOD PRESSURE: 94 MMHG

## 2024-05-06 LAB
ANION GAP SERPL CALCULATED.3IONS-SCNC: 7 MMOL/L (ref 5–15)
BASOPHILS # BLD AUTO: 0.03 10*3/MM3 (ref 0–0.2)
BASOPHILS NFR BLD AUTO: 0.4 % (ref 0–1.5)
BUN SERPL-MCNC: 21 MG/DL (ref 8–23)
BUN/CREAT SERPL: 29.2 (ref 7–25)
CALCIUM SPEC-SCNC: 8 MG/DL (ref 8.6–10.5)
CHLORIDE SERPL-SCNC: 105 MMOL/L (ref 98–107)
CO2 SERPL-SCNC: 31 MMOL/L (ref 22–29)
CREAT SERPL-MCNC: 0.72 MG/DL (ref 0.76–1.27)
DEPRECATED RDW RBC AUTO: 49.4 FL (ref 37–54)
EGFRCR SERPLBLD CKD-EPI 2021: 90.7 ML/MIN/1.73
EOSINOPHIL # BLD AUTO: 0.12 10*3/MM3 (ref 0–0.4)
EOSINOPHIL NFR BLD AUTO: 1.6 % (ref 0.3–6.2)
ERYTHROCYTE [DISTWIDTH] IN BLOOD BY AUTOMATED COUNT: 16.1 % (ref 12.3–15.4)
GLUCOSE SERPL-MCNC: 77 MG/DL (ref 65–99)
HCT VFR BLD AUTO: 34.4 % (ref 37.5–51)
HGB BLD-MCNC: 10.5 G/DL (ref 13–17.7)
IMM GRANULOCYTES # BLD AUTO: 0.08 10*3/MM3 (ref 0–0.05)
IMM GRANULOCYTES NFR BLD AUTO: 1.1 % (ref 0–0.5)
LYMPHOCYTES # BLD AUTO: 0.97 10*3/MM3 (ref 0.7–3.1)
LYMPHOCYTES NFR BLD AUTO: 12.8 % (ref 19.6–45.3)
MCH RBC QN AUTO: 25.4 PG (ref 26.6–33)
MCHC RBC AUTO-ENTMCNC: 30.5 G/DL (ref 31.5–35.7)
MCV RBC AUTO: 83.1 FL (ref 79–97)
MONOCYTES # BLD AUTO: 0.67 10*3/MM3 (ref 0.1–0.9)
MONOCYTES NFR BLD AUTO: 8.8 % (ref 5–12)
NEUTROPHILS NFR BLD AUTO: 5.71 10*3/MM3 (ref 1.7–7)
NEUTROPHILS NFR BLD AUTO: 75.3 % (ref 42.7–76)
NRBC BLD AUTO-RTO: 0 /100 WBC (ref 0–0.2)
PLATELET # BLD AUTO: 203 10*3/MM3 (ref 140–450)
PMV BLD AUTO: 10.2 FL (ref 6–12)
POTASSIUM SERPL-SCNC: 3.3 MMOL/L (ref 3.5–5.2)
RBC # BLD AUTO: 4.14 10*6/MM3 (ref 4.14–5.8)
SODIUM SERPL-SCNC: 143 MMOL/L (ref 136–145)
WBC NRBC COR # BLD AUTO: 7.58 10*3/MM3 (ref 3.4–10.8)

## 2024-05-06 PROCEDURE — 80048 BASIC METABOLIC PNL TOTAL CA: CPT | Performed by: HOSPITALIST

## 2024-05-06 PROCEDURE — 85025 COMPLETE CBC W/AUTO DIFF WBC: CPT | Performed by: HOSPITALIST

## 2024-05-06 RX ORDER — POTASSIUM CHLORIDE 20 MEQ/1
20 TABLET, EXTENDED RELEASE ORAL 2 TIMES DAILY
Qty: 30 TABLET | Refills: 0 | Status: SHIPPED | OUTPATIENT
Start: 2024-05-06

## 2024-05-06 RX ORDER — MIDODRINE HYDROCHLORIDE 5 MG/1
5 TABLET ORAL
Qty: 60 TABLET | Refills: 0 | Status: SHIPPED | OUTPATIENT
Start: 2024-05-06

## 2024-05-06 RX ORDER — DIGOXIN 125 MCG
125 TABLET ORAL
Qty: 30 TABLET | Refills: 0 | Status: SHIPPED | OUTPATIENT
Start: 2024-05-06

## 2024-05-06 RX ORDER — FUROSEMIDE 40 MG/1
40 TABLET ORAL DAILY
Qty: 30 TABLET | Refills: 0 | Status: SHIPPED | OUTPATIENT
Start: 2024-05-07

## 2024-05-06 RX ORDER — ATORVASTATIN CALCIUM 10 MG/1
10 TABLET, FILM COATED ORAL NIGHTLY
Qty: 90 TABLET | Refills: 0 | Status: SHIPPED | OUTPATIENT
Start: 2024-05-06

## 2024-05-06 RX ADMIN — MIDODRINE HYDROCHLORIDE 5 MG: 5 TABLET ORAL at 06:37

## 2024-05-06 RX ADMIN — METHIMAZOLE 5 MG: 5 TABLET ORAL at 09:38

## 2024-05-06 RX ADMIN — COLLAGENASE SANTYL 1 APPLICATION: 250 OINTMENT TOPICAL at 09:39

## 2024-05-06 RX ADMIN — METOPROLOL TARTRATE 12.5 MG: 25 TABLET, FILM COATED ORAL at 09:38

## 2024-05-06 RX ADMIN — ACETAMINOPHEN 1000 MG: 500 TABLET ORAL at 09:38

## 2024-05-06 RX ADMIN — APIXABAN 5 MG: 5 TABLET, FILM COATED ORAL at 09:38

## 2024-05-06 RX ADMIN — GUAIFENESIN 1200 MG: 600 TABLET, EXTENDED RELEASE ORAL at 09:38

## 2024-05-06 RX ADMIN — FUROSEMIDE 40 MG: 40 TABLET ORAL at 09:38

## 2024-05-06 RX ADMIN — Medication 10 ML: at 09:39

## 2024-05-06 RX ADMIN — DIGOXIN 125 MCG: 125 TABLET ORAL at 12:36

## 2024-05-06 RX ADMIN — EMPAGLIFLOZIN 10 MG: 10 TABLET, FILM COATED ORAL at 09:38

## 2024-05-06 RX ADMIN — MUPIROCIN 1 APPLICATION: 20 OINTMENT TOPICAL at 09:39

## 2024-05-06 NOTE — PROGRESS NOTES
Per PT note Patient is only able to perform bed mobility.  Patient is able to maintain sat at or above 95% at rest on room air.

## 2024-05-06 NOTE — DISCHARGE SUMMARY
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        Hospital Course     Mr. Silva is a 83 y.o. male with a history of CHF and atrial fibrillation recently in the hospital with right pleural effusion who presented to [facility] initially complaining of shortness of breath worsening over the last week.  Please see the admitting history and physical for further details.  He was found to have large pleural effusion on CT scan compressing and collapsing right lung causing mediastinal shift to the left and was admitted to the hospital for further evaluation and treatment.  He was seen in consultation by pulmonology, thoracic surgery and cardiology.  Thoracic surgery recommended Pleurx catheter placement which was performed on 2024 by Dr. Nataliya Noyola.  He has done well following the procedure and plan is for home health to drain Pleurx catheter every Monday, Wednesday and Friday.  He will resume anticoagulation.  He will follow-up with thoracic surgery in about 1 month.  Cardiology did follow during his stay and made some adjustment medications as outlined below.  Low blood pressure limits some options in treating his atrial fibrillation.  He was started on digoxin.  Cardiology will also increase his Lasix from 20 mg daily to 40 mg daily.  With  this adjustment potassium has become low today.  He will receive some replacement but will send home on 20 mill equivalents per day.  Cardiology said they would follow-up with him in their office in 1 to 2 weeks.  Will resume home dose Lopressor.  Home health to follow.  He also has private caregivers.                                                                                                                                Day of Discharge     Subjective:  No new complaints today.  Eager for discharge home.    Physical Exam:  Temp:  [97.3 °F (36.3 °C)-97.8 °F (36.6 °C)] 97.4 °F (36.3 °C)  Heart Rate:  [75-93] 81  Resp:  [16] 16  BP: ()/(57-75) 94/66  Body mass index is 20.37 kg/m².  Physical Exam  Vitals and nursing note reviewed.   Constitutional:       Appearance: Normal appearance.   Cardiovascular:      Rate and Rhythm: Rhythm irregular.   Pulmonary:      Effort: Pulmonary effort is normal.   Chest:      Comments: Surgical changes with Pleurx catheter in place  Musculoskeletal:      Comments: Right AKA   Neurological:      Mental Status: He is alert. Mental status is at baseline.   Psychiatric:         Mood and Affect: Mood normal.         Consultants     Consult Orders (all) (From admission, onward)       Start     Ordered    04/29/24 0907  Inpatient Nutrition Consult  Once        Provider:  (Not yet assigned)    04/29/24 0906    04/27/24 1611  Inpatient Thoracic Surgery Consult  Once        Specialty:  Thoracic Surgery  Provider:  Rolando Santoro MD    04/27/24 1611    04/27/24 1610  Inpatient Pulmonology Consult  Once        Specialty:  Pulmonary Disease  Provider:  Henrique Monterroso MD    04/27/24 1610    04/27/24 1609  Inpatient Cardiology Consult  Once        Specialty:  Cardiology  Provider:  Huang Chan MD    04/27/24 1610             Procedures     RIGHT VIDEO ASSISTED THORACOSCOPY, PARTIAL DECORTICATION, PLEURX CATHETER INSERTION    Imaging Results (All)       Procedure Component Value Units  Date/Time    XR Chest 1 View [[id number]] Collected: 05/03/24 0722     Updated: 05/03/24 0727    Narrative:      XR CHEST 1 VW-     Clinical: Chest tube management     COMPARISON examination 5/1/2024     FINDINGS: One of the 2 right-sided chest tubes removed in the interim.  Chest tube at the right lung base remains. Small amount of subcutaneous  emphysema right chest wall. No pneumothorax demonstrated. There is a  right-sided pleural effusion seen. Infiltrate/atelectasis in the right  mid to lower lung zone has considerably diminished within the interim.  The cardiomediastinal silhouette and left lung are stable. No other  interval change.     This report was finalized on 5/3/2024 7:24 AM by Dr. Gregory Dang M.D  on Workstation: [id number]       XR Chest 1 View [[id number]] Collected: 05/02/24 0736     Updated: 05/02/24 0742    Narrative:      ONE-VIEW PORTABLE CHEST AT 5:07 A.M.     HISTORY: Recent right chest surgery with partial decortication and  Pleurx catheter placement.     FINDINGS: There appears to be a right-sided chest tube as well as a  right-sided Pleurx catheter unchanged from 2 days ago. There remains a  moderately large amount of loculated right-sided pleural effusion and  adjacent atelectasis showing slight further enlargement since  yesterday's exam. There is no pneumothorax. There remains some  subcutaneous emphysema along the lower right lateral chest wall. The  left lung remains clear and the heart is slightly enlarged.     This report was finalized on 5/2/2024 7:39 AM by Dr. Chavo Denny M.D  on Workstation: [id number]       XR Chest 1 View [[id number]] Collected: 05/01/24 0708     Updated: 05/01/24 0713    Narrative:      XR CHEST 1 VW-     Clinical: Postop     COMPARISON examination 4/30/2024     FINDINGS: The cardiomediastinal silhouette is stable and the left lung  is clear. Right-sided chest tubes in position, no pneumothorax  demonstrated on the current examination. Right lung airspace  disease  with pleural effusion as before. Subcutaneous emphysema right chest  wall. No other interval change has occurred.     This report was finalized on 5/1/2024 7:10 AM by Dr. Gregory Dang M.D  on Workstation: [id number]       XR Chest 1 View [[id number]] Collected: 04/30/24 1013     Updated: 04/30/24 1018    Narrative:      ONE-VIEW PORTABLE CHEST AT 10:05 A.M.     HISTORY: Recent right chest surgery with partial decortication and  Pleurx catheter placement.     FINDINGS: The patient has had recent right-sided decortication with  placement of 2 Pleurx catheters and there is very marked reduction in  size of the previous large right pleural effusion with opacification of  the chest noted on the preoperative exam performed 5 hours ago. There is  a small pneumothorax along the right lateral chest wall and at the right  base measuring 2.5 cm consistent with the recent large volume fluid  removal. There is adjacent dense atelectasis and consolidation of the  partially expanded right lung. The left lung remains clear.     This report was finalized on 4/30/2024 10:15 AM by Dr. Chavo Denny M.D on Workstation: [id number]       XR Lost Needle / Instrument [[id number]] Collected: 04/30/24 0914     Updated: 04/30/24 0945    Narrative:      XR LOST NEEDLE/INSTRUMENT-     INDICATION: Right partial decortication and Pleurx catheter placement.  No surgical count performed.     COMPARISON: None available       Impression:      Two right-sided chest tubes. Partially visualized  endotracheal tube. No other radiopaque foreign body within the right  chest field-of-view.      This report was finalized on 4/30/2024 9:15 AM by Dr. Spencer Wood M.D on Workstation: [id number]       FL C Arm During Surgery [[id number]] Resulted: 04/30/24 0941     Updated: 04/30/24 0941    Narrative:      This procedure was auto-finalized with no dictation required.    XR Chest 1 View [[id number]] Collected: 04/30/24 0803     Updated: 04/30/24 0808     Narrative:      ONE-VIEW PORTABLE CHEST AT 5:29 A.M.     HISTORY: Large right pleural effusion. Follow-up evaluation.     FINDINGS: There is complete opacification of the right chest  predominantly related to a very large pleural effusion as also confirmed  on chest CT scan performed 3 days ago and there is no change from the  chest x-ray obtained at that time. There is some associated slight  mediastinal shift to the left and the left lung remains clear.     This report was finalized on 4/30/2024 8:05 AM by Dr. Chavo Denny M.D  on Workstation: [id number]       CT Chest Without Contrast Diagnostic [[id number]] Collected: 04/27/24 1208     Updated: 04/27/24 1224    Narrative:      CT CHEST WO CONTRAST DIAGNOSTIC-     INDICATION: Complicated right pleural effusion     COMPARISON: CT chest February 14, 2024     TECHNIQUE:  Routine CT chest with IV contrast. Coronal and sagittal reformats.  Radiation dose reduction techniques were utilized, including automated  exposure control and exposure modulation based on body size.     FINDINGS:      Chest wall: No lymphadenopathy.     Mediastinum: Shift to the left. Coronary artery atherosclerotic  calcifications. Heart is at upper limits in size. No pericardial  effusion. Mildly enlarged main pulmonary artery. Calcified right hilar  lymph nodes, consistent with prior granulomatous infection. No  mediastinal or hilar lymphadenopathy.     Lungs/pleura: Large right pleural effusion with associated inferior  displacement of the right hemidiaphragm and leftward mediastinal shift,  with some focal irregular isodensity seen anteroinferiorly along the  pleura, series 2, axial mage 102 that is nonspecific, measuring 1.7 cm.  Small left pleural effusion. Patent left central airways. Right lung  central and peripheral airways are collapsed. Right lung collapse.  Posterior dependent and basilar subsegmental atelectasis in the left  lung.     Upper abdomen: Small amount of perihepatic free  fluid. Cholelithiasis.  Mild to moderate left hydronephrosis.     Osseous structures: Severe right glenohumeral osteoarthritis with  bone-on-bone articulation. Total left shoulder arthroplasty.     Other: Body wall edema seen in the flanks       Impression:         1. Large right pleural effusion with tension component with leftward  displacement of the mediastinum and right diaphragm and version. Focal  irregular soft tissue attenuating material seen along the anteroinferior  margin of the effusion/pleura is indeterminate  2. Small left pleural effusion.  3. Right lung collapse.  4. Mild to moderate left hydronephrosis, incompletely imaged     Call Report: Dr. Rodriguez was notified of tension effusion by telephone of  the above findings on April 27, 2024 at 12:20 p.m.     This report was finalized on 4/27/2024 12:21 PM by Dr. Taiwo Espinosa M.D on Workstation: [id number]       XR Chest 1 View [[id number]] Collected: 04/27/24 1108     Updated: 04/27/24 1112    Narrative:      XR CHEST 1 VW-        INDICATION: Shortness of air     COMPARISON: Chest radiograph March 27, 2024     TECHNIQUE: 1 view chest     FINDINGS:      Opacification of the right hemithorax. Cardiomediastinal shift to the  left. Total left shoulder arthroplasty. Right glenohumeral  osteoarthritis with suspected rotator cuff tear.       Impression:      Complete opacification the right hemithorax with cardiomediastinal shift  to the left. Suspect large pleural effusion with passive collapse of the  right lung.     This report was finalized on 4/27/2024 11:09 AM by Dr. Taiwo Espinosa M.D on Workstation: [id number]        Results for orders placed during the hospital encounter of 04/27/24    Adult Transthoracic Echo Limited W/ Cont if Necessary Per Protocol    Interpretation Summary  •  Limited study.  •  The left ventricular cavity is small in size. Left ventricular systolic function is normal. Calculated left ventricular EF = 62.4%  •  Left  "ventricular wall thickness is consistent with moderate concentric hypertrophy. Left ventricular diastolic function was indeterminate due to AFib  •  There is moderate anterior mitral valve thickening present. Moderate to severe mitral valve regurgitation is present.  •  Estimated right ventricular systolic pressure from tricuspid regurgitation is moderately elevated (45-55 mmHg).  •  There is a trivial pericardial effusion. There is a large right pleural effusion with likely consolidated right lung base.  •  Severe LA enlargement.  IVC normal in size and respirophasic variation.    Pertinent Labs     Results from last 7 days   Lab Units 05/06/24 0304 05/05/24 0530 05/04/24 0425 05/03/24  0508   WBC 10*3/mm3 7.58 6.88 6.14 6.20   HEMOGLOBIN g/dL 10.5* 10.8* 10.3* 10.4*   PLATELETS 10*3/mm3 203 196 189 188     Results from last 7 days   Lab Units 05/06/24 0304 05/05/24 0530 05/04/24 0425 05/03/24  0508   SODIUM mmol/L 143 142 143 143   POTASSIUM mmol/L 3.3* 3.5 3.6 3.7   CHLORIDE mmol/L 105 106 105 105   CO2 mmol/L 31.0* 30.4* 31.7* 29.6*   BUN mg/dL 21 24* 29* 29*   CREATININE mg/dL 0.72* 0.86 0.78 0.69*   GLUCOSE mg/dL 77 72 73 71   EGFR mL/min/1.73 90.7 85.9 88.5 91.8       Results from last 7 days   Lab Units 05/06/24 0304 05/05/24 0530 05/04/24 0425 05/03/24  0508   CALCIUM mg/dL 8.0* 8.0* 8.0* 7.8*               Invalid input(s): \"LDLCALC\"            Test Results Pending at Discharge     Pending Labs       Order Current Status    AFB Culture - Tissue, Lung, R Preliminary result    Fungus Culture - Tissue, Lung, R Preliminary result            Discharge Details        Discharge Medications        New Medications        Instructions Start Date   atorvastatin 10 MG tablet  Commonly known as: LIPITOR   10 mg, Oral, Nightly      digoxin 125 MCG tablet  Commonly known as: LANOXIN   125 mcg, Oral, Daily Digoxin      midodrine 5 MG tablet  Commonly known as: PROAMATINE   5 mg, Oral, 2 Times Daily Before Meals   "    potassium chloride 20 MEQ CR tablet  Commonly known as: KLOR-CON M20   20 mEq, Oral, 2 Times Daily             Changes to Medications        Instructions Start Date   furosemide 40 MG tablet  Commonly known as: LASIX  What changed:   medication strength  how much to take   40 mg, Oral, Daily   Start Date: May 7, 2024     metoprolol tartrate 25 MG tablet  Commonly known as: LOPRESSOR  What changed: when to take this   25 mg, Oral, Every 12 Hours Scheduled             Continue These Medications        Instructions Start Date   cholecalciferol 25 MCG (1000 UT) tablet  Commonly known as: VITAMIN D3   1,000 Units, Oral, Daily      Eliquis 5 MG tablet tablet  Generic drug: apixaban   5 mg, Oral, Every 12 Hours      escitalopram 5 MG tablet  Commonly known as: LEXAPRO   1 tablet, Oral, Daily      ezetimibe 10 MG tablet  Commonly known as: ZETIA   10 mg, Oral, Daily      gabapentin 100 MG capsule  Commonly known as: NEURONTIN   100 mg, Oral, Every Night at Bedtime      Jardiance 10 MG tablet tablet  Generic drug: empagliflozin   10 mg, Oral, Daily      LORazepam 0.5 MG tablet  Commonly known as: ATIVAN       methIMAzole 5 MG tablet  Commonly known as: TAPAZOLE   5 mg, Oral, 3 Times Weekly, Monday, Wednesday, and Friday       multivitamin with minerals tablet tablet   1 tablet, Oral, Daily      Santyl 250 UNIT/GM ointment  Generic drug: collagenase   Apply 1 Application topically to the appropriate area as directed Daily. To sacrum             Stop These Medications      cephalexin 500 MG capsule  Commonly known as: KEFLEX              No Known Allergies    Discharge Disposition:  Home-Health Care [facility]      Discharge Diet:  Diet Order   Procedures   • Diet: Cardiac; Healthy Heart (2-3 Na+); Fluid Consistency: Thin (IDDSI 0)       Discharge Activity:   Activity Instructions       Activity as Tolerated              CODE STATUS:    Code Status and Medical Interventions:   Ordered at: 04/27/24 2360     Level Of Support  Discussed With:    Patient     Code Status (Patient has no pulse and is not breathing):    CPR (Attempt to Resuscitate)     Medical Interventions (Patient has pulse or is breathing):    Full Support       Future Appointments   Date Time Provider Department Center   5/9/2024 10:15 AM Miryam Fatima S, PT [facility]   5/13/2024  3:00 PM Yulia Wynn MD [facility]   5/14/2024 10:15 AM Miryam Faitma S, PT [facility]   5/16/2024 10:15 AM KvMiryam drew S, PT [facility]   5/21/2024 10:15 AM KvMiryam drew S, PT [facility]   5/23/2024 10:15 AM Miryam Fatima S, PT [facility]   5/28/2024 10:15 AM Miryam Fatima S, PT [facility]   5/30/2024 10:15 AM Miryam Fatima S, PT [facility]   5/30/2024 11:45 AM [facility] CT 2 BH [facility] CT [facility]   6/4/2024 10:15 AM Miryam Fatima S, PT [facility]   6/6/2024  9:00 AM Shannan Denis, DNP, APRN [facility]   6/6/2024 10:15 AM Miryam Fatima S, PT [facility]   6/10/2024 11:30 AM Huang Chan MD [facility]   6/11/2024 10:15 AM Miryam Fatima S, PT [facility]   6/13/2024 10:15 AM Miryam Fatima S, PT [facility]   6/18/2024 10:15 AM KvMiryam drew S, PT [facility]   6/20/2024 10:15 AM Miryam Fatima S, PT [facility]   6/25/2024 10:15 AM KvMiryam drew S, PT [facility]   6/27/2024 10:15 AM Miryam Fatima, PT  [facility]   7/2/2024 10:15 AM Miryam Fatima, PT  [facility]   7/4/2024 10:15 AM Miryam Fatima, PT  [facility]     Additional Instructions for the Follow-ups that You Need to Schedule       Ambulatory Referral to Home Health (Hospital)   As directed      Face to Face Visit Date: 4/29/2024   Follow-up provider for Plan of Care?: I will be treating the patient on an ongoing basis.  Please send me the Plan of Care for signature.   Follow-up provider: SHANNAN DENIS [[id number]]   Reason/Clinical Findings: pleurx   Describe mobility limitations that make leaving home difficult: pleurx   Nursing/Therapeutic Services Requested:  Skilled Nursing   Skilled nursing orders: Other   Frequency: Other (Drain Pleurx 2-3x/week)        Discharge Follow-up with PCP   As directed       Currently Documented PCP:    Taiwo Powers MD    PCP Phone Number:    [phone]     Follow Up Details: 1 to 2 weeks (or sooner if problems)        XR Chest 2 View    Jun 03, 2024 (Approximate)      Exam reason: Postop Pleurx catheter placement, follow-up effusion.   Release to patient: Routine Release               Contact information for follow-up providers       Shannan Augustin, NOHEMY, APRN Follow up on 6/6/2024.    Specialties: Thoracic Surgery, Nurse Practitioner  Why: Please go the main hospital radiology department 45 minutes prior to your appointment for chest x-ray.  Contact information:  [address] [address]  [phone]               Taiwo Powers MD .    Specialty: Internal Medicine  Why: 1 to 2 weeks (or sooner if problems)  Contact information:  [address] [id number] [phone]                       Contact information for after-discharge care       Home Medical Care       [facility] .    Service: Home Health Services  Contact information:  [address], [address]  [facility] [address] [phone]                                   Additional Instructions for the Follow-ups that You Need to Schedule       Ambulatory Referral to Home Health ([facility])   As directed      Face to Face Visit Date: 4/29/2024   Follow-up provider for Plan of Care?: I will be treating the patient on an ongoing basis.  Please send me the Plan of Care for signature.   Follow-up provider: SHANNAN AUGUSTIN [[id number]]   Reason/Clinical Findings: pleurx   Describe mobility limitations that make leaving home difficult: pleurx   Nursing/Therapeutic Services Requested: Skilled Nursing   Skilled nursing orders: Other   Frequency: Other (Drain Pleurx 2-3x/week)        Discharge Follow-up  with PCP   As directed       Currently Documented PCP:    Taiwo Powers MD    PCP Phone Number:    [phone]     Follow Up Details: 1 to 2 weeks (or sooner if problems)        XR Chest 2 View    Jun 03, 2024 (Approximate)      Exam reason: Postop Pleurx catheter placement, follow-up effusion.   Release to patient: Routine Release            Time Spent on Discharge:  Greater than 30 minutes      Regulo Dang MD  [facility]  05/06/24  10:02 EDT

## 2024-05-06 NOTE — PLAN OF CARE
Goal Outcome Evaluation:  Plan of Care Reviewed With: patient           Outcome Evaluation: VSS and on RA. No c/o pain. LG BM this am. Incontinent of urine. Reluctant to turn q 2 hrs. Refused IS. AFib on monitor. CM to confirm if wife has HHC set up as well as transportation. Wife and pt have given conflicting info over the wknd

## 2024-05-07 ENCOUNTER — TELEPHONE (OUTPATIENT)
Dept: CARDIOLOGY | Facility: CLINIC | Age: 84
End: 2024-05-07
Payer: MEDICARE

## 2024-05-07 LAB
FUNGUS WND CULT: NORMAL
MYCOBACTERIUM SPEC CULT: NORMAL
NIGHT BLUE STAIN TISS: NORMAL

## 2024-05-07 NOTE — CASE MANAGEMENT/SOCIAL WORK
Case Management Discharge Note      Final Note: Discharged home with [facility] and caregiver. Transported by caregiver         Selected Continued Care - Discharged on 5/6/2024 Admission date: 4/27/2024 - Discharge disposition: Home-Health Care [facility]      Destination    No services have been selected for the patient.                Durable Medical Equipment    No services have been selected for the patient.                Dialysis/Infusion    No services have been selected for the patient.                Home Medical Care       Service Provider Selected Services Address Phone Fax Patient Preferred    [facility] [address], [address], [facility] [address] [phone] --              Therapy    No services have been selected for the patient.                Community Resources    No services have been selected for the patient.                Community & DME    No services have been selected for the patient.                    Selected Continued Care - Prior Encounters Includes continued care and service providers with selected services from prior encounters from 1/28/2024 to 5/6/2024      Discharged on 2/20/2024 Admission date: 2/9/2024 - Discharge disposition: Home-Health Care [facility]      Home Medical Care       Service Provider Selected Services Address Phone Fax Patient Preferred    [facility] [address], [address], [place] [address] [phone] --                          Transportation Services  Private: Car    Final Discharge Disposition Code: 06 - home with home health care

## 2024-05-13 ENCOUNTER — OFFICE VISIT (OUTPATIENT)
Dept: SURGERY | Facility: CLINIC | Age: 84
End: 2024-05-13
Payer: MEDICARE

## 2024-05-13 VITALS
DIASTOLIC BLOOD PRESSURE: 62 MMHG | HEART RATE: 68 BPM | HEIGHT: 70 IN | TEMPERATURE: 98.5 F | OXYGEN SATURATION: 98 % | SYSTOLIC BLOOD PRESSURE: 124 MMHG | BODY MASS INDEX: 20.37 KG/M2

## 2024-05-13 DIAGNOSIS — E44.0 MODERATE MALNUTRITION: ICD-10-CM

## 2024-05-13 DIAGNOSIS — L08.9 INFECTED EPIDERMOID CYST: ICD-10-CM

## 2024-05-13 DIAGNOSIS — L72.0 INFECTED EPIDERMOID CYST: ICD-10-CM

## 2024-05-13 DIAGNOSIS — Z74.09 COMPLICATIONS OF IMMOBILITY: ICD-10-CM

## 2024-05-13 DIAGNOSIS — L89.150 PRESSURE INJURY OF SACRAL REGION, UNSTAGEABLE: Primary | ICD-10-CM

## 2024-05-13 RX ORDER — MIRTAZAPINE 7.5 MG/1
1 TABLET, FILM COATED ORAL NIGHTLY
COMMUNITY
Start: 2024-04-26

## 2024-05-13 NOTE — PROGRESS NOTES
General Surgery Initial Office Visit    Referring Provider: CHARLEE Webber    Chief Complaint:    Right chest and sacral wounds    History of Present Illness:    Mehreen Silva IV is a 84 y.o. male who presents to General Surgery clinic for evaluation of right chest and sacral wounds.     The patient is accompanied by his caregiver who helps provide some history. The patient appears hard of hearing but answers questions appropriately and is able to present his history. Per his caregiver, he noticed his sacral wound about 6 weeks ago.  He has been seen by the [facility], last visit 4/17/24, at that time was noted to have a 4 cm x 3 cm unstageable sacral wound with eschar and slough, recommended to undergo treatment with Santyl, Vaseline Gauze, and bordered foam to the sacral wound, and surgical evaluation. A second wound of his right lateral chest was noted to be healed following treatment.    The patient has previously been on cipro and keflex for his sacral wound.  He has undergone plain films to evaluate for osteomyelitis.    He was scheduled for an office visit with General Surgery in late April but was recently admitted to the hospital for pleural effusion related to CHF.  He underwent pleurx catheter placement and undergos drainage MW, stating he has been feeling better since this. During his hospitalization on 5/1/24 he was seen by Wound Care team, noted to have partial-thickness skin loss, excoriated area to periwound of his right clavicle area, draining yellow contents, Bactroban ointment was ordered. For his sacral wound, Therahoney, Opticel dressing and Optifoam were ordered along with Q2h turns.    He was assessed by Nutrition during his inpatient stay on 4/29/24 and was noted to have recent significant 17.5% weight loss since 11/23, with diagnosis of moderate malnutrition of chronic illness based on weight loss, muscle wasting, and fat loss.  He was recommended to use nutritional  supplements in addition to regular PO intake. The patient reportedly eats very little, taking in scrambled eggs for breakfast, fruit and yogurt for lunch, sometimes carrots, and drinks an ensure and his family encourages him to drink a milkshake or have ice cream for dinner.  He has an offloading mattress similar to a hospital bed. He has a caretaker at home from 8am-5pm daily who helps move the patient out of bed to his wheelchair, who turns him every 2 hours, and who performs his sacral wound care.  He was in physical therapy following his AKA but this has been put on hold since his recent thoracic surgery, and family is attempting to get home health PT.    The patient is on Eliquis for afib. He is a former smoker stating he quit many years ago.      The patient states he had a right chest cyst similar to prior sebaceous cysts that he noted swelled and opened up and drained.  It healed for a time but then when he was in the hospital it came back.  He states that it has been draining yellowish fluid.  It has been mildly tender.    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 Shaji Barahona Jr.  Back surgery x 4  Neck surgery  Shoulder replacement surgery  Rotator cuff surgery  Knee surgery for torn meniscus  Surgery for small bowel obstruction noted on his chart, but patient denies prior bowel surgery,  "stating he was managed nonoperatively with NPO status and his bowel obstruction resolved    Family History:    Denies family history of skin cancer  Father had heart disease and AAA  Mother had unspecified cancer    Social History:    Denies alcohol, tobacco, or recreational drug use  Former smoker, quit \"many years ago\"    Allergies:   No Known Allergies    Medications:     Current Outpatient Medications:     atorvastatin (LIPITOR) 10 MG tablet, Take 1 tablet by mouth Every Night., Disp: 90 tablet, Rfl: 0    cholecalciferol (VITAMIN D3) 25 MCG (1000 UT) tablet, Take 1 tablet by mouth Daily., Disp: , Rfl:     digoxin (LANOXIN) 125 MCG tablet, Take 1 tablet by mouth Daily., Disp: 30 tablet, Rfl: 0    Eliquis 5 MG tablet tablet, TAKE ONE TABLET BY MOUTH EVERY 12 HOURS, Disp: 180 tablet, Rfl: 3    escitalopram (LEXAPRO) 5 MG tablet, Take 1 tablet by mouth Daily., Disp: , Rfl:     ezetimibe (ZETIA) 10 MG tablet, Take 1 tablet by mouth Daily., Disp: , Rfl:     furosemide (LASIX) 40 MG tablet, Take 1 tablet by mouth Daily., Disp: 30 tablet, Rfl: 0    gabapentin (NEURONTIN) 100 MG capsule, Take 1 capsule by mouth every night at bedtime., Disp: , Rfl:     Jardiance 10 MG tablet tablet, TAKE 1 TABLET BY MOUTH DAILY, Disp: 90 tablet, Rfl: 1    LORazepam (ATIVAN) 0.5 MG tablet, , Disp: , Rfl:     methIMAzole (TAPAZOLE) 5 MG tablet, Take 1 tablet by mouth 3 (Three) Times a Week. Monday, Wednesday, and Friday, Disp: , Rfl:     metoprolol tartrate (LOPRESSOR) 25 MG tablet, Take 1 tablet by mouth Every 12 (Twelve) Hours., Disp: 60 tablet, Rfl: 2    midodrine (PROAMATINE) 5 MG tablet, Take 1 tablet by mouth 2 (Two) Times a Day Before Meals., Disp: 60 tablet, Rfl: 0    mirtazapine (REMERON) 7.5 MG tablet, Take 1 tablet by mouth Every Night., Disp: , Rfl:     multivitamin with minerals tablet tablet, Take 1 tablet by mouth Daily., Disp: , Rfl:     potassium chloride (KLOR-CON M20) 20 MEQ CR tablet, Take 1 tablet by mouth 2 (Two) " "Times a Day., Disp: 30 tablet, Rfl: 0    Santyl 250 UNIT/GM ointment, Apply 1 Application topically to the appropriate area as directed Daily. To sacrum, Disp: , Rfl:     Review of Systems:   Constitutional: denies fevers, chills  Eyes: denies vision changes or scleral icterus  Resp: +shortness of breath  CV: denies chest pain, heart palpitations  GI: denies diarrhea    Physical Exam:   /62   Pulse 68   Temp 98.5 °F (36.9 °C) (Oral)   Ht 177.8 cm (70\")   SpO2 98%   BMI 20.37 kg/m²   BMI is within normal parameters. No other follow-up for BMI required.  Constitutional: Well-developed, thin, chronically ill appearing   Neuro: hard of hearing  Respiratory: Normal inspiratory effort on room air, right chest pleurx catheter in place with gauze dressing  Cardiovascular: Irregularly irregular rate  Gastrointestinal: Abd nondistended   Musculoskeletal: Symmetric strength; moves all extremities; right AKA with prosthesis in place  Psychiatric: Normal mood and affect  Skin:    Right chest with what appears to be a draining epidermoid cyst, with a small amount of purulent cystic material manually expressed, mild tenderness, minimal surrounding erythema measuring approximately 2cm in diameter, pictured:   Unstageable right gluteal/sacral wound measuring approximately 4cm x 2cm x2mm deep with white and brown eschar overlying, with surrounding hyperpigmented skin, without evidence of infection. There is no surrounding induration, fluctuance, or crepitus.The interface between the surrounding intact epidermis and white eschar demonstrates pinpoint bleeding when probed with q tip. The wound and surrounding skin are nontender.  The wound was covered with a mepilex dressing.      Labs:    CBC 5/6/2024-WBC 7.58, hemoglobin 10.5, hematocrit 34.4, platelets 203  BMP 5/6/2024-glucose 77, BUN 21, creatinine 0.72, sodium 143, potassium 3.3, chloride 105, CO2 31, calcium 8, EGFR 90.7  Albumin 4/27/2024-3.1    Radiology:  XR " Hip/Pelvis 3/27/24 images and report reviewed - no findings of osteomyelitis noted; degenerative changes of right hip present    Assessment/Plan:  84-year-old gentleman presenting with unstageable right gluteal/sacral pressure wound and suspected spontaneously draining infected epidermoid cyst of the right chest    With regards to the patient's unstageable right gluteal/sacral wound, it does not appear infected. Based on review of his wound image from 4/27, there has not been much change to the wound while he has been undergoing enzymatic debridement with Santyl. His risk factors for poor wound healing include debility being wheelchair bound and with AKA, vascular disease, and malnutrition. He is not a current smoker. Does not have diabetes. I am concerned that based on his limited mobility to offload pressure and his malnutrition, he may not be able to heal a larger wound if he underwent debridement. At this point, I would recommend continuing Santyl daily with his wound care and would like the patient to follow up in a week for repeat evaluation.     The patient's right chest lesion appears to be a spontaneously draining infected epidermoid cyst. This appears to be draining without further need for incision at this time, and it does not require antibiotics currently given it is draining and has no significant cellulitis. Recommended keeping the wound clean and dry with daily washing with soap and water and placing a gauze bandage over the site, changing daily and as needed for soilage. Will have him return in 4 weeks for evaluation for possible cyst excision.      Yulia Wynn M.D.  General, Robotic, and Endoscopic Surgery  [facility]    [address] [place] [address], [address]  [place], [place], [facility]  P: [phone]  F: [phone]

## 2024-05-14 ENCOUNTER — TELEPHONE (OUTPATIENT)
Dept: SURGERY | Facility: CLINIC | Age: 84
End: 2024-05-14
Payer: MEDICARE

## 2024-05-14 PROBLEM — Z74.09: Status: ACTIVE | Noted: 2024-05-14

## 2024-05-14 PROBLEM — L08.9 INFECTED EPIDERMOID CYST: Status: ACTIVE | Noted: 2024-05-14

## 2024-05-14 PROBLEM — L89.150 PRESSURE INJURY OF SACRAL REGION, UNSTAGEABLE: Status: ACTIVE | Noted: 2024-05-14

## 2024-05-14 PROBLEM — L72.0 INFECTED EPIDERMOID CYST: Status: ACTIVE | Noted: 2024-05-14

## 2024-05-14 LAB
FUNGUS WND CULT: NORMAL
MYCOBACTERIUM SPEC CULT: NORMAL
NIGHT BLUE STAIN TISS: NORMAL

## 2024-05-14 NOTE — TELEPHONE ENCOUNTER
Patient's wife called stating that she needs office notes from her 's appointment with Dr. Wynn from 5/13/2024.  These office notes need to be sent attention to CHARLEE Webber, at [facility].  The phone number is [phone] office and [phone] fax.  The appointment with Wound Care is scheduled for 5/22/2024 at 9:45 a.m.  Unable to complete the SHARITA at this time since encounter is still open.  The request is on hold until encounter can be signed by physician.      ADDEND @ 03:23 PM/KZ    Completed SHARITA request for 5/13/24 office notes from Dr. Wynn visit to be sent to  Wound Care, Attention, CHARLEE Webber.

## 2024-05-14 NOTE — TELEPHONE ENCOUNTER
Called and spoke with patient and his wife on the phone. His wife states she has power of  and is responsible for making his appointments. Discussed  recommendation for continuing wound care as ordered and follow up in 1 week for recheck. Patient and wife agreeable with the recommendations.    Yulia Wynn M.D.  General, Robotic, and Endoscopic Surgery  [facility]    [address], [address]  [place], [place], [address]  P: [phone]  F: [phone]

## 2024-05-15 ENCOUNTER — READMISSION MANAGEMENT (OUTPATIENT)
Dept: CALL CENTER | Facility: HOSPITAL | Age: 84
End: 2024-05-15
Payer: MEDICARE

## 2024-05-15 NOTE — OP NOTE
PLEURX CATHETER INSERTION  Procedure Report    Patient Name:  Mehreen Silva IV  YOB: 1940    Date of Surgery:  4/30/2024     Indications: Recurrent right pleural effusion    Pre-op Diagnosis:   Recurrent pleural effusion on right [J90]       Post-Op Diagnosis Codes:     * Recurrent pleural effusion on right [J90]    Procedure/CPT® Codes:      Procedure(s):  RIGHT VIDEO ASSISTED THORACOSCOPY, PARTIAL DECORTICATION, PLEURX CATHETER INSERTION    Staff:  Surgeon(s):  Nataliya Noyola MD      Anesthesia: General    Estimated Blood Loss: minimal    Implants:    Nothing was implanted during the procedure    Specimen:          Specimens       ID Source Type Tests Collected By Collected At Frozen?    1 Lung, R Tissue ANAEROBIC CULTURE  FUNGAL CULTURE  TISSUE / BONE CULTURE  AFB CULTURE   Nataliya Noyola MD 4/30/24 0833     Description: right lung pleura for cultures              Findings: Gelatinous material throughout the entire chest cavity encasing the lung.  Approximately 4 L of fluid and material was removed from the chest    Complications: None apparent        Description of Procedure: Mehreen Silva IV was identified in the preoperative holding area and again his consent for the procedure was verified.  The patient was transported to the operating room and placed on the operating room table in supine position.  A general anesthetic was successfully administered and He was intubated without difficulty.   A time out was performed to verify correct patient, correct procedure and the correct administration of IV antibiotics per [facility] protocol.     The patient was bumped on the right.  The patient was prepped and draped in standard sterile fashion.  Using fluoroscopy, needle was inserted into the chest cavity and the fluid.  Minimal fluid was returned but I was able to thread a wire in the chest cavity without difficulty.  The Pleurx catheter was tunneled from the eighth intercostal space midclavicular line to  the sixth intercostal space incision.  The Pleurx catheter was placed in the chest.  Minimal return from the Pleurx was obtained.  I elected to perform a small incision in the sixth intercostal space and use a 10 mm scope to evaluate his chest cavity.  An Lawrence wound protector was placed.  There was a large amount of fluid and gelatinous material throughout the entire chest cavity.  I was able to evacuate approximately two thirds of this material and fluid and get expansion of most of the lung.  This involved tedious dissection of the gelatinous material from the lung surface in the chest wall.  Using combination of suction and blunt dissection, the lung did expand.  Pleurx was directed inferiorly under direct vision.   A 24 Ghanaian chest tube was placed in the 6th intercostal space incision. The lung was reexpanded under direct visualization.  The incisions were closed using deep vicryl sutures and Monocryl for the skin in standard fashion.  The chest tube was secured to the skin.      The patient tolerated this procedure well and was extubated and transported to the recovery room in stable condition.  The counts were correct at the end of the case.           Nataliya Noyola MD     Date: 5/15/2024  Time: 09:48 EDT

## 2024-05-15 NOTE — OUTREACH NOTE
CT Surgery Week 1 Survey      Flowsheet Row Responses   [facility] patient discharged from? [place]   Does the patient have one of the following disease processes/diagnoses(primary or secondary)? Cardiothoracic surgery   Week 1 attempt successful? No   Unsuccessful attempts Attempt 1  [Son defers to pt for call, NA at the pt's number]   Discharge diagnosis Recurrent pleural effusion on right, RIGHT VIDEO ASSISTED THORACOSCOPY, PARTIAL DECORTICATION, PLEURX CATHETER INSERTION   What is the Home health agency?  [facility]   Has home health visited the patient within 72 hours of discharge? Yes              Janna AJMES - Registered Nurse

## 2024-05-20 ENCOUNTER — TELEPHONE (OUTPATIENT)
Dept: CARDIOLOGY | Facility: CLINIC | Age: 84
End: 2024-05-20
Payer: MEDICARE

## 2024-05-20 NOTE — TELEPHONE ENCOUNTER
Please triage this call.  Looks like there was telephone correspondence on 5/7 between CHARLEE Bryant and Dr. Huang Chan (not me).  Patient was supposed to alternate furosemide.    Can you call his wife and see if his shortness of breath and lower extremity edema are stable taking furosemide 20 mg daily?  If so, continue that dosage.  If he starts to experience increased amount of fluid retention, that he needs to take an extra dosage.  Thank you

## 2024-05-20 NOTE — TELEPHONE ENCOUNTER
Caller: Belem Silva    Relationship: Emergency Contact    Best call back number:[phone]    What is the best time to reach you: ANY    Who are you requesting to speak with (clinical staff, provider,  specific staff member): CLINICAL         What was the call regarding: PT HAD TO CANCEL APPT ON 5/21 BECAUSE HE DOESN'T HAVE AWAY HERE,   WIFE WANTS TO LET YOU KNOW THEY DIDN'T RESCHEDULE BECAUSE HE SEES DR LAO ON 6/10/24   AND IS LOOKING FORWARD TO THAT. HOWEVER HER  HAS BEEN TAKING 20 MG OF LASIX INSTEAD ON 40 MG OF LASIX AS GIVEN AT THE HOSPITAL. SHALONDA HAD TOLD  PT TO TAKE A DAY OF 40MG AND THEN A DAY OF 20MG. THE PT DID NOT DO THAT AND HAS BEEN TAKING THE 2OMG DAILY SINCE 5/8/24 AND SHE WANTS TO MAKE SURE YOU ALL ARE AWARE AND PLEASE CALL BACK IF YOU NEED TO DISCUSS.

## 2024-05-21 LAB
MYCOBACTERIUM SPEC CULT: NORMAL
NIGHT BLUE STAIN TISS: NORMAL

## 2024-05-21 NOTE — TELEPHONE ENCOUNTER
Called and left  for patient's wife, Belem. Will continue to try to reach her. HUB transfer call to triage.     Stella Hernandez RN  Triage [facility]

## 2024-05-21 NOTE — TELEPHONE ENCOUNTER
Clarita,    I attempted to call pt and I spent over 20 minutes on the phone with his wife. I could not give her the answers she needed. She needs to speak with a provider.    Thank you,    Debi Grijalva, RN  Triage [facility]  05/21/24 15:27 EDT

## 2024-05-23 ENCOUNTER — READMISSION MANAGEMENT (OUTPATIENT)
Dept: CALL CENTER | Facility: HOSPITAL | Age: 84
End: 2024-05-23
Payer: MEDICARE

## 2024-05-23 ENCOUNTER — PROCEDURE VISIT (OUTPATIENT)
Dept: SURGERY | Facility: CLINIC | Age: 84
End: 2024-05-23
Payer: MEDICARE

## 2024-05-23 VITALS
DIASTOLIC BLOOD PRESSURE: 65 MMHG | HEIGHT: 70 IN | WEIGHT: 142 LBS | BODY MASS INDEX: 20.33 KG/M2 | SYSTOLIC BLOOD PRESSURE: 122 MMHG

## 2024-05-23 DIAGNOSIS — L08.9 INFECTED EPIDERMOID CYST: ICD-10-CM

## 2024-05-23 DIAGNOSIS — E44.0 MODERATE MALNUTRITION: ICD-10-CM

## 2024-05-23 DIAGNOSIS — L89.150 PRESSURE INJURY OF SACRAL REGION, UNSTAGEABLE: Primary | ICD-10-CM

## 2024-05-23 DIAGNOSIS — L72.0 INFECTED EPIDERMOID CYST: ICD-10-CM

## 2024-05-23 DIAGNOSIS — Z74.09 COMPLICATIONS OF IMMOBILITY: ICD-10-CM

## 2024-05-23 NOTE — OUTREACH NOTE
CT Surgery Week 3 Survey      Flowsheet Row Responses   [facility] patient discharged from? [place]   Does the patient have one of the following disease processes/diagnoses(primary or secondary)? Cardiothoracic surgery   Week 3 attempt successful? No   Unsuccessful attempts Attempt 1  [did not attempt son's number. previous RN states son deferred call to patient.]            Sruthi WINSLOW - Registered Nurse

## 2024-05-23 NOTE — PROGRESS NOTES
General Surgery Office Note     History of Present Illness:    Mehreen Silva IV is a 84 y.o. male who presents for follow up evaluation of a sacral pressure wound and right chest cystic lesion.     He is on Eliquis for A-fib.  He is a former smoker, having quit smoking many years ago.  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  He developed an unstageable sacral wound for which he was seen by the [facility] wound care center.  He was recommended to undergo treatment with Santyl, Vaseline gauze, and bordered foam to the wound, and after several visits was recommended to undergo surgical evaluation.  He was recently diagnosed with moderate malnutrition of chronic illness in April 2024, due to recent significant weight loss and poor p.o. intake.  He was in physical therapy, but this was recently put on hold following admission to the hospital in April for pleural effusion related to CHF for which he underwent Pleurx catheter placement.  Because of his malnutrition and immobility, we elected to monitor his noninfected sacral wound and continue Santyl dressings with repeat evaluation this week.    He has also been assessed for a right chest cystic lesion that has spontaneously drained and appears consistent with infected epidermoid cyst.  He was recommended to return to clinic 3 week from now for possible cyst excision after healing from his abscess.    Interval history:  Patient is accompanied by his wife and son today. His wife presents the history.  The patient has had no changes in his symptoms since his previous visit. He has lost his caregiver recently, and the family is trying to hire a new one. He continues his local wound care with daily dressing changes with Santyl for his sacral wound.  The patient denies any pain to the area.  The patient has not had any fevers, chills, or drainage from  his sacral wound.  Family is unsure about weight loss or weight gain due to inability to weigh him at home.  They note he has had stable appetite, eating small amounts at meals, able to drink a full Ensure at least once a day.  He has not had any increased pain or redness at his right chest abscess, but there is still small amount of purulent drainage daily from it.    Allergies:  No Known Allergies    Meds:    Current Outpatient Medications:     atorvastatin (LIPITOR) 10 MG tablet, Take 1 tablet by mouth Every Night., Disp: 90 tablet, Rfl: 0    cholecalciferol (VITAMIN D3) 25 MCG (1000 UT) tablet, Take 1 tablet by mouth Daily., Disp: , Rfl:     digoxin (LANOXIN) 125 MCG tablet, Take 1 tablet by mouth Daily., Disp: 30 tablet, Rfl: 0    Eliquis 5 MG tablet tablet, TAKE ONE TABLET BY MOUTH EVERY 12 HOURS, Disp: 180 tablet, Rfl: 3    escitalopram (LEXAPRO) 5 MG tablet, Take 1 tablet by mouth Daily., Disp: , Rfl:     ezetimibe (ZETIA) 10 MG tablet, Take 1 tablet by mouth Daily., Disp: , Rfl:     furosemide (LASIX) 40 MG tablet, Take 1 tablet by mouth Daily., Disp: 30 tablet, Rfl: 0    gabapentin (NEURONTIN) 100 MG capsule, Take 1 capsule by mouth every night at bedtime., Disp: , Rfl:     Jardiance 10 MG tablet tablet, TAKE 1 TABLET BY MOUTH DAILY, Disp: 90 tablet, Rfl: 1    LORazepam (ATIVAN) 0.5 MG tablet, , Disp: , Rfl:     methIMAzole (TAPAZOLE) 5 MG tablet, Take 1 tablet by mouth 3 (Three) Times a Week. Monday, Wednesday, and Friday, Disp: , Rfl:     metoprolol tartrate (LOPRESSOR) 25 MG tablet, Take 1 tablet by mouth Every 12 (Twelve) Hours., Disp: 60 tablet, Rfl: 2    midodrine (PROAMATINE) 5 MG tablet, Take 1 tablet by mouth 2 (Two) Times a Day Before Meals., Disp: 60 tablet, Rfl: 0    mirtazapine (REMERON) 7.5 MG tablet, Take 1 tablet by mouth Every Night., Disp: , Rfl:     multivitamin with minerals tablet tablet, Take 1 tablet by mouth Daily., Disp: , Rfl:     potassium chloride (KLOR-CON M20) 20 MEQ CR  "tablet, Take 1 tablet by mouth 2 (Two) Times a Day., Disp: 30 tablet, Rfl: 0    Santyl 250 UNIT/GM ointment, Apply 1 Application topically to the appropriate area as directed Daily. To sacrum, Disp: , Rfl:     Physical Exam:   /65   Ht 177.8 cm (70\")   Wt 64.4 kg (142 lb)   BMI 20.37 kg/m²   Constitutional: Well-developed, thin, chronically ill appearing  Eyes: Conjunctiva normal, sclera nonicteric  HENT: Hearing impaired, oral mucosa moist  Respiratory: Normal inspiratory effort on room air, right sided pleurx catheter in place with clean gauze dressing  Gastrointestinal: Abd nondistended  Musculoskeletal: Symmetric strength; R AKA   Psychiatric: Normal mood and affect  Skin:  R chest abscess stable in size without significant erythema, there is scan expressible purulent fluid noted, the area is nontender; unstageable right gluteal/sacral wound measures approximately 3.5 x 2cm x2mm deep with white eschar overlying the wound, with stable appearance of surrounding hyperpigmentation, no fluctuance, crepitus, or induration; there is no drainage or foul smell. The intact epidermis surrounding the wound demonstrates pinpoint bleeding with q tip probing.     BMI is within normal parameters. No other follow-up for BMI required.    Assessment/Plan:  84-year-old gentleman presenting with unstageable right gluteal/sacral pressure wound and suspected spontaneously draining infected epidermoid cyst of the right chest     Pressure ulcer is stable if not slightly smaller in appearance on exam today, without any signs of infection.  Discussed with the patient's wife, the patient, the patient's son at bedside the management options including continued conservative management with enzymatic debridement with Santyl versus surgical debridement with possible wound VAC placement.  I discussed his risk factors for poor wound healing with or without surgical debridement including immobility and malnutrition.  Although we have not " been able to weigh the patient here at home, it does sound that he has reasonable oral intake. I encouraged him to increase his Ensure intake to 2 per day.  I recommend CBC to assess anemia and albumin level to assess his nutritional status. Would prefer his albumin to be greater than 3.5 for adequate healing. After discussion about management options, the patient and family wish to continue local wound care for now and follow up in 2 weeks for a wound check.  I counseled the patient and his family about signs and symptoms concerning to call the office, including increased pain, redness, swelling, drainage, fever, chills, changes in appearance to his wounds, or other concerns. They voiced understanding and are agreeable. The patient does not wish to undergo repeat lab testing at this time, although I explained that having more information about his nutritional status would be beneficial to making decisions about his wound care. His wife stated they would consider it. Orders placed.    With regards to his chest wound, he is still having a small amount of purulent drainage from the wound, therefore I recommended incision and drainage to allow the wound to fully drain and heal, with possible excision of his epidermoid cyst in 3-4 weeks. I obtained informed consent, explaining risks, benefits, alternatives, and postprocedure expectations including for wound care. Procedure note is documented below.  Recommend daily packing changes with iodoform gauze until the wound is unable to be gently packed, then dressing changes with gauze bandage. Will see the patient back in 2 weeks for sacral wound check and right chest abscess check.     Procedure:   Incision and drainage of right chest abscess  Anesthesia:  8 cc lidocaine 1% with epinephrine  Estimated Blood Loss:  1cc  Specimens:   None  Complications:   None  Findings:   2cm transverse x 1cm longitudinal abscess deep to subcutaneous tissue incised, deloculated, irrigated,  and packed with 2cm of iodoform gauze with long tail for easy retrieval    Description of procedure:  The right chest was prepped and draped in the usual sterile fashion. A timeout was performed. Lidocaine 1% with epinephrine was used to anesthetize the skin. The 11 blade was used to make a 1cm trasnverse incision over the area of fluctuance.  Pus was evacuated from the abscess cavity and hemostats used to break up loculations. The cavity was irrigated with sterile saline. The cavity was gently packed with 2cm of iodoform gauze, with a longer tail hanging out for easy retrieval.  A sterile gauze dressing was applied.  There was hemostasis noted. The patient tolerated the procedure without complication.     Yulia Wynn M.D.  General, Robotic and Endoscopic Surgery  [facility] [address], [address]  [place], [place], [address]  P: [phone]  F: [phone]

## 2024-05-23 NOTE — TELEPHONE ENCOUNTER
I spoke with wife and clarified what she was needing.  She just wants to know he will be taking over the cardiac medications for refills in the future.  He was prescribed for new medicines I told her Dr. Chan can take care of that on the next visit.  She verbalized understanding.

## 2024-05-26 LAB — FUNGUS WND CULT: NORMAL

## 2024-05-28 LAB
MYCOBACTERIUM SPEC CULT: NORMAL
NIGHT BLUE STAIN TISS: NORMAL

## 2024-05-29 ENCOUNTER — READMISSION MANAGEMENT (OUTPATIENT)
Dept: CALL CENTER | Facility: HOSPITAL | Age: 84
End: 2024-05-29
Payer: MEDICARE

## 2024-05-29 NOTE — OUTREACH NOTE
CT Surgery Week 3 Survey      Flowsheet Row Responses   [facility] patient discharged from? [place]   Does the patient have one of the following disease processes/diagnoses(primary or secondary)? Cardiothoracic surgery   Week 3 attempt successful? Yes   Call start time 1621   Call end time 1623   Discharge diagnosis Recurrent pleural effusion on right, RIGHT VIDEO ASSISTED THORACOSCOPY, PARTIAL DECORTICATION, PLEURX CATHETER INSERTION   Is patient permission given to speak with other caregiver? Yes   List who call center can speak with Son   Person spoke with today (if not patient) and relationship Son   Meds reviewed with patient/caregiver? Yes   Is the patient having any side effects they believe may be caused by any medication additions or changes? No   Does the patient have all medications related to this admission filled (includes all antibiotics, pain medications, cardiac medications, etc.) Yes   Is the patient taking all medications as directed (includes completed medication regime)? Yes   Does the patient have a primary care provider?  Yes   Does the patient have an appointment scheduled with their C/T surgeon? Yes   Has the patient kept scheduled appointments due by today? Yes   Psychosocial issues? No   Did the patient receive a copy of their discharge instructions? Yes   Nursing interventions Reviewed instructions with patient   What is the patient's perception of their health status since discharge? Improving   Nursing interventions Nurse provided patient education   Is the patient /caregiver able to teach back basic post-op care? Practice cough and deep breath every 4 hours while awake, Use a clean wash cloth and antibacterial bar or liquid soap to clean incisions   Is the patient/caregiver able to teach back signs and symptoms of incisional infection? Increased redness, swelling or pain at the incisonal site, Increased drainage or bleeding, Incisional warmth, Pus or odor from incision, Fever    Is the patient/caregiver able to teach back steps to recovery at home? Set small, achievable goals for return to baseline health, Rest and rebuild strength, gradually increase activity, Eat a well-balance diet   Is the patient /caregiver able to teach back the importance of cardiac rehab? No   Week 3 call completed? Yes   Graduated Yes   Is the patient interested in additional calls from an ambulatory ? No   Would this patient benefit from a Referral to [facility] Social Work? No   Wrap up additional comments Son reports patient doing well, denies any concerns today.   Call end time [id number]            Zoila YO - Registered Nurse

## 2024-05-30 ENCOUNTER — HOSPITAL ENCOUNTER (OUTPATIENT)
Dept: CT IMAGING | Facility: HOSPITAL | Age: 84
Discharge: HOME | End: 2024-05-30
Admitting: NURSE PRACTITIONER
Payer: MEDICARE

## 2024-05-30 DIAGNOSIS — J90 PLEURAL EFFUSION: ICD-10-CM

## 2024-05-30 DIAGNOSIS — Z87.891 FORMER SMOKER: ICD-10-CM

## 2024-05-30 PROCEDURE — 71250 CT THORAX DX C-: CPT

## 2024-06-01 NOTE — PROGRESS NOTES
RM:________     PCP: Taiwo Powers MD    : 1940  AGE: 84 y.o.  EST PATIENT     REASON FOR VISIT/  CC:        BP Readings from Last 3 Encounters:   24 122/65   24 124/62   24 94/66      Wt Readings from Last 3 Encounters:   24 64.4 kg (142 lb)   24 64.4 kg (142 lb)   24 65.8 kg (145 lb)        WT: ____________ BP: __________L __________R HR______    CHEST PAIN: _____________    SOA: _____________PALPS: _______________     LIGHTHEADED: ___________FATIGUE: ________________ EDEMA __________    ALLERGIES:Patient has no known allergies. SMOKING HISTORY:  Social History     Tobacco Use    Smoking status: Former     Current packs/day: 0.00     Types: Cigarettes     Quit date: 1983     Years since quittin.0     Passive exposure: Past    Smokeless tobacco: Never    Tobacco comments:        Vaping Use    Vaping status: Never Used   Substance Use Topics    Alcohol use: Yes     Comment: SOCIAL; Patient is non drinker.    Drug use: No     Comment: Drug Abuse: Not a drug user     CAFFEINE USE_________________  ALCOHOL ______________________

## 2024-06-03 ENCOUNTER — OFFICE VISIT (OUTPATIENT)
Dept: CARDIOLOGY | Facility: CLINIC | Age: 84
End: 2024-06-03
Payer: MEDICARE

## 2024-06-03 VITALS
BODY MASS INDEX: 20.33 KG/M2 | WEIGHT: 142 LBS | HEIGHT: 70 IN | HEART RATE: 64 BPM | DIASTOLIC BLOOD PRESSURE: 64 MMHG | SYSTOLIC BLOOD PRESSURE: 102 MMHG

## 2024-06-03 DIAGNOSIS — Z74.09 COMPLICATIONS OF IMMOBILITY: ICD-10-CM

## 2024-06-03 DIAGNOSIS — I50.32 CHRONIC HEART FAILURE WITH PRESERVED EJECTION FRACTION (HFPEF): ICD-10-CM

## 2024-06-03 DIAGNOSIS — I95.1 ORTHOSTATIC HYPOTENSION: ICD-10-CM

## 2024-06-03 DIAGNOSIS — J90 RECURRENT PLEURAL EFFUSION ON RIGHT: ICD-10-CM

## 2024-06-03 DIAGNOSIS — E44.0 MODERATE MALNUTRITION: ICD-10-CM

## 2024-06-03 DIAGNOSIS — I48.11 LONGSTANDING PERSISTENT ATRIAL FIBRILLATION: Primary | ICD-10-CM

## 2024-06-03 PROBLEM — L72.0 INFECTED EPIDERMOID CYST: Status: RESOLVED | Noted: 2024-05-14 | Resolved: 2024-06-03

## 2024-06-03 PROBLEM — L08.9 INFECTED EPIDERMOID CYST: Status: RESOLVED | Noted: 2024-05-14 | Resolved: 2024-06-03

## 2024-06-03 PROCEDURE — 1159F MED LIST DOCD IN RCRD: CPT | Performed by: INTERNAL MEDICINE

## 2024-06-03 PROCEDURE — 1160F RVW MEDS BY RX/DR IN RCRD: CPT | Performed by: INTERNAL MEDICINE

## 2024-06-03 PROCEDURE — 99214 OFFICE O/P EST MOD 30 MIN: CPT | Performed by: INTERNAL MEDICINE

## 2024-06-03 PROCEDURE — G2211 COMPLEX E/M VISIT ADD ON: HCPCS | Performed by: INTERNAL MEDICINE

## 2024-06-03 RX ORDER — ATORVASTATIN CALCIUM 10 MG/1
10 TABLET, FILM COATED ORAL NIGHTLY
Qty: 90 TABLET | Status: CANCELLED | OUTPATIENT
Start: 2024-06-03

## 2024-06-03 RX ORDER — DIGOXIN 125 MCG
125 TABLET ORAL
Qty: 90 TABLET | Refills: 1 | Status: SHIPPED | OUTPATIENT
Start: 2024-06-03

## 2024-06-03 RX ORDER — MIDODRINE HYDROCHLORIDE 5 MG/1
5 TABLET ORAL
Qty: 180 TABLET | Refills: 1 | Status: SHIPPED | OUTPATIENT
Start: 2024-06-03

## 2024-06-03 RX ORDER — FUROSEMIDE 40 MG/1
20 TABLET ORAL DAILY
Qty: 45 TABLET | Refills: 1 | Status: SHIPPED | OUTPATIENT
Start: 2024-06-03

## 2024-06-03 RX ORDER — POTASSIUM CHLORIDE 20 MEQ/1
20 TABLET, EXTENDED RELEASE ORAL DAILY
Qty: 90 TABLET | Refills: 1 | Status: SHIPPED | OUTPATIENT
Start: 2024-06-03

## 2024-06-03 NOTE — PROGRESS NOTES
Date of Office Visit: 24  Encounter Provider: Huang Chan MD  Place of Service: [facility] CARDIOLOGY  Patient Name: Mehreen Silva IV  :1940    Chief Complaint   Patient presents with    Congestive Heart Failure   :   HPI:     Mr. Silva is 84 y.o. and presents today in follow up. I have reviewed prior notes and there are no changes except for any new updates described below. I have also reviewed any information entered into the medical record by the patient or by ancillary staff.     He presented in 2022 with dyspnea; he was found to have acute diastolic CHF and atrial fibrillation. An echo revealed normal LVSF (60%), moderate LVH, bi-atrial dilation, and very mild PHTN. He was started on metoprolol, apixaban, and furosemide.  Between then and now, he has had progressive decline due to an indeterminant neurodegenerative illness.  He is immobile.  He has required a right lower extremity amputation due to poor circulation and a pressure injury to the heel.  He has had issues with a sacral decubitus ulcer.  He has had a recurrent right pleural effusion and has required a Pleurx catheter.  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  He has had trouble with diuretics and that it has been very easy for him to develop volume overload, as well as overdiuresis.    He was recently hospitalized.  He is home and his family and caregivers take good care of him.  They have been draining his catheter 3 times per week and today they did not get any fluid out at all.    Echo 2024      Limited study.    The left ventricular cavity is small in size. Left ventricular systolic function is normal. Calculated left ventricular EF = 62.4%    Left ventricular wall thickness is consistent with moderate concentric hypertrophy. Left ventricular diastolic function was indeterminate due to AFib    There is moderate anterior mitral valve thickening present. Moderate to severe mitral valve  regurgitation is present.    Estimated right ventricular systolic pressure from tricuspid regurgitation is moderately elevated (45-55 mmHg).    There is a trivial pericardial effusion. There is a large right pleural effusion with likely consolidated right lung base.    Severe LA enlargement.  IVC normal in size and respirophasic variation.    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, 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/2023    Atrial fibrillation     Cervical spondylosis with myelopathy     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 12/13/2023    HLD (hyperlipidemia) 09/23/2022    Hx of toxic multinodular goiter 09/23/2022    Hyperthyroidism     Lumbar radiculopathy     Orthostatic hypotension     Osteoarthritis     Pressure injury of sacral region, unstageable 05/14/2024    Pulmonary hypertension     Recurrent pleural effusion on right 04/27/2024    Ulcer with gangrene     right heel       Past Surgical History:   Procedure Laterality Date    ABOVE KNEE AMPUTATION Right 12/13/2023    Procedure: ABOVE KNEE AMPUTATION RIGHT, proveena wound vac placement;  Surgeon: Issa Nieves MD;  Location: [facility];  Service: Vascular;  Laterality: Right;    APPENDECTOMY      BACK SURGERY      x4    COLONOSCOPY      EXCISION MASS TRUNK N/A 06/08/2016    Procedure: Excision soft tissue neoplasm on abdominal wall and left neck;  Surgeon: Shaji Barahona Jr., MD;  Location: [facility] OR;  Service:     KNEE SURGERY      NECK SURGERY  1974    PLEURAL CATHETER INSERTION Right 4/30/2024    Procedure: RIGHT VIDEO ASSISTED THORACOSCOPY, PARTIAL DECORTICATION, PLEURX CATHETER INSERTION;  Surgeon: Nataliya Noyola MD;  Location: [facility] OR;  Service: Thoracic;  Laterality: Right;    QUADRICEPS REPAIR      ROTATOR CUFF REPAIR      SHOULDER SURGERY       SMALL INTESTINE SURGERY      Bowel Obstruction       Social History     Socioeconomic History    Marital status:     Number of children: 1    Highest education level: Professional school degree (e.g., MD, DDS, DVM, BRITNEY)   Tobacco Use    Smoking status: Former     Current packs/day: 0.00     Types: Cigarettes     Quit date: 1983     Years since quittin.0     Passive exposure: Past    Smokeless tobacco: Never    Tobacco comments:        Vaping Use    Vaping status: Never Used   Substance and Sexual Activity    Alcohol use: Yes     Comment: SOCIAL; Patient is non drinker.    Drug use: No     Comment: Drug Abuse: Not a drug user    Sexual activity: Defer       Family History   Problem Relation Age of Onset    Cancer Mother     Heart disease Father     Aneurysm Father         Abdominal Aortic Aneurysm    Malig Hyperthermia Neg Hx        Review of Systems   Constitutional: Positive for malaise/fatigue.   Musculoskeletal:  Positive for muscle weakness.   Neurological:  Positive for weakness.   Psychiatric/Behavioral:  Positive for depression.      No Known Allergies      Current Outpatient Medications:     cholecalciferol (VITAMIN D3) 25 MCG (1000 UT) tablet, Take 1 tablet by mouth Daily., Disp: , Rfl:     digoxin (LANOXIN) 125 MCG tablet, Take 1 tablet by mouth Daily., Disp: 90 tablet, Rfl: 1    Eliquis 5 MG tablet tablet, TAKE ONE TABLET BY MOUTH EVERY 12 HOURS, Disp: 180 tablet, Rfl: 3    furosemide (LASIX) 40 MG tablet, Take 0.5 tablets by mouth Daily., Disp: 45 tablet, Rfl: 1    gabapentin (NEURONTIN) 100 MG capsule, Take 1 capsule by mouth every night at bedtime., Disp: , Rfl:     Jardiance 10 MG tablet tablet, TAKE 1 TABLET BY MOUTH DAILY, Disp: 90 tablet, Rfl: 1    LORazepam (ATIVAN) 0.5 MG tablet, , Disp: , Rfl:     methIMAzole (TAPAZOLE) 5 MG tablet, Take 1 tablet by mouth 3 (Three) Times a Week. Monday, Wednesday, and Friday, Disp: , Rfl:     metoprolol tartrate (LOPRESSOR) 25 MG tablet,  "Take 1 tablet by mouth Every 12 (Twelve) Hours., Disp: 60 tablet, Rfl: 2    midodrine (PROAMATINE) 5 MG tablet, Take 1 tablet by mouth 2 (Two) Times a Day Before Meals., Disp: 180 tablet, Rfl: 1    mirtazapine (REMERON) 7.5 MG tablet, Take 1 tablet by mouth Every Night., Disp: , Rfl:     multivitamin with minerals tablet tablet, Take 1 tablet by mouth Daily., Disp: , Rfl:     potassium chloride (KLOR-CON M20) 20 MEQ CR tablet, Take 1 tablet by mouth Daily., Disp: 90 tablet, Rfl: 1    Santyl 250 UNIT/GM ointment, Apply 1 Application topically to the appropriate area as directed Daily. To sacrum, Disp: , Rfl:     escitalopram (LEXAPRO) 5 MG tablet, Take 1 tablet by mouth Daily. (Patient not taking: Reported on 6/3/2024), Disp: , Rfl:       Objective:     Vitals:    06/03/24 1207   BP: 102/64   BP Location: Left arm   Pulse: 64   Weight: 64.4 kg (142 lb)   Height: 177.8 cm (70\")     Body mass index is 20.37 kg/m².    Vitals reviewed.   Constitutional:       Appearance: Well-developed and not in distress.      Comments: In a wheelchair, very frail appearing   Eyes:      Conjunctiva/sclera: Conjunctivae normal.   HENT:      Head: Normocephalic.      Nose: Nose normal.   Neck:      Vascular: No JVD. JVD normal.      Lymphadenopathy: No cervical adenopathy.   Pulmonary:      Effort: Pulmonary effort is normal.      Breath sounds: Examination of the right-lower field reveals decreased breath sounds.   Cardiovascular:      Normal rate. Irregular rhythm.      Murmurs: There is no murmur.      Comments: No LLE edema  Abdominal:      Palpations: Abdomen is soft.   Musculoskeletal:      Cervical back: Normal range of motion.      Comments: RLE amputation Skin:     General: Skin is warm and dry.   Neurological:      Mental Status: Alert and oriented to person, place, and time.      Cranial Nerves: No cranial nerve deficit.   Psychiatric:         Behavior: Behavior normal.         Thought Content: Thought content normal.         " Judgment: Judgment normal.       Procedures  EKG --   I have personally reviewed EKG on 04/27/2024 and my interpretation of the tracing is as follows: atrial fibrillation, NSST abn      Assessment:       Diagnosis Plan   1. Longstanding persistent atrial fibrillation        2. Chronic heart failure with preserved ejection fraction (HFpEF)        3. Recurrent pleural effusion on right        4. Orthostatic hypotension        5. Moderate malnutrition        6. Complications of immobility              Plan:       1. He has permanent atrial fibrillation. He has significant atrial dilation. I feel that the likelihood of successful rhythm control is very small and that we should proceed with rate control. He will remain on metoprolol, digoxin, and apixaban.  Low blood pressure has limited the up-titration of the beta-blocker.  As long as his weight is above 60 kg, he should remain on 5 mg of apixaban twice daily.    2/3/4.  He is status post Pleurx catheter on the right.  He follows with CT surgery in a few weeks.  He was discharged from the hospital on 40 mg of furosemide but his family feels that this is too much for him.  He will remain on 20 mg daily and I cut back on potassium to 20 mEq daily.  He will remain on empagliflozin.    5/6.  He continues with malnutrition and immobility.  Given his advanced age and progressive decline, I am not sure that the long-term benefits of ongoing aggressive statin lowering are there.  I have went ahead and stopped ezetimibe and atorvastatin.    Sincerely,       Huang Chan MD

## 2024-06-03 NOTE — Clinical Note
I stopped his aggressive statin regimen. I know he has PAD but he's 84, with progressive decline, and having some hallucinations/crazy dreams at night. Hope that's okay.  Abrahan alejo

## 2024-06-04 LAB
MYCOBACTERIUM SPEC CULT: NORMAL
NIGHT BLUE STAIN TISS: NORMAL

## 2024-06-05 ENCOUNTER — TELEPHONE (OUTPATIENT)
Dept: SURGERY | Facility: CLINIC | Age: 84
End: 2024-06-05
Payer: MEDICARE

## 2024-06-05 NOTE — TELEPHONE ENCOUNTER
MRS. WRAY CALLED TO MAKE SURE THE CT CHEST DONE AT [facility] ON 5/30 HAD RESULTS FOR HIS VISIT ON 6/13 WITH SHANNAN DENIS.  I CALLED AND LEFT A MESSAGE TODAY THAT THE CT CHEST DOES SHOW UP AND IT HAS RESULTS THAT WILL BE READY FOR HIS APPT ON 6/13.

## 2024-06-10 ENCOUNTER — OFFICE VISIT (OUTPATIENT)
Dept: SURGERY | Facility: CLINIC | Age: 84
End: 2024-06-10
Payer: MEDICARE

## 2024-06-10 VITALS
HEART RATE: 52 BPM | TEMPERATURE: 97 F | BODY MASS INDEX: 20.33 KG/M2 | OXYGEN SATURATION: 97 % | SYSTOLIC BLOOD PRESSURE: 122 MMHG | HEIGHT: 70 IN | DIASTOLIC BLOOD PRESSURE: 60 MMHG | WEIGHT: 142 LBS

## 2024-06-10 DIAGNOSIS — L72.0 EPIDERMOID CYST: ICD-10-CM

## 2024-06-10 DIAGNOSIS — L89.150 PRESSURE INJURY OF SACRAL REGION, UNSTAGEABLE: Primary | ICD-10-CM

## 2024-06-10 PROCEDURE — 1160F RVW MEDS BY RX/DR IN RCRD: CPT | Performed by: STUDENT IN AN ORGANIZED HEALTH CARE EDUCATION/TRAINING PROGRAM

## 2024-06-10 PROCEDURE — 99214 OFFICE O/P EST MOD 30 MIN: CPT | Performed by: STUDENT IN AN ORGANIZED HEALTH CARE EDUCATION/TRAINING PROGRAM

## 2024-06-10 PROCEDURE — 1159F MED LIST DOCD IN RCRD: CPT | Performed by: STUDENT IN AN ORGANIZED HEALTH CARE EDUCATION/TRAINING PROGRAM

## 2024-06-10 NOTE — PROGRESS NOTES
General Surgery Office Follow Up Note     History of Present Illness:    Mehreen Silva IV is a 84 y.o. male who presents for follow up evaluation of a sacral pressure wound and right chest cystic lesion.     Last seen in office 5/23/24. Since then reports no new symptoms. States he did not pack his chest wound as instructed, and his new caregiver at bedside states he was not aware of this. The patient denies any pain or problems with the wound however. He denies fever, chills, redness, drainage, or pain, and thinks his sacral wound is doing ok. They have been performing wound care as prescribed for the sacral wound.    Allergies:  No Known Allergies    Meds:    Current Outpatient Medications:     cholecalciferol (VITAMIN D3) 25 MCG (1000 UT) tablet, Take 1 tablet by mouth Daily., Disp: , Rfl:     digoxin (LANOXIN) 125 MCG tablet, Take 1 tablet by mouth Daily., Disp: 90 tablet, Rfl: 1    Eliquis 5 MG tablet tablet, TAKE ONE TABLET BY MOUTH EVERY 12 HOURS, Disp: 180 tablet, Rfl: 3    furosemide (LASIX) 40 MG tablet, Take 0.5 tablets by mouth Daily., Disp: 45 tablet, Rfl: 1    gabapentin (NEURONTIN) 100 MG capsule, Take 1 capsule by mouth every night at bedtime., Disp: , Rfl:     Jardiance 10 MG tablet tablet, TAKE 1 TABLET BY MOUTH DAILY, Disp: 90 tablet, Rfl: 1    methIMAzole (TAPAZOLE) 5 MG tablet, Take 1 tablet by mouth 3 (Three) Times a Week. Monday, Wednesday, and Friday, Disp: , Rfl:     metoprolol tartrate (LOPRESSOR) 25 MG tablet, Take 1 tablet by mouth Every 12 (Twelve) Hours., Disp: 60 tablet, Rfl: 2    midodrine (PROAMATINE) 5 MG tablet, Take 1 tablet by mouth 2 (Two) Times a Day Before Meals., Disp: 180 tablet, Rfl: 1    multivitamin with minerals tablet tablet, Take 1 tablet by mouth Daily., Disp: , Rfl:     potassium chloride (KLOR-CON M20) 20 MEQ CR tablet, Take 1 tablet by mouth Daily., Disp: 90 tablet, Rfl: 1    Santyl 250 UNIT/GM ointment, Apply 1 Application topically to the appropriate area as  directed Daily. To sacrum, Disp: , Rfl:     Physical Exam:   Afebrile.  Constitutional: Well-developed, thin, appears in good spirits  Eyes: Conjunctiva normal, sclera nonicteric  HENT: Hearing grossly normal, oral mucosa moist  Respiratory: Normal inspiratory effort  Cardiovascular: Regular rate  MSK: R AKA  Psychiatric: Normal mood and affect  Skin:  R chest wound with small amount of keratinized material expressible, with no signs of cellulitis or abscess  Right gluteal/sacral wound appears chaz measuring 3cm x 1.5cm and with healthy appearing wound edges, without evidence of infection; there is white eschar overlying the wound  BMI is within normal parameters. No other follow-up for BMI required.    Assessment/Plan:    Right gluteal/sacral pressure wound  -Given the patient's immobility and nutritional status, he was recommended to undergo conservative treatment for this wound.  The wound is chaz and appears to be healing with daily dressing changes with Santyl. I am pleased with his progress and will recommend continued local wound care without surgical debridement.    Right chest abscess, suspected infected epidermoid cyst, status post incision and drainage on 5/23/2024  -Abscess resolved. Wound does have some keratinized cystic material expressible today. Discussed with patient options for management including observation vs.  recommendation for excision to prevent recurrent infection. Discussed risks, benefits, alternatives, and postprocedure expectations with patient.  After informed consent was obtained, he elects to proceed with cyst excision.  We will schedule him to perform this in the office later this week.    Yulia Wynn M.D.  General, Robotic and Endoscopic Surgery  [facility]    [address], [address]  [place], [place], [address]  P: [phone]  F: [phone]

## 2024-06-11 LAB
MYCOBACTERIUM SPEC CULT: NORMAL
NIGHT BLUE STAIN TISS: NORMAL

## 2024-06-12 ENCOUNTER — PROCEDURE VISIT (OUTPATIENT)
Dept: SURGERY | Facility: CLINIC | Age: 84
End: 2024-06-12
Payer: MEDICARE

## 2024-06-12 DIAGNOSIS — L72.0 EPIDERMOID CYST: Primary | ICD-10-CM

## 2024-06-12 PROCEDURE — 11403 EXC TR-EXT B9+MARG 2.1-3CM: CPT | Performed by: STUDENT IN AN ORGANIZED HEALTH CARE EDUCATION/TRAINING PROGRAM

## 2024-06-12 PROCEDURE — 88304 TISSUE EXAM BY PATHOLOGIST: CPT | Performed by: STUDENT IN AN ORGANIZED HEALTH CARE EDUCATION/TRAINING PROGRAM

## 2024-06-12 NOTE — PROGRESS NOTES
General Surgery Office Follow Up Note     History of Present Illness:    Mehreen Silva IV is a 84 y.o. male who presents for right chest cyst excision.  He has been seen previously for incision and drainage of an infected epidermoid/sebaceous cyst.  His infection resolved and he was recommended to undergo excision to prevent recurrent infection.  We discussed the risk, benefits, alternatives, and postprocedure expectations with the patient and his caregiver.  After informed consent was obtained, the patient wishes to proceed with right chest cyst excision.    He denies any changes to his health history since his visit a couple days ago.    Allergies:  No Known Allergies    Meds:    Current Outpatient Medications:     cholecalciferol (VITAMIN D3) 25 MCG (1000 UT) tablet, Take 1 tablet by mouth Daily., Disp: , Rfl:     digoxin (LANOXIN) 125 MCG tablet, Take 1 tablet by mouth Daily., Disp: 90 tablet, Rfl: 1    Eliquis 5 MG tablet tablet, TAKE ONE TABLET BY MOUTH EVERY 12 HOURS, Disp: 180 tablet, Rfl: 3    furosemide (LASIX) 40 MG tablet, Take 0.5 tablets by mouth Daily., Disp: 45 tablet, Rfl: 1    gabapentin (NEURONTIN) 100 MG capsule, Take 1 capsule by mouth every night at bedtime., Disp: , Rfl:     Jardiance 10 MG tablet tablet, TAKE 1 TABLET BY MOUTH DAILY, Disp: 90 tablet, Rfl: 1    methIMAzole (TAPAZOLE) 5 MG tablet, Take 1 tablet by mouth 3 (Three) Times a Week. Monday, Wednesday, and Friday, Disp: , Rfl:     metoprolol tartrate (LOPRESSOR) 25 MG tablet, Take 1 tablet by mouth Every 12 (Twelve) Hours., Disp: 60 tablet, Rfl: 2    midodrine (PROAMATINE) 5 MG tablet, Take 1 tablet by mouth 2 (Two) Times a Day Before Meals., Disp: 180 tablet, Rfl: 1    multivitamin with minerals tablet tablet, Take 1 tablet by mouth Daily., Disp: , Rfl:     potassium chloride (KLOR-CON M20) 20 MEQ CR tablet, Take 1 tablet by mouth Daily., Disp: 90 tablet, Rfl: 1    Santyl 250 UNIT/GM ointment, Apply 1 Application topically to the  appropriate area as directed Daily. To sacrum, Disp: , Rfl:     Physical Exam:   Constitutional: Well-developed, thin, frail  Eyes: Conjunctiva normal, sclera nonicteric  HENT: Hearing grossly normal, oral mucosa moist  Respiratory: Normal inspiratory effort  Cardiovascular: Regular rate   Psychiatric: Normal mood and affect  Skin:  R chest wound clean/dry without signs of infection  BMI is within normal parameters. No other follow-up for BMI required.    Assessment/Plan:  84 year old gentleman with     Right chest abscess, suspected infected epidermoid cyst, status post incision and drainage on 5/23/2024   -Plan for excision today. Procedure note below.  I counseled the patient and his caregiver on postprocedure expectations for wound care and signs and symptoms to call back the office.  Follow-up in 2 weeks for recheck of his wound.    Right gluteal/sacral pressure wound   -Continue local wound care. Follow up in 2 weeks for recheck.    Procedure Note:  The right chest was prepped and draped in sterile fashion.  A timeout was performed.  Lidocaine 1% with epinephrine was used to anesthetize the skin.  An elliptical incision measuring 2cm transverse by 1cm longitudinal was made using a 15 blade and carried down to the subcutaneous fat. The entire cyst capsule was removed.  The specimen was sent for pathology. Then the wound was closed with 3-0 Vicryl deep dermal sutures and 4-0 Monocryl subcuticular sutures.  The area was cleaned and dried.  Hemostasis was noted.  Mastisol and Steri-Strips were applied.  A sterile gauze dressing was applied.  Patient tolerated procedure without complication.    Yulia Wynn M.D.  General, Robotic and Endoscopic Surgery  [facility]  [address] [place] [address], [address]  [place], [place], [address]  P: [phone]  F: [phone]

## 2024-06-12 NOTE — PATIENT INSTRUCTIONS
Dr. Yulia Wynn  [address]  [place] [id number]  [phone]    Discharge Instructions for Skin and Soft Tissue Excision    Your incisions are dressed with steri strips. You may remove the outer clear plastic bandage and gauze.  Do not remove steri strips until 2 weeks postoperatively.  If they fall off before then it is okay.    You may notice some bleeding/drainage on your outer dressings. A little bloody drainage is normal. If the bleeding/drainage is such that the bandage cannot absorb it, remove the dressing, apply clean gauze and apply firm pressure for a full 15 minutes.  If the bleeding continues, please call me.    You may shower over your incision tomorrow.    You can take Tylenol as needed for incisional pain.    You may follow-up with me in 2 weeks for an incision check and to recheck your sacral wound.  Continue sacral wound care as you have been doing.    Remember to contact me for any of the following:    Fever> 101 degrees  Severe pain that cannot be controlled by taking your pain pills  Severe nausea or vomiting that cannot be controlled by taking your nausea medicine  Significant bleeding from your incision  Drainage that has a bad smell or is yellow or green in appearance  Any other questions or concerns

## 2024-06-13 ENCOUNTER — OFFICE VISIT (OUTPATIENT)
Dept: SURGERY | Facility: CLINIC | Age: 84
End: 2024-06-13
Payer: MEDICARE

## 2024-06-13 ENCOUNTER — HOSPITAL ENCOUNTER (OUTPATIENT)
Dept: GENERAL RADIOLOGY | Facility: HOSPITAL | Age: 84
Discharge: HOME | End: 2024-06-13
Payer: MEDICARE

## 2024-06-13 VITALS
SYSTOLIC BLOOD PRESSURE: 114 MMHG | OXYGEN SATURATION: 100 % | HEIGHT: 70 IN | HEART RATE: 69 BPM | DIASTOLIC BLOOD PRESSURE: 72 MMHG | BODY MASS INDEX: 20.37 KG/M2

## 2024-06-13 DIAGNOSIS — J90 PLEURAL EFFUSION: Primary | ICD-10-CM

## 2024-06-13 DIAGNOSIS — J90 RECURRENT PLEURAL EFFUSION ON RIGHT: ICD-10-CM

## 2024-06-13 PROCEDURE — 71046 X-RAY EXAM CHEST 2 VIEWS: CPT

## 2024-06-13 RX ORDER — HEPARIN SODIUM 5000 [USP'U]/ML
5000 INJECTION, SOLUTION INTRAVENOUS; SUBCUTANEOUS ONCE
OUTPATIENT
Start: 2024-06-13

## 2024-06-13 RX ORDER — SODIUM CHLORIDE 9 MG/ML
40 INJECTION, SOLUTION INTRAVENOUS AS NEEDED
OUTPATIENT
Start: 2024-06-13

## 2024-06-13 RX ORDER — SODIUM CHLORIDE 0.9 % (FLUSH) 0.9 %
3 SYRINGE (ML) INJECTION EVERY 12 HOURS SCHEDULED
OUTPATIENT
Start: 2024-06-13

## 2024-06-13 RX ORDER — SODIUM CHLORIDE 0.9 % (FLUSH) 0.9 %
3-10 SYRINGE (ML) INJECTION AS NEEDED
OUTPATIENT
Start: 2024-06-13

## 2024-06-13 NOTE — PROGRESS NOTES
"Chief Complaint  Follow up, right pleural effusion    Subjective        Mehreen Silva IV presents to [facility] THORACIC SURGERY  History of Present Illness    Mr. Silva is a pleasant 84-year-old gentleman who we saw in February 2023 while hospitalized for a right pleural effusion of uncertain etiology possibly related to heart failure. He underwent CT-guided chest tube placement with intrapleural lytic therapy in February 2024 and his pleural effusion improved at the time.  Effusion appeared to have reaccumulated and Pleurx catheter was placed by Dr. Noyola on 4/30/2024. He presents today in follow-up.  Of note, he had a right AKA in December 2023.    Cytology was negative for malignancy.  He presents today and is much improved from when I last encountered him during his hospitalization in late April/early May 2024.  He denies shortness of breath.  He tells me that he needs clearance to work with PT to use his prosthetic.  He he drains very minimal amount from his Pleurx, less than 10 cc/times per week per his caregiver.  He is very eager for Pleurx to be removed.      Objective   Vital Signs:  /72 (BP Location: Left arm, Patient Position: Sitting, Cuff Size: Adult)   Pulse 69   Ht 177.8 cm (70\")   SpO2 100%   BMI 20.37 kg/m²   Estimated body mass index is 20.37 kg/m² as calculated from the following:    Height as of this encounter: 177.8 cm (70\").    Weight as of 6/10/24: 64.4 kg (142 lb).             Physical Exam  Vitals reviewed.   Constitutional:       General: He is not in acute distress.     Appearance: Normal appearance. He is not ill-appearing.   HENT:      Head: Normocephalic and atraumatic.   Cardiovascular:      Rate and Rhythm: Normal rate.   Pulmonary:      Effort: Pulmonary effort is normal.   Musculoskeletal:      Cervical back: Normal range of motion and neck supple.      Comments: In wheelchair status post right AKA   Skin:     General: Skin is warm.   Neurological:      " General: No focal deficit present.      Mental Status: He is alert. Mental status is at baseline.      Motor: Weakness present.   Psychiatric:         Mood and Affect: Mood normal.        Result Review :    CT chest 5/30/2024: There is a small right hydropneumothorax with consolidation in the right lung base.  This is significantly improved from CT chest in February 2024.  There are some groundglass opacities on the left.      Chest x-ray performed prior to his appointment today demonstrates adequate expansion of the right lung.  There is a small right pleural effusion with Pleurx in adequate position.           Assessment and Plan     Diagnoses and all orders for this visit:    1. Pleural effusion (Primary)  -     alteplase (CATHFLO/ACTIVASE) injection 4 mg  -     Case Request; Standing  -     ECG 12 Lead; Future  -     sodium chloride 0.9 % flush 3 mL  -     sodium chloride 0.9 % flush 3-10 mL  -     sodium chloride 0.9 % infusion 40 mL  -     heparin (porcine) 5000 UNIT/ML injection 5,000 Units  -     Case Request    Other orders  -     Follow Anesthesia Guidelines / Protocol; Future  -     Follow Anesthesia Guidelines / Protocol; Standing  -     Verify / Perform Chlorhexidine Skin Prep; Standing  -     Provide Patient With Instructions on NPO Status; Future  -     Provide Chlorhexidine Skin Prep Wipes and Instructions; Future  -     Insert Peripheral IV; Standing  -     Saline Lock & Maintain IV Access; Standing  -     Place Sequential Compression Device; Standing  -     Maintain Sequential Compression Device; Standing      Mr. Silva is status post right Pleurx catheter placement secondary to recurrent right pleural effusion in setting of heart failure.  His Pleurx drains a very scant amount of fluid 3 times per week.  He request for his Pleurx to be removed.  I reviewed the CT chest with him and it appears that his effusion is much improved with some residual loculated fluid.  I will go ahead and administer  intrapleural tPA today with plans for Pleurx removal in the near future.  His Pleurx should be drained tomorrow as scheduled.  Plan to discuss the case with Dr. Noyola and we will schedule Pleurx removal at her next available date.  He will need to stop his Eliquis prior to this procedure and these instructions will be provided to him.    From a thoracic perspective, he is fine to participate in physical therapy with Pleurx in place.  I will send a message to Dr. Duran of physical rehab with this information.       I spent 32 minutes caring for Mehreen on this date of service. This time includes time spent by me in the following activities:preparing for the visit, reviewing tests, obtaining and/or reviewing a separately obtained history, performing a medically appropriate examination and/or evaluation , ordering medications, tests, or procedures, documenting information in the medical record, independently interpreting results and communicating that information with the patient/family/caregiver, and care coordination  Follow Up     Return for Next scheduled follow up.  Patient was given instructions and counseling regarding his condition or for health maintenance advice. Please see specific information pulled into the AVS if appropriate.

## 2024-06-14 PROBLEM — J90 PLEURAL EFFUSION: Status: ACTIVE | Noted: 2024-06-13

## 2024-06-14 LAB
LAB AP CASE REPORT: NORMAL
PATH REPORT.FINAL DX SPEC: NORMAL
PATH REPORT.GROSS SPEC: NORMAL

## 2024-06-17 RX ORDER — EMPAGLIFLOZIN 10 MG/1
10 TABLET, FILM COATED ORAL DAILY
Qty: 90 TABLET | Refills: 1 | Status: ON HOLD | OUTPATIENT
Start: 2024-06-17

## 2024-06-21 ENCOUNTER — ANESTHESIA EVENT (OUTPATIENT)
Dept: PERIOP | Facility: HOSPITAL | Age: 84
DRG: 186 | End: 2024-06-21
Payer: MEDICARE

## 2024-06-21 ENCOUNTER — HOSPITAL ENCOUNTER (INPATIENT)
Facility: HOSPITAL | Age: 84
LOS: 9 days | Discharge: HOME HEALTH | DRG: 186 | End: 2024-06-30
Attending: THORACIC SURGERY (CARDIOTHORACIC VASCULAR SURGERY) | Admitting: INTERNAL MEDICINE
Payer: MEDICARE

## 2024-06-21 ENCOUNTER — ANESTHESIA (OUTPATIENT)
Dept: PERIOP | Facility: HOSPITAL | Age: 84
DRG: 186 | End: 2024-06-21
Payer: MEDICARE

## 2024-06-21 DIAGNOSIS — D64.9 ANEMIA, UNSPECIFIED TYPE: Primary | ICD-10-CM

## 2024-06-21 DIAGNOSIS — J90 PLEURAL EFFUSION: Primary | ICD-10-CM

## 2024-06-21 DIAGNOSIS — I50.32 CHRONIC HEART FAILURE WITH PRESERVED EJECTION FRACTION (HFPEF): ICD-10-CM

## 2024-06-21 DIAGNOSIS — R53.1 WEAKNESS: ICD-10-CM

## 2024-06-21 DIAGNOSIS — J90 PLEURAL EFFUSION: ICD-10-CM

## 2024-06-21 DIAGNOSIS — R33.8 ACUTE URINARY RETENTION: ICD-10-CM

## 2024-06-21 PROBLEM — N18.9 CKD (CHRONIC KIDNEY DISEASE): Status: ACTIVE | Noted: 2024-06-21

## 2024-06-21 LAB
ABO GROUP BLD: NORMAL
ANION GAP SERPL CALCULATED.3IONS-SCNC: 10.9 MMOL/L (ref 5–15)
ANION GAP SERPL CALCULATED.3IONS-SCNC: 12 MMOL/L (ref 5–15)
ANION GAP SERPL CALCULATED.3IONS-SCNC: 12 MMOL/L (ref 5–15)
BLD GP AB SCN SERPL QL: NEGATIVE
BUN SERPL-MCNC: 79 MG/DL (ref 8–23)
BUN SERPL-MCNC: 83 MG/DL (ref 8–23)
BUN SERPL-MCNC: 85 MG/DL (ref 8–23)
BUN/CREAT SERPL: 28.7 (ref 7–25)
BUN/CREAT SERPL: 31.9 (ref 7–25)
BUN/CREAT SERPL: 32.8 (ref 7–25)
CALCIUM SPEC-SCNC: 8.4 MG/DL (ref 8.6–10.5)
CALCIUM SPEC-SCNC: 8.5 MG/DL (ref 8.6–10.5)
CALCIUM SPEC-SCNC: 8.6 MG/DL (ref 8.6–10.5)
CHLORIDE SERPL-SCNC: 101 MMOL/L (ref 98–107)
CHLORIDE SERPL-SCNC: 101 MMOL/L (ref 98–107)
CHLORIDE SERPL-SCNC: 103 MMOL/L (ref 98–107)
CO2 SERPL-SCNC: 23 MMOL/L (ref 22–29)
CO2 SERPL-SCNC: 24.1 MMOL/L (ref 22–29)
CO2 SERPL-SCNC: 25 MMOL/L (ref 22–29)
CREAT SERPL-MCNC: 2.59 MG/DL (ref 0.76–1.27)
CREAT SERPL-MCNC: 2.6 MG/DL (ref 0.76–1.27)
CREAT SERPL-MCNC: 2.75 MG/DL (ref 0.76–1.27)
DEPRECATED RDW RBC AUTO: 46.6 FL (ref 37–54)
DEPRECATED RDW RBC AUTO: 49.2 FL (ref 37–54)
EGFRCR SERPLBLD CKD-EPI 2021: 22 ML/MIN/1.73
EGFRCR SERPLBLD CKD-EPI 2021: 23.6 ML/MIN/1.73
EGFRCR SERPLBLD CKD-EPI 2021: 23.7 ML/MIN/1.73
ERYTHROCYTE [DISTWIDTH] IN BLOOD BY AUTOMATED COUNT: 16.4 % (ref 12.3–15.4)
ERYTHROCYTE [DISTWIDTH] IN BLOOD BY AUTOMATED COUNT: 16.5 % (ref 12.3–15.4)
GLUCOSE SERPL-MCNC: 80 MG/DL (ref 65–99)
GLUCOSE SERPL-MCNC: 88 MG/DL (ref 65–99)
GLUCOSE SERPL-MCNC: 95 MG/DL (ref 65–99)
HCT VFR BLD AUTO: 20.7 % (ref 37.5–51)
HCT VFR BLD AUTO: 23.7 % (ref 37.5–51)
HCT VFR BLD AUTO: 24.1 % (ref 37.5–51)
HGB BLD-MCNC: 6.6 G/DL (ref 13–17.7)
HGB BLD-MCNC: 7.3 G/DL (ref 13–17.7)
HGB BLD-MCNC: 7.6 G/DL (ref 13–17.7)
MCH RBC QN AUTO: 25.3 PG (ref 26.6–33)
MCH RBC QN AUTO: 25.5 PG (ref 26.6–33)
MCHC RBC AUTO-ENTMCNC: 30.8 G/DL (ref 31.5–35.7)
MCHC RBC AUTO-ENTMCNC: 31.9 G/DL (ref 31.5–35.7)
MCV RBC AUTO: 79.9 FL (ref 79–97)
MCV RBC AUTO: 82.3 FL (ref 79–97)
PLATELET # BLD AUTO: 252 10*3/MM3 (ref 140–450)
PLATELET # BLD AUTO: 279 10*3/MM3 (ref 140–450)
PMV BLD AUTO: 10 FL (ref 6–12)
PMV BLD AUTO: 9.4 FL (ref 6–12)
POTASSIUM SERPL-SCNC: 5 MMOL/L (ref 3.5–5.2)
POTASSIUM SERPL-SCNC: 5.6 MMOL/L (ref 3.5–5.2)
POTASSIUM SERPL-SCNC: 6.2 MMOL/L (ref 3.5–5.2)
RBC # BLD AUTO: 2.59 10*6/MM3 (ref 4.14–5.8)
RBC # BLD AUTO: 2.88 10*6/MM3 (ref 4.14–5.8)
RH BLD: POSITIVE
SODIUM SERPL-SCNC: 136 MMOL/L (ref 136–145)
SODIUM SERPL-SCNC: 138 MMOL/L (ref 136–145)
SODIUM SERPL-SCNC: 138 MMOL/L (ref 136–145)
T&S EXPIRATION DATE: NORMAL
WBC NRBC COR # BLD AUTO: 10.47 10*3/MM3 (ref 3.4–10.8)
WBC NRBC COR # BLD AUTO: 11.92 10*3/MM3 (ref 3.4–10.8)

## 2024-06-21 PROCEDURE — 94799 UNLISTED PULMONARY SVC/PX: CPT

## 2024-06-21 PROCEDURE — 93010 ELECTROCARDIOGRAM REPORT: CPT | Performed by: INTERNAL MEDICINE

## 2024-06-21 PROCEDURE — 86923 COMPATIBILITY TEST ELECTRIC: CPT

## 2024-06-21 PROCEDURE — 63710000001 INSULIN REGULAR HUMAN PER 5 UNITS: Performed by: INTERNAL MEDICINE

## 2024-06-21 PROCEDURE — 94640 AIRWAY INHALATION TREATMENT: CPT

## 2024-06-21 PROCEDURE — 25010000002 CALCIUM GLUCONATE-NACL 1-0.675 GM/50ML-% SOLUTION: Performed by: INTERNAL MEDICINE

## 2024-06-21 PROCEDURE — 93005 ELECTROCARDIOGRAM TRACING: CPT | Performed by: INTERNAL MEDICINE

## 2024-06-21 PROCEDURE — 86850 RBC ANTIBODY SCREEN: CPT | Performed by: THORACIC SURGERY (CARDIOTHORACIC VASCULAR SURGERY)

## 2024-06-21 PROCEDURE — 85018 HEMOGLOBIN: CPT | Performed by: INTERNAL MEDICINE

## 2024-06-21 PROCEDURE — G0463 HOSPITAL OUTPT CLINIC VISIT: HCPCS | Performed by: THORACIC SURGERY (CARDIOTHORACIC VASCULAR SURGERY)

## 2024-06-21 PROCEDURE — P9016 RBC LEUKOCYTES REDUCED: HCPCS

## 2024-06-21 PROCEDURE — 85014 HEMATOCRIT: CPT | Performed by: INTERNAL MEDICINE

## 2024-06-21 PROCEDURE — 80048 BASIC METABOLIC PNL TOTAL CA: CPT | Performed by: THORACIC SURGERY (CARDIOTHORACIC VASCULAR SURGERY)

## 2024-06-21 PROCEDURE — 86900 BLOOD TYPING SEROLOGIC ABO: CPT

## 2024-06-21 PROCEDURE — 85027 COMPLETE CBC AUTOMATED: CPT | Performed by: THORACIC SURGERY (CARDIOTHORACIC VASCULAR SURGERY)

## 2024-06-21 PROCEDURE — 36430 TRANSFUSION BLD/BLD COMPNT: CPT

## 2024-06-21 PROCEDURE — 86900 BLOOD TYPING SEROLOGIC ABO: CPT | Performed by: THORACIC SURGERY (CARDIOTHORACIC VASCULAR SURGERY)

## 2024-06-21 PROCEDURE — 86901 BLOOD TYPING SEROLOGIC RH(D): CPT | Performed by: THORACIC SURGERY (CARDIOTHORACIC VASCULAR SURGERY)

## 2024-06-21 PROCEDURE — 80048 BASIC METABOLIC PNL TOTAL CA: CPT | Performed by: INTERNAL MEDICINE

## 2024-06-21 RX ORDER — FAMOTIDINE 10 MG/ML
20 INJECTION, SOLUTION INTRAVENOUS ONCE
Status: DISCONTINUED | OUTPATIENT
Start: 2024-06-21 | End: 2024-06-21 | Stop reason: HOSPADM

## 2024-06-21 RX ORDER — NITROGLYCERIN 0.4 MG/1
0.4 TABLET SUBLINGUAL
Status: DISCONTINUED | OUTPATIENT
Start: 2024-06-21 | End: 2024-06-30 | Stop reason: HOSPADM

## 2024-06-21 RX ORDER — SODIUM CHLORIDE 0.9 % (FLUSH) 0.9 %
3-10 SYRINGE (ML) INJECTION AS NEEDED
Status: DISCONTINUED | OUTPATIENT
Start: 2024-06-21 | End: 2024-06-21 | Stop reason: HOSPADM

## 2024-06-21 RX ORDER — CALCIUM GLUCONATE 20 MG/ML
1000 INJECTION, SOLUTION INTRAVENOUS ONCE
Status: COMPLETED | OUTPATIENT
Start: 2024-06-21 | End: 2024-06-21

## 2024-06-21 RX ORDER — HEPARIN SODIUM 5000 [USP'U]/ML
5000 INJECTION, SOLUTION INTRAVENOUS; SUBCUTANEOUS ONCE
Status: DISCONTINUED | OUTPATIENT
Start: 2024-06-21 | End: 2024-06-21 | Stop reason: HOSPADM

## 2024-06-21 RX ORDER — FENTANYL CITRATE 50 UG/ML
50 INJECTION, SOLUTION INTRAMUSCULAR; INTRAVENOUS ONCE AS NEEDED
Status: DISCONTINUED | OUTPATIENT
Start: 2024-06-21 | End: 2024-06-21 | Stop reason: HOSPADM

## 2024-06-21 RX ORDER — MIDAZOLAM HYDROCHLORIDE 1 MG/ML
0.5 INJECTION INTRAMUSCULAR; INTRAVENOUS
Status: DISCONTINUED | OUTPATIENT
Start: 2024-06-21 | End: 2024-06-21 | Stop reason: HOSPADM

## 2024-06-21 RX ORDER — DEXTROSE MONOHYDRATE 25 G/50ML
50 INJECTION, SOLUTION INTRAVENOUS ONCE
Status: COMPLETED | OUTPATIENT
Start: 2024-06-21 | End: 2024-06-21

## 2024-06-21 RX ORDER — GABAPENTIN 100 MG/1
100 CAPSULE ORAL NIGHTLY
Status: DISCONTINUED | OUTPATIENT
Start: 2024-06-21 | End: 2024-06-30 | Stop reason: HOSPADM

## 2024-06-21 RX ORDER — LIDOCAINE HYDROCHLORIDE 10 MG/ML
0.5 INJECTION, SOLUTION INFILTRATION; PERINEURAL ONCE AS NEEDED
Status: DISCONTINUED | OUTPATIENT
Start: 2024-06-21 | End: 2024-06-21 | Stop reason: HOSPADM

## 2024-06-21 RX ORDER — SODIUM CHLORIDE 9 MG/ML
40 INJECTION, SOLUTION INTRAVENOUS AS NEEDED
Status: DISCONTINUED | OUTPATIENT
Start: 2024-06-21 | End: 2024-06-30 | Stop reason: HOSPADM

## 2024-06-21 RX ORDER — SODIUM CHLORIDE 0.9 % (FLUSH) 0.9 %
10 SYRINGE (ML) INJECTION AS NEEDED
Status: DISCONTINUED | OUTPATIENT
Start: 2024-06-21 | End: 2024-06-30 | Stop reason: HOSPADM

## 2024-06-21 RX ORDER — DEXTROSE MONOHYDRATE 25 G/50ML
INJECTION, SOLUTION INTRAVENOUS
Status: COMPLETED
Start: 2024-06-21 | End: 2024-06-21

## 2024-06-21 RX ORDER — SODIUM CHLORIDE 0.9 % (FLUSH) 0.9 %
3 SYRINGE (ML) INJECTION EVERY 12 HOURS SCHEDULED
Status: DISCONTINUED | OUTPATIENT
Start: 2024-06-21 | End: 2024-06-21 | Stop reason: HOSPADM

## 2024-06-21 RX ORDER — MELATONIN
1000 DAILY
Status: DISCONTINUED | OUTPATIENT
Start: 2024-06-22 | End: 2024-06-30 | Stop reason: HOSPADM

## 2024-06-21 RX ORDER — METHIMAZOLE 5 MG/1
5 TABLET ORAL 3 TIMES WEEKLY
Status: DISCONTINUED | OUTPATIENT
Start: 2024-06-24 | End: 2024-06-30 | Stop reason: HOSPADM

## 2024-06-21 RX ORDER — DIGOXIN 125 MCG
125 TABLET ORAL
Status: DISCONTINUED | OUTPATIENT
Start: 2024-06-22 | End: 2024-06-28

## 2024-06-21 RX ORDER — SODIUM CHLORIDE 9 MG/ML
40 INJECTION, SOLUTION INTRAVENOUS AS NEEDED
Status: DISCONTINUED | OUTPATIENT
Start: 2024-06-21 | End: 2024-06-21 | Stop reason: HOSPADM

## 2024-06-21 RX ORDER — FUROSEMIDE 20 MG/1
20 TABLET ORAL DAILY
Status: DISCONTINUED | OUTPATIENT
Start: 2024-06-22 | End: 2024-06-22

## 2024-06-21 RX ORDER — SODIUM CHLORIDE, SODIUM LACTATE, POTASSIUM CHLORIDE, CALCIUM CHLORIDE 600; 310; 30; 20 MG/100ML; MG/100ML; MG/100ML; MG/100ML
9 INJECTION, SOLUTION INTRAVENOUS CONTINUOUS
Status: DISCONTINUED | OUTPATIENT
Start: 2024-06-21 | End: 2024-06-23

## 2024-06-21 RX ORDER — MIDODRINE HYDROCHLORIDE 5 MG/1
5 TABLET ORAL
Status: DISCONTINUED | OUTPATIENT
Start: 2024-06-21 | End: 2024-06-30 | Stop reason: HOSPADM

## 2024-06-21 RX ORDER — SODIUM CHLORIDE 0.9 % (FLUSH) 0.9 %
10 SYRINGE (ML) INJECTION EVERY 12 HOURS SCHEDULED
Status: DISCONTINUED | OUTPATIENT
Start: 2024-06-21 | End: 2024-06-30 | Stop reason: HOSPADM

## 2024-06-21 RX ADMIN — MIDODRINE HYDROCHLORIDE 5 MG: 5 TABLET ORAL at 18:15

## 2024-06-21 RX ADMIN — CALCIUM GLUCONATE 1000 MG: 20 INJECTION, SOLUTION INTRAVENOUS at 18:15

## 2024-06-21 RX ADMIN — SODIUM BICARBONATE 50 MEQ: 84 INJECTION INTRAVENOUS at 18:15

## 2024-06-21 RX ADMIN — METOPROLOL TARTRATE 25 MG: 25 TABLET, FILM COATED ORAL at 20:56

## 2024-06-21 RX ADMIN — INSULIN HUMAN 10 UNITS: 100 INJECTION, SOLUTION PARENTERAL at 18:15

## 2024-06-21 RX ADMIN — SODIUM ZIRCONIUM CYCLOSILICATE 10 G: 10 POWDER, FOR SUSPENSION ORAL at 18:15

## 2024-06-21 RX ADMIN — DEXTROSE MONOHYDRATE 50 ML: 25 INJECTION, SOLUTION INTRAVENOUS at 18:15

## 2024-06-21 RX ADMIN — Medication 10 ML: at 20:56

## 2024-06-21 RX ADMIN — ALBUTEROL SULFATE 10 MG: 2.5 SOLUTION RESPIRATORY (INHALATION) at 18:11

## 2024-06-21 RX ADMIN — GABAPENTIN 100 MG: 100 CAPSULE ORAL at 20:56

## 2024-06-21 NOTE — NURSING NOTE
Paged Dr Ponce about critical K+ value of 6.2, awaiting call back. Called report to SWAPNA Britt on [address]. T&S sent awaiting results/prbc availability to transfuse, SWAPNA Britt aware.     Pt was transferred by stretcher to [address] with orderly and patient's spouse.

## 2024-06-21 NOTE — PLAN OF CARE
Problem: Adult Inpatient Plan of Care  Goal: Plan of Care Review  Outcome: Ongoing, Progressing  Flowsheets (Taken 6/21/2024 1647)  Progress: improving  Plan of Care Reviewed With: patient  Outcome Evaluation: Patient has been pleasant and cooperative during shift. No complaints of pain, nausea, or SOA. AOx4, turn Q2, room air, Afib. PleurX catheter in place. From last 3 previous assessments no production. Elevated potassium and decreased HGB. PRBCs infusing now. Wound consulted for wounbd on left heel and coccyx. Wife at bedside. Per malathi patient needs to be fed at mealtime. Will continue to monitor and assist patient as needed.  Goal: Patient-Specific Goal (Individualized)  Outcome: Ongoing, Progressing  Goal: Absence of Hospital-Acquired Illness or Injury  Outcome: Ongoing, Progressing  Goal: Optimal Comfort and Wellbeing  Outcome: Ongoing, Progressing  Goal: Readiness for Transition of Care  Outcome: Ongoing, Progressing  Intervention: Mutually Develop Transition Plan  Recent Flowsheet Documentation  Taken 6/21/2024 1644 by Taiwo Barrera, RN  Transportation Anticipated: family or friend will provide  Patient/Family Anticipated Services at Transition: none  Patient/Family Anticipates Transition to: home with family  Taken 6/21/2024 1616 by Taiwo Barrera, RN  Equipment Currently Used at Home:   wheelchair   prosthesis   shower chair   grab bar   hospital bed     Problem: Skin Injury Risk Increased  Goal: Skin Health and Integrity  Outcome: Ongoing, Progressing   Goal Outcome Evaluation:  Plan of Care Reviewed With: patient        Progress: improving  Outcome Evaluation: Patient has been pleasant and cooperative during shift. No complaints of pain, nausea, or SOA. AOx4, turn Q2, room air, Afib. PleurX catheter in place. From last 3 previous assessments no production. Elevated potassium and decreased HGB. PRBCs infusing now. Wound consulted for wounbd on left heel and coccyx. Wife at bedside. Per malathi patient  needs to be fed at mealtime. Will continue to monitor and assist patient as needed.

## 2024-06-21 NOTE — ANESTHESIA PREPROCEDURE EVALUATION
Anesthesia Evaluation     Patient summary reviewed and Nursing notes reviewed                Airway   Mallampati: III  TM distance: >3 FB  Neck ROM: limited  Dental - normal exam     Pulmonary    (+) pleural effusion,  Cardiovascular     ECG reviewed  PT is on anticoagulation therapy    (+) dysrhythmias Atrial Fib, CHF Diastolic >=55%, hyperlipidemia    ROS comment: ·  The left ventricular cavity is small in size. Left ventricular systolic function is normal. Calculated left ventricular EF = 62.4%  ·  Left ventricular wall thickness is consistent with moderate concentric hypertrophy. Left ventricular diastolic function was indeterminate due to AFib  ·  There is moderate anterior mitral valve thickening present. Moderate to severe mitral valve regurgitation is present.  ·  Estimated right ventricular systolic pressure from tricuspid regurgitation is moderately elevated (45-55 mmHg).  ·  There is a trivial pericardial effusion. There is a large right pleural effusion with likely consolidated right lung base.  ·  Severe LA enlargement.  IVC normal in size and respirophasic variation.      Neuro/Psych  (+) psychiatric history  GI/Hepatic/Renal/Endo    (+) thyroid problem     Musculoskeletal     Abdominal    Substance History      OB/GYN          Other   arthritis,                       Anesthesia Plan    ASA 3     general and MAC   total IV anesthesia  (I have reviewed the patient's history with the patient and the chart, including all pertinent laboratory results and imaging. I have explained the risks of anesthesia including but not limited to dental damage, corneal abrasion, nerve injury, MI, stroke, and death. Questions asked and answered. Anesthetic plan discussed with patient and team as indicated. Patient expressed understanding of the above.    Discussed GA vs MAC)  intravenous induction     Anesthetic plan, risks, benefits, and alternatives have been provided, discussed and informed consent has been obtained  with: patient.        CODE STATUS:

## 2024-06-21 NOTE — PERIOPERATIVE NURSING NOTE
Informed Dr Noyola pt took Diana this am at 0830am. Surgery cancelled and will need to be rescheduled. Informed [facility] Surgery desk and Rhianna S Charge RN Main Pre Post. Pt and wife verbalized understanding

## 2024-06-21 NOTE — H&P
Internal medicine history and physical  INTERNAL MEDICINE   [facility]       Patient Identification:  Name: Mehreen Silva IV  Age: 84 y.o.  Sex: male  :  1940  MRN: [id number]                   Primary Care Physician: Taiwo Powers MD                               Date of admission:2024    Chief Complaint: Severe anemia and hyperkalemia noted at preop labs resulting request for admission.    History of Present Illness:   Source of information review of records discussion with caring nurse at the preop holding.  Patient himself is unable to give much account of his symptoms.  He is able to converse but cannot recall why he is in the hospital and was surprised that he was having his Pleurx catheter to be removed earlier today.  Patient is 84-year-old male who has complicated past medical history including history of recurrent right-sided pleural effusion which was thought to be related to his heart failure.  Patient underwent CT-guided chest tube placement in 2024 and has subsequently recommended to have Pleurx catheter placement which was done in 2024.  Since his Pleurx catheter placement by output from his Pleurx catheter has significantly decreased and at the latest visit with the thoracic surgery service on 2024 patient received tPA administered through his Pleurx catheter to improve drainage and eventually plan was made for him to have his Pleurx catheter removed.  Patient was brought in today for scheduled removal of his fluids catheter but in the preop nursing assessment it was found that he is taking his Eliquis earlier today.  His surgery was canceled.  Routine preop labs revealed significant abnormalities including hemoglobin of 7.3 down from 10.5 about a month and a half ago with repeat lab work showing hemoglobin dropped down to 6.6.  Patient was also noted to have potassium of 5.6 and repeat test few hours later showed potassium of 6.2.  His  kidney function is also deteriorated with creatinine rising from 0.72 on 5/6/2024-2.75 on 6/21/2024.  Patient himself is very tired and unable to give detailed symptoms but denies any hematemesis or melena or any abdominal pain or difficulty in urination.  Evaluation did reveal distended suprapubic mass.  Patient has right  AKA.  He also has superficial wound on his left heel.  He recently had a procedure on his right upper chest with a healing wound.  His right Pleurx catheter is in place and currently dressed.  Patient is being admitted for management of acute anemia and acute renal failure with hyperkalemia.  Patient has received stat treatment for hyperkalemia and is awaiting repeat lab work to see the impact of the treatment.  Patient does not have any rhythm abnormalities or bradycardia.  His past medical history is remarkable for diastolic congestive heart failure with recurrent pleural effusion, history of right AKA, atrial fibrillation on chronic anticoagulation therapy, hypothyroidism, ulcer of the left heel, osteoarthritis, malnutrition and ongoing decubitus ulcer due to immobility involving the sacral region.  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 12/13/2023    HLD (hyperlipidemia) 09/23/2022    Hx of toxic multinodular goiter 09/23/2022    Hyperthyroidism     Lumbar radiculopathy     Orthostatic hypotension     Osteoarthritis     Pressure injury of sacral region, unstageable 05/14/2024    Pulmonary hypertension     Recurrent pleural effusion on right 04/27/2024    Ulcer with gangrene     right heel     Past Surgical History:  Past Surgical History:    Procedure Laterality Date    ABOVE KNEE AMPUTATION Right 12/13/2023    Procedure: ABOVE KNEE AMPUTATION RIGHT, proveena wound vac placement;  Surgeon: Issa Nieves MD;  Location: [facility] MAIN OR;  Service: Vascular;  Laterality: Right;    APPENDECTOMY      BACK SURGERY      x4    COLONOSCOPY      EXCISION MASS TRUNK N/A 06/08/2016    Procedure: Excision soft tissue neoplasm on abdominal wall and left neck;  Surgeon: Shaji Barahona Jr., MD;  Location: [facility] MAIN OR;  Service:     KNEE SURGERY      NECK SURGERY  1974    PLEURAL CATHETER INSERTION Right 4/30/2024    Procedure: RIGHT VIDEO ASSISTED THORACOSCOPY, PARTIAL DECORTICATION, PLEURX CATHETER INSERTION;  Surgeon: Nataliya Noyola MD;  Location: [facility] MAIN OR;  Service: Thoracic;  Laterality: Right;    QUADRICEPS REPAIR      ROTATOR CUFF REPAIR      SHOULDER SURGERY      SMALL INTESTINE SURGERY  2021    Bowel Obstruction      Home Meds:  Medications Prior to Admission   Medication Sig Dispense Refill Last Dose    cholecalciferol (VITAMIN D3) 25 MCG (1000 UT) tablet Take 1 tablet by mouth Daily.   6/21/2024 at 0830    digoxin (LANOXIN) 125 MCG tablet Take 1 tablet by mouth Daily. 90 tablet 1 6/21/2024 at 0830    Eliquis 5 MG tablet tablet TAKE ONE TABLET BY MOUTH EVERY 12 HOURS 180 tablet 3 6/21/2024 at 0830    furosemide (LASIX) 40 MG tablet Take 0.5 tablets by mouth Daily. 45 tablet 1 6/21/2024 at 0830    gabapentin (NEURONTIN) 100 MG capsule Take 1 capsule by mouth every night at bedtime.   6/20/2024 at 2115    Jardiance 10 MG tablet tablet TAKE 1 TABLET BY MOUTH DAILY 90 tablet 1 6/21/2024 at 0830    methIMAzole (TAPAZOLE) 5 MG tablet Take 1 tablet by mouth 3 (Three) Times a Week. Monday, Wednesday, and Friday 6/21/2024 at 0830    metoprolol tartrate (LOPRESSOR) 25 MG tablet Take 1 tablet by mouth Every 12 (Twelve) Hours. 60 tablet 2 6/21/2024 at 0830    midodrine (PROAMATINE) 5 MG tablet Take 1 tablet by mouth 2 (Two) Times a Day Before Meals. 180  "tablet 1 2024 at 0830    multivitamin with minerals tablet tablet Take 1 tablet by mouth Daily.   2024 at 0830    potassium chloride (KLOR-CON M20) 20 MEQ CR tablet Take 1 tablet by mouth Daily. 90 tablet 1 2024 at 0830    Santyl 250 UNIT/GM ointment Apply 1 Application topically to the appropriate area as directed Daily. To sacrum   2024 at 0830     Current Meds:     Current Facility-Administered Medications:     lactated ringers infusion, 9 mL/hr, Intravenous, Continuous, Lupillo Ko MD  Allergies:  No Known Allergies  Social History:   Social History     Tobacco Use    Smoking status: Former     Current packs/day: 0.00     Types: Cigarettes     Quit date: 1983     Years since quittin.0     Passive exposure: Past    Smokeless tobacco: Never    Tobacco comments:        Substance Use Topics    Alcohol use: Yes     Comment: SOCIAL; Patient is non drinker.      Family History:  Family History   Problem Relation Age of Onset    Cancer Mother     Heart disease Father     Aneurysm Father         Abdominal Aortic Aneurysm    Malig Hyperthermia Neg Hx           Review of Systems  See history of present illness and past medical history.       Vitals:   /94 (BP Location: Left arm, Patient Position: Lying)   Pulse 72   Temp 97 °F (36.1 °C) (Oral)   Resp 16   Ht 177.8 cm (70\")   Wt 63.5 kg (140 lb)   SpO2 97%   BMI 20.09 kg/m²   I/O: No intake or output data in the 24 hours ending 24 1633  Exam:  Patient is examined using the personal protective equipment as per guidelines from infection control for this particular patient as enacted.  Hand washing was performed before and after patient interaction.  General Appearance:  Chronically ill-appearing emaciated male who is unable to give details of his symptoms but interacts appropriately.   Head:    Normocephalic, without obvious abnormality, atraumatic   Eyes:  Pale conjunctiva   Ears:    Normal external ear canals, both " ears   Nose:   Nares normal, septum midline, mucosa normal, no drainage    or sinus tenderness   Throat:   Lips, tongue, gums normal; oral mucosa pink and moist   Neck: Supple and no adenopathy   Back:     Symmetric, no curvature, ROM normal, no CVA tenderness   Lungs:   Right Pleurx catheter in place with decreased breath sounds.   Chest Wall:  Right upper chest is dressed over a clean incision which is healing with no surrounding inflammation.    Heart:  S1-S2 irregular   Abdomen:   Soft, distended bladder/mass in the suprapubic area noted   Extremities: Right AKA   Pulses:   Pulses palpable in all extremities; symmetric all extremities   Skin: Ecchymotic changes noted   Neurologic: Grossly nonfocal examination       Data Review:      I reviewed the patient's new clinical results.  Results from last 7 days   Lab Units 06/21/24  1411 06/21/24  1311   WBC 10*3/mm3 10.47 11.92*   HEMOGLOBIN g/dL 6.6* 7.3*   PLATELETS 10*3/mm3 252 279     Results from last 7 days   Lab Units 06/21/24  1411 06/21/24  1311   SODIUM mmol/L 138 136   POTASSIUM mmol/L 6.2* 5.6*   CHLORIDE mmol/L 103 101   CO2 mmol/L 24.1 23.0   BUN mg/dL 79* 85*   CREATININE mg/dL 2.75* 2.59*   CALCIUM mg/dL 8.6 8.5*   GLUCOSE mg/dL 88 95     CT Chest Without Contrast Diagnostic    Result Date: 6/4/2024   1. Small right hydropneumothorax with right chest tube in place. 2. Consolidation of the base of the right lower lobe, possible pneumonia, with surrounding groundglass opacities and interstitial thickening in the right lung that could also be infectious/inflammatory or could reflect reexpansion pulmonary edema. 3. Small left pleural effusion with patchy groundglass opacities, sub-6 mm tree-in-bud nodules, and interstitial thickening in the left lower lobe, likely infectious/inflammatory. 4. Moderate to severe calcified coronary artery disease and aortic valve calcifications. 5. Mild enlargement of the main pulmonary artery, suggesting pulmonary arterial  hypertension. 6. Partially imaged bilateral hydronephrosis. 7. Moderate degenerative disease sternoclavicular joint with large bilateral sternoclavicular joint effusions.  This report was finalized on 6/4/2024 4:56 PM by Dionisio Kinsey MD on Workstation: [id number]     ECG 12 Lead Electrolyte Imbalance   Preliminary Result   HEART RATE= 69  bpm   RR Interval= 870  ms   GA Interval=   ms   P Horizontal Axis=   deg   P Front Axis=   deg   QRSD Interval= 82  ms   QT Interval= 343  ms   QTcB= 368  ms   QRS Axis= 22  deg   T Wave Axis= 224  deg   - ABNORMAL ECG -   Atrial fibrillation   Low voltage, extremity leads   Nonspecific repol abnormality, lateral leads   Electronically Signed By:    Date and Time of Study: 2024-06-21 16:48:09      Telemetry Scan   Final Result      Telemetry Scan   Final Result      Telemetry Scan   Final Result          Assessment:  Active Hospital Problems    Diagnosis  POA    **Pleural effusion [J90]  Unknown    CKD (chronic kidney disease) [N18.9]  Yes    Anemia [D64.9]  Unknown    Chronic heart failure with preserved ejection fraction (HFpEF) [I50.32]  Yes    Recurrent pleural effusion on right [J90]  Yes    Longstanding persistent atrial fibrillation [I48.11]  Yes    Hyperkalemia [E87.5]  Yes    Hyperthyroidism [E05.90]  Yes       Medical decision making/CARE plan: See admitting orders  Acute hyperkalemia with acute kidney injury-etiology multifactorial including possibility of urinary retention versus effect of medication versus hypertension versus progression of underlying kidney disease-plan is to aggressively treat his hyperkalemia with acute hyperkalemia management protocol, repeat BMP.  Check bladder scan and ultrasound of his kidneys and nephrology consultation.  Hold his potassium replacement.  He would definitely need IV fluids but given his history of diastolic congestive heart failure and pleural effusions is a tough situation.  Will hold his Lasix while watching the effect of  treatment of hyperkalemia.  Recurrent right-sided pleural effusion with malfunctioning Pleurx catheter-plan was to remove the Pleurx catheter but because of his anemia and renal insufficiency and hyperkalemia as well as his reported intake of Eliquis surgery got canceled-continue follow-up with thoracic surgery service.  Severe anemia with dramatic drop in hemoglobin in the last month-continue with basic anemia workup transfuse packed RBC to keep hemoglobin above 7.  Check serial H&H and follow-up on stool for Hemoccult.  May benefit from GI and hematology oncology evaluation.  History of atrial fibrillation-continue his regimen but hold his Eliquis given anemia as well as potential need for surgery for removal of catheter.  Hyperthyroidism-continue with his home regimen.  Severe malnutrition-continue with nutrition evaluation.    Baron Ponce MD   6/21/2024  16:33 EDT    Parts of this note may be an electronic transcription/translation of spoken language to printed text using the Dragon dictation system.'

## 2024-06-21 NOTE — PERIOPERATIVE NURSING NOTE
Called Dr Noyola regarding repeat H/H. Requested Dr Noyola to call [facility] to expedite pt transfer to floor.

## 2024-06-22 ENCOUNTER — APPOINTMENT (OUTPATIENT)
Dept: ULTRASOUND IMAGING | Facility: HOSPITAL | Age: 84
DRG: 186 | End: 2024-06-22
Payer: MEDICARE

## 2024-06-22 LAB
ALBUMIN SERPL-MCNC: 2.4 G/DL (ref 3.5–5.2)
ALBUMIN/GLOB SERPL: 0.8 G/DL
ALP SERPL-CCNC: 126 U/L (ref 39–117)
ALT SERPL W P-5'-P-CCNC: 14 U/L (ref 1–41)
ANION GAP SERPL CALCULATED.3IONS-SCNC: 11.9 MMOL/L (ref 5–15)
AST SERPL-CCNC: 11 U/L (ref 1–40)
BACTERIA UR QL AUTO: ABNORMAL /HPF
BASOPHILS # BLD AUTO: 0.01 10*3/MM3 (ref 0–0.2)
BASOPHILS NFR BLD AUTO: 0.1 % (ref 0–1.5)
BILIRUB SERPL-MCNC: 0.4 MG/DL (ref 0–1.2)
BILIRUB UR QL STRIP: NEGATIVE
BUN SERPL-MCNC: 76 MG/DL (ref 8–23)
BUN/CREAT SERPL: 30.4 (ref 7–25)
CALCIUM SPEC-SCNC: 8.6 MG/DL (ref 8.6–10.5)
CHLORIDE SERPL-SCNC: 104 MMOL/L (ref 98–107)
CLARITY UR: CLEAR
CO2 SERPL-SCNC: 25.1 MMOL/L (ref 22–29)
COLOR UR: YELLOW
CREAT SERPL-MCNC: 2.5 MG/DL (ref 0.76–1.27)
CREAT UR-MCNC: 12.7 MG/DL
DEPRECATED RDW RBC AUTO: 48.3 FL (ref 37–54)
EGFRCR SERPLBLD CKD-EPI 2021: 24.7 ML/MIN/1.73
EOSINOPHIL # BLD AUTO: 0.03 10*3/MM3 (ref 0–0.4)
EOSINOPHIL NFR BLD AUTO: 0.3 % (ref 0.3–6.2)
ERYTHROCYTE [DISTWIDTH] IN BLOOD BY AUTOMATED COUNT: 16.3 % (ref 12.3–15.4)
FERRITIN SERPL-MCNC: 544 NG/ML (ref 30–400)
FOLATE SERPL-MCNC: >20 NG/ML (ref 4.78–24.2)
GLOBULIN UR ELPH-MCNC: 3.1 GM/DL
GLUCOSE SERPL-MCNC: 73 MG/DL (ref 65–99)
GLUCOSE UR STRIP-MCNC: ABNORMAL MG/DL
HAPTOGLOB SERPL-MCNC: 364 MG/DL (ref 30–200)
HBA1C MFR BLD: 5.3 % (ref 4.8–5.6)
HCT VFR BLD AUTO: 24.1 % (ref 37.5–51)
HCT VFR BLD AUTO: 24.1 % (ref 37.5–51)
HCT VFR BLD AUTO: 26.6 % (ref 37.5–51)
HEMOCCULT STL QL: POSITIVE
HGB BLD-MCNC: 7.5 G/DL (ref 13–17.7)
HGB BLD-MCNC: 7.6 G/DL (ref 13–17.7)
HGB BLD-MCNC: 8.1 G/DL (ref 13–17.7)
HGB UR QL STRIP.AUTO: ABNORMAL
HYALINE CASTS UR QL AUTO: ABNORMAL /LPF
IMM GRANULOCYTES # BLD AUTO: 0.06 10*3/MM3 (ref 0–0.05)
IMM GRANULOCYTES NFR BLD AUTO: 0.6 % (ref 0–0.5)
IRON 24H UR-MRATE: 23 MCG/DL (ref 59–158)
IRON 24H UR-MRATE: 23 MCG/DL (ref 59–158)
IRON SATN MFR SERPL: 11 % (ref 20–50)
KETONES UR QL STRIP: NEGATIVE
LDH SERPL-CCNC: 158 U/L (ref 135–225)
LEUKOCYTE ESTERASE UR QL STRIP.AUTO: NEGATIVE
LYMPHOCYTES # BLD AUTO: 0.71 10*3/MM3 (ref 0.7–3.1)
LYMPHOCYTES NFR BLD AUTO: 7.6 % (ref 19.6–45.3)
MCH RBC QN AUTO: 25.2 PG (ref 26.6–33)
MCHC RBC AUTO-ENTMCNC: 31.1 G/DL (ref 31.5–35.7)
MCV RBC AUTO: 80.9 FL (ref 79–97)
MONOCYTES # BLD AUTO: 0.71 10*3/MM3 (ref 0.1–0.9)
MONOCYTES NFR BLD AUTO: 7.6 % (ref 5–12)
NEUTROPHILS NFR BLD AUTO: 7.87 10*3/MM3 (ref 1.7–7)
NEUTROPHILS NFR BLD AUTO: 83.8 % (ref 42.7–76)
NITRITE UR QL STRIP: NEGATIVE
NRBC BLD AUTO-RTO: 0 /100 WBC (ref 0–0.2)
PH UR STRIP.AUTO: 6.5 [PH] (ref 5–8)
PLATELET # BLD AUTO: 234 10*3/MM3 (ref 140–450)
PMV BLD AUTO: 9.9 FL (ref 6–12)
POTASSIUM SERPL-SCNC: 4.6 MMOL/L (ref 3.5–5.2)
PROT ?TM UR-MCNC: 43.3 MG/DL
PROT SERPL-MCNC: 5.5 G/DL (ref 6–8.5)
PROT UR QL STRIP: ABNORMAL
PROT/CREAT UR: 3409.4 MG/G CREA (ref 0–200)
QT INTERVAL: 343 MS
QTC INTERVAL: 368 MS
RBC # BLD AUTO: 2.98 10*6/MM3 (ref 4.14–5.8)
RBC # UR STRIP: ABNORMAL /HPF
REF LAB TEST METHOD: ABNORMAL
RETICS # AUTO: 0.05 10*6/MM3 (ref 0.02–0.13)
RETICS/RBC NFR AUTO: 1.52 % (ref 0.7–1.9)
SODIUM SERPL-SCNC: 141 MMOL/L (ref 136–145)
SODIUM UR-SCNC: 40 MMOL/L
SP GR UR STRIP: 1.01 (ref 1–1.03)
SQUAMOUS #/AREA URNS HPF: ABNORMAL /HPF
TIBC SERPL-MCNC: 210 MCG/DL (ref 298–536)
TRANSFERRIN SERPL-MCNC: 141 MG/DL (ref 200–360)
UROBILINOGEN UR QL STRIP: ABNORMAL
VIT B12 BLD-MCNC: 1205 PG/ML (ref 211–946)
WBC # UR STRIP: ABNORMAL /HPF
WBC NRBC COR # BLD AUTO: 9.39 10*3/MM3 (ref 3.4–10.8)

## 2024-06-22 PROCEDURE — 83615 LACTATE (LD) (LDH) ENZYME: CPT | Performed by: INTERNAL MEDICINE

## 2024-06-22 PROCEDURE — 82607 VITAMIN B-12: CPT | Performed by: INTERNAL MEDICINE

## 2024-06-22 PROCEDURE — 85045 AUTOMATED RETICULOCYTE COUNT: CPT | Performed by: INTERNAL MEDICINE

## 2024-06-22 PROCEDURE — 85014 HEMATOCRIT: CPT | Performed by: INTERNAL MEDICINE

## 2024-06-22 PROCEDURE — 82728 ASSAY OF FERRITIN: CPT | Performed by: INTERNAL MEDICINE

## 2024-06-22 PROCEDURE — 83010 ASSAY OF HAPTOGLOBIN QUANT: CPT | Performed by: INTERNAL MEDICINE

## 2024-06-22 PROCEDURE — 99232 SBSQ HOSP IP/OBS MODERATE 35: CPT | Performed by: SURGERY

## 2024-06-22 PROCEDURE — 83540 ASSAY OF IRON: CPT | Performed by: INTERNAL MEDICINE

## 2024-06-22 PROCEDURE — 85018 HEMOGLOBIN: CPT | Performed by: INTERNAL MEDICINE

## 2024-06-22 PROCEDURE — 76775 US EXAM ABDO BACK WALL LIM: CPT

## 2024-06-22 PROCEDURE — 82570 ASSAY OF URINE CREATININE: CPT | Performed by: INTERNAL MEDICINE

## 2024-06-22 PROCEDURE — 82272 OCCULT BLD FECES 1-3 TESTS: CPT | Performed by: INTERNAL MEDICINE

## 2024-06-22 PROCEDURE — 82746 ASSAY OF FOLIC ACID SERUM: CPT | Performed by: INTERNAL MEDICINE

## 2024-06-22 PROCEDURE — 81001 URINALYSIS AUTO W/SCOPE: CPT | Performed by: INTERNAL MEDICINE

## 2024-06-22 PROCEDURE — 80053 COMPREHEN METABOLIC PANEL: CPT | Performed by: INTERNAL MEDICINE

## 2024-06-22 PROCEDURE — 99222 1ST HOSP IP/OBS MODERATE 55: CPT | Performed by: NURSE PRACTITIONER

## 2024-06-22 PROCEDURE — 84156 ASSAY OF PROTEIN URINE: CPT | Performed by: INTERNAL MEDICINE

## 2024-06-22 PROCEDURE — 80299 QUANTITATIVE ASSAY DRUG: CPT | Performed by: INTERNAL MEDICINE

## 2024-06-22 PROCEDURE — 25810000003 SODIUM CHLORIDE 0.9 % SOLUTION: Performed by: STUDENT IN AN ORGANIZED HEALTH CARE EDUCATION/TRAINING PROGRAM

## 2024-06-22 PROCEDURE — 84466 ASSAY OF TRANSFERRIN: CPT | Performed by: INTERNAL MEDICINE

## 2024-06-22 PROCEDURE — 85025 COMPLETE CBC W/AUTO DIFF WBC: CPT | Performed by: INTERNAL MEDICINE

## 2024-06-22 PROCEDURE — 83036 HEMOGLOBIN GLYCOSYLATED A1C: CPT | Performed by: INTERNAL MEDICINE

## 2024-06-22 PROCEDURE — 84300 ASSAY OF URINE SODIUM: CPT | Performed by: INTERNAL MEDICINE

## 2024-06-22 RX ORDER — TAMSULOSIN HYDROCHLORIDE 0.4 MG/1
0.4 CAPSULE ORAL DAILY
Status: DISCONTINUED | OUTPATIENT
Start: 2024-06-22 | End: 2024-06-30 | Stop reason: HOSPADM

## 2024-06-22 RX ORDER — SODIUM CHLORIDE 9 MG/ML
100 INJECTION, SOLUTION INTRAVENOUS CONTINUOUS
Status: DISCONTINUED | OUTPATIENT
Start: 2024-06-22 | End: 2024-06-22

## 2024-06-22 RX ORDER — LORAZEPAM 0.5 MG/1
0.5 TABLET ORAL EVERY 8 HOURS PRN
Status: DISCONTINUED | OUTPATIENT
Start: 2024-06-22 | End: 2024-06-30 | Stop reason: HOSPADM

## 2024-06-22 RX ORDER — LORAZEPAM 0.5 MG/1
0.5 TABLET ORAL EVERY 8 HOURS PRN
COMMUNITY

## 2024-06-22 RX ORDER — HYDROCODONE BITARTRATE AND ACETAMINOPHEN 5; 325 MG/1; MG/1
1 TABLET ORAL ONCE AS NEEDED
Status: COMPLETED | OUTPATIENT
Start: 2024-06-22 | End: 2024-06-22

## 2024-06-22 RX ADMIN — HYDROCODONE BITARTRATE AND ACETAMINOPHEN 1 TABLET: 5; 325 TABLET ORAL at 15:55

## 2024-06-22 RX ADMIN — Medication 10 ML: at 22:09

## 2024-06-22 RX ADMIN — MIDODRINE HYDROCHLORIDE 5 MG: 5 TABLET ORAL at 09:19

## 2024-06-22 RX ADMIN — MIDODRINE HYDROCHLORIDE 5 MG: 5 TABLET ORAL at 17:45

## 2024-06-22 RX ADMIN — COLLAGENASE SANTYL 1 APPLICATION: 250 OINTMENT TOPICAL at 09:19

## 2024-06-22 RX ADMIN — METOPROLOL TARTRATE 25 MG: 25 TABLET, FILM COATED ORAL at 09:19

## 2024-06-22 RX ADMIN — GABAPENTIN 100 MG: 100 CAPSULE ORAL at 22:00

## 2024-06-22 RX ADMIN — EMPAGLIFLOZIN 10 MG: 10 TABLET, FILM COATED ORAL at 09:19

## 2024-06-22 RX ADMIN — SODIUM CHLORIDE 100 ML/HR: 9 INJECTION, SOLUTION INTRAVENOUS at 14:33

## 2024-06-22 RX ADMIN — TAMSULOSIN HYDROCHLORIDE 0.4 MG: 0.4 CAPSULE ORAL at 17:45

## 2024-06-22 RX ADMIN — LORAZEPAM 0.5 MG: 0.5 TABLET ORAL at 17:47

## 2024-06-22 RX ADMIN — Medication 1000 UNITS: at 09:19

## 2024-06-22 NOTE — CONSULTS
Gastroenterology   Initial Inpatient Consult Note    Referring Provider: [facility]    Reason for Consultation: Anemia    Subjective     History of present illness:    84 y.o. male was admitted for removal of Pleurx catheter, but during his preoperative lab work was noted to have abnormal labs and he failed to stop his Eliquis prior to the procedure.  Hgb 7.6.  Patient does not have any overt GI bleeding.  FOBT ordered, but not collected as patient has not had a bowel movement.  Pleurx catheter produced 500 cc output within 30 minutes of connection to suction today.    Patient is confused and history is unable to be obtained.  Patient not able to fully answer questions.  Patient denies any nausea, vomiting, or abdominal pain.  No EGD or colonoscopy findings in chart.  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 12/13/2023    HLD (hyperlipidemia) 09/23/2022    Hx of toxic multinodular goiter 09/23/2022    Hyperthyroidism     Lumbar radiculopathy     Orthostatic hypotension     Osteoarthritis     Pressure injury of sacral region, unstageable 05/14/2024    Pulmonary hypertension     Recurrent pleural effusion on right 04/27/2024    Ulcer with gangrene     right heel     Past Surgical History:  Past Surgical History:   Procedure Laterality Date    ABOVE KNEE AMPUTATION Right 12/13/2023    Procedure: ABOVE KNEE AMPUTATION RIGHT, proveena wound vac placement;  Surgeon: Issa Nieves MD;  Location: [facility];  Service: Vascular;  Laterality: Right;    APPENDECTOMY      BACK SURGERY      x4    COLONOSCOPY      EXCISION MASS TRUNK N/A 06/08/2016    Procedure: Excision soft  tissue neoplasm on abdominal wall and left neck;  Surgeon: Shaji Barahona Jr., MD;  Location: [facility] MAIN OR;  Service:     KNEE SURGERY      NECK SURGERY  1974    PLEURAL CATHETER INSERTION Right 2024    Procedure: RIGHT VIDEO ASSISTED THORACOSCOPY, PARTIAL DECORTICATION, PLEURX CATHETER INSERTION;  Surgeon: Nataliya Noyola MD;  Location:  [facility] OR;  Service: Thoracic;  Laterality: Right;    QUADRICEPS REPAIR      ROTATOR CUFF REPAIR      SHOULDER SURGERY      SMALL INTESTINE SURGERY      Bowel Obstruction      Social History:   Social History     Tobacco Use    Smoking status: Former     Current packs/day: 0.00     Types: Cigarettes     Quit date: 1983     Years since quittin.0     Passive exposure: Past    Smokeless tobacco: Never    Tobacco comments:        Substance Use Topics    Alcohol use: Yes     Comment: SOCIAL; Patient is non drinker.      Family History:  Family History   Problem Relation Age of Onset    Cancer Mother     Heart disease Father     Aneurysm Father         Abdominal Aortic Aneurysm    Malig Hyperthermia Neg Hx        Home Meds:  Medications Prior to Admission   Medication Sig Dispense Refill Last Dose    cholecalciferol (VITAMIN D3) 25 MCG (1000 UT) tablet Take 1 tablet by mouth Daily.   2024 at 0830    digoxin (LANOXIN) 125 MCG tablet Take 1 tablet by mouth Daily. 90 tablet 1 2024 at 0830    Eliquis 5 MG tablet tablet TAKE ONE TABLET BY MOUTH EVERY 12 HOURS 180 tablet 3 2024 at 0830    furosemide (LASIX) 40 MG tablet Take 0.5 tablets by mouth Daily. 45 tablet 1 2024 at 0830    gabapentin (NEURONTIN) 100 MG capsule Take 1 capsule by mouth every night at bedtime.   2024 at 2115    Jardiance 10 MG tablet tablet TAKE 1 TABLET BY MOUTH DAILY 90 tablet 1 2024 at 0830    methIMAzole (TAPAZOLE) 5 MG tablet Take 1 tablet by mouth 3 (Three) Times a Week. Monday, Wednesday, and 2024 at 0830    metoprolol tartrate (LOPRESSOR) 25 MG  tablet Take 1 tablet by mouth Every 12 (Twelve) Hours. 60 tablet 2 6/21/2024 at 0830    midodrine (PROAMATINE) 5 MG tablet Take 1 tablet by mouth 2 (Two) Times a Day Before Meals. 180 tablet 1 6/21/2024 at 0830    multivitamin with minerals tablet tablet Take 1 tablet by mouth Daily.   6/21/2024 at 0830    potassium chloride (KLOR-CON M20) 20 MEQ CR tablet Take 1 tablet by mouth Daily. 90 tablet 1 6/21/2024 at 0830    Santyl 250 UNIT/GM ointment Apply 1 Application topically to the appropriate area as directed Daily. To sacrum   6/21/2024 at 0830     Current Meds:   cholecalciferol, 1,000 Units, Oral, Daily  collagenase, 1 Application, Topical, Q24H  [Held by provider] digoxin, 125 mcg, Oral, Daily  gabapentin, 100 mg, Oral, Nightly  [START ON 6/24/2024] methIMAzole, 5 mg, Oral, Once per day on Monday Wednesday Friday  metoprolol tartrate, 25 mg, Oral, Q12H  midodrine, 5 mg, Oral, BID AC  sodium chloride, 10 mL, Intravenous, Q12H      Allergies:  No Known Allergies  Review of Systems  Pertinent items are noted in HPI, all other systems reviewed and negative    Objective     Vital Signs  Temp:  [97 °F (36.1 °C)-97.8 °F (36.6 °C)] 97.5 °F (36.4 °C)  Heart Rate:  [61-88] 61  Resp:  [16-18] 16  BP: (102-119)/(59-94) 102/68    Physical Exam:  CONSTITUTIONAL:  today's vital signs reviewed  EARS NOSE THROAT: trachea midline and no deformity of the nares  EYES: no scleral icterus  GASTROINTESTINAL: abdomen is soft, nontender, nondistended with normal active bowel sounds, no masses are appreciated  PSYCHIATRIC: Confused  RESPIRATORY: normal inspiratory effort with no increased work of breathing  NEUROLOGIC: patient is awake and alert, but confused  DERMATOLOGIC: skin is warm with no cyanosis  LYMPHATIC: no periumbilical lymphadenopathy     Results Review:   I reviewed the patient's new clinical results.  I reviewed the patient's new imaging results and agree with the interpretation.  I reviewed the patient's other test  results and agree with the interpretation    Results from last 7 days   Lab Units 06/22/24  0759 06/22/24  0645 06/21/24  2250 06/21/24  1411 06/21/24  1311   WBC 10*3/mm3  --  9.39  --  10.47 11.92*   HEMOGLOBIN g/dL 7.6* 7.5* 7.6* 6.6* 7.3*   HEMATOCRIT % 24.1* 24.1* 24.1* 20.7* 23.7*   PLATELETS 10*3/mm3  --  234  --  252 279     Results from last 7 days   Lab Units 06/22/24  0645 06/21/24  2250 06/21/24  1411   SODIUM mmol/L 141 138 138   POTASSIUM mmol/L 4.6 5.0 6.2*   CHLORIDE mmol/L 104 101 103   CO2 mmol/L 25.1 25.0 24.1   BUN mg/dL 76* 83* 79*   CREATININE mg/dL 2.50* 2.60* 2.75*   CALCIUM mg/dL 8.6 8.4* 8.6   BILIRUBIN mg/dL 0.4  --   --    ALK PHOS U/L 126*  --   --    ALT (SGPT) U/L 14  --   --    AST (SGOT) U/L 11  --   --    GLUCOSE mg/dL 73 80 88         Lab Results   Lab Value Date/Time    LIPASE 26 02/21/2022 0702    LIPASE 55 (H) 08/27/2021 1110       Radiology:  US Renal Bilateral   Final Result          Assessment & Plan   Active Hospital Problems    Diagnosis     **Pleural effusion     CKD (chronic kidney disease)     Anemia     Chronic heart failure with preserved ejection fraction (HFpEF)     Recurrent pleural effusion on right     Ulcer of left heel     Longstanding persistent atrial fibrillation     Hyperkalemia     Hyperthyroidism        Assessment:  Anemia  Longstanding persistent atrial fibrillation on Eliquis-currently on hold  JOSEPH with acute hyperkalemia  Chronic heart failure with preserved EF  Right AKA    Plan:  No overt GI bleeding noted.    We will check FOBT  Continue monitor lab work and transfuse per primary team  Continue to hold Eliquis  Unable to obtain history from patient.  APRN reached out to patient's wife and left voicemail.  APRN reviewed plan of care with Dr. Munoz.    It appears that the patient's anemia is multifactorial due to the patient's kidney disease as well as serosanguineous drainage from Pleurx catheter  We will watch clinical course, continue to monitor  lab work, and determine if bidirectional endoscopy is indicated.  We will continue to follow      I discussed the patients findings and my recommendations with patient, nursing staff, and consulting provider.    CHARLEE Paredes APRN  [facility] [place] [id number]  Office: [phone]

## 2024-06-22 NOTE — SIGNIFICANT NOTE
06/22/24 1213   OTHER   Discipline physical therapist   Rehab Time/Intention   Session Not Performed other (see comments)  (Per chart and RN pt confused this AM. Per chart pt is DepA at baseline w/ use of Kimberly lift to WC via caregivers. Prior R AKA w/ new prosthesis, but has not been back to OP PT in several weeks, and now w/ L heel wound. Pt appears at baseline and does not require acute skilled PT services, will sign-off at this time.)   Therapy Assessment/Plan (PT)   Criteria for Skilled Interventions Met (PT) no;does not meet criteria for skilled intervention

## 2024-06-22 NOTE — CONSULTS
Patient Care Team:  Taiwo Powers MD as PCP - General (Internal Medicine)  Self, Bess YO MD as Consulting Physician (Endocrinology)  Huang Chan MD as Cardiologist (Cardiology)    Chief complaint: JOSEPH, hyperkalemia     History of Present Illness  Patient is a 83 yo male who was admitted with severe anemia, JOSEPH and hyperkalemia.   He was scheduled to have Pleurx catheter removed, but during pre-op labs, he was found to have abnormal labs.   His baseline Cr seems to be under 1.0. Cr was up ot 2.5 and K was 6.2.   He was noted to have bladder distention, straight in/out cath revealed 1.5 liters of urine.   His K is normal this am, but cr is still elevated.       Review of Systems   Review of Systems - History obtained from chart review and the patient  General ROS: feeling poorly   Psychological ROS: reports he feels depressed.   Ophthalmic ROS: negative  ENT ROS: negative  Allergy and Immunology ROS: negative  Hematological and Lymphatic ROS: negative  Endocrine ROS: negative  Respiratory ROS: no cough, shortness of breath, or wheezing  Cardiovascular ROS: no chest pain or dyspnea on exertion  Gastrointestinal ROS: no abdominal pain, change in bowel habits, or black or bloody stools  Genito-Urinary ROS: no dysuria, trouble voiding, or hematuria  Musculoskeletal ROS: negative  Neurological ROS: no TIA or stroke symptoms  Dermatological ROS: negative     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, 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/2023    Atrial fibrillation     Cervical spondylosis with myelopathy     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 12/13/2023    HLD (hyperlipidemia) 09/23/2022    Hx of toxic multinodular goiter 09/23/2022    Hyperthyroidism     Lumbar radiculopathy     Orthostatic hypotension     Osteoarthritis      Pressure injury of sacral region, unstageable 2024    Pulmonary hypertension     Recurrent pleural effusion on right 2024    Ulcer with gangrene     right heel   ,   Past Surgical History:   Procedure Laterality Date    ABOVE KNEE AMPUTATION Right 2023    Procedure: ABOVE KNEE AMPUTATION RIGHT, proveena wound vac placement;  Surgeon: Issa Nieves MD;  Location: [facility] MAIN OR;  Service: Vascular;  Laterality: Right;    APPENDECTOMY      BACK SURGERY      x4    COLONOSCOPY      EXCISION MASS TRUNK N/A 2016    Procedure: Excision soft tissue neoplasm on abdominal wall and left neck;  Surgeon: hSaji Barahona Jr., MD;  Location: [facility] MAIN OR;  Service:     KNEE SURGERY      NECK SURGERY  1974    PLEURAL CATHETER INSERTION Right 2024    Procedure: RIGHT VIDEO ASSISTED THORACOSCOPY, PARTIAL DECORTICATION, PLEURX CATHETER INSERTION;  Surgeon: Nataliya Noyola MD;  Location: [facility] MAIN OR;  Service: Thoracic;  Laterality: Right;    QUADRICEPS REPAIR      ROTATOR CUFF REPAIR      SHOULDER SURGERY      SMALL INTESTINE SURGERY      Bowel Obstruction   ,   Family History   Problem Relation Age of Onset    Cancer Mother     Heart disease Father     Aneurysm Father         Abdominal Aortic Aneurysm    Malig Hyperthermia Neg Hx    ,   Social History     Socioeconomic History    Marital status:     Number of children: 1    Highest education level: Professional school degree (e.g., MD, DDS, DVM, BRITNEY)   Tobacco Use    Smoking status: Former     Current packs/day: 0.00     Types: Cigarettes     Quit date: 1983     Years since quittin.0     Passive exposure: Past    Smokeless tobacco: Never    Tobacco comments:        Vaping Use    Vaping status: Never Used   Substance and Sexual Activity    Alcohol use: Yes     Comment: SOCIAL; Patient is non drinker.    Drug use: No     Comment: Drug Abuse: Not a drug user    Sexual activity: Defer     E-cigarette/Vaping    E-cigarette/Vaping  Use Never User     Passive Exposure No     Counseling Given No      E-cigarette/Vaping Substances    Nicotine No     THC No     CBD No     Flavoring No      E-cigarette/Vaping Devices    Disposable No     Pre-filled or Refillable Cartridge No     Refillable Tank No     Pre-filled Pod No          ,   Medications Prior to Admission   Medication Sig Dispense Refill Last Dose    cholecalciferol (VITAMIN D3) 25 MCG (1000 UT) tablet Take 1 tablet by mouth Daily.   6/21/2024 at 0830    digoxin (LANOXIN) 125 MCG tablet Take 1 tablet by mouth Daily. 90 tablet 1 6/21/2024 at 0830    Eliquis 5 MG tablet tablet TAKE ONE TABLET BY MOUTH EVERY 12 HOURS 180 tablet 3 6/21/2024 at 0830    furosemide (LASIX) 40 MG tablet Take 0.5 tablets by mouth Daily. 45 tablet 1 6/21/2024 at 0830    gabapentin (NEURONTIN) 100 MG capsule Take 1 capsule by mouth every night at bedtime.   6/20/2024 at 2115    Jardiance 10 MG tablet tablet TAKE 1 TABLET BY MOUTH DAILY 90 tablet 1 6/21/2024 at 0830    methIMAzole (TAPAZOLE) 5 MG tablet Take 1 tablet by mouth 3 (Three) Times a Week. Monday, Wednesday, and Friday 6/21/2024 at 0830    metoprolol tartrate (LOPRESSOR) 25 MG tablet Take 1 tablet by mouth Every 12 (Twelve) Hours. 60 tablet 2 6/21/2024 at 0830    midodrine (PROAMATINE) 5 MG tablet Take 1 tablet by mouth 2 (Two) Times a Day Before Meals. 180 tablet 1 6/21/2024 at 0830    multivitamin with minerals tablet tablet Take 1 tablet by mouth Daily.   6/21/2024 at 0830    potassium chloride (KLOR-CON M20) 20 MEQ CR tablet Take 1 tablet by mouth Daily. 90 tablet 1 6/21/2024 at 0830    Santyl 250 UNIT/GM ointment Apply 1 Application topically to the appropriate area as directed Daily. To sacrum   6/21/2024 at 0830   , Scheduled Meds:  cholecalciferol, 1,000 Units, Oral, Daily  collagenase, 1 Application, Topical, Q24H  digoxin, 125 mcg, Oral, Daily  empagliflozin, 10 mg, Oral, Daily  gabapentin, 100 mg, Oral, Nightly  [START ON 6/24/2024] methIMAzole, 5  "mg, Oral, Once per day on Monday Wednesday Friday  metoprolol tartrate, 25 mg, Oral, Q12H  midodrine, 5 mg, Oral, BID AC  sodium chloride, 10 mL, Intravenous, Q12H   , Continuous Infusions:  lactated ringers, 9 mL/hr, Last Rate: Stopped (06/21/24 1701)   , PRN Meds:    nitroglycerin    sodium chloride    sodium chloride, and Allergies:  Patient has no known allergies.    Objective     Vital Signs  Temp:  [97 °F (36.1 °C)-97.8 °F (36.6 °C)] 97.5 °F (36.4 °C)  Heart Rate:  [64-88] 88  Resp:  [14-18] 16  BP: (107-119)/(59-94) 109/59    No intake/output data recorded.  I/O last 3 completed shifts:  In: 343.8 [Blood:343.8]  Out: 1500 [Urine:1500]    Physical Exam  Physical Examination: General appearance - NAD, chronically ill, malnourished, frail   Mental status - alert, oriented to person, place, and time  Chest - right pleurx catheter   Heart - normal rate, regular rhythm, normal S1, S2, no murmurs, rubs, clicks or gallops  Neurological - alert, oriented, normal speech, no focal findings or movement disorder noted  Extremities - right AKA      Results Review:    I reviewed the patient's new clinical results.    WBC WBC   Date Value Ref Range Status   06/22/2024 9.39 3.40 - 10.80 10*3/mm3 Final   06/21/2024 10.47 3.40 - 10.80 10*3/mm3 Final   06/21/2024 11.92 (H) 3.40 - 10.80 10*3/mm3 Final      HGB Hemoglobin   Date Value Ref Range Status   06/22/2024 7.6 (L) 13.0 - 17.7 g/dL Final   06/22/2024 7.5 (L) 13.0 - 17.7 g/dL Final   06/21/2024 7.6 (L) 13.0 - 17.7 g/dL Final   06/21/2024 6.6 (C) 13.0 - 17.7 g/dL Final   06/21/2024 7.3 (L) 13.0 - 17.7 g/dL Final      HCT Hematocrit   Date Value Ref Range Status   06/22/2024 24.1 (L) 37.5 - 51.0 % Final   06/22/2024 24.1 (L) 37.5 - 51.0 % Final   06/21/2024 24.1 (L) 37.5 - 51.0 % Final   06/21/2024 20.7 (C) 37.5 - 51.0 % Final   06/21/2024 23.7 (L) 37.5 - 51.0 % Final      Platlets No results found for: \"LABPLAT\"   MCV MCV   Date Value Ref Range Status   06/22/2024 80.9 79.0 - " "97.0 fL Final   06/21/2024 79.9 79.0 - 97.0 fL Final   06/21/2024 82.3 79.0 - 97.0 fL Final          Sodium Sodium   Date Value Ref Range Status   06/22/2024 141 136 - 145 mmol/L Final   06/21/2024 138 136 - 145 mmol/L Final   06/21/2024 138 136 - 145 mmol/L Final   06/21/2024 136 136 - 145 mmol/L Final      Potassium Potassium   Date Value Ref Range Status   06/22/2024 4.6 3.5 - 5.2 mmol/L Final   06/21/2024 5.0 3.5 - 5.2 mmol/L Final   06/21/2024 6.2 (C) 3.5 - 5.2 mmol/L Final   06/21/2024 5.6 (H) 3.5 - 5.2 mmol/L Final      Chloride Chloride   Date Value Ref Range Status   06/22/2024 104 98 - 107 mmol/L Final   06/21/2024 101 98 - 107 mmol/L Final   06/21/2024 103 98 - 107 mmol/L Final   06/21/2024 101 98 - 107 mmol/L Final      CO2 CO2   Date Value Ref Range Status   06/22/2024 25.1 22.0 - 29.0 mmol/L Final   06/21/2024 25.0 22.0 - 29.0 mmol/L Final   06/21/2024 24.1 22.0 - 29.0 mmol/L Final   06/21/2024 23.0 22.0 - 29.0 mmol/L Final      BUN BUN   Date Value Ref Range Status   06/22/2024 76 (H) 8 - 23 mg/dL Final   06/21/2024 83 (H) 8 - 23 mg/dL Final   06/21/2024 79 (H) 8 - 23 mg/dL Final   06/21/2024 85 (H) 8 - 23 mg/dL Final      Creatinine Creatinine   Date Value Ref Range Status   06/22/2024 2.50 (H) 0.76 - 1.27 mg/dL Final   06/21/2024 2.60 (H) 0.76 - 1.27 mg/dL Final   06/21/2024 2.75 (H) 0.76 - 1.27 mg/dL Final   06/21/2024 2.59 (H) 0.76 - 1.27 mg/dL Final      Calcium Calcium   Date Value Ref Range Status   06/22/2024 8.6 8.6 - 10.5 mg/dL Final   06/21/2024 8.4 (L) 8.6 - 10.5 mg/dL Final   06/21/2024 8.6 8.6 - 10.5 mg/dL Final   06/21/2024 8.5 (L) 8.6 - 10.5 mg/dL Final      PO4 No results found for: \"CAPO4\"   Albumin Albumin   Date Value Ref Range Status   06/22/2024 2.4 (L) 3.5 - 5.2 g/dL Final      Magnesium No results found for: \"MG\"   Uric Acid No results found for: \"URICACID\"         Assessment & Plan       Pleural effusion    Hyperthyroidism    Hyperkalemia    Longstanding persistent atrial " fibrillation    Ulcer of left heel    Recurrent pleural effusion on right    Chronic heart failure with preserved ejection fraction (HFpEF)    CKD (chronic kidney disease)    Anemia      Assessment & Plan  JOSEPH  Severe hyperkalemia   Urinary retention   Severe anemia   Recurrent right sided pleural effusion    JOSEPH likely related to poor perfusion from anemia as well as retention.   Hyperkalemia due to urinary retention   Check UA and urine Na, Cr and protein.   Would hold Jardiance   S/p blood transfusion, will hold on any further IVF.   Serial bladder scans to monitor for retention, if persistent, will need lopez catheter.   BP improving.   Check labs in am   Will follow     I discussed the patients findings and my recommendations with patient    Desire Colón MD  06/22/24  09:45 EDT

## 2024-06-22 NOTE — PROGRESS NOTES
Progress note    Reason for consult: Pleural catheter removal    Patient was supposed to have his Pleurx catheter removed but was found to have anemia and he did not stop his Eliquis therefore the procedure was postponed.  He denies shortness of breath.    No acute distress.  Non labored breathing.  Pleurx catheter dressing clean.  Extremities warm.  Moving all 4 extremities.    Labs reviewed.  Imaging reviewed.    I recommend clinically close catheter to atrium to facilitate drainage.  If he has no significant drainage and CT chest shows no significant effusion, we will consider removing Pleurx catheter soon.    Rolando Santoro MD  Thoracic surgeon

## 2024-06-22 NOTE — PROGRESS NOTES
Name: Mehreen Silva IV ADMIT: 2024   : 1940  PCP: Taiwo Powers MD    MRN: [id number] LOS: 1 days   AGE/SEX: 84 y.o. male  ROOM: [facility]     Subjective   Subjective     No complaints. Creatinine is stable, but hyperkalemia is improved. He had significant urinary retention requiring a straight catheterization earlier this morning       Objective   Objective   Vital Signs  Temp:  [97 °F (36.1 °C)-97.8 °F (36.6 °C)] 97.5 °F (36.4 °C)  Heart Rate:  [64-88] 88  Resp:  [14-18] 16  BP: (107-119)/(59-94) 109/59  SpO2:  [96 %-100 %] 96 %  on   ;   Device (Oxygen Therapy): room air  Body mass index is 20.09 kg/m².  Physical Exam  Constitutional:       General: He is not in acute distress.     Appearance: He is ill-appearing. He is not toxic-appearing.   Cardiovascular:      Rate and Rhythm: Normal rate and regular rhythm.      Heart sounds: Normal heart sounds.   Pulmonary:      Effort: Pulmonary effort is normal.      Breath sounds: Normal breath sounds.   Abdominal:      General: Bowel sounds are normal.      Palpations: Abdomen is soft.   Musculoskeletal:      Comments: Right AKA   Skin:     General: Skin is warm and dry.      Findings: No erythema or rash.      Comments: Right chest cyst excision site cdi  Right pleurx catheter   Neurological:      Mental Status: He is alert.   Psychiatric:         Mood and Affect: Mood normal.         Behavior: Behavior normal.         Results Review     I reviewed the patient's new clinical results.  Results from last 7 days   Lab Units 24  0759 24  0645 24  2250 24  1411 24  1311   WBC 10*3/mm3  --  9.39  --  10.47 11.92*   HEMOGLOBIN g/dL 7.6* 7.5* 7.6* 6.6* 7.3*   PLATELETS 10*3/mm3  --  234  --  252 279     Results from last 7 days   Lab Units 24  0645 24  2250 24  1411 24  1311   SODIUM mmol/L 141 138 138 136   POTASSIUM mmol/L 4.6 5.0 6.2* 5.6*   CHLORIDE mmol/L 104 101 103 101   CO2 mmol/L 25.1 25.0  24.1 23.0   BUN mg/dL 76* 83* 79* 85*   CREATININE mg/dL 2.50* 2.60* 2.75* 2.59*   GLUCOSE mg/dL 73 80 88 95   Estimated Creatinine Clearance: 19.8 mL/min (A) (by C-G formula based on SCr of 2.5 mg/dL (H)).  Results from last 7 days   Lab Units 06/22/24  0645   ALBUMIN g/dL 2.4*   BILIRUBIN mg/dL 0.4   ALK PHOS U/L 126*   AST (SGOT) U/L 11   ALT (SGPT) U/L 14     Results from last 7 days   Lab Units 06/22/24  0645 06/21/24  2250 06/21/24  1411 06/21/24  1311   CALCIUM mg/dL 8.6 8.4* 8.6 8.5*   ALBUMIN g/dL 2.4*  --   --   --        COVID19   Date Value Ref Range Status   02/09/2024 Not Detected Not Detected - Ref. Range Final     Hemoglobin A1C   Date/Time Value Ref Range Status   06/22/2024 0645 5.30 4.80 - 5.60 % Final       XR Chest 2 View  2 VIEW CHEST     HISTORY: Recurrent right pleural effusion.     FINDINGS: There is a moderately large loculated right pleural effusion  with some adjacent parenchymal changes and showing slight improvement in  the size of the effusion and adjacent parenchymal change as compared to  the study of 5/3/2024. The left lung remains clear and the heart remains  mildly enlarged.     This report was finalized on 6/15/2024 7:09 AM by Dr. Chavo Denny M.D  on Workstation: [id number]       Scheduled Medications  cholecalciferol, 1,000 Units, Oral, Daily  collagenase, 1 Application, Topical, Q24H  digoxin, 125 mcg, Oral, Daily  empagliflozin, 10 mg, Oral, Daily  gabapentin, 100 mg, Oral, Nightly  [START ON 6/24/2024] methIMAzole, 5 mg, Oral, Once per day on Monday Wednesday Friday  metoprolol tartrate, 25 mg, Oral, Q12H  midodrine, 5 mg, Oral, BID AC  sodium chloride, 10 mL, Intravenous, Q12H    Infusions  lactated ringers, 9 mL/hr, Last Rate: Stopped (06/21/24 1701)    Diet  Diet: Cardiac, Diabetic, Renal; Healthy Heart (2-3 Na+); Consistent Carbohydrate; Low Sodium (2-3g), Low Potassium, Low Phosphorus; Fluid Consistency: Thin (IDDSI 0)       Assessment/Plan     Active Hospital  Problems    Diagnosis  POA    **Pleural effusion [J90]  Unknown    CKD (chronic kidney disease) [N18.9]  Yes    Anemia [D64.9]  Unknown    Chronic heart failure with preserved ejection fraction (HFpEF) [I50.32]  Yes    Recurrent pleural effusion on right [J90]  Yes    Ulcer of left heel [L97.429]  Yes    Longstanding persistent atrial fibrillation [I48.11]  Yes    Hyperkalemia [E87.5]  Yes    Hyperthyroidism [E05.90]  Yes      Resolved Hospital Problems   No resolved problems to display.       84 y.o. male admitted with Pleural effusion.    84-year-old man with chronic A-fib on Eliquis and recurrent right pleural effusions thought to be due to his heart failure who is status post Pleurx catheter.  His Pleurx catheter has had minimal output since placement, and there had been plans to remove it on the day of admission.  Unfortunately, the patient accidentally took his Eliquis and so the procedure was canceled and rescheduled.  Preoperative labs have already been performed, this showed JOSEPH with hyperkalemia and significant no anemia which led to his admission.    JOSEPH with hyperkalemia-possibly due to obstructive uropathy as he was retaining urine earlier this morning and had to be straight cathed. I will order serial PVRs with instructions to place a lopez catheter if >300 cc. I will start some gentle IVF as well. Hyperkalemia is resolved after treatment in the ED. Follow up renal ultrasound. Nephrology is consulted.  Anemia-s/p 1 unit PRBC on admission. No reported bleeding, upper GI symptoms, BRBPR, or melena. Iron studies are consistent with chronic disease. A stool hemoccult test is pending. I'll ask GI to see. Hold eliquis for now.   Acute urinary retention-serial PVRs as above. Place lopez for PVR > 300  Recurrent right pleural effusion in the setting of heart failure s/p pleurx catheter in May 2024-he had only had minimal drainage from his pleurx since placement and there had been plans to remove this on the  day of admission, but surgery was canceled and rescheduled since he had taken his eliquis.  Chronic diastolic heart failure-diuretics held as above for JOSEPH. Currently euvolemic to hypovolemic on exam. Monitor with fluids.  Chronic afib-metoprolol for rate control. Hold digoxin in the setting of JOSEPH. Hold eliquis given his anemia.  Chronic hypotension-midodrine   Hyperthyroidism-methimazole   Recent right chest cyst excision-wound is CDI. No bruising or evidence of infection or bleeding  SCDs for DVT prophylaxis.  Full code.  Discussed with patient.  Anticipate discharge  TBD  timing yet to be determined.      Ricardo Kiser MD  [facility]  06/22/24  10:14 EDT    I wore protective equipment throughout this patient encounter including a face mask, gloves and protective eyewear.  Hand hygiene was performed before donning protective equipment and after removal when leaving the room.

## 2024-06-22 NOTE — PLAN OF CARE
Goal Outcome Evaluation:   Patient alert follows commands confused forgetful. Pills whole. Bladder scan orders noted distended bladder no pain noted. PVR >999, in /out cath completed and 1500 cc urine removed. Incont care and skin care provided. Scant amount of Blood noted after in/out catheter removed. No blood in urine noted. Eliquis on hold. Lab work completed at 2300, potassium level lower to 5.0 hgb level 7.6. q2h turn. No acute distress noted will continue to monitor

## 2024-06-23 ENCOUNTER — APPOINTMENT (OUTPATIENT)
Dept: CT IMAGING | Facility: HOSPITAL | Age: 84
DRG: 186 | End: 2024-06-23
Payer: MEDICARE

## 2024-06-23 LAB
ANION GAP SERPL CALCULATED.3IONS-SCNC: 11.1 MMOL/L (ref 5–15)
B PARAPERT DNA SPEC QL NAA+PROBE: NOT DETECTED
B PERT DNA SPEC QL NAA+PROBE: NOT DETECTED
BUN SERPL-MCNC: 62 MG/DL (ref 8–23)
BUN/CREAT SERPL: 26.4 (ref 7–25)
C PNEUM DNA NPH QL NAA+NON-PROBE: NOT DETECTED
CALCIUM SPEC-SCNC: 8.3 MG/DL (ref 8.6–10.5)
CHLORIDE SERPL-SCNC: 108 MMOL/L (ref 98–107)
CO2 SERPL-SCNC: 26.9 MMOL/L (ref 22–29)
CREAT SERPL-MCNC: 2.35 MG/DL (ref 0.76–1.27)
DEPRECATED RDW RBC AUTO: 47.4 FL (ref 37–54)
EGFRCR SERPLBLD CKD-EPI 2021: 26.6 ML/MIN/1.73
ERYTHROCYTE [DISTWIDTH] IN BLOOD BY AUTOMATED COUNT: 16.5 % (ref 12.3–15.4)
FLUAV SUBTYP SPEC NAA+PROBE: NOT DETECTED
FLUBV RNA ISLT QL NAA+PROBE: NOT DETECTED
GLUCOSE BLDC GLUCOMTR-MCNC: 104 MG/DL (ref 70–130)
GLUCOSE SERPL-MCNC: 100 MG/DL (ref 65–99)
HADV DNA SPEC NAA+PROBE: NOT DETECTED
HCOV 229E RNA SPEC QL NAA+PROBE: NOT DETECTED
HCOV HKU1 RNA SPEC QL NAA+PROBE: NOT DETECTED
HCOV NL63 RNA SPEC QL NAA+PROBE: NOT DETECTED
HCOV OC43 RNA SPEC QL NAA+PROBE: NOT DETECTED
HCT VFR BLD AUTO: 24.1 % (ref 37.5–51)
HCT VFR BLD AUTO: 25.2 % (ref 37.5–51)
HCT VFR BLD AUTO: 26.3 % (ref 37.5–51)
HGB BLD-MCNC: 7.6 G/DL (ref 13–17.7)
HGB BLD-MCNC: 7.8 G/DL (ref 13–17.7)
HGB BLD-MCNC: 8.2 G/DL (ref 13–17.7)
HMPV RNA NPH QL NAA+NON-PROBE: NOT DETECTED
HPIV1 RNA ISLT QL NAA+PROBE: NOT DETECTED
HPIV2 RNA SPEC QL NAA+PROBE: NOT DETECTED
HPIV3 RNA NPH QL NAA+PROBE: NOT DETECTED
HPIV4 P GENE NPH QL NAA+PROBE: NOT DETECTED
M PNEUMO IGG SER IA-ACNC: NOT DETECTED
MCH RBC QN AUTO: 25.2 PG (ref 26.6–33)
MCHC RBC AUTO-ENTMCNC: 31.5 G/DL (ref 31.5–35.7)
MCV RBC AUTO: 79.8 FL (ref 79–97)
PLATELET # BLD AUTO: 255 10*3/MM3 (ref 140–450)
PMV BLD AUTO: 10 FL (ref 6–12)
POTASSIUM SERPL-SCNC: 4.1 MMOL/L (ref 3.5–5.2)
PROCALCITONIN SERPL-MCNC: 0.23 NG/ML (ref 0–0.25)
RBC # BLD AUTO: 3.02 10*6/MM3 (ref 4.14–5.8)
RHINOVIRUS RNA SPEC NAA+PROBE: NOT DETECTED
RSV RNA NPH QL NAA+NON-PROBE: NOT DETECTED
SARS-COV-2 RNA NPH QL NAA+NON-PROBE: NOT DETECTED
SODIUM SERPL-SCNC: 146 MMOL/L (ref 136–145)
WBC NRBC COR # BLD AUTO: 8.14 10*3/MM3 (ref 3.4–10.8)

## 2024-06-23 PROCEDURE — 82948 REAGENT STRIP/BLOOD GLUCOSE: CPT

## 2024-06-23 PROCEDURE — 85014 HEMATOCRIT: CPT | Performed by: INTERNAL MEDICINE

## 2024-06-23 PROCEDURE — 71250 CT THORAX DX C-: CPT

## 2024-06-23 PROCEDURE — 80048 BASIC METABOLIC PNL TOTAL CA: CPT | Performed by: STUDENT IN AN ORGANIZED HEALTH CARE EDUCATION/TRAINING PROGRAM

## 2024-06-23 PROCEDURE — 99232 SBSQ HOSP IP/OBS MODERATE 35: CPT | Performed by: SURGERY

## 2024-06-23 PROCEDURE — 99232 SBSQ HOSP IP/OBS MODERATE 35: CPT | Performed by: INTERNAL MEDICINE

## 2024-06-23 PROCEDURE — 0202U NFCT DS 22 TRGT SARS-COV-2: CPT | Performed by: STUDENT IN AN ORGANIZED HEALTH CARE EDUCATION/TRAINING PROGRAM

## 2024-06-23 PROCEDURE — 85018 HEMOGLOBIN: CPT | Performed by: INTERNAL MEDICINE

## 2024-06-23 PROCEDURE — 85027 COMPLETE CBC AUTOMATED: CPT | Performed by: STUDENT IN AN ORGANIZED HEALTH CARE EDUCATION/TRAINING PROGRAM

## 2024-06-23 PROCEDURE — 84145 PROCALCITONIN (PCT): CPT | Performed by: STUDENT IN AN ORGANIZED HEALTH CARE EDUCATION/TRAINING PROGRAM

## 2024-06-23 PROCEDURE — 87040 BLOOD CULTURE FOR BACTERIA: CPT | Performed by: STUDENT IN AN ORGANIZED HEALTH CARE EDUCATION/TRAINING PROGRAM

## 2024-06-23 RX ORDER — SODIUM CHLORIDE 450 MG/100ML
125 INJECTION, SOLUTION INTRAVENOUS CONTINUOUS
Status: DISCONTINUED | OUTPATIENT
Start: 2024-06-23 | End: 2024-06-24

## 2024-06-23 RX ADMIN — MIDODRINE HYDROCHLORIDE 5 MG: 5 TABLET ORAL at 07:25

## 2024-06-23 RX ADMIN — Medication 10 ML: at 22:17

## 2024-06-23 RX ADMIN — Medication 10 ML: at 08:59

## 2024-06-23 RX ADMIN — TAMSULOSIN HYDROCHLORIDE 0.4 MG: 0.4 CAPSULE ORAL at 08:59

## 2024-06-23 RX ADMIN — MIDODRINE HYDROCHLORIDE 5 MG: 5 TABLET ORAL at 16:53

## 2024-06-23 RX ADMIN — SODIUM CHLORIDE 125 ML/HR: 4.5 INJECTION, SOLUTION INTRAVENOUS at 09:45

## 2024-06-23 RX ADMIN — GABAPENTIN 100 MG: 100 CAPSULE ORAL at 22:16

## 2024-06-23 RX ADMIN — Medication 1000 UNITS: at 08:58

## 2024-06-23 RX ADMIN — METOPROLOL TARTRATE 25 MG: 25 TABLET, FILM COATED ORAL at 10:23

## 2024-06-23 RX ADMIN — COLLAGENASE SANTYL 1 APPLICATION: 250 OINTMENT TOPICAL at 08:59

## 2024-06-23 NOTE — PROGRESS NOTES
Name: Mehreen Silva IV ADMIT: 2024   : 1940  PCP: Taiwo Powers MD    MRN: [id number] LOS: 2 days   AGE/SEX: 84 y.o. male  ROOM: [facility]     Subjective   Subjective     No complaints. Creatinine is slightly but not significantly better. Having post obstructive diuresis. CT reviewed       Objective   Objective   Vital Signs  Temp:  [97.1 °F (36.2 °C)-97.9 °F (36.6 °C)] 97.3 °F (36.3 °C)  Heart Rate:  [61-92] 70  Resp:  [16] 16  BP: ()/() 90/48  SpO2:  [96 %-100 %] 100 %  on   ;   Device (Oxygen Therapy): room air  Body mass index is 20.09 kg/m².  Physical Exam  Constitutional:       General: He is not in acute distress.     Appearance: He is ill-appearing. He is not toxic-appearing.   Cardiovascular:      Rate and Rhythm: Normal rate and regular rhythm.      Heart sounds: Normal heart sounds.   Pulmonary:      Effort: Pulmonary effort is normal.      Breath sounds: Normal breath sounds.   Abdominal:      General: Bowel sounds are normal.      Palpations: Abdomen is soft.   Musculoskeletal:      Comments: Right AKA   Skin:     General: Skin is warm and dry.      Findings: No erythema or rash.      Comments: Right chest cyst excision site cdi  Right pleurx catheter   Neurological:      Mental Status: He is alert.   Psychiatric:         Mood and Affect: Mood normal.         Behavior: Behavior normal.         Results Review     I reviewed the patient's new clinical results.  Results from last 7 days   Lab Units 24  0754 24  0442 24  2351 24  1632 24  0759 24  0645 24  2250 24  1411 24  1311   WBC 10*3/mm3  --  8.14  --   --   --  9.39  --  10.47 11.92*   HEMOGLOBIN g/dL 7.8* 7.6* 8.2* 8.1*   < > 7.5*   < > 6.6* 7.3*   PLATELETS 10*3/mm3  --  255  --   --   --  234  --  252 279    < > = values in this interval not displayed.     Results from last 7 days   Lab Units 24  0442 24  0645 24  2250 24  1411   SODIUM  mmol/L 146* 141 138 138   POTASSIUM mmol/L 4.1 4.6 5.0 6.2*   CHLORIDE mmol/L 108* 104 101 103   CO2 mmol/L 26.9 25.1 25.0 24.1   BUN mg/dL 62* 76* 83* 79*   CREATININE mg/dL 2.35* 2.50* 2.60* 2.75*   GLUCOSE mg/dL 100* 73 80 88   Estimated Creatinine Clearance: 21 mL/min (A) (by C-G formula based on SCr of 2.35 mg/dL (H)).  Results from last 7 days   Lab Units 06/22/24  0645   ALBUMIN g/dL 2.4*   BILIRUBIN mg/dL 0.4   ALK PHOS U/L 126*   AST (SGOT) U/L 11   ALT (SGPT) U/L 14     Results from last 7 days   Lab Units 06/23/24  0442 06/22/24  0645 06/21/24  2250 06/21/24  1411   CALCIUM mg/dL 8.3* 8.6 8.4* 8.6   ALBUMIN g/dL  --  2.4*  --   --        COVID19   Date Value Ref Range Status   02/09/2024 Not Detected Not Detected - Ref. Range Final     Hemoglobin A1C   Date/Time Value Ref Range Status   06/22/2024 0645 5.30 4.80 - 5.60 % Final     Glucose   Date/Time Value Ref Range Status   06/23/2024 0617 104 70 - 130 mg/dL Final       CT Chest Without Contrast Diagnostic  Narrative: CT CHEST WO CONTRAST DIAGNOSTIC-     INDICATION: Pleural effusion assessment     COMPARISON: CT chest May 30, 2024     TECHNIQUE:  Routine CT chest without IV contrast. Coronal and sagittal reformats.  Radiation dose reduction techniques were utilized, including automated  exposure control and exposure modulation based on body size.     FINDINGS:      Chest wall: Atrophy of the bilateral rotator cuff musculature.  Gynecomastia. No lymphadenopathy.     Thyroid: Multinodular goiter.     Mediastinum: Coronary artery atherosclerotic calcifications. Mild  cardiomegaly. Aortic valve calcification. No pericardial effusion.  Enlarged main pulmonary artery. Calcified mediastinal and right hilar  lymph nodes, consistent with prior granulomatous infection. Stable  mildly prominent mediastinal and hilar lymph nodes. For example, right  infrahilar lymph node, series 2, axial mage 58, measures 1.4 cm,  unchanged.     Lungs/pleura: Small loculated right  pleural effusion with pleural  thickening, with small amount of loculated fluid extending into the  fissures. Loculated pneumothorax at the right lung base. Right sided  chest tube with the catheter coursing through the loculated effusion and  pneumothorax. Small left pleural effusion. Mild airways wall thickening  and small amount of cylindrical bronchiolectasis. Heterogeneous  peripheral opacities in the right lung, greatest in the inferior right  upper lobe, lateral segment right middle lobe and superior basilar  segments of the right lower lobe with associated groundglass opacities  in the right lower lobe. Some centrilobular and tree-in-bud nodules seen  in the superior segment left lower lobe.     Upper abdomen: Gallbladder full of gallstones. Left hydronephrosis,  incompletely imaged. Suspect cyst in the superior pole left kidney.     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     Impression:    1. Mild right sided loculated hydropneumothorax with decreased fluid  component and increased gas component with associated pleural  thickening. Could be empyema or malignant effusion if patient has a  known malignancy. Correlate with chest tube output.  2. Small left pleural effusion, slightly increased in the interval.  3. Heterogeneous peripheral opacities in the right lung, greatest in the  lower lung, mild increased. Opacities are nonspecific. Could be  multifocal pneumonia with follow-up to resolution recommended.  4. New centrilobular and tree-in-bud nodules in the superior segment  left lower lobe. Suspect infectious bronchiolitis.  5. Mild cardiomegaly.  6. Enlarged main pulmonary artery, can be seen with pulmonary artery  hypertension.     Additional findings described above that are stable     This report was finalized on 6/23/2024 9:00 AM by Dr. Tawio Espinosa M.D on Workstation: [id number]       Scheduled  Medications  cholecalciferol, 1,000 Units, Oral, Daily  collagenase, 1 Application, Topical, Q24H  [Held by provider] digoxin, 125 mcg, Oral, Daily  gabapentin, 100 mg, Oral, Nightly  [START ON 6/24/2024] methIMAzole, 5 mg, Oral, Once per day on Monday Wednesday Friday  metoprolol tartrate, 25 mg, Oral, Q12H  midodrine, 5 mg, Oral, BID AC  sodium chloride, 10 mL, Intravenous, Q12H  tamsulosin, 0.4 mg, Oral, Daily    Infusions  sodium chloride, 125 mL/hr, Last Rate: 125 mL/hr (06/23/24 0945)    Diet  Diet: Cardiac, Diabetic, Renal; Healthy Heart (2-3 Na+); Consistent Carbohydrate; Low Sodium (2-3g), Low Potassium, Low Phosphorus; Fluid Consistency: Thin (IDDSI 0)       Assessment/Plan     Active Hospital Problems    Diagnosis  POA    **Pleural effusion [J90]  Unknown    CKD (chronic kidney disease) [N18.9]  Yes    Anemia [D64.9]  Unknown    Chronic heart failure with preserved ejection fraction (HFpEF) [I50.32]  Yes    Recurrent pleural effusion on right [J90]  Yes    Ulcer of left heel [L97.429]  Yes    Longstanding persistent atrial fibrillation [I48.11]  Yes    Hyperkalemia [E87.5]  Yes    Hyperthyroidism [E05.90]  Yes      Resolved Hospital Problems   No resolved problems to display.       84 y.o. male admitted with Pleural effusion.    84-year-old man with chronic A-fib on Eliquis and recurrent right pleural effusions thought to be due to his heart failure who is status post Pleurx catheter.  His Pleurx catheter has had minimal output since placement, and there had been plans to remove it on the day of admission.  Unfortunately, the patient accidentally took his Eliquis and so the procedure was canceled and rescheduled.  Preoperative labs have already been performed, this showed JOSEPH with hyperkalemia and significant no anemia which led to his admission.    Urinary retention causing bilateral hydronephrosis and JOSEPH with hyperkalemia-A lopez catheter has been placed and urology is following. Having post  obstructive diuresis and so is on hypotonic fluids per nephrology.  Urology is planning to check a CT abdomen/pelvis to reassess the hydronephrosis. Discussed with Dr. Colón and Doretha Onofre at bedside.  Acute on chronic anemia-iron studies are consistent with anemia of chronic disease. No signs of active bleeding at the moment. Hemoccult testing is positive. GI is following.   Recurrent right pleural effusion in the setting of heart failure s/p pleurx catheter in May 2024-he had only had minimal drainage from his pleurx since placement and there had been plans to remove this on the day of admission, but surgery was canceled and rescheduled since he had taken his eliquis. A chest CT which was performed this morning shows a right loculated hydropneumothorax and some new findings consistent with infection. He is afebrile and has no leukocytosis and doesn't have much of a cough. I will check a procalcitonin and RVP and await thoracic surgery's impressions.  Chronic diastolic heart failure-diuretics held for JOSEPH. On IVF as above  Chronic afib-metoprolol for rate control. Hold digoxin in the setting of JOSEPH. Hold eliquis given his anemia.  Chronic hypotension-midodrine   Hyperthyroidism-methimazole   Recent right chest cyst excision-wound is CDI. No bruising or evidence of infection or bleeding  SCDs for DVT prophylaxis.  Full code.  Discussed with patient, nursing staff, and consulting provider.  Anticipate discharge  TBD  timing yet to be determined.    Addendum: procalcitonin and rvp were normal. He's having some mild hypothermia but really no other signs or symptoms of infection. I will check some blood cultures, but hold off on antibiotics for the moment.      Ricardo Kiser MD  [facility]  06/23/24  12:21 EDT    I wore protective equipment throughout this patient encounter including a face mask, gloves and protective eyewear.  Hand hygiene was performed before donning protective equipment  and after removal when leaving the room.        No

## 2024-06-23 NOTE — PLAN OF CARE
Problem: Adult Inpatient Plan of Care  Goal: Plan of Care Review  Outcome: Ongoing, Progressing   Goal Outcome Evaluation:      VSS ex/ chronic Afib. RA. Ax self, time, disoriented x place & situation. Whitehead patent, care completed. Stage 2 coccyx, cleansed w/ NS, meplex changed. KARINA mattress. Heel boot on L foot. Q2 turn. R pleurex to -20 sxn, intermittent airleak, no fluctuation, no subQ air, drx CDI.

## 2024-06-23 NOTE — PLAN OF CARE
Goal Outcome Evaluation:  Plan of Care Reviewed With: patient        Progress: improving  Outcome Evaluation: Temp running low, Dr. Kiser aware, multiple blankets on patient. No complaints from patient. Other vitals stable. right pleurx to atrium and -20sx. Pt turned q2h.

## 2024-06-23 NOTE — PROGRESS NOTES
" LOS: 2 days   Patient Care Team:  Taiwo Powers MD as PCP - General (Internal Medicine)  Self, Bess YO MD as Consulting Physician (Endocrinology)  Huang Chan MD as Cardiologist (Cardiology)    Chief Complaint: JOSEPH, hyperkalemia         History of Present Illness  Patient is a 85 yo male who was admitted with severe anemia, JOSEPH and hyperkalemia.   He was scheduled to have Pleurx catheter removed, but during pre-op labs, he was found to have abnormal labs.   His baseline Cr seems to be under 1.0. Cr was up ot 2.5 and K was 6.2.   He was noted to have bladder distention, straight in/out cath revealed 1.5 liters of urine.   His K is normal this am, but cr is still elevated.       Subjective  6/23 patient feeling ok today. He looks better today.   Denies sob or chest pain     History taken from: patient chart    Objective     Vital Sign Min/Max for last 24 hours  Temp  Min: 97.1 °F (36.2 °C)  Max: 97.9 °F (36.6 °C)   BP  Min: 86/62  Max: 149/137   Pulse  Min: 61  Max: 92   Resp  Min: 16  Max: 16   SpO2  Min: 96 %  Max: 100 %   No data recorded   No data recorded     Flowsheet Rows      Flowsheet Row First Filed Value   Admission Height 177.8 cm (70\") Documented at 06/21/2024 1255   Admission Weight 63.5 kg (140 lb) Documented at 06/21/2024 1255            I/O this shift:  In: 240 [P.O.:240]  Out: -   I/O last 3 completed shifts:  In: 343.8 [Blood:343.8]  Out: 7290 [Urine:5850; Chest Tube:1440]    Objective:  Vital signs: (most recent): Blood pressure 90/48, pulse 70, temperature 97.3 °F (36.3 °C), temperature source Oral, resp. rate 16, height 177.8 cm (70\"), weight 63.5 kg (140 lb), SpO2 100%.            Physical Examination: General appearance - awake and alert. NAD, chronically ill. Frail   Mental status - alert, oriented to person, place, and time  Chest - clear to auscultation, no wheezes, rales or rhonchi, symmetric air entry  Heart - normal rate, regular rhythm, normal S1, S2, no murmurs, rubs, " "clicks or gallops  Abdomen - soft, nontender, nondistended, no masses or organomegaly  Neurological - alert, oriented, normal speech, no focal findings or movement disorder noted  Extremities - right AKA    Results Review:     I reviewed the patient's new clinical results.    WBC WBC   Date Value Ref Range Status   06/23/2024 8.14 3.40 - 10.80 10*3/mm3 Final   06/22/2024 9.39 3.40 - 10.80 10*3/mm3 Final   06/21/2024 10.47 3.40 - 10.80 10*3/mm3 Final   06/21/2024 11.92 (H) 3.40 - 10.80 10*3/mm3 Final      HGB Hemoglobin   Date Value Ref Range Status   06/23/2024 7.8 (L) 13.0 - 17.7 g/dL Final   06/23/2024 7.6 (L) 13.0 - 17.7 g/dL Final   06/22/2024 8.2 (L) 13.0 - 17.7 g/dL Final   06/22/2024 8.1 (L) 13.0 - 17.7 g/dL Final   06/22/2024 7.6 (L) 13.0 - 17.7 g/dL Final   06/22/2024 7.5 (L) 13.0 - 17.7 g/dL Final   06/21/2024 7.6 (L) 13.0 - 17.7 g/dL Final   06/21/2024 6.6 (C) 13.0 - 17.7 g/dL Final   06/21/2024 7.3 (L) 13.0 - 17.7 g/dL Final      HCT Hematocrit   Date Value Ref Range Status   06/23/2024 25.2 (L) 37.5 - 51.0 % Final   06/23/2024 24.1 (L) 37.5 - 51.0 % Final   06/22/2024 26.3 (L) 37.5 - 51.0 % Final   06/22/2024 26.6 (L) 37.5 - 51.0 % Final   06/22/2024 24.1 (L) 37.5 - 51.0 % Final   06/22/2024 24.1 (L) 37.5 - 51.0 % Final   06/21/2024 24.1 (L) 37.5 - 51.0 % Final   06/21/2024 20.7 (C) 37.5 - 51.0 % Final   06/21/2024 23.7 (L) 37.5 - 51.0 % Final      Platlets No results found for: \"LABPLAT\"   MCV MCV   Date Value Ref Range Status   06/23/2024 79.8 79.0 - 97.0 fL Final   06/22/2024 80.9 79.0 - 97.0 fL Final   06/21/2024 79.9 79.0 - 97.0 fL Final   06/21/2024 82.3 79.0 - 97.0 fL Final          Sodium Sodium   Date Value Ref Range Status   06/23/2024 146 (H) 136 - 145 mmol/L Final   06/22/2024 141 136 - 145 mmol/L Final   06/21/2024 138 136 - 145 mmol/L Final   06/21/2024 138 136 - 145 mmol/L Final   06/21/2024 136 136 - 145 mmol/L Final      Potassium Potassium   Date Value Ref Range Status   06/23/2024 4.1 " "3.5 - 5.2 mmol/L Final   06/22/2024 4.6 3.5 - 5.2 mmol/L Final   06/21/2024 5.0 3.5 - 5.2 mmol/L Final   06/21/2024 6.2 (C) 3.5 - 5.2 mmol/L Final   06/21/2024 5.6 (H) 3.5 - 5.2 mmol/L Final      Chloride Chloride   Date Value Ref Range Status   06/23/2024 108 (H) 98 - 107 mmol/L Final   06/22/2024 104 98 - 107 mmol/L Final   06/21/2024 101 98 - 107 mmol/L Final   06/21/2024 103 98 - 107 mmol/L Final   06/21/2024 101 98 - 107 mmol/L Final      CO2 CO2   Date Value Ref Range Status   06/23/2024 26.9 22.0 - 29.0 mmol/L Final   06/22/2024 25.1 22.0 - 29.0 mmol/L Final   06/21/2024 25.0 22.0 - 29.0 mmol/L Final   06/21/2024 24.1 22.0 - 29.0 mmol/L Final   06/21/2024 23.0 22.0 - 29.0 mmol/L Final      BUN BUN   Date Value Ref Range Status   06/23/2024 62 (H) 8 - 23 mg/dL Final   06/22/2024 76 (H) 8 - 23 mg/dL Final   06/21/2024 83 (H) 8 - 23 mg/dL Final   06/21/2024 79 (H) 8 - 23 mg/dL Final   06/21/2024 85 (H) 8 - 23 mg/dL Final      Creatinine Creatinine   Date Value Ref Range Status   06/23/2024 2.35 (H) 0.76 - 1.27 mg/dL Final   06/22/2024 2.50 (H) 0.76 - 1.27 mg/dL Final   06/21/2024 2.60 (H) 0.76 - 1.27 mg/dL Final   06/21/2024 2.75 (H) 0.76 - 1.27 mg/dL Final   06/21/2024 2.59 (H) 0.76 - 1.27 mg/dL Final      Calcium Calcium   Date Value Ref Range Status   06/23/2024 8.3 (L) 8.6 - 10.5 mg/dL Final   06/22/2024 8.6 8.6 - 10.5 mg/dL Final   06/21/2024 8.4 (L) 8.6 - 10.5 mg/dL Final   06/21/2024 8.6 8.6 - 10.5 mg/dL Final   06/21/2024 8.5 (L) 8.6 - 10.5 mg/dL Final      PO4 No results found for: \"CAPO4\"   Albumin Albumin   Date Value Ref Range Status   06/22/2024 2.4 (L) 3.5 - 5.2 g/dL Final      Magnesium No results found for: \"MG\"   Uric Acid No results found for: \"URICACID\"     Medication Review:     Current Facility-Administered Medications:     cholecalciferol (VITAMIN D3) tablet 1,000 Units, 1,000 Units, Oral, Daily, Baron Ponce MD, 1,000 Units at 06/23/24 0858    collagenase ointment 1 Application, 1 " Application, Topical, Q24H, Baron Ponce MD, 1 Application at 06/23/24 0859    [Held by provider] digoxin (LANOXIN) tablet 125 mcg, 125 mcg, Oral, Daily, Baron Ponce MD    gabapentin (NEURONTIN) capsule 100 mg, 100 mg, Oral, Nightly, Baron Ponce MD, 100 mg at 06/22/24 2200    LORazepam (ATIVAN) tablet 0.5 mg, 0.5 mg, Oral, Q8H PRN, Ricardo Kiser MD, 0.5 mg at 06/22/24 1747    [START ON 6/24/2024] methIMAzole (TAPAZOLE) tablet 5 mg, 5 mg, Oral, Once per day on Monday Wednesday Friday, Baron Ponce MD    metoprolol tartrate (LOPRESSOR) tablet 25 mg, 25 mg, Oral, Q12H, Baron Ponce MD, 25 mg at 06/23/24 1023    midodrine (PROAMATINE) tablet 5 mg, 5 mg, Oral, BID AC, Baron Ponce MD, 5 mg at 06/23/24 0725    nitroglycerin (NITROSTAT) SL tablet 0.4 mg, 0.4 mg, Sublingual, Q5 Min PRN, Baron Ponce MD    sodium chloride 0.45 % infusion, 125 mL/hr, Intravenous, Continuous, Desire Colón MD, Last Rate: 125 mL/hr at 06/23/24 0945, 125 mL/hr at 06/23/24 0945    sodium chloride 0.9 % flush 10 mL, 10 mL, Intravenous, Q12H, Baron Ponce MD, 10 mL at 06/23/24 0859    sodium chloride 0.9 % flush 10 mL, 10 mL, Intravenous, PRN, Baron Ponce MD    sodium chloride 0.9 % infusion 40 mL, 40 mL, Intravenous, PRN, Baron Ponce MD    tamsulosin (FLOMAX) 24 hr capsule 0.4 mg, 0.4 mg, Oral, Daily, Ricardo Kiser MD, 0.4 mg at 06/23/24 0859      Assessment & Plan       Pleural effusion    Hyperthyroidism    Hyperkalemia    Longstanding persistent atrial fibrillation    Ulcer of left heel    Recurrent pleural effusion on right    Chronic heart failure with preserved ejection fraction (HFpEF)    CKD (chronic kidney disease)    Anemia      Assessment & Plan  JOSEPH  Severe hyperkalemia   Urinary retention   Severe anemia   Recurrent right sided pleural effusion     Renal function slowly improving.   K at goal   Whitehead catheter placed yesterday with 4.3 liters of UOP  Now Na is elevated.   Will start 1/2NS  to provide some volume and  free water.   Hemoglobin stable. GI following, no evidence of bleeding.   He does not have CKD as far as I can tell, doubt his anemia is due to renal   Check labs in am   Will follow     Desire Colón MD  06/23/24  11:27 EDT

## 2024-06-23 NOTE — CONSULTS
Urology Consult Note    Patient:Mehreen Silva IV :1940  Room:[facility]  Admit Date2024  Age:84 y.o.     SEX:male     DOS:2024     MR:[id number]     Visit:[id number]       Attending: Ricardo Kiser MD  Referring Provider: CHARLEE Chu   Reason for Consultation: Hydronephosis    Patient Care Team:  Taiwo Powers MD as PCP - General (Internal Medicine)  Self, Bess YO MD as Consulting Physician (Endocrinology)  Huang Chan MD as Cardiologist (Cardiology)    Chief complaint Hydronephrosis    Subjective .     History of present illness:    Patient is poor historian. Previously seen by Dr. URIEL Miller in distant past. Patient states he is on Flomax. Denies any gross hematuria or dysuria prior to having lopez anchored.  GLORIA notes bilateral hydronephrosis.  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, right leg 2023    Atrial fibrillation     Cervical spondylosis with myelopathy     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    HLD (hyperlipidemia) 2022    Hx of toxic multinodular goiter 2022    Hyperthyroidism     Lumbar radiculopathy     Orthostatic hypotension     Osteoarthritis     Pressure injury of sacral region, unstageable 2024    Pulmonary hypertension     Recurrent pleural effusion on right 2024    Ulcer with gangrene     right heel     Past Surgical History:   Procedure Laterality Date    ABOVE KNEE AMPUTATION Right 2023    Procedure: ABOVE KNEE AMPUTATION RIGHT, proveena wound vac placement;  Surgeon: Issa Nieves MD;  Location: [facility] OR;  Service: Vascular;  Laterality: Right;    APPENDECTOMY      BACK SURGERY      x4     COLONOSCOPY      EXCISION MASS TRUNK N/A 2016    Procedure: Excision soft tissue neoplasm on abdominal wall and left neck;  Surgeon: Shaji Barahona Jr., MD;  Location: [facility] MAIN OR;  Service:     KNEE SURGERY      NECK SURGERY  1974    PLEURAL CATHETER INSERTION Right 2024    Procedure: RIGHT VIDEO ASSISTED THORACOSCOPY, PARTIAL DECORTICATION, PLEURX CATHETER INSERTION;  Surgeon: Nataliya Noyola MD;  Location:  [facility] MAIN OR;  Service: Thoracic;  Laterality: Right;    QUADRICEPS REPAIR      ROTATOR CUFF REPAIR      SHOULDER SURGERY      SMALL INTESTINE SURGERY      Bowel Obstruction     Social History     Socioeconomic History    Marital status:     Number of children: 1    Highest education level: Professional school degree (e.g., MD, DDS, DVM, BRITNEY)   Tobacco Use    Smoking status: Former     Current packs/day: 0.00     Types: Cigarettes     Quit date: 1983     Years since quittin.0     Passive exposure: Past    Smokeless tobacco: Never    Tobacco comments:        Vaping Use    Vaping status: Never Used   Substance and Sexual Activity    Alcohol use: Yes     Comment: SOCIAL; Patient is non drinker.    Drug use: No     Comment: Drug Abuse: Not a drug user    Sexual activity: Defer     Family History   Problem Relation Age of Onset    Cancer Mother     Heart disease Father     Aneurysm Father         Abdominal Aortic Aneurysm    Malig Hyperthermia Neg Hx      No Known Allergies  Prior to Admission medications    Medication Sig Start Date End Date Taking? Authorizing Provider   cholecalciferol (VITAMIN D3) 25 MCG (1000 UT) tablet Take 1 tablet by mouth Daily.   Yes Provider, MD Vijaya   digoxin (LANOXIN) 125 MCG tablet Take 1 tablet by mouth Daily. 6/3/24  Yes Huang Chan MD   Eliquis 5 MG tablet tablet TAKE ONE TABLET BY MOUTH EVERY 12 HOURS 23  Yes Priya Mustafa APRN   furosemide (LASIX) 40 MG tablet Take 0.5 tablets by mouth Daily. 6/3/24  Yes Huang Chan MD  "  gabapentin (NEURONTIN) 100 MG capsule Take 1 capsule by mouth every night at bedtime.   Yes Vijaya Montez MD   Jardiance 10 MG tablet tablet TAKE 1 TABLET BY MOUTH DAILY 24  Yes Priya Mustafa APRN   LORazepam (ATIVAN) 0.5 MG tablet Take 1 tablet by mouth Every 8 (Eight) Hours As Needed for Anxiety.   Yes Vijaya Montez MD   methIMAzole (TAPAZOLE) 5 MG tablet Take 1 tablet by mouth 3 (Three) Times a Week. Monday, Wednesday, and Friday   Yes Vijaya Montez MD   metoprolol tartrate (LOPRESSOR) 25 MG tablet Take 1 tablet by mouth Every 12 (Twelve) Hours. 24  Yes Asia Gracia APRN   midodrine (PROAMATINE) 5 MG tablet Take 1 tablet by mouth 2 (Two) Times a Day Before Meals. 6/3/24  Yes Huang Chan MD   multivitamin with minerals tablet tablet Take 1 tablet by mouth Daily.   Yes Vijaya Montez MD   potassium chloride (KLOR-CON M20) 20 MEQ CR tablet Take 1 tablet by mouth Daily. 6/3/24  Yes Huang Chan MD   Santyl 250 UNIT/GM ointment Apply 1 Application topically to the appropriate area as directed Daily. To sacrum   Yes Vijaya Montez MD         Objective     tMax 24 hours:  Temp (24hrs), Av.5 °F (36.4 °C), Min:97.1 °F (36.2 °C), Max:97.9 °F (36.6 °C)    Vital Sign Ranges:  Temp:  [97.1 °F (36.2 °C)-97.9 °F (36.6 °C)] 97.3 °F (36.3 °C)  Heart Rate:  [61-92] 75  Resp:  [16] 16  BP: ()/() 98/50  Intake and Output Last 3 Shifts:  I/O last 3 completed shifts:  In: 343.8 [Blood:343.8]  Out: 7290 [Urine:5850; Chest Tube:1440]      Physical Exam:     Vital signs: BP 98/50 (BP Location: Left arm, Patient Position: Lying)   Pulse 75   Temp 97.3 °F (36.3 °C) (Oral)   Resp 16   Ht 177.8 cm (70\")   Wt 63.5 kg (140 lb)   SpO2 100%   BMI 20.09 kg/m²   100%     General: Well developed, well nourished, alert and oriented x 3    Appears stated age. No apparent distress.    HEENT: Moist mucous membranes of the oral mucosa & nasal mucosa.    Lips are not " cyanotic.    Extraocular movements intact    Neck:  Trachea position is midline.     Respiratory: Respiratory rhythm & depth: Normal.    Respiratory effort:  Normal.    Chest tube intact      GI:  Nondistended. Nontender.    Skin:  Inspection: No rash.    Palpation:  Warm, dry. No induration.     Extremities: No clubbing & no cyanosis.    No edema    Right AKA    :  Normal external genitalia    Whitehead intact draining clear urine            Results Review:     Lab Results (last 24 hours)       Procedure Component Value Units Date/Time    Hemoglobin & Hematocrit, Blood [[id number]]  (Abnormal) Collected: 06/23/24 0754    Specimen: Blood Updated: 06/23/24 0844     Hemoglobin 7.8 g/dL      Hematocrit 25.2 %     POC Glucose Once [[id number]]  (Normal) Collected: 06/23/24 0617    Specimen: Blood Updated: 06/23/24 0619     Glucose 104 mg/dL     Basic Metabolic Panel [[id number]]  (Abnormal) Collected: 06/23/24 0442    Specimen: Blood Updated: 06/23/24 0555     Glucose 100 mg/dL      BUN 62 mg/dL      Creatinine 2.35 mg/dL      Sodium 146 mmol/L      Potassium 4.1 mmol/L      Chloride 108 mmol/L      CO2 26.9 mmol/L      Calcium 8.3 mg/dL      BUN/Creatinine Ratio 26.4     Anion Gap 11.1 mmol/L      eGFR 26.6 mL/min/1.73     Narrative:      GFR Normal >60  Chronic Kidney Disease <60  Kidney Failure <15    The GFR formula is only valid for adults with stable renal function between ages 18 and 70.    CBC (No Diff) [[id number]]  (Abnormal) Collected: 06/23/24 0442    Specimen: Blood Updated: 06/23/24 0534     WBC 8.14 10*3/mm3      RBC 3.02 10*6/mm3      Hemoglobin 7.6 g/dL      Hematocrit 24.1 %      MCV 79.8 fL      MCH 25.2 pg      MCHC 31.5 g/dL      RDW 16.5 %      RDW-SD 47.4 fl      MPV 10.0 fL      Platelets 255 10*3/mm3     Hemoglobin & Hematocrit, Blood [[id number]]  (Abnormal) Collected: 06/22/24 2351    Specimen: Blood Updated: 06/23/24 0009     Hemoglobin 8.2 g/dL      Hematocrit 26.3 %     Occult Blood X 1, Stool -  "Stool, Per Rectum [[id number]]  (Abnormal) Collected: 06/22/24 1828    Specimen: Stool from Per Rectum Updated: 06/22/24 1834     Fecal Occult Blood Positive    Hemoglobin & Hematocrit, Blood [[id number]]  (Abnormal) Collected: 06/22/24 1632    Specimen: Blood Updated: 06/22/24 1659     Hemoglobin 8.1 g/dL      Hematocrit 26.6 %     Sodium, Urine, Random - Urine, Clean Catch [[id number]] Collected: 06/22/24 1436    Specimen: Urine, Clean Catch Updated: 06/22/24 1508     Sodium, Urine 40 mmol/L     Narrative:      Reference intervals for random urine have not been established.  Clinical usage is dependent upon physician's interpretation in combination with other laboratory tests.       Protein / Creatinine Ratio, Urine - Urine, Clean Catch [[id number]]  (Abnormal) Collected: 06/22/24 1436    Specimen: Urine, Clean Catch Updated: 06/22/24 1508     Protein/Creatinine Ratio, Urine 3,409.4 mg/G Crea      Creatinine, Urine 12.7 mg/dL      Total Protein, Urine 43.3 mg/dL     Urinalysis, Microscopic Only - Urine, Clean Catch [[id number]]  (Abnormal) Collected: 06/22/24 1436    Specimen: Urine, Clean Catch Updated: 06/22/24 1456     RBC, UA 3-5 /HPF      WBC, UA 0-2 /HPF      Bacteria, UA None Seen /HPF      Squamous Epithelial Cells, UA 0-2 /HPF      Hyaline Casts, UA 0-2 /LPF      Methodology Automated Microscopy    Urinalysis With Microscopic If Indicated (No Culture) - Urine, Clean Catch [[id number]]  (Abnormal) Collected: 06/22/24 1436    Specimen: Urine, Clean Catch Updated: 06/22/24 1453     Color, UA Yellow     Appearance, UA Clear     pH, UA 6.5     Specific Gravity, UA 1.007     Glucose,  mg/dL (Trace)     Ketones, UA Negative     Bilirubin, UA Negative     Blood, UA Large (3+)     Protein, UA 30 mg/dL (1+)     Leuk Esterase, UA Negative     Nitrite, UA Negative     Urobilinogen, UA 0.2 E.U./dL           No results found for: \"URINECX\"     Imaging Results (Last 7 Days)       Procedure Component Value Units " Date/Time    CT Chest Without Contrast Diagnostic [[id number]] Collected: 06/23/24 0844     Updated: 06/23/24 0903    Narrative:      CT CHEST WO CONTRAST DIAGNOSTIC-     INDICATION: Pleural effusion assessment     COMPARISON: CT chest May 30, 2024     TECHNIQUE:  Routine CT chest without IV contrast. Coronal and sagittal reformats.  Radiation dose reduction techniques were utilized, including automated  exposure control and exposure modulation based on body size.     FINDINGS:      Chest wall: Atrophy of the bilateral rotator cuff musculature.  Gynecomastia. No lymphadenopathy.     Thyroid: Multinodular goiter.     Mediastinum: Coronary artery atherosclerotic calcifications. Mild  cardiomegaly. Aortic valve calcification. No pericardial effusion.  Enlarged main pulmonary artery. Calcified mediastinal and right hilar  lymph nodes, consistent with prior granulomatous infection. Stable  mildly prominent mediastinal and hilar lymph nodes. For example, right  infrahilar lymph node, series 2, axial mage 58, measures 1.4 cm,  unchanged.     Lungs/pleura: Small loculated right pleural effusion with pleural  thickening, with small amount of loculated fluid extending into the  fissures. Loculated pneumothorax at the right lung base. Right sided  chest tube with the catheter coursing through the loculated effusion and  pneumothorax. Small left pleural effusion. Mild airways wall thickening  and small amount of cylindrical bronchiolectasis. Heterogeneous  peripheral opacities in the right lung, greatest in the inferior right  upper lobe, lateral segment right middle lobe and superior basilar  segments of the right lower lobe with associated groundglass opacities  in the right lower lobe. Some centrilobular and tree-in-bud nodules seen  in the superior segment left lower lobe.     Upper abdomen: Gallbladder full of gallstones. Left hydronephrosis,  incompletely imaged. Suspect cyst in the superior pole left kidney.     Osseous  structures: Severe right glenohumeral osteoarthritis with  posterior subluxation. Total left shoulder arthroplasty.       Impression:         1. Mild right sided loculated hydropneumothorax with decreased fluid  component and increased gas component with associated pleural  thickening. Could be empyema or malignant effusion if patient has a  known malignancy. Correlate with chest tube output.  2. Small left pleural effusion, slightly increased in the interval.  3. Heterogeneous peripheral opacities in the right lung, greatest in the  lower lung, mild increased. Opacities are nonspecific. Could be  multifocal pneumonia with follow-up to resolution recommended.  4. New centrilobular and tree-in-bud nodules in the superior segment  left lower lobe. Suspect infectious bronchiolitis.  5. Mild cardiomegaly.  6. Enlarged main pulmonary artery, can be seen with pulmonary artery  hypertension.     Additional findings described above that are stable     This report was finalized on 6/23/2024 9:00 AM by Dr. Taiwo Espinosa M.D on Workstation: [id number]       US Renal Bilateral [[id number]] Collected: 06/22/24 1103     Updated: 06/22/24 1116    Narrative:      US RENAL BILATERAL-     Clinical: Acute renal insufficiency     Ultrasound findings: The right kidney measures 10.5 cm in length and  left kidney measures 10.9 cm in length.     There is moderate right-sided hydronephrosis, mild to moderate  left-sided hydronephrosis. Neither the right or left ureteral jet could  be documented. Urinary bladder appears well distended, there appears to  be a small amount of debris at the bladder base. No bladder stone.     The renal cortical echotexture appears slightly greater than anticipated  suggesting underlying medical renal disease. Few tiny renal cysts noted.  No suspicious renal lesion seen. The remainder is unremarkable.     This report was finalized on 6/22/2024 11:13 AM by Dr. Gregory Dang M.D on Workstation: [id number]                   Inpatient Meds:   Current Meds:         Scheduled Meds:cholecalciferol, 1,000 Units, Oral, Daily  collagenase, 1 Application, Topical, Q24H  [Held by provider] digoxin, 125 mcg, Oral, Daily  gabapentin, 100 mg, Oral, Nightly  [START ON 6/24/2024] methIMAzole, 5 mg, Oral, Once per day on Monday Wednesday Friday  metoprolol tartrate, 25 mg, Oral, Q12H  midodrine, 5 mg, Oral, BID AC  sodium chloride, 10 mL, Intravenous, Q12H  tamsulosin, 0.4 mg, Oral, Daily       Continuous Infusions:sodium chloride, 125 mL/hr, Last Rate: 125 mL/hr (06/23/24 0945)       PRN Meds:.  LORazepam    nitroglycerin    sodium chloride    sodium chloride      Impression       Pleural effusion    Hyperthyroidism    Hyperkalemia    Longstanding persistent atrial fibrillation    Ulcer of left heel    Recurrent pleural effusion on right    Chronic heart failure with preserved ejection fraction (HFpEF)    CKD (chronic kidney disease)    Anemia      Hydronephrosis  JOSEPH    Plan:  Recommend keeping lopez cathter anchored  Discussed plan for obtaining CT Abdomen and Pelvis in morning to re-evaluate his hydronephrosis now that lopez has been draining  Will follow     I discussed the patients findings and my recommendations with patient.    Doretha Onofre, CHARLEE  06/23/24  11:19 EDT

## 2024-06-23 NOTE — PROGRESS NOTES
[facility]  Inpatient Progress Note    Reason for Followup:  Anemia    Subjective     Interval History:   No new issues    Current Facility-Administered Medications:     cholecalciferol (VITAMIN D3) tablet 1,000 Units, 1,000 Units, Oral, Daily, Baron Ponce MD, 1,000 Units at 06/23/24 0858    collagenase ointment 1 Application, 1 Application, Topical, Q24H, Baron Ponce MD, 1 Application at 06/23/24 0859    [Held by provider] digoxin (LANOXIN) tablet 125 mcg, 125 mcg, Oral, Daily, Baron Ponce MD    gabapentin (NEURONTIN) capsule 100 mg, 100 mg, Oral, Nightly, Baron Ponce MD, 100 mg at 06/22/24 2200    LORazepam (ATIVAN) tablet 0.5 mg, 0.5 mg, Oral, Q8H PRN, Ricardo Kiser MD, 0.5 mg at 06/22/24 1747    [START ON 6/24/2024] methIMAzole (TAPAZOLE) tablet 5 mg, 5 mg, Oral, Once per day on Monday Wednesday Friday, Baron Ponce MD    metoprolol tartrate (LOPRESSOR) tablet 25 mg, 25 mg, Oral, Q12H, Baron Ponce MD, 25 mg at 06/22/24 0919    midodrine (PROAMATINE) tablet 5 mg, 5 mg, Oral, BID AC, Baron Ponce MD, 5 mg at 06/23/24 0725    nitroglycerin (NITROSTAT) SL tablet 0.4 mg, 0.4 mg, Sublingual, Q5 Min PRN, Baron Ponce MD    sodium chloride 0.45 % infusion, 125 mL/hr, Intravenous, Continuous, Desire Colón MD, Last Rate: 125 mL/hr at 06/23/24 0945, 125 mL/hr at 06/23/24 0945    sodium chloride 0.9 % flush 10 mL, 10 mL, Intravenous, Q12H, Baron Ponce MD, 10 mL at 06/23/24 0859    sodium chloride 0.9 % flush 10 mL, 10 mL, Intravenous, PRN, Baron Ponce MD    sodium chloride 0.9 % infusion 40 mL, 40 mL, Intravenous, PRN, Kris, Jawed, MD    tamsulosin (FLOMAX) 24 hr capsule 0.4 mg, 0.4 mg, Oral, Daily, Ricardo Kiser MD, 0.4 mg at 06/23/24 0859    Objective     Vital Signs  Temp:  [97.1 °F (36.2 °C)-97.9 °F (36.6 °C)] 97.3 °F (36.3 °C)  Heart Rate:  [61-92] 75  Resp:  [16] 16  BP: ()/() 98/50  Body mass index is 20.09 kg/m².    Intake/Output Summary (Last 24 hours) at  6/23/2024 1023  Last data filed at 6/23/2024 0900  Gross per 24 hour   Intake 240 ml   Output 5790 ml   Net -5550 ml     I/O this shift:  In: 240 [P.O.:240]  Out: -      Physical Exam:   General: patient awake, alert and cooperative   Abdomen: soft, nontender, nondistended; normal bowel sounds     Results Review:     I reviewed the patient's new clinical results.    Results from last 7 days   Lab Units 06/23/24  0754 06/23/24  0442 06/22/24  2351 06/22/24  0759 06/22/24  0645 06/21/24  2250 06/21/24  1411   WBC 10*3/mm3  --  8.14  --   --  9.39  --  10.47   HEMOGLOBIN g/dL 7.8* 7.6* 8.2*   < > 7.5*   < > 6.6*   HEMATOCRIT % 25.2* 24.1* 26.3*   < > 24.1*   < > 20.7*   PLATELETS 10*3/mm3  --  255  --   --  234  --  252    < > = values in this interval not displayed.     Results from last 7 days   Lab Units 06/23/24 0442 06/22/24  0645 06/21/24  2250   SODIUM mmol/L 146* 141 138   POTASSIUM mmol/L 4.1 4.6 5.0   CHLORIDE mmol/L 108* 104 101   CO2 mmol/L 26.9 25.1 25.0   BUN mg/dL 62* 76* 83*   CREATININE mg/dL 2.35* 2.50* 2.60*   CALCIUM mg/dL 8.3* 8.6 8.4*   BILIRUBIN mg/dL  --  0.4  --    ALK PHOS U/L  --  126*  --    ALT (SGPT) U/L  --  14  --    AST (SGOT) U/L  --  11  --    GLUCOSE mg/dL 100* 73 80         Lab Results   Lab Value Date/Time    LIPASE 26 02/21/2022 0702    LIPASE 55 (H) 08/27/2021 1110       Radiology:  [unfilled]      Assessment & Plan   Assessment:   Anemia  Longstanding persistent atrial fibrillation on Eliquis-currently on hold  JOSEPH with acute hyperkalemia  Chronic heart failure with preserved EF  Right AKA    All problems new to me today\    Plan:   Patient with multifactorial anemia.  There is no evidence of overt GI bleeding.  Heme positive stool noted.  Continue to follow H/H, bidirectional endoscopy could be considered once Pleurex removed and cleared by all services      I discussed the patient's findings and my recommendations with patient.          Harpreet Clayton M.D.  [facility]  [address] [place] [address]  Office: [phone]

## 2024-06-24 ENCOUNTER — TELEPHONE (OUTPATIENT)
Dept: SURGERY | Facility: CLINIC | Age: 84
End: 2024-06-24
Payer: MEDICARE

## 2024-06-24 ENCOUNTER — APPOINTMENT (OUTPATIENT)
Dept: GENERAL RADIOLOGY | Facility: HOSPITAL | Age: 84
DRG: 186 | End: 2024-06-24
Payer: MEDICARE

## 2024-06-24 ENCOUNTER — APPOINTMENT (OUTPATIENT)
Dept: CT IMAGING | Facility: HOSPITAL | Age: 84
DRG: 186 | End: 2024-06-24
Payer: MEDICARE

## 2024-06-24 LAB
ABO GROUP BLD: NORMAL
ANION GAP SERPL CALCULATED.3IONS-SCNC: 8.5 MMOL/L (ref 5–15)
BLD GP AB SCN SERPL QL: NEGATIVE
BUN SERPL-MCNC: 44 MG/DL (ref 8–23)
BUN/CREAT SERPL: 25.4 (ref 7–25)
CALCIUM SPEC-SCNC: 7.9 MG/DL (ref 8.6–10.5)
CHLORIDE SERPL-SCNC: 106 MMOL/L (ref 98–107)
CO2 SERPL-SCNC: 22.5 MMOL/L (ref 22–29)
CREAT SERPL-MCNC: 1.73 MG/DL (ref 0.76–1.27)
DEPRECATED RDW RBC AUTO: 47.6 FL (ref 37–54)
EGFRCR SERPLBLD CKD-EPI 2021: 38.4 ML/MIN/1.73
ERYTHROCYTE [DISTWIDTH] IN BLOOD BY AUTOMATED COUNT: 16 % (ref 12.3–15.4)
GLUCOSE SERPL-MCNC: 76 MG/DL (ref 65–99)
HCT VFR BLD AUTO: 24.1 % (ref 37.5–51)
HGB BLD-MCNC: 7.4 G/DL (ref 13–17.7)
MCH RBC QN AUTO: 25.1 PG (ref 26.6–33)
MCHC RBC AUTO-ENTMCNC: 30.7 G/DL (ref 31.5–35.7)
MCV RBC AUTO: 81.7 FL (ref 79–97)
PLATELET # BLD AUTO: 224 10*3/MM3 (ref 140–450)
PMV BLD AUTO: 9.8 FL (ref 6–12)
POTASSIUM SERPL-SCNC: 3.8 MMOL/L (ref 3.5–5.2)
RBC # BLD AUTO: 2.95 10*6/MM3 (ref 4.14–5.8)
RH BLD: POSITIVE
SODIUM SERPL-SCNC: 137 MMOL/L (ref 136–145)
T&S EXPIRATION DATE: NORMAL
WBC NRBC COR # BLD AUTO: 6.42 10*3/MM3 (ref 3.4–10.8)

## 2024-06-24 PROCEDURE — 86901 BLOOD TYPING SEROLOGIC RH(D): CPT | Performed by: INTERNAL MEDICINE

## 2024-06-24 PROCEDURE — 86900 BLOOD TYPING SEROLOGIC ABO: CPT | Performed by: INTERNAL MEDICINE

## 2024-06-24 PROCEDURE — 74176 CT ABD & PELVIS W/O CONTRAST: CPT

## 2024-06-24 PROCEDURE — 86850 RBC ANTIBODY SCREEN: CPT | Performed by: INTERNAL MEDICINE

## 2024-06-24 PROCEDURE — 71045 X-RAY EXAM CHEST 1 VIEW: CPT

## 2024-06-24 PROCEDURE — 25010000002 ALTEPLASE PER 1 MG

## 2024-06-24 PROCEDURE — 32561 LYSE CHEST FIBRIN INIT DAY: CPT

## 2024-06-24 PROCEDURE — 80048 BASIC METABOLIC PNL TOTAL CA: CPT | Performed by: STUDENT IN AN ORGANIZED HEALTH CARE EDUCATION/TRAINING PROGRAM

## 2024-06-24 PROCEDURE — 99232 SBSQ HOSP IP/OBS MODERATE 35: CPT

## 2024-06-24 PROCEDURE — 85027 COMPLETE CBC AUTOMATED: CPT | Performed by: STUDENT IN AN ORGANIZED HEALTH CARE EDUCATION/TRAINING PROGRAM

## 2024-06-24 PROCEDURE — 0W9930Z DRAINAGE OF RIGHT PLEURAL CAVITY WITH DRAINAGE DEVICE, PERCUTANEOUS APPROACH: ICD-10-PCS

## 2024-06-24 RX ADMIN — Medication 1000 UNITS: at 09:01

## 2024-06-24 RX ADMIN — Medication 10 ML: at 20:34

## 2024-06-24 RX ADMIN — DORNASE ALFA 5 MG: 1 SOLUTION RESPIRATORY (INHALATION) at 12:13

## 2024-06-24 RX ADMIN — Medication 10 ML: at 09:04

## 2024-06-24 RX ADMIN — ALTEPLASE 10 MG: KIT at 12:13

## 2024-06-24 RX ADMIN — MIDODRINE HYDROCHLORIDE 5 MG: 5 TABLET ORAL at 06:32

## 2024-06-24 RX ADMIN — METOPROLOL TARTRATE 25 MG: 25 TABLET, FILM COATED ORAL at 20:33

## 2024-06-24 RX ADMIN — MIDODRINE HYDROCHLORIDE 5 MG: 5 TABLET ORAL at 18:00

## 2024-06-24 RX ADMIN — COLLAGENASE SANTYL 1 APPLICATION: 250 OINTMENT TOPICAL at 09:04

## 2024-06-24 RX ADMIN — METHIMAZOLE 5 MG: 5 TABLET ORAL at 09:02

## 2024-06-24 RX ADMIN — METOPROLOL TARTRATE 25 MG: 25 TABLET, FILM COATED ORAL at 09:02

## 2024-06-24 RX ADMIN — SODIUM CHLORIDE 125 ML/HR: 4.5 INJECTION, SOLUTION INTRAVENOUS at 00:26

## 2024-06-24 RX ADMIN — GABAPENTIN 100 MG: 100 CAPSULE ORAL at 20:33

## 2024-06-24 RX ADMIN — TAMSULOSIN HYDROCHLORIDE 0.4 MG: 0.4 CAPSULE ORAL at 09:02

## 2024-06-24 NOTE — PROGRESS NOTES
"   LOS: 3 days     Chief Complaint/ Reason for encounter: JOSEPH    Subjective   06/24/24 : No new complaints or events  He feels well, no nausea or vomiting  Good appetite, no shortness of breath or chest pain      Medical history reviewed:  History of Present Illness    Subjective    History taken from: Patient and chart    Vital Signs  Temp:  [95.4 °F (35.2 °C)-97.3 °F (36.3 °C)] 97.1 °F (36.2 °C)  Heart Rate:  [55-74] 55  Resp:  [16] 16  BP: ()/(48-71) 90/48       Wt Readings from Last 1 Encounters:   06/21/24 1255 63.5 kg (140 lb)       Objective:  Vital signs: (most recent): Blood pressure 90/48, pulse 55, temperature 97.1 °F (36.2 °C), temperature source Axillary, resp. rate 16, height 177.8 cm (70\"), weight 63.5 kg (140 lb), SpO2 98%.                Objective:  General Appearance:  Comfortable, chronically ill-appearing, in no acute distress and not in pain.  Awake, alert  HEENT: Mucous membranes moist, no injury, oropharynx clear  Lungs:  Normal effort and normal respiratory rate.  Breath sounds clear to auscultation.  No  respiratory distress.  No rales, decreased breath sounds or rhonchi.    Heart: Normal rate.  Regular rhythm.  S1, S2 normal.  No murmur.   Abdomen: Abdomen is soft.  Bowel sounds are normal, no abdominal tenderness.  There is no rebound or guarding  Extremities: Trace edema of bilateral lower extremities  Skin:  Warm and dry with no rashes      Results Review:    Intake/Output:     Intake/Output Summary (Last 24 hours) at 6/24/2024 1217  Last data filed at 6/24/2024 0900  Gross per 24 hour   Intake 2840 ml   Output 3080 ml   Net -240 ml         DATA:  Radiology and Labs:  The following labs independently reviewed by me. Additional labs ordered for tomorrow a.m.  Interval notes, chart personally reviewed by me.   Old records independently reviewed showing baseline creatinine 0.7  The following radiologic studies independently viewed by me, findings renal ultrasound showing moderate " right-sided hydronephrosis mild to moderate left-sided hydronephrosis with bladder distention  New problems include obstructive uropathy  Discussed with patient himself at bedside    Risk/ complexity of medical care/ medical decision making moderate complexity, renal failure management    Labs:   Recent Results (from the past 24 hour(s))   Respiratory Panel PCR w/COVID-19(SARS-CoV-2) [facility]/PRAVEEN/[facility] In-House, NP Swab in UTM/VTM, 2 HR TAT - Swab, Nasopharynx    Collection Time: 06/23/24 12:56 PM    Specimen: Nasopharynx; Swab   Result Value Ref Range    ADENOVIRUS, PCR Not Detected Not Detected    Coronavirus 229E Not Detected Not Detected    Coronavirus HKU1 Not Detected Not Detected    Coronavirus NL63 Not Detected Not Detected    Coronavirus OC43 Not Detected Not Detected    COVID19 Not Detected Not Detected - Ref. Range    Human Metapneumovirus Not Detected Not Detected    Human Rhinovirus/Enterovirus Not Detected Not Detected    Influenza A PCR Not Detected Not Detected    Influenza B PCR Not Detected Not Detected    Parainfluenza Virus 1 Not Detected Not Detected    Parainfluenza Virus 2 Not Detected Not Detected    Parainfluenza Virus 3 Not Detected Not Detected    Parainfluenza Virus 4 Not Detected Not Detected    RSV, PCR Not Detected Not Detected    Bordetella pertussis pcr Not Detected Not Detected    Bordetella parapertussis PCR Not Detected Not Detected    Chlamydophila pneumoniae PCR Not Detected Not Detected    Mycoplasma pneumo by PCR Not Detected Not Detected   CBC (No Diff)    Collection Time: 06/24/24  6:16 AM    Specimen: Blood   Result Value Ref Range    WBC 6.42 3.40 - 10.80 10*3/mm3    RBC 2.95 (L) 4.14 - 5.80 10*6/mm3    Hemoglobin 7.4 (L) 13.0 - 17.7 g/dL    Hematocrit 24.1 (L) 37.5 - 51.0 %    MCV 81.7 79.0 - 97.0 fL    MCH 25.1 (L) 26.6 - 33.0 pg    MCHC 30.7 (L) 31.5 - 35.7 g/dL    RDW 16.0 (H) 12.3 - 15.4 %    RDW-SD 47.6 37.0 - 54.0 fl    MPV 9.8 6.0 - 12.0 fL    Platelets 224  140 - 450 10*3/mm3   Basic Metabolic Panel    Collection Time: 06/24/24  6:16 AM    Specimen: Blood   Result Value Ref Range    Glucose 76 65 - 99 mg/dL    BUN 44 (H) 8 - 23 mg/dL    Creatinine 1.73 (H) 0.76 - 1.27 mg/dL    Sodium 137 136 - 145 mmol/L    Potassium 3.8 3.5 - 5.2 mmol/L    Chloride 106 98 - 107 mmol/L    CO2 22.5 22.0 - 29.0 mmol/L    Calcium 7.9 (L) 8.6 - 10.5 mg/dL    BUN/Creatinine Ratio 25.4 (H) 7.0 - 25.0    Anion Gap 8.5 5.0 - 15.0 mmol/L    eGFR 38.4 (L) >60.0 mL/min/1.73       Radiology:  Pertinent radiology studies were reviewed as described above      Medications have been reviewed separately in chart overview      ASSESSMENT:  JOSEPH, likely mainly obstructive  Severe hyperkalemia, improved with treatment  Urinary retention, improved post Whitehead  Severe anemia   Recurrent right sided pleural effusion       Plan:  Renal function continues to improve  Based on imaging this looks to be mainly due to obstruction  We will discontinue IV fluids and encourage oral fluid intake  Continue midodrine for hypotension, holding parameters for Lopressor  Continue Flomax and defer Whitehead management to urology  Maintaining excellent urine output, euvolemic on exam       Randy Rico MD  [facility]   Office phone number: [phone]  Answering service phone number: [phone]    06/24/24  12:17 EDT    Dictation performed using Dragon dictation software

## 2024-06-24 NOTE — PLAN OF CARE
Problem: Adult Inpatient Plan of Care  Goal: Plan of Care Review  Outcome: Ongoing, Progressing  Flowsheets (Taken 6/24/2024 1428)  Progress: no change  Plan of Care Reviewed With: patient  Outcome Evaluation: vss. aaox2-3 this shift. disoriented to situation and at times place. tpa given by thoracic aprn this shift, pleurx unclamped at 1330. wife updated via telephone.   Goal Outcome Evaluation:  Plan of Care Reviewed With: patient        Progress: no change  Outcome Evaluation: vss. aaox2-3 this shift. disoriented to situation and at times place. tpa given by thoracic aprn this shift, pleurx unclamped at 1330. wife updated via telephone.

## 2024-06-24 NOTE — PROGRESS NOTES
Name: Mehreen Silva IV ADMIT: 2024   : 1940  PCP: Taiwo Powers MD    MRN: [id number] LOS: 3 days   AGE/SEX: 84 y.o. male  ROOM: [facility]     Subjective   Subjective   No chief complaint on file.    Not reporting any shortness of breath.  No abdominal pain reported.  No chest pain or palpitations.     Objective   Objective   Vital Signs  Temp:  [95.4 °F (35.2 °C)-97.3 °F (36.3 °C)] 97.1 °F (36.2 °C)  Heart Rate:  [55-74] 55  Resp:  [16] 16  BP: ()/(48-71) 90/48  SpO2:  [96 %-100 %] 98 %  on   ;   Device (Oxygen Therapy): room air  Body mass index is 20.09 kg/m².    Physical Exam  Vitals and nursing note reviewed.   Constitutional:       General: He is not in acute distress.     Appearance: He is ill-appearing (chronically). He is not diaphoretic.   Cardiovascular:      Rate and Rhythm: Normal rate. Rhythm irregular.   Pulmonary:      Effort: Pulmonary effort is normal.      Breath sounds: No wheezing.   Abdominal:      General: There is no distension.      Palpations: Abdomen is soft.      Tenderness: There is no abdominal tenderness. There is no guarding or rebound.   Musculoskeletal:      Comments: Right AKA   Skin:     General: Skin is warm and dry.      Comments: Right pleurx catheter   Neurological:      Mental Status: He is alert. Mental status is at baseline.   Psychiatric:         Mood and Affect: Mood normal.         Behavior: Behavior normal.       Results Review  I reviewed the patient's new clinical results.    Results from last 7 days   Lab Units 24  0616 24  0754 24  0442 24  2351 24  0759 24  0645 24  2250 24  1411   WBC 10*3/mm3 6.42  --  8.14  --   --  9.39  --  10.47   HEMOGLOBIN g/dL 7.4* 7.8* 7.6* 8.2*   < > 7.5*   < > 6.6*   PLATELETS 10*3/mm3 224  --  255  --   --  234  --  252    < > = values in this interval not displayed.     Results from last 7 days   Lab Units 24  0616 24  0442 24  0645  06/21/24  2250   SODIUM mmol/L 137 146* 141 138   POTASSIUM mmol/L 3.8 4.1 4.6 5.0   CHLORIDE mmol/L 106 108* 104 101   CO2 mmol/L 22.5 26.9 25.1 25.0   BUN mg/dL 44* 62* 76* 83*   CREATININE mg/dL 1.73* 2.35* 2.50* 2.60*   GLUCOSE mg/dL 76 100* 73 80   EGFR mL/min/1.73 38.4* 26.6* 24.7* 23.6*     Results from last 7 days   Lab Units 06/22/24  0645   ALBUMIN g/dL 2.4*   BILIRUBIN mg/dL 0.4   ALK PHOS U/L 126*   AST (SGOT) U/L 11   ALT (SGPT) U/L 14     Results from last 7 days   Lab Units 06/24/24  0616 06/23/24  0442 06/22/24  0645 06/21/24  2250   CALCIUM mg/dL 7.9* 8.3* 8.6 8.4*   ALBUMIN g/dL  --   --  2.4*  --      Results from last 7 days   Lab Units 06/23/24  0442   PROCALCITONIN ng/mL 0.23     Hemoglobin A1C   Date/Time Value Ref Range Status   06/22/2024 0645 5.30 4.80 - 5.60 % Final     Glucose   Date/Time Value Ref Range Status   06/23/2024 0617 104 70 - 130 mg/dL Final       CT Abdomen Pelvis Without Contrast    Result Date: 6/24/2024  1. Moderately severe bilateral hydronephrosis and hydroureter with urinary bladder wall thickening. The urinary bladder wall thickening could be related to cystitis and/or infiltrative neoplasm. There could be an element of bladder outlet obstruction as well. Whitehead catheter is seen in the urinary bladder which is only partially distended. 2. Areas of increased density in both lung bases as described. Right chest tube is seen in the right base. Please additional dictation for yesterday's CT scan of the chest. 3. Cholelithiasis. 4. Large amount of stool in the rectum.    Radiation dose reduction techniques were utilized, including automated exposure control and exposure modulation based on body size.   This report was finalized on 6/24/2024 12:15 PM by Dr. Romeo Cummings M.D on Workstation: [id number]      CT Chest Without Contrast Diagnostic    Result Date: 6/23/2024   1. Mild right sided loculated hydropneumothorax with decreased fluid component and increased gas  component with associated pleural thickening. Could be empyema or malignant effusion if patient has a known malignancy. Correlate with chest tube output. 2. Small left pleural effusion, slightly increased in the interval. 3. Heterogeneous peripheral opacities in the right lung, greatest in the lower lung, mild increased. Opacities are nonspecific. Could be multifocal pneumonia with follow-up to resolution recommended. 4. New centrilobular and tree-in-bud nodules in the superior segment left lower lobe. Suspect infectious bronchiolitis. 5. Mild cardiomegaly. 6. Enlarged main pulmonary artery, can be seen with pulmonary artery hypertension.  Additional findings described above that are stable  This report was finalized on 6/23/2024 9:00 AM by Dr. Taiwo Espinosa M.D on Workstation: [id number]       I have personally reviewed all medications:  Scheduled Medications  cholecalciferol, 1,000 Units, Oral, Daily  collagenase, 1 Application, Topical, Q24H  [Held by provider] digoxin, 125 mcg, Oral, Daily  gabapentin, 100 mg, Oral, Nightly  methIMAzole, 5 mg, Oral, Once per day on Monday Wednesday Friday  metoprolol tartrate, 25 mg, Oral, Q12H  midodrine, 5 mg, Oral, BID AC  sodium chloride, 10 mL, Intravenous, Q12H  tamsulosin, 0.4 mg, Oral, Daily      Infusions  sodium chloride, 125 mL/hr, Last Rate: 125 mL/hr (06/24/24 0026)      Diet  Diet: Cardiac, Diabetic, Renal; Healthy Heart (2-3 Na+); Consistent Carbohydrate; Low Sodium (2-3g), Low Potassium, Low Phosphorus; Fluid Consistency: Thin (IDDSI 0)    I have personally reviewed:  [x]  Laboratory   [x]  Microbiology   [x]  Radiology   [x]  EKG/Telemetry  []  Cardiology/Vascular   []  Pathology    []  Records       Assessment/Plan     Active Hospital Problems    Diagnosis  POA    **Pleural effusion [J90]  Unknown    CKD (chronic kidney disease) [N18.9]  Yes    Anemia [D64.9]  Unknown    Chronic heart failure with preserved ejection fraction (HFpEF) [I50.32]  Yes     Recurrent pleural effusion on right [J90]  Yes    Ulcer of left heel [L97.429]  Yes    Longstanding persistent atrial fibrillation [I48.11]  Yes    Hyperkalemia [E87.5]  Yes    Hyperthyroidism [E05.90]  Yes      Resolved Hospital Problems   No resolved problems to display.       84 y.o. male admitted with Pleural effusion.    Urinary retention/hydronephrosis/JOSEPH with hyperkalemia: Whitehead placed.  Improving creatinine and potassium levels.  Urology and nephrology following.  Recurrent right pleural effusion/Pleurx: Right basilar pneumothorax likely dead space due to inadequate lung expansion.  Potential trapped lung.  tPA and dornase treatments have been initiated.  Thoracic surgery following.  Acute on chronic anemia: Iron saturation 11%.  Ferritin elevated.  Heme positive stool but no overt losses.  Endoscopy could be considered once Pleurx is removed.  GI following.  Chronic diastolic heart failure: Diuretics held for JOSEPH.  Chronic A-fib: Metoprolol.  Digoxin held.  Eliquis held.  Check digoxin level.  Chronic hypotension: Midodrine  History right chest cystic skin excision  Hyperthyroidism: On methimazole.  Repeat thyroid studies  PPX: SCD.  Resume AC when able.  Disposition: TBD    Expected Discharge Date: 6/25/2024; Expected Discharge Time:      Feliciano Torres MD  [facility]  06/24/24  12:39 EDT    Dictated portions of note using Dragon dictation software.  Copied text in this note has been reviewed by me and remains accurate as of 06/24/24

## 2024-06-24 NOTE — NURSING NOTE
06/24/24 1409   Wound 12/13/23 1427 coccyx Pressure Injury   Placement Date/Time: 12/13/23 1427   Present on Original Admission: Yes  Location: coccyx  Primary Wound Type: (c) Pressure Injury   Pressure Injury Stage U   Base moist;yellow;slough;pink   Periwound intact;dry   Wound Length (cm) 1.2 cm   Wound Width (cm) 3 cm   Wound Depth (cm) 0.5 cm   Wound Surface Area (cm^2) 3.6 cm^2   Wound Volume (cm^3) 1.8 cm^3   Drainage Characteristics/Odor tan   Drainage Amount small   Care, Wound cleansed with;sterile normal saline   Dressing Care dressing changed;silver impregnated;hydrofiber;silicone;foam  (Santyl with NS wet to moist packing has been ordered for daily dressing changes)   Wound 02/09/24 1648 Left posterior heel Pressure Injury   Placement Date/Time: 02/09/24 1648   Present on Original Admission: Yes  Side: Left  Orientation: posterior  Location: heel  Primary Wound Type: Pressure Injury   Base   (terminated blister/ scab peeled off, wound is healed underneath)   Skin Interventions   Pressure Reduction Devices specialty bed utilized;heel offloading device utilized   Pressure Reduction Techniques positioned off wounds;heels elevated off bed;pressure points protected   Skin Protection silicone foam dressing in place     CWON note: pt seen for evaluation of skin issues POA. Pt is alert and oriented, able to turn with some assistance. Right BKA, left heel scab peeled off and skin is healed underneath. Heel boot in place for protection. He is on a KARINA mattress from [employer] for adequate pressure redistribution. Sacral skin as noted above, Dr Wynn has been following him as an OP for this wound and he continues to use Santyl daily for his wound care. Wound care and prevention standing orders have been placed into [facility]. Please re-consult for any additional needs.

## 2024-06-24 NOTE — PROGRESS NOTES
"   LOS: 3 days   Patient Care Team:  Taiwo Powers MD as PCP - General (Internal Medicine)  Self, Bess YO MD as Consulting Physician (Endocrinology)  Huang Chan MD as Cardiologist (Cardiology)      Subjective   Interval History: Comfortable.    Objective     ROS   12 POINT NEG ROS PERTINENT IN HPI      Vital Signs  Temp:  [96.3 °F (35.7 °C)-97.3 °F (36.3 °C)] 97.1 °F (36.2 °C)  Heart Rate:  [55-74] 67  Resp:  [16] 16  BP: ()/(48-67) 106/58      Intake/Output Summary (Last 24 hours) at 6/24/2024 1618  Last data filed at 6/24/2024 1456  Gross per 24 hour   Intake 2840 ml   Output 4480 ml   Net -1640 ml       Flowsheet Rows      Flowsheet Row First Filed Value   Admission Height 177.8 cm (70\") Documented at 06/21/2024 1255   Admission Weight 63.5 kg (140 lb) Documented at 06/21/2024 1255            Physical Exam:     General appearance: alert, cooperative, oriented  Whitehead intact, urine clear       Lab Results (all)       Procedure Component Value Units Date/Time    Basic Metabolic Panel [[id number]]  (Abnormal) Collected: 06/24/24 0616    Specimen: Blood Updated: 06/24/24 0720     Glucose 76 mg/dL      BUN 44 mg/dL      Creatinine 1.73 mg/dL      Sodium 137 mmol/L      Potassium 3.8 mmol/L      Comment: Slight hemolysis detected by analyzer. Result may be falsely elevated.        Chloride 106 mmol/L      CO2 22.5 mmol/L      Calcium 7.9 mg/dL      BUN/Creatinine Ratio 25.4     Anion Gap 8.5 mmol/L      eGFR 38.4 mL/min/1.73     Narrative:      GFR Normal >60  Chronic Kidney Disease <60  Kidney Failure <15    The GFR formula is only valid for adults with stable renal function between ages 18 and 70.    CBC (No Diff) [[id number]]  (Abnormal) Collected: 06/24/24 0616    Specimen: Blood Updated: 06/24/24 0716     WBC 6.42 10*3/mm3      RBC 2.95 10*6/mm3      Hemoglobin 7.4 g/dL      Hematocrit 24.1 %      MCV 81.7 fL      MCH 25.1 pg      MCHC 30.7 g/dL      RDW 16.0 %      RDW-SD 47.6 fl      MPV 9.8 fL "      Platelets 224 10*3/mm3     Blood Culture - Blood, Arm, Left [[id number]] Collected: 06/23/24 1630    Specimen: Blood from Arm, Left Updated: 06/23/24 1703    Blood Culture - Blood, Hand, Right [[id number]] Collected: 06/23/24 1629    Specimen: Blood from Hand, Right Updated: 06/23/24 1703    Respiratory Panel PCR w/COVID-19(SARS-CoV-2) [facility] In-House, NP Swab in UTM/VTM, 2 HR TAT - Swab, Nasopharynx [[id number]]  (Normal) Collected: 06/23/24 1256    Specimen: Swab from Nasopharynx Updated: 06/23/24 1413     ADENOVIRUS, PCR Not Detected     Coronavirus 229E Not Detected     Coronavirus HKU1 Not Detected     Coronavirus NL63 Not Detected     Coronavirus OC43 Not Detected     COVID19 Not Detected     Human Metapneumovirus Not Detected     Human Rhinovirus/Enterovirus Not Detected     Influenza A PCR Not Detected     Influenza B PCR Not Detected     Parainfluenza Virus 1 Not Detected     Parainfluenza Virus 2 Not Detected     Parainfluenza Virus 3 Not Detected     Parainfluenza Virus 4 Not Detected     RSV, PCR Not Detected     Bordetella pertussis pcr Not Detected     Bordetella parapertussis PCR Not Detected     Chlamydophila pneumoniae PCR Not Detected     Mycoplasma pneumo by PCR Not Detected    Narrative:      In the setting of a positive respiratory panel with a viral infection PLUS a negative procalcitonin without other underlying concern for bacterial infection, consider observing off antibiotics or discontinuation of antibiotics and continue supportive care. If the respiratory panel is positive for atypical bacterial infection (Bordetella pertussis, Chlamydophila pneumoniae, or Mycoplasma pneumoniae), consider antibiotic de-escalation to target atypical bacterial infection.    Procalcitonin [[id number]]  (Normal) Collected: 06/23/24 0442    Specimen: Blood Updated: 06/23/24 1259     Procalcitonin 0.23 ng/mL     Narrative:      As a Marker for Sepsis (Non-Neonates):    1. <0.5 ng/mL represents  "a low risk of severe sepsis and/or septic shock.  2. >2 ng/mL represents a high risk of severe sepsis and/or septic shock.    As a Marker for Lower Respiratory Tract Infections that require antibiotic therapy:    PCT on Admission    Antibiotic Therapy       6-12 Hrs later    >0.5                Strongly Recommended  >0.25 - <0.5        Recommended   0.1 - 0.25          Discouraged              Remeasure/reassess PCT  <0.1                Strongly Discouraged     Remeasure/reassess PCT    As 28 day mortality risk marker: \"Change in Procalcitonin Result\" (>80% or <=80%) if Day 0 (or Day 1) and Day 4 values are available. Refer to http://www.[email or web address] [email or web address]-[email or web address]    Change in PCT <=80%  A decrease of PCT levels below or equal to 80% defines a positive change in PCT test result representing a higher risk for 28-day all-cause mortality of patients diagnosed with severe sepsis for septic shock.    Change in PCT >80%  A decrease of PCT levels of more than 80% defines a negative change in PCT result representing a lower risk for 28-day all-cause mortality of patients diagnosed with severe sepsis or septic shock.       Hemoglobin & Hematocrit, Blood [[id number]]  (Abnormal) Collected: 06/23/24 0754    Specimen: Blood Updated: 06/23/24 0844     Hemoglobin 7.8 g/dL      Hematocrit 25.2 %     POC Glucose Once [[id number]]  (Normal) Collected: 06/23/24 0617    Specimen: Blood Updated: 06/23/24 0619     Glucose 104 mg/dL     Basic Metabolic Panel [[id number]]  (Abnormal) Collected: 06/23/24 0442    Specimen: Blood Updated: 06/23/24 0555     Glucose 100 mg/dL      BUN 62 mg/dL      Creatinine 2.35 mg/dL      Sodium 146 mmol/L      Potassium 4.1 mmol/L      Chloride 108 mmol/L      CO2 26.9 mmol/L      Calcium 8.3 mg/dL      BUN/Creatinine Ratio 26.4     Anion Gap 11.1 mmol/L      eGFR 26.6 mL/min/1.73     Narrative:      GFR Normal >60  Chronic Kidney Disease <60  Kidney Failure <15    The GFR formula is only valid for adults with " stable renal function between ages 18 and 70.    CBC (No Diff) [[id number]]  (Abnormal) Collected: 06/23/24 0442    Specimen: Blood Updated: 06/23/24 0534     WBC 8.14 10*3/mm3      RBC 3.02 10*6/mm3      Hemoglobin 7.6 g/dL      Hematocrit 24.1 %      MCV 79.8 fL      MCH 25.2 pg      MCHC 31.5 g/dL      RDW 16.5 %      RDW-SD 47.4 fl      MPV 10.0 fL      Platelets 255 10*3/mm3     Hemoglobin & Hematocrit, Blood [[id number]]  (Abnormal) Collected: 06/22/24 2351    Specimen: Blood Updated: 06/23/24 0009     Hemoglobin 8.2 g/dL      Hematocrit 26.3 %     Occult Blood X 1, Stool - Stool, Per Rectum [[id number]]  (Abnormal) Collected: 06/22/24 1828    Specimen: Stool from Per Rectum Updated: 06/22/24 1834     Fecal Occult Blood Positive    Hemoglobin & Hematocrit, Blood [[id number]]  (Abnormal) Collected: 06/22/24 1632    Specimen: Blood Updated: 06/22/24 1659     Hemoglobin 8.1 g/dL      Hematocrit 26.6 %     Sodium, Urine, Random - Urine, Clean Catch [[id number]] Collected: 06/22/24 1436    Specimen: Urine, Clean Catch Updated: 06/22/24 1508     Sodium, Urine 40 mmol/L     Narrative:      Reference intervals for random urine have not been established.  Clinical usage is dependent upon physician's interpretation in combination with other laboratory tests.       Protein / Creatinine Ratio, Urine - Urine, Clean Catch [[id number]]  (Abnormal) Collected: 06/22/24 1436    Specimen: Urine, Clean Catch Updated: 06/22/24 1508     Protein/Creatinine Ratio, Urine 3,409.4 mg/G Crea      Creatinine, Urine 12.7 mg/dL      Total Protein, Urine 43.3 mg/dL     Urinalysis, Microscopic Only - Urine, Clean Catch [[id number]]  (Abnormal) Collected: 06/22/24 1436    Specimen: Urine, Clean Catch Updated: 06/22/24 1456     RBC, UA 3-5 /HPF      WBC, UA 0-2 /HPF      Bacteria, UA None Seen /HPF      Squamous Epithelial Cells, UA 0-2 /HPF      Hyaline Casts, UA 0-2 /LPF      Methodology Automated Microscopy    Urinalysis With Microscopic If  Indicated (No Culture) - Urine, Clean Catch [[id number]]  (Abnormal) Collected: 06/22/24 1436    Specimen: Urine, Clean Catch Updated: 06/22/24 1453     Color, UA Yellow     Appearance, UA Clear     pH, UA 6.5     Specific Gravity, UA 1.007     Glucose,  mg/dL (Trace)     Ketones, UA Negative     Bilirubin, UA Negative     Blood, UA Large (3+)     Protein, UA 30 mg/dL (1+)     Leuk Esterase, UA Negative     Nitrite, UA Negative     Urobilinogen, UA 0.2 E.U./dL    Reticulocytes [[id number]]  (Normal) Collected: 06/22/24 0645    Specimen: Blood Updated: 06/22/24 1110     Reticulocyte % 1.52 %      Reticulocyte Absolute 0.0454 10*6/mm3     Vitamin B12 [[id number]]  (Abnormal) Collected: 06/22/24 0759    Specimen: Blood Updated: 06/22/24 0852     Vitamin B-12 1,205 pg/mL     Narrative:      Results may be falsely increased if patient taking Biotin.      Folate [[id number]]  (Normal) Collected: 06/22/24 0759    Specimen: Blood Updated: 06/22/24 0852     Folate >20.00 ng/mL     Narrative:      Results may be falsely increased if patient taking Biotin.      Hemoglobin & Hematocrit, Blood [[id number]]  (Abnormal) Collected: 06/22/24 0759    Specimen: Blood Updated: 06/22/24 0818     Hemoglobin 7.6 g/dL      Hematocrit 24.1 %     Haptoglobin [[id number]]  (Abnormal) Collected: 06/22/24 0645    Specimen: Blood Updated: 06/22/24 0815     Haptoglobin 364 mg/dL     Iron [[id number]]  (Abnormal) Collected: 06/22/24 0645    Specimen: Blood Updated: 06/22/24 0815     Iron 23 mcg/dL     Iron Profile [[id number]]  (Abnormal) Collected: 06/22/24 0645    Specimen: Blood Updated: 06/22/24 0815     Iron 23 mcg/dL      Iron Saturation (TSAT) 11 %      Transferrin 141 mg/dL      TIBC 210 mcg/dL     Lactate Dehydrogenase [[id number]]  (Normal) Collected: 06/22/24 0645    Specimen: Blood Updated: 06/22/24 0815      U/L     Digitoxin Level [[id number]] Collected: 06/22/24 0759    Specimen: Blood Updated: 06/22/24 0812    Ferritin  [[id number]]  (Abnormal) Collected: 06/22/24 0645    Specimen: Blood Updated: 06/22/24 0755     Ferritin 544.00 ng/mL     Narrative:      Results may be falsely decreased if patient taking Biotin.      Comprehensive Metabolic Panel [[id number]]  (Abnormal) Collected: 06/22/24 0645    Specimen: Blood Updated: 06/22/24 0748     Glucose 73 mg/dL      BUN 76 mg/dL      Creatinine 2.50 mg/dL      Sodium 141 mmol/L      Potassium 4.6 mmol/L      Chloride 104 mmol/L      CO2 25.1 mmol/L      Calcium 8.6 mg/dL      Total Protein 5.5 g/dL      Albumin 2.4 g/dL      ALT (SGPT) 14 U/L      AST (SGOT) 11 U/L      Alkaline Phosphatase 126 U/L      Total Bilirubin 0.4 mg/dL      Globulin 3.1 gm/dL      A/G Ratio 0.8 g/dL      BUN/Creatinine Ratio 30.4     Anion Gap 11.9 mmol/L      eGFR 24.7 mL/min/1.73     Narrative:      GFR Normal >60  Chronic Kidney Disease <60  Kidney Failure <15    The GFR formula is only valid for adults with stable renal function between ages 18 and 70.    Hemoglobin A1c [[id number]]  (Normal) Collected: 06/22/24 0645    Specimen: Blood Updated: 06/22/24 0740     Hemoglobin A1C 5.30 %     Narrative:      Hemoglobin A1C Ranges:    Increased Risk for Diabetes  5.7% to 6.4%  Diabetes                     >= 6.5%  Diabetic Goal                < 7.0%    CBC Auto Differential [[id number]]  (Abnormal) Collected: 06/22/24 0645    Specimen: Blood Updated: 06/22/24 0729     WBC 9.39 10*3/mm3      RBC 2.98 10*6/mm3      Hemoglobin 7.5 g/dL      Hematocrit 24.1 %      MCV 80.9 fL      MCH 25.2 pg      MCHC 31.1 g/dL      RDW 16.3 %      RDW-SD 48.3 fl      MPV 9.9 fL      Platelets 234 10*3/mm3      Neutrophil % 83.8 %      Lymphocyte % 7.6 %      Monocyte % 7.6 %      Eosinophil % 0.3 %      Basophil % 0.1 %      Immature Grans % 0.6 %      Neutrophils, Absolute 7.87 10*3/mm3      Lymphocytes, Absolute 0.71 10*3/mm3      Monocytes, Absolute 0.71 10*3/mm3      Eosinophils, Absolute 0.03 10*3/mm3      Basophils, Absolute  0.01 10*3/mm3      Immature Grans, Absolute 0.06 10*3/mm3      nRBC 0.0 /100 WBC     Basic Metabolic Panel [[id number]]  (Abnormal) Collected: 06/21/24 2250    Specimen: Blood Updated: 06/21/24 2326     Glucose 80 mg/dL      BUN 83 mg/dL      Creatinine 2.60 mg/dL      Sodium 138 mmol/L      Potassium 5.0 mmol/L      Chloride 101 mmol/L      CO2 25.0 mmol/L      Calcium 8.4 mg/dL      BUN/Creatinine Ratio 31.9     Anion Gap 12.0 mmol/L      eGFR 23.6 mL/min/1.73     Narrative:      GFR Normal >60  Chronic Kidney Disease <60  Kidney Failure <15    The GFR formula is only valid for adults with stable renal function between ages 18 and 70.    Hemoglobin & Hematocrit, Blood [[id number]]  (Abnormal) Collected: 06/21/24 2250    Specimen: Blood Updated: 06/21/24 2310     Hemoglobin 7.6 g/dL      Hematocrit 24.1 %     Basic Metabolic Panel [[id number]]  (Abnormal) Collected: 06/21/24 1411    Specimen: Blood Updated: 06/21/24 1446     Glucose 88 mg/dL      BUN 79 mg/dL      Creatinine 2.75 mg/dL      Sodium 138 mmol/L      Potassium 6.2 mmol/L      Chloride 103 mmol/L      CO2 24.1 mmol/L      Calcium 8.6 mg/dL      BUN/Creatinine Ratio 28.7     Anion Gap 10.9 mmol/L      eGFR 22.0 mL/min/1.73     Narrative:      GFR Normal >60  Chronic Kidney Disease <60  Kidney Failure <15    The GFR formula is only valid for adults with stable renal function between ages 18 and 70.    CBC (No Diff) [[id number]]  (Abnormal) Collected: 06/21/24 1411    Specimen: Blood Updated: 06/21/24 1430     WBC 10.47 10*3/mm3      RBC 2.59 10*6/mm3      Hemoglobin 6.6 g/dL      Hematocrit 20.7 %      MCV 79.9 fL      MCH 25.5 pg      MCHC 31.9 g/dL      RDW 16.4 %      RDW-SD 46.6 fl      MPV 9.4 fL      Platelets 252 10*3/mm3     Basic Metabolic Panel [[id number]]  (Abnormal) Collected: 06/21/24 1311    Specimen: Blood Updated: 06/21/24 1342     Glucose 95 mg/dL      BUN 85 mg/dL      Creatinine 2.59 mg/dL      Sodium 136 mmol/L      Potassium 5.6 mmol/L       Chloride 101 mmol/L      CO2 23.0 mmol/L      Calcium 8.5 mg/dL      BUN/Creatinine Ratio 32.8     Anion Gap 12.0 mmol/L      eGFR 23.7 mL/min/1.73     Narrative:      GFR Normal >60  Chronic Kidney Disease <60  Kidney Failure <15    The GFR formula is only valid for adults with stable renal function between ages 18 and 70.    CBC (No Diff) [[id number]]  (Abnormal) Collected: 06/21/24 1311    Specimen: Blood Updated: 06/21/24 1324     WBC 11.92 10*3/mm3      RBC 2.88 10*6/mm3      Hemoglobin 7.3 g/dL      Hematocrit 23.7 %      MCV 82.3 fL      MCH 25.3 pg      MCHC 30.8 g/dL      RDW 16.5 %      RDW-SD 49.2 fl      MPV 10.0 fL      Platelets 279 10*3/mm3             Imaging Results (All)       Procedure Component Value Units Date/Time    CT Abdomen Pelvis Without Contrast [[id number]] Collected: 06/24/24 1150     Updated: 06/24/24 1218    Narrative:      CT OF THE ABDOMEN AND PELVIS WITHOUT CONTRAST 06/24/2024     HISTORY: Hydronephrosis.     Spiral images were obtained from the lung bases to the symphysis pubis.  No intravenous contrast was given.     There is a left pleural effusion. Atelectasis, pneumonia and small  amount of pleural fluid is seen on the right with right chest tube  terminating in the right base. A tiny amount of air is seen in the  pleural space on the right as well. Please see additional dictation for  the CT of the chest from 06/23/2024.     Gallbladder is largely filled with stones. No gallbladder inflammatory  changes are seen. The liver, spleen, pancreas and adrenals appear within  normal limits. There is moderately severe hydronephrosis and hydroureter  bilaterally. Urinary bladder demonstrates moderately severe wall  thickening. A Whitehead catheter is seen in the urinary bladder. Punctate  air bubble seen in the urinary bladder.     A small amount of stool is seen in the colon except for the rectum which  is moderately severely distended with stool measuring up to 8.4 cm.     No bowel  wall thickening or bowel dilatation is seen. There are  postoperative changes of the spine.       Impression:      1. Moderately severe bilateral hydronephrosis and hydroureter with  urinary bladder wall thickening. The urinary bladder wall thickening  could be related to cystitis and/or infiltrative neoplasm. There could  be an element of bladder outlet obstruction as well. Whitehead catheter is  seen in the urinary bladder which is only partially distended.  2. Areas of increased density in both lung bases as described. Right  chest tube is seen in the right base. Please additional dictation for  yesterday's CT scan of the chest.  3. Cholelithiasis.  4. Large amount of stool in the rectum.           Radiation dose reduction techniques were utilized, including automated  exposure control and exposure modulation based on body size.        This report was finalized on 6/24/2024 12:15 PM by Dr. Romeo Cummings M.D on Workstation: [id number]       XR Chest 1 View [[id number]] Collected: 06/24/24 1102     Updated: 06/24/24 1106    Narrative:      XR CHEST 1 VW-     Clinical: Pleural effusion     COMPARISON examination dated 6/13/2024     FINDINGS: Cardiomegaly similar to the previous examination. The left  lung is clear. No vascular congestion seen. Diminished opacity at the  right lung base, likely related to decreasing pleural effusion and  airspace disease. There is a Pleurx catheter looped at the right lung  base. There is small loculated pneumothorax along the costophrenic  sulcus. The remainder is unremarkable.     This report was finalized on 6/24/2024 11:03 AM by Dr. Gregory Dang M.D on Workstation: [id number]       CT Chest Without Contrast Diagnostic [[id number]] Collected: 06/23/24 0844     Updated: 06/23/24 0903    Narrative:      CT CHEST WO CONTRAST DIAGNOSTIC-     INDICATION: Pleural effusion assessment     COMPARISON: CT chest May 30, 2024     TECHNIQUE:  Routine CT chest without IV contrast.  Coronal and sagittal reformats.  Radiation dose reduction techniques were utilized, including automated  exposure control and exposure modulation based on body size.     FINDINGS:      Chest wall: Atrophy of the bilateral rotator cuff musculature.  Gynecomastia. No lymphadenopathy.     Thyroid: Multinodular goiter.     Mediastinum: Coronary artery atherosclerotic calcifications. Mild  cardiomegaly. Aortic valve calcification. No pericardial effusion.  Enlarged main pulmonary artery. Calcified mediastinal and right hilar  lymph nodes, consistent with prior granulomatous infection. Stable  mildly prominent mediastinal and hilar lymph nodes. For example, right  infrahilar lymph node, series 2, axial mage 58, measures 1.4 cm,  unchanged.     Lungs/pleura: Small loculated right pleural effusion with pleural  thickening, with small amount of loculated fluid extending into the  fissures. Loculated pneumothorax at the right lung base. Right sided  chest tube with the catheter coursing through the loculated effusion and  pneumothorax. Small left pleural effusion. Mild airways wall thickening  and small amount of cylindrical bronchiolectasis. Heterogeneous  peripheral opacities in the right lung, greatest in the inferior right  upper lobe, lateral segment right middle lobe and superior basilar  segments of the right lower lobe with associated groundglass opacities  in the right lower lobe. Some centrilobular and tree-in-bud nodules seen  in the superior segment left lower lobe.     Upper abdomen: Gallbladder full of gallstones. Left hydronephrosis,  incompletely imaged. Suspect cyst in the superior pole left kidney.     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       Impression:         1. Mild right sided loculated hydropneumothorax with decreased fluid  component and increased gas component with associated pleural  thickening. Could be empyema  or malignant effusion if patient has a  known malignancy. Correlate with chest tube output.  2. Small left pleural effusion, slightly increased in the interval.  3. Heterogeneous peripheral opacities in the right lung, greatest in the  lower lung, mild increased. Opacities are nonspecific. Could be  multifocal pneumonia with follow-up to resolution recommended.  4. New centrilobular and tree-in-bud nodules in the superior segment  left lower lobe. Suspect infectious bronchiolitis.  5. Mild cardiomegaly.  6. Enlarged main pulmonary artery, can be seen with pulmonary artery  hypertension.     Additional findings described above that are stable     This report was finalized on 6/23/2024 9:00 AM by Dr. Taiwo Espinosa M.D on Workstation: [id number]       US Renal Bilateral [[id number]] Collected: 06/22/24 1103     Updated: 06/22/24 1116    Narrative:      US RENAL BILATERAL-     Clinical: Acute renal insufficiency     Ultrasound findings: The right kidney measures 10.5 cm in length and  left kidney measures 10.9 cm in length.     There is moderate right-sided hydronephrosis, mild to moderate  left-sided hydronephrosis. Neither the right or left ureteral jet could  be documented. Urinary bladder appears well distended, there appears to  be a small amount of debris at the bladder base. No bladder stone.     The renal cortical echotexture appears slightly greater than anticipated  suggesting underlying medical renal disease. Few tiny renal cysts noted.  No suspicious renal lesion seen. The remainder is unremarkable.     This report was finalized on 6/22/2024 11:13 AM by Dr. Gregory Dang M.D on Workstation: [id number]               Medication Review:   Current Facility-Administered Medications   Medication Dose Route Frequency Provider Last Rate Last Admin    cholecalciferol (VITAMIN D3) tablet 1,000 Units  1,000 Units Oral Daily Baron Ponce MD   1,000 Units at 06/24/24 0901    collagenase ointment 1 Application  1  Application Topical Q24H Feliciano Torres MD   1 Application at 06/24/24 0904    [Held by provider] digoxin (LANOXIN) tablet 125 mcg  125 mcg Oral Daily Baron Ponce MD        gabapentin (NEURONTIN) capsule 100 mg  100 mg Oral Nightly Baron Ponce MD   100 mg at 06/23/24 2216    LORazepam (ATIVAN) tablet 0.5 mg  0.5 mg Oral Q8H PRN Ricardo Kiser MD   0.5 mg at 06/22/24 1747    methIMAzole (TAPAZOLE) tablet 5 mg  5 mg Oral Once per day on Monday Wednesday Friday Baron Ponce MD   5 mg at 06/24/24 0902    metoprolol tartrate (LOPRESSOR) tablet 25 mg  25 mg Oral Q12H Baron Ponce MD   25 mg at 06/24/24 0902    midodrine (PROAMATINE) tablet 5 mg  5 mg Oral BID AC Baron Ponce MD   5 mg at 06/24/24 0632    nitroglycerin (NITROSTAT) SL tablet 0.4 mg  0.4 mg Sublingual Q5 Min PRN Baron Ponce MD        sodium chloride 0.9 % flush 10 mL  10 mL Intravenous Q12H Baron Ponce MD   10 mL at 06/24/24 0904    sodium chloride 0.9 % flush 10 mL  10 mL Intravenous PRN Baron Ponce MD        sodium chloride 0.9 % infusion 40 mL  40 mL Intravenous PRN Baorn Ponce MD        tamsulosin (FLOMAX) 24 hr capsule 0.4 mg  0.4 mg Oral Daily Ricardo Kiser MD   0.4 mg at 06/24/24 0902       Current Facility-Administered Medications:     cholecalciferol (VITAMIN D3) tablet 1,000 Units, 1,000 Units, Oral, Daily, Baron Ponce MD, 1,000 Units at 06/24/24 0901    collagenase ointment 1 Application, 1 Application, Topical, Q24H, Feliciano Torres MD, 1 Application at 06/24/24 0904    [Held by provider] digoxin (LANOXIN) tablet 125 mcg, 125 mcg, Oral, Daily, Baron Ponce MD    gabapentin (NEURONTIN) capsule 100 mg, 100 mg, Oral, Nightly, Baron Ponce MD, 100 mg at 06/23/24 2216    LORazepam (ATIVAN) tablet 0.5 mg, 0.5 mg, Oral, Q8H PRN, Ricardo Kiser MD, 0.5 mg at 06/22/24 1747    methIMAzole (TAPAZOLE) tablet 5 mg, 5 mg, Oral, Once per day on Monday Wednesday Friday, Baron Ponce MD, 5 mg at 06/24/24 0902    metoprolol tartrate  (LOPRESSOR) tablet 25 mg, 25 mg, Oral, Q12H, Baron Ponce MD, 25 mg at 06/24/24 0902    midodrine (PROAMATINE) tablet 5 mg, 5 mg, Oral, BID AC, Baron Ponce MD, 5 mg at 06/24/24 0632    nitroglycerin (NITROSTAT) SL tablet 0.4 mg, 0.4 mg, Sublingual, Q5 Min PRN, Baron Ponce MD    sodium chloride 0.9 % flush 10 mL, 10 mL, Intravenous, Q12H, Baron Ponce MD, 10 mL at 06/24/24 0904    sodium chloride 0.9 % flush 10 mL, 10 mL, Intravenous, PRN, Baron Ponce MD    sodium chloride 0.9 % infusion 40 mL, 40 mL, Intravenous, PRN, Baron Ponce MD    tamsulosin (FLOMAX) 24 hr capsule 0.4 mg, 0.4 mg, Oral, Daily, Ricardo Kiser MD, 0.4 mg at 06/24/24 0902  Medications Discontinued During This Encounter   Medication Reason    sodium chloride 0.9 % flush 3 mL Patient Transfer    sodium chloride 0.9 % flush 3-10 mL Patient Transfer    sodium chloride 0.9 % infusion 40 mL Patient Transfer    heparin (porcine) 5000 UNIT/ML injection 5,000 Units Patient Transfer    sodium chloride 0.9 % flush 3 mL Patient Transfer    sodium chloride 0.9 % flush 3-10 mL Patient Transfer    lidocaine (XYLOCAINE) 1 % injection 0.5 mL Patient Transfer    fentaNYL citrate (PF) (SUBLIMAZE) injection 50 mcg Patient Transfer    midazolam (VERSED) injection 0.5 mg Patient Transfer    famotidine (PEPCID) injection 20 mg Patient Transfer    furosemide (LASIX) tablet 20 mg     empagliflozin (JARDIANCE) tablet 10 mg     sodium chloride 0.9 % infusion     lactated ringers infusion     sodium chloride 0.45 % infusion        Assessment & Plan   Hydronephrosis      Pleural effusion    Hyperthyroidism    Hyperkalemia    Longstanding persistent atrial fibrillation    Ulcer of left heel    Recurrent pleural effusion on right    Chronic heart failure with preserved ejection fraction (HFpEF)    CKD (chronic kidney disease)    Anemia        Plan   - CT stone demonstrates persistent bilateral hydronephrosis and hydroureter with a thick walled bladder  - Since renal  function is improving, we will defer stent placement currently. However, I will arrange follow up imaging in 1 week to ensure hydronephrosis improves with longer term catheterization  - we will plan office cystoscopy after discharge    Johnson Kang Jr., MD  06/24/24  16:18 EDT

## 2024-06-24 NOTE — PLAN OF CARE
Problem: Adult Inpatient Plan of Care  Goal: Plan of Care Review  Outcome: Ongoing, Progressing   Goal Outcome Evaluation:         VSS ex/ Afib. RA. AxO x3, disoriented to situation. R pleurex to -20 sxn, no airleak, fluctuation, or subQ air. Drx CDI. 1/2 NS @ 125 mL/hr. Whitehead patent, care completed. Stage 2 PI meplex changed. KARINA mattress, Q2 turn unless pt refused. L heel boot on.

## 2024-06-24 NOTE — TELEPHONE ENCOUNTER
Patient's wife called concerned stiches needed to come off the patient's chest, advised she needed someone from the office to come and take care of this. Advised they do not need to come off they are absorbable stiches. Advised Dr. Wynn will see them tomorrow at the hospital, okay per Dr. Wynn.

## 2024-06-24 NOTE — PROCEDURES
Anesthesia: None     Summary of procedure: Under sterile technique the pleural catheter was opened.  10 mg of TPA (reconstituted in 30cc of normal saline and 5 mg of dornase (reconstituted in 30cc sterile water) was instilled into the pleural cavity.    The pleural tube was clamped.  The patient's position was changed to every 15 minutes for 2 hours.  The tube was then unclamped and placed to -20 cm of suction.  Patient tolerated the procedure well.     CHARLEE Pascal

## 2024-06-24 NOTE — PROGRESS NOTES
Progress note    Reason for consult: Pleural catheter removal    Patient was supposed to have his Pleurx catheter removed but was found to have anemia and he did not stop his Eliquis therefore the procedure was postponed.  The Pleurx catheter was connected to atrium and has drained 1500 cc serosanguineous fluid.    No acute distress.  Non labored breathing.  No airleak noted.  Extremities warm.  Moving all 4 extremities.    Labs reviewed.  Imaging reviewed.  CT scan of the chest showed right basilar pneumothorax which is likely dead space due to inadequate lung expansion.  There is a rind around the right lower lobe preventing complete lung expansion.  I doubt that the lung will expand completely.  I think we should leave the Pleurx catheter for the time being as it has been draining fluid.  We will inject tPA and dornase into the Pleurx catheter tomorrow to facilitate better drainage and hopefully return to improve lung expansion.    Rolando Santoro MD  Thoracic surgeon

## 2024-06-24 NOTE — PROGRESS NOTES
"    Chief Complaint: Recurrent pleural effusion with Pleurx catheter in place    Subjective:  Symptoms:  Stable.  No shortness of breath, cough or chest pain.    Diet:  No nausea or vomiting.    Activity level: Returning to normal.    Pain:  He reports no pain.    Resting comfortably in bed.  Without complaints.      Vital Signs:  Temp:  [95.4 °F (35.2 °C)-97.3 °F (36.3 °C)] 97.1 °F (36.2 °C)  Heart Rate:  [55-74] 55  Resp:  [16] 16  BP: ()/(48-71) 90/48    Intake & Output (last day)         06/23 0701  06/24 0700 06/24 0701  06/25 0700    P.O. 840 480    I.V. (mL/kg) 2000 (31.5)     Total Intake(mL/kg) 2840 (44.7) 480 (7.6)    Urine (mL/kg/hr) 3250 (2.1) 700 (1.6)    Stool      Chest Tube 230     Total Output 3480 700    Net -640 -220                  Objective:  General Appearance:  Comfortable and in no acute distress.    Vital signs: (most recent): Blood pressure 90/48, pulse 55, temperature 97.1 °F (36.2 °C), temperature source Axillary, resp. rate 16, height 177.8 cm (70\"), weight 63.5 kg (140 lb), SpO2 98%.    Lungs:  Normal effort and normal respiratory rate.  He is not in respiratory distress.    Heart: Normal rate.  Regular rhythm.    Chest: Symmetric chest wall expansion.   Abdomen: Abdomen is soft and non-distended.    Neurological: Patient is alert and oriented to person, place and time.    Skin:  Warm and dry.                Pleurx:   Site: Right, Clean, Dry, Intact, and Securement device intact  Suction: -20 cm  Air leak: Negative  24 Hour Total: 230ml     Results Review:     I reviewed the patient's new clinical results.  I reviewed the patient's new imaging results and agree with the interpretation.  Discussed with patient, nurse, and surgeon    Imaging Results (Last 24 Hours)       Procedure Component Value Units Date/Time    CT Abdomen Pelvis Without Contrast [[id number]] Collected: 06/24/24 1150     Updated: 06/24/24 1218    Narrative:      CT OF THE ABDOMEN AND PELVIS WITHOUT CONTRAST " 06/24/2024     HISTORY: Hydronephrosis.     Spiral images were obtained from the lung bases to the symphysis pubis.  No intravenous contrast was given.     There is a left pleural effusion. Atelectasis, pneumonia and small  amount of pleural fluid is seen on the right with right chest tube  terminating in the right base. A tiny amount of air is seen in the  pleural space on the right as well. Please see additional dictation for  the CT of the chest from 06/23/2024.     Gallbladder is largely filled with stones. No gallbladder inflammatory  changes are seen. The liver, spleen, pancreas and adrenals appear within  normal limits. There is moderately severe hydronephrosis and hydroureter  bilaterally. Urinary bladder demonstrates moderately severe wall  thickening. A Whitehead catheter is seen in the urinary bladder. Punctate  air bubble seen in the urinary bladder.     A small amount of stool is seen in the colon except for the rectum which  is moderately severely distended with stool measuring up to 8.4 cm.     No bowel wall thickening or bowel dilatation is seen. There are  postoperative changes of the spine.       Impression:      1. Moderately severe bilateral hydronephrosis and hydroureter with  urinary bladder wall thickening. The urinary bladder wall thickening  could be related to cystitis and/or infiltrative neoplasm. There could  be an element of bladder outlet obstruction as well. Whitehead catheter is  seen in the urinary bladder which is only partially distended.  2. Areas of increased density in both lung bases as described. Right  chest tube is seen in the right base. Please additional dictation for  yesterday's CT scan of the chest.  3. Cholelithiasis.  4. Large amount of stool in the rectum.           Radiation dose reduction techniques were utilized, including automated  exposure control and exposure modulation based on body size.        This report was finalized on 6/24/2024 12:15 PM by Dr. Walters  IRENA Cummings on Workstation: [id number]       XR Chest 1 View [[id number]] Collected: 06/24/24 1102     Updated: 06/24/24 1106    Narrative:      XR CHEST 1 VW-     Clinical: Pleural effusion     COMPARISON examination dated 6/13/2024     FINDINGS: Cardiomegaly similar to the previous examination. The left  lung is clear. No vascular congestion seen. Diminished opacity at the  right lung base, likely related to decreasing pleural effusion and  airspace disease. There is a Pleurx catheter looped at the right lung  base. There is small loculated pneumothorax along the costophrenic  sulcus. The remainder is unremarkable.     This report was finalized on 6/24/2024 11:03 AM by Dr. Gregory Dang M.D on Workstation: [id number]               Lab Results:     Lab Results (last 24 hours)       Procedure Component Value Units Date/Time    Basic Metabolic Panel [[id number]]  (Abnormal) Collected: 06/24/24 0616    Specimen: Blood Updated: 06/24/24 0720     Glucose 76 mg/dL      BUN 44 mg/dL      Creatinine 1.73 mg/dL      Sodium 137 mmol/L      Potassium 3.8 mmol/L      Comment: Slight hemolysis detected by analyzer. Result may be falsely elevated.        Chloride 106 mmol/L      CO2 22.5 mmol/L      Calcium 7.9 mg/dL      BUN/Creatinine Ratio 25.4     Anion Gap 8.5 mmol/L      eGFR 38.4 mL/min/1.73     Narrative:      GFR Normal >60  Chronic Kidney Disease <60  Kidney Failure <15    The GFR formula is only valid for adults with stable renal function between ages 18 and 70.    CBC (No Diff) [[id number]]  (Abnormal) Collected: 06/24/24 0616    Specimen: Blood Updated: 06/24/24 0716     WBC 6.42 10*3/mm3      RBC 2.95 10*6/mm3      Hemoglobin 7.4 g/dL      Hematocrit 24.1 %      MCV 81.7 fL      MCH 25.1 pg      MCHC 30.7 g/dL      RDW 16.0 %      RDW-SD 47.6 fl      MPV 9.8 fL      Platelets 224 10*3/mm3     Blood Culture - Blood, Arm, Left [[id number]] Collected: 06/23/24 1630    Specimen: Blood from Arm, Left Updated:  06/23/24 1703    Blood Culture - Blood, Hand, Right [[id number]] Collected: 06/23/24 1629    Specimen: Blood from Hand, Right Updated: 06/23/24 1703    Respiratory Panel PCR w/COVID-19(SARS-CoV-2) [facility] In-House, NP Swab in UTM/VTM, 2 HR TAT - Swab, Nasopharynx [[id number]]  (Normal) Collected: 06/23/24 1256    Specimen: Swab from Nasopharynx Updated: 06/23/24 1413     ADENOVIRUS, PCR Not Detected     Coronavirus 229E Not Detected     Coronavirus HKU1 Not Detected     Coronavirus NL63 Not Detected     Coronavirus OC43 Not Detected     COVID19 Not Detected     Human Metapneumovirus Not Detected     Human Rhinovirus/Enterovirus Not Detected     Influenza A PCR Not Detected     Influenza B PCR Not Detected     Parainfluenza Virus 1 Not Detected     Parainfluenza Virus 2 Not Detected     Parainfluenza Virus 3 Not Detected     Parainfluenza Virus 4 Not Detected     RSV, PCR Not Detected     Bordetella pertussis pcr Not Detected     Bordetella parapertussis PCR Not Detected     Chlamydophila pneumoniae PCR Not Detected     Mycoplasma pneumo by PCR Not Detected    Narrative:      In the setting of a positive respiratory panel with a viral infection PLUS a negative procalcitonin without other underlying concern for bacterial infection, consider observing off antibiotics or discontinuation of antibiotics and continue supportive care. If the respiratory panel is positive for atypical bacterial infection (Bordetella pertussis, Chlamydophila pneumoniae, or Mycoplasma pneumoniae), consider antibiotic de-escalation to target atypical bacterial infection.             Assessment & Plan       Pleural effusion    Hyperthyroidism    Hyperkalemia    Longstanding persistent atrial fibrillation    Ulcer of left heel    Recurrent pleural effusion on right    Chronic heart failure with preserved ejection fraction (HFpEF)    CKD (chronic kidney disease)    Anemia       Assessment & Plan  Continues to do well.  Pleurx to  bedside drainage and has drained a small amount of serous fluid.  Follow-up chest x-ray performed today demonstrates improved effusion with residual likely loculated effusion.  Plan for initial round of intrapleural lytic therapy today via Pleurx.  Continue to -20 cm of suction once unclamped.  Trend output overnight.  Repeat a.m. chest x-ray.  Encourage pulmonary hygiene.     CHARLEE Pascal  [facility]  06/24/24  14:03 EDT    Greater than 30  minutes was spent reviewing the patient's chart, including imaging, labs, provider notes, assessing the patient and developing a plan of care which was discussed with the patient and RN. This is separate from any billable procedural time which will be dictated in a separate note.

## 2024-06-24 NOTE — PROGRESS NOTES
Gastroenterology   Inpatient Progress Note    Reason for Follow Up: Anemia    Subjective  Interval History:   No overnight events.  Pleurx drain remains in place.  Hgb 7.4.  No bowel movement overnight.    Current Facility-Administered Medications:     alteplase (ACTIVASE) 10 mg in sodium chloride 0.9 % 30 mL Syringe, 10 mg, Intrapleural, Once **AND** dornase alpha (PULMOZYME) 5 mg in sterile water (preservative free) 30 mL intrapleural syringe, 5 mg, Intrapleural, Once, Feliciano Ford APRN    cholecalciferol (VITAMIN D3) tablet 1,000 Units, 1,000 Units, Oral, Daily, Baron Ponce MD, 1,000 Units at 06/24/24 0901    collagenase ointment 1 Application, 1 Application, Topical, Q24H, Baron Ponce MD, 1 Application at 06/24/24 0904    [Held by provider] digoxin (LANOXIN) tablet 125 mcg, 125 mcg, Oral, Daily, Baron Ponce MD    gabapentin (NEURONTIN) capsule 100 mg, 100 mg, Oral, Nightly, Baron Ponce MD, 100 mg at 06/23/24 2216    LORazepam (ATIVAN) tablet 0.5 mg, 0.5 mg, Oral, Q8H PRN, Ricardo Kiser MD, 0.5 mg at 06/22/24 1747    methIMAzole (TAPAZOLE) tablet 5 mg, 5 mg, Oral, Once per day on Monday Wednesday Friday, Baron Ponce MD, 5 mg at 06/24/24 0902    metoprolol tartrate (LOPRESSOR) tablet 25 mg, 25 mg, Oral, Q12H, Baron Ponce MD, 25 mg at 06/24/24 0902    midodrine (PROAMATINE) tablet 5 mg, 5 mg, Oral, BID AC, Baron Ponce MD, 5 mg at 06/24/24 0632    nitroglycerin (NITROSTAT) SL tablet 0.4 mg, 0.4 mg, Sublingual, Q5 Min PRN, Baron Ponce MD    sodium chloride 0.45 % infusion, 125 mL/hr, Intravenous, Continuous, Desire Colón MD, Last Rate: 125 mL/hr at 06/24/24 0026, 125 mL/hr at 06/24/24 0026    sodium chloride 0.9 % flush 10 mL, 10 mL, Intravenous, Q12H, Baron Ponce MD, 10 mL at 06/24/24 0904    sodium chloride 0.9 % flush 10 mL, 10 mL, Intravenous, PRN, Baron Ponce MD    sodium chloride 0.9 % infusion 40 mL, 40 mL, Intravenous, PRN, Baron Ponce MD    tamsulosin (FLOMAX) 24 hr capsule  0.4 mg, 0.4 mg, Oral, Daily, Ricardo Kiser MD, 0.4 mg at 06/24/24 0902  Review of Systems:               The following systems were reviewed and negative;  constitution and gastrointestinal    Objective     Vital Signs  Temp:  [95.4 °F (35.2 °C)-97.3 °F (36.3 °C)] 97.1 °F (36.2 °C)  Heart Rate:  [55-74] 55  Resp:  [16] 16  BP: ()/(48-71) 90/48  Body mass index is 20.09 kg/m².                  General Appearance:  in no acute distress, alert, and cooperative  Abdomen:  Soft, non-tender, normal bowel sounds; no bruits, organomegaly or masses.                Results Review:                I reviewed the patient's new clinical results.    Results from last 7 days   Lab Units 06/24/24  0616 06/23/24  0754 06/23/24  0442 06/22/24  0759 06/22/24  0645   WBC 10*3/mm3 6.42  --  8.14  --  9.39   HEMOGLOBIN g/dL 7.4* 7.8* 7.6*   < > 7.5*   HEMATOCRIT % 24.1* 25.2* 24.1*   < > 24.1*   PLATELETS 10*3/mm3 224  --  255  --  234    < > = values in this interval not displayed.     Results from last 7 days   Lab Units 06/24/24  0616 06/23/24  0442 06/22/24  0645   SODIUM mmol/L 137 146* 141   POTASSIUM mmol/L 3.8 4.1 4.6   CHLORIDE mmol/L 106 108* 104   CO2 mmol/L 22.5 26.9 25.1   BUN mg/dL 44* 62* 76*   CREATININE mg/dL 1.73* 2.35* 2.50*   CALCIUM mg/dL 7.9* 8.3* 8.6   BILIRUBIN mg/dL  --   --  0.4   ALK PHOS U/L  --   --  126*   ALT (SGPT) U/L  --   --  14   AST (SGOT) U/L  --   --  11   GLUCOSE mg/dL 76 100* 73         Lab Results   Lab Value Date/Time    LIPASE 26 02/21/2022 0702    LIPASE 55 (H) 08/27/2021 1110       Radiology:  XR Chest 1 View   Final Result      CT Abdomen Pelvis Without Contrast   Preliminary Result   1. Moderately severe bilateral hydronephrosis and hydroureter with   urinary bladder wall thickening. The urinary bladder wall thickening   could be related to cystitis and/or infiltrative neoplasm. There could   be an element of bladder outlet obstruction as well. Whitehead catheter is   seen in the urinary  bladder which is only partially distended.   2. Areas of increased density in both lung bases as described. Right   chest tube is seen in the right base. Please additional dictation for   yesterday's CT scan of the chest.   3. Cholelithiasis.   4. Large amount of stool in the rectum.               Radiation dose reduction techniques were utilized, including automated   exposure control and exposure modulation based on body size.              CT Chest Without Contrast Diagnostic   Final Result       1. Mild right sided loculated hydropneumothorax with decreased fluid   component and increased gas component with associated pleural   thickening. Could be empyema or malignant effusion if patient has a   known malignancy. Correlate with chest tube output.   2. Small left pleural effusion, slightly increased in the interval.   3. Heterogeneous peripheral opacities in the right lung, greatest in the   lower lung, mild increased. Opacities are nonspecific. Could be   multifocal pneumonia with follow-up to resolution recommended.   4. New centrilobular and tree-in-bud nodules in the superior segment   left lower lobe. Suspect infectious bronchiolitis.   5. Mild cardiomegaly.   6. Enlarged main pulmonary artery, can be seen with pulmonary artery   hypertension.       Additional findings described above that are stable       This report was finalized on 6/23/2024 9:00 AM by Dr. Taiwo Espinosa M.D on Workstation: [id number]          US Renal Bilateral   Final Result      XR Chest 1 View    (Results Pending)   XR Chest 1 View    (Results Pending)       Assessment & Plan     Active Hospital Problems    Diagnosis     **Pleural effusion     CKD (chronic kidney disease)     Anemia     Chronic heart failure with preserved ejection fraction (HFpEF)     Recurrent pleural effusion on right     Ulcer of left heel     Longstanding persistent atrial fibrillation     Hyperkalemia     Hyperthyroidism         Assessment:  Anemia  Longstanding persistent A-fib on Eliquis-currently on hold  JOSEPH with acute hyperkalemia  Chronic heart failure with preserved VS  Right AKA    These problems are new to me      Plan:  Patient with multifactorial anemia.  Heme positive stool noted.  Continue to trend H&H.  Recommend considering bidirectional endoscopy once Pleurx removed and cleared by all other services  In the interim GI will continue to follow     I discussed the patients findings and my recommendations with patient and nursing staff.          CHARLEE Lopez  [facility] [place]  [address] [place] [address]

## 2024-06-25 ENCOUNTER — APPOINTMENT (OUTPATIENT)
Dept: GENERAL RADIOLOGY | Facility: HOSPITAL | Age: 84
DRG: 186 | End: 2024-06-25
Payer: MEDICARE

## 2024-06-25 ENCOUNTER — TELEPHONE (OUTPATIENT)
Dept: SURGERY | Facility: CLINIC | Age: 84
End: 2024-06-25
Payer: MEDICARE

## 2024-06-25 LAB
ALBUMIN SERPL-MCNC: 2.1 G/DL (ref 3.5–5.2)
ANION GAP SERPL CALCULATED.3IONS-SCNC: 8 MMOL/L (ref 5–15)
BH BB BLOOD EXPIRATION DATE: NORMAL
BH BB BLOOD TYPE BARCODE: 5100
BH BB BLOOD TYPE BARCODE: 5100
BH BB BLOOD TYPE BARCODE: NORMAL
BH BB DISPENSE STATUS: NORMAL
BH BB PRODUCT CODE: NORMAL
BH BB UNIT NUMBER: NORMAL
BUN SERPL-MCNC: 40 MG/DL (ref 8–23)
BUN/CREAT SERPL: 27.6 (ref 7–25)
CALCIUM SPEC-SCNC: 7.8 MG/DL (ref 8.6–10.5)
CHLORIDE SERPL-SCNC: 110 MMOL/L (ref 98–107)
CO2 SERPL-SCNC: 24 MMOL/L (ref 22–29)
CREAT SERPL-MCNC: 1.45 MG/DL (ref 0.76–1.27)
CROSSMATCH INTERPRETATION: NORMAL
DEPRECATED RDW RBC AUTO: 46.5 FL (ref 37–54)
DIGOXIN SERPL-MCNC: 0.8 NG/ML (ref 0.6–1.2)
EGFRCR SERPLBLD CKD-EPI 2021: 47.5 ML/MIN/1.73
ERYTHROCYTE [DISTWIDTH] IN BLOOD BY AUTOMATED COUNT: 15.8 % (ref 12.3–15.4)
GLUCOSE SERPL-MCNC: 78 MG/DL (ref 65–99)
HCT VFR BLD AUTO: 24.3 % (ref 37.5–51)
HGB BLD-MCNC: 7.5 G/DL (ref 13–17.7)
MAGNESIUM SERPL-MCNC: 1.8 MG/DL (ref 1.6–2.4)
MCH RBC QN AUTO: 25 PG (ref 26.6–33)
MCHC RBC AUTO-ENTMCNC: 30.9 G/DL (ref 31.5–35.7)
MCV RBC AUTO: 81 FL (ref 79–97)
PHOSPHATE SERPL-MCNC: 3.2 MG/DL (ref 2.5–4.5)
PLATELET # BLD AUTO: 221 10*3/MM3 (ref 140–450)
PMV BLD AUTO: 10 FL (ref 6–12)
POTASSIUM SERPL-SCNC: 3.5 MMOL/L (ref 3.5–5.2)
RBC # BLD AUTO: 3 10*6/MM3 (ref 4.14–5.8)
SODIUM SERPL-SCNC: 142 MMOL/L (ref 136–145)
T3FREE SERPL-MCNC: 2 PG/ML (ref 2–4.4)
T4 FREE SERPL-MCNC: 1.57 NG/DL (ref 0.92–1.68)
TSH SERPL DL<=0.05 MIU/L-ACNC: 0.88 UIU/ML (ref 0.27–4.2)
UNIT  ABO: NORMAL
UNIT  RH: NORMAL
WBC NRBC COR # BLD AUTO: 8.66 10*3/MM3 (ref 3.4–10.8)

## 2024-06-25 PROCEDURE — 80069 RENAL FUNCTION PANEL: CPT | Performed by: INTERNAL MEDICINE

## 2024-06-25 PROCEDURE — 85027 COMPLETE CBC AUTOMATED: CPT | Performed by: INTERNAL MEDICINE

## 2024-06-25 PROCEDURE — 99232 SBSQ HOSP IP/OBS MODERATE 35: CPT

## 2024-06-25 PROCEDURE — 84439 ASSAY OF FREE THYROXINE: CPT | Performed by: INTERNAL MEDICINE

## 2024-06-25 PROCEDURE — 83735 ASSAY OF MAGNESIUM: CPT | Performed by: INTERNAL MEDICINE

## 2024-06-25 PROCEDURE — 84481 FREE ASSAY (FT-3): CPT | Performed by: INTERNAL MEDICINE

## 2024-06-25 PROCEDURE — 71045 X-RAY EXAM CHEST 1 VIEW: CPT

## 2024-06-25 PROCEDURE — 84443 ASSAY THYROID STIM HORMONE: CPT | Performed by: INTERNAL MEDICINE

## 2024-06-25 PROCEDURE — 80162 ASSAY OF DIGOXIN TOTAL: CPT | Performed by: INTERNAL MEDICINE

## 2024-06-25 PROCEDURE — 99024 POSTOP FOLLOW-UP VISIT: CPT | Performed by: STUDENT IN AN ORGANIZED HEALTH CARE EDUCATION/TRAINING PROGRAM

## 2024-06-25 RX ADMIN — Medication 1000 UNITS: at 08:56

## 2024-06-25 RX ADMIN — MIDODRINE HYDROCHLORIDE 5 MG: 5 TABLET ORAL at 16:56

## 2024-06-25 RX ADMIN — Medication 10 ML: at 10:31

## 2024-06-25 RX ADMIN — TAMSULOSIN HYDROCHLORIDE 0.4 MG: 0.4 CAPSULE ORAL at 08:56

## 2024-06-25 RX ADMIN — LORAZEPAM 0.5 MG: 0.5 TABLET ORAL at 16:57

## 2024-06-25 RX ADMIN — MIDODRINE HYDROCHLORIDE 5 MG: 5 TABLET ORAL at 08:56

## 2024-06-25 RX ADMIN — COLLAGENASE SANTYL 1 APPLICATION: 250 OINTMENT TOPICAL at 09:05

## 2024-06-25 RX ADMIN — GABAPENTIN 100 MG: 100 CAPSULE ORAL at 20:28

## 2024-06-25 RX ADMIN — Medication 10 ML: at 20:28

## 2024-06-25 NOTE — PROGRESS NOTES
"    Chief Complaint: Recurrent pleural effusion with Pleurx catheter in place  S/p intrapleural lytic therapy x 1, 6/24/2024    Subjective:  Symptoms:  Improved.  No shortness of breath, cough or chest pain.    Diet:  No nausea or vomiting.    Activity level: Returning to normal.    Pain:  He reports no pain.    Resting comfortably in bed.  No new complaints.  Wife at bedside.    Vital Signs:  Temp:  [97.4 °F (36.3 °C)-97.7 °F (36.5 °C)] 97.7 °F (36.5 °C)  Heart Rate:  [63-75] 63  Resp:  [16] 16  BP: ()/(55-62) 103/59    Intake & Output (last day)         06/24 0701  06/25 0700 06/25 0701 06/26 0700    P.O. 720     I.V. (mL/kg)      Total Intake(mL/kg) 720 (11.3)     Urine (mL/kg/hr) 3525 (2.3)     Stool 0     Chest Tube 375     Total Output 3900     Net -3180           Stool Unmeasured Occurrence 3 x             Objective:  General Appearance:  Comfortable and in no acute distress.    Vital signs: (most recent): Blood pressure 103/59, pulse 63, temperature 97.7 °F (36.5 °C), temperature source Oral, resp. rate 16, height 177.8 cm (70\"), weight 63.5 kg (140 lb), SpO2 99%.    Lungs:  Normal effort and normal respiratory rate.  He is not in respiratory distress.    Heart: Normal rate.  Regular rhythm.    Chest: Symmetric chest wall expansion.   Abdomen: Abdomen is soft and non-distended.    Neurological: Patient is alert and oriented to person, place and time.    Skin:  Warm and dry.                Pleurx:   Site: Right, Clean, Dry, Intact, and Securement device intact  Suction: -20 cm  Air leak: Negative  24 Hour Total: 375ml     Results Review:     I reviewed the patient's new clinical results.  I reviewed the patient's new imaging results and agree with the interpretation.  Discussed with patient, nurse, and surgeon    Imaging Results (Last 24 Hours)       Procedure Component Value Units Date/Time    XR Chest 1 View [[id number]] Collected: 06/25/24 0725     Updated: 06/25/24 0730    Narrative:      ONE-VIEW " PORTABLE CHEST AT 6:14 A.M.     HISTORY: Right Pleurx catheter. Pleural effusion.     FINDINGS: A Pleurx catheter is present at the right base without change  from yesterday. There is an adjacent small loculated pneumothorax at the  right base with associated scattered atelectasis that is unchanged from  yesterday. The left lung remains clear. The heart remains mildly  enlarged.     This report was finalized on 6/25/2024 7:27 AM by Dr. Chavo Denny M.D  on Workstation: [id number]               Lab Results:     Lab Results (last 24 hours)       Procedure Component Value Units Date/Time    TSH [[id number]]  (Normal) Collected: 06/25/24 0336    Specimen: Blood Updated: 06/25/24 0455     TSH 0.883 uIU/mL     T4, Free [[id number]]  (Normal) Collected: 06/25/24 0336    Specimen: Blood Updated: 06/25/24 0455     Free T4 1.57 ng/dL     T3, Free [[id number]]  (Normal) Collected: 06/25/24 0336    Specimen: Blood Updated: 06/25/24 0455     T3, Free 2.00 pg/mL     Narrative:      Results may be falsely increased if patient taking Biotin.      Digoxin Level [[id number]]  (Normal) Collected: 06/25/24 0336    Specimen: Blood Updated: 06/25/24 0450     Digoxin 0.80 ng/mL     Renal Function Panel [[id number]]  (Abnormal) Collected: 06/25/24 0336    Specimen: Blood Updated: 06/25/24 0450     Glucose 78 mg/dL      BUN 40 mg/dL      Creatinine 1.45 mg/dL      Sodium 142 mmol/L      Potassium 3.5 mmol/L      Chloride 110 mmol/L      CO2 24.0 mmol/L      Calcium 7.8 mg/dL      Albumin 2.1 g/dL      Phosphorus 3.2 mg/dL      Anion Gap 8.0 mmol/L      BUN/Creatinine Ratio 27.6     eGFR 47.5 mL/min/1.73     Narrative:      GFR Normal >60  Chronic Kidney Disease <60  Kidney Failure <15    The GFR formula is only valid for adults with stable renal function between ages 18 and 70.    Magnesium [[id number]]  (Normal) Collected: 06/25/24 0336    Specimen: Blood Updated: 06/25/24 0450     Magnesium 1.8 mg/dL     CBC (No Diff) [[id number]]  (Abnormal)  Collected: 06/25/24 0337    Specimen: Blood Updated: 06/25/24 0422     WBC 8.66 10*3/mm3      RBC 3.00 10*6/mm3      Hemoglobin 7.5 g/dL      Hematocrit 24.3 %      MCV 81.0 fL      MCH 25.0 pg      MCHC 30.9 g/dL      RDW 15.8 %      RDW-SD 46.5 fl      MPV 10.0 fL      Platelets 221 10*3/mm3     Blood Culture - Blood, Arm, Left [[id number]]  (Normal) Collected: 06/23/24 1630    Specimen: Blood from Arm, Left Updated: 06/24/24 1716     Blood Culture No growth at 24 hours    Blood Culture - Blood, Hand, Right [[id number]]  (Normal) Collected: 06/23/24 1629    Specimen: Blood from Hand, Right Updated: 06/24/24 1715     Blood Culture No growth at 24 hours             Assessment & Plan       Pleural effusion    Hyperthyroidism    Hyperkalemia    Longstanding persistent atrial fibrillation    Ulcer of left heel    Recurrent pleural effusion on right    Chronic heart failure with preserved ejection fraction (HFpEF)    CKD (chronic kidney disease)    Anemia       Assessment & Plan  He continues to do well.  S/p intrapleural lytic therapy via Pleurx yesterday.  Approximately 375 cc of thin serous sinus fluid drained overnight.  Follow-up chest x-ray performed today demonstrates stability.  Will continue chest tube to -20 cm suction.  Trend output.  Encourage pulmonary hygiene.  Repeat a.m. chest x-ray.  Renal function continues to improve with lopez catheter in place. Management per urology and nephrology.     CHARLEE Pascal  [facility]  06/25/24  15:36 EDT    Greater than 30 minutes was spent reviewing the patient's chart, including imaging, labs, provider notes, assessing the patient and developing a plan of care which was discussed with the patient and RN. This is separate from any billable procedural time which will be dictated in a separate note.

## 2024-06-25 NOTE — PLAN OF CARE
Problem: Adult Inpatient Plan of Care  Goal: Plan of Care Review  Outcome: Ongoing, Progressing  Flowsheets (Taken 6/25/2024 1705)  Outcome Evaluation: VSS. Alert and oriented X3. Dressing changed. Will continue supportive care.  Goal: Patient-Specific Goal (Individualized)  Outcome: Ongoing, Progressing  Goal: Absence of Hospital-Acquired Illness or Injury  Outcome: Ongoing, Progressing  Intervention: Identify and Manage Fall Risk  Recent Flowsheet Documentation  Taken 6/25/2024 1200 by Beth Jackson RN  Safety Promotion/Fall Prevention:   clutter free environment maintained   activity supervised  Taken 6/25/2024 1015 by Beth Jackson RN  Safety Promotion/Fall Prevention:   clutter free environment maintained   activity supervised  Taken 6/25/2024 0815 by Beth Jackson RN  Safety Promotion/Fall Prevention:   clutter free environment maintained   activity supervised  Intervention: Prevent Skin Injury  Recent Flowsheet Documentation  Taken 6/25/2024 1200 by Beth Jackson RN  Body Position: turned  Taken 6/25/2024 1015 by Beth Jackson RN  Body Position: turned  Taken 6/25/2024 0815 by Beth Jackson RN  Body Position:   turned   left  Intervention: Prevent and Manage VTE (Venous Thromboembolism) Risk  Recent Flowsheet Documentation  Taken 6/25/2024 1400 by Beth Jackson RN  Range of Motion: active ROM (range of motion) encouraged  Taken 6/25/2024 1015 by Beth Jackson RN  Activity Management: activity encouraged  Taken 6/25/2024 0815 by Beth Jackson RN  Activity Management: activity encouraged  Range of Motion: active ROM (range of motion) encouraged  Intervention: Prevent Infection  Recent Flowsheet Documentation  Taken 6/25/2024 1200 by Beth Jackson RN  Infection Prevention: single patient room provided  Taken 6/25/2024 1015 by Beth Jackson RN  Infection Prevention: single patient room provided  Taken 6/25/2024 0815 by Beth Jackson RN  Infection Prevention: single patient room provided  Goal:  Optimal Comfort and Wellbeing  Outcome: Ongoing, Progressing  Intervention: Provide Person-Centered Care  Recent Flowsheet Documentation  Taken 6/25/2024 0815 by Beth Jackson RN  Trust Relationship/Rapport:   care explained   choices provided  Goal: Readiness for Transition of Care  Outcome: Ongoing, Progressing     Problem: Skin Injury Risk Increased  Goal: Skin Health and Integrity  Outcome: Ongoing, Progressing  Intervention: Optimize Skin Protection  Recent Flowsheet Documentation  Taken 6/25/2024 1200 by Beth Jackson RN  Head of Bed (HOB) Positioning: HOB elevated  Taken 6/25/2024 1015 by Beth Jackson RN  Head of Bed (HOB) Positioning: HOB elevated  Taken 6/25/2024 0815 by Beth Jackson RN  Head of Bed (HOB) Positioning: HOB elevated     Problem: Fall Injury Risk  Goal: Absence of Fall and Fall-Related Injury  Outcome: Ongoing, Progressing  Intervention: Identify and Manage Contributors  Recent Flowsheet Documentation  Taken 6/25/2024 1200 by Beth Jackson RN  Medication Review/Management: medications reviewed  Taken 6/25/2024 1015 by Beth Jackson RN  Medication Review/Management: medications reviewed  Taken 6/25/2024 0815 by Beth Jackson RN  Medication Review/Management: medications reviewed  Intervention: Promote Injury-Free Environment  Recent Flowsheet Documentation  Taken 6/25/2024 1200 by Beth Jackson RN  Safety Promotion/Fall Prevention:   clutter free environment maintained   activity supervised  Taken 6/25/2024 1015 by Beth Jackson RN  Safety Promotion/Fall Prevention:   clutter free environment maintained   activity supervised  Taken 6/25/2024 0815 by Beth Jackson RN  Safety Promotion/Fall Prevention:   clutter free environment maintained   activity supervised   Goal Outcome Evaluation:              Outcome Evaluation: VSS. Alert and oriented X3. Dressing changed. Will continue supportive care.

## 2024-06-25 NOTE — CASE MANAGEMENT/SOCIAL WORK
Discharge Planning Assessment  [facility]     Patient Name: Mehreen Silva IV  MRN: [id number]  Today's Date: 6/25/2024    Admit Date: 6/21/2024    Plan: SNF   Discharge Needs Assessment       Row Name 06/25/24 2954       Living Environment    People in Home spouse    Current Living Arrangements home    Potentially Unsafe Housing Conditions none    In the past 12 months has the electric, gas, oil, or water company threatened to shut off services in your home? No    Primary Care Provided by spouse/significant other    Provides Primary Care For no one, unable/limited ability to care for self    Family Caregiver if Needed spouse;child(juventino), adult    Family Caregiver Names Wife Belem [phone], son Mehreen [phone]    Quality of Family Relationships supportive    Able to Return to Prior Arrangements yes       Resource/Environmental Concerns    Resource/Environmental Concerns none    Transportation Concerns none       Transportation Needs    In the past 12 months, has lack of transportation kept you from medical appointments or from getting medications? no    In the past 12 months, has lack of transportation kept you from meetings, work, or from getting things needed for daily living? No       Food Insecurity    Within the past 12 months, you worried that your food would run out before you got the money to buy more. Never true    Within the past 12 months, the food you bought just didn't last and you didn't have money to get more. Never true       Transition Planning    Patient/Family Anticipates Transition to home with family    Patient/Family Anticipated Services at Transition rehabilitation services    Transportation Anticipated family or friend will provide       Discharge Needs Assessment    Equipment Currently Used at Home wheelchair;prosthesis;shower chair;grab bar;hospital bed    Concerns to be Addressed no discharge needs identified    Anticipated Changes Related to Illness none    Equipment Needed After  Discharge none                   Discharge Plan       Row Name 06/25/24 1652       Plan    Plan SNF    Patient/Family in Agreement with Plan yes    Plan Comments Met with pt's wife outside room. Introduced self, explained  role, verified face sheet. Plan is SNF at discharge. We discussed options and she picked five facilities. Referrals entered for all. Spoke with Ira/ [facility] and she said [facility] can accept and will have a bed on Thursday. Pt has a wheelchair, prosthetic, and hospital bed at home. Wife denies any further needs at this time. CCP to follow. GENEVA Kelly RN                  Continued Care and Services - Admitted Since 6/21/2024       Destination       Service Provider Request Status Selected Services Address Phone Fax Patient Preferred    [facility] Accepted N/A [address] [place] [id number] [phone] --    [facility] Pending - Request Sent N/A [address] [facility] [id number] [phone] --    [facility] Pending - Request Sent N/A [address] [facility] [id number] [phone] --    [facility] Pending - Request Sent N/A [address] [facility] [id number] [phone] --    [facility] Pending - Request Sent N/A [address] [facility] [id number] [phone] --                  Selected Continued Care - Prior Encounters Includes continued care and service providers with selected services from prior encounters from 3/23/2024 to 6/25/2024      Discharged on 5/6/2024 Admission date: 4/27/2024 - Discharge disposition: Home-Health Care [facility]      Home Medical Care       Service Provider Selected Services Address Phone Fax Patient Preferred    [facility] [address], [address], [facility] [address] [phone] --                           Expected Discharge Date and Time       Expected Discharge Date Expected Discharge Time    Jun 27, 2024            Demographic Summary       Row Name 06/25/24 3862       General Information    Admission Type inpatient    Preferred Language English                   Functional Status    No documentation.                  Psychosocial    No documentation.                  Abuse/Neglect    No documentation.                  Legal    No documentation.                  Substance Abuse    No documentation.                  Patient Forms    No documentation.                     Marcos Clark RN

## 2024-06-25 NOTE — PROGRESS NOTES
Nutrition Services    Patient Name:  Mehreen Silva IV  YOB: 1940  MRN: [id number]  Admit Date:  6/21/2024    Assessment Date:  06/25/24    Summary: Nutrition assessment initiated due to MST score of 3 and pressure injury. Pt was admitted with JOSEPH, R pleural effusion, severe anemia, pressure injuries to heel and coccyx, hx R AKA. Pt a bit confused, wife at bedside. She reports wt has been stable since his AKA and appetite is good.  Discussed importance of healthy nutrition to support wound healing and offered Mauricio , they are agreeable to try it. Labs, meds, photos of wounds reviewed.     Plan/Recommendations:  Mauricio bid  Encourage good nutrition.    Will follow clinical course, nutrition needs.    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, 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/2023    Atrial fibrillation     Cervical spondylosis with myelopathy     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 12/13/2023    HLD (hyperlipidemia) 09/23/2022    Hx of toxic multinodular goiter 09/23/2022    Hyperthyroidism     Lumbar radiculopathy     Orthostatic hypotension     Osteoarthritis     Pressure injury of sacral region, unstageable 05/14/2024    Pulmonary hypertension     Recurrent pleural effusion on right 04/27/2024    Ulcer with gangrene     right heel       Past Surgical History:   Procedure Laterality Date    ABOVE KNEE AMPUTATION Right 12/13/2023    Procedure: ABOVE KNEE AMPUTATION RIGHT, proveena wound vac placement;  Surgeon: Issa Nieves MD;  Location: [facility];  Service: Vascular;  Laterality: Right;    APPENDECTOMY      BACK SURGERY      x4    COLONOSCOPY    "   EXCISION MASS TRUNK N/A 06/08/2016    Procedure: Excision soft tissue neoplasm on abdominal wall and left neck;  Surgeon: Shaji Barahona Jr., MD;  Location: [facility] MAIN OR;  Service:     KNEE SURGERY      NECK SURGERY  1974    PLEURAL CATHETER INSERTION Right 4/30/2024    Procedure: RIGHT VIDEO ASSISTED THORACOSCOPY, PARTIAL DECORTICATION, PLEURX CATHETER INSERTION;  Surgeon: Nataliya Noyola MD;  Location: [facility] MAIN OR;  Service: Thoracic;  Laterality: Right;    QUADRICEPS REPAIR      ROTATOR CUFF REPAIR      SHOULDER SURGERY      SMALL INTESTINE SURGERY  2021    Bowel Obstruction        Anthropometrics        Current Height  Current Weight  BMI kg/m2 Height: 177.8 cm (70\")  Weight: 63.5 kg (140 lb) (06/21/24 1255)  Body mass index is 20.09 kg/m².   Adjusted BMI (if applicable) 24  (AKA)   BMI Category Normal/Healthy (18.4 - 24.9)   Ideal Body Weight (IBW) 146lb (adjusted)   Usual Body Weight (UBW) 140lb per wife since amputation   Weight Trend Stable   Weight History Wt Readings from Last 30 Encounters:   06/21/24 1255 63.5 kg (140 lb)   06/10/24 1313 64.4 kg (142 lb)   06/03/24 1207 64.4 kg (142 lb)   05/23/24 1254 64.4 kg (142 lb)   04/28/24 1348 64.4 kg (142 lb)   04/27/24 1544 64.5 kg (142 lb 3.2 oz)   04/23/24 1231 65.8 kg (145 lb)   03/05/24 1305 51.3 kg (113 lb)   02/29/24 1331 53.1 kg (117 lb)   02/19/24 2313 53.4 kg (117 lb 11.6 oz)   02/19/24 0500 54.4 kg (119 lb 14.9 oz)   02/18/24 0300 59 kg (130 lb 1.1 oz)   02/17/24 0548 63.8 kg (140 lb 10.5 oz)   02/16/24 0435 60.8 kg (134 lb 0.6 oz)   02/15/24 0500 63 kg (138 lb 14.2 oz)   02/14/24 0440 60.7 kg (133 lb 13.1 oz)   02/13/24 0450 61.7 kg (136 lb 0.4 oz)   02/12/24 0600 57.3 kg (126 lb 5.2 oz)   02/11/24 0500 63.2 kg (139 lb 5.3 oz)   02/10/24 1500 61.2 kg (134 lb 14.7 oz)   02/10/24 0500 61.2 kg (134 lb 14.7 oz)   02/09/24 1728 60.7 kg (133 lb 13.1 oz)   02/09/24 1102 72.6 kg (160 lb)   12/14/23 0521 71.4 kg (157 lb 6.5 oz)   11/20/23 0957 74.8 " kg (165 lb)   11/14/23 0203 75 kg (165 lb 6.4 oz)   11/13/23 0500 74.4 kg (164 lb 0.4 oz)   11/12/23 0529 74.8 kg (164 lb 14.5 oz)   11/11/23 0310 74.9 kg (165 lb 2 oz)   11/10/23 0440 78.2 kg (172 lb 6.4 oz)   11/09/23 1304 77.6 kg (171 lb)   11/09/23 0634 77.6 kg (171 lb 1.2 oz)   11/09/23 1620 77.6 kg (171 lb)   11/08/23 0929 74.8 kg (165 lb)   08/23/23 0918 74.8 kg (165 lb)   08/14/23 1400 74.8 kg (165 lb)   06/05/23 1054 76.2 kg (168 lb)   05/22/23 0947 75.4 kg (166 lb 3.2 oz)   05/15/23 0811 75.3 kg (166 lb)   05/12/23 1504 75.3 kg (166 lb)   11/03/22 0939 73.5 kg (162 lb)   10/10/22 0936 68.5 kg (151 lb)   09/25/22 0500 66.2 kg (146 lb)   09/24/22 0400 74 kg (163 lb 1.6 oz)   09/23/22 1900 76.8 kg (169 lb 4.8 oz)   09/23/22 1537 77.1 kg (170 lb)   08/09/17 1136 88.5 kg (195 lb)   02/03/17 0640 84.8 kg (187 lb)   06/28/16 1320 89.4 kg (197 lb)   06/08/16 0902 87.5 kg (193 lb)   06/06/16 0734 88.9 kg (196 lb)   05/31/16 0907 88.6 kg (195 lb 6.4 oz)   06/08/15 1634 88.5 kg (195 lb)        Estimated/Assessed Needs        Current Weight  Weight: 63.5 kg (140 lb) (06/21/24 1255)       Energy Requirements    Weight for Calculation 63.5 kg   Method for Estimation  25-30 kcal/kg   EST Needs (kcal/day) 6228-3061       Protein Requirements    Weight for Calculation 63.5 kg   EST Protein Needs (g/kg) 1.2 gm/kg   EST Daily Needs (g/day) 76       Fluid Requirements     Method for Estimation 1 mL/kcal    EST Needs (mL/day) 1800     Labs       Pertinent Labs    Results from last 7 days   Lab Units 06/25/24  0336 06/24/24  0616 06/23/24  0442 06/22/24  0645   SODIUM mmol/L 142 137 146* 141   POTASSIUM mmol/L 3.5 3.8 4.1 4.6   CHLORIDE mmol/L 110* 106 108* 104   CO2 mmol/L 24.0 22.5 26.9 25.1   BUN mg/dL 40* 44* 62* 76*   CREATININE mg/dL 1.45* 1.73* 2.35* 2.50*   CALCIUM mg/dL 7.8* 7.9* 8.3* 8.6   BILIRUBIN mg/dL  --   --   --  0.4   ALK PHOS U/L  --   --   --  126*   ALT (SGPT) U/L  --   --   --  14   AST (SGOT) U/L  --   --    --  11   GLUCOSE mg/dL 78 76 100* 73     Results from last 7 days   Lab Units 06/25/24  0337 06/25/24  0336   MAGNESIUM mg/dL  --  1.8   PHOSPHORUS mg/dL  --  3.2   HEMOGLOBIN g/dL 7.5*  --    HEMATOCRIT % 24.3*  --    WBC 10*3/mm3 8.66  --    ALBUMIN g/dL  --  2.1*     Results from last 7 days   Lab Units 06/25/24  0337 06/24/24  0616 06/23/24  0442 06/22/24  0645 06/21/24  1411   PLATELETS 10*3/mm3 221 224 255 234 252     COVID19   Date Value Ref Range Status   06/23/2024 Not Detected Not Detected - Ref. Range Final     Lab Results   Component Value Date    HGBA1C 5.30 06/22/2024          Medications           Scheduled Medications cholecalciferol, 1,000 Units, Oral, Daily  collagenase, 1 Application, Topical, Q24H  [Held by provider] digoxin, 125 mcg, Oral, Daily  gabapentin, 100 mg, Oral, Nightly  methIMAzole, 5 mg, Oral, Once per day on Monday Wednesday Friday  metoprolol tartrate, 25 mg, Oral, Q12H  midodrine, 5 mg, Oral, BID AC  sodium chloride, 10 mL, Intravenous, Q12H  tamsulosin, 0.4 mg, Oral, Daily       Infusions     PRN Medications   LORazepam    nitroglycerin    sodium chloride    sodium chloride     Physical Findings          General Findings alert, amputee, disoriented   Oral/Mouth Cavity tooth or teeth missing   Edema  1+ (trace)   Gastrointestinal last bowel movement: 6/25   Skin  pressure injury: unstageable to L heel and coccyx   Tubes/Drains/Lines chest tube   NFPE Not indicated at this time   --  Current Nutrition Orders & Evaluation of Intake       Oral Nutrition     Food Allergies NKFA   Current PO Diet Diet: Cardiac, Diabetic, Renal; Healthy Heart (2-3 Na+); Consistent Carbohydrate; Low Sodium (2-3g), Low Potassium, Low Phosphorus; Fluid Consistency: Thin (IDDSI 0)   Supplement n/a   PO Evaluation     % PO Intake %    Factors Affecting Intake: altered mental status   --  PES STATEMENT / NUTRITION DIAGNOSIS      Nutrition Dx Problem  Problem: Increased Nutrient Needs  Etiology:  Medical Diagnosis - JOSEPH, R pleural effusion, severe anemia, pressure injuries to heel and coccyx, hx R AKA    Signs/Symptoms: Report/Observation     NUTRITION INTERVENTION / PLAN OF CARE      Intervention Goal(s) Maintain nutrition status, Reduce/improve symptoms, Meet estimated needs, Disease management/therapy, Tolerate PO , Maintain intake, and No significant weight loss         RD Intervention/Action Interview for preferences, Advise alternative selection, Advise available snack, Encourage intake, Continue to monitor, Care plan reviewed, and Recommend/order: mauricio   --      Prescription/Orders:       PO Diet       Supplements Mauricio bid      Enteral Nutrition       Parenteral Nutrition    New Prescription Ordered? Yes   --      Monitor/Evaluation Per protocol   Discharge Plan/Needs Pending clinical course   --    RD to follow per protocol.      Electronically signed by:  Christi Rico RD  06/25/24 13:20 EDT

## 2024-06-25 NOTE — DISCHARGE PLACEMENT REQUEST
"Ellen Silva IV (84 y.o. Male)       Date of Birth   1940    Social Security Number       Address   [address] [place] [address]    Home Phone   [phone]    MRN   [id number]       Islam   None    Marital Status                               Admission Date   6/21/24    Admission Type   Elective    Admitting Provider   Baron Ponce MD    Attending Provider   Feliciano Torres MD    Department, Room/Bed   [address], E552/1       Discharge Date       Discharge Disposition       Discharge Destination                                 Attending Provider: Feliciano Torres MD    Allergies: No Known Allergies    Isolation: None   Infection: None   Code Status: CPR    Ht: 177.8 cm (70\")   Wt: 63.5 kg (140 lb)    Admission Cmt: None   Principal Problem: Pleural effusion [J90]                   Active Insurance as of 6/21/2024       Primary Coverage       Payor Plan Insurance Group Employer/Plan Group    MEDICARE MEDICARE A & B        Payor Plan Address Payor Plan Phone Number Payor Plan Fax Number Effective Dates    [address] [phone]  5/1/2005 - None Entered    [facility] [id number]         Subscriber Name Subscriber Birth Date Member ID       ELLEN SILVA IV 1940 [id number]               Secondary Coverage       Payor Plan Insurance Group Employer/Plan Group    ANTH BLUE CROSS [facility] SUPP [id number]       Payor Plan Address Payor Plan Phone Number Payor Plan Fax Number Effective Dates    [address]   5/1/2005 - None Entered    [facility] [id number]         Subscriber Name Subscriber Birth Date Member ID       ELLEN SILVA IV 1940 [id number]                     Emergency Contacts        (Rel.) Home Phone Work Phone Mobile Phone    Belem Silva (Spouse) [phone] -- [phone]    ELLEN SILVA (Son) [phone] -- [phone]              Emergency Contact Information       Name Relation Home Work Mobile    Belem Silva " Spouse [phone]    ELLEN WRAY Son [phone]

## 2024-06-25 NOTE — PROGRESS NOTES
"   LOS: 4 days     Chief Complaint/ Reason for encounter: JOSEPH    Subjective   06/24/24 : No new complaints or events  He feels well, no nausea or vomiting  Good appetite, no shortness of breath or chest pain    6/25: Pleasantly confused, no new complaints  Family at bedside, multiple questions answered  Eating and drinking well, no nausea vomiting or shortness of breath    Medical history reviewed:  History of Present Illness    Subjective    History taken from: Patient and chart    Vital Signs  Temp:  [97.1 °F (36.2 °C)-97.7 °F (36.5 °C)] 97.4 °F (36.3 °C)  Heart Rate:  [66-75] 66  Resp:  [16] 16  BP: ()/(57-62) 101/60       Wt Readings from Last 1 Encounters:   06/21/24 1255 63.5 kg (140 lb)       Objective:  Vital signs: (most recent): Blood pressure 101/60, pulse 66, temperature 97.4 °F (36.3 °C), temperature source Oral, resp. rate 16, height 177.8 cm (70\"), weight 63.5 kg (140 lb), SpO2 97%.                Objective:  General Appearance:  Comfortable, chronically ill-appearing, in no acute distress and not in pain.  Awake, alert  HEENT: Mucous membranes moist, no injury, oropharynx clear  Lungs:  Normal effort and normal respiratory rate.  Breath sounds clear to auscultation.  No  respiratory distress.  No rales, decreased breath sounds or rhonchi.    Heart: Normal rate.  Regular rhythm.  S1, S2 normal.  No murmur.   Abdomen: Abdomen is soft.  Bowel sounds are normal, no abdominal tenderness.  There is no rebound or guarding  Extremities: Trace edema of bilateral lower extremities  Skin:  Warm and dry with no rashes      Results Review:    Intake/Output:     Intake/Output Summary (Last 24 hours) at 6/25/2024 1235  Last data filed at 6/25/2024 0609  Gross per 24 hour   Intake 480 ml   Output 3200 ml   Net -2720 ml         DATA:  Radiology and Labs:  The following labs independently reviewed by me. Additional labs ordered for tomorrow a.m.  Interval notes, chart personally reviewed by me.   Old records " independently reviewed showing baseline creatinine 0.7  Discussed with patient himself at bedside, discussed with family at bedside who had many questions for me    Risk/ complexity of medical care/ medical decision making moderate complexity, renal failure management      Labs:     Recent Results (from the past 24 hour(s))   Type & Screen    Collection Time: 06/24/24  6:22 PM    Specimen: Blood   Result Value Ref Range    ABO Type O     RH type Positive     Antibody Screen Negative     T&S Expiration Date 6/27/2024 11:59:59 PM    TSH    Collection Time: 06/25/24  3:36 AM    Specimen: Blood   Result Value Ref Range    TSH 0.883 0.270 - 4.200 uIU/mL   T4, Free    Collection Time: 06/25/24  3:36 AM    Specimen: Blood   Result Value Ref Range    Free T4 1.57 0.92 - 1.68 ng/dL   T3, Free    Collection Time: 06/25/24  3:36 AM    Specimen: Blood   Result Value Ref Range    T3, Free 2.00 2.00 - 4.40 pg/mL   Digoxin Level    Collection Time: 06/25/24  3:36 AM    Specimen: Blood   Result Value Ref Range    Digoxin 0.80 0.60 - 1.20 ng/mL   Magnesium    Collection Time: 06/25/24  3:36 AM    Specimen: Blood   Result Value Ref Range    Magnesium 1.8 1.6 - 2.4 mg/dL   Renal Function Panel    Collection Time: 06/25/24  3:36 AM    Specimen: Blood   Result Value Ref Range    Glucose 78 65 - 99 mg/dL    BUN 40 (H) 8 - 23 mg/dL    Creatinine 1.45 (H) 0.76 - 1.27 mg/dL    Sodium 142 136 - 145 mmol/L    Potassium 3.5 3.5 - 5.2 mmol/L    Chloride 110 (H) 98 - 107 mmol/L    CO2 24.0 22.0 - 29.0 mmol/L    Calcium 7.8 (L) 8.6 - 10.5 mg/dL    Albumin 2.1 (L) 3.5 - 5.2 g/dL    Phosphorus 3.2 2.5 - 4.5 mg/dL    Anion Gap 8.0 5.0 - 15.0 mmol/L    BUN/Creatinine Ratio 27.6 (H) 7.0 - 25.0    eGFR 47.5 (L) >60.0 mL/min/1.73   CBC (No Diff)    Collection Time: 06/25/24  3:37 AM    Specimen: Blood   Result Value Ref Range    WBC 8.66 3.40 - 10.80 10*3/mm3    RBC 3.00 (L) 4.14 - 5.80 10*6/mm3    Hemoglobin 7.5 (L) 13.0 - 17.7 g/dL    Hematocrit 24.3  (L) 37.5 - 51.0 %    MCV 81.0 79.0 - 97.0 fL    MCH 25.0 (L) 26.6 - 33.0 pg    MCHC 30.9 (L) 31.5 - 35.7 g/dL    RDW 15.8 (H) 12.3 - 15.4 %    RDW-SD 46.5 37.0 - 54.0 fl    MPV 10.0 6.0 - 12.0 fL    Platelets 221 140 - 450 10*3/mm3   Prepare RBC, 2 Units    Collection Time: 06/25/24  6:53 AM   Result Value Ref Range    Product Code F0576U79     Unit Number [id number]     UNIT  ABO O     UNIT  RH POS     Crossmatch Interpretation Compatible     Dispense Status RE     Blood Expiration Date 202407162359     Blood Type Barcode 5100     Product Code R5123G83     Unit Number [id number]     UNIT  ABO O     UNIT  RH POS     Crossmatch Interpretation Compatible     Dispense Status PT     Blood Expiration Date 202407162359     Blood Type Barcode     Prepare RBC, 1 Units    Collection Time: 06/25/24  6:53 AM   Result Value Ref Range    Product Code K5059S59     Unit Number [id number]     UNIT  ABO O     UNIT  RH POS     Crossmatch Interpretation Compatible     Dispense Status RE     Blood Expiration Date 202407162359     Blood Type Barcode 5100        Radiology:  Pertinent radiology studies were reviewed as described above      Medications have been reviewed separately in chart overview      ASSESSMENT:  JOSEPH, seems to be predominantly obstructive.  Much improved after Whitehead placement but CT still showed hydronephrosis.  No immediate plans for intervention since his renal function is improving  Severe hyperkalemia, improved with treatment  Urinary retention, improved post Whitehead  Severe anemia   Recurrent right sided pleural effusion       Plan:  Renal function continues to improve after Whitehead placement  CT still showed significant hydronephrosis after the Whitehead but since his renal function is improving there is no plan for any urologic intervention at this time  Continue to monitor off IV fluids and encourage nutrition  Continue Flomax  Midodrine for chronic hypotension    Maintaining excellent urine output,  euvolemic on exam  Discussed with patient and family at bedside       Randy Rico MD  [facility]   Office phone number: [phone]  Answering service phone number: [phone]    06/25/24  12:35 EDT    Dictation performed using Dragon dictation software

## 2024-06-25 NOTE — PLAN OF CARE
Goal Outcome Evaluation:  Plan of Care Reviewed With: patient        Progress: no change  Outcome Evaluation: vss, no comlaints of pain. turned q2. cath care completed. labs in am. CT-20 suction. Good pulmonary hygiene encouraged.

## 2024-06-25 NOTE — PROGRESS NOTES
Gastroenterology   Inpatient Progress Note    Reason for Follow Up:  anemia     Subjective  Interval History:   Pleurx drain remains.  He denies abdominal pain or overnight events.  Hgb 7.5    Current Facility-Administered Medications:     cholecalciferol (VITAMIN D3) tablet 1,000 Units, 1,000 Units, Oral, Daily, Baron Ponce MD, 1,000 Units at 06/24/24 0901    collagenase ointment 1 Application, 1 Application, Topical, Q24H, Feliciano Torres MD, 1 Application at 06/24/24 0904    [Held by provider] digoxin (LANOXIN) tablet 125 mcg, 125 mcg, Oral, Daily, Baron Ponce MD    gabapentin (NEURONTIN) capsule 100 mg, 100 mg, Oral, Nightly, Baron Ponce MD, 100 mg at 06/24/24 2033    LORazepam (ATIVAN) tablet 0.5 mg, 0.5 mg, Oral, Q8H PRN, Ricardo Kiser MD, 0.5 mg at 06/22/24 1747    methIMAzole (TAPAZOLE) tablet 5 mg, 5 mg, Oral, Once per day on Monday Wednesday Friday, Baron Pocne MD, 5 mg at 06/24/24 0902    metoprolol tartrate (LOPRESSOR) tablet 25 mg, 25 mg, Oral, Q12H, Baron Ponce MD, 25 mg at 06/24/24 2033    midodrine (PROAMATINE) tablet 5 mg, 5 mg, Oral, BID AC, Baron Ponce MD, 5 mg at 06/24/24 1800    nitroglycerin (NITROSTAT) SL tablet 0.4 mg, 0.4 mg, Sublingual, Q5 Min PRN, Baron Ponce MD    sodium chloride 0.9 % flush 10 mL, 10 mL, Intravenous, Q12H, Baron Ponce MD, 10 mL at 06/24/24 2034    sodium chloride 0.9 % flush 10 mL, 10 mL, Intravenous, PRN, Baron Ponce MD    sodium chloride 0.9 % infusion 40 mL, 40 mL, Intravenous, PRN, Baron Ponce MD    tamsulosin (FLOMAX) 24 hr capsule 0.4 mg, 0.4 mg, Oral, Daily, Ricardo Kiser MD, 0.4 mg at 06/24/24 0902  Review of Systems:               All systems were reviewed and negative except for:  Constitution:  positive for See HPI  Gastrointestinal: positive for  See HPI    Objective     Vital Signs  Temp:  [97.1 °F (36.2 °C)-97.7 °F (36.5 °C)] 97.4 °F (36.3 °C)  Heart Rate:  [55-75] 66  Resp:  [16] 16  BP: ()/(48-62) 101/60  Body mass index is  20.09 kg/m².                  General Appearance:  awake, alert, oriented, in no acute distress  Abdomen:  Soft, non-tender, normal bowel sounds; no bruits, organomegaly or masses.                Results Review:                I reviewed the patient's new clinical results.    Results from last 7 days   Lab Units 06/25/24  0337 06/24/24  0616 06/23/24  0754 06/23/24  0442   WBC 10*3/mm3 8.66 6.42  --  8.14   HEMOGLOBIN g/dL 7.5* 7.4* 7.8* 7.6*   HEMATOCRIT % 24.3* 24.1* 25.2* 24.1*   PLATELETS 10*3/mm3 221 224  --  255     Results from last 7 days   Lab Units 06/25/24  0336 06/24/24  0616 06/23/24 0442 06/22/24  0645   SODIUM mmol/L 142 137 146* 141   POTASSIUM mmol/L 3.5 3.8 4.1 4.6   CHLORIDE mmol/L 110* 106 108* 104   CO2 mmol/L 24.0 22.5 26.9 25.1   BUN mg/dL 40* 44* 62* 76*   CREATININE mg/dL 1.45* 1.73* 2.35* 2.50*   CALCIUM mg/dL 7.8* 7.9* 8.3* 8.6   BILIRUBIN mg/dL  --   --   --  0.4   ALK PHOS U/L  --   --   --  126*   ALT (SGPT) U/L  --   --   --  14   AST (SGOT) U/L  --   --   --  11   GLUCOSE mg/dL 78 76 100* 73         Lab Results   Lab Value Date/Time    LIPASE 26 02/21/2022 0702    LIPASE 55 (H) 08/27/2021 1110       Radiology:  XR Chest 1 View   Final Result      XR Chest 1 View   Final Result      CT Abdomen Pelvis Without Contrast   Final Result   1. Moderately severe bilateral hydronephrosis and hydroureter with   urinary bladder wall thickening. The urinary bladder wall thickening   could be related to cystitis and/or infiltrative neoplasm. There could   be an element of bladder outlet obstruction as well. Whitehead catheter is   seen in the urinary bladder which is only partially distended.   2. Areas of increased density in both lung bases as described. Right   chest tube is seen in the right base. Please additional dictation for   yesterday's CT scan of the chest.   3. Cholelithiasis.   4. Large amount of stool in the rectum.               Radiation dose reduction techniques were utilized,  including automated   exposure control and exposure modulation based on body size.           This report was finalized on 6/24/2024 12:15 PM by Dr. Romeo Cummings M.D on Workstation: [id number]          CT Chest Without Contrast Diagnostic   Final Result       1. Mild right sided loculated hydropneumothorax with decreased fluid   component and increased gas component with associated pleural   thickening. Could be empyema or malignant effusion if patient has a   known malignancy. Correlate with chest tube output.   2. Small left pleural effusion, slightly increased in the interval.   3. Heterogeneous peripheral opacities in the right lung, greatest in the   lower lung, mild increased. Opacities are nonspecific. Could be   multifocal pneumonia with follow-up to resolution recommended.   4. New centrilobular and tree-in-bud nodules in the superior segment   left lower lobe. Suspect infectious bronchiolitis.   5. Mild cardiomegaly.   6. Enlarged main pulmonary artery, can be seen with pulmonary artery   hypertension.       Additional findings described above that are stable       This report was finalized on 6/23/2024 9:00 AM by Dr. Taiwo Espinosa M.D on Workstation: [id number]          US Renal Bilateral   Final Result      XR Chest 1 View    (Results Pending)   XR Chest 1 View    (Results Pending)       Assessment & Plan     Active Hospital Problems    Diagnosis     **Pleural effusion     CKD (chronic kidney disease)     Anemia     Chronic heart failure with preserved ejection fraction (HFpEF)     Recurrent pleural effusion on right     Ulcer of left heel     Longstanding persistent atrial fibrillation     Hyperkalemia     Hyperthyroidism        Assessment:  Anemia  Longstanding persistent A-fib on Eliquis-currently on hold  JOSEPH with acute hyperkalemia  Chronic heart failure with preserved VS  Right AKA    Plan:  Monitor H&H and signs of overt GI bleeding.  Patient with multifactorial anemia.  Heme  positive stool noted.  Continue to trend H&H.  Recommend considering bidirectional endoscopy once Pleurx removed and cleared by all other services  In the interim GI will continue to follow peripherally while awaiting Pleurx removal    I discussed the patients findings and my recommendations with patient, family, primary care team, and consulting provider.          CHARLEE Lopez  [facility], [place], operated by [facility]  [address] [place] [address]

## 2024-06-25 NOTE — PROGRESS NOTES
Name: Mehreen Silva IV ADMIT: 2024   : 1940  PCP: Taiwo Powers MD    MRN: [id number] LOS: 4 days   AGE/SEX: 84 y.o. male  ROOM: [facility]     Subjective   Subjective   No chief complaint on file.    Not reporting any shortness of breath.  No abdominal pain reported.  No chest pain or palpitations. Discussed with family at bedside.     Objective   Objective   Vital Signs  Temp:  [97.1 °F (36.2 °C)-97.7 °F (36.5 °C)] 97.4 °F (36.3 °C)  Heart Rate:  [66-75] 74  Resp:  [16] 16  BP: ()/(55-62) 96/55  SpO2:  [97 %-99 %] 97 %  on   ;   Device (Oxygen Therapy): room air  Body mass index is 20.09 kg/m².    Physical Exam  Vitals and nursing note reviewed.   Constitutional:       General: He is not in acute distress.     Appearance: He is ill-appearing (chronically). He is not diaphoretic.   Cardiovascular:      Rate and Rhythm: Normal rate. Rhythm irregular.   Pulmonary:      Effort: Pulmonary effort is normal.      Breath sounds: No wheezing.   Abdominal:      General: There is no distension.      Palpations: Abdomen is soft.      Tenderness: There is no abdominal tenderness. There is no guarding or rebound.   Musculoskeletal:      Comments: Right AKA   Skin:     General: Skin is warm and dry.      Comments: Right pleurx catheter   Neurological:      Mental Status: He is alert. Mental status is at baseline.   Psychiatric:         Mood and Affect: Mood normal.         Behavior: Behavior normal.       Results Review  I reviewed the patient's new clinical results.    Results from last 7 days   Lab Units 24  0337 24  0616 24  0754 24  0442 24  0759 24  0645   WBC 10*3/mm3 8.66 6.42  --  8.14  --  9.39   HEMOGLOBIN g/dL 7.5* 7.4* 7.8* 7.6*   < > 7.5*   PLATELETS 10*3/mm3 221 224  --  255  --  234    < > = values in this interval not displayed.     Results from last 7 days   Lab Units 24  0336 24  0616 24  0442 24  0645   SODIUM mmol/L 142  137 146* 141   POTASSIUM mmol/L 3.5 3.8 4.1 4.6   CHLORIDE mmol/L 110* 106 108* 104   CO2 mmol/L 24.0 22.5 26.9 25.1   BUN mg/dL 40* 44* 62* 76*   CREATININE mg/dL 1.45* 1.73* 2.35* 2.50*   GLUCOSE mg/dL 78 76 100* 73   EGFR mL/min/1.73 47.5* 38.4* 26.6* 24.7*     Results from last 7 days   Lab Units 06/25/24  0336 06/22/24  0645   ALBUMIN g/dL 2.1* 2.4*   BILIRUBIN mg/dL  --  0.4   ALK PHOS U/L  --  126*   AST (SGOT) U/L  --  11   ALT (SGPT) U/L  --  14     Results from last 7 days   Lab Units 06/25/24  0336 06/24/24  0616 06/23/24  0442 06/22/24  0645   CALCIUM mg/dL 7.8* 7.9* 8.3* 8.6   ALBUMIN g/dL 2.1*  --   --  2.4*   MAGNESIUM mg/dL 1.8  --   --   --    PHOSPHORUS mg/dL 3.2  --   --   --      Results from last 7 days   Lab Units 06/23/24  0442   PROCALCITONIN ng/mL 0.23     Glucose   Date/Time Value Ref Range Status   06/23/2024 0617 104 70 - 130 mg/dL Final       CT Abdomen Pelvis Without Contrast    Result Date: 6/24/2024  1. Moderately severe bilateral hydronephrosis and hydroureter with urinary bladder wall thickening. The urinary bladder wall thickening could be related to cystitis and/or infiltrative neoplasm. There could be an element of bladder outlet obstruction as well. Whitehead catheter is seen in the urinary bladder which is only partially distended. 2. Areas of increased density in both lung bases as described. Right chest tube is seen in the right base. Please additional dictation for yesterday's CT scan of the chest. 3. Cholelithiasis. 4. Large amount of stool in the rectum.    Radiation dose reduction techniques were utilized, including automated exposure control and exposure modulation based on body size.   This report was finalized on 6/24/2024 12:15 PM by Dr. Romeo Cummings M.D on Workstation: [id number]       I have personally reviewed all medications:  Scheduled Medications  cholecalciferol, 1,000 Units, Oral, Daily  collagenase, 1 Application, Topical, Q24H  [Held by provider]  digoxin, 125 mcg, Oral, Daily  gabapentin, 100 mg, Oral, Nightly  methIMAzole, 5 mg, Oral, Once per day on Monday Wednesday Friday  metoprolol tartrate, 25 mg, Oral, Q12H  midodrine, 5 mg, Oral, BID AC  sodium chloride, 10 mL, Intravenous, Q12H  tamsulosin, 0.4 mg, Oral, Daily      Infusions       Diet  Diet: Cardiac, Diabetic, Renal; Healthy Heart (2-3 Na+); Consistent Carbohydrate; Low Sodium (2-3g), Low Potassium, Low Phosphorus; Fluid Consistency: Thin (IDDSI 0)    I have personally reviewed:  [x]  Laboratory   [x]  Microbiology   [x]  Radiology   [x]  EKG/Telemetry  []  Cardiology/Vascular   []  Pathology    []  Records       Assessment/Plan     Active Hospital Problems    Diagnosis  POA    **Pleural effusion [J90]  Unknown    CKD (chronic kidney disease) [N18.9]  Yes    Anemia [D64.9]  Unknown    Chronic heart failure with preserved ejection fraction (HFpEF) [I50.32]  Yes    Recurrent pleural effusion on right [J90]  Yes    Ulcer of left heel [L97.429]  Yes    Longstanding persistent atrial fibrillation [I48.11]  Yes    Hyperkalemia [E87.5]  Yes    Hyperthyroidism [E05.90]  Yes      Resolved Hospital Problems   No resolved problems to display.       84 y.o. male admitted with Pleural effusion.    Urinary retention/hydronephrosis/JOSEPH with hyperkalemia: Lopez placed.  Continued hydronephrosis but improving creatinine and potassium levels.  Continued lopez at discharge and outpatient cystoscopy planned. Urology and nephrology following.  Recurrent right pleural effusion/Pleurx: Right basilar pneumothorax likely dead space due to inadequate lung expansion.  tPA and dornase treatments.  Thoracic surgery following.  Acute on chronic anemia: Iron saturation 11%.  Ferritin elevated.  Heme positive stool but no overt losses.  Endoscopy consideration once Pleurx is removed.  GI evaluated.  Chronic diastolic heart failure: Diuretics held for JOSEPH.  Chronic A-fib: Metoprolol.  Eliquis held.  Digoxin level is ok and CR  improved. Will resume home digoxin.  Chronic hypotension: Midodrine  History right chest cystic skin excision: Surgery evaluated.  Hyperthyroidism: On methimazole.  Repeat thyroid painel ok.  PPX: SCD.  Resume AC when able.  Disposition: TBD    Expected Discharge Date: 6/27/2024; Expected Discharge Time:      Feliciano Torres MD  [facility]  06/25/24  14:21 EDT    Dictated portions of note using Dragon dictation software.  Copied text in this note has been reviewed by me and remains accurate as of 06/25/24

## 2024-06-25 NOTE — PROGRESS NOTES
Mr. Silva's wife called our office yesterday with many concerns about the patient being seen by all of his providers. She asked about the patient's sutures in his chest and his sacral wound and requested a general surgery consult.    I came to see the patient today. He has been admitted for JOSEPH, R pleural effusion, severe anemia. He is confused today but denies any complaints regarding his right chest wound or sacral wound. His wife asked me to remove his sutures from his chest wound and states they have been doing his wound care for chest and sacral wounds as instructed.     On exam, the patient's right chest bandage is removed and his epidermal inclusion cyst incision is healing with a tiny scab at the lateral aspect and no signs of infection.  His sacral wound is chaz, appears smaller than last visit, with hyperpigmentation but no signs of infection.     Patient may continue santyl dressings to sacral wound as previously performing, and he needs nothing further for his chest wound as his sutures are absorbable (discussed with patient's wife). He may follow up with me in the office for sacral wound check as scheduled following hospital discharge. Please call if any questions or concerns.    Yulia Wynn M.D.  General, Robotic, and Endoscopic Surgery  [facility]    [address], [address]  [place], [place], [address]  P: [phone]  F: [phone]

## 2024-06-26 ENCOUNTER — APPOINTMENT (OUTPATIENT)
Dept: GENERAL RADIOLOGY | Facility: HOSPITAL | Age: 84
DRG: 186 | End: 2024-06-26
Payer: MEDICARE

## 2024-06-26 LAB
ALBUMIN SERPL-MCNC: 2.3 G/DL (ref 3.5–5.2)
ANION GAP SERPL CALCULATED.3IONS-SCNC: 8.6 MMOL/L (ref 5–15)
BUN SERPL-MCNC: 30 MG/DL (ref 8–23)
BUN/CREAT SERPL: 25.4 (ref 7–25)
CALCIUM SPEC-SCNC: 8.1 MG/DL (ref 8.6–10.5)
CHLORIDE SERPL-SCNC: 110 MMOL/L (ref 98–107)
CO2 SERPL-SCNC: 23.4 MMOL/L (ref 22–29)
CREAT SERPL-MCNC: 1.18 MG/DL (ref 0.76–1.27)
DEPRECATED RDW RBC AUTO: 45.7 FL (ref 37–54)
DIGITOXIN SERPL-MCNC: <5 NG/ML (ref 10–25)
EGFRCR SERPLBLD CKD-EPI 2021: 60.8 ML/MIN/1.73
ERYTHROCYTE [DISTWIDTH] IN BLOOD BY AUTOMATED COUNT: 15.9 % (ref 12.3–15.4)
GLUCOSE SERPL-MCNC: 79 MG/DL (ref 65–99)
HCT VFR BLD AUTO: 25 % (ref 37.5–51)
HGB BLD-MCNC: 7.9 G/DL (ref 13–17.7)
MAGNESIUM SERPL-MCNC: 1.6 MG/DL (ref 1.6–2.4)
MCH RBC QN AUTO: 25.3 PG (ref 26.6–33)
MCHC RBC AUTO-ENTMCNC: 31.6 G/DL (ref 31.5–35.7)
MCV RBC AUTO: 80.1 FL (ref 79–97)
PHOSPHATE SERPL-MCNC: 2.8 MG/DL (ref 2.5–4.5)
PLATELET # BLD AUTO: 219 10*3/MM3 (ref 140–450)
PMV BLD AUTO: 9.6 FL (ref 6–12)
POTASSIUM SERPL-SCNC: 3.3 MMOL/L (ref 3.5–5.2)
RBC # BLD AUTO: 3.12 10*6/MM3 (ref 4.14–5.8)
SODIUM SERPL-SCNC: 142 MMOL/L (ref 136–145)
WBC NRBC COR # BLD AUTO: 8.6 10*3/MM3 (ref 3.4–10.8)

## 2024-06-26 PROCEDURE — 99024 POSTOP FOLLOW-UP VISIT: CPT

## 2024-06-26 PROCEDURE — 83735 ASSAY OF MAGNESIUM: CPT | Performed by: INTERNAL MEDICINE

## 2024-06-26 PROCEDURE — 97165 OT EVAL LOW COMPLEX 30 MIN: CPT

## 2024-06-26 PROCEDURE — 97530 THERAPEUTIC ACTIVITIES: CPT

## 2024-06-26 PROCEDURE — 85027 COMPLETE CBC AUTOMATED: CPT | Performed by: INTERNAL MEDICINE

## 2024-06-26 PROCEDURE — 80069 RENAL FUNCTION PANEL: CPT | Performed by: INTERNAL MEDICINE

## 2024-06-26 PROCEDURE — 97161 PT EVAL LOW COMPLEX 20 MIN: CPT

## 2024-06-26 PROCEDURE — 71045 X-RAY EXAM CHEST 1 VIEW: CPT

## 2024-06-26 RX ORDER — POTASSIUM CHLORIDE 750 MG/1
40 TABLET, FILM COATED, EXTENDED RELEASE ORAL ONCE
Status: COMPLETED | OUTPATIENT
Start: 2024-06-26 | End: 2024-06-26

## 2024-06-26 RX ADMIN — METOPROLOL TARTRATE 25 MG: 25 TABLET, FILM COATED ORAL at 20:16

## 2024-06-26 RX ADMIN — COLLAGENASE SANTYL 1 APPLICATION: 250 OINTMENT TOPICAL at 08:01

## 2024-06-26 RX ADMIN — POTASSIUM CHLORIDE 40 MEQ: 750 TABLET, EXTENDED RELEASE ORAL at 09:31

## 2024-06-26 RX ADMIN — METHIMAZOLE 5 MG: 5 TABLET ORAL at 08:01

## 2024-06-26 RX ADMIN — MIDODRINE HYDROCHLORIDE 5 MG: 5 TABLET ORAL at 17:01

## 2024-06-26 RX ADMIN — Medication 10 ML: at 20:17

## 2024-06-26 RX ADMIN — Medication 10 ML: at 08:02

## 2024-06-26 RX ADMIN — TAMSULOSIN HYDROCHLORIDE 0.4 MG: 0.4 CAPSULE ORAL at 08:01

## 2024-06-26 RX ADMIN — Medication 1000 UNITS: at 08:01

## 2024-06-26 RX ADMIN — GABAPENTIN 100 MG: 100 CAPSULE ORAL at 20:16

## 2024-06-26 RX ADMIN — MIDODRINE HYDROCHLORIDE 5 MG: 5 TABLET ORAL at 08:01

## 2024-06-26 RX ADMIN — DIGOXIN 125 MCG: 125 TABLET ORAL at 11:00

## 2024-06-26 NOTE — PLAN OF CARE
Goal Outcome Evaluation:  Plan of Care Reviewed With: patient           Outcome Evaluation: Pt seen for OT eval this AM. Admitted with JOSEPH, R pleural effusion and severe anemia.Chest tube to suction at time of eval. Hx includes R AKA with prothesis. Pt currently states he's in the process of receiving a new one.  Today he was sitting up in bed attempting to eat upon arrival asking for help with repositioning tray/food. He reported a friend assists him with transfers to w/c and BSC at home. Per chart a sydni lift is used. Today he required min-mod Ax2 with increased encouragement to transition to sit EOB. He required CGA/min A due to posterior lean. Pt held to rails with BUE to assist with holding self up on EOB. Pt quickly requested to return to supine and declined further activity. Pt to benefit from skilled OT to address deficits and maximize independence with ADLs. Pts family is hopeful for SNF at [place]

## 2024-06-26 NOTE — PLAN OF CARE
Problem: Adult Inpatient Plan of Care  Goal: Plan of Care Review  Outcome: Ongoing, Progressing  Flowsheets (Taken 6/26/2024 1554)  Outcome Evaluation: Patient is alert and disoriented to time. -20 suction pleurex. Will continue supportive care  Goal: Patient-Specific Goal (Individualized)  Outcome: Ongoing, Progressing  Goal: Absence of Hospital-Acquired Illness or Injury  Outcome: Ongoing, Progressing  Intervention: Identify and Manage Fall Risk  Recent Flowsheet Documentation  Taken 6/26/2024 1400 by Beth Jackson RN  Safety Promotion/Fall Prevention:   clutter free environment maintained   activity supervised  Taken 6/26/2024 1200 by Beth Jackson RN  Safety Promotion/Fall Prevention:   clutter free environment maintained   activity supervised  Taken 6/26/2024 1005 by Beth Jackson RN  Safety Promotion/Fall Prevention:   clutter free environment maintained   activity supervised  Taken 6/26/2024 0800 by Beth Jackson RN  Safety Promotion/Fall Prevention:   clutter free environment maintained   activity supervised  Intervention: Prevent Skin Injury  Recent Flowsheet Documentation  Taken 6/26/2024 1400 by Beth Jackson RN  Body Position: turned  Taken 6/26/2024 1200 by Beth Jackson RN  Body Position: turned  Taken 6/26/2024 1005 by Beth Jackson RN  Body Position: turned  Taken 6/26/2024 0800 by Beth Jackson RN  Body Position: turned  Skin Protection: adhesive use limited  Intervention: Prevent and Manage VTE (Venous Thromboembolism) Risk  Recent Flowsheet Documentation  Taken 6/26/2024 0800 by Beth Jackson RN  Activity Management: activity encouraged  Range of Motion: active ROM (range of motion) encouraged  Intervention: Prevent Infection  Recent Flowsheet Documentation  Taken 6/26/2024 1400 by Beth Jackson RN  Infection Prevention: single patient room provided  Taken 6/26/2024 1200 by Beth Jackson RN  Infection Prevention: single patient room provided  Taken 6/26/2024 1005 by Beth Jackson  RN  Infection Prevention: single patient room provided  Taken 6/26/2024 0800 by Beth Jackson RN  Infection Prevention: single patient room provided  Goal: Optimal Comfort and Wellbeing  Outcome: Ongoing, Progressing  Intervention: Provide Person-Centered Care  Recent Flowsheet Documentation  Taken 6/26/2024 0800 by Beth Jackson RN  Trust Relationship/Rapport: care explained  Goal: Readiness for Transition of Care  Outcome: Ongoing, Progressing     Problem: Skin Injury Risk Increased  Goal: Skin Health and Integrity  Outcome: Ongoing, Progressing  Intervention: Optimize Skin Protection  Recent Flowsheet Documentation  Taken 6/26/2024 1400 by Beth Jackson RN  Head of Bed (HOB) Positioning: HOB lowered  Taken 6/26/2024 1200 by Beth Jackson RN  Head of Bed (HOB) Positioning: HOB lowered  Taken 6/26/2024 1005 by Beth Jackson RN  Head of Bed (HOB) Positioning: HOB lowered  Taken 6/26/2024 0800 by Beth Jackson RN  Pressure Reduction Techniques: frequent weight shift encouraged  Head of Bed (HOB) Positioning: HOB lowered  Pressure Reduction Devices: specialty bed utilized  Skin Protection: adhesive use limited     Problem: Fall Injury Risk  Goal: Absence of Fall and Fall-Related Injury  Outcome: Ongoing, Progressing  Intervention: Identify and Manage Contributors  Recent Flowsheet Documentation  Taken 6/26/2024 1400 by Beth Jackson RN  Medication Review/Management: medications reviewed  Taken 6/26/2024 1200 by Beth Jackson RN  Medication Review/Management: medications reviewed  Taken 6/26/2024 1005 by Beth Jackson RN  Medication Review/Management: medications reviewed  Taken 6/26/2024 0800 by Beth Jackson RN  Medication Review/Management: medications reviewed  Intervention: Promote Injury-Free Environment  Recent Flowsheet Documentation  Taken 6/26/2024 1400 by Beth Jackson RN  Safety Promotion/Fall Prevention:   clutter free environment maintained   activity supervised  Taken 6/26/2024 1200 by Manuel  SWAPNA Campos  Safety Promotion/Fall Prevention:   clutter free environment maintained   activity supervised  Taken 6/26/2024 1005 by Beth Jackson RN  Safety Promotion/Fall Prevention:   clutter free environment maintained   activity supervised  Taken 6/26/2024 0800 by Beth Jackson, SWAPNA  Safety Promotion/Fall Prevention:   clutter free environment maintained   activity supervised   Goal Outcome Evaluation:              Outcome Evaluation: Patient is alert and disoriented to time. -20 suction pleurex. Will continue supportive care

## 2024-06-26 NOTE — PROGRESS NOTES
"   LOS: 5 days     Chief Complaint/ Reason for encounter: JOSEPH    Subjective   06/24/24 : No new complaints or events  He feels well, no nausea or vomiting  Good appetite, no shortness of breath or chest pain    6/25: Pleasantly confused, no new complaints  Family at bedside, multiple questions answered  Eating and drinking well, no nausea vomiting or shortness of breath    6/26: Resting in bed no new complaints or events noted  Slept well per nursing staff, no complaints of pain    Medical history reviewed:  History of Present Illness    Subjective    History taken from: Patient and chart    Vital Signs  Temp:  [97.3 °F (36.3 °C)-97.8 °F (36.6 °C)] 97.3 °F (36.3 °C)  Heart Rate:  [63-91] 67  Resp:  [16] 16  BP: ()/(56-74) 109/67       Wt Readings from Last 1 Encounters:   06/21/24 1255 63.5 kg (140 lb)       Objective:  Vital signs: (most recent): Blood pressure 109/67, pulse 67, temperature 97.3 °F (36.3 °C), resp. rate 16, height 177.8 cm (70\"), weight 63.5 kg (140 lb), SpO2 100%.                Objective:  General Appearance:  Comfortable, chronically ill-appearing, in no acute distress and not in pain.  Awake, alert  HEENT: Mucous membranes moist, no injury, oropharynx clear  Lungs:  Normal effort and normal respiratory rate.  Breath sounds clear to auscultation.  No  respiratory distress.  No rales, decreased breath sounds or rhonchi.  Pleurx catheter in place  Heart: Normal rate.  Regular rhythm.  S1, S2 normal.  No murmur.   Abdomen: Abdomen is soft.  Bowel sounds are normal, no abdominal tenderness.  There is no rebound or guarding  Extremities: Trace edema of bilateral lower extremities  Skin:  Warm and dry with no rashes      Results Review:    Intake/Output:     Intake/Output Summary (Last 24 hours) at 6/26/2024 1302  Last data filed at 6/26/2024 0600  Gross per 24 hour   Intake 240 ml   Output 2475 ml   Net -2235 ml         DATA:  Radiology and Labs:  The following labs independently reviewed by " me. Additional labs ordered for tomorrow a.m.  Interval notes, chart personally reviewed by me.   Old records independently reviewed showing baseline creatinine 0.7    Risk/ complexity of medical care/ medical decision making moderate complexity, renal failure management      Labs:     Recent Results (from the past 24 hour(s))   CBC (No Diff)    Collection Time: 06/26/24  3:45 AM    Specimen: Blood   Result Value Ref Range    WBC 8.60 3.40 - 10.80 10*3/mm3    RBC 3.12 (L) 4.14 - 5.80 10*6/mm3    Hemoglobin 7.9 (L) 13.0 - 17.7 g/dL    Hematocrit 25.0 (L) 37.5 - 51.0 %    MCV 80.1 79.0 - 97.0 fL    MCH 25.3 (L) 26.6 - 33.0 pg    MCHC 31.6 31.5 - 35.7 g/dL    RDW 15.9 (H) 12.3 - 15.4 %    RDW-SD 45.7 37.0 - 54.0 fl    MPV 9.6 6.0 - 12.0 fL    Platelets 219 140 - 450 10*3/mm3   Renal Function Panel    Collection Time: 06/26/24  3:45 AM    Specimen: Blood   Result Value Ref Range    Glucose 79 65 - 99 mg/dL    BUN 30 (H) 8 - 23 mg/dL    Creatinine 1.18 0.76 - 1.27 mg/dL    Sodium 142 136 - 145 mmol/L    Potassium 3.3 (L) 3.5 - 5.2 mmol/L    Chloride 110 (H) 98 - 107 mmol/L    CO2 23.4 22.0 - 29.0 mmol/L    Calcium 8.1 (L) 8.6 - 10.5 mg/dL    Albumin 2.3 (L) 3.5 - 5.2 g/dL    Phosphorus 2.8 2.5 - 4.5 mg/dL    Anion Gap 8.6 5.0 - 15.0 mmol/L    BUN/Creatinine Ratio 25.4 (H) 7.0 - 25.0    eGFR 60.8 >60.0 mL/min/1.73   Magnesium    Collection Time: 06/26/24  3:45 AM    Specimen: Blood   Result Value Ref Range    Magnesium 1.6 1.6 - 2.4 mg/dL       Radiology:  Pertinent radiology studies were reviewed as described above      Medications have been reviewed separately in chart overview      ASSESSMENT:  JOSEPH, seems to be predominantly obstructive.  Much improved after Whitehead placement but CT still showed hydronephrosis.  No immediate plans for intervention since his renal function is improving  Severe hyperkalemia, improved with treatment  Urinary retention, improved post Whitehead  Severe anemia   Recurrent right sided pleural  effusion       Plan:  Renal function continues to improve after Whitehead placement with creatinine down to 1.18 today  CT still showed significant hydronephrosis after the Whitehead but since his renal function is improving there is no plan for any urologic intervention at this time  Continue to monitor off IV fluids and encourage nutrition  Continue Flomax  Midodrine for chronic hypotension  Chest x-ray reviewed: No sign of volume excess    Maintaining excellent urine output, euvolemic on exam  Discussed with patient and family at bedside       Randy Rico MD  [facility]   Office phone number: [phone]  Answering service phone number: [phone]    06/26/24  13:02 EDT    Dictation performed using Dragon dictation software

## 2024-06-26 NOTE — PLAN OF CARE
Goal Outcome Evaluation:  Plan of Care Reviewed With: patient           Outcome Evaluation: Skyler is an 84 y.o male who presented to [facility] with pleural effusion. Patient with chest tube to suction. Hx of R AKA.  Patient reports he gets up to a w/c and BSC via sydni lift at baseline. Patient reports planning to get a new prostetic to assist with transfers. Patient  sat up to EOB with Fuad-modAx2 and VCs for sequencing. Patient maintained sitting balance at EOB with CGA-Fuad. Patient demonstrated a mild posterior lean. Patient quick to fatigue and eager to lay back down. maxAx2 to scoot up in bed. Family hopeful for patient to go to SNF at d/c. Acute PT will continue to monitor.      Anticipated Discharge Disposition (PT): skilled nursing facility, home with 24/7 care, home with home health

## 2024-06-26 NOTE — THERAPY EVALUATION
Patient Name: Mehreen Silva IV  : 1940    MRN: [id number]                              Today's Date: 2024       Admit Date: 2024    Visit Dx:     ICD-10-CM ICD-9-CM   1. Pleural effusion  J90 511.9     Patient Active Problem List   Diagnosis    Cervical spondylosis with myelopathy    Lumbar radiculopathy    Hx of toxic multinodular goiter    Dyslipidemia    Hyperthyroidism    Hyperkalemia    Longstanding persistent atrial fibrillation    Ulcer of left heel    Recurrent pleural effusion on right    Moderate malnutrition    Pressure injury of sacral region, unstageable    Complications of immobility    Chronic heart failure with preserved ejection fraction (HFpEF)    Orthostatic hypotension    Pleural effusion    CKD (chronic kidney disease)    Anemia     Past Medical History:   Diagnosis Date    (HFpEF) heart failure with preserved ejection fraction 2022    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, right leg 2023    Atrial fibrillation     Cervical spondylosis with myelopathy     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    HLD (hyperlipidemia) 2022    Hx of toxic multinodular goiter 2022    Hyperthyroidism     Lumbar radiculopathy     Orthostatic hypotension     Osteoarthritis     Pressure injury of sacral region, unstageable 2024    Pulmonary hypertension     Recurrent pleural effusion on right 2024    Ulcer with gangrene     right heel     Past Surgical History:   Procedure Laterality Date    ABOVE KNEE AMPUTATION Right 2023    Procedure: ABOVE KNEE AMPUTATION RIGHT, proveena wound vac placement;  Surgeon: Issa Nieves MD;  Location: [facility];  Service: Vascular;  Laterality: Right;    APPENDECTOMY      BACK SURGERY      x4    COLONOSCOPY      EXCISION MASS TRUNK N/A 2016    Procedure: Excision soft tissue neoplasm on abdominal wall and  left neck;  Surgeon: Shaji Barahona Jr., MD;  Location: [facility] MAIN OR;  Service:     KNEE SURGERY      NECK SURGERY  1974    PLEURAL CATHETER INSERTION Right 4/30/2024    Procedure: RIGHT VIDEO ASSISTED THORACOSCOPY, PARTIAL DECORTICATION, PLEURX CATHETER INSERTION;  Surgeon: Nataliya Noyola MD;  Location: [facility] MAIN OR;  Service: Thoracic;  Laterality: Right;    QUADRICEPS REPAIR      ROTATOR CUFF REPAIR      SHOULDER SURGERY      SMALL INTESTINE SURGERY  2021    Bowel Obstruction      General Information       Row Name 06/26/24 0906          Physical Therapy Time and Intention    Document Type evaluation  -     Mode of Treatment physical therapy  -       Row Name 06/26/24 0906          General Information    Patient Profile Reviewed yes  -SM     Prior Level of Function --  Recieves assist with ADLs  -     Existing Precautions/Restrictions fall  -     Barriers to Rehab previous functional deficit;medically complex  -       Row Name 06/26/24 0906          Living Environment    People in Home spouse;friend(s)  -       Row Name 06/26/24 0906          Cognition    Orientation Status (Cognition) oriented x 3  -       Row Name 06/26/24 0906          Safety Issues, Functional Mobility    Safety Issues Affecting Function (Mobility) problem-solving;sequencing abilities  -     Impairments Affecting Function (Mobility) balance;postural/trunk control;endurance/activity tolerance;strength;pain;range of motion (ROM)  -               User Key  (r) = Recorded By, (t) = Taken By, (c) = Cosigned By      Initials Name Provider Type     Tran Morin PT Physical Therapist                   Mobility       Row Name 06/26/24 0907          Bed Mobility    Bed Mobility supine-sit;sit-supine  -     Supine-Sit [place] (Bed Mobility) minimum assist (75% patient effort);moderate assist (50% patient effort);2 person assist;verbal cues  -     Sit-Supine [place] (Bed Mobility) minimum assist (75% patient  effort);moderate assist (50% patient effort);2 person assist;verbal cues  -     Assistive Device (Bed Mobility) head of bed elevated;bed rails  -     Comment, (Bed Mobility) Increased time. Cues for sequencing  -       Row Name 06/26/24 0907          Bed-Chair Transfer    Bed-Chair [place] (Transfers) unable to assess  -[facility] Name 06/26/24 0907          Gait/Stairs (Locomotion)    [place] Level (Gait) unable to assess  -               User Key  (r) = Recorded By, (t) = Taken By, (c) = Cosigned By      Initials Name Provider Type     Tran Morin PT Physical Therapist                   Obj/Interventions       Row Name 06/26/24 0908          Range of Motion Comprehensive    Comment, General Range of Motion R AKA. L LE WFL  -[facility] Name 06/26/24 0908          Strength Comprehensive (MMT)    General Manual Muscle Testing (MMT) Assessment lower extremity strength deficits identified  -     Comment, General Manual Muscle Testing (MMT) Assessment Genealized LE weakness  -[facility] Name 06/26/24 0908          Balance    Balance Assessment sitting static balance  -     Static Sitting Balance minimal assist;contact guard;verbal cues  -     Position, Sitting Balance sitting edge of bed  -     Balance Interventions sitting;static  -     Comment, Balance mild posterior lean  -               User Key  (r) = Recorded By, (t) = Taken By, (c) = Cosigned By      Initials Name Provider Type     Tran Morin PT Physical Therapist                   Goals/Plan       Row Name 06/26/24 0913          Bed Mobility Goal 1 (PT)    Activity/Assistive Device (Bed Mobility Goal 1, PT) bed mobility activities, all  -     [place] Level/Cues Needed (Bed Mobility Goal 1, PT) contact guard required  -     Time Frame (Bed Mobility Goal 1, PT) 2 weeks  -[facility] Name 06/26/24 0913          Transfer Goal 1 (PT)    Activity/Assistive Device (Transfer Goal 1, PT)  sit-to-stand/stand-to-sit;bed-to-chair/chair-to-bed  -     [place] Level/Cues Needed (Transfer Goal 1, PT) moderate assist (50-74% patient effort)  -     Time Frame (Transfer Goal 1, PT) 2 weeks  -               User Key  (r) = Recorded By, (t) = Taken By, (c) = Cosigned By      Initials Name Provider Type     Tran Morin, PT Physical Therapist                   Clinical Impression       Row Name 06/26/24 0908          Pain    Pain Location - Side/Orientation Left  -     Pain Location - hip  -     Pre/Posttreatment Pain Comment Patient did not rate pain  -SM     Pain Intervention(s) Rest;Repositioned  -       Row Name 06/26/24 0908          Plan of Care Review    Plan of Care Reviewed With patient  -     Outcome Evaluation Skyler is an 84 y.o male who presented to [facility] with pleural effusion. Patient with chest tube to suction. Hx of R AKA.  Patient reports he gets up to a w/c and BSC via sydni lift at baseline. Patient reports planning to get a new prostetic to assist with transfers. Patient  sat up to EOB with Fuad-modAx2 and VCs for sequencing. Patient maintained sitting balance at EOB with CGA-Fuad. Patient demonstrated a mild posterior lean. Patient quick to fatigue and eager to lay back down. maxAx2 to scoot up in bed. Family hopeful for patient to go to SNF at d/c. Acute PT will continue to monitor.  -       Row Name 06/26/24 0908          Therapy Assessment/Plan (PT)    Rehab Potential (PT) fair, will monitor progress closely  -     Criteria for Skilled Interventions Met (PT) yes;meets criteria  -     Therapy Frequency (PT) 2 times/wk  -SM       Row Name 06/26/24 0908          Vital Signs    Pre Patient Position Supine  -SM     Intra Patient Position Sitting  -SM     Post Patient Position Supine  -SM       Row Name 06/26/24 0908          Positioning and Restraints    Pre-Treatment Position in bed  -SM     Post Treatment Position bed  -SM     In Bed notified nsg;call light  within reach;encouraged to call for assist;exit alarm on;fowlers  -               User Key  (r) = Recorded By, (t) = Taken By, (c) = Cosigned By      Initials Name Provider Type    Tran Todd PT Physical Therapist                   Outcome Measures       Row Name 06/26/24 0913          How much help from another person do you currently need...    Turning from your back to your side while in flat bed without using bedrails? 3  -SM     Moving from lying on back to sitting on the side of a flat bed without bedrails? 2  -SM     Moving to and from a bed to a chair (including a wheelchair)? 1  -SM     Standing up from a chair using your arms (e.g., wheelchair, bedside chair)? 1  -SM     Climbing 3-5 steps with a railing? 1  -SM     To walk in hospital room? 1  -SM     AM-PAC 6 Clicks Score (PT) 9  -SM     Highest Level of Mobility Goal 3 --> Sit at edge of bed  -       Row Name 06/26/24 0913          Functional Assessment    Outcome Measure Options AM-PAC 6 Clicks Basic Mobility (PT)  -               User Key  (r) = Recorded By, (t) = Taken By, (c) = Cosigned By      Initials Name Provider Type    Tran Todd PT Physical Therapist                                 Physical Therapy Education       Title: PT OT SLP Therapies (In Progress)       Topic: Physical Therapy (In Progress)       Point: Mobility training (In Progress)       Learning Progress Summary             Patient Acceptance, E, NR by  at 6/26/2024 0913                         Point: Home exercise program (In Progress)       Learning Progress Summary             Patient Acceptance, E, NR by  at 6/26/2024 0913                         Point: Body mechanics (In Progress)       Learning Progress Summary             Patient Acceptance, E, NR by  at 6/26/2024 0913                         Point: Precautions (In Progress)       Learning Progress Summary             Patient Acceptance, E, NR by  at 6/26/2024 0913                                          User Key       Initials Effective Dates Name Provider Type Discipline     05/02/22 -  Tran Morin PT Physical Therapist PT                  PT Recommendation and Plan     Plan of Care Reviewed With: patient  Outcome Evaluation: Skyler is an 84 y.o male who presented to [facility] with pleural effusion. Patient with chest tube to suction. Hx of R AKA.  Patient reports he gets up to a w/c and BSC via sydni lift at baseline. Patient reports planning to get a new prostetic to assist with transfers. Patient  sat up to EOB with Fuad-modAx2 and VCs for sequencing. Patient maintained sitting balance at EOB with CGA-Fuad. Patient demonstrated a mild posterior lean. Patient quick to fatigue and eager to lay back down. maxAx2 to scoot up in bed. Family hopeful for patient to go to SNF at d/c. Acute PT will continue to monitor.     Time Calculation:         PT Charges       Row Name 06/26/24 0914             Time Calculation    Start Time 0821  -SM      Stop Time 0835  -SM      Time Calculation (min) 14 min  -SM      PT Received On 06/26/24  -      PT - Next Appointment 06/28/24  -      PT Goal Re-Cert Due Date 07/10/24  -         Time Calculation- PT    Total Timed Code Minutes- PT 8 minute(s)  -SM         Timed Charges    41564 - PT Therapeutic Activity Minutes 8  -SM         Total Minutes    Timed Charges Total Minutes 8  -SM       Total Minutes 8  -SM                User Key  (r) = Recorded By, (t) = Taken By, (c) = Cosigned By      Initials Name Provider Type     Tran Morin PT Physical Therapist                  Therapy Charges for Today       Code Description Service Date Service Provider Modifiers Qty    91310152612 HC PT THERAPEUTIC ACT EA 15 MIN 6/26/2024 Tran Morin, PT GP 1    59530364077 HC PT EVAL LOW COMPLEXITY 3 6/26/2024 Tran Morin, PT GP 1            PT G-Codes  Outcome Measure Options: AM-PAC 6 Clicks Basic Mobility (PT)  AM-PAC 6 Clicks Score (PT): 9  PT  Discharge Summary  Anticipated Discharge Disposition (PT): skilled nursing facility, home with 24/7 care, home with home health    Tran Morin, PT  6/26/2024

## 2024-06-26 NOTE — PLAN OF CARE
Goal Outcome Evaluation:  Plan of Care Reviewed With: patient        Progress: no change  Outcome Evaluation: vss, no complaints of pain. CT-20 suction, good pulmonary hygiene encouraged, turned q2. pt rested well durinf shift. labs in am. And cxr in am.

## 2024-06-26 NOTE — THERAPY EVALUATION
Acute Care - Occupational Therapy Initial Evaluation  [facility]     Patient Name: Mehreen Silva IV  : 1940  MRN: [id number]  Today's Date: 2024             Admit Date: 2024       ICD-10-CM ICD-9-CM   1. Pleural effusion  J90 511.9     Patient Active Problem List   Diagnosis    Cervical spondylosis with myelopathy    Lumbar radiculopathy    Hx of toxic multinodular goiter    Dyslipidemia    Hyperthyroidism    Hyperkalemia    Longstanding persistent atrial fibrillation    Ulcer of left heel    Recurrent pleural effusion on right    Moderate malnutrition    Pressure injury of sacral region, unstageable    Complications of immobility    Chronic heart failure with preserved ejection fraction (HFpEF)    Orthostatic hypotension    Pleural effusion    CKD (chronic kidney disease)    Anemia     Past Medical History:   Diagnosis Date    (HFpEF) heart failure with preserved ejection fraction 2022    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, right leg 2023    Atrial fibrillation     Cervical spondylosis with myelopathy     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    HLD (hyperlipidemia) 2022    Hx of toxic multinodular goiter 2022    Hyperthyroidism     Lumbar radiculopathy     Orthostatic hypotension     Osteoarthritis     Pressure injury of sacral region, unstageable 2024    Pulmonary hypertension     Recurrent pleural effusion on right 2024    Ulcer with gangrene     right heel     Past Surgical History:   Procedure Laterality Date    ABOVE KNEE AMPUTATION Right 2023    Procedure: ABOVE KNEE AMPUTATION RIGHT, proveena wound vac placement;  Surgeon: Issa Nieves MD;  Location: [facility] OR;  Service: Vascular;  Laterality: Right;    APPENDECTOMY      BACK SURGERY      x4    COLONOSCOPY      EXCISION MASS TRUNK N/A 2016    Procedure: Excision soft  tissue neoplasm on abdominal wall and left neck;  Surgeon: Shaji Barahona Jr., MD;  Location: [facility] MAIN OR;  Service:     KNEE SURGERY      NECK SURGERY  1974    PLEURAL CATHETER INSERTION Right 4/30/2024    Procedure: RIGHT VIDEO ASSISTED THORACOSCOPY, PARTIAL DECORTICATION, PLEURX CATHETER INSERTION;  Surgeon: Nataliya Noyola MD;  Location:  [facility] MAIN OR;  Service: Thoracic;  Laterality: Right;    QUADRICEPS REPAIR      ROTATOR CUFF REPAIR      SHOULDER SURGERY      SMALL INTESTINE SURGERY  2021    Bowel Obstruction         OT ASSESSMENT FLOWSHEET (Last 12 Hours)       OT Evaluation and Treatment       Row Name 06/26/24 0914                   OT Time and Intention    Document Type evaluation  -KR        Mode of Treatment individual therapy;occupational therapy  -KR           General Information    Patient Profile Reviewed yes  -KR        Prior Level of Function --  receives assist with transferring to BSC and for ADLs.  -KR        Existing Precautions/Restrictions fall  -KR        Barriers to Rehab previous functional deficit;medically complex  -KR           Living Environment    Current Living Arrangements home  -KR        Home Accessibility --  ramp entrance  -KR        People in Home spouse  pt reports a friend assists him with transfers to w/c and BSC  -KR           Home Use of Assistive/Adaptive Equipment    Equipment Currently Used at Home wheelchair;prosthesis;shower chair;grab bar;hospital bed  -KR           Pain Assessment    Pain Location - Side/Orientation Left  -KR        Pain Location - hip  -KR        Pain Intervention(s) Repositioned;Rest  -KR           Cognition    Orientation Status (Cognition) oriented x 3  -KR           Range of Motion Comprehensive    General Range of Motion bilateral upper extremity ROM WFL  -KR           Strength Comprehensive (MMT)    Comment, General Manual Muscle Testing (MMT) Assessment BUE grossly 4/5  -KR           Activities of Daily Living    BADL  Assessment/Intervention feeding  declined all ADLs this AM. Anticipate requring max-dep A for toileting at this time. min A with grooming sitting up in bed  -KR           Self-Feeding Assessment/Training    [place] Level (Feeding) minimum assist (75% patient effort);verbal cues;prepare tray/open items;feeding skills  -KR        Position (Self-Feeding) sitting up in bed  -KR        Comment, (Feeding) Required assist for repositioning and cues to hold item with UE as the other used the fork. Difficulty with getting items on the fork. able to complete with increased time  -KR           Bed Mobility    Supine-Sit [place] (Bed Mobility) minimum assist (75% patient effort);moderate assist (50% patient effort);2 person assist;verbal cues  -KR        Sit-Supine [place] (Bed Mobility) minimum assist (75% patient effort);moderate assist (50% patient effort);2 person assist;verbal cues  -KR        Comment, (Bed Mobility) increased time and encouragement  -KR           Bed-Chair Transfer    Bed-Chair [place] (Transfers) unable to assess  -KR           Safety Issues, Functional Mobility    Safety Issues Affecting Function (Mobility) problem-solving;sequencing abilities  -KR        Impairments Affecting Function (Mobility) balance;postural/trunk control;endurance/activity tolerance;strength;pain;range of motion (ROM)  -KR           Balance    Static Sitting Balance contact guard;minimal assist  -KR        Position, Sitting Balance sitting edge of bed  -KR        Comment, Balance posterior lean  -KR           Wound 12/13/23 1427 coccyx Pressure Injury    Wound - Properties Group Placement Date: 12/13/23  -KP Placement Time: 1427  -KP Present on Original Admission: Y  -KP Location: coccyx  -KP Primary Wound Type: Pressure inj  -KP, old, scab     Retired Wound - Properties Group Placement Date: 12/13/23  -KP Placement Time: 1427 -KP Present on Original Admission: Y  -KP Location: coccyx  -KP Primary Wound Type:  Pressure inj  -KP, old, scab     Retired Wound - Properties Group Date first assessed: 12/13/23  -KP Time first assessed: 1427  -KP Present on Original Admission: Y  -KP Location: coccyx  -KP Primary Wound Type: Pressure inj  -KP, old, scab        Wound 02/09/24 1648 Left posterior heel Pressure Injury    Wound - Properties Group Placement Date: 02/09/24  -MM Placement Time: 1648  -MM Present on Original Admission: Y  -MM Side: Left  -MM Orientation: posterior  -MM Location: heel  -MM Primary Wound Type: Pressure inj  -MM    Retired Wound - Properties Group Placement Date: 02/09/24  -MM Placement Time: 1648  -MM Present on Original Admission: Y  -MM Side: Left  -MM Orientation: posterior  -MM Location: heel  -MM Primary Wound Type: Pressure inj  -MM    Retired Wound - Properties Group Date first assessed: 02/09/24  -MM Time first assessed: 1648  -MM Present on Original Admission: Y  -MM Side: Left  -MM Location: heel  -MM Primary Wound Type: Pressure inj  -MM       Plan of Care Review    Plan of Care Reviewed With patient  -KR        Outcome Evaluation Pt seen for OT eval this AM. Admitted with JOSEPH, R pleural effusion and severe anemia.Chest tube to suction at time of eval. Hx includes R AKA with prothesis. Pt currently states he's in the process of receiving a new one.  Today he was sitting up in bed attempting to eat upon arrival asking for help with repositioning tray/food. He reported a friend assists him with transfers to w/c and BSC at home. Per chart a sydni lift is used. Today he required min-mod Ax2 with increased encouragement to transition to sit EOB. He required CGA/min A due to posterior lean. Pt held to rails with BUE to assist with holding self up on EOB. Pt quickly requested to return to supine and declined further activity. Pt to benefit from skilled OT to address deficits and maximize independence with ADLs. Pts family is hopeful for SNF at [facility]           Positioning and Restraints     Pre-Treatment Position in bed  -KR        Post Treatment Position bed  -KR        In Bed notified nsg;supine;call light within reach;encouraged to call for assist;exit alarm on  -KR           Therapy Assessment/Plan (OT)    Therapy Frequency (OT) 2 times/wk  -KR           OT Goals    Dressing Goal Selection (OT) dressing, OT goal 1  -KR        Grooming Goal Selection (OT) grooming, OT goal 1  -KR        Strength Goal Selection (OT) strength, OT goal 1  -KR           Dressing Goal 1 (OT)    Activity/Device (Dressing Goal 1, OT) dressing skills, all  supine in bedfor LB and sitting up EOB or w/c for UB  -KR        [place]/Cues Needed (Dressing Goal 1, OT) minimum assist (75% or more patient effort)  -KR        Time Frame (Dressing Goal 1, OT) short term goal (STG)  -KR        Progress/Outcome (Dressing Goal 1, OT) goal ongoing  -KR           Grooming Goal 1 (OT)    Activity/Device (Grooming Goal 1, OT) grooming skills, all  -KR        [place] (Grooming Goal 1, OT) standby assist  -KR        Time Frame (Grooming Goal 1, OT) short term goal (STG)  -KR        Progress/Outcome (Grooming Goal 1, OT) goal ongoing  -KR           Strength Goal 1 (OT)    Strength Goal 1 (OT) Pt to demo understanding of HEP in order to improve overall endurance and strength  -KR        Time Frame (Strength Goal 1, OT) short term goal (STG)  -KR        Progress/Outcome (Strength Goal 1, OT) goal ongoing  -KR                  User Key  (r) = Recorded By, (t) = Taken By, (c) = Cosigned By      Initials Name Effective Dates     Margie Stout RN 02/08/21 -     MM Lakshmi Germain RN 10/12/20 -     KR Margie Renteria OT 06/25/24 -                            OT Recommendation and Plan  Therapy Frequency (OT): 2 times/wk  Plan of Care Review  Plan of Care Reviewed With: patient  Outcome Evaluation: Pt seen for OT eval this AM. Admitted with JOSEPH, R pleural effusion and severe anemia.Chest tube to suction at time of eval. Hx includes R  AKA with prothesis. Pt currently states he's in the process of receiving a new one.  Today he was sitting up in bed attempting to eat upon arrival asking for help with repositioning tray/food. He reported a friend assists him with transfers to w/c and BSC at home. Per chart a sydni lift is used. Today he required min-mod Ax2 with increased encouragement to transition to sit EOB. He required CGA/min A due to posterior lean. Pt held to rails with BUE to assist with holding self up on EOB. Pt quickly requested to return to supine and declined further activity. Pt to benefit from skilled OT to address deficits and maximize independence with ADLs. Pts family is hopeful for SNF [place]  Plan of Care Reviewed With: patient  Outcome Evaluation: Pt seen for OT eval this AM. Admitted with JOSEPH, R pleural effusion and severe anemia.Chest tube to suction at time of eval. Hx includes R AKA with prothesis. Pt currently states he's in the process of receiving a new one.  Today he was sitting up in bed attempting to eat upon arrival asking for help with repositioning tray/food. He reported a friend assists him with transfers to w/c and BSC at home. Per chart a sydni lift is used. Today he required min-mod Ax2 with increased encouragement to transition to sit EOB. He required CGA/min A due to posterior lean. Pt held to rails with BUE to assist with holding self up on EOB. Pt quickly requested to return to supine and declined further activity. Pt to benefit from skilled OT to address deficits and maximize independence with ADLs. Pts family is hopeful for SNF at [place]        Time Calculation:    Time Calculation- OT       Row Name 06/26/24 0922             Time Calculation- OT    OT Start Time 0820  -KR      OT Stop Time 0835  -KR      OT Time Calculation (min) 15 min  -KR      OT Received On 06/26/24  -KR      OT - Next Appointment 06/28/24  -KR      OT Goal Re-Cert Due Date 07/10/24  -KR         Timed Charges    51810 - OT Therapeutic  Activity Minutes 8  -KR         Total Minutes    Timed Charges Total Minutes 8  -KR       Total Minutes 8  -KR                User Key  (r) = Recorded By, (t) = Taken By, (c) = Cosigned By      Initials Name Provider Type    Margie Luis OT Occupational Therapist                  Therapy Charges for Today       Code Description Service Date Service Provider Modifiers Qty    [id number]  OT THERAPEUTIC ACT EA 15 MIN 6/26/2024 Margie Renteria OT GO 1    [id number]  OT EVAL LOW COMPLEXITY 3 6/26/2024 Margie Renteria OT GO 1                 Margie Renteria OT  6/26/2024

## 2024-06-26 NOTE — PROGRESS NOTES
"    Chief Complaint: Recurrent pleural effusion with Pleurx catheter in place  S/p intrapleural lytic therapy x 1, 6/24/2024    Subjective:  Symptoms:  Improved.  No shortness of breath, cough or chest pain.    Diet:  No nausea or vomiting.    Activity level: Returning to normal.    Pain:  He reports no pain.    Resting comfortably in bed.      Vital Signs:  Temp:  [97.3 °F (36.3 °C)-97.8 °F (36.6 °C)] 97.4 °F (36.3 °C)  Heart Rate:  [67-91] 80  Resp:  [16] 16  BP: ()/(56-74) 106/57    Intake & Output (last day)         06/25 0701  06/26 0700 06/26 0701 06/27 0700    P.O. 240     Total Intake(mL/kg) 240 (3.8)     Urine (mL/kg/hr) 2925 (1.9)     Stool 0     Chest Tube 200     Total Output 3125     Net -2885           Stool Unmeasured Occurrence 2 x             Objective:  General Appearance:  Comfortable and in no acute distress.    Vital signs: (most recent): Blood pressure 106/57, pulse 80, temperature 97.4 °F (36.3 °C), resp. rate 16, height 177.8 cm (70\"), weight 63.5 kg (140 lb), SpO2 98%.    Lungs:  Normal effort and normal respiratory rate.  He is not in respiratory distress.    Heart: Normal rate.  Regular rhythm.    Chest: Symmetric chest wall expansion.   Abdomen: Abdomen is soft and non-distended.    Neurological: Patient is alert and oriented to person, place and time.    Skin:  Warm and dry.                Pleurx:   Site: Right, Clean, Dry, Intact, and Securement device intact  Suction: -20 cm  Air leak: Negative  24 Hour Total: 200ml     Results Review:     I reviewed the patient's new clinical results.  I reviewed the patient's new imaging results and agree with the interpretation.  Discussed with patient, nurse, and surgeon    Imaging Results (Last 24 Hours)       Procedure Component Value Units Date/Time    XR Chest 1 View [[id number]] Collected: 06/26/24 0712     Updated: 06/26/24 0716    Narrative:      ONE-VIEW PORTABLE CHEST AT 5:40 A.M.     HISTORY: Right pleural effusion.     FINDINGS: A " Pleurx catheter forms a loop at the right base without  change from yesterday. There is an adjacent very small loculated  pneumothorax at the right base laterally that appears slightly smaller.  There is adjacent atelectasis that is unchanged and the left lung  remains clear. The heart remains mildly enlarged.     This report was finalized on 6/26/2024 7:13 AM by Dr. Chavo Denny M.D  on Workstation: [id number]               Lab Results:     Lab Results (last 24 hours)       Procedure Component Value Units Date/Time    Digitoxin Level [[id number]]  (Abnormal) Collected: 06/22/24 0759    Specimen: Blood Updated: 06/26/24 1311     Digitoxin <5.0 ng/mL     Narrative:      Performed at:  [facility] [address]  [id number]  : Yesica Clark [facility], Phone:  [phone]    Renal Function Panel [[id number]]  (Abnormal) Collected: 06/26/24 0345    Specimen: Blood Updated: 06/26/24 0423     Glucose 79 mg/dL      BUN 30 mg/dL      Creatinine 1.18 mg/dL      Sodium 142 mmol/L      Potassium 3.3 mmol/L      Chloride 110 mmol/L      CO2 23.4 mmol/L      Calcium 8.1 mg/dL      Albumin 2.3 g/dL      Phosphorus 2.8 mg/dL      Anion Gap 8.6 mmol/L      BUN/Creatinine Ratio 25.4     eGFR 60.8 mL/min/1.73     Narrative:      GFR Normal >60  Chronic Kidney Disease <60  Kidney Failure <15    The GFR formula is only valid for adults with stable renal function between ages 18 and 70.    Magnesium [[id number]]  (Normal) Collected: 06/26/24 0345    Specimen: Blood Updated: 06/26/24 0423     Magnesium 1.6 mg/dL     CBC (No Diff) [[id number]]  (Abnormal) Collected: 06/26/24 0345    Specimen: Blood Updated: 06/26/24 0403     WBC 8.60 10*3/mm3      RBC 3.12 10*6/mm3      Hemoglobin 7.9 g/dL      Hematocrit 25.0 %      MCV 80.1 fL      MCH 25.3 pg      MCHC 31.6 g/dL      RDW 15.9 %      RDW-SD 45.7 fl      MPV 9.6 fL      Platelets 219 10*3/mm3     Blood Culture - Blood, Hand, Right [[id number]]   (Normal) Collected: 06/23/24 1629    Specimen: Blood from Hand, Right Updated: 06/25/24 1715     Blood Culture No growth at 2 days    Blood Culture - Blood, Arm, Left [[id number]]  (Normal) Collected: 06/23/24 1630    Specimen: Blood from Arm, Left Updated: 06/25/24 1715     Blood Culture No growth at 2 days             Assessment & Plan       Pleural effusion    Hyperthyroidism    Hyperkalemia    Longstanding persistent atrial fibrillation    Ulcer of left heel    Recurrent pleural effusion on right    Chronic heart failure with preserved ejection fraction (HFpEF)    CKD (chronic kidney disease)    Anemia       Assessment & Plan  He continues to do well.  S/p intrapleural lytic therapy via Pleurx yesterday.  Approximately 200 cc of thin serous sinus fluid drained overnight.  Follow-up chest x-ray performed today demonstrates improved left basilar pneumothorax.  Will continue chest tube to -20 cm suction. Trend output.  Encourage pulmonary hygiene.  Repeat a.m. chest x-ray. Renal function continues to improve with lopez catheter in place. Management per urology and nephrology.     CHARLEE Pascal  [facility]  06/26/24  16:43 EDT    Greater than 30 minutes was spent reviewing the patient's chart, including imaging, labs, provider notes, assessing the patient and developing a plan of care which was discussed with the patient and RN. This is separate from any billable procedural time which will be dictated in a separate note.

## 2024-06-26 NOTE — PROGRESS NOTES
Name: Mehreen Silva IV ADMIT: 2024   : 1940  PCP: Taiwo Powers MD    MRN: [id number] LOS: 5 days   AGE/SEX: 84 y.o. male  ROOM: [facility]     Subjective   Subjective   No chief complaint on file.    Not reporting any shortness of breath.  No abdominal pain reported.  No chest pain or palpitations.     Objective   Objective   Vital Signs  Temp:  [97.3 °F (36.3 °C)-97.8 °F (36.6 °C)] 97.3 °F (36.3 °C)  Heart Rate:  [63-91] 67  Resp:  [16] 16  BP: ()/(56-74) 109/67  SpO2:  [98 %-100 %] 100 %  on   ;   Device (Oxygen Therapy): room air  Body mass index is 20.09 kg/m².    Physical Exam  Vitals and nursing note reviewed.   Constitutional:       General: He is not in acute distress.     Appearance: He is ill-appearing (chronically). He is not diaphoretic.   Cardiovascular:      Rate and Rhythm: Normal rate. Rhythm irregular.   Pulmonary:      Effort: Pulmonary effort is normal.      Breath sounds: No wheezing.   Abdominal:      General: There is no distension.      Palpations: Abdomen is soft.      Tenderness: There is no abdominal tenderness. There is no guarding or rebound.   Musculoskeletal:      Comments: Right AKA   Skin:     General: Skin is warm and dry.      Comments: Right pleurx catheter   Neurological:      Mental Status: He is alert. Mental status is at baseline.   Psychiatric:         Mood and Affect: Mood normal.         Behavior: Behavior normal.       Results Review  I reviewed the patient's new clinical results.    Results from last 7 days   Lab Units 24  0345 24  0337 24  0616 24  0754 24  0442   WBC 10*3/mm3 8.60 8.66 6.42  --  8.14   HEMOGLOBIN g/dL 7.9* 7.5* 7.4* 7.8* 7.6*   PLATELETS 10*3/mm3 219 221 224  --  255     Results from last 7 days   Lab Units 24  0345 24  0336 24  0616 24  0442   SODIUM mmol/L 142 142 137 146*   POTASSIUM mmol/L 3.3* 3.5 3.8 4.1   CHLORIDE mmol/L 110* 110* 106 108*   CO2 mmol/L 23.4 24.0  "22.5 26.9   BUN mg/dL 30* 40* 44* 62*   CREATININE mg/dL 1.18 1.45* 1.73* 2.35*   GLUCOSE mg/dL 79 78 76 100*   EGFR mL/min/1.73 60.8 47.5* 38.4* 26.6*     Results from last 7 days   Lab Units 06/26/24 0345 06/25/24  0336 06/22/24  0645   ALBUMIN g/dL 2.3* 2.1* 2.4*   BILIRUBIN mg/dL  --   --  0.4   ALK PHOS U/L  --   --  126*   AST (SGOT) U/L  --   --  11   ALT (SGPT) U/L  --   --  14     Results from last 7 days   Lab Units 06/26/24 0345 06/25/24  0336 06/24/24  0616 06/23/24  0442 06/22/24  0645   CALCIUM mg/dL 8.1* 7.8* 7.9* 8.3* 8.6   ALBUMIN g/dL 2.3* 2.1*  --   --  2.4*   MAGNESIUM mg/dL 1.6 1.8  --   --   --    PHOSPHORUS mg/dL 2.8 3.2  --   --   --      Results from last 7 days   Lab Units 06/23/24  0442   PROCALCITONIN ng/mL 0.23     No results found for: \"HGBA1C\", \"POCGLU\"      No radiology results for the last day    I have personally reviewed all medications:  Scheduled Medications  cholecalciferol, 1,000 Units, Oral, Daily  collagenase, 1 Application, Topical, Q24H  digoxin, 125 mcg, Oral, Daily  gabapentin, 100 mg, Oral, Nightly  methIMAzole, 5 mg, Oral, Once per day on Monday Wednesday Friday  metoprolol tartrate, 25 mg, Oral, Q12H  midodrine, 5 mg, Oral, BID AC  sodium chloride, 10 mL, Intravenous, Q12H  tamsulosin, 0.4 mg, Oral, Daily      Infusions       Diet  Diet: Cardiac, Diabetic, Renal; Healthy Heart (2-3 Na+); Consistent Carbohydrate; Low Sodium (2-3g), Low Potassium, Low Phosphorus; Fluid Consistency: Thin (IDDSI 0)    I have personally reviewed:  [x]  Laboratory   []  Microbiology   [x]  Radiology   [x]  EKG/Telemetry  []  Cardiology/Vascular   []  Pathology    []  Records       Assessment/Plan     Active Hospital Problems    Diagnosis  POA    **Pleural effusion [J90]  Unknown    CKD (chronic kidney disease) [N18.9]  Yes    Anemia [D64.9]  Unknown    Chronic heart failure with preserved ejection fraction (HFpEF) [I50.32]  Yes    Recurrent pleural effusion on right [J90]  Yes    Ulcer of " left heel [L97.429]  Yes    Longstanding persistent atrial fibrillation [I48.11]  Yes    Hyperkalemia [E87.5]  Yes    Hyperthyroidism [E05.90]  Yes      Resolved Hospital Problems   No resolved problems to display.       84 y.o. male admitted with Pleural effusion.    Urinary retention/hydronephrosis/JOSEPH with hyperkalemia: Lopze placed.  Continued hydronephrosis but improving creatinine and potassium levels.  Continued lopez at discharge and outpatient cystoscopy planned. Urology and nephrology following.  Recurrent right pleural effusion/Pleurx: tPA and dornase treatments.  Thoracic surgery following.  Acute on chronic anemia: Iron saturation 11%.  Ferritin elevated.  Heme positive stool but no overt losses.  Endoscopy consideration once Pleurx is removed.  GI evaluated.  Chronic diastolic heart failure: Diuretics held for JOSEPH.  Chronic A-fib: Metoprolol.  Eliquis held.  Digoxin level is ok and CR improved. Will resume home digoxin.  Chronic hypotension: Midodrine  History right chest cystic skin excision: Surgery evaluated.  Hyperthyroidism: On methimazole.  Repeat thyroid painel was ok.  PPX: SCD.  Resume AC when able.  Disposition: SNF/TBD    Expected Discharge Date: 6/27/2024; Expected Discharge Time:      Feliciano Torres MD  [facility]  06/26/24  12:29 EDT    Dictated portions of note using Dragon dictation software.  Copied text in this note has been reviewed by me and remains accurate as of 06/26/24

## 2024-06-26 NOTE — CASE MANAGEMENT/SOCIAL WORK
Continued Stay Note  [facility]     Patient Name: Mehreen Silva IV  MRN: [id number]  Today's Date: 6/26/2024    Admit Date: 6/21/2024    Plan: SNF   Discharge Plan       Row Name 06/26/24 1425       Plan    Plan SNF    Patient/Family in Agreement with Plan yes    Plan Comments Spoke with pt's wife on the phone. [place] and [facility] have both accepted. Leonor will be calling her shortly to assess the patient. She stated that she would wait until after she speaks to Leonor to make a decision but she is leaning towards [facility]. Answered her questions about his care. She denied any further needs at this time. CCP following. GENEVA Kelly RN                   Discharge Codes    No documentation.                 Expected Discharge Date and Time       Expected Discharge Date Expected Discharge Time    Jun 27, 2024               Marcos Clark RN

## 2024-06-27 ENCOUNTER — APPOINTMENT (OUTPATIENT)
Dept: GENERAL RADIOLOGY | Facility: HOSPITAL | Age: 84
DRG: 186 | End: 2024-06-27
Payer: MEDICARE

## 2024-06-27 ENCOUNTER — TELEPHONE (OUTPATIENT)
Dept: CARDIOLOGY | Facility: CLINIC | Age: 84
End: 2024-06-27

## 2024-06-27 LAB
ALBUMIN SERPL-MCNC: 2.4 G/DL (ref 3.5–5.2)
ANION GAP SERPL CALCULATED.3IONS-SCNC: 7 MMOL/L (ref 5–15)
BUN SERPL-MCNC: 30 MG/DL (ref 8–23)
BUN/CREAT SERPL: 26.5 (ref 7–25)
CALCIUM SPEC-SCNC: 8.1 MG/DL (ref 8.6–10.5)
CHLORIDE SERPL-SCNC: 109 MMOL/L (ref 98–107)
CO2 SERPL-SCNC: 27 MMOL/L (ref 22–29)
CREAT SERPL-MCNC: 1.13 MG/DL (ref 0.76–1.27)
DEPRECATED RDW RBC AUTO: 48.1 FL (ref 37–54)
EGFRCR SERPLBLD CKD-EPI 2021: 64.1 ML/MIN/1.73
ERYTHROCYTE [DISTWIDTH] IN BLOOD BY AUTOMATED COUNT: 16.1 % (ref 12.3–15.4)
GLUCOSE SERPL-MCNC: 87 MG/DL (ref 65–99)
HCT VFR BLD AUTO: 24.8 % (ref 37.5–51)
HGB BLD-MCNC: 7.6 G/DL (ref 13–17.7)
MAGNESIUM SERPL-MCNC: 1.8 MG/DL (ref 1.6–2.4)
MCH RBC QN AUTO: 25.1 PG (ref 26.6–33)
MCHC RBC AUTO-ENTMCNC: 30.6 G/DL (ref 31.5–35.7)
MCV RBC AUTO: 81.8 FL (ref 79–97)
PHOSPHATE SERPL-MCNC: 2.6 MG/DL (ref 2.5–4.5)
PLATELET # BLD AUTO: 222 10*3/MM3 (ref 140–450)
PMV BLD AUTO: 10.2 FL (ref 6–12)
POTASSIUM SERPL-SCNC: 4.2 MMOL/L (ref 3.5–5.2)
RBC # BLD AUTO: 3.03 10*6/MM3 (ref 4.14–5.8)
SODIUM SERPL-SCNC: 143 MMOL/L (ref 136–145)
WBC NRBC COR # BLD AUTO: 8.36 10*3/MM3 (ref 3.4–10.8)

## 2024-06-27 PROCEDURE — 80069 RENAL FUNCTION PANEL: CPT | Performed by: INTERNAL MEDICINE

## 2024-06-27 PROCEDURE — 71045 X-RAY EXAM CHEST 1 VIEW: CPT

## 2024-06-27 PROCEDURE — 83735 ASSAY OF MAGNESIUM: CPT | Performed by: INTERNAL MEDICINE

## 2024-06-27 PROCEDURE — 99024 POSTOP FOLLOW-UP VISIT: CPT

## 2024-06-27 PROCEDURE — 99232 SBSQ HOSP IP/OBS MODERATE 35: CPT

## 2024-06-27 PROCEDURE — 85027 COMPLETE CBC AUTOMATED: CPT | Performed by: INTERNAL MEDICINE

## 2024-06-27 RX ORDER — POTASSIUM CHLORIDE 750 MG/1
20 TABLET, FILM COATED, EXTENDED RELEASE ORAL DAILY
Status: DISCONTINUED | OUTPATIENT
Start: 2024-06-28 | End: 2024-06-30 | Stop reason: HOSPADM

## 2024-06-27 RX ORDER — FUROSEMIDE 20 MG/1
20 TABLET ORAL DAILY
Status: DISCONTINUED | OUTPATIENT
Start: 2024-06-27 | End: 2024-06-27

## 2024-06-27 RX ORDER — FUROSEMIDE 20 MG/1
20 TABLET ORAL DAILY
Status: DISCONTINUED | OUTPATIENT
Start: 2024-06-28 | End: 2024-06-30 | Stop reason: HOSPADM

## 2024-06-27 RX ORDER — BISACODYL 10 MG
10 SUPPOSITORY, RECTAL RECTAL DAILY PRN
Status: DISCONTINUED | OUTPATIENT
Start: 2024-06-27 | End: 2024-06-30 | Stop reason: HOSPADM

## 2024-06-27 RX ORDER — BISACODYL 10 MG
10 SUPPOSITORY, RECTAL RECTAL DAILY
Status: DISCONTINUED | OUTPATIENT
Start: 2024-06-27 | End: 2024-06-30 | Stop reason: HOSPADM

## 2024-06-27 RX ORDER — POLYETHYLENE GLYCOL 3350 17 G/17G
0.5 POWDER, FOR SOLUTION ORAL 2 TIMES DAILY
Status: COMPLETED | OUTPATIENT
Start: 2024-06-28 | End: 2024-06-28

## 2024-06-27 RX ORDER — BISACODYL 5 MG/1
5 TABLET, DELAYED RELEASE ORAL DAILY PRN
Status: DISCONTINUED | OUTPATIENT
Start: 2024-06-27 | End: 2024-06-30 | Stop reason: HOSPADM

## 2024-06-27 RX ORDER — POLYETHYLENE GLYCOL 3350 17 G/17G
0.5 POWDER, FOR SOLUTION ORAL 2 TIMES DAILY
Status: DISCONTINUED | OUTPATIENT
Start: 2024-06-27 | End: 2024-06-27

## 2024-06-27 RX ORDER — AMOXICILLIN 250 MG
2 CAPSULE ORAL 2 TIMES DAILY
Status: DISCONTINUED | OUTPATIENT
Start: 2024-06-27 | End: 2024-06-30 | Stop reason: HOSPADM

## 2024-06-27 RX ORDER — POLYETHYLENE GLYCOL 3350 17 G/17G
17 POWDER, FOR SOLUTION ORAL DAILY PRN
Status: DISCONTINUED | OUTPATIENT
Start: 2024-06-27 | End: 2024-06-30 | Stop reason: HOSPADM

## 2024-06-27 RX ADMIN — Medication 10 ML: at 08:24

## 2024-06-27 RX ADMIN — BISACODYL 10 MG: 10 SUPPOSITORY RECTAL at 15:36

## 2024-06-27 RX ADMIN — EMPAGLIFLOZIN 10 MG: 10 TABLET, FILM COATED ORAL at 17:40

## 2024-06-27 RX ADMIN — MIDODRINE HYDROCHLORIDE 5 MG: 5 TABLET ORAL at 17:40

## 2024-06-27 RX ADMIN — Medication 10 ML: at 21:40

## 2024-06-27 RX ADMIN — MIDODRINE HYDROCHLORIDE 5 MG: 5 TABLET ORAL at 08:23

## 2024-06-27 RX ADMIN — DIGOXIN 125 MCG: 125 TABLET ORAL at 12:04

## 2024-06-27 RX ADMIN — GABAPENTIN 100 MG: 100 CAPSULE ORAL at 21:22

## 2024-06-27 RX ADMIN — COLLAGENASE SANTYL 1 APPLICATION: 250 OINTMENT TOPICAL at 08:24

## 2024-06-27 RX ADMIN — METOPROLOL TARTRATE 12.5 MG: 25 TABLET, FILM COATED ORAL at 17:39

## 2024-06-27 RX ADMIN — TAMSULOSIN HYDROCHLORIDE 0.4 MG: 0.4 CAPSULE ORAL at 08:23

## 2024-06-27 RX ADMIN — Medication 1000 UNITS: at 08:23

## 2024-06-27 NOTE — PROGRESS NOTES
"   LOS: 6 days     Chief Complaint/ Reason for encounter: JOSEPH    Subjective   06/24/24 : No new complaints or events  He feels well, no nausea or vomiting  Good appetite, no shortness of breath or chest pain    6/25: Pleasantly confused, no new complaints  Family at bedside, multiple questions answered  Eating and drinking well, no nausea vomiting or shortness of breath    6/26: Resting in bed no new complaints or events noted  Slept well per nursing staff, no complaints of pain    6/27: No new complaints, resting, denies new complaints.  No shortness of breath or chest pain    Medical history reviewed:  History of Present Illness    Subjective    History taken from: Patient and chart    Vital Signs  Temp:  [97.3 °F (36.3 °C)-97.7 °F (36.5 °C)] 97.4 °F (36.3 °C)  Heart Rate:  [67-91] 68  Resp:  [16] 16  BP: ()/(50-92) 105/50       Wt Readings from Last 1 Encounters:   06/21/24 1255 63.5 kg (140 lb)       Objective:  Vital signs: (most recent): Blood pressure 105/50, pulse 68, temperature 97.4 °F (36.3 °C), temperature source Oral, resp. rate 16, height 177.8 cm (70\"), weight 63.5 kg (140 lb), SpO2 100%.                Objective:  General Appearance:  Comfortable, chronically ill-appearing, in no acute distress and not in pain.  Awake, alert  HEENT: Mucous membranes moist, no injury, oropharynx clear  Lungs:  Normal effort and normal respiratory rate.  Breath sounds clear to auscultation.  No  respiratory distress.  No rales, decreased breath sounds or rhonchi.  Pleurx catheter in place  Heart: Normal rate.  Regular rhythm.  S1, S2 normal.  No murmur.   Abdomen: Abdomen is soft.  Bowel sounds are normal, no abdominal tenderness.  There is no rebound or guarding  Extremities: Trace edema of bilateral lower extremities  Skin:  Warm and dry with no rashes      Results Review:    Intake/Output:     Intake/Output Summary (Last 24 hours) at 6/27/2024 0774  Last data filed at 6/27/2024 0600  Gross per 24 hour   Intake " --   Output 2125 ml   Net -2125 ml         DATA:  Radiology and Labs:  The following labs independently reviewed by me. Additional labs ordered for tomorrow a.m.  Interval notes, chart personally reviewed by me.   Old records independently reviewed showing baseline creatinine 0.7    Risk/ complexity of medical care/ medical decision making moderate complexity, renal failure management      Labs:     Recent Results (from the past 24 hour(s))   CBC (No Diff)    Collection Time: 06/27/24  4:15 AM    Specimen: Blood   Result Value Ref Range    WBC 8.36 3.40 - 10.80 10*3/mm3    RBC 3.03 (L) 4.14 - 5.80 10*6/mm3    Hemoglobin 7.6 (L) 13.0 - 17.7 g/dL    Hematocrit 24.8 (L) 37.5 - 51.0 %    MCV 81.8 79.0 - 97.0 fL    MCH 25.1 (L) 26.6 - 33.0 pg    MCHC 30.6 (L) 31.5 - 35.7 g/dL    RDW 16.1 (H) 12.3 - 15.4 %    RDW-SD 48.1 37.0 - 54.0 fl    MPV 10.2 6.0 - 12.0 fL    Platelets 222 140 - 450 10*3/mm3   Renal Function Panel    Collection Time: 06/27/24  4:15 AM    Specimen: Blood   Result Value Ref Range    Glucose 87 65 - 99 mg/dL    BUN 30 (H) 8 - 23 mg/dL    Creatinine 1.13 0.76 - 1.27 mg/dL    Sodium 143 136 - 145 mmol/L    Potassium 4.2 3.5 - 5.2 mmol/L    Chloride 109 (H) 98 - 107 mmol/L    CO2 27.0 22.0 - 29.0 mmol/L    Calcium 8.1 (L) 8.6 - 10.5 mg/dL    Albumin 2.4 (L) 3.5 - 5.2 g/dL    Phosphorus 2.6 2.5 - 4.5 mg/dL    Anion Gap 7.0 5.0 - 15.0 mmol/L    BUN/Creatinine Ratio 26.5 (H) 7.0 - 25.0    eGFR 64.1 >60.0 mL/min/1.73   Magnesium    Collection Time: 06/27/24  4:15 AM    Specimen: Blood   Result Value Ref Range    Magnesium 1.8 1.6 - 2.4 mg/dL       Radiology:  Pertinent radiology studies were reviewed as described above      Medications have been reviewed separately in chart overview      ASSESSMENT:  JOSPEH, seems to be predominantly obstructive.  Much improved after Whitehead placement but CT still showed hydronephrosis.  No immediate plans for intervention since his renal function is improving  Severe hyperkalemia,  improved with treatment  Urinary retention, improved post Whitehead  Severe anemia, stabilizing  Recurrent right sided pleural effusion   Hypotension on midodrine      Plan:  Renal function continues to slowly improving with Whitehead decompression  Euvolemic on exam  Maintaining Whitehead catheter for now with plans to discharge home with a Whitehead, outpatient urology follow-up and continue Flomax at current dosage  Continue to monitor off IV fluids and encourage nutrition    Pleurx/chest tube per CV surgery  Maintain Midodrine for chronic hypotension  Chest x-ray reviewed: No sign of volume excess    Maintaining excellent urine output, euvolemic on exam       Randy Rico MD  [facility]   Office phone number: [phone]  Answering service phone number: [phone]    06/27/24  07:49 EDT    Dictation performed using Dragon dictation software

## 2024-06-27 NOTE — PROGRESS NOTES
Name: Mehreen Silva IV ADMIT: 2024   : 1940  PCP: Taiwo Powers MD    MRN: [id number] LOS: 6 days   AGE/SEX: 84 y.o. male  ROOM: [facility]     Subjective   Subjective   No chief complaint on file.    No CP SOA NV or abdominal pain. He feels constipated. Discussed the plan for endoscopy and timing with him and family at bedside.     Objective   Objective   Vital Signs  Temp:  [97.4 °F (36.3 °C)-97.5 °F (36.4 °C)] 97.5 °F (36.4 °C)  Heart Rate:  [68-92] 73  Resp:  [16] 16  BP: ()/(38-92) 104/57  SpO2:  [100 %] 100 %  on   ;   Device (Oxygen Therapy): room air  Body mass index is 20.09 kg/m².    Physical Exam  Vitals and nursing note reviewed.   Constitutional:       General: He is not in acute distress.     Appearance: He is ill-appearing (chronically). He is not diaphoretic.   Cardiovascular:      Rate and Rhythm: Normal rate. Rhythm irregular.   Pulmonary:      Effort: Pulmonary effort is normal.      Breath sounds: No wheezing.   Abdominal:      General: There is no distension.      Palpations: Abdomen is soft.      Tenderness: There is no abdominal tenderness. There is no guarding or rebound.   Musculoskeletal:      Comments: Right AKA   Skin:     General: Skin is warm and dry.      Comments: Right pleurx catheter   Neurological:      Mental Status: He is alert. Mental status is at baseline.   Psychiatric:         Mood and Affect: Mood normal.         Behavior: Behavior normal.       Results Review  I reviewed the patient's new clinical results.    Results from last 7 days   Lab Units 24  0415 24  0345 24  0337 24  0616   WBC 10*3/mm3 8.36 8.60 8.66 6.42   HEMOGLOBIN g/dL 7.6* 7.9* 7.5* 7.4*   PLATELETS 10*3/mm3 222 219 221 224     Results from last 7 days   Lab Units 24  0415 24  0345 24  0336 24  0616   SODIUM mmol/L 143 142 142 137   POTASSIUM mmol/L 4.2 3.3* 3.5 3.8   CHLORIDE mmol/L 109* 110* 110* 106   CO2 mmol/L 27.0 23.4 24.0 22.5  "  BUN mg/dL 30* 30* 40* 44*   CREATININE mg/dL 1.13 1.18 1.45* 1.73*   GLUCOSE mg/dL 87 79 78 76   EGFR mL/min/1.73 64.1 60.8 47.5* 38.4*     Results from last 7 days   Lab Units 06/27/24 0415 06/26/24 0345 06/25/24  0336 06/22/24  0645   ALBUMIN g/dL 2.4* 2.3* 2.1* 2.4*   BILIRUBIN mg/dL  --   --   --  0.4   ALK PHOS U/L  --   --   --  126*   AST (SGOT) U/L  --   --   --  11   ALT (SGPT) U/L  --   --   --  14     Results from last 7 days   Lab Units 06/27/24 0415 06/26/24 0345 06/25/24 0336 06/24/24  0616 06/23/24  0442 06/22/24  0645   CALCIUM mg/dL 8.1* 8.1* 7.8* 7.9*   < > 8.6   ALBUMIN g/dL 2.4* 2.3* 2.1*  --   --  2.4*   MAGNESIUM mg/dL 1.8 1.6 1.8  --   --   --    PHOSPHORUS mg/dL 2.6 2.8 3.2  --   --   --     < > = values in this interval not displayed.     Results from last 7 days   Lab Units 06/23/24  0442   PROCALCITONIN ng/mL 0.23     No results found for: \"HGBA1C\", \"POCGLU\"      No radiology results for the last day    I have personally reviewed all medications:  Scheduled Medications  cholecalciferol, 1,000 Units, Oral, Daily  collagenase, 1 Application, Topical, Q24H  digoxin, 125 mcg, Oral, Daily  gabapentin, 100 mg, Oral, Nightly  methIMAzole, 5 mg, Oral, Once per day on Monday Wednesday Friday  metoprolol tartrate, 25 mg, Oral, Q12H  midodrine, 5 mg, Oral, BID AC  sodium chloride, 10 mL, Intravenous, Q12H  tamsulosin, 0.4 mg, Oral, Daily      Infusions       Diet  Diet: Cardiac, Diabetic, Renal; Healthy Heart (2-3 Na+); Consistent Carbohydrate; Low Sodium (2-3g), Low Potassium, Low Phosphorus; Fluid Consistency: Thin (IDDSI 0)    I have personally reviewed:  [x]  Laboratory   []  Microbiology   [x]  Radiology   [x]  EKG/Telemetry  []  Cardiology/Vascular   []  Pathology    []  Records       Assessment/Plan     Active Hospital Problems    Diagnosis  POA    **Pleural effusion [J90]  Unknown    CKD (chronic kidney disease) [N18.9]  Yes    Anemia [D64.9]  Unknown    Chronic heart failure with " preserved ejection fraction (HFpEF) [I50.32]  Yes    Recurrent pleural effusion on right [J90]  Yes    Ulcer of left heel [L97.429]  Yes    Longstanding persistent atrial fibrillation [I48.11]  Yes    Hyperkalemia [E87.5]  Yes    Hyperthyroidism [E05.90]  Yes      Resolved Hospital Problems   No resolved problems to display.       84 y.o. male admitted with Pleural effusion.    Urinary retention/hydronephrosis/JOSEPH with hyperkalemia: Lopez placed.  Continued hydronephrosis but improved creatinine and potassium levels.  Continued lopez at discharge and outpatient cystoscopy planned. Urology and nephrology following.  Recurrent right pleural effusion/Pleurx: sp tPA and dornase treatments.  Pleurx to remain in place with MWF drainage. Thoracic surgery evaluated.  Acute on chronic anemia: Iron saturation 11%.  Ferritin elevated.  Heme positive stool but no overt losses.  Endoscopy planned Saturday. Start prn regimen for constipation. GI following.  Chronic diastolic heart failure: Diuretics held for JOSEPH.  Chronic A-fib: Metoprolol, digoxin.  Eliquis held.  Chronic hypotension: Midodrine  History right chest cystic skin excision: Surgery evaluated.  Hyperthyroidism: On methimazole.  Repeat thyroid painel was ok.  PPX: SCD.  Resume AC when able.  Disposition: HH/possibly Sunday    Discussed with Ms Lara.    Expected Discharge Date: 6/28/2024; Expected Discharge Time:      Feliciano Torres MD  [facility]  06/27/24  14:52 EDT    Dictated portions of note using Dragon dictation software.  Copied text in this note has been reviewed by me and remains accurate as of 06/27/24

## 2024-06-27 NOTE — TELEPHONE ENCOUNTER
Caller: KOSTA    Relationship: [employer]SE    Best call back number: [phone]        What was the call regarding: PT HAS BEEN TAKEN OFF FOUR MEDICATIONS ON 6/22/24 AT THE HOSPITAL    POTASSIUM  ELIQUIS  LASIX  DIGOXIN

## 2024-06-27 NOTE — PLAN OF CARE
Goal Outcome Evaluation:  Plan of Care Reviewed With: patient        Progress: improving  Outcome Evaluation: vss, no complaints of pain. CT-20 suction, cath care completed. q2 turn, pt rested well during shift. labs in am.

## 2024-06-27 NOTE — TELEPHONE ENCOUNTER
I reviewed his chart, he went in with acute renal failure so these all needed to be stopped. I reached out to his attending and the nephrologist to make sure we know when they can be safely restarted in the future. Please let her know.    Thanks, sapna

## 2024-06-27 NOTE — DISCHARGE PLACEMENT REQUEST
"Ellen Silva IV (84 y.o. Male)       Date of Birth   1940    Social Security Number       Address   [address] [place] [address]    Home Phone   [phone]    MRN   [id number]       Judaism   None    Marital Status                               Admission Date   6/21/24    Admission Type   Elective    Admitting Provider   Baron Ponce MD    Attending Provider   Feliciano Torres MD    Department, Room/Bed   [address], E552/1       Discharge Date       Discharge Disposition       Discharge Destination                                 Attending Provider: Feliciano Torres MD    Allergies: No Known Allergies    Isolation: None   Infection: None   Code Status: CPR    Ht: 177.8 cm (70\")   Wt: 63.5 kg (140 lb)    Admission Cmt: None   Principal Problem: Pleural effusion [J90]                   Active Insurance as of 6/21/2024       Primary Coverage       Payor Plan Insurance Group Employer/Plan Group    MEDICARE MEDICARE A & B        Payor Plan Address Payor Plan Phone Number Payor Plan Fax Number Effective Dates    [address] [phone]  5/1/2005 - None Entered    [facility] [id number]         Subscriber Name Subscriber Birth Date Member ID       ELLEN SILVA IV 1940 [id number]               Secondary Coverage       Payor Plan Insurance Group Employer/Plan Group    ANTH BLUE CROSS [facility] SUPP [id number]       Payor Plan Address Payor Plan Phone Number Payor Plan Fax Number Effective Dates    [address]   5/1/2005 - None Entered    [facility] [id number]         Subscriber Name Subscriber Birth Date Member ID       ELLEN SILVA IV 1940 [id number]                     Emergency Contacts        (Rel.) Home Phone Work Phone Mobile Phone    Belem Silva (Spouse) [phone] -- [phone]    ELLEN SILVA (Son) [phone] -- [phone]              Emergency Contact Information       Name Relation Home Work Mobile    Belem Silva " Spouse [phone]    ELLEN WRAY Son [phone]

## 2024-06-27 NOTE — PROGRESS NOTES
"    Chief Complaint: Recurrent pleural effusion with Pleurx catheter in place  S/p intrapleural lytic therapy x 1, 6/24/2024    Subjective:  Symptoms:  Improved.  No shortness of breath, cough or chest pain.    Diet:  No nausea or vomiting.    Activity level: Returning to normal.    Pain:  He reports no pain.    Resting comfortably in bed.      Vital Signs:  Temp:  [97.4 °F (36.3 °C)] 97.4 °F (36.3 °C)  Heart Rate:  [68-92] 87  Resp:  [16] 16  BP: ()/(50-92) 94/57    Intake & Output (last day)         06/26 0701  06/27 0700 06/27 0701  06/28 0700    P.O.      Total Intake(mL/kg)      Urine (mL/kg/hr) 1925 (1.3)     Stool 0     Chest Tube 200     Total Output 2125     Net -2125           Stool Unmeasured Occurrence 2 x             Objective:  General Appearance:  Comfortable, in no acute distress, well-appearing and not in pain.    Vital signs: (most recent): Blood pressure 94/57, pulse 87, temperature 97.4 °F (36.3 °C), resp. rate 16, height 177.8 cm (70\"), weight 63.5 kg (140 lb), SpO2 100%.    Lungs:  Normal effort and normal respiratory rate.  He is not in respiratory distress.    Heart: Normal rate.  Regular rhythm.    Chest: Symmetric chest wall expansion.   Abdomen: Abdomen is soft and non-distended.    Neurological: Patient is alert.    Skin:  Warm and dry.                Pleurx:   Site: Right, Clean, Dry, Intact, and Securement device intact  Suction: -20 cm  Air leak: Negative  24 Hour Total: 200ml     Results Review:     I reviewed the patient's new clinical results.  I reviewed the patient's new imaging results and agree with the interpretation.  Discussed with patient, nurse, and surgeon    Imaging Results (Last 24 Hours)       Procedure Component Value Units Date/Time    XR Chest 1 View [[id number]] Collected: 06/27/24 0627     Updated: 06/27/24 0632    Narrative:      XR CHEST 1 VW-     Clinical: Chest tube management, pleural effusion     COMPARISON examination dated 6/26/2024     FINDINGS: " Pleurx catheter looped at the right lung base. Stable  loculated pneumothorax demonstrated at the right lung base. The  cardiomediastinal silhouette is stable. Residual pleural  fluid/thickening and right base airspace disease as before.     CONCLUSION: Stable chest     This report was finalized on 6/27/2024 6:29 AM by Dr. Gregory Dang M.D  on Workstation: [id number]               Lab Results:     Lab Results (last 24 hours)       Procedure Component Value Units Date/Time    Renal Function Panel [[id number]]  (Abnormal) Collected: 06/27/24 0415    Specimen: Blood Updated: 06/27/24 0631     Glucose 87 mg/dL      BUN 30 mg/dL      Creatinine 1.13 mg/dL      Sodium 143 mmol/L      Potassium 4.2 mmol/L      Chloride 109 mmol/L      CO2 27.0 mmol/L      Calcium 8.1 mg/dL      Albumin 2.4 g/dL      Phosphorus 2.6 mg/dL      Anion Gap 7.0 mmol/L      BUN/Creatinine Ratio 26.5     eGFR 64.1 mL/min/1.73     Narrative:      GFR Normal >60  Chronic Kidney Disease <60  Kidney Failure <15    The GFR formula is only valid for adults with stable renal function between ages 18 and 70.    Magnesium [[id number]]  (Normal) Collected: 06/27/24 0415    Specimen: Blood Updated: 06/27/24 0613     Magnesium 1.8 mg/dL     CBC (No Diff) [[id number]]  (Abnormal) Collected: 06/27/24 0415    Specimen: Blood Updated: 06/27/24 0555     WBC 8.36 10*3/mm3      RBC 3.03 10*6/mm3      Hemoglobin 7.6 g/dL      Hematocrit 24.8 %      MCV 81.8 fL      MCH 25.1 pg      MCHC 30.6 g/dL      RDW 16.1 %      RDW-SD 48.1 fl      MPV 10.2 fL      Platelets 222 10*3/mm3     Blood Culture - Blood, Hand, Right [[id number]]  (Normal) Collected: 06/23/24 1629    Specimen: Blood from Hand, Right Updated: 06/26/24 1715     Blood Culture No growth at 3 days    Blood Culture - Blood, Arm, Left [[id number]]  (Normal) Collected: 06/23/24 1630    Specimen: Blood from Arm, Left Updated: 06/26/24 1715     Blood Culture No growth at 3 days    Digitoxin Level [[id number]]   (Abnormal) Collected: 06/22/24 0759    Specimen: Blood Updated: 06/26/24 1311     Digitoxin <5.0 ng/mL     Narrative:      Performed at:  [address] [place]  [address] [place] [address]  [id number]  : [facility]ica [facility], Phone:  [phone]             Assessment & Plan       Pleural effusion    Hyperthyroidism    Hyperkalemia    Longstanding persistent atrial fibrillation    Ulcer of left heel    Recurrent pleural effusion on right    Chronic heart failure with preserved ejection fraction (HFpEF)    CKD (chronic kidney disease)    Anemia       Assessment & Plan  He continues to do well.  S/p intrapleural lytic therapy via Pleurx with good response.  Another 200 cc of serous fluid drained overnight.  His lungs are well-expanded on chest x-ray today.  No significant volume effusions.  The pleural catheter is seen in position.  Given persistent drainage from Pleurx, no plan for removal at this time.  We will cap & dress the Pleurx catheter and will continue prior Monday, Wednesday, Friday draining schedule up to 1 L or as patient tolerates.      Renal function continues to improve.  Management per urology and nephrology. Noted plans for endoscopy per gastroenterology. No objections from thoracic surgery standpoint.  Discussed above plan with the patient's nurse as well as his wife via phone.  She is agreeable.     CHARLEE Pascal  [facility]  06/27/24  12:39 EDT    Greater than 30 minutes was spent reviewing the patient's chart, including imaging, labs, provider notes, assessing the patient and developing a plan of care which was discussed with the patient and RN. This is separate from any billable procedural time which will be dictated in a separate note.

## 2024-06-27 NOTE — PROGRESS NOTES
Gastroenterology   Inpatient Progress Note    Reason for Follow Up: Anemia    Subjective  Interval History:     No overnight events.  Denies bowel movement.  Hgb relatively stable at 7.6.      Current Facility-Administered Medications:     sennosides-docusate (PERICOLACE) 8.6-50 MG per tablet 2 tablet, 2 tablet, Oral, BID **AND** polyethylene glycol (MIRALAX) packet 17 g, 17 g, Oral, Daily PRN **AND** bisacodyl (DULCOLAX) EC tablet 5 mg, 5 mg, Oral, Daily PRN **AND** bisacodyl (DULCOLAX) suppository 10 mg, 10 mg, Rectal, Daily PRN, Feliciano Torres MD    cholecalciferol (VITAMIN D3) tablet 1,000 Units, 1,000 Units, Oral, Daily, Baron Ponce MD, 1,000 Units at 06/27/24 0823    collagenase ointment 1 Application, 1 Application, Topical, Q24H, Feliciano Torres MD, 1 Application at 06/27/24 0824    digoxin (LANOXIN) tablet 125 mcg, 125 mcg, Oral, Daily, Feliciano Torres MD, 125 mcg at 06/27/24 1204    gabapentin (NEURONTIN) capsule 100 mg, 100 mg, Oral, Nightly, Baron Ponce MD, 100 mg at 06/26/24 2016    LORazepam (ATIVAN) tablet 0.5 mg, 0.5 mg, Oral, Q8H PRN, Ricardo Kiser MD, 0.5 mg at 06/25/24 1657    methIMAzole (TAPAZOLE) tablet 5 mg, 5 mg, Oral, Once per day on Monday Wednesday Friday, Baron Ponce MD, 5 mg at 06/26/24 0801    metoprolol tartrate (LOPRESSOR) tablet 25 mg, 25 mg, Oral, Q12H, Baron Ponce MD, 25 mg at 06/26/24 2016    midodrine (PROAMATINE) tablet 5 mg, 5 mg, Oral, BID AC, Baron Ponce MD, 5 mg at 06/27/24 0823    nitroglycerin (NITROSTAT) SL tablet 0.4 mg, 0.4 mg, Sublingual, Q5 Min PRN, Baron Ponce MD    sodium chloride 0.9 % flush 10 mL, 10 mL, Intravenous, Q12H, Baron Ponce MD, 10 mL at 06/27/24 0824    sodium chloride 0.9 % flush 10 mL, 10 mL, Intravenous, PRN, Baron Ponce MD    sodium chloride 0.9 % infusion 40 mL, 40 mL, Intravenous, PRN, Baron Pnoce MD    tamsulosin (FLOMAX) 24 hr capsule 0.4 mg, 0.4 mg, Oral, Daily, Ricardo Kiser MD, 0.4 mg at 06/27/24 0823  Review  of Systems:               All systems were reviewed and negative except for:  Constitution:  positive for See HPI  Gastrointestinal: positive for  constipation    Objective     Vital Signs  Temp:  [97.4 °F (36.3 °C)-97.5 °F (36.4 °C)] 97.5 °F (36.4 °C)  Heart Rate:  [68-92] 73  Resp:  [16] 16  BP: ()/(38-92) 104/57  Body mass index is 20.09 kg/m².                  General Appearance:  awake, alert, oriented, in no acute distress  Abdomen:  Soft, non-tender, normal bowel sounds; no bruits, organomegaly or masses.                Results Review:                I reviewed the patient's new clinical results.    Results from last 7 days   Lab Units 06/27/24 0415 06/26/24 0345 06/25/24  0337   WBC 10*3/mm3 8.36 8.60 8.66   HEMOGLOBIN g/dL 7.6* 7.9* 7.5*   HEMATOCRIT % 24.8* 25.0* 24.3*   PLATELETS 10*3/mm3 222 219 221     Results from last 7 days   Lab Units 06/27/24 0415 06/26/24  0345 06/25/24  0336 06/23/24  0442 06/22/24  0645   SODIUM mmol/L 143 142 142   < > 141   POTASSIUM mmol/L 4.2 3.3* 3.5   < > 4.6   CHLORIDE mmol/L 109* 110* 110*   < > 104   CO2 mmol/L 27.0 23.4 24.0   < > 25.1   BUN mg/dL 30* 30* 40*   < > 76*   CREATININE mg/dL 1.13 1.18 1.45*   < > 2.50*   CALCIUM mg/dL 8.1* 8.1* 7.8*   < > 8.6   BILIRUBIN mg/dL  --   --   --   --  0.4   ALK PHOS U/L  --   --   --   --  126*   ALT (SGPT) U/L  --   --   --   --  14   AST (SGOT) U/L  --   --   --   --  11   GLUCOSE mg/dL 87 79 78   < > 73    < > = values in this interval not displayed.         Lab Results   Lab Value Date/Time    LIPASE 26 02/21/2022 0702    LIPASE 55 (H) 08/27/2021 1110       Radiology:  XR Chest 1 View   Final Result      XR Chest 1 View   Final Result      XR Chest 1 View   Final Result      XR Chest 1 View   Final Result      CT Abdomen Pelvis Without Contrast   Final Result   1. Moderately severe bilateral hydronephrosis and hydroureter with   urinary bladder wall thickening. The urinary bladder wall thickening   could be related  to cystitis and/or infiltrative neoplasm. There could   be an element of bladder outlet obstruction as well. Whitehead catheter is   seen in the urinary bladder which is only partially distended.   2. Areas of increased density in both lung bases as described. Right   chest tube is seen in the right base. Please additional dictation for   yesterday's CT scan of the chest.   3. Cholelithiasis.   4. Large amount of stool in the rectum.               Radiation dose reduction techniques were utilized, including automated   exposure control and exposure modulation based on body size.           This report was finalized on 6/24/2024 12:15 PM by Dr. Romeo Cummings M.D on Workstation: [id number]          CT Chest Without Contrast Diagnostic   Final Result       1. Mild right sided loculated hydropneumothorax with decreased fluid   component and increased gas component with associated pleural   thickening. Could be empyema or malignant effusion if patient has a   known malignancy. Correlate with chest tube output.   2. Small left pleural effusion, slightly increased in the interval.   3. Heterogeneous peripheral opacities in the right lung, greatest in the   lower lung, mild increased. Opacities are nonspecific. Could be   multifocal pneumonia with follow-up to resolution recommended.   4. New centrilobular and tree-in-bud nodules in the superior segment   left lower lobe. Suspect infectious bronchiolitis.   5. Mild cardiomegaly.   6. Enlarged main pulmonary artery, can be seen with pulmonary artery   hypertension.       Additional findings described above that are stable       This report was finalized on 6/23/2024 9:00 AM by Dr. Taiwo Espinosa M.D on Workstation: [id number]          US Renal Bilateral   Final Result      XR Chest 1 View    (Results Pending)   XR Chest 1 View    (Results Pending)       Assessment & Plan     Active Hospital Problems    Diagnosis     **Pleural effusion     CKD (chronic kidney  disease)     Anemia     Chronic heart failure with preserved ejection fraction (HFpEF)     Recurrent pleural effusion on right     Ulcer of left heel     Longstanding persistent atrial fibrillation     Hyperkalemia     Hyperthyroidism        Assessment:  Anemia  Longstanding persistent A-fib on Eliquis-currently on hold  JOSEPH with acute hyperkalemia  Chronic heart failure with preserved VS  Right AKA      Plan:  Reviewed case with GI attending Dr. Munoz who concurs with plan to complete bidirectional endoscopy evaluation before discharging patient home and restarting anticoagulation.  Clearance obtained from thoracic surgery and hospitalist team.  Clear liquid diet tomorrow with initiation of bowel prep.  Bidirectional endoscopy on Saturday to assess for GI source to persistent anemia.  Can continue diet as tolerated today.  Viewed these recommendations at length with family and patient who are agreeable.  Single Dulcolax suppository x 1 now and continue daily MiraLAX.    I discussed the patients findings and my recommendations with patient, family, nursing staff, primary care team, and consulting provider.          CHARLEE Lopez  [facility]  [address] [place] [address]

## 2024-06-27 NOTE — PLAN OF CARE
Goal Outcome Evaluation:  Plan of Care Reviewed With: patient        Progress: improving  Outcome Evaluation: Patient alert, oriented x 2-3. Right pleurex to be capped today and drained 3x/week. Whitehead in place. Specialty bed in place; patient turned q2h. Await discharge plan. Will continue supportive care.

## 2024-06-27 NOTE — CASE MANAGEMENT/SOCIAL WORK
Continued Stay Note  [facility]     Patient Name: Mehreen Silva IV  MRN: [id number]  Today's Date: 6/27/2024    Admit Date: 6/21/2024    Plan: Home with HH   Discharge Plan       Row Name 06/27/24 0955       Plan    Plan Home with     Patient/Family in Agreement with Plan yes    Plan Comments Spoke with pt's wife on the phone. She discussed everything with her son and they have decided against SNF and will instead take him home with HH. She requested [facility], referral entered. She also requested a wheelchair van for discharge, they are willing to pay for it. She denied any further needs at this time. CCP following. GENEVA Kelly RN                   Discharge Codes    No documentation.                 Expected Discharge Date and Time       Expected Discharge Date Expected Discharge Time    Jun 28, 2024               Marcos Clark RN

## 2024-06-28 ENCOUNTER — APPOINTMENT (OUTPATIENT)
Dept: GENERAL RADIOLOGY | Facility: HOSPITAL | Age: 84
DRG: 186 | End: 2024-06-28
Payer: MEDICARE

## 2024-06-28 ENCOUNTER — TELEPHONE (OUTPATIENT)
Dept: CARDIOLOGY | Facility: CLINIC | Age: 84
End: 2024-06-28

## 2024-06-28 LAB
ALBUMIN SERPL-MCNC: 2.4 G/DL (ref 3.5–5.2)
ANION GAP SERPL CALCULATED.3IONS-SCNC: 9 MMOL/L (ref 5–15)
BACTERIA SPEC AEROBE CULT: NORMAL
BACTERIA SPEC AEROBE CULT: NORMAL
BUN SERPL-MCNC: 24 MG/DL (ref 8–23)
BUN/CREAT SERPL: 20.2 (ref 7–25)
CALCIUM SPEC-SCNC: 7.9 MG/DL (ref 8.6–10.5)
CHLORIDE SERPL-SCNC: 105 MMOL/L (ref 98–107)
CO2 SERPL-SCNC: 25 MMOL/L (ref 22–29)
CREAT SERPL-MCNC: 1.19 MG/DL (ref 0.76–1.27)
DEPRECATED RDW RBC AUTO: 46.6 FL (ref 37–54)
DIGOXIN SERPL-MCNC: 0.7 NG/ML (ref 0.6–1.2)
EGFRCR SERPLBLD CKD-EPI 2021: 60.2 ML/MIN/1.73
ERYTHROCYTE [DISTWIDTH] IN BLOOD BY AUTOMATED COUNT: 16 % (ref 12.3–15.4)
GEN 5 2HR TROPONIN T REFLEX: 159 NG/L
GLUCOSE SERPL-MCNC: 95 MG/DL (ref 65–99)
HCT VFR BLD AUTO: 25.8 % (ref 37.5–51)
HGB BLD-MCNC: 8 G/DL (ref 13–17.7)
MAGNESIUM SERPL-MCNC: 1.5 MG/DL (ref 1.6–2.4)
MCH RBC QN AUTO: 25.1 PG (ref 26.6–33)
MCHC RBC AUTO-ENTMCNC: 31 G/DL (ref 31.5–35.7)
MCV RBC AUTO: 80.9 FL (ref 79–97)
PHOSPHATE SERPL-MCNC: 2.7 MG/DL (ref 2.5–4.5)
PLATELET # BLD AUTO: 228 10*3/MM3 (ref 140–450)
PMV BLD AUTO: 9.6 FL (ref 6–12)
POTASSIUM SERPL-SCNC: 4 MMOL/L (ref 3.5–5.2)
RBC # BLD AUTO: 3.19 10*6/MM3 (ref 4.14–5.8)
SODIUM SERPL-SCNC: 139 MMOL/L (ref 136–145)
TROPONIN T DELTA: -1 NG/L
TROPONIN T SERPL HS-MCNC: 160 NG/L
WBC NRBC COR # BLD AUTO: 9.45 10*3/MM3 (ref 3.4–10.8)

## 2024-06-28 PROCEDURE — 99222 1ST HOSP IP/OBS MODERATE 55: CPT | Performed by: INTERNAL MEDICINE

## 2024-06-28 PROCEDURE — 80069 RENAL FUNCTION PANEL: CPT | Performed by: INTERNAL MEDICINE

## 2024-06-28 PROCEDURE — 93005 ELECTROCARDIOGRAM TRACING: CPT | Performed by: INTERNAL MEDICINE

## 2024-06-28 PROCEDURE — 99232 SBSQ HOSP IP/OBS MODERATE 35: CPT

## 2024-06-28 PROCEDURE — 71045 X-RAY EXAM CHEST 1 VIEW: CPT

## 2024-06-28 PROCEDURE — 99024 POSTOP FOLLOW-UP VISIT: CPT

## 2024-06-28 PROCEDURE — 85027 COMPLETE CBC AUTOMATED: CPT | Performed by: INTERNAL MEDICINE

## 2024-06-28 PROCEDURE — 84484 ASSAY OF TROPONIN QUANT: CPT | Performed by: INTERNAL MEDICINE

## 2024-06-28 PROCEDURE — 25010000002 MAGNESIUM SULFATE 2 GM/50ML SOLUTION: Performed by: INTERNAL MEDICINE

## 2024-06-28 PROCEDURE — 93010 ELECTROCARDIOGRAM REPORT: CPT | Performed by: INTERNAL MEDICINE

## 2024-06-28 PROCEDURE — 83735 ASSAY OF MAGNESIUM: CPT | Performed by: INTERNAL MEDICINE

## 2024-06-28 PROCEDURE — 80162 ASSAY OF DIGOXIN TOTAL: CPT | Performed by: INTERNAL MEDICINE

## 2024-06-28 RX ORDER — MAGNESIUM SULFATE HEPTAHYDRATE 40 MG/ML
2 INJECTION, SOLUTION INTRAVENOUS
Status: COMPLETED | OUTPATIENT
Start: 2024-06-28 | End: 2024-06-28

## 2024-06-28 RX ADMIN — MAGNESIUM SULFATE HEPTAHYDRATE 2 G: 2 INJECTION, SOLUTION INTRAVENOUS at 16:02

## 2024-06-28 RX ADMIN — EMPAGLIFLOZIN 10 MG: 10 TABLET, FILM COATED ORAL at 08:40

## 2024-06-28 RX ADMIN — FUROSEMIDE 20 MG: 20 TABLET ORAL at 08:42

## 2024-06-28 RX ADMIN — MAGNESIUM SULFATE HEPTAHYDRATE 2 G: 2 INJECTION, SOLUTION INTRAVENOUS at 21:35

## 2024-06-28 RX ADMIN — Medication 10 ML: at 10:01

## 2024-06-28 RX ADMIN — POTASSIUM CHLORIDE 20 MEQ: 750 TABLET, EXTENDED RELEASE ORAL at 08:39

## 2024-06-28 RX ADMIN — Medication 10 ML: at 21:36

## 2024-06-28 RX ADMIN — Medication 1000 UNITS: at 08:39

## 2024-06-28 RX ADMIN — POLYETHYLENE GLYCOL 3350 0.5 BOTTLE: 17 POWDER, FOR SOLUTION ORAL at 21:35

## 2024-06-28 RX ADMIN — POLYETHYLENE GLYCOL 3350 0.5 BOTTLE: 17 POWDER, FOR SOLUTION ORAL at 17:13

## 2024-06-28 RX ADMIN — GABAPENTIN 100 MG: 100 CAPSULE ORAL at 21:35

## 2024-06-28 RX ADMIN — MAGNESIUM SULFATE HEPTAHYDRATE 2 G: 2 INJECTION, SOLUTION INTRAVENOUS at 18:33

## 2024-06-28 RX ADMIN — SENNOSIDES AND DOCUSATE SODIUM 2 TABLET: 50; 8.6 TABLET ORAL at 08:40

## 2024-06-28 RX ADMIN — SENNOSIDES AND DOCUSATE SODIUM 2 TABLET: 50; 8.6 TABLET ORAL at 21:35

## 2024-06-28 RX ADMIN — MIDODRINE HYDROCHLORIDE 5 MG: 5 TABLET ORAL at 08:45

## 2024-06-28 RX ADMIN — BISACODYL 10 MG: 10 SUPPOSITORY RECTAL at 15:55

## 2024-06-28 RX ADMIN — TAMSULOSIN HYDROCHLORIDE 0.4 MG: 0.4 CAPSULE ORAL at 08:41

## 2024-06-28 RX ADMIN — MIDODRINE HYDROCHLORIDE 5 MG: 5 TABLET ORAL at 17:15

## 2024-06-28 RX ADMIN — METOPROLOL TARTRATE 12.5 MG: 25 TABLET, FILM COATED ORAL at 08:42

## 2024-06-28 RX ADMIN — METHIMAZOLE 5 MG: 5 TABLET ORAL at 08:41

## 2024-06-28 RX ADMIN — COLLAGENASE SANTYL 1 APPLICATION: 250 OINTMENT TOPICAL at 15:55

## 2024-06-28 NOTE — TELEPHONE ENCOUNTER
Caller: Belem Silva    Relationship: Emergency Contact    Best call back number: [phone]        Who are you requesting to speak with (clinical staff, provider,  specific staff member): CLINICAL        What was the call regarding: [facility]. PT WIFE CALLED  AND ASKED THAT HE NEVER BE PRESCRIBED POTASSIUM  AGAIN.

## 2024-06-28 NOTE — CONSULTS
Date of Consultation: 24    Referral Provider: Nataliya Noyola MD     Reason for Consultation: Bradycardia and atrial fibrillation.    Encounter Provider: Denny Gentile MD    Group of Service: [facility]     Patient Name: Mehreen Silva IV    :1940    Chief complaint:  Abnormal labs.    History of Present Illness:      This is a very pleasant 84 year-old male who follows with Dr. Chan in the office. He has a past medical history significant for thyroid disease, permanent atrial fibrillation, and chronic diastolic heart failure.     In 2023, he was admitted for anxiety, shortness of breath, hypoxia, and large pleural effusions. He subsequently underwent thoracentesis and his symptoms improved. A month later, in 2023, he underwent a right above-the-knee amputation for chronic osteomyelitis. He had an uneventful recovery.     In 2024, he was admitted with a large right pleural effusion with atelectasis and right to left mediastinal shift of unknown etiology. A chest tube was placed by thoracic surgery. Cardiology saw him in consult for his permanent atrial fibrillation. It was recommended that he continue his metoprolol. He had an echocardiogram done that showed an EF of 70%, mild LVH, moderately dilated left atrial cavity, moderate aortic calcification, mild to moderate mitral regurgitation, and a small pericardial effusion.     Due to his recurrent right pleural effusions, he required the placement of a Pleurx catheter in 2024. He had a repeat limited echocardiogram on 2024 that revealed a normal LVEF of 62%, moderate LVH, moderate to severe mitral valve regurgitation (RVSP 45-55 mmHg), and severe LA enlargement.     He was last seen in the office on 6/3/2024 by Dr. Chan. At that time, she felt that due to his significant atrial dilation, the likelihood of successful rhythm control was very small, so she opted for rate control. He is on  metoprolol, digoxin, and apixaban. His furosemide was decreased to 20 mg daily as well as potassium to 20 mEq daily. His ezetimibe and atorvastatin were stopped.     He has been having decreased output from his Pleurx catheter. He had a follow up with thoracic surgery on 6/13/2024. At that time, tPA was administered thorough the catheter to improve drainage with the ultimate plan of having the catheter removed. He presented to the hospital on 6/20/2024 to have his Pleurx catheter removed. Unfortunately, he had taken his Eliquis earlier in the day so the procedure was cancelled. Preoperative labs that were drawn showed a significant drop in hemoglobin (from 10.5 to 7.3) that was verified by another lab draw that showed an even lower hemoglobin (6.6), a potassium of 6.2, and a creatinine of 2.75. He was admitted for management of acute anemia and acute renal failure with hyperkalemia. Hemoccult testing was positive and GI is following. The plan is for him to have a bidirectional endoscopy on Saturday to assess for GI source.     He has had some bradycardia with rates as low as the 30s to 40s.  He has been asymptomatic with this.  He has had no chest pain or shortness of breath.  His high-sensitivity troponin is chronically elevated, although it was slightly more than normal at 160.  However, he has had no ischemic symptoms.        ECHO 4/28/2024    Limited study.    The left ventricular cavity is small in size. Left ventricular systolic function is normal. Calculated left ventricular EF = 62.4%    Left ventricular wall thickness is consistent with moderate concentric hypertrophy. Left ventricular diastolic function was indeterminate due to AFib    There is moderate anterior mitral valve thickening present. Moderate to severe mitral valve regurgitation is present.    Estimated right ventricular systolic pressure from tricuspid regurgitation is moderately elevated (45-55 mmHg).    There is a trivial pericardial  effusion. There is a large right pleural effusion with likely consolidated right lung base.    Severe LA enlargement.  IVC normal in size and respirophasic variation.         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, 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/2023    Atrial fibrillation     Cervical spondylosis with myelopathy     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 12/13/2023    HLD (hyperlipidemia) 09/23/2022    Hx of toxic multinodular goiter 09/23/2022    Hyperthyroidism     Lumbar radiculopathy     Orthostatic hypotension     Osteoarthritis     Pressure injury of sacral region, unstageable 05/14/2024    Pulmonary hypertension     Recurrent pleural effusion on right 04/27/2024    Ulcer with gangrene     right heel         Past Surgical History:   Procedure Laterality Date    ABOVE KNEE AMPUTATION Right 12/13/2023    Procedure: ABOVE KNEE AMPUTATION RIGHT, proveena wound vac placement;  Surgeon: Issa Nieves MD;  Location: [facility] OR;  Service: Vascular;  Laterality: Right;    APPENDECTOMY      BACK SURGERY      x4    COLONOSCOPY      EXCISION MASS TRUNK N/A 06/08/2016    Procedure: Excision soft tissue neoplasm on abdominal wall and left neck;  Surgeon: Shaji Barahona Jr., MD;  Location: [facility] OR;  Service:     KNEE SURGERY      NECK SURGERY  1974    PLEURAL CATHETER INSERTION Right 4/30/2024    Procedure: RIGHT VIDEO ASSISTED THORACOSCOPY, PARTIAL DECORTICATION, PLEURX CATHETER INSERTION;  Surgeon: Nataliya Noyola MD;  Location: [facility] OR;  Service: Thoracic;  Laterality: Right;    QUADRICEPS REPAIR      ROTATOR CUFF REPAIR      SHOULDER SURGERY      SMALL INTESTINE SURGERY  2021    Bowel Obstruction         No Known Allergies      No current facility-administered medications on file prior to encounter.      Current Outpatient Medications on File Prior to Encounter   Medication Sig Dispense Refill    cholecalciferol (VITAMIN D3) 25 MCG (1000 UT) tablet Take 1 tablet by mouth Daily.      digoxin (LANOXIN) 125 MCG tablet Take 1 tablet by mouth Daily. 90 tablet 1    Eliquis 5 MG tablet tablet TAKE ONE TABLET BY MOUTH EVERY 12 HOURS 180 tablet 3    furosemide (LASIX) 40 MG tablet Take 0.5 tablets by mouth Daily. 45 tablet 1    gabapentin (NEURONTIN) 100 MG capsule Take 1 capsule by mouth every night at bedtime.      LORazepam (ATIVAN) 0.5 MG tablet Take 1 tablet by mouth Every 8 (Eight) Hours As Needed for Anxiety.      methIMAzole (TAPAZOLE) 5 MG tablet Take 1 tablet by mouth 3 (Three) Times a Week. Monday, Wednesday, and Friday      metoprolol tartrate (LOPRESSOR) 25 MG tablet Take 1 tablet by mouth Every 12 (Twelve) Hours. 60 tablet 2    midodrine (PROAMATINE) 5 MG tablet Take 1 tablet by mouth 2 (Two) Times a Day Before Meals. 180 tablet 1    multivitamin with minerals tablet tablet Take 1 tablet by mouth Daily.      potassium chloride (KLOR-CON M20) 20 MEQ CR tablet Take 1 tablet by mouth Daily. 90 tablet 1    Santyl 250 UNIT/GM ointment Apply 1 Application topically to the appropriate area as directed Daily. To sacrum           Social History     Socioeconomic History    Marital status:     Number of children: 1    Highest education level: Professional school degree (e.g., MD, DDS, DVM, BRITNEY)   Tobacco Use    Smoking status: Former     Current packs/day: 0.00     Types: Cigarettes     Quit date: 1983     Years since quittin.0     Passive exposure: Past    Smokeless tobacco: Never    Tobacco comments:        Vaping Use    Vaping status: Never Used   Substance and Sexual Activity    Alcohol use: Yes     Comment: SOCIAL; Patient is non drinker.    Drug use: No     Comment: Drug Abuse: Not a drug user    Sexual activity: Defer         Family History   Problem Relation Age of Onset    Cancer Mother      "Heart disease Father     Aneurysm Father         Abdominal Aortic Aneurysm    Malig Hyperthermia Neg Hx        REVIEW OF SYSTEMS:   Pertinent positives are noted in the HPI above.  Otherwise, all other systems were reviewed, and are negative.     Objective:     Vitals:    06/27/24 2256 06/28/24 0753 06/28/24 1050 06/28/24 1450   BP: 105/49 120/62 111/64 98/53   BP Location: Left arm Left arm Left arm Left arm   Patient Position: Lying Lying Lying Lying   Pulse: 68 74 60 75   Resp: 16 16 16 16   Temp: 97.4 °F (36.3 °C) 97.4 °F (36.3 °C) 97.4 °F (36.3 °C) 97.2 °F (36.2 °C)   TempSrc: Oral Oral Oral Oral   SpO2: 99%  100% 99%   Weight:       Height:         Body mass index is 20.09 kg/m².  Flowsheet Rows      Flowsheet Row First Filed Value   Admission Height 177.8 cm (70\") Documented at 06/21/2024 1255   Admission Weight 63.5 kg (140 lb) Documented at 06/21/2024 1255             General:    No acute distress, alert and oriented x4, chronically ill-appearing.                    Head:    Normocephalic, atraumatic.   Eyes:          Conjunctivae and sclerae normal, no icterus,   Throat:   No oral lesions, no thrush, oral mucosa moist.    Neck:   Supple, trachea midline.   Lungs:     Clear to auscultation.  Right Pleurx catheter.    Heart:    Irregularly irregular rhythm and normal rate. No murmurs, gallops, or rubs.    Abdomen:     Soft, non-tender, non-distended, positive bowel sounds.    Extremities:   Status post right AKA. No left lower extremity edema.    Pulses:   Pulses palpable and equal bilaterally.    Skin:   No bleeding or rash.   Neuro:   Non-focal.     Psychiatric:   Normal mood and affect.             Lab Review:                Results from last 7 days   Lab Units 06/28/24  1354   SODIUM mmol/L 139   POTASSIUM mmol/L 4.0   CHLORIDE mmol/L 105   CO2 mmol/L 25.0   BUN mg/dL 24*   CREATININE mg/dL 1.19   GLUCOSE mg/dL 95   CALCIUM mg/dL 7.9*     Results from last 7 days   Lab Units 06/28/24  1354   HSTROP T " ng/L 160*     Results from last 7 days   Lab Units 06/28/24  1354   WBC 10*3/mm3 9.45   HEMOGLOBIN g/dL 8.0*   HEMATOCRIT % 25.8*   PLATELETS 10*3/mm3 228             Results from last 7 days   Lab Units 06/28/24  1354   MAGNESIUM mg/dL 1.5*           EKG (reviewed by me personally):                Assessment:   1.  Obstructive uropathy and acute kidney injury  2.  Hyperkalemia, resolved  3.  Urinary retention, improved after Whitehead catheter  4.  Persistent atrial fibrillation with a history of RVR, now with intermittent bradycardia  5.  Chronically elevated troponin with current elevated level, likely type II NSTEMI secondary to #1 and #3  6.  Chronic hypotension, on midodrine  7.  Recurrent large pleural effusion, status post Pleurx catheter placement  8.  Acute on chronic anemia with concern for GI loss  9.  Chronic diastolic CHF  10.  Hyperthyroidism, on methimazole  11.  Multifactorial weakness  12.  Status post right AKA on 12/13/2023 secondary to osteomyelitis and contracture of the right knee  13.  Hypoalbuminemia    Plan:       I did review the telemetry.  He occasionally has heart rates with the atrial fibrillation and that the upper 30s, and 40s.  However, he is asymptomatic.  He is mainly running in the 60s currently.  His digoxin level was 0.7.  I agree with holding the digoxin.  I will also hold his metoprolol temporarily until we see how he does.  He has had issues with RVR in the past.   This has also been limited by hypotension for which she takes midodrine.    I am not sure why the troponin was checked.  He is not having any chest discomfort.  There is no evidence of anginal symptoms.  His last troponin was 79 in April 2024, and it is now 160.  I suspect that this is from the acute kidney injury in conjunction with the obstructive uropathy as a type II NSTEMI.  I would not check further troponin levels.  He is not a candidate for ischemic evaluation at this point with his comorbid conditions.       His Eliquis is being held in preparation for bidirectional scoping tomorrow.  I would proceed as planned.  There is no reason to hold on this from a cardiac standpoint.  He is at moderate cardiac risk to proceed.    Dr. Chan to see tomorrow for our group.    Gio Gentile MD

## 2024-06-28 NOTE — PROGRESS NOTES
"Nutrition Services    Patient Name:  Mehreen Silva IV  YOB: 1940  MRN: [id number]  Admit Date:  6/21/2024    Assessment Date:  06/28/24    Summary: follow-up    Pt currently on a CLD. NPO after midnight with plan for EGD and colonoscopy tomorrow to further assess persistent anemia. Intakes have been %. Will add Boost Breeze with dinner tonight. Labs reviewed. Skin status reviewed; pressure injuries to heel and coccyx. Last BM; 6/27. Repeat Dulcolax suppository and cont. Miralax bowel prep as ordered per GI.    Plan/Recommendations:  Addition of Boost Breeze with dinner d/t CLD  Mauricio bid  Encourage good nutrition.    Will follow clinical course, nutrition needs.    CLINICAL NUTRITION ASSESSMENT      Reason for Assessment Follow-up Protocol     Diagnosis/Problem   JOSEPH, R pleural effusion, severe anemia      Anthropometrics        Current Height  Current Weight  BMI kg/m2 Height: 177.8 cm (70\")  Weight: 63.5 kg (140 lb) (06/21/24 1255)  Body mass index is 20.09 kg/m².   Adjusted BMI (if applicable) 24  (AKA)   BMI Category Normal/Healthy (18.4 - 24.9)   Ideal Body Weight (IBW) 146lb (adjusted)   Usual Body Weight (UBW) 140lb per wife since amputation   Weight Trend Stable     Estimated/Assessed Needs        Current Weight  Weight: 63.5 kg (140 lb) (06/21/24 1255)       Energy Requirements    Weight for Calculation 63.5 kg   Method for Estimation  25-30 kcal/kg   EST Needs (kcal/day) 7549-9715       Protein Requirements    Weight for Calculation 63.5 kg   EST Protein Needs (g/kg) 1.2 gm/kg   EST Daily Needs (g/day) 76       Fluid Requirements     Method for Estimation 1 mL/kcal    EST Needs (mL/day) 1800     Labs       Pertinent Labs    Results from last 7 days   Lab Units 06/27/24  0415 06/26/24  0345 06/25/24  0336 06/23/24  0442 06/22/24  0645   SODIUM mmol/L 143 142 142   < > 141   POTASSIUM mmol/L 4.2 3.3* 3.5   < > 4.6   CHLORIDE mmol/L 109* 110* 110*   < > 104   CO2 mmol/L 27.0 23.4 24.0   < " > 25.1   BUN mg/dL 30* 30* 40*   < > 76*   CREATININE mg/dL 1.13 1.18 1.45*   < > 2.50*   CALCIUM mg/dL 8.1* 8.1* 7.8*   < > 8.6   BILIRUBIN mg/dL  --   --   --   --  0.4   ALK PHOS U/L  --   --   --   --  126*   ALT (SGPT) U/L  --   --   --   --  14   AST (SGOT) U/L  --   --   --   --  11   GLUCOSE mg/dL 87 79 78   < > 73    < > = values in this interval not displayed.     Results from last 7 days   Lab Units 06/27/24 0415 06/26/24 0345 06/25/24 0337 06/25/24  0336   MAGNESIUM mg/dL 1.8 1.6  --  1.8   PHOSPHORUS mg/dL 2.6 2.8  --  3.2   HEMOGLOBIN g/dL 7.6* 7.9*   < >  --    HEMATOCRIT % 24.8* 25.0*   < >  --    WBC 10*3/mm3 8.36 8.60   < >  --    ALBUMIN g/dL 2.4* 2.3*  --  2.1*    < > = values in this interval not displayed.     Results from last 7 days   Lab Units 06/27/24 0415 06/26/24 0345 06/25/24  0337 06/24/24  0616 06/23/24  0442   PLATELETS 10*3/mm3 222 219 221 224 255     COVID19   Date Value Ref Range Status   06/23/2024 Not Detected Not Detected - Ref. Range Final     Lab Results   Component Value Date    HGBA1C 5.30 06/22/2024          Medications           Scheduled Medications bisacodyl, 10 mg, Rectal, Daily  cholecalciferol, 1,000 Units, Oral, Daily  collagenase, 1 Application, Topical, Q24H  digoxin, 125 mcg, Oral, Daily  empagliflozin, 10 mg, Oral, Daily  furosemide, 20 mg, Oral, Daily  gabapentin, 100 mg, Oral, Nightly  methIMAzole, 5 mg, Oral, Once per day on Monday Wednesday Friday  metoprolol tartrate, 12.5 mg, Oral, Q12H  midodrine, 5 mg, Oral, BID AC  polyethylene glycol, 0.5 bottle, Oral, BID  potassium chloride, 20 mEq, Oral, Daily  senna-docusate sodium, 2 tablet, Oral, BID  sodium chloride, 10 mL, Intravenous, Q12H  tamsulosin, 0.4 mg, Oral, Daily       Infusions     PRN Medications   senna-docusate sodium **AND** polyethylene glycol **AND** bisacodyl **AND** bisacodyl    LORazepam    nitroglycerin    sodium chloride    sodium chloride     Physical Findings          General  Findings amputee, confused, disoriented   Oral/Mouth Cavity tooth or teeth missing   Edema  1+ (trace)   Gastrointestinal last bowel movement: 6/27   Skin  pressure injury: unstageable to L heel and coccyx   Tubes/Drains/Lines chest tube   NFPE Not indicated at this time   --  Current Nutrition Orders & Evaluation of Intake       Oral Nutrition     Food Allergies NKFA   Current PO Diet Diet: Liquid; Clear Liquid; Fluid Consistency: Thin (IDDSI 0)  NPO Diet NPO Type: Strict NPO   Supplement Mauricio, BID   PO Evaluation     % PO Intake %    Factors Affecting Intake: altered mental status   --  PES STATEMENT / NUTRITION DIAGNOSIS      Nutrition Dx Problem  Problem: Increased Nutrient Needs  Etiology: Medical Diagnosis - JOSEPH, R pleural effusion, severe anemia, pressure injuries to heel and coccyx, hx R AKA    Signs/Symptoms: Report/Observation     NUTRITION INTERVENTION / PLAN OF CARE      Intervention Goal(s) Maintain nutrition status, Reduce/improve symptoms, Meet estimated needs, Disease management/therapy, Tolerate PO , Maintain intake, and No significant weight loss         RD Intervention/Action Interview for preferences, Advise alternative selection, Advise available snack, Encourage intake, Continue to monitor, Care plan reviewed, and Recommend/order: mauricio   --      Prescription/Orders:       PO Diet       Supplements Boost Breeze BID  Mauricio bid      Enteral Nutrition       Parenteral Nutrition    New Prescription Ordered? Yes   --      Monitor/Evaluation Per protocol   Discharge Plan/Needs Pending clinical course   --    RD to follow per protocol.      Electronically signed by:  Nani Baez  06/28/24 13:03 EDT

## 2024-06-28 NOTE — SIGNIFICANT NOTE
06/28/24 1439   OTHER   Discipline physical therapist   Rehab Time/Intention   Session Not Performed other (see comments)  (Patient declined participation in PT despite max encouragement. Patient reports he plans to return home with family at d/c and has decided not to go to SNF. Patient does not want to work with PT while in hospital. PT will s/o.)   Therapy Assessment/Plan (PT)   Criteria for Skilled Interventions Met (PT) no;no problems identified which require skilled intervention

## 2024-06-28 NOTE — PLAN OF CARE
Goal Outcome Evaluation:   VSS. A&OX2. No c/o pain or discomfort. Whitehead care provided. Turned Q2 and PRN. Right pleurx cath capped. Call light in reach.

## 2024-06-28 NOTE — TELEPHONE ENCOUNTER
Reviewed Dr. Chan's message with Belem and she verbalized understanding.  She stated she really just wanted Dr. Chan to be aware of changes that have been made so far.    Thank you,  Yeni KNAPP RN  Triage Nurse [facility]   08:48 EDT

## 2024-06-28 NOTE — PROGRESS NOTES
"   LOS: 7 days     Chief Complaint/ Reason for encounter: JOSEPH    Subjective   06/24/24 : No new complaints or events  He feels well, no nausea or vomiting  Good appetite, no shortness of breath or chest pain    6/25: Pleasantly confused, no new complaints  Family at bedside, multiple questions answered  Eating and drinking well, no nausea vomiting or shortness of breath    6/26: Resting in bed no new complaints or events noted  Slept well per nursing staff, no complaints of pain    6/27: No new complaints, resting, denies new complaints.  No shortness of breath or chest pain    6/28: No new complaints.  He remains somewhat confused.  I remind him every day of ongoing medical issues and he does not seem to remember any of our conversation daily from prior days    Medical history reviewed:  History of Present Illness    Subjective    History taken from: Patient and chart    Vital Signs  Temp:  [92.6 °F (33.7 °C)-97.5 °F (36.4 °C)] 97.4 °F (36.3 °C)  Heart Rate:  [68-87] 74  Resp:  [16] 16  BP: ()/(38-62) 120/62       Wt Readings from Last 1 Encounters:   06/21/24 1255 63.5 kg (140 lb)       Objective:  Vital signs: (most recent): Blood pressure 120/62, pulse 74, temperature 97.4 °F (36.3 °C), temperature source Oral, resp. rate 16, height 177.8 cm (70\"), weight 63.5 kg (140 lb), SpO2 99%.                Objective:  General Appearance:  Comfortable, chronically ill-appearing, in no acute distress and not in pain.  Awake, alert, confused  HEENT: Mucous membranes moist, no injury, oropharynx clear  Lungs:  Normal effort and normal respiratory rate.  Breath sounds clear to auscultation.  No  respiratory distress.  No rales, decreased breath sounds or rhonchi.  Pleurx catheter in place  Heart: Normal rate.  Regular rhythm.  S1, S2 normal.  No murmur.   Abdomen: Abdomen is soft.  Bowel sounds are normal, no abdominal tenderness.  There is no rebound or guarding  Extremities: Trace edema of bilateral lower " extremities  Skin:  Warm and dry with no rashes      Results Review:    Intake/Output:     Intake/Output Summary (Last 24 hours) at 6/28/2024 1017  Last data filed at 6/28/2024 0642  Gross per 24 hour   Intake --   Output 2610 ml   Net -2610 ml         DATA:  Radiology and Labs:  The following labs independently reviewed by me. Additional labs ordered for tomorrow a.m.  Interval notes, chart personally reviewed by me.   Old records independently reviewed showing baseline creatinine 0.7    Risk/ complexity of medical care/ medical decision making moderate complexity, renal failure management      Labs:     No results found for this or any previous visit (from the past 24 hour(s)).      Radiology:  Pertinent radiology studies were reviewed as described above      Medications have been reviewed separately in chart overview      ASSESSMENT:  JOSEPH, predominantly obstructive.  Much improved after Whitehead placement but CT still showed hydronephrosis.  No immediate plans for intervention since his renal function is improving.  Repeat imaging with urology follow-up as an outpatient  Severe hyperkalemia, improved with treatment  Urinary retention, improved post Whitehead  Severe anemia, stabilizing  Recurrent right sided pleural effusion   Hypotension on midodrine  CHF.  Volume status somewhat brittle per Dr. Chan.  Restarting CHF meds today      Plan:  Renal function continues to slowly improve after Whitehead decompression  Euvolemic on exam  His family was concerned that he is not on his usual diuretic therapy but he remains euvolemic on exam, O2 sats 99% on room air with no complaints of dyspnea.  He is maintaining adequate urine output  I think it is reasonable to restart low-dose oral Lasix, potassium and Jardiance today with close monitoring of his renal function and electrolytes    Maintaining Whitehead catheter for now with plans to discharge home with a Whitehead, outpatient urology follow-up and continue Flomax at current  dosage  Continue to encourage nutrition    Pleurx per CV surgery  Maintain Midodrine for chronic hypotension  Chest x-ray reviewed: No sign of volume excess    Maintaining excellent urine output, euvolemic on exam  Will follow-up a.m. labs       Randy Rico MD  [facility]   Office phone number: [phone]  Answering service phone number: [phone]    06/28/24  10:17 EDT    Dictation performed using Dragon dictation software

## 2024-06-28 NOTE — PROGRESS NOTES
Name: Mehreen Silva IV ADMIT: 2024   : 1940  PCP: Taiwo Powers MD    MRN: [id number] LOS: 7 days   AGE/SEX: 84 y.o. male  ROOM: [facility]     Subjective   Subjective   No chief complaint on file.    No CP SOA NV or abdominal pain.  I was called about bradycardia earlier this morning.  During exam patient is in the high 50s low 60s and not having any symptoms.  The rate was into the 30s overnight.     Objective   Objective   Vital Signs  Temp:  [92.6 °F (33.7 °C)-97.5 °F (36.4 °C)] 97.4 °F (36.3 °C)  Heart Rate:  [60-74] 60  Resp:  [16] 16  BP: ()/(49-64) 111/64  SpO2:  [99 %-100 %] 100 %  on   ;   Device (Oxygen Therapy): room air  Body mass index is 20.09 kg/m².    Physical Exam  Vitals and nursing note reviewed.   Constitutional:       General: He is not in acute distress.     Appearance: He is ill-appearing (chronically). He is not diaphoretic.   Cardiovascular:      Rate and Rhythm: Normal rate. Rhythm irregular.   Pulmonary:      Effort: Pulmonary effort is normal.      Breath sounds: No wheezing.   Abdominal:      General: There is no distension.      Palpations: Abdomen is soft.      Tenderness: There is no abdominal tenderness. There is no guarding or rebound.   Musculoskeletal:      Comments: Right AKA   Skin:     General: Skin is warm and dry.      Comments: Right pleurx catheter   Neurological:      Mental Status: He is alert. Mental status is at baseline.   Psychiatric:         Mood and Affect: Mood normal.         Behavior: Behavior normal.       Results Review  I reviewed the patient's new clinical results.    Results from last 7 days   Lab Units 24  0415 24  0345 24  0337 24  0616   WBC 10*3/mm3 8.36 8.60 8.66 6.42   HEMOGLOBIN g/dL 7.6* 7.9* 7.5* 7.4*   PLATELETS 10*3/mm3 222 219 221 224     Results from last 7 days   Lab Units 24  0415 24  0345 24  0336 24  0616   SODIUM mmol/L 143 142 142 137   POTASSIUM mmol/L 4.2 3.3*  "3.5 3.8   CHLORIDE mmol/L 109* 110* 110* 106   CO2 mmol/L 27.0 23.4 24.0 22.5   BUN mg/dL 30* 30* 40* 44*   CREATININE mg/dL 1.13 1.18 1.45* 1.73*   GLUCOSE mg/dL 87 79 78 76   EGFR mL/min/1.73 64.1 60.8 47.5* 38.4*     Results from last 7 days   Lab Units 06/27/24 0415 06/26/24 0345 06/25/24  0336 06/22/24  0645   ALBUMIN g/dL 2.4* 2.3* 2.1* 2.4*   BILIRUBIN mg/dL  --   --   --  0.4   ALK PHOS U/L  --   --   --  126*   AST (SGOT) U/L  --   --   --  11   ALT (SGPT) U/L  --   --   --  14     Results from last 7 days   Lab Units 06/27/24 0415 06/26/24 0345 06/25/24 0336 06/24/24  0616 06/23/24  0442 06/22/24  0645   CALCIUM mg/dL 8.1* 8.1* 7.8* 7.9*   < > 8.6   ALBUMIN g/dL 2.4* 2.3* 2.1*  --   --  2.4*   MAGNESIUM mg/dL 1.8 1.6 1.8  --   --   --    PHOSPHORUS mg/dL 2.6 2.8 3.2  --   --   --     < > = values in this interval not displayed.     Results from last 7 days   Lab Units 06/23/24  0442   PROCALCITONIN ng/mL 0.23     No results found for: \"HGBA1C\", \"POCGLU\"      XR Chest 1 View    Result Date: 6/28/2024  Right-sided chest tube. Stable small right hydropneumothorax ex vacuo. Unchanged peripheral linear opacities in the immediately adjacent right middle and right lower lobes, atelectasis versus infiltrate. No new focal consolidation. Stable enlarged cardiac silhouette. Partially visualized left reverse shoulder arthroplasty.  This report was finalized on 6/28/2024 7:57 AM by Dr. Spencer Wood M.D on Workstation: [id number]       I have personally reviewed all medications:  Scheduled Medications  bisacodyl, 10 mg, Rectal, Daily  cholecalciferol, 1,000 Units, Oral, Daily  collagenase, 1 Application, Topical, Q24H  digoxin, 125 mcg, Oral, Daily  empagliflozin, 10 mg, Oral, Daily  furosemide, 20 mg, Oral, Daily  gabapentin, 100 mg, Oral, Nightly  methIMAzole, 5 mg, Oral, Once per day on Monday Wednesday Friday  metoprolol tartrate, 12.5 mg, Oral, Q12H  midodrine, 5 mg, Oral, BID AC  polyethylene glycol, " 0.5 bottle, Oral, BID  potassium chloride, 20 mEq, Oral, Daily  senna-docusate sodium, 2 tablet, Oral, BID  sodium chloride, 10 mL, Intravenous, Q12H  tamsulosin, 0.4 mg, Oral, Daily      Infusions       Diet  Diet: Liquid; Clear Liquid; Fluid Consistency: Thin (IDDSI 0)  NPO Diet NPO Type: Strict NPO    I have personally reviewed:  [x]  Laboratory   []  Microbiology   [x]  Radiology   [x]  EKG/Telemetry  []  Cardiology/Vascular   []  Pathology    []  Records       Assessment/Plan     Active Hospital Problems    Diagnosis  POA    **Pleural effusion [J90]  Unknown    CKD (chronic kidney disease) [N18.9]  Yes    Anemia [D64.9]  Unknown    Chronic heart failure with preserved ejection fraction (HFpEF) [I50.32]  Yes    Recurrent pleural effusion on right [J90]  Yes    Ulcer of left heel [L97.429]  Yes    Longstanding persistent atrial fibrillation [I48.11]  Yes    Hyperkalemia [E87.5]  Yes    Hyperthyroidism [E05.90]  Yes      Resolved Hospital Problems   No resolved problems to display.       84 y.o. male admitted with Pleural effusion.    Urinary retention/hydronephrosis/JOSEPH with hyperkalemia: Lopez placed.  Continued hydronephrosis but improved creatinine and potassium levels.  Continued lopez at discharge and outpatient cystoscopy planned. Urology and nephrology following.  Recurrent right pleural effusion/Pleurx: sp tPA and dornase treatments.  Pleurx to remain in place with MWF drainage. Thoracic surgery evaluated.  Acute on chronic anemia: Iron saturation 11%.  Ferritin elevated.  Heme positive stool but no overt losses.  Endoscopy planned Saturday. GI following.  Chronic diastolic heart failure: Diuretics were held for JOSEPH.  Oral Lasix 20 mg resumed today.  Monitoring.  Chronic A-fib: Eliquis held.  Metoprolol dose decreased yesterday.  Bradycardia overnight.  Hold digoxin.  Will consult cardiology.  Chronic hypotension: Midodrine  History right chest cystic skin excision: Surgery evaluated.  Hyperthyroidism: On  methimazole.  Repeat thyroid painel was ok.  PPX: SCD.  Resume AC when able.  Disposition: HH/possibly Sunday    Expected Discharge Date: 6/28/2024; Expected Discharge Time:      Feliciano Torres MD  [facility]  06/28/24  13:54 EDT    Dictated portions of note using Dragon dictation software.  Copied text in this note has been reviewed by me and remains accurate as of 06/28/24

## 2024-06-28 NOTE — TELEPHONE ENCOUNTER
Called Belem for additional information.    Belem reports that when patient was admitted for this hospitalization his potassium was extremely elevated.  She stated that she thinks a lot of his problems and the hospitalization is related to the potassium supplement.  She stated she doesn't think that the patient can tolerate this medication and just wants to make sure he is not prescribed it in the future.  She stated if patient needs potassium supplementation in the future, it be through a different medication/route.    Thank you,  Yeni KNAPP RN  Triage Nurse [facility]   14:16 EDT

## 2024-06-28 NOTE — PLAN OF CARE
Problem: Adult Inpatient Plan of Care  Goal: Plan of Care Review  Outcome: Ongoing, Progressing  Flowsheets (Taken 6/28/2024 1719)  Progress: improving  Plan of Care Reviewed With: patient  Goal: Patient-Specific Goal (Individualized)  Outcome: Ongoing, Progressing  Goal: Absence of Hospital-Acquired Illness or Injury  Outcome: Ongoing, Progressing  Intervention: Identify and Manage Fall Risk  Recent Flowsheet Documentation  Taken 6/28/2024 1600 by Felicita Almanzar RN  Safety Promotion/Fall Prevention:   activity supervised   assistive device/personal items within reach   clutter free environment maintained   fall prevention program maintained   nonskid shoes/slippers when out of bed   safety round/check completed  Taken 6/28/2024 1400 by Felicita Almanzar RN  Safety Promotion/Fall Prevention:   activity supervised   assistive device/personal items within reach   clutter free environment maintained   fall prevention program maintained   nonskid shoes/slippers when out of bed   safety round/check completed  Taken 6/28/2024 1200 by Felicita Almanzar RN  Safety Promotion/Fall Prevention:   activity supervised   assistive device/personal items within reach   clutter free environment maintained   fall prevention program maintained   nonskid shoes/slippers when out of bed  Taken 6/28/2024 1000 by Felicita Almanzar RN  Safety Promotion/Fall Prevention:   activity supervised   assistive device/personal items within reach   clutter free environment maintained   fall prevention program maintained   nonskid shoes/slippers when out of bed   safety round/check completed  Taken 6/28/2024 0800 by Felicita Almanzar, RN  Safety Promotion/Fall Prevention:   activity supervised   assistive device/personal items within reach   clutter free environment maintained   fall prevention program maintained   nonskid shoes/slippers when out of bed   safety round/check completed  Intervention: Prevent Skin Injury  Recent Flowsheet  Documentation  Taken 6/28/2024 1400 by Felicita Almanzar RN  Skin Protection: incontinence pads utilized  Taken 6/28/2024 0800 by Felicita Almanzar RN  Skin Protection: incontinence pads utilized  Intervention: Prevent and Manage VTE (Venous Thromboembolism) Risk  Recent Flowsheet Documentation  Taken 6/28/2024 1600 by Felicita Almanzar RN  Activity Management: activity encouraged  Taken 6/28/2024 1400 by Felicita Almanzar RN  Activity Management: activity encouraged  Taken 6/28/2024 1200 by Felicita Almanzar RN  Activity Management: activity encouraged  Taken 6/28/2024 1000 by Felicita Almanzar RN  Activity Management: activity encouraged  Taken 6/28/2024 0800 by Felicita Almanzar RN  Activity Management: activity encouraged  Intervention: Prevent Infection  Recent Flowsheet Documentation  Taken 6/28/2024 1600 by Felicita Almanzar RN  Infection Prevention: single patient room provided  Taken 6/28/2024 1400 by Felicita Almanzar RN  Infection Prevention: single patient room provided  Taken 6/28/2024 1200 by Felicita Almanzar RN  Infection Prevention: single patient room provided  Taken 6/28/2024 1000 by Felicita Almanzar RN  Infection Prevention: single patient room provided  Taken 6/28/2024 0800 by Felicita Almanzar RN  Infection Prevention: single patient room provided  Goal: Optimal Comfort and Wellbeing  Outcome: Ongoing, Progressing  Intervention: Provide Person-Centered Care  Recent Flowsheet Documentation  Taken 6/28/2024 1400 by Felicita Almanzar RN  Trust Relationship/Rapport:   choices provided   care explained  Taken 6/28/2024 0800 by Felicita Almanzar RN  Trust Relationship/Rapport:   care explained   choices provided  Goal: Readiness for Transition of Care  Outcome: Ongoing, Progressing   Goal Outcome Evaluation:  Plan of Care Reviewed With: patient        Progress: improving

## 2024-06-28 NOTE — PROGRESS NOTES
Gastroenterology   Inpatient Progress Note    Reason for Follow Up: Anemia    Subjective  Interval History:     Denies abdominal pain.  No nausea or vomiting.    Large stool output after administration of Dulcolax suppository.    Current Facility-Administered Medications:     sennosides-docusate (PERICOLACE) 8.6-50 MG per tablet 2 tablet, 2 tablet, Oral, BID **AND** polyethylene glycol (MIRALAX) packet 17 g, 17 g, Oral, Daily PRN **AND** bisacodyl (DULCOLAX) EC tablet 5 mg, 5 mg, Oral, Daily PRN **AND** bisacodyl (DULCOLAX) suppository 10 mg, 10 mg, Rectal, Daily PRN, Feliciano Torres MD    bisacodyl (DULCOLAX) suppository 10 mg, 10 mg, Rectal, Daily, Karen Lara, APRN, 10 mg at 06/27/24 1536    cholecalciferol (VITAMIN D3) tablet 1,000 Units, 1,000 Units, Oral, Daily, Baron Ponce MD, 1,000 Units at 06/27/24 0823    collagenase ointment 1 Application, 1 Application, Topical, Q24H, Feliciano Torres MD, 1 Application at 06/27/24 0824    digoxin (LANOXIN) tablet 125 mcg, 125 mcg, Oral, Daily, Feliciano Torres MD, 125 mcg at 06/27/24 1204    empagliflozin (JARDIANCE) tablet 10 mg, 10 mg, Oral, Daily, Randy Rico MD, 10 mg at 06/27/24 1740    furosemide (LASIX) tablet 20 mg, 20 mg, Oral, Daily, Randy Rico MD    gabapentin (NEURONTIN) capsule 100 mg, 100 mg, Oral, Nightly, Baron Ponce MD, 100 mg at 06/27/24 2122    LORazepam (ATIVAN) tablet 0.5 mg, 0.5 mg, Oral, Q8H PRN, Ricardo Kiser MD, 0.5 mg at 06/25/24 1657    methIMAzole (TAPAZOLE) tablet 5 mg, 5 mg, Oral, Once per day on Monday Wednesday Friday, Baron Ponce MD, 5 mg at 06/26/24 0801    metoprolol tartrate (LOPRESSOR) tablet 12.5 mg, 12.5 mg, Oral, Q12H, Feliciano Torres MD, 12.5 mg at 06/27/24 1739    midodrine (PROAMATINE) tablet 5 mg, 5 mg, Oral, BID AC, Baron Ponce MD, 5 mg at 06/27/24 1740    nitroglycerin (NITROSTAT) SL tablet 0.4 mg, 0.4 mg, Sublingual, Q5 Min PRN, Baron Ponce MD    polyethylene glycol (MIRALAX)  powder 0.5 bottle, 0.5 bottle, Oral, BID, Karen Lara APRN    potassium chloride (K-DUR,KLOR-CON) ER tablet 20 mEq, 20 mEq, Oral, Daily, Randy Rico MD    sodium chloride 0.9 % flush 10 mL, 10 mL, Intravenous, Q12H, Baron Ponce MD, 10 mL at 06/27/24 2140    sodium chloride 0.9 % flush 10 mL, 10 mL, Intravenous, PRN, Baron Ponce MD    sodium chloride 0.9 % infusion 40 mL, 40 mL, Intravenous, PRN, Baron Ponce MD    tamsulosin (FLOMAX) 24 hr capsule 0.4 mg, 0.4 mg, Oral, Daily, Ricardo Kiser MD, 0.4 mg at 06/27/24 0823  Review of Systems:               The following systems were reviewed and negative;  gastrointestinal    Objective     Vital Signs  Temp:  [92.6 °F (33.7 °C)-97.5 °F (36.4 °C)] 97.4 °F (36.3 °C)  Heart Rate:  [68-87] 74  Resp:  [16] 16  BP: ()/(38-62) 120/62  Body mass index is 20.09 kg/m².                  General Appearance:  awake, alert, oriented, in no acute distress  Abdomen:  Soft, non-tender, normal bowel sounds; no bruits, organomegaly or masses.                Results Review:                I reviewed the patient's new clinical results.    Results from last 7 days   Lab Units 06/27/24 0415 06/26/24 0345 06/25/24  0337   WBC 10*3/mm3 8.36 8.60 8.66   HEMOGLOBIN g/dL 7.6* 7.9* 7.5*   HEMATOCRIT % 24.8* 25.0* 24.3*   PLATELETS 10*3/mm3 222 219 221     Results from last 7 days   Lab Units 06/27/24 0415 06/26/24  0345 06/25/24  0336 06/23/24  0442 06/22/24  0645   SODIUM mmol/L 143 142 142   < > 141   POTASSIUM mmol/L 4.2 3.3* 3.5   < > 4.6   CHLORIDE mmol/L 109* 110* 110*   < > 104   CO2 mmol/L 27.0 23.4 24.0   < > 25.1   BUN mg/dL 30* 30* 40*   < > 76*   CREATININE mg/dL 1.13 1.18 1.45*   < > 2.50*   CALCIUM mg/dL 8.1* 8.1* 7.8*   < > 8.6   BILIRUBIN mg/dL  --   --   --   --  0.4   ALK PHOS U/L  --   --   --   --  126*   ALT (SGPT) U/L  --   --   --   --  14   AST (SGOT) U/L  --   --   --   --  11   GLUCOSE mg/dL 87 79 78   < > 73    < > = values in this interval not  displayed.         Lab Results   Lab Value Date/Time    LIPASE 26 02/21/2022 0702    LIPASE 55 (H) 08/27/2021 1110       Radiology:  XR Chest 1 View   Final Result   Right-sided chest tube. Stable small right hydropneumothorax   ex vacuo. Unchanged peripheral linear opacities in the immediately   adjacent right middle and right lower lobes, atelectasis versus   infiltrate. No new focal consolidation. Stable enlarged cardiac   silhouette. Partially visualized left reverse shoulder arthroplasty.       This report was finalized on 6/28/2024 7:57 AM by Dr. Spencer Wood M.D on Workstation: [id number]          XR Chest 1 View   Final Result      XR Chest 1 View   Final Result      XR Chest 1 View   Final Result      XR Chest 1 View   Final Result      CT Abdomen Pelvis Without Contrast   Final Result   1. Moderately severe bilateral hydronephrosis and hydroureter with   urinary bladder wall thickening. The urinary bladder wall thickening   could be related to cystitis and/or infiltrative neoplasm. There could   be an element of bladder outlet obstruction as well. Whitehead catheter is   seen in the urinary bladder which is only partially distended.   2. Areas of increased density in both lung bases as described. Right   chest tube is seen in the right base. Please additional dictation for   yesterday's CT scan of the chest.   3. Cholelithiasis.   4. Large amount of stool in the rectum.               Radiation dose reduction techniques were utilized, including automated   exposure control and exposure modulation based on body size.           This report was finalized on 6/24/2024 12:15 PM by Dr. Romeo Cummings M.D on Workstation: [id number]          CT Chest Without Contrast Diagnostic   Final Result       1. Mild right sided loculated hydropneumothorax with decreased fluid   component and increased gas component with associated pleural   thickening. Could be empyema or malignant effusion if patient has a    known malignancy. Correlate with chest tube output.   2. Small left pleural effusion, slightly increased in the interval.   3. Heterogeneous peripheral opacities in the right lung, greatest in the   lower lung, mild increased. Opacities are nonspecific. Could be   multifocal pneumonia with follow-up to resolution recommended.   4. New centrilobular and tree-in-bud nodules in the superior segment   left lower lobe. Suspect infectious bronchiolitis.   5. Mild cardiomegaly.   6. Enlarged main pulmonary artery, can be seen with pulmonary artery   hypertension.       Additional findings described above that are stable       This report was finalized on 6/23/2024 9:00 AM by Dr. Taiwo Espinosa M.D on Workstation: [id number]          US Renal Bilateral   Final Result      XR Chest 1 View    (Results Pending)   XR Chest 1 View    (Results Pending)       Assessment & Plan     Active Hospital Problems    Diagnosis     **Pleural effusion     CKD (chronic kidney disease)     Anemia     Chronic heart failure with preserved ejection fraction (HFpEF)     Recurrent pleural effusion on right     Ulcer of left heel     Longstanding persistent atrial fibrillation     Hyperkalemia     Hyperthyroidism        Assessment:  Anemia  Longstanding persistent A-fib on Eliquis-currently on hold  JOSEPH with acute hyperkalemia-improving  Chronic heart failure with preserved EF        Plan:  Clear liquid diet, repeat Dulcolax suppository and proceed with MiraLAX bowel prep as ordered later today  N.p.o. after midnight  Proceed with EGD and colonoscopy tomorrow to further assess persistent anemia  Continue to monitor H&H and for overt signs of GI bleeding    I discussed the patients findings and my recommendations with patient, family, and nursing staff.          CHARLEE Lopez  [facility] [address] [address]

## 2024-06-28 NOTE — PROGRESS NOTES
"    Chief Complaint: Recurrent pleural effusion with Pleurx catheter in place  S/p intrapleural lytic therapy x 1, 6/24/2024    Subjective:  Symptoms:  Improved.  No shortness of breath, cough or chest pain.    Diet:  No nausea or vomiting.    Activity level: Returning to normal.    Pain:  He reports no pain.    Resting comfortably in bed.  No complaints.  Denies dyspnea.    Vital Signs:  Temp:  [92.6 °F (33.7 °C)-97.5 °F (36.4 °C)] 97.4 °F (36.3 °C)  Heart Rate:  [60-74] 60  Resp:  [16] 16  BP: ()/(49-64) 111/64    Intake & Output (last day)         06/27 0701  06/28 0700 06/28 0701  06/29 0700    Urine (mL/kg/hr) 2550 (1.7) 550 (1.3)    Stool 0     Chest Tube 60     Total Output 2610 550    Net -2610 -550          Stool Unmeasured Occurrence 3 x             Objective:  General Appearance:  Comfortable, in no acute distress, well-appearing and not in pain.    Vital signs: (most recent): Blood pressure 111/64, pulse 60, temperature 97.4 °F (36.3 °C), temperature source Oral, resp. rate 16, height 177.8 cm (70\"), weight 63.5 kg (140 lb), SpO2 100%.    Lungs:  Normal effort and normal respiratory rate.  He is not in respiratory distress.    Heart: Normal rate.  Regular rhythm.    Chest: Symmetric chest wall expansion.   Abdomen: Abdomen is soft and non-distended.    Neurological: Patient is alert.    Skin:  Warm and dry.                Results Review:     I reviewed the patient's new clinical results.  I reviewed the patient's new imaging results and agree with the interpretation.  Discussed with patient, nurse, and surgeon    Imaging Results (Last 24 Hours)       Procedure Component Value Units Date/Time    XR Chest 1 View [[id number]] Collected: 06/28/24 0752     Updated: 06/28/24 0800    Narrative:      XR CHEST 1 VW-     INDICATION: Follow-up pleural effusion     COMPARISON: CT chest 6/23/2024. Chest radiographs dating back to  9/23/2022       Impression:      Right-sided chest tube. Stable small right " hydropneumothorax  ex vacuo. Unchanged peripheral linear opacities in the immediately  adjacent right middle and right lower lobes, atelectasis versus  infiltrate. No new focal consolidation. Stable enlarged cardiac  silhouette. Partially visualized left reverse shoulder arthroplasty.     This report was finalized on 6/28/2024 7:57 AM by Dr. Spencer Wood M.D on Workstation: [id number]               Lab Results:     Lab Results (last 24 hours)       Procedure Component Value Units Date/Time    Blood Culture - Blood, Hand, Right [[id number]]  (Normal) Collected: 06/23/24 1629    Specimen: Blood from Hand, Right Updated: 06/27/24 1715     Blood Culture No growth at 4 days    Blood Culture - Blood, Arm, Left [[id number]]  (Normal) Collected: 06/23/24 1630    Specimen: Blood from Arm, Left Updated: 06/27/24 1715     Blood Culture No growth at 4 days             Assessment & Plan       Pleural effusion    Hyperthyroidism    Hyperkalemia    Longstanding persistent atrial fibrillation    Ulcer of left heel    Recurrent pleural effusion on right    Chronic heart failure with preserved ejection fraction (HFpEF)    CKD (chronic kidney disease)    Anemia       Assessment & Plan  He continues to do well.  S/p intrapleural lytic therapy via Pleurx with good response.  Another 200 cc of serous fluid drained overnight.  His lungs are well-expanded on chest x-ray today.  No significant volume effusions.  The pleural catheter is seen in position.  Given persistent drainage from Pleurx, no plan for removal at this time.  We will cap & dress the Pleurx catheter and will continue prior Monday, Wednesday, Friday draining schedule up to 1 L or as patient tolerates.  Plan to drain Pleurx today.  Discussed with primary RN.    Noted plans for endoscopy this weekend.  Discussed above plan with patient today and wife via phone yesterday.  Will tentatively plan for outpatient appointment although I have instructed her to reach out should  any needs arise prior to his scheduled appointment.  Home health arrangements per case management. Not much more to add from thoracic surgery standpoint.  Will follow peripherally at this time.  Please call with any questions.     CHARLEE Pascal  [facility]  06/28/24  13:43 EDT    Greater than 30 minutes was spent reviewing the patient's chart, including imaging, labs, provider notes, assessing the patient and developing a plan of care which was discussed with the patient and RN. This is separate from any billable procedural time which will be dictated in a separate note.

## 2024-06-29 ENCOUNTER — APPOINTMENT (OUTPATIENT)
Dept: GENERAL RADIOLOGY | Facility: HOSPITAL | Age: 84
DRG: 186 | End: 2024-06-29
Payer: MEDICARE

## 2024-06-29 ENCOUNTER — ANESTHESIA EVENT (OUTPATIENT)
Dept: GASTROENTEROLOGY | Facility: HOSPITAL | Age: 84
End: 2024-06-29
Payer: MEDICARE

## 2024-06-29 ENCOUNTER — ANESTHESIA (OUTPATIENT)
Dept: GASTROENTEROLOGY | Facility: HOSPITAL | Age: 84
End: 2024-06-29
Payer: MEDICARE

## 2024-06-29 ENCOUNTER — INPATIENT HOSPITAL (OUTPATIENT)
Dept: URBAN - METROPOLITAN AREA HOSPITAL 113 | Facility: HOSPITAL | Age: 84
End: 2024-06-29

## 2024-06-29 DIAGNOSIS — K31.819 ANGIODYSPLASIA OF STOMACH AND DUODENUM WITHOUT BLEEDING: ICD-10-CM

## 2024-06-29 DIAGNOSIS — R19.5 OTHER FECAL ABNORMALITIES: ICD-10-CM

## 2024-06-29 DIAGNOSIS — K26.9 DUODENAL ULCER, UNSPECIFIED AS ACUTE OR CHRONIC, WITHOUT HEM: ICD-10-CM

## 2024-06-29 DIAGNOSIS — Z91.199 PATIENT'S NONCOMPLIANCE WITH OTHER MEDICAL TREATMENT AND REG: ICD-10-CM

## 2024-06-29 DIAGNOSIS — K29.50 UNSPECIFIED CHRONIC GASTRITIS WITHOUT BLEEDING: ICD-10-CM

## 2024-06-29 LAB
ALBUMIN SERPL-MCNC: 2.5 G/DL (ref 3.5–5.2)
ANION GAP SERPL CALCULATED.3IONS-SCNC: 11 MMOL/L (ref 5–15)
BUN SERPL-MCNC: 20 MG/DL (ref 8–23)
BUN/CREAT SERPL: 20.4 (ref 7–25)
CALCIUM SPEC-SCNC: 8.5 MG/DL (ref 8.6–10.5)
CHLORIDE SERPL-SCNC: 106 MMOL/L (ref 98–107)
CO2 SERPL-SCNC: 21 MMOL/L (ref 22–29)
CREAT SERPL-MCNC: 0.98 MG/DL (ref 0.76–1.27)
DEPRECATED RDW RBC AUTO: 46.6 FL (ref 37–54)
EGFRCR SERPLBLD CKD-EPI 2021: 76 ML/MIN/1.73
ERYTHROCYTE [DISTWIDTH] IN BLOOD BY AUTOMATED COUNT: 16.2 % (ref 12.3–15.4)
GLUCOSE SERPL-MCNC: 88 MG/DL (ref 65–99)
HCT VFR BLD AUTO: 27 % (ref 37.5–51)
HGB BLD-MCNC: 8.4 G/DL (ref 13–17.7)
MAGNESIUM SERPL-MCNC: 3.6 MG/DL (ref 1.6–2.4)
MCH RBC QN AUTO: 24.8 PG (ref 26.6–33)
MCHC RBC AUTO-ENTMCNC: 31.1 G/DL (ref 31.5–35.7)
MCV RBC AUTO: 79.6 FL (ref 79–97)
PHOSPHATE SERPL-MCNC: 2.4 MG/DL (ref 2.5–4.5)
PLATELET # BLD AUTO: 243 10*3/MM3 (ref 140–450)
PMV BLD AUTO: 10.3 FL (ref 6–12)
POTASSIUM SERPL-SCNC: 4 MMOL/L (ref 3.5–5.2)
RBC # BLD AUTO: 3.39 10*6/MM3 (ref 4.14–5.8)
SODIUM SERPL-SCNC: 138 MMOL/L (ref 136–145)
WBC NRBC COR # BLD AUTO: 10.28 10*3/MM3 (ref 3.4–10.8)

## 2024-06-29 PROCEDURE — 83735 ASSAY OF MAGNESIUM: CPT | Performed by: INTERNAL MEDICINE

## 2024-06-29 PROCEDURE — 85027 COMPLETE CBC AUTOMATED: CPT | Performed by: INTERNAL MEDICINE

## 2024-06-29 PROCEDURE — 45378 DIAGNOSTIC COLONOSCOPY: CPT | Performed by: INTERNAL MEDICINE

## 2024-06-29 PROCEDURE — 80069 RENAL FUNCTION PANEL: CPT | Performed by: INTERNAL MEDICINE

## 2024-06-29 PROCEDURE — 25810000003 SODIUM CHLORIDE 0.9 % SOLUTION: Performed by: INTERNAL MEDICINE

## 2024-06-29 PROCEDURE — 71045 X-RAY EXAM CHEST 1 VIEW: CPT

## 2024-06-29 PROCEDURE — 99232 SBSQ HOSP IP/OBS MODERATE 35: CPT | Performed by: INTERNAL MEDICINE

## 2024-06-29 PROCEDURE — 25010000002 PROPOFOL 1000 MG/100ML EMULSION: Performed by: ANESTHESIOLOGY

## 2024-06-29 PROCEDURE — 0DJD8ZZ INSPECTION OF LOWER INTESTINAL TRACT, VIA NATURAL OR ARTIFICIAL OPENING ENDOSCOPIC: ICD-10-PCS | Performed by: INTERNAL MEDICINE

## 2024-06-29 PROCEDURE — 43239 EGD BIOPSY SINGLE/MULTIPLE: CPT | Performed by: INTERNAL MEDICINE

## 2024-06-29 PROCEDURE — 88305 TISSUE EXAM BY PATHOLOGIST: CPT | Performed by: INTERNAL MEDICINE

## 2024-06-29 PROCEDURE — 25810000003 LACTATED RINGERS PER 1000 ML: Performed by: ANESTHESIOLOGY

## 2024-06-29 PROCEDURE — 0W3P8ZZ CONTROL BLEEDING IN GASTROINTESTINAL TRACT, VIA NATURAL OR ARTIFICIAL OPENING ENDOSCOPIC: ICD-10-PCS | Performed by: INTERNAL MEDICINE

## 2024-06-29 PROCEDURE — 43255 EGD CONTROL BLEEDING ANY: CPT | Mod: 59 | Performed by: INTERNAL MEDICINE

## 2024-06-29 PROCEDURE — 25010000002 PHENYLEPHRINE 10 MG/ML SOLUTION: Performed by: ANESTHESIOLOGY

## 2024-06-29 PROCEDURE — 25010000002 PROPOFOL 200 MG/20ML EMULSION: Performed by: ANESTHESIOLOGY

## 2024-06-29 PROCEDURE — 0DB68ZX EXCISION OF STOMACH, VIA NATURAL OR ARTIFICIAL OPENING ENDOSCOPIC, DIAGNOSTIC: ICD-10-PCS | Performed by: INTERNAL MEDICINE

## 2024-06-29 RX ORDER — EPHEDRINE SULFATE 50 MG/ML
INJECTION, SOLUTION INTRAVENOUS AS NEEDED
Status: DISCONTINUED | OUTPATIENT
Start: 2024-06-29 | End: 2024-06-29 | Stop reason: SURG

## 2024-06-29 RX ORDER — PROPOFOL 10 MG/ML
INJECTION, EMULSION INTRAVENOUS CONTINUOUS PRN
Status: DISCONTINUED | OUTPATIENT
Start: 2024-06-29 | End: 2024-06-29 | Stop reason: SURG

## 2024-06-29 RX ORDER — ACETAMINOPHEN 325 MG/1
650 TABLET ORAL EVERY 4 HOURS PRN
Status: DISCONTINUED | OUTPATIENT
Start: 2024-06-29 | End: 2024-06-30 | Stop reason: HOSPADM

## 2024-06-29 RX ORDER — LIDOCAINE HYDROCHLORIDE 20 MG/ML
INJECTION, SOLUTION INFILTRATION; PERINEURAL AS NEEDED
Status: DISCONTINUED | OUTPATIENT
Start: 2024-06-29 | End: 2024-06-29 | Stop reason: SURG

## 2024-06-29 RX ORDER — SODIUM CHLORIDE 9 MG/ML
30 INJECTION, SOLUTION INTRAVENOUS CONTINUOUS PRN
Status: DISCONTINUED | OUTPATIENT
Start: 2024-06-29 | End: 2024-06-30 | Stop reason: HOSPADM

## 2024-06-29 RX ORDER — PHENYLEPHRINE HYDROCHLORIDE 10 MG/ML
INJECTION INTRAVENOUS AS NEEDED
Status: DISCONTINUED | OUTPATIENT
Start: 2024-06-29 | End: 2024-06-29 | Stop reason: SURG

## 2024-06-29 RX ORDER — SODIUM CHLORIDE, SODIUM LACTATE, POTASSIUM CHLORIDE, CALCIUM CHLORIDE 600; 310; 30; 20 MG/100ML; MG/100ML; MG/100ML; MG/100ML
INJECTION, SOLUTION INTRAVENOUS CONTINUOUS PRN
Status: DISCONTINUED | OUTPATIENT
Start: 2024-06-29 | End: 2024-06-29 | Stop reason: SURG

## 2024-06-29 RX ORDER — PROPOFOL 10 MG/ML
INJECTION, EMULSION INTRAVENOUS AS NEEDED
Status: DISCONTINUED | OUTPATIENT
Start: 2024-06-29 | End: 2024-06-29 | Stop reason: SURG

## 2024-06-29 RX ADMIN — PHENYLEPHRINE HYDROCHLORIDE 100 MCG: 10 INJECTION INTRAVENOUS at 10:20

## 2024-06-29 RX ADMIN — MIDODRINE HYDROCHLORIDE 5 MG: 5 TABLET ORAL at 21:02

## 2024-06-29 RX ADMIN — PHENYLEPHRINE HYDROCHLORIDE 100 MCG: 10 INJECTION INTRAVENOUS at 10:37

## 2024-06-29 RX ADMIN — COLLAGENASE SANTYL 1 APPLICATION: 250 OINTMENT TOPICAL at 14:18

## 2024-06-29 RX ADMIN — Medication 10 ML: at 14:18

## 2024-06-29 RX ADMIN — FUROSEMIDE 20 MG: 20 TABLET ORAL at 14:17

## 2024-06-29 RX ADMIN — Medication 1000 UNITS: at 11:57

## 2024-06-29 RX ADMIN — SODIUM CHLORIDE 30 ML/HR: 9 INJECTION, SOLUTION INTRAVENOUS at 08:33

## 2024-06-29 RX ADMIN — Medication 10 ML: at 21:03

## 2024-06-29 RX ADMIN — EPHEDRINE SULFATE 10 MG: 50 INJECTION INTRAVENOUS at 10:28

## 2024-06-29 RX ADMIN — MIDODRINE HYDROCHLORIDE 5 MG: 5 TABLET ORAL at 11:57

## 2024-06-29 RX ADMIN — GABAPENTIN 100 MG: 100 CAPSULE ORAL at 21:02

## 2024-06-29 RX ADMIN — PROPOFOL INJECTABLE EMULSION 50 MG: 10 INJECTION, EMULSION INTRAVENOUS at 10:03

## 2024-06-29 RX ADMIN — TAMSULOSIN HYDROCHLORIDE 0.4 MG: 0.4 CAPSULE ORAL at 11:57

## 2024-06-29 RX ADMIN — PHENYLEPHRINE HYDROCHLORIDE 200 MCG: 10 INJECTION INTRAVENOUS at 10:17

## 2024-06-29 RX ADMIN — EPHEDRINE SULFATE 7.5 MG: 50 INJECTION INTRAVENOUS at 10:37

## 2024-06-29 RX ADMIN — PHENYLEPHRINE HYDROCHLORIDE 100 MCG: 10 INJECTION INTRAVENOUS at 10:28

## 2024-06-29 RX ADMIN — EMPAGLIFLOZIN 10 MG: 10 TABLET, FILM COATED ORAL at 11:57

## 2024-06-29 RX ADMIN — LIDOCAINE HYDROCHLORIDE 30 MG: 20 INJECTION, SOLUTION INFILTRATION; PERINEURAL at 10:01

## 2024-06-29 RX ADMIN — METOPROLOL TARTRATE 12.5 MG: 25 TABLET, FILM COATED ORAL at 21:02

## 2024-06-29 RX ADMIN — PHENYLEPHRINE HYDROCHLORIDE 200 MCG: 10 INJECTION INTRAVENOUS at 10:11

## 2024-06-29 RX ADMIN — SODIUM CHLORIDE, POTASSIUM CHLORIDE, SODIUM LACTATE AND CALCIUM CHLORIDE: 600; 310; 30; 20 INJECTION, SOLUTION INTRAVENOUS at 09:58

## 2024-06-29 RX ADMIN — PROPOFOL 140 MCG/KG/MIN: 10 INJECTION, EMULSION INTRAVENOUS at 10:03

## 2024-06-29 RX ADMIN — POTASSIUM CHLORIDE 20 MEQ: 750 TABLET, EXTENDED RELEASE ORAL at 11:57

## 2024-06-29 RX ADMIN — PHENYLEPHRINE HYDROCHLORIDE 200 MCG: 10 INJECTION INTRAVENOUS at 10:40

## 2024-06-29 RX ADMIN — PHENYLEPHRINE HYDROCHLORIDE 200 MCG: 10 INJECTION INTRAVENOUS at 10:22

## 2024-06-29 NOTE — ANESTHESIA PREPROCEDURE EVALUATION
Anesthesia Evaluation                  Airway   Mallampati: II  No difficulty expected  Dental - normal exam     Pulmonary    (+) pleural effusion, a smoker Former,  Cardiovascular   Exercise tolerance: poor (<4 METS)    Rhythm: irregular  Rate: abnormal    (+) valvular problems/murmurs (moderate to severe) MR, dysrhythmias Atrial Fib, CHF Diastolic >=55%, PVD, hyperlipidemia      Neuro/Psych  (+) numbness  GI/Hepatic/Renal/Endo    (+) renal disease- ARF and CRI, thyroid problem hypothyroidism    Musculoskeletal     Abdominal    Substance History      OB/GYN          Other   arthritis, blood dyscrasia (hgb 8.4) anemia,                 Anesthesia Plan    ASA 4     MAC   total IV anesthesia  intravenous induction     Anesthetic plan, risks, benefits, and alternatives have been provided, discussed and informed consent has been obtained with: patient.    CODE STATUS:    Code Status (Patient has no pulse and is not breathing): CPR (Attempt to Resuscitate)  Medical Interventions (Patient has pulse or is breathing): Full Support

## 2024-06-29 NOTE — PROGRESS NOTES
"   LOS: 8 days   Patient Care Team:  Taiwo Powers MD as PCP - General (Internal Medicine)  Self, Bess YO MD as Consulting Physician (Endocrinology)  Huang Chan MD as Cardiologist (Cardiology)    Chief Complaint: anemia     Interval History: Seen after endoscopy. In good spirits. Lying flat on back on RA, breathing comfortably. AF rate 100-110s on monitor.       Objective   Vital Signs  Temp:  [97.1 °F (36.2 °C)-97.3 °F (36.3 °C)] 97.3 °F (36.3 °C)  Heart Rate:  [] 63  Resp:  [12-19] 16  BP: ()/(51-68) 100/64    Intake/Output Summary (Last 24 hours) at 6/29/2024 1525  Last data filed at 6/29/2024 1152  Gross per 24 hour   Intake 900 ml   Output 2150 ml   Net -1250 ml       Last Weight and Admission Weight        06/21/24  1255   Weight: 63.5 kg (140 lb)     Flowsheet Rows      Flowsheet Row First Filed Value   Admission Height 177.8 cm (70\") Documented at 06/21/2024 1255   Admission Weight 63.5 kg (140 lb) Documented at 06/21/2024 1255                    Physical Exam  Constitutional:       Comments: Very frail, chronically ill appearing   HENT:      Head: Normocephalic.   Eyes:      Conjunctiva/sclera: Conjunctivae normal.   Cardiovascular:      Rate and Rhythm: Normal rate. Rhythm irregular.   Pulmonary:      Effort: Pulmonary effort is normal.      Breath sounds: Examination of the right-lower field reveals decreased breath sounds.   Abdominal:      Palpations: Abdomen is soft.   Musculoskeletal:         General: No swelling.   Neurological:      General: No focal deficit present.         Results Review:      Results from last 7 days   Lab Units 06/29/24  0512 06/28/24  1354 06/27/24  0415   SODIUM mmol/L 138 139 143   POTASSIUM mmol/L 4.0 4.0 4.2   CHLORIDE mmol/L 106 105 109*   CO2 mmol/L 21.0* 25.0 27.0   BUN mg/dL 20 24* 30*   CREATININE mg/dL 0.98 1.19 1.13   GLUCOSE mg/dL 88 95 87   CALCIUM mg/dL 8.5* 7.9* 8.1*     Results from last 7 days   Lab Units 06/28/24  1554 06/28/24  8864 "   HSTROP T ng/L 159* 160*     Results from last 7 days   Lab Units 06/29/24  0512 06/28/24  1354 06/27/24  0415   WBC 10*3/mm3 10.28 9.45 8.36   HEMOGLOBIN g/dL 8.4* 8.0* 7.6*   HEMATOCRIT % 27.0* 25.8* 24.8*   PLATELETS 10*3/mm3 243 228 222             Results from last 7 days   Lab Units 06/29/24  0512   MAGNESIUM mg/dL 3.6*           I reviewed the patient's new clinical results.  I personally viewed and interpreted the patient's EKG/Telemetry data        Medication Review:   bisacodyl, 10 mg, Rectal, Daily  cholecalciferol, 1,000 Units, Oral, Daily  collagenase, 1 Application, Topical, Q24H  empagliflozin, 10 mg, Oral, Daily  furosemide, 20 mg, Oral, Daily  gabapentin, 100 mg, Oral, Nightly  methIMAzole, 5 mg, Oral, Once per day on Monday Wednesday Friday  metoprolol tartrate, 12.5 mg, Oral, Q12H  midodrine, 5 mg, Oral, BID AC  potassium chloride, 20 mEq, Oral, Daily  senna-docusate sodium, 2 tablet, Oral, BID  sodium chloride, 10 mL, Intravenous, Q12H  tamsulosin, 0.4 mg, Oral, Daily        sodium chloride, 30 mL/hr, Last Rate: 30 mL/hr (06/29/24 0833)        Assessment & Plan     1. Chronic HFpEF, brittle  2. Chronic atrial fibrillation  3. JOSEPH due to urinary retention, normalized s/p Whitehead catheter  4. Hyperkalemia, resolved  5. Chronic hypotension on midodrine  6. Recurrent pleural effusion s/p Pleurex  7. Hyperthyroidism on methimazole  8. Acute on chronic anemia with evidence of gastritis, gastric and duodenal AVM (s/p APC), and duodenal ulcer  9. Chronic multifactorial weakness and immobility  10. Hypoalbuminemia  11. 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 His home furosemide and empagliflozin have been resumed.    Also, he has a tendency towards rapid AF with difficult rate control. It's interesting that he's had issues with low HR here. Agree with discontinuation of digoxin. I stopped the hold order on his BB; it will need to be given. This may  lower BP. He's on midodrine, but that can raise HR. His malnutrition, immobility will make treatment of hypotension very difficult.     Resume apixaban when okay with GI.     Huang Chan MD  06/29/24  15:25 EDT

## 2024-06-29 NOTE — ANESTHESIA POSTPROCEDURE EVALUATION
"Patient: Mehreen Silva IV    Procedure Summary       Date: 06/29/24 Room / Location:  [facility]    Anesthesia Start: 0957 Anesthesia Stop: 1048    Procedures:       COLONOSCOPY to cecum      ESOPHAGOGASTRODUODENOSCOPY apc and cold biopsy (Esophagus) Diagnosis:       Anemia, unspecified type      (Anemia, unspecified type [D64.9])    Surgeons: Amos Cuevas MD Provider: Johnson Chris MD    Anesthesia Type: MAC ASA Status: 4            Anesthesia Type: MAC    Vitals  Vitals Value Taken Time   BP 91/51 06/29/24 1105   Temp     Pulse 100 06/29/24 1110   Resp 12 06/29/24 1105   SpO2 94 % 06/29/24 1110   Vitals shown include unfiled device data.        Post Anesthesia Care and Evaluation    Level of consciousness: awake and alert  Pain management: adequate    Airway patency: patent  Anesthetic complications: No anesthetic complications  PONV Status: none  Cardiovascular status: acceptable  Respiratory status: acceptable  Hydration status: acceptable    Comments: BP 91/51 (BP Location: Left arm, Patient Position: Lying)   Pulse 90   Temp 36.3 °C (97.3 °F) (Oral)   Resp 12   Ht 177.8 cm (70\")   Wt 63.5 kg (140 lb)   SpO2 94%   BMI 20.09 kg/m²       "

## 2024-06-29 NOTE — PLAN OF CARE
Goal Outcome Evaluation:  Plan of Care Reviewed With: patient        Progress: no change  Outcome Evaluation: VSS, NPO for EGD/Colonoscopy, completed bowel prep, tap water enema X2, stool remains light brown, pleurx cath CDI, bed alarm on

## 2024-06-29 NOTE — PLAN OF CARE
Problem: Adult Inpatient Plan of Care  Goal: Plan of Care Review  Outcome: Ongoing, Progressing  Flowsheets (Taken 6/29/2024 6734)  Progress: no change  Plan of Care Reviewed With:   patient   spouse  Outcome Evaluation: vss. egd and colonoscopy completed this shift. formerly [facility] cdi.   Goal Outcome Evaluation:  Plan of Care Reviewed With: patient, spouse        Progress: no change  Outcome Evaluation: vss. egd and colonoscopy completed this shift. formerly [facility] cdi.plan for discharge home tomorrow

## 2024-06-29 NOTE — PROGRESS NOTES
Name: Mehreen Silva IV ADMIT: 2024   : 1940  PCP: Taiwo Powers MD    MRN: [id number] LOS: 8 days   AGE/SEX: 84 y.o. male  ROOM: [facility]     Subjective   Subjective   No chief complaint on file.    No CP SOA NV or abdominal pain.     Objective   Objective   Vital Signs  Temp:  [97.1 °F (36.2 °C)-97.3 °F (36.3 °C)] 97.3 °F (36.3 °C)  Heart Rate:  [] 63  Resp:  [12-19] 16  BP: ()/(51-68) 100/64  SpO2:  [94 %-100 %] 100 %  on  Flow (L/min):  [4-6] 4;   Device (Oxygen Therapy): room air  Body mass index is 20.09 kg/m².    Physical Exam  Vitals and nursing note reviewed.   Constitutional:       General: He is not in acute distress.     Appearance: He is ill-appearing (chronically). He is not diaphoretic.   Cardiovascular:      Rate and Rhythm: Normal rate. Rhythm irregular.   Pulmonary:      Effort: Pulmonary effort is normal.      Breath sounds: No wheezing.   Abdominal:      General: There is no distension.      Palpations: Abdomen is soft.      Tenderness: There is no abdominal tenderness. There is no guarding or rebound.   Musculoskeletal:      Comments: Right AKA   Skin:     General: Skin is warm and dry.      Comments: Right pleurx catheter   Neurological:      Mental Status: He is alert. Mental status is at baseline.   Psychiatric:         Mood and Affect: Mood normal.         Behavior: Behavior normal.       Results Review  I reviewed the patient's new clinical results.    Results from last 7 days   Lab Units 24  0512 24  1354 24  0415 24  0345   WBC 10*3/mm3 10.28 9.45 8.36 8.60   HEMOGLOBIN g/dL 8.4* 8.0* 7.6* 7.9*   PLATELETS 10*3/mm3 243 228 222 219     Results from last 7 days   Lab Units 24  0512 24  1354 24  0415 24  0345   SODIUM mmol/L 138 139 143 142   POTASSIUM mmol/L 4.0 4.0 4.2 3.3*   CHLORIDE mmol/L 106 105 109* 110*   CO2 mmol/L 21.0* 25.0 27.0 23.4   BUN mg/dL 20 24* 30* 30*   CREATININE mg/dL 0.98 1.19 1.13 1.18  "  GLUCOSE mg/dL 88 95 87 79   EGFR mL/min/1.73 76.0 60.2 64.1 60.8     Results from last 7 days   Lab Units 06/29/24  0512 06/28/24  1354 06/27/24  0415 06/26/24  0345   ALBUMIN g/dL 2.5* 2.4* 2.4* 2.3*     Results from last 7 days   Lab Units 06/29/24  0512 06/28/24  1354 06/27/24  0415 06/26/24  0345   CALCIUM mg/dL 8.5* 7.9* 8.1* 8.1*   ALBUMIN g/dL 2.5* 2.4* 2.4* 2.3*   MAGNESIUM mg/dL 3.6* 1.5* 1.8 1.6   PHOSPHORUS mg/dL 2.4* 2.7 2.6 2.8     Results from last 7 days   Lab Units 06/23/24  0442   PROCALCITONIN ng/mL 0.23     No results found for: \"HGBA1C\", \"POCGLU\"      XR Chest 1 View    Result Date: 6/29/2024  No significant change.  This report was finalized on 6/29/2024 6:17 AM by Dr. Jun Maldonado M.D on Workstation: [id number] [id number]      XR Chest 1 View    Result Date: 6/28/2024  Right-sided chest tube. Stable small right hydropneumothorax ex vacuo. Unchanged peripheral linear opacities in the immediately adjacent right middle and right lower lobes, atelectasis versus infiltrate. No new focal consolidation. Stable enlarged cardiac silhouette. Partially visualized left reverse shoulder arthroplasty.  This report was finalized on 6/28/2024 7:57 AM by Dr. Spencer Wood M.D on Workstation: [id number]       I have personally reviewed all medications:  Scheduled Medications  bisacodyl, 10 mg, Rectal, Daily  cholecalciferol, 1,000 Units, Oral, Daily  collagenase, 1 Application, Topical, Q24H  empagliflozin, 10 mg, Oral, Daily  furosemide, 20 mg, Oral, Daily  gabapentin, 100 mg, Oral, Nightly  methIMAzole, 5 mg, Oral, Once per day on Monday Wednesday Friday  [Held by provider] metoprolol tartrate, 12.5 mg, Oral, Q12H  midodrine, 5 mg, Oral, BID AC  potassium chloride, 20 mEq, Oral, Daily  senna-docusate sodium, 2 tablet, Oral, BID  sodium chloride, 10 mL, Intravenous, Q12H  tamsulosin, 0.4 mg, Oral, Daily      Infusions  sodium chloride, 30 mL/hr, Last Rate: 30 mL/hr (06/29/24 0833)        Diet  Diet: Cardiac, " Diabetic; Healthy Heart (2-3 Na+); Consistent Carbohydrate; Fluid Consistency: Thin (IDDSI 0)    I have personally reviewed:  [x]  Laboratory   []  Microbiology   [x]  Radiology   [x]  EKG/Telemetry  []  Cardiology/Vascular   []  Pathology    []  Records       Assessment/Plan     Active Hospital Problems    Diagnosis  POA    **Pleural effusion [J90]  Unknown    CKD (chronic kidney disease) [N18.9]  Yes    Anemia [D64.9]  Unknown    Chronic heart failure with preserved ejection fraction (HFpEF) [I50.32]  Yes    Recurrent pleural effusion on right [J90]  Yes    Ulcer of left heel [L97.429]  Yes    Longstanding persistent atrial fibrillation [I48.11]  Yes    Hyperkalemia [E87.5]  Yes    Hyperthyroidism [E05.90]  Yes      Resolved Hospital Problems   No resolved problems to display.       84 y.o. male admitted with Pleural effusion.    Urinary retention/hydronephrosis/JOSEPH with hyperkalemia: Lopez placed.  Continued hydronephrosis but improved creatinine and potassium levels.  Continued lopez at discharge and outpatient cystoscopy planned. Urology and nephrology following.  Recurrent right pleural effusion/Pleurx: sp tPA and dornase treatments.  Pleurx to remain in place with MWF drainage. Thoracic surgery evaluated.  Acute on chronic anemia: Iron saturation 11%.  Ferritin elevated.  Sp EGD 06/29. Angiodysplastic lesion of stomach and duodenum treated with APC. Colonoscopy as well but poor visualization noted.GI following.  Chronic diastolic heart failure: Diuretics were held for JOSEPH.  Oral Lasix 20 mg resumed.  Monitoring.  Chronic A-fib: Eliquis held.  Metoprolol dose decreased and now held for bradycardia.  Digoxin stopped.  Cardiology following.  Chronic hypotension: Midodrine  History right chest cystic skin excision: Surgery evaluated.  Hyperthyroidism: On methimazole.  Repeat thyroid painel was ok.  PPX: SCD.  Resume AC when ok with GI.  Disposition: HH/possibly Sunday    Expected Discharge Date: 6/30/2024;  Expected Discharge Time:      Feliciano Torres MD  [facility]  06/29/24  15:08 EDT    Dictated portions of note using Dragon dictation software.  Copied text in this note has been reviewed by me and remains accurate as of 06/29/24

## 2024-06-29 NOTE — PROGRESS NOTES
"RENAL/KCC:     LOS: 8 days    Patient Care Team:  Taiwo Powers MD as PCP - General (Internal Medicine)  Self, Bess YO MD as Consulting Physician (Endocrinology)  Huang Chan MD as Cardiologist (Cardiology)    Chief Complaint:  JOSEPH    Subjective     Interval History:   Chart reviewed  No complaints this morning  Adequate UOP    Objective     Vital Sign Min/Max for last 24 hours  Temp  Min: 97.2 °F (36.2 °C)  Max: 97.4 °F (36.3 °C)   BP  Min: 98/53  Max: 120/62   Pulse  Min: 60  Max: 75   Resp  Min: 16  Max: 16   SpO2  Min: 99 %  Max: 100 %   No data recorded   No data recorded     Flowsheet Rows      Flowsheet Row First Filed Value   Admission Height 177.8 cm (70\") Documented at 06/21/2024 1255   Admission Weight 63.5 kg (140 lb) Documented at 06/21/2024 1255            I/O this shift:  In: -   Out: 1150 [Urine:1150]  I/O last 3 completed shifts:  In: 720 [P.O.:720]  Out: 4010 [Urine:3700; Chest Tube:310]    Physical Exam:  GEN: Awake, NAD  ENT: PERRL, EOMI, MMM  NECK: Supple, no JVD  CHEST: CTAB, no W/R/C  CV: RRR, no M/G/R  ABD: Soft, NT, +BS  SKIN: Warm and Dry  NEURO: CN's intact      WBC WBC   Date Value Ref Range Status   06/29/2024 10.28 3.40 - 10.80 10*3/mm3 Final   06/28/2024 9.45 3.40 - 10.80 10*3/mm3 Final   06/27/2024 8.36 3.40 - 10.80 10*3/mm3 Final      HGB Hemoglobin   Date Value Ref Range Status   06/29/2024 8.4 (L) 13.0 - 17.7 g/dL Final   06/28/2024 8.0 (L) 13.0 - 17.7 g/dL Final   06/27/2024 7.6 (L) 13.0 - 17.7 g/dL Final      HCT Hematocrit   Date Value Ref Range Status   06/29/2024 27.0 (L) 37.5 - 51.0 % Final   06/28/2024 25.8 (L) 37.5 - 51.0 % Final   06/27/2024 24.8 (L) 37.5 - 51.0 % Final      Platlets No results found for: \"LABPLAT\"   MCV MCV   Date Value Ref Range Status   06/29/2024 79.6 79.0 - 97.0 fL Final   06/28/2024 80.9 79.0 - 97.0 fL Final   06/27/2024 81.8 79.0 - 97.0 fL Final          Sodium Sodium   Date Value Ref Range Status   06/28/2024 139 136 - 145 mmol/L " "Final   06/27/2024 143 136 - 145 mmol/L Final      Potassium Potassium   Date Value Ref Range Status   06/28/2024 4.0 3.5 - 5.2 mmol/L Final   06/27/2024 4.2 3.5 - 5.2 mmol/L Final      Chloride Chloride   Date Value Ref Range Status   06/28/2024 105 98 - 107 mmol/L Final   06/27/2024 109 (H) 98 - 107 mmol/L Final      CO2 CO2   Date Value Ref Range Status   06/28/2024 25.0 22.0 - 29.0 mmol/L Final   06/27/2024 27.0 22.0 - 29.0 mmol/L Final      BUN BUN   Date Value Ref Range Status   06/28/2024 24 (H) 8 - 23 mg/dL Final   06/27/2024 30 (H) 8 - 23 mg/dL Final      Creatinine Creatinine   Date Value Ref Range Status   06/28/2024 1.19 0.76 - 1.27 mg/dL Final   06/27/2024 1.13 0.76 - 1.27 mg/dL Final      Calcium Calcium   Date Value Ref Range Status   06/28/2024 7.9 (L) 8.6 - 10.5 mg/dL Final   06/27/2024 8.1 (L) 8.6 - 10.5 mg/dL Final      PO4 No results found for: \"CAPO4\"   Albumin Albumin   Date Value Ref Range Status   06/28/2024 2.4 (L) 3.5 - 5.2 g/dL Final   06/27/2024 2.4 (L) 3.5 - 5.2 g/dL Final      Magnesium Magnesium   Date Value Ref Range Status   06/28/2024 1.5 (L) 1.6 - 2.4 mg/dL Final   06/27/2024 1.8 1.6 - 2.4 mg/dL Final      Uric Acid No results found for: \"URICACID\"        Results Review:     I reviewed the patient's new clinical results.    bisacodyl, 10 mg, Rectal, Daily  cholecalciferol, 1,000 Units, Oral, Daily  collagenase, 1 Application, Topical, Q24H  empagliflozin, 10 mg, Oral, Daily  furosemide, 20 mg, Oral, Daily  gabapentin, 100 mg, Oral, Nightly  methIMAzole, 5 mg, Oral, Once per day on Monday Wednesday Friday  [Held by provider] metoprolol tartrate, 12.5 mg, Oral, Q12H  midodrine, 5 mg, Oral, BID AC  potassium chloride, 20 mEq, Oral, Daily  senna-docusate sodium, 2 tablet, Oral, BID  sodium chloride, 10 mL, Intravenous, Q12H  tamsulosin, 0.4 mg, Oral, Daily           Medication Review: Reviewed    Assessment & Plan     JOSEPH, predominantly obstructive.  Much improved after Whitehead placement " but CT still showed hydronephrosis.  No immediate plans for intervention since his renal function is improving.  Repeat imaging with urology follow-up as an outpatient  Severe hyperkalemia, improved with treatment  Urinary retention, improved post Whitehead  Severe anemia, stabilizing  Recurrent right sided pleural effusion   Hypotension on midodrine  CHF.  Volume status somewhat brittle     Plan: Cr improved to 0.98.  Lytes OK.  Lasix, KCl and Jardiance resumed.  On Midodrine for BP support.  F/U with  outpatient.  Will follow.    Rk Raines MD  [facility]  06/29/24  06:35 EDT

## 2024-06-30 ENCOUNTER — APPOINTMENT (OUTPATIENT)
Dept: GENERAL RADIOLOGY | Facility: HOSPITAL | Age: 84
DRG: 186 | End: 2024-06-30
Payer: MEDICARE

## 2024-06-30 ENCOUNTER — READMISSION MANAGEMENT (OUTPATIENT)
Dept: CALL CENTER | Facility: HOSPITAL | Age: 84
End: 2024-06-30
Payer: MEDICARE

## 2024-06-30 VITALS
BODY MASS INDEX: 20.04 KG/M2 | SYSTOLIC BLOOD PRESSURE: 94 MMHG | RESPIRATION RATE: 16 BRPM | HEIGHT: 70 IN | HEART RATE: 60 BPM | DIASTOLIC BLOOD PRESSURE: 49 MMHG | WEIGHT: 140 LBS | OXYGEN SATURATION: 100 % | TEMPERATURE: 98 F

## 2024-06-30 LAB
ALBUMIN SERPL-MCNC: 2.3 G/DL (ref 3.5–5.2)
ANION GAP SERPL CALCULATED.3IONS-SCNC: 10.4 MMOL/L (ref 5–15)
BUN SERPL-MCNC: 16 MG/DL (ref 8–23)
BUN/CREAT SERPL: 15.5 (ref 7–25)
CALCIUM SPEC-SCNC: 8.2 MG/DL (ref 8.6–10.5)
CHLORIDE SERPL-SCNC: 105 MMOL/L (ref 98–107)
CO2 SERPL-SCNC: 23.6 MMOL/L (ref 22–29)
CREAT SERPL-MCNC: 1.03 MG/DL (ref 0.76–1.27)
DEPRECATED RDW RBC AUTO: 47.4 FL (ref 37–54)
EGFRCR SERPLBLD CKD-EPI 2021: 71.6 ML/MIN/1.73
ERYTHROCYTE [DISTWIDTH] IN BLOOD BY AUTOMATED COUNT: 16.1 % (ref 12.3–15.4)
GLUCOSE SERPL-MCNC: 91 MG/DL (ref 65–99)
HCT VFR BLD AUTO: 26.3 % (ref 37.5–51)
HGB BLD-MCNC: 8.2 G/DL (ref 13–17.7)
MAGNESIUM SERPL-MCNC: 2.6 MG/DL (ref 1.6–2.4)
MCH RBC QN AUTO: 25.3 PG (ref 26.6–33)
MCHC RBC AUTO-ENTMCNC: 31.2 G/DL (ref 31.5–35.7)
MCV RBC AUTO: 81.2 FL (ref 79–97)
PHOSPHATE SERPL-MCNC: 3 MG/DL (ref 2.5–4.5)
PLATELET # BLD AUTO: 231 10*3/MM3 (ref 140–450)
PMV BLD AUTO: 10.4 FL (ref 6–12)
POTASSIUM SERPL-SCNC: 3.9 MMOL/L (ref 3.5–5.2)
RBC # BLD AUTO: 3.24 10*6/MM3 (ref 4.14–5.8)
SODIUM SERPL-SCNC: 139 MMOL/L (ref 136–145)
WBC NRBC COR # BLD AUTO: 9.72 10*3/MM3 (ref 3.4–10.8)

## 2024-06-30 PROCEDURE — 83735 ASSAY OF MAGNESIUM: CPT | Performed by: INTERNAL MEDICINE

## 2024-06-30 PROCEDURE — 85027 COMPLETE CBC AUTOMATED: CPT | Performed by: INTERNAL MEDICINE

## 2024-06-30 PROCEDURE — 71045 X-RAY EXAM CHEST 1 VIEW: CPT

## 2024-06-30 PROCEDURE — 80069 RENAL FUNCTION PANEL: CPT | Performed by: INTERNAL MEDICINE

## 2024-06-30 RX ORDER — PANTOPRAZOLE SODIUM 40 MG/1
40 TABLET, DELAYED RELEASE ORAL
Status: DISCONTINUED | OUTPATIENT
Start: 2024-06-30 | End: 2024-06-30 | Stop reason: HOSPADM

## 2024-06-30 RX ORDER — PANTOPRAZOLE SODIUM 40 MG/1
40 TABLET, DELAYED RELEASE ORAL
Qty: 60 TABLET | Refills: 0 | Status: SHIPPED | OUTPATIENT
Start: 2024-06-30

## 2024-06-30 RX ORDER — TAMSULOSIN HYDROCHLORIDE 0.4 MG/1
0.4 CAPSULE ORAL NIGHTLY
Qty: 30 CAPSULE | Refills: 0 | Status: SHIPPED | OUTPATIENT
Start: 2024-06-30

## 2024-06-30 RX ADMIN — MIDODRINE HYDROCHLORIDE 5 MG: 5 TABLET ORAL at 06:43

## 2024-06-30 RX ADMIN — TAMSULOSIN HYDROCHLORIDE 0.4 MG: 0.4 CAPSULE ORAL at 08:43

## 2024-06-30 RX ADMIN — EMPAGLIFLOZIN 10 MG: 10 TABLET, FILM COATED ORAL at 08:44

## 2024-06-30 RX ADMIN — POTASSIUM CHLORIDE 20 MEQ: 750 TABLET, EXTENDED RELEASE ORAL at 08:44

## 2024-06-30 RX ADMIN — Medication 1000 UNITS: at 08:45

## 2024-06-30 RX ADMIN — Medication 10 ML: at 08:45

## 2024-06-30 RX ADMIN — METOPROLOL TARTRATE 12.5 MG: 25 TABLET, FILM COATED ORAL at 08:44

## 2024-06-30 RX ADMIN — COLLAGENASE SANTYL 1 APPLICATION: 250 OINTMENT TOPICAL at 08:43

## 2024-06-30 RX ADMIN — FUROSEMIDE 20 MG: 20 TABLET ORAL at 08:43

## 2024-06-30 NOTE — NURSING NOTE
Pt refused morning labs after much encouragement. Explained importance of compliance and adherence to treatment plan. Pt verbalized understanding and continued to refuse. Will ask lab to return in a few hours.

## 2024-06-30 NOTE — CASE MANAGEMENT/SOCIAL WORK
Case Management Discharge Note      Final Note: Home w/HH         Selected Continued Care - Discharged on 6/30/2024 Admission date: 6/21/2024 - Discharge disposition: Home-Health Care Svc      Destination    No services have been selected for the patient.                Durable Medical Equipment    No services have been selected for the patient.                Dialysis/Infusion    No services have been selected for the patient.                Home Medical Care    No services have been selected for the patient.                Therapy    No services have been selected for the patient.                Community Resources    No services have been selected for the patient.                Community & DME    No services have been selected for the patient.                    Selected Continued Care - Prior Encounters Includes continued care and service providers with selected services from prior encounters from 3/23/2024 to 6/30/2024      Discharged on 5/6/2024 Admission date: 4/27/2024 - Discharge disposition: Home-Health Care [facility]      [place] Medical Care       Service Provider Selected Services Address Phone Fax Patient Preferred    [facility] [address] , [address], [facility] [address] [phone] --                               Final Discharge Disposition Code: 06 - home with home health care

## 2024-06-30 NOTE — PLAN OF CARE
Goal Outcome Evaluation:  Plan of Care Reviewed With: (P) patient        Progress: (P) improving  Outcome Evaluation: (P) VSS. room air. AOx3 time. no pain. sleeping between care. Possible discharge today

## 2024-06-30 NOTE — PROGRESS NOTES
Tele reviewed, some low HR in the 40s, but his trend today is mostly 50-60s. Will change metoprolol to qday. Otherwise continue current regimen, okay for d/c from CV standpoint. Will need to resume apixaban when okay with GI.    Add: s/w Dr Cuevas. 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  I would recommend starting PPI as well.

## 2024-06-30 NOTE — PROGRESS NOTES
"RENAL/KCC:     LOS: 9 days    Patient Care Team:  Taiwo Powers MD as PCP - General (Internal Medicine)  Self, Bess YO MD as Consulting Physician (Endocrinology)  Huang Chan MD as Cardiologist (Cardiology)    Chief Complaint:  JOSEPH    Subjective     Interval History:   Chart reviewed  No complaints this morning  Adequate UOP    Objective     Vital Sign Min/Max for last 24 hours  Temp  Min: 97.1 °F (36.2 °C)  Max: 97.6 °F (36.4 °C)   BP  Min: 88/55  Max: 116/98   Pulse  Min: 62  Max: 114   Resp  Min: 12  Max: 19   SpO2  Min: 94 %  Max: 100 %   Flow (L/min)  Min: 4  Max: 6   No data recorded     Flowsheet Rows      Flowsheet Row First Filed Value   Admission Height 177.8 cm (70\") Documented at 06/21/2024 1255   Admission Weight 63.5 kg (140 lb) Documented at 06/21/2024 1255            I/O this shift:  In: 40 [P.O.:40]  Out: 650 [Urine:650]  I/O last 3 completed shifts:  In: 1860 [P.O.:960; I.V.:900]  Out: 3300 [Urine:3050; Chest Tube:250]    Physical Exam:  GEN: Awake, NAD  ENT: PERRL, EOMI, MMM  NECK: Supple, no JVD  CHEST: CTAB, no W/R/C  CV: RRR, no M/G/R  ABD: Soft, NT, +BS  SKIN: Warm and Dry  NEURO: CN's intact      WBC WBC   Date Value Ref Range Status   06/29/2024 10.28 3.40 - 10.80 10*3/mm3 Final   06/28/2024 9.45 3.40 - 10.80 10*3/mm3 Final      HGB Hemoglobin   Date Value Ref Range Status   06/29/2024 8.4 (L) 13.0 - 17.7 g/dL Final   06/28/2024 8.0 (L) 13.0 - 17.7 g/dL Final      HCT Hematocrit   Date Value Ref Range Status   06/29/2024 27.0 (L) 37.5 - 51.0 % Final   06/28/2024 25.8 (L) 37.5 - 51.0 % Final      Platlets No results found for: \"LABPLAT\"   MCV MCV   Date Value Ref Range Status   06/29/2024 79.6 79.0 - 97.0 fL Final   06/28/2024 80.9 79.0 - 97.0 fL Final          Sodium Sodium   Date Value Ref Range Status   06/29/2024 138 136 - 145 mmol/L Final   06/28/2024 139 136 - 145 mmol/L Final      Potassium Potassium   Date Value Ref Range Status   06/29/2024 4.0 3.5 - 5.2 mmol/L Final " "  06/28/2024 4.0 3.5 - 5.2 mmol/L Final      Chloride Chloride   Date Value Ref Range Status   06/29/2024 106 98 - 107 mmol/L Final   06/28/2024 105 98 - 107 mmol/L Final      CO2 CO2   Date Value Ref Range Status   06/29/2024 21.0 (L) 22.0 - 29.0 mmol/L Final   06/28/2024 25.0 22.0 - 29.0 mmol/L Final      BUN BUN   Date Value Ref Range Status   06/29/2024 20 8 - 23 mg/dL Final   06/28/2024 24 (H) 8 - 23 mg/dL Final      Creatinine Creatinine   Date Value Ref Range Status   06/29/2024 0.98 0.76 - 1.27 mg/dL Final   06/28/2024 1.19 0.76 - 1.27 mg/dL Final      Calcium Calcium   Date Value Ref Range Status   06/29/2024 8.5 (L) 8.6 - 10.5 mg/dL Final   06/28/2024 7.9 (L) 8.6 - 10.5 mg/dL Final      PO4 No results found for: \"CAPO4\"   Albumin Albumin   Date Value Ref Range Status   06/29/2024 2.5 (L) 3.5 - 5.2 g/dL Final   06/28/2024 2.4 (L) 3.5 - 5.2 g/dL Final      Magnesium Magnesium   Date Value Ref Range Status   06/29/2024 3.6 (H) 1.6 - 2.4 mg/dL Final   06/28/2024 1.5 (L) 1.6 - 2.4 mg/dL Final      Uric Acid No results found for: \"URICACID\"        Results Review:     I reviewed the patient's new clinical results.    bisacodyl, 10 mg, Rectal, Daily  cholecalciferol, 1,000 Units, Oral, Daily  collagenase, 1 Application, Topical, Q24H  empagliflozin, 10 mg, Oral, Daily  furosemide, 20 mg, Oral, Daily  gabapentin, 100 mg, Oral, Nightly  methIMAzole, 5 mg, Oral, Once per day on Monday Wednesday Friday  metoprolol tartrate, 12.5 mg, Oral, Q12H  midodrine, 5 mg, Oral, BID AC  potassium chloride, 20 mEq, Oral, Daily  senna-docusate sodium, 2 tablet, Oral, BID  sodium chloride, 10 mL, Intravenous, Q12H  tamsulosin, 0.4 mg, Oral, Daily      sodium chloride, 30 mL/hr, Last Rate: 30 mL/hr (06/29/24 0833)        Medication Review: Reviewed    Assessment & Plan     JOSEPH, predominantly obstructive.  Much improved after Whitehead placement but CT still showed hydronephrosis.  No immediate plans for intervention since his renal " function is improving.  Repeat imaging with urology follow-up as an outpatient  Severe hyperkalemia, improved with treatment  Urinary retention, improved post Whitehead  Severe anemia, stabilizing  Recurrent right sided pleural effusion   Hypotension on midodrine  CHF.  Volume status somewhat brittle     Plan: BMP pending this AM.  Cr improved to 0.98 yesterday.  Lytes have been OK.  Lasix, KCl and Jardiance resumed.  On Midodrine for BP support.  F/U with  outpatient.  Will follow.    Rk Raines MD  [facility]  06/30/24  06:10 EDT

## 2024-06-30 NOTE — CASE MANAGEMENT/SOCIAL WORK
Continued Stay Note  [facility]     Patient Name: Mehreen Silva IV  MRN: [id number]  Today's Date: 6/30/2024    Admit Date: 6/21/2024    Plan: Home with    Discharge Plan       Row Name 06/30/24 1213       Plan    Plan Comments [facility] notified that pt is discharging today and will need his Pleurex cath drained on M/W/F. Nikki davenport/ Ilia notified and aware of the Pleurex cath orders... RN updated                   Discharge Codes    No documentation.                 Expected Discharge Date and Time       Expected Discharge Date Expected Discharge Time    Jun 30, 2024               Danni Dunn RN

## 2024-06-30 NOTE — DISCHARGE SUMMARY
Date of Admission: 6/21/2024  Date of Discharge:  6/30/2024  Primary Care Physician: Taiwo Powers MD     Discharge Diagnosis:  Active Hospital Problems    Diagnosis  POA    **Pleural effusion [J90]  Yes    CKD (chronic kidney disease) [N18.9]  Yes    Anemia [D64.9]  Yes    Chronic heart failure with preserved ejection fraction (HFpEF) [I50.32]  Yes    Recurrent pleural effusion on right [J90]  Yes    Ulcer of left heel [L97.429]  Yes    Longstanding persistent atrial fibrillation [I48.11]  Yes    Hyperkalemia [E87.5]  Yes    Hyperthyroidism [E05.90]  Yes      Resolved Hospital Problems   No resolved problems to display.       Presenting Problem/History of Present Illness from H&P:  Pleural effusion [J90]  CKD (chronic kidney disease) [N18.9]     Source of information review of records discussion with caring nurse at the preop holding.  Patient himself is unable to give much account of his symptoms.  He is able to converse but cannot recall why he is in the hospital and was surprised that he was having his Pleurx catheter to be removed earlier today.  Patient is 84-year-old male who has complicated past medical history including history of recurrent right-sided pleural effusion which was thought to be related to his heart failure.  Patient underwent CT-guided chest tube placement in February 2024 and has subsequently recommended to have Pleurx catheter placement which was done in April 2024.  Since his Pleurx catheter placement by output from his Pleurx catheter has significantly decreased and at the latest visit with the thoracic surgery service on 6/13/2024 patient received tPA administered through his Pleurx catheter to improve drainage and eventually plan was made for him to have his Pleurx catheter removed.  Patient was brought in today for scheduled removal of his fluids catheter but in the preop nursing assessment it was found that he is taking his Eliquis earlier today.  His surgery was  canceled.  Routine preop labs revealed significant abnormalities including hemoglobin of 7.3 down from 10.5 about a month and a half ago with repeat lab work showing hemoglobin dropped down to 6.6.  Patient was also noted to have potassium of 5.6 and repeat test few hours later showed potassium of 6.2.  His kidney function is also deteriorated with creatinine rising from 0.72 on 5/6/2024-2.75 on 6/21/2024.  Patient himself is very tired and unable to give detailed symptoms but denies any hematemesis or melena or any abdominal pain or difficulty in urination.  Evaluation did reveal distended suprapubic mass.  Patient has right  AKA.  He also has superficial wound on his left heel.  He recently had a procedure on his right upper chest with a healing wound.  His right Pleurx catheter is in place and currently dressed.  Patient is being admitted for management of acute anemia and acute renal failure with hyperkalemia.  Patient has received stat treatment for hyperkalemia and is awaiting repeat lab work to see the impact of the treatment.  Patient does not have any rhythm abnormalities or bradycardia.  His past medical history is remarkable for diastolic congestive heart failure with recurrent pleural effusion, history of right AKA, atrial fibrillation on chronic anticoagulation therapy, hypothyroidism, ulcer of the left heel, osteoarthritis, malnutrition and ongoing decubitus ulcer due to immobility involving the sacral region.    Hospital Course:  The patient is a 84 y.o. male who presented with urinary retention hydronephrosis and JOSEPH with hyperkalemia.  He had a Pleurx catheter in place and initially had a plan to remove it on the day he was admitted.  He underwent evaluation by urology and nephrology services.  Whitehead was placed.  He had improvement in his creatinine and potassium levels.  He still has hydronephrosis.  He is going to keep his Whitehead and follow-up with urology in 1 week in clinic to monitor the  hydronephrosis and have office cystoscopy.    Thoracic surgery evaluated for his recurrent right pleural effusions and Pleurx.  He had loculated effusion likely related to poor lung expansion.  He did receive tPA/dornase treatments here.  He is doing well from a respiratory standpoint now and he is keeping the Pleurx.  They are planning on 3 times a week drainages as an outpatient.    He has chronic atrial fibrillation.  Digoxin was held initially for JOSEPH.  When his renal function improved it was resumed but he had bradycardia issues.  The metoprolol dose was decreased and digoxin has been stopped.  He also had the diuretics held for the JOSEPH initially.  He has chronic diastolic heart failure and is prone to volume overload.  Oral Lasix has been resumed.  Cardiology has been following .    He had acute on chronic anemia with an iron saturation of 11%.  Ferritin was elevated.  FOBT was positive.  He underwent EGD on 6/29 which showed an angiodysplastic lesion of stomach and duodenum.  These were treated with APC.  Colonoscopy was performed as well but there was not good visualization due to poor prep.  He is going to stay off his Eliquis for 1 week and resume that Saturday, July 7.  He can follow-up with GI in clinic.    Surgery evaluated for history of right chest cystic skin excision.  It was healing well.    He is on methimazole for hyperthyroidism.  Repeat thyroid panel here was okay.    Exam Today:  Constitutional:       General: He is not in acute distress.     Appearance: He is ill-appearing (chronically). He is not diaphoretic.   Cardiovascular:      Rate and Rhythm: Normal rate. Rhythm irregular.   Pulmonary:      Effort: Pulmonary effort is normal.      Breath sounds: No wheezing.   Abdominal:      General: There is no distension.      Palpations: Abdomen is soft.      Tenderness: There is no abdominal tenderness. There is no guarding or rebound.   Musculoskeletal:      Comments: Right AKA   Skin:      General: Skin is warm and dry.      Comments: Right pleurx catheter   Neurological:      Mental Status: He is alert. Mental status is at baseline.   Psychiatric:         Mood and Affect: Mood normal.         Behavior: Behavior normal.     Results:  US Renal  The right kidney measures 10.5 cm in length and  left kidney measures 10.9 cm in length.     There is moderate right-sided hydronephrosis, mild to moderate  left-sided hydronephrosis. Neither the right or left ureteral jet could  be documented. Urinary bladder appears well distended, there appears to  be a small amount of debris at the bladder base. No bladder stone.     The renal cortical echotexture appears slightly greater than anticipated  suggesting underlying medical renal disease. Few tiny renal cysts noted.  No suspicious renal lesion seen. The remainder is unremarkable.    CT Chest  1. Mild right sided loculated hydropneumothorax with decreased fluid  component and increased gas component with associated pleural  thickening. Could be empyema or malignant effusion if patient has a  known malignancy. Correlate with chest tube output.  2. Small left pleural effusion, slightly increased in the interval.  3. Heterogeneous peripheral opacities in the right lung, greatest in the  lower lung, mild increased. Opacities are nonspecific. Could be  multifocal pneumonia with follow-up to resolution recommended.  4. New centrilobular and tree-in-bud nodules in the superior segment  left lower lobe. Suspect infectious bronchiolitis.  5. Mild cardiomegaly.  6. Enlarged main pulmonary artery, can be seen with pulmonary artery  hypertension.    Additional findings described above that are stable     CT Abdomen/Pelvis  1. Moderately severe bilateral hydronephrosis and hydroureter with  urinary bladder wall thickening. The urinary bladder wall thickening  could be related to cystitis and/or infiltrative neoplasm. There could  be an element of bladder outlet obstruction as  well. Whitehead catheter is  seen in the urinary bladder which is only partially distended.  2. Areas of increased density in both lung bases as described. Right  chest tube is seen in the right base. Please additional dictation for  yesterday's CT scan of the chest.  3. Cholelithiasis.  4. Large amount of stool in the rectum.    CXR (6/30)  Mild right basilar atelectasis/infiltrate/effusion, similar to prior  exam.     Procedures Performed:  Procedure(s):  COLONOSCOPY to cecum  ESOPHAGOGASTRODUODENOSCOPY apc and cold biopsy  06/29 0953 Colonoscopy  06/29 0952 Upper GI Endoscopy    - Normal esophagus.   - A single non-bleeding angiodysplastic lesion in the stomach. Treated with argon plasma coagulation (APC).   - Chronic gastritis. Biopsied.   - Non-bleeding duodenal ulcer with no stigmata of bleeding.   - A single non-bleeding angiodysplastic lesion in the duodenum. Treated with argon plasma coagulation (APC).   - Await pathology results.   - Observe patient's clinical course.    - Stool in the rectum, in the recto-sigmoid colon, in the descending colon and in the transverse colon.   - No specimens collected.   - could consider repeat exam in future with several day prep, however the cause of the Heme + stools has been found on upper tract exam and treated.    Consults:   Consults       Date and Time Order Name Status Description    6/28/2024 11:51 AM Inpatient Cardiology Consult Completed     6/22/2024  2:56 PM Inpatient Urology Consult Completed     6/22/2024 10:14 AM Inpatient Gastroenterology Consult Completed     6/22/2024  6:52 AM Inpatient Nephrology Consult Completed              Discharge Disposition:  Home-Health Care [facility]    Discharge Medications:     Discharge Medications        New Medications        Instructions Start Date   pantoprazole 40 MG EC tablet  Commonly known as: PROTONIX   40 mg, Oral, 2 Times Daily Before Meals      tamsulosin 0.4 MG capsule 24 hr capsule  Commonly known as: FLOMAX   0.4 mg,  Oral, Nightly             Changes to Medications        Instructions Start Date   apixaban 5 MG tablet tablet  Commonly known as: Eliquis  What changed: These instructions start on July 7, 2024. If you are unsure what to do until then, ask your doctor or other care provider.   5 mg, Oral, Every 12 Hours   Start Date: July 7, 2024     metoprolol tartrate 25 MG tablet  Commonly known as: LOPRESSOR  What changed:   how much to take  when to take this   12.5 mg, Oral, 2 Times Daily             Continue These Medications        Instructions Start Date   cholecalciferol 25 MCG (1000 UT) tablet  Commonly known as: VITAMIN D3   1,000 Units, Oral, Daily      furosemide 40 MG tablet  Commonly known as: LASIX   20 mg, Oral, Daily      gabapentin 100 MG capsule  Commonly known as: NEURONTIN   100 mg, Oral, Every Night at Bedtime      Jardiance 10 MG tablet tablet  Generic drug: empagliflozin   10 mg, Oral, Daily      LORazepam 0.5 MG tablet  Commonly known as: ATIVAN   0.5 mg, Oral, Every 8 Hours PRN      methIMAzole 5 MG tablet  Commonly known as: TAPAZOLE   5 mg, Oral, 3 Times Weekly, Monday, Wednesday, and Friday       midodrine 5 MG tablet  Commonly known as: PROAMATINE   5 mg, Oral, 2 Times Daily Before Meals      multivitamin with minerals tablet tablet   1 tablet, Oral, Daily      potassium chloride 20 MEQ CR tablet  Commonly known as: KLOR-CON M20   20 mEq, Oral, Daily      Santyl 250 UNIT/GM ointment  Generic drug: collagenase   Apply 1 Application topically to the appropriate area as directed Daily. To sacrum             Stop These Medications      digoxin 125 MCG tablet  Commonly known as: LANOXIN              Discharge Diet:   Diet Instructions       Diet: Cardiac Diets; Healthy Heart (2-3 Na+); Thin (IDDSI 0)      Discharge Diet: Cardiac Diets    Cardiac Diet: Healthy Heart (2-3 Na+)    Fluid Consistency: Thin (IDDSI 0)            Activity at Discharge:   Activity Instructions       Activity as Tolerated               Follow-up Appointments:  Additional Instructions for the Follow-ups that You Need to Schedule       Ambulatory Referral to Home Health   As directed      Face to Face Visit Date: 6/30/2024   Follow-up provider for Plan of Care?: I treated the patient in an acute care facility and will not continue treatment after discharge.   Follow-up provider: TAIWO POWERS [[id number]]   Reason/Clinical Findings: Weakness, Pleurx, Whitehead   Describe mobility limitations that make leaving home difficult: See above   Nursing/Therapeutic Services Requested: Skilled Nursing Physical Therapy Occupational Therapy   Skilled nursing orders: Medication education CHF management Monthly catheter care   PT orders: Therapeutic exercise Strengthening   Occupational orders: Strengthening   Frequency: 1 Week 1        XR Chest 2 View    Jul 28, 2024 (Approximate)      Exam reason: Follow-up effusion, pleural catheter management.   Release to patient: Routine Release               Follow-up Information       Nataliya Noyola MD Follow up in 3 month(s).    Specialty: Thoracic Surgery  Why: & As needed, If symptoms worsen.   Please go to the [facility] radiology department 30 minutes prior to your appointment for a chest x-ray.  Contact information:  [address] [address]  [phone]               Huang Chan MD Follow up in 2 week(s).    Specialty: Cardiology  Why: We will call to make an appointment in the office  Contact information:  [address] [address]  [phone]               Taiwo Powers MD Follow up.    Specialty: Internal Medicine  Contact information:  [address] [address]  [phone]               Harpreet Clayton MD Follow up.    Specialties: Gastroenterology, Internal Medicine  Contact information:  [address] [facility]  [address]LO[address]  [place] [address]  [phone]               Johnson Kang Jr., MD Follow up in 1  week(s).    Specialty: Urology  Contact information:  [address] [facility] [address] [phone]                             Test Results Pending at Discharge:  Pending Labs       Order Current Status    Tissue Pathology Exam Collected (06/29/24 1011)             Feliciano Torres MD  06/30/24  10:51 EDT    Time Spent on Discharge Activities: >30 minutes    Dictated portions using Dragon dictation software.

## 2024-06-30 NOTE — OUTREACH NOTE
Prep Survey      Flowsheet Row Responses   [facility] facility patient discharged from? [facility]   Is LACE score < 7 ? No   Eligibility Readm Mgmt   Discharge diagnosis Pleural effusion, pleurx cath   Does the patient have one of the following disease processes/diagnoses(primary or secondary)? Other   Does the patient have Home health ordered? Yes   What is the Home health agency?  [facility]   Is there a DME ordered? No   Prep survey completed? Yes            Melissa ZAMARRIPA - Registered Nurse

## 2024-07-01 LAB
QT INTERVAL: 383 MS
QTC INTERVAL: 402 MS

## 2024-07-02 ENCOUNTER — TELEPHONE (OUTPATIENT)
Dept: CARDIOLOGY | Facility: CLINIC | Age: 84
End: 2024-07-02

## 2024-07-02 LAB
LAB AP CASE REPORT: NORMAL
PATH REPORT.FINAL DX SPEC: NORMAL
PATH REPORT.GROSS SPEC: NORMAL

## 2024-07-02 NOTE — TELEPHONE ENCOUNTER
Caller: ROMEO    Relationship: SPOUSE    Best call back number: [phone]         What was the call regarding: PT NEEDED A 2 WEEK HOSPITAL FOLLOW UP- FIRST AVAIL WAS  7/26/24 WITH SHALONDA

## 2024-07-03 ENCOUNTER — READMISSION MANAGEMENT (OUTPATIENT)
Dept: CALL CENTER | Facility: HOSPITAL | Age: 84
End: 2024-07-03
Payer: MEDICARE

## 2024-07-03 NOTE — OUTREACH NOTE
Medical Week 1 Survey      Flowsheet Row Responses   [facility] patient discharged from? [facility]   Does the patient have one of the following disease processes/diagnoses(primary or secondary)? Other   Week 1 attempt successful? Yes   Call start time 1439   Call end time 1447   Discharge diagnosis Pleural effusion, pleurx cath   Is patient permission given to speak with other caregiver? Yes   List who call center can speak with wife   Person spoke with today (if not patient) and relationship wife   Meds reviewed with patient/caregiver? Yes   Is the patient having any side effects they believe may be caused by any medication additions or changes? No   Does the patient have all medications ordered at discharge? Yes   Is the patient taking all medications as directed (includes completed medication regime)? Yes   Does the patient have a primary care provider?  Yes   Does the patient have an appointment with their PCP within 7 days of discharge? Yes   What is the Home health agency?  [facility]    Has home health visited the patient within 72 hours of discharge? Yes   Comments Wife reports that the pt has daily caregivers with him to assist the wife with pt's care. Pleurx catheter in place, managed by caregiver in home on MWF, wife reports. Whitehead catheter in place per wife's report. Pt is tolerating po intake and his appetite has increased, his wife reports. Pt unable to stand to monitor daily wts, wife reports. Pt has Left heel ulcer and sacral decubitus ulcer both present PTA, wife reports that the pt has appt for f/u care next week r/t these issues.   Did the patient receive a copy of their discharge instructions? Yes   Nursing interventions Reviewed instructions with patient   What is the patient's perception of their health status since discharge? Improving   Is the patient/caregiver able to teach back signs and symptoms related to disease process for when to call PCP? Yes   Is the patient/caregiver able  to teach back signs and symptoms related to disease process for when to call 911? Yes   Is the patient/caregiver able to teach back the hierarchy of who to call/visit for symptoms/problems? PCP, Specialist, Home health nurse, Urgent Care, ED, 911 Yes   If the patient is a current smoker, are they able to teach back resources for cessation? Not a smoker   Week 1 call completed? Yes   Revoked No further contact(revokes)-requires comment   Call end time 1447            Janna JAMES - Registered Nurse

## 2024-07-10 ENCOUNTER — TELEPHONE (OUTPATIENT)
Dept: SURGERY | Facility: CLINIC | Age: 84
End: 2024-07-10
Payer: MEDICARE

## 2024-07-10 NOTE — TELEPHONE ENCOUNTER
Caller: RicardoBelem    Relationship: Emergency Contact    Best call back number:     [phone]        What is the best time to reach you: ANY     Who are you requesting to speak with (clinical staff, provider,  specific staff member): CLINICAL     What was the call regarding: PATIENT WOULD LIKE TO KNOW WHEN TO GET XR AND IF IT IS SAFE TO GET AN XR CLOSE TO GETTING A CT.     Is it okay if the provider responds through MyChart: PATIENT WOULD LIKE A CALL BACK.

## 2024-07-11 NOTE — TELEPHONE ENCOUNTER
I spoke pt wife and gave her clarification on whether it was safe to have CXR performed and assured them it was okay per medical. Pt was agreeable and satisfied    DB 10:32  7/11/2024    Caller: Belem Silva    Relationship: Emergency Contact    Best call back number:     [phone]        What is the best time to reach you: ANY     Who are you requesting to speak with (clinical staff, provider,  specific staff member): CLINICAL     What was the call regarding: PATIENT WOULD LIKE TO KNOW WHEN TO GET XR AND IF IT IS SAFE TO GET AN XR CLOSE TO GETTING A CT.     Is it okay if the provider responds through MyChart: PATIENT WOULD LIKE A CALL BACK.

## 2024-07-25 ENCOUNTER — OFFICE VISIT (OUTPATIENT)
Dept: SURGERY | Facility: CLINIC | Age: 84
End: 2024-07-25
Payer: MEDICARE

## 2024-07-25 VITALS
TEMPERATURE: 97.7 F | BODY MASS INDEX: 20.04 KG/M2 | DIASTOLIC BLOOD PRESSURE: 64 MMHG | HEIGHT: 70 IN | SYSTOLIC BLOOD PRESSURE: 100 MMHG | WEIGHT: 140 LBS

## 2024-07-25 DIAGNOSIS — Z74.09 COMPLICATIONS OF IMMOBILITY: ICD-10-CM

## 2024-07-25 DIAGNOSIS — E44.0 MODERATE MALNUTRITION: ICD-10-CM

## 2024-07-25 DIAGNOSIS — L89.150 PRESSURE INJURY OF SACRAL REGION, UNSTAGEABLE: Primary | ICD-10-CM

## 2024-07-25 NOTE — PROGRESS NOTES
General Surgery Office Follow Up Note     History of Present Illness:    Mehreen Silva IV is a 84 y.o. male who presents for follow up for a sacral pressure wound.  He had been previously seen at the [facility] wound care center, last visit 4/17/2024, and was recommended to undergo Santyl and Vaseline gauze treatment for his wound.  He was sent over for surgical evaluation.  Given his limited mobility and moderate malnutrition of chronic illness I recommended he undergo local wound care.  He has done well over the past couple of months.  I last saw him in the hospital on 6/25/2024 when he was admitted for JOSEPH, right pleural effusion, and severe anemia.  I was asked to evaluate a wound on his right chest from excision of his epidermal inclusion cyst, as well as his sacral wound.  Both of the sites appeared to be healing, and I recommended continued local wound care for his sacral wound and no further management needed for his chest wound.    Interval history:  Patient is accompanied by his caregiver today who helps provide his history.  The patient has had no issues with his wounds, he continues to undergo 1-2 times per day dressing changes for his right sacral wound.  He has not had any fever or chills or any drainage.  He has not had any pain.  He has been improving in terms of his nutrition, eating 3 regular meals daily, drinking lots of water, including having weekly dinners at the club with his friends.  He has been pretty stable at around 147 pounds per his caregiver.     The patient has been draining his Pleurx catheter 3 times a week with approximately 100 cc of serous fluid noted at each drainage.    The patient saw urology yesterday and had a repeat CAT scan to evaluate bilateral hydroureteronephrosis.  He is wearing a Whitehead catheter today.  He has a lot of questions about plans for possible cystoscopy which according to his caregiver has been scheduled.    Past Medical History:   Moderate malnutrition of  chronic illness  Heart failure with preserved ejection fraction  Hypertension  Peripheral arterial disease  Atrial fibrillation  Acquired coagulopathy secondary to chronic anticoagulation for A-fib (on Eliquis)  Pulmonary hypertension  Pleural effusion  Pneumothorax  Cervical spondylosis with myelopathy  Lumbar radiculopathy  Arthritis  Thyroid disease  Hyperlipidemia  Cataracts  Hepatitis B     Past Surgical History:    Right VATS, partial decortication, Pleurx catheter placement 4/30/2024  Right AKA 12/13/2023  Appendectomy  Colonoscopy  Excision of soft tissue neoplasm on abdominal wall and left neck 6/8/2016 by Dr. Shaji Barahona Jr.  Back surgery x 4  Neck surgery  Shoulder replacement surgery  Rotator cuff surgery  Knee surgery for torn meniscus  Surgery for small bowel obstruction noted on his chart, but patient denies prior bowel surgery, stating he was managed nonoperatively with NPO status and his bowel obstruction resolved     Family History:    Father had heart disease and AAA  Mother had unspecified cancer     Social History:    Denies alcohol, tobacco, or recreational drug use  Former smoker    Allergies:  No Known Allergies    Meds:    Current Outpatient Medications:     apixaban (Eliquis) 5 MG tablet tablet, Take 1 tablet by mouth Every 12 (Twelve) Hours., Disp: , Rfl:     cholecalciferol (VITAMIN D3) 25 MCG (1000 UT) tablet, Take 1 tablet by mouth Daily., Disp: , Rfl:     furosemide (LASIX) 40 MG tablet, Take 0.5 tablets by mouth Daily., Disp: 45 tablet, Rfl: 1    gabapentin (NEURONTIN) 100 MG capsule, Take 1 capsule by mouth every night at bedtime., Disp: , Rfl:     Jardiance 10 MG tablet tablet, TAKE 1 TABLET BY MOUTH DAILY, Disp: 90 tablet, Rfl: 1    LORazepam (ATIVAN) 0.5 MG tablet, Take 1 tablet by mouth Every 8 (Eight) Hours As Needed for Anxiety., Disp: , Rfl:     methIMAzole (TAPAZOLE) 5 MG tablet, Take 1 tablet by mouth 3 (Three) Times a Week. Monday, Wednesday, and Friday, Disp: , Rfl:     " metoprolol tartrate (LOPRESSOR) 25 MG tablet, Take 0.5 tablets by mouth 2 (Two) Times a Day., Disp: 30 tablet, Rfl: 0    midodrine (PROAMATINE) 5 MG tablet, Take 1 tablet by mouth 2 (Two) Times a Day Before Meals., Disp: 180 tablet, Rfl: 1    multivitamin with minerals tablet tablet, Take 1 tablet by mouth Daily., Disp: , Rfl:     pantoprazole (PROTONIX) 40 MG EC tablet, Take 1 tablet by mouth 2 (Two) Times a Day Before Meals., Disp: 60 tablet, Rfl: 0    potassium chloride (KLOR-CON M20) 20 MEQ CR tablet, Take 1 tablet by mouth Daily., Disp: 90 tablet, Rfl: 1    Santyl 250 UNIT/GM ointment, Apply 1 Application topically to the appropriate area as directed Daily. To sacrum, Disp: , Rfl:     tamsulosin (FLOMAX) 0.4 MG capsule 24 hr capsule, Take 1 capsule by mouth Every Night., Disp: 30 capsule, Rfl: 0    Physical Exam:   /64 (BP Location: Left arm, Patient Position: Sitting)   Temp 97.7 °F (36.5 °C)   Ht 177.8 cm (70\")   Wt 63.5 kg (140 lb)   BMI 20.09 kg/m²   Constitutional: Well-developed well-nourished   Eyes: Conjunctiva normal, sclera nonicteric  HENT: Hearing grossly normal, oral mucosa moist  Respiratory: Normal inspiratory effort on room air; right Pleurx catheter dressed with clean dry dressing, chest wall nontender  Cardiovascular: No peripheral edema; right AKA  Gastrointestinal: Abd nondistended  Psychiatric: Normal mood and affect  Skin: Right chest wound healed; right gluteal/sacral wound continues to contract with healthy appearing wound edges without evidence of infection, no drainage; wound pictured:      BMI is within normal parameters. No other follow-up for BMI required.      Assessment/Plan:  84-year-old gentleman with malnutrition and immobility presenting with right gluteal/sacral pressure wound    Wound continues to heal without evidence of infection.  It is much smaller than when he initially presented and fortunately he has a very attentive caregiver who helps provide offloading " and excellent wound care.  Continue daily dressing changes.  Patient also improving his p.o. intake and stable from a nutritional standpoint.  Follow-up in 1 month for recheck.    Discussed with patient that I would defer recommendations regarding Whitehead, cystoscopy, management of bilateral hydroureteronephrosis to urology.    Patient and caregiver voiced understanding and are agreeable with recommendations.    Yulia Wynn M.D.  General, Robotic and Endoscopic Surgery  [facility]    [address], [address]  [place], [place], [address]  P: [phone]  F: [phone]

## 2024-07-26 ENCOUNTER — TELEPHONE (OUTPATIENT)
Dept: SURGERY | Facility: CLINIC | Age: 84
End: 2024-07-26
Payer: MEDICARE

## 2024-07-26 ENCOUNTER — OFFICE VISIT (OUTPATIENT)
Dept: CARDIOLOGY | Facility: CLINIC | Age: 84
End: 2024-07-26
Payer: MEDICARE

## 2024-07-26 VITALS
HEART RATE: 70 BPM | OXYGEN SATURATION: 100 % | DIASTOLIC BLOOD PRESSURE: 58 MMHG | WEIGHT: 147 LBS | HEIGHT: 70 IN | SYSTOLIC BLOOD PRESSURE: 90 MMHG | BODY MASS INDEX: 21.05 KG/M2

## 2024-07-26 DIAGNOSIS — I31.39 PERICARDIAL EFFUSION: ICD-10-CM

## 2024-07-26 DIAGNOSIS — I95.1 ORTHOSTATIC HYPOTENSION: ICD-10-CM

## 2024-07-26 DIAGNOSIS — I36.1 NONRHEUMATIC TRICUSPID VALVE REGURGITATION: ICD-10-CM

## 2024-07-26 DIAGNOSIS — I34.0 NONRHEUMATIC MITRAL VALVE REGURGITATION: ICD-10-CM

## 2024-07-26 DIAGNOSIS — R00.1 BRADYCARDIA, SINUS: ICD-10-CM

## 2024-07-26 DIAGNOSIS — D64.89 ANEMIA DUE TO OTHER CAUSE, NOT CLASSIFIED: ICD-10-CM

## 2024-07-26 DIAGNOSIS — J90 RECURRENT PLEURAL EFFUSION ON RIGHT: ICD-10-CM

## 2024-07-26 DIAGNOSIS — I48.21 PERMANENT ATRIAL FIBRILLATION: ICD-10-CM

## 2024-07-26 DIAGNOSIS — Z01.810 ENCOUNTER FOR PRE-OPERATIVE CARDIOVASCULAR CLEARANCE: ICD-10-CM

## 2024-07-26 DIAGNOSIS — I50.32 CHRONIC HEART FAILURE WITH PRESERVED EJECTION FRACTION (HFPEF): Primary | ICD-10-CM

## 2024-07-26 DIAGNOSIS — I27.20 PULMONARY HYPERTENSION: ICD-10-CM

## 2024-07-26 RX ORDER — FUROSEMIDE 20 MG/1
20 TABLET ORAL DAILY
Qty: 90 TABLET | Refills: 2 | Status: SHIPPED | OUTPATIENT
Start: 2024-07-26

## 2024-07-26 NOTE — TELEPHONE ENCOUNTER
I called and left a voicemail to the pt to call me back regarding message about medications for [facility]. In my voicemail I stated that if he has any questions to calls us back. But I did state that he needs to contact [facility].

## 2024-07-26 NOTE — PROGRESS NOTES
Date of Office Visit: 2024  Encounter Provider: CHARLEE Lopez  Place of Service: [facility] CARDIOLOGY  Patient Name: Mehreen Silva IV  :1940  Primary Cardiologist: Dr. Huang Chan     Chief Complaint   Patient presents with    Congestive Heart Failure    Follow-up   :     HPI: Mehreen Silva IV is a 84 y.o. male who presents today for cardiac follow-up visit. I reviewed his medical records.    He has been diagnosed with thyroid disease, permanent atrial fibrillation (2022), and acute on chronic diastolic heart failure (2022).  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     He has known pleural effusions and acute on chronic diastolic heart failure.  He has a right Pleurx catheter and follows with thoracic surgery.  He developed permanent atrial fibrillation.  In 2024, echocardiogram  showed EF 70%, mild LVH, diastolic function indeterminate, left atrial cavity moderately dilated, moderate aortic calcification, mild to moderate mitral regurgitation, and small pericardial effusion.     In 2023, he underwent right above-the-knee amputation for osteomyelitis.  In 2024, he was admitted for pleural effusion with negative cytology.     In 2024, limited echocardiogram following: EF 62%, LV small in size, moderate LVH, diastolic function indeterminate, left atrium severely dilated with increased volume, right atrium borderline dilated, aortic valve sclerosis, moderate to severe mitral regurgitation, mild to moderate tricuspid regurgitation, moderate pulmonary hypertension, trivial pericardial effusion without tamponade, and large right pleural effusion.     In May 2024, he was readmitted for right pleural effusion and underwent right Barrx catheter placement.    In 2024, he was admitted to the hospital for urinary retention, hydronephrosis, JOSEPH with hyperkalemia and recurrent right pleural effusion.   He was bradycardic and digoxin was discontinued and metoprolol decreased.  He had acute on chronic anemia and EGD showed angiodysplastic lesion in the stomach and duodenum.    He presents today with his caregiver, Sebastian accompanying him.  He still has a right Pleurx catheter and is undergoing draining 3 times per week. He denies chest pain, shortness of air, PND, orthopnea, edema, palpitations, dizziness, or syncope.  Blood pressure is low today.  He appears to have lost weight.  His caregiver says he eats a really good meal in the morning and at dinner.  He does not eat much during the daytime.      He is scheduled to undergo a cystoscopy in the near future by Dr. Kang and the caregiver was asking about apixaban recommendations.  We have not received any correspondence from Dr. Kang's office.      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, 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/2023    Atrial fibrillation     Cervical spondylosis with myelopathy     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 12/13/2023    HLD (hyperlipidemia) 09/23/2022    Hx of toxic multinodular goiter 09/23/2022    Hyperthyroidism     Lumbar radiculopathy     Nonrheumatic mitral valve regurgitation 07/31/2024    Nonrheumatic tricuspid valve regurgitation 07/31/2024    Orthostatic hypotension     Osteoarthritis     Pressure injury of sacral region, unstageable 05/14/2024    Pulmonary hypertension     Recurrent pleural effusion on right 04/27/2024    Ulcer with gangrene     right heel       Past Surgical History:   Procedure Laterality Date    ABOVE KNEE AMPUTATION Right 12/13/2023    Procedure: ABOVE KNEE AMPUTATION RIGHT, proveena wound vac placement;  Surgeon: Issa Nieves MD;  Location: [facility];  Service: Vascular;  Laterality: Right;    APPENDECTOMY       BACK SURGERY      x4    COLONOSCOPY      COLONOSCOPY N/A 2024    Procedure: COLONOSCOPY to cecum;  Surgeon: Amos Cuevas MD;  Location: [facility] ENDOSCOPY;  Service: Gastroenterology;  Laterality: N/A;  pre heme pos stool  post poor prep    ENDOSCOPY N/A 2024    Procedure: ESOPHAGOGASTRODUODENOSCOPY apc and cold biopsy;  Surgeon: Amos Cuevas MD;  Location: [facility] ENDOSCOPY;  Service: Gastroenterology;  Laterality: N/A;  pre anemia  post duodenal/stomach ulcer angio dysplasia gastritis    EXCISION MASS TRUNK N/A 2016    Procedure: Excision soft tissue neoplasm on abdominal wall and left neck;  Surgeon: Shaji Barahona Jr., MD;  Location: [facility] MAIN OR;  Service:     KNEE SURGERY      NECK SURGERY  1974    PLEURAL CATHETER INSERTION Right 2024    Procedure: RIGHT VIDEO ASSISTED THORACOSCOPY, PARTIAL DECORTICATION, PLEURX CATHETER INSERTION;  Surgeon: Nataliya Noyola MD;  Location: [facility] MAIN OR;  Service: Thoracic;  Laterality: Right;    QUADRICEPS REPAIR      ROTATOR CUFF REPAIR      SHOULDER SURGERY      SMALL INTESTINE SURGERY      Bowel Obstruction       Social History     Socioeconomic History    Marital status:     Number of children: 1    Highest education level: Professional school degree (e.g., MD, DDS, DVM, BRITNEY)   Tobacco Use    Smoking status: Former     Current packs/day: 0.00     Types: Cigarettes     Quit date: 1983     Years since quittin.1     Passive exposure: Past    Smokeless tobacco: Never    Tobacco comments:        Vaping Use    Vaping status: Never Used   Substance and Sexual Activity    Alcohol use: Yes     Comment: SOCIAL; Patient is non drinker.    Drug use: No     Comment: Drug Abuse: Not a drug user    Sexual activity: Defer       Family History   Problem Relation Age of Onset    Cancer Mother     Heart disease Father     Aneurysm Father         Abdominal Aortic Aneurysm    Malig Hyperthermia Neg Hx        The following portion of the  patient's history were reviewed and updated as appropriate: past medical history, past surgical history, past social history, past family history, allergies, current medications, and problem list.    Review of Systems   Constitutional: Negative.   Cardiovascular: Negative.    Respiratory: Negative.     Hematologic/Lymphatic: Negative.    Neurological: Negative.        No Known Allergies      Current Outpatient Medications:     apixaban (Eliquis) 5 MG tablet tablet, Take 1 tablet by mouth Every 12 (Twelve) Hours., Disp: 180 tablet, Rfl: 3    cholecalciferol (VITAMIN D3) 25 MCG (1000 UT) tablet, Take 1 tablet by mouth Daily., Disp: , Rfl:     furosemide (LASIX) 20 MG tablet, Take 1 tablet by mouth Daily., Disp: 90 tablet, Rfl: 2    gabapentin (NEURONTIN) 100 MG capsule, Take 1 capsule by mouth every night at bedtime., Disp: , Rfl:     Jardiance 10 MG tablet tablet, TAKE 1 TABLET BY MOUTH DAILY, Disp: 90 tablet, Rfl: 1    LORazepam (ATIVAN) 0.5 MG tablet, Take 1 tablet by mouth Every 8 (Eight) Hours As Needed for Anxiety., Disp: , Rfl:     methIMAzole (TAPAZOLE) 5 MG tablet, Take 1 tablet by mouth 3 (Three) Times a Week. Monday, Wednesday, and Friday, Disp: , Rfl:     metoprolol tartrate (LOPRESSOR) 25 MG tablet, Take 0.5 tablets by mouth 2 (Two) Times a Day., Disp: 90 tablet, Rfl: 3    midodrine (PROAMATINE) 5 MG tablet, Take 1 tablet by mouth 2 (Two) Times a Day Before Meals., Disp: 180 tablet, Rfl: 1    multivitamin with minerals tablet tablet, Take 1 tablet by mouth Daily., Disp: , Rfl:     pantoprazole (PROTONIX) 40 MG EC tablet, Take 1 tablet by mouth 2 (Two) Times a Day Before Meals., Disp: 60 tablet, Rfl: 0    potassium chloride (KLOR-CON M20) 20 MEQ CR tablet, Take 1 tablet by mouth Daily., Disp: 90 tablet, Rfl: 1    Santyl 250 UNIT/GM ointment, Apply 1 Application topically to the appropriate area as directed Daily. To sacrum, Disp: , Rfl:     tamsulosin (FLOMAX) 0.4 MG capsule 24 hr capsule, Take 1 capsule  "by mouth Every Night., Disp: 30 capsule, Rfl: 0         Objective:     Vitals:    07/26/24 1412   BP: 90/58   BP Location: Left arm   Patient Position: Sitting   Cuff Size: Adult   Pulse: 70   SpO2: 100%   Weight: 66.7 kg (147 lb)   Height: 177.8 cm (70\")     Body mass index is 21.09 kg/m².    PHYSICAL EXAM:    Vitals Reviewed.   General Appearance: Thin.  Looks acutely ill.    HENT: No hearing loss noted.    Respiratory: No signs of respiratory distress. Respiration rhythm and depth normal.  Clear to auscultation.   Cardiovascular:  Jugular Venous Pressure: Normal  Heart Rate and Rhythm: Irregular, irregular.  Heart Sounds: Normal S1 and S2. No S3 or S4 noted.  Murmurs: No murmurs noted. No rubs, thrills, or gallops.   Lower Extremities: No edema noted on the left.  Musculoskeletal: Right above the knee amputation.  Skin: General appearance normal.    Psychiatric: Patient alert and oriented to person, place, and time. Speech and behavior appropriate. Normal mood and affect.       ECG 12 Lead    Date/Time: 7/26/2024 2:13 PM  Performed by: Clarita Armenta APRN    Authorized by: Clarita Armenta APRN  Comparison: compared with previous ECG from 7/1/2024  Similar to previous ECG  Rhythm: atrial fibrillation  Rate: normal  BPM: 70  Conduction: conduction normal  ST Segments: ST segments normal  T Waves: T waves normal  QRS axis: normal    Clinical impression: abnormal EKG  Comments: Not the best EKG.  Patient sitting up in wheelchair.  Artifact noted.            Assessment:       Diagnosis Plan   1. Chronic heart failure with preserved ejection fraction (HFpEF)        2. Permanent atrial fibrillation  ECG 12 Lead      3. Recurrent pleural effusion on right        4. Nonrheumatic mitral valve regurgitation        5. Nonrheumatic tricuspid valve regurgitation        6. Pulmonary hypertension        7. Pericardial effusion        8. Orthostatic hypotension        9. Bradycardia, sinus        10. Anemia due to other " cause, not classified        11. Encounter for pre-operative cardiovascular clearance               Plan:       1.  Acute on chronic diastolic heart failure.  Well compensated today.    2.  Permanent atrial fibrillation: Rate controlled on metoprolol tartrate.  [id number] score of 4 and remains on apixaban.    3.  Recurrent pleural effusion on right and Pleurx catheter placement.  Follows with thoracic surgery.    4-7.  Echocardiogram April 2024 showed the following:  moderate to severe mitral regurgitation, mild to moderate tricuspid regurgitation, moderate pulmonary hypertension, trivial pericardial effusion without tamponade, and large right pleural effusion.    8.  Orthostatic hypotension: Stable on midodrine.    9.  Sinus bradycardia: Heart rate is improved with lowering the dose of metoprolol and stopping digoxin during his last hospitalization.    10.  Acute anemia with evidence of gastritis, gastric and duodenal AVMs (status post APC) and duodenal ulcer.  His apixaban has been resumed.    11.  Status post right AKA in December 2023.    12.  Perioperative cardiac risk assessment: He is at moderate risk of cardiovascular events with cystoscopy.  He has permanent atrial fibrillation so he needs to hold his apixaban prior to surgery, he is at potential, nonmodifiable risk of stroke.  Patient verbalizes understanding.    13.  He will follow-up with Dr. Chan in 6 months.    As always, it has been a pleasure to participate in your patient's care. Thank you.         Sincerely,         CHARLEE Jackson  [facility] Cardiology      Dictated utilizing Dragon Dictation

## 2024-07-26 NOTE — TELEPHONE ENCOUNTER
Caller: Belem Silva    Relationship: Emergency Contact    Best call back number: [phone]    What is the best time to reach you: ANY    Who are you requesting to speak with (clinical staff, provider,  specific staff member): CLINICAL    Do you know the name of the person who called: PTS WIFE    What was the call regarding: PTS WIFE STATES THAT THE PT IS HAVING SURGERY ON THURSDAY AND SHE NEEDS TO KNOW IF HE NEEDS TO STOP THE ELIQUIS 72 HOURS BEFORE. PLEASE GIVE HER A CALL BACK TODAY SO THAT SHE KNOWS IF SHE NEEDS TO STOP MEDS ON MONDAY. THANK YOU    Is it okay if the provider responds through [facility]: NO

## 2024-07-26 NOTE — TELEPHONE ENCOUNTER
Pt wife called and stated that she wants to speak to Mary Augustin regarding Eliquis medication regarding a procedure. I spoke with Mary and she informed me pt wife needs to discuss this with the cardiology because they were the ones who sent the prescription in. Pt wife understood verbally.

## 2024-07-31 PROBLEM — I36.1 NONRHEUMATIC TRICUSPID VALVE REGURGITATION: Status: ACTIVE | Noted: 2024-07-31

## 2024-07-31 PROBLEM — I34.0 NONRHEUMATIC MITRAL VALVE REGURGITATION: Status: ACTIVE | Noted: 2024-07-31

## 2024-07-31 PROBLEM — I27.20 PULMONARY HYPERTENSION: Status: ACTIVE | Noted: 2024-07-31

## 2024-07-31 PROBLEM — I31.39 PERICARDIAL EFFUSION: Status: ACTIVE | Noted: 2024-07-31

## 2024-08-01 ENCOUNTER — PRE-ADMISSION TESTING (OUTPATIENT)
Dept: PREADMISSION TESTING | Facility: HOSPITAL | Age: 84
End: 2024-08-01
Payer: MEDICARE

## 2024-08-01 VITALS
BODY MASS INDEX: 21.05 KG/M2 | SYSTOLIC BLOOD PRESSURE: 86 MMHG | HEART RATE: 85 BPM | RESPIRATION RATE: 16 BRPM | HEIGHT: 70 IN | DIASTOLIC BLOOD PRESSURE: 55 MMHG | TEMPERATURE: 97.3 F | OXYGEN SATURATION: 96 % | WEIGHT: 147 LBS

## 2024-08-01 LAB
ALBUMIN SERPL-MCNC: 2.9 G/DL (ref 3.5–5.2)
ALBUMIN/GLOB SERPL: 0.9 G/DL
ALP SERPL-CCNC: 103 U/L (ref 39–117)
ALT SERPL W P-5'-P-CCNC: <5 U/L (ref 1–41)
ANION GAP SERPL CALCULATED.3IONS-SCNC: 6.5 MMOL/L (ref 5–15)
AST SERPL-CCNC: 10 U/L (ref 1–40)
BILIRUB SERPL-MCNC: 0.4 MG/DL (ref 0–1.2)
BUN SERPL-MCNC: 25 MG/DL (ref 8–23)
BUN/CREAT SERPL: 20.5 (ref 7–25)
CALCIUM SPEC-SCNC: 8.9 MG/DL (ref 8.6–10.5)
CHLORIDE SERPL-SCNC: 99 MMOL/L (ref 98–107)
CO2 SERPL-SCNC: 30.5 MMOL/L (ref 22–29)
CREAT SERPL-MCNC: 1.22 MG/DL (ref 0.76–1.27)
DEPRECATED RDW RBC AUTO: 48.7 FL (ref 37–54)
EGFRCR SERPLBLD CKD-EPI 2021: 58.5 ML/MIN/1.73
ERYTHROCYTE [DISTWIDTH] IN BLOOD BY AUTOMATED COUNT: 16.8 % (ref 12.3–15.4)
GLOBULIN UR ELPH-MCNC: 3.3 GM/DL
GLUCOSE SERPL-MCNC: 152 MG/DL (ref 65–99)
HCT VFR BLD AUTO: 25.4 % (ref 37.5–51)
HGB BLD-MCNC: 7.8 G/DL (ref 13–17.7)
INR PPP: 1.62 (ref 0.9–1.1)
MCH RBC QN AUTO: 25 PG (ref 26.6–33)
MCHC RBC AUTO-ENTMCNC: 30.7 G/DL (ref 31.5–35.7)
MCV RBC AUTO: 81.4 FL (ref 79–97)
PLATELET # BLD AUTO: 222 10*3/MM3 (ref 140–450)
PMV BLD AUTO: 9.9 FL (ref 6–12)
POTASSIUM SERPL-SCNC: 4.8 MMOL/L (ref 3.5–5.2)
PROT SERPL-MCNC: 6.2 G/DL (ref 6–8.5)
PROTHROMBIN TIME: 19.5 SECONDS (ref 11.7–14.2)
RBC # BLD AUTO: 3.12 10*6/MM3 (ref 4.14–5.8)
SODIUM SERPL-SCNC: 136 MMOL/L (ref 136–145)
WBC NRBC COR # BLD AUTO: 7.74 10*3/MM3 (ref 3.4–10.8)

## 2024-08-01 PROCEDURE — 85610 PROTHROMBIN TIME: CPT | Performed by: UROLOGY

## 2024-08-01 PROCEDURE — 85027 COMPLETE CBC AUTOMATED: CPT

## 2024-08-01 PROCEDURE — 36415 COLL VENOUS BLD VENIPUNCTURE: CPT

## 2024-08-01 PROCEDURE — 80053 COMPREHEN METABOLIC PANEL: CPT

## 2024-08-01 NOTE — DISCHARGE INSTRUCTIONS
Take the following medications the morning of surgery with a small sip of water: metoprolol, midodrine, pantoprazole    ARRIVE TO ENTRANCE C.      If you are on prescription narcotic pain medication to control your pain you may also take that medication the morning of surgery.    General Instructions:  Do not eat or drink anything after midnight the night before surgery.  Patients who avoid smoking, chewing tobacco and alcohol for 4 weeks prior to surgery have a reduced risk of post-operative complications.  Quit smoking as many days before surgery as you can.  Do not smoke, use chewing tobacco or drink alcohol the day of surgery.   If applicable bring your C-PAP/ BI-PAP machine in with you to preop day of surgery.  Bring any papers given to you in the doctor’s office.  Wear clean comfortable clothes.  Do not wear contact lenses, false eyelashes or make-up.  Bring a case for your glasses.   Bring crutches or walker if applicable.  Remove all piercings.  Leave jewelry and any other valuables at home.  Hair extensions with metal clips must be removed prior to surgery.  The Pre-Admission Testing nurse will instruct you to bring medications if unable to obtain an accurate list in Pre-Admission Testing.        Preventing a Surgical Site Infection:  For 2 to 3 days before surgery, avoid shaving with a razor because the razor can irritate skin and make it easier to develop an infection.    Any areas of open skin can increase the risk of a post-operative wound infection by allowing bacteria to enter and travel throughout the body.  Notify your surgeon if you have any skin wounds / rashes even if it is not near the expected surgical site.  The area will need assessed to determine if surgery should be delayed until it is healed.  The night prior to surgery shower using a fresh bar of anti-bacterial soap (such as Dial) and clean washcloth.  Sleep in a clean bed with clean clothing.  Do not allow pets to sleep with you.  Shower  on the morning of surgery using a fresh bar of anti-bacterial soap (such as Dial) and clean washcloth.  Dry with a clean towel and dress in clean clothing.  Ask your surgeon if you will be receiving antibiotics prior to surgery.  Make sure you, your family, and all healthcare providers clean their hands with soap and water or an alcohol based hand  before caring for you or your wound.    Day of surgery:  Your arrival time is approximately two hours before your scheduled surgery time.  Upon arrival, a Pre-op nurse and Anesthesiologist will review your health history, obtain vital signs, and answer questions you may have.  The only belongings needed at this time will be your home medications and if applicable your C-PAP/BI-PAP machine.  A Pre-op nurse will start an IV and you may receive medication in preparation for surgery, including something to help you relax.      Please be aware that surgery does come with discomfort.  We want to make every effort to control your discomfort so please discuss any uncontrolled symptoms with your nurse.   Your doctor will most likely have prescribed pain medications.      If you are going home after surgery you will receive individualized written care instructions before being discharged.  A responsible adult must drive you to and from the hospital on the day of your surgery and ideally stay with you through the night.  Discharge prescriptions can be filled by the hospital pharmacy during regular pharmacy hours.  If you are having surgery late in the day/evening your prescription may be e-prescribed to your pharmacy.  Please verify your pharmacy hours or chose a 24 hour pharmacy to avoid not having access to your prescription because your pharmacy has closed for the day.    If you are staying overnight following surgery, you will be transported to your hospital room following the recovery period.  [facility] has all private rooms.    If you have any  questions please call Pre-Admission Testing at [phone].  Deductibles and co-payments are collected on the day of service. Please be prepared to pay the required co-pay, deductible or deposit on the day of service as defined by your plan.    Call your surgeon immediately if you experience any of the following symptoms:  Sore Throat  Shortness of Breath or difficulty breathing  Cough  Chills  Body soreness or muscle pain  Headache  Fever  New loss of taste or smell  Do not arrive for your surgery ill.  Your procedure will need to be rescheduled to another time.  You will need to call your physician before the day of surgery to avoid any unnecessary exposure to hospital staff as well as other patients.

## 2024-08-08 ENCOUNTER — ANESTHESIA (OUTPATIENT)
Dept: PERIOP | Facility: HOSPITAL | Age: 84
End: 2024-08-08
Payer: MEDICARE

## 2024-08-08 ENCOUNTER — APPOINTMENT (OUTPATIENT)
Dept: GENERAL RADIOLOGY | Facility: HOSPITAL | Age: 84
End: 2024-08-08
Payer: MEDICARE

## 2024-08-08 ENCOUNTER — ANESTHESIA EVENT (OUTPATIENT)
Dept: PERIOP | Facility: HOSPITAL | Age: 84
End: 2024-08-08
Payer: MEDICARE

## 2024-08-08 ENCOUNTER — HOSPITAL ENCOUNTER (OUTPATIENT)
Facility: HOSPITAL | Age: 84
Setting detail: HOSPITAL OUTPATIENT SURGERY
Discharge: HOME | End: 2024-08-08
Attending: UROLOGY | Admitting: UROLOGY
Payer: MEDICARE

## 2024-08-08 VITALS
DIASTOLIC BLOOD PRESSURE: 69 MMHG | TEMPERATURE: 97.9 F | RESPIRATION RATE: 20 BRPM | OXYGEN SATURATION: 92 % | SYSTOLIC BLOOD PRESSURE: 116 MMHG | HEART RATE: 71 BPM

## 2024-08-08 DIAGNOSIS — N13.30 HYDRONEPHROSIS, UNSPECIFIED HYDRONEPHROSIS TYPE: Primary | ICD-10-CM

## 2024-08-08 PROCEDURE — 25010000002 ONDANSETRON PER 1 MG: Performed by: STUDENT IN AN ORGANIZED HEALTH CARE EDUCATION/TRAINING PROGRAM

## 2024-08-08 PROCEDURE — 25010000002 CEFAZOLIN PER 500 MG: Performed by: UROLOGY

## 2024-08-08 PROCEDURE — 74420 UROGRAPHY RTRGR +-KUB: CPT

## 2024-08-08 PROCEDURE — 25010000002 PROPOFOL 200 MG/20ML EMULSION: Performed by: STUDENT IN AN ORGANIZED HEALTH CARE EDUCATION/TRAINING PROGRAM

## 2024-08-08 PROCEDURE — 25510000001 IOPAMIDOL 61 % SOLUTION: Performed by: UROLOGY

## 2024-08-08 PROCEDURE — 25010000002 PHENYLEPHRINE 10 MG/ML SOLUTION: Performed by: STUDENT IN AN ORGANIZED HEALTH CARE EDUCATION/TRAINING PROGRAM

## 2024-08-08 PROCEDURE — 25010000002 DEXAMETHASONE SODIUM PHOSPHATE 20 MG/5ML SOLUTION: Performed by: STUDENT IN AN ORGANIZED HEALTH CARE EDUCATION/TRAINING PROGRAM

## 2024-08-08 PROCEDURE — 25810000003 LACTATED RINGERS PER 1000 ML: Performed by: ANESTHESIOLOGY

## 2024-08-08 PROCEDURE — 25810000003 LACTATED RINGERS PER 1000 ML: Performed by: STUDENT IN AN ORGANIZED HEALTH CARE EDUCATION/TRAINING PROGRAM

## 2024-08-08 RX ORDER — ONDANSETRON 2 MG/ML
4 INJECTION INTRAMUSCULAR; INTRAVENOUS ONCE AS NEEDED
Status: DISCONTINUED | OUTPATIENT
Start: 2024-08-08 | End: 2024-08-08 | Stop reason: HOSPADM

## 2024-08-08 RX ORDER — DROPERIDOL 2.5 MG/ML
0.62 INJECTION, SOLUTION INTRAMUSCULAR; INTRAVENOUS
Status: DISCONTINUED | OUTPATIENT
Start: 2024-08-08 | End: 2024-08-08 | Stop reason: HOSPADM

## 2024-08-08 RX ORDER — HYDROCODONE BITARTRATE AND ACETAMINOPHEN 7.5; 325 MG/1; MG/1
1 TABLET ORAL EVERY 4 HOURS PRN
Status: DISCONTINUED | OUTPATIENT
Start: 2024-08-08 | End: 2024-08-08 | Stop reason: HOSPADM

## 2024-08-08 RX ORDER — PROPOFOL 10 MG/ML
INJECTION, EMULSION INTRAVENOUS AS NEEDED
Status: DISCONTINUED | OUTPATIENT
Start: 2024-08-08 | End: 2024-08-08 | Stop reason: SURG

## 2024-08-08 RX ORDER — HYDROCODONE BITARTRATE AND ACETAMINOPHEN 5; 325 MG/1; MG/1
1 TABLET ORAL ONCE AS NEEDED
Status: DISCONTINUED | OUTPATIENT
Start: 2024-08-08 | End: 2024-08-08 | Stop reason: HOSPADM

## 2024-08-08 RX ORDER — EPHEDRINE SULFATE 50 MG/ML
5 INJECTION, SOLUTION INTRAVENOUS ONCE AS NEEDED
Status: DISCONTINUED | OUTPATIENT
Start: 2024-08-08 | End: 2024-08-08 | Stop reason: HOSPADM

## 2024-08-08 RX ORDER — ONDANSETRON 2 MG/ML
INJECTION INTRAMUSCULAR; INTRAVENOUS AS NEEDED
Status: DISCONTINUED | OUTPATIENT
Start: 2024-08-08 | End: 2024-08-08 | Stop reason: SURG

## 2024-08-08 RX ORDER — PHENYLEPHRINE HYDROCHLORIDE 10 MG/ML
INJECTION INTRAVENOUS AS NEEDED
Status: DISCONTINUED | OUTPATIENT
Start: 2024-08-08 | End: 2024-08-08 | Stop reason: SURG

## 2024-08-08 RX ORDER — NALOXONE HCL 0.4 MG/ML
0.2 VIAL (ML) INJECTION AS NEEDED
Status: DISCONTINUED | OUTPATIENT
Start: 2024-08-08 | End: 2024-08-08 | Stop reason: HOSPADM

## 2024-08-08 RX ORDER — PROMETHAZINE HYDROCHLORIDE 25 MG/1
25 TABLET ORAL ONCE AS NEEDED
Status: DISCONTINUED | OUTPATIENT
Start: 2024-08-08 | End: 2024-08-08 | Stop reason: HOSPADM

## 2024-08-08 RX ORDER — SULFAMETHOXAZOLE AND TRIMETHOPRIM 800; 160 MG/1; MG/1
1 TABLET ORAL 2 TIMES DAILY
Qty: 6 TABLET | Refills: 0 | Status: SHIPPED | OUTPATIENT
Start: 2024-08-08

## 2024-08-08 RX ORDER — HYDROMORPHONE HYDROCHLORIDE 1 MG/ML
0.25 INJECTION, SOLUTION INTRAMUSCULAR; INTRAVENOUS; SUBCUTANEOUS
Status: DISCONTINUED | OUTPATIENT
Start: 2024-08-08 | End: 2024-08-08 | Stop reason: HOSPADM

## 2024-08-08 RX ORDER — SODIUM CHLORIDE, SODIUM LACTATE, POTASSIUM CHLORIDE, CALCIUM CHLORIDE 600; 310; 30; 20 MG/100ML; MG/100ML; MG/100ML; MG/100ML
INJECTION, SOLUTION INTRAVENOUS CONTINUOUS PRN
Status: DISCONTINUED | OUTPATIENT
Start: 2024-08-08 | End: 2024-08-08 | Stop reason: SURG

## 2024-08-08 RX ORDER — LABETALOL HYDROCHLORIDE 5 MG/ML
5 INJECTION, SOLUTION INTRAVENOUS
Status: DISCONTINUED | OUTPATIENT
Start: 2024-08-08 | End: 2024-08-08 | Stop reason: HOSPADM

## 2024-08-08 RX ORDER — SODIUM CHLORIDE, SODIUM LACTATE, POTASSIUM CHLORIDE, CALCIUM CHLORIDE 600; 310; 30; 20 MG/100ML; MG/100ML; MG/100ML; MG/100ML
9 INJECTION, SOLUTION INTRAVENOUS CONTINUOUS
Status: DISCONTINUED | OUTPATIENT
Start: 2024-08-08 | End: 2024-08-08 | Stop reason: HOSPADM

## 2024-08-08 RX ORDER — HYDRALAZINE HYDROCHLORIDE 20 MG/ML
5 INJECTION INTRAMUSCULAR; INTRAVENOUS
Status: DISCONTINUED | OUTPATIENT
Start: 2024-08-08 | End: 2024-08-08 | Stop reason: HOSPADM

## 2024-08-08 RX ORDER — SODIUM CHLORIDE 0.9 % (FLUSH) 0.9 %
3 SYRINGE (ML) INJECTION EVERY 12 HOURS SCHEDULED
Status: DISCONTINUED | OUTPATIENT
Start: 2024-08-08 | End: 2024-08-08 | Stop reason: HOSPADM

## 2024-08-08 RX ORDER — DEXAMETHASONE SODIUM PHOSPHATE 4 MG/ML
INJECTION, SOLUTION INTRA-ARTICULAR; INTRALESIONAL; INTRAMUSCULAR; INTRAVENOUS; SOFT TISSUE AS NEEDED
Status: DISCONTINUED | OUTPATIENT
Start: 2024-08-08 | End: 2024-08-08 | Stop reason: SURG

## 2024-08-08 RX ORDER — IPRATROPIUM BROMIDE AND ALBUTEROL SULFATE 2.5; .5 MG/3ML; MG/3ML
3 SOLUTION RESPIRATORY (INHALATION) ONCE AS NEEDED
Status: DISCONTINUED | OUTPATIENT
Start: 2024-08-08 | End: 2024-08-08 | Stop reason: HOSPADM

## 2024-08-08 RX ORDER — PROMETHAZINE HYDROCHLORIDE 25 MG/1
25 SUPPOSITORY RECTAL ONCE AS NEEDED
Status: DISCONTINUED | OUTPATIENT
Start: 2024-08-08 | End: 2024-08-08 | Stop reason: HOSPADM

## 2024-08-08 RX ORDER — FENTANYL CITRATE 50 UG/ML
25 INJECTION, SOLUTION INTRAMUSCULAR; INTRAVENOUS
Status: DISCONTINUED | OUTPATIENT
Start: 2024-08-08 | End: 2024-08-08 | Stop reason: HOSPADM

## 2024-08-08 RX ORDER — FLUMAZENIL 0.1 MG/ML
0.2 INJECTION INTRAVENOUS AS NEEDED
Status: DISCONTINUED | OUTPATIENT
Start: 2024-08-08 | End: 2024-08-08 | Stop reason: HOSPADM

## 2024-08-08 RX ORDER — DIPHENHYDRAMINE HYDROCHLORIDE 50 MG/ML
12.5 INJECTION INTRAMUSCULAR; INTRAVENOUS
Status: DISCONTINUED | OUTPATIENT
Start: 2024-08-08 | End: 2024-08-08 | Stop reason: HOSPADM

## 2024-08-08 RX ORDER — LIDOCAINE HYDROCHLORIDE 10 MG/ML
0.5 INJECTION, SOLUTION INFILTRATION; PERINEURAL ONCE AS NEEDED
Status: DISCONTINUED | OUTPATIENT
Start: 2024-08-08 | End: 2024-08-08 | Stop reason: HOSPADM

## 2024-08-08 RX ORDER — FAMOTIDINE 10 MG/ML
20 INJECTION, SOLUTION INTRAVENOUS ONCE
Status: COMPLETED | OUTPATIENT
Start: 2024-08-08 | End: 2024-08-08

## 2024-08-08 RX ORDER — FINASTERIDE 5 MG/1
5 TABLET, FILM COATED ORAL DAILY
Qty: 40 TABLET | Refills: 11 | Status: SHIPPED | OUTPATIENT
Start: 2024-08-08

## 2024-08-08 RX ORDER — SODIUM CHLORIDE 0.9 % (FLUSH) 0.9 %
3-10 SYRINGE (ML) INJECTION AS NEEDED
Status: DISCONTINUED | OUTPATIENT
Start: 2024-08-08 | End: 2024-08-08 | Stop reason: HOSPADM

## 2024-08-08 RX ORDER — LIDOCAINE HYDROCHLORIDE 20 MG/ML
INJECTION, SOLUTION INFILTRATION; PERINEURAL AS NEEDED
Status: DISCONTINUED | OUTPATIENT
Start: 2024-08-08 | End: 2024-08-08 | Stop reason: SURG

## 2024-08-08 RX ADMIN — FAMOTIDINE 20 MG: 10 INJECTION INTRAVENOUS at 11:36

## 2024-08-08 RX ADMIN — SODIUM CHLORIDE, POTASSIUM CHLORIDE, SODIUM LACTATE AND CALCIUM CHLORIDE: 600; 310; 30; 20 INJECTION, SOLUTION INTRAVENOUS at 12:06

## 2024-08-08 RX ADMIN — PROPOFOL 50 MG: 10 INJECTION, EMULSION INTRAVENOUS at 12:12

## 2024-08-08 RX ADMIN — PHENYLEPHRINE HYDROCHLORIDE 200 MCG: 10 INJECTION INTRAVENOUS at 12:12

## 2024-08-08 RX ADMIN — SODIUM CHLORIDE, POTASSIUM CHLORIDE, SODIUM LACTATE AND CALCIUM CHLORIDE 9 ML/HR: 600; 310; 30; 20 INJECTION, SOLUTION INTRAVENOUS at 11:36

## 2024-08-08 RX ADMIN — ONDANSETRON 4 MG: 2 INJECTION INTRAMUSCULAR; INTRAVENOUS at 12:28

## 2024-08-08 RX ADMIN — SODIUM CHLORIDE 2000 MG: 900 INJECTION INTRAVENOUS at 12:02

## 2024-08-08 RX ADMIN — PHENYLEPHRINE HYDROCHLORIDE 200 MCG: 10 INJECTION INTRAVENOUS at 12:28

## 2024-08-08 RX ADMIN — LIDOCAINE HYDROCHLORIDE 60 MG: 20 INJECTION, SOLUTION INFILTRATION; PERINEURAL at 12:11

## 2024-08-08 RX ADMIN — PROPOFOL 50 MG: 10 INJECTION, EMULSION INTRAVENOUS at 12:11

## 2024-08-08 RX ADMIN — DEXAMETHASONE SODIUM PHOSPHATE 10 MG: 4 INJECTION, SOLUTION INTRAMUSCULAR; INTRAVENOUS at 12:18

## 2024-08-08 NOTE — OP NOTE
Operative Report    Former[facility] OR    Patient: Mehreen Silva IV  Age:      84 y.o.  :     1940  Sex:      male    Medical Record:  [id number]    Date of Operation/Procedure:  2024    Pre-operative Diagnosis:  Bilateral hydronephrosis    Post-operative Diagnosis:  Same    Surgeon:  Pearl    Name of Operation/Procedure:  Procedure(s) and Anesthesia Type:     * CYSTOSCOPY WITH BILATERAL RETROGRADE PYELOGRAM - General    Findings/Complications:  No complications.    Left retrograde pyelogram interpretation: minimal hydronephrosis. No filling defects or extravasation.    Right retrograde pyelogram interpretation: minimal hydronephrosis. No filling defects or extravasation.    Description of procedure: The patient was taken to the OR and placed under GA in lithotomy position.  Prepped and draped in sterile fashion.  The 21 Fr cystoscope was introduced and pancystoscopy was performed.  No tumors or stones were seen.  A Sensor guidewire was passed through the left ureteral orifice into the kidney without difficulty. A Pollack catheter was inserted, and a retrograde pyelogram was performed with findings as above. A 6 Fr x 26 cm JJ stent was passed into the kidney into the proper anatomic position.    Estimated Blood Loss: minimal    Specimens: * No orders in the log *    Fluids/Drains: none    Johnson Kang Jr., MD  2024  12:38 EDT

## 2024-08-08 NOTE — H&P
FIRST UROLOGY CONSULT      Patient Identification:  NAME:  Mehreen Silva IV  Age:  84 y.o.   Sex:  male   :  1940   MRN:  [id number]     Chief complaint: Bilateral hydronephrosis    History of present illness:      This is a 84 year old man with a history of urinary retention who was admitted in . On initial presentation, he was found to have bilateral hydronephrosis and a distended bladder. A lopez catheter was placed, but subsequent imaging demonstrated ongoing hydronephrosis. He presents for cystoscopy and bilateral retrograde pyelogram, and possible stent placement. We discussed the risks of the procedure including bleeding, infection, damage to surrounding structures, pain, need to perform additional procedures, possible need for a stent, need for a catheter, possible long term risks to kidney function, risks of anesthesia. The patient understands these risks and would like to proceed.    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    HLD (hyperlipidemia) 2022    Hx of toxic multinodular goiter 2022    Hyperthyroidism     Lumbar radiculopathy     Nonrheumatic mitral valve regurgitation 2024    Nonrheumatic tricuspid valve regurgitation 2024    Orthostatic hypotension     Osteoarthritis     Pressure injury of sacral region, unstageable 2024    Pulmonary hypertension     Recurrent pleural effusion on right 2024    Ulcer with gangrene     right heel       Past surgical history:  Past Surgical History:   Procedure Laterality Date    ABOVE KNEE AMPUTATION Right 2023    Procedure: ABOVE KNEE  AMPUTATION RIGHT, proveena wound vac placement;  Surgeon: Issa Nieves MD;  Location: [facility] MAIN OR;  Service: Vascular;  Laterality: Right;    APPENDECTOMY      BACK SURGERY      x4    COLONOSCOPY      COLONOSCOPY N/A 6/29/2024    Procedure: COLONOSCOPY to cecum;  Surgeon: Amos Cuevas MD;  Location: [facility] ENDOSCOPY;  Service: Gastroenterology;  Laterality: N/A;  pre heme pos stool  post poor prep    ENDOSCOPY N/A 6/29/2024    Procedure: ESOPHAGOGASTRODUODENOSCOPY apc and cold biopsy;  Surgeon: Amos Cuevas MD;  Location: [facility] ENDOSCOPY;  Service: Gastroenterology;  Laterality: N/A;  pre anemia  post duodenal/stomach ulcer angio dysplasia gastritis    EXCISION MASS TRUNK N/A 06/08/2016    Procedure: Excision soft tissue neoplasm on abdominal wall and left neck;  Surgeon: Shaji Barahona Jr., MD;  Location: [facility] MAIN OR;  Service:     KNEE SURGERY      NECK SURGERY  1974    PLEURAL CATHETER INSERTION Right 4/30/2024    Procedure: RIGHT VIDEO ASSISTED THORACOSCOPY, PARTIAL DECORTICATION, PLEURX CATHETER INSERTION;  Surgeon: Nataliya Noyola MD;  Location: [facility] MAIN OR;  Service: Thoracic;  Laterality: Right;    QUADRICEPS REPAIR      ROTATOR CUFF REPAIR      SHOULDER SURGERY      SMALL INTESTINE SURGERY  2021    Bowel Obstruction       Allergies:  Patient has no known allergies.    Home medications:  Medications Prior to Admission   Medication Sig Dispense Refill Last Dose    apixaban (Eliquis) 5 MG tablet tablet Take 1 tablet by mouth Every 12 (Twelve) Hours. (Patient taking differently: Take 1 tablet by mouth Every 12 (Twelve) Hours. Continue prior to surgery per md instructions) 180 tablet 3 8/7/2024    cholecalciferol (VITAMIN D3) 25 MCG (1000 UT) tablet Take 1 tablet by mouth Daily. HOLD PRIOR TO SURGERY   8/7/2024    furosemide (LASIX) 20 MG tablet Take 1 tablet by mouth Daily. 90 tablet 2 8/7/2024    gabapentin (NEURONTIN) 100 MG capsule Take 1 capsule by mouth every night at bedtime.    2024    Jardiance 10 MG tablet tablet TAKE 1 TABLET BY MOUTH DAILY 90 tablet 1 2024    metoprolol tartrate (LOPRESSOR) 25 MG tablet Take 0.5 tablets by mouth 2 (Two) Times a Day. 90 tablet 3 2024    midodrine (PROAMATINE) 5 MG tablet Take 1 tablet by mouth 2 (Two) Times a Day Before Meals. 180 tablet 1 2024    multivitamin with minerals tablet tablet Take 1 tablet by mouth Daily. HOLD PRIOR TO SURGERY   2024    pantoprazole (PROTONIX) 40 MG EC tablet Take 1 tablet by mouth 2 (Two) Times a Day Before Meals. 60 tablet 0 2024    potassium chloride (KLOR-CON M20) 20 MEQ CR tablet Take 1 tablet by mouth Daily. 90 tablet 1 2024    tamsulosin (FLOMAX) 0.4 MG capsule 24 hr capsule Take 1 capsule by mouth Every Night. 30 capsule 0 2024    LORazepam (ATIVAN) 0.5 MG tablet Take 1 tablet by mouth Every 8 (Eight) Hours As Needed for Anxiety.       methIMAzole (TAPAZOLE) 5 MG tablet Take 1 tablet by mouth 3 (Three) Times a Week. Monday, Wednesday, and Friday       Santyl 250 UNIT/GM ointment Apply 1 Application topically to the appropriate area as directed Daily. To [facility] medications:  ceFAZolin, 2,000 mg, Intravenous, Once  famotidine, 20 mg, Intravenous, Once  sodium chloride, 3 mL, Intravenous, Q12H      lactated ringers, 9 mL/hr        lidocaine    sodium chloride    Family history:  Family History   Problem Relation Age of Onset    Cancer Mother     Heart disease Father     Aneurysm Father         Abdominal Aortic Aneurysm    Malig Hyperthermia Neg Hx        Social history:  Social History     Tobacco Use    Smoking status: Former     Current packs/day: 0.00     Types: Cigarettes     Quit date: 1983     Years since quittin.2     Passive exposure: Past    Smokeless tobacco: Never    Tobacco comments:        Vaping Use    Vaping status: Never Used   Substance Use Topics    Alcohol use: Not Currently     Comment: SOCIAL; Patient is non drinker.    Drug use: No      Comment: Drug Abuse: Not a drug user       Objective:  TMax 24 hours:   Temp (24hrs), Av °F (36.7 °C), Min:98 °F (36.7 °C), Max:98 °F (36.7 °C)      Vitals Ranges:   Temp:  [98 °F (36.7 °C)] 98 °F (36.7 °C)  Heart Rate:  [92] 92  Resp:  [18] 18  BP: (111)/(80) 111/80    Intake/Output Last 3 shifts:  No intake/output data recorded.     Physical Exam:    General Appearance:    Alert, cooperative, NAD                   Neuro/Psych:   Orientation intact, mood/affect pleasant       Results review:   I reviewed the patient's new clinical results.    Data review:  Lab Results (last 24 hours)       ** No results found for the last 24 hours. **             Imaging:  Imaging Results (Last 24 Hours)       ** No results found for the last 24 hours. **               Assessment:     Bilateral hydronephrosis    Plan:     Cystoscopy, bilateral retrograde pyelogram, bilateral ureteral stent placement    Johnson Kang Jr., MD  24  11:35 EDT

## 2024-08-08 NOTE — ANESTHESIA PREPROCEDURE EVALUATION
Anesthesia Evaluation     NPO Solid Status: > 8 hours  NPO Liquid Status: > 2 hours           Airway   Mallampati: II  No difficulty expected  Dental - normal exam     Pulmonary    (+) pleural effusion (R pleurx in place), a smoker Former,  Cardiovascular   Exercise tolerance: poor (<4 METS)    PT is on anticoagulation therapy  Rhythm: irregular  Rate: abnormal    (+) valvular problems/murmurs (moderate to severe) MR, dysrhythmias Atrial Fib, CHF Diastolic >=55%, PVD, hyperlipidemia      Neuro/Psych  (+) numbness  GI/Hepatic/Renal/Endo    (+) renal disease- ARF and CRI, thyroid problem hypothyroidism    Musculoskeletal     Abdominal    Substance History      OB/GYN          Other   arthritis, blood dyscrasia (hgb 8.4) anemia,                 Anesthesia Plan    ASA 4     general     intravenous induction     Anesthetic plan, risks, benefits, and alternatives have been provided, discussed and informed consent has been obtained with: patient.    CODE STATUS:

## 2024-08-08 NOTE — ANESTHESIA PROCEDURE NOTES
Airway  Urgency: elective    Date/Time: 8/8/2024 12:15 PM  Airway not difficult    General Information and Staff    Patient location during procedure: OR  Anesthesiologist: Raz Felix MD    Indications and Patient Condition  Indications for airway management: airway protection    Preoxygenated: yes  Mask difficulty assessment: 1 - vent by mask    Final Airway Details  Final airway type: supraglottic airway      Successful airway: unique  Size 5     Number of attempts at approach: 2  Assessment: lips, teeth, and gum same as pre-op and atraumatic intubation    Additional Comments  Attempt 1: LMA4, poor seal with large leak  Attempt 2: LMA5, better seal, small leak present overcome by higher gas flow

## 2024-08-08 NOTE — ANESTHESIA POSTPROCEDURE EVALUATION
Patient: Mehreen Silva IV    Procedure Summary       Date: 08/08/24 Room / Location: [facility] OR  / [facility] MAIN OR    Anesthesia Start: 1206 Anesthesia Stop: 1245    Procedure: CYSTOSCOPY WITH BILATERAL RETROGRADE PYELOGRAM (Bilateral: Urethra) Diagnosis:     Surgeons: Johnson Kang Jr., MD Provider: Raz Felix MD    Anesthesia Type: general ASA Status: 4            Anesthesia Type: general    Vitals  Vitals Value Taken Time   /68 08/08/24 1316   Temp 36.6 °C (97.9 °F) 08/08/24 1245   Pulse 111 08/08/24 1319   Resp 20 08/08/24 1300   SpO2 93 % 08/08/24 1318   Vitals shown include unfiled device data.        Post Anesthesia Care and Evaluation    Patient location during evaluation: bedside  Patient participation: complete - patient participated  Level of consciousness: awake  Pain management: adequate    Airway patency: patent  Anesthetic complications: No anesthetic complications    Cardiovascular status: acceptable  Respiratory status: acceptable  Hydration status: acceptable    Comments: /69   Pulse 97   Temp 36.6 °C (97.9 °F) (Oral)   Resp 20   SpO2 100%

## 2024-08-19 ENCOUNTER — OFFICE VISIT (OUTPATIENT)
Dept: SURGERY | Facility: CLINIC | Age: 84
End: 2024-08-19
Payer: MEDICARE

## 2024-08-19 ENCOUNTER — TELEPHONE (OUTPATIENT)
Dept: SURGERY | Facility: CLINIC | Age: 84
End: 2024-08-19

## 2024-08-19 VITALS
HEIGHT: 70 IN | OXYGEN SATURATION: 97 % | WEIGHT: 147 LBS | SYSTOLIC BLOOD PRESSURE: 114 MMHG | BODY MASS INDEX: 21.05 KG/M2 | HEART RATE: 86 BPM | DIASTOLIC BLOOD PRESSURE: 60 MMHG

## 2024-08-19 DIAGNOSIS — J90 PLEURAL EFFUSION: Primary | ICD-10-CM

## 2024-08-19 PROCEDURE — 1159F MED LIST DOCD IN RCRD: CPT | Performed by: NURSE PRACTITIONER

## 2024-08-19 PROCEDURE — 99214 OFFICE O/P EST MOD 30 MIN: CPT | Performed by: NURSE PRACTITIONER

## 2024-08-19 PROCEDURE — 1160F RVW MEDS BY RX/DR IN RCRD: CPT | Performed by: NURSE PRACTITIONER

## 2024-08-19 NOTE — TELEPHONE ENCOUNTER
SPOKE TO FLORENCE WITH [employer] [phone] SHE STATES THAT THE ORDER IS ON RECORD AND SET TO BILL TO PATIENTS INSURANCE.  WHEN PATIENT NEEDS SUPPLIES OR STARTS TO RUN LOW, TO CALL THE 1800 NUMBER AND REORDER.    SPOKE WITH KOSTA AND GAVE THE ABOVE INFORMATION.  KOSTA STATES UNDERSTANDING AND WILL CALL TO ORDER TODAY, AS PATIENTS WILL BE OUT AT THE END OF THE WEEK.

## 2024-08-19 NOTE — PROGRESS NOTES
"Chief Complaint  Follow up, right pleural effusion    Subjective        Mehreen CHANDU Silva IV presents to [facility] THORACIC SURGERY  History of Present Illness    Mr. 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 He underwent CT-guided chest tube placement with intrapleural lytic therapy in February 2024 and his pleural effusion improved at the time.  Effusion appeared to have reaccumulated and Pleurx catheter was placed by Dr. Noyola on 4/30/2024.  This was attempted to be removed by Dr. Noyola on 6/25/2024 but was found to be anemic and did not stop Eliquis prior to his procedure. He had tPA injected to his Pleurx while hospitalized in June 2024 with adequate response.    His Pleurx is drained by his caregiver 3 times per week.  However a very little amount is drained, less than 10 cc or less.  He reports relief from \"pressure\" when he is drained.  He denies significant shortness of breath.  He is able to complete most of his activities independently    His caregiver noticed a blister surrounding the Pleurx site earlier today and called our office wanting to be seen.  He presents today feeling well.      Objective   Vital Signs:  /60 (BP Location: Left arm, Patient Position: Sitting, Cuff Size: Adult)   Pulse 86   Ht 177.8 cm (70\")   Wt 66.7 kg (147 lb)   SpO2 97%   BMI 21.09 kg/m²   Estimated body mass index is 21.09 kg/m² as calculated from the following:    Height as of this encounter: 177.8 cm (70\").    Weight as of this encounter: 66.7 kg (147 lb).             Physical Exam  Vitals reviewed.   Constitutional:       General: He is not in acute distress.     Appearance: Normal appearance. He is not ill-appearing.   HENT:      Head: Normocephalic and atraumatic.   Cardiovascular:      Rate and Rhythm: Normal " rate.   Pulmonary:      Effort: Pulmonary effort is normal. No respiratory distress.   Musculoskeletal:      Cervical back: Normal range of motion and neck supple.      Comments: In wheelchair status post right AKA   Skin:     General: Skin is warm and dry.             Comments: PleurX catheter site intact with small 1 cm blistered area to the right flank.  No purulence or erythema.   Neurological:      General: No focal deficit present.      Mental Status: He is alert. Mental status is at baseline.      Motor: Weakness present.   Psychiatric:         Mood and Affect: Mood normal.        Result Review :    CT chest 5/30/2024: There is a small right hydropneumothorax with consolidation in the right lung base.  This is significantly improved from CT chest in February 2024.  There are some groundglass opacities on the left.             Assessment and Plan     Diagnoses and all orders for this visit:    1. Pleural effusion (Primary)  -     CT Chest Without Contrast; Future      Mr. Silva is status post right Pleurx catheter placement on 4/30/2024 secondary to recurrent right pleural effusion in setting of heart failure.  His Pleurx continues to drain a very scant amount of fluid 3 times per week.  Patient was scheduled for Pleurx removal in late June 2024 but did not stop his Eliquis, and was hospitalized for anemia.  He has since recovered.  There is a small 1 cm blister, likely from Tegaderm, surrounding his Pleurx.  Does not appear to be infected.  I recommended to keep this area clean and dry, clean with hydrogen peroxide.  Will order CT chest prior to his appointment with Dr. Noyola on 9/23/2024.  Discussed this with the patient and his caregiver today.  They will review the results and discuss Pleurx catheter removal at that time.         I spent 32 minutes caring for Mehreen on this date of service. This time includes time spent by me in the following activities:preparing for the visit, reviewing tests, obtaining  and/or reviewing a separately obtained history, performing a medically appropriate examination and/or evaluation , ordering medications, tests, or procedures, documenting information in the medical record, independently interpreting results and communicating that information with the patient/family/caregiver, and care coordination  Follow Up     Return in about 1 month (around 9/19/2024) for Next scheduled follow up.  Patient was given instructions and counseling regarding his condition or for health maintenance advice. Please see specific information pulled into the AVS if appropriate.

## 2024-08-19 NOTE — TELEPHONE ENCOUNTER
SPOKE WITH PATIENT DIRECTLY - PATIENT GAVE VERBAL CONSENT TO SPEAK WITH WIFE KOSTA.  OFFICE DOESN'T HAVE A WRITTEN VERBAL RELEASE ON FILE.  OFFICE WILL OBTAIN AT NEXT OFFICE VISIT.     SPOKE WITH KOSTA AND SHE STATES SOME CONCERNS ON BLISTER THAT IS DEVELOPING NEXT TO PLEURX CATH.  PATIENT WAS SCHEDULED TO SEE SHANNAN SAME DAY TO HAVE THIS AREA EVALUATED.      KOSTA ALSO STATES THAT PATIENT WILL BE OUT OF SUPPLIES AT THE END OF THE WEEK. IF WE COULD PLEASE REVIEW ORDER AND LET HER KNOW IF SHE NEEDS TO DO ANYTHING ON HER END TO ENSURE THEY DO NOT RUN OUT SUPPLIES.  WILL REVIEW ORDER FORM AND DISCUSS WITH CIRILO DENIS, AND GO OVER WITH PATIENT AT TIME OF VISIT.

## 2024-08-20 ENCOUNTER — TELEPHONE (OUTPATIENT)
Dept: SURGERY | Facility: CLINIC | Age: 84
End: 2024-08-20
Payer: MEDICARE

## 2024-08-20 ENCOUNTER — TELEPHONE (OUTPATIENT)
Dept: CARDIOLOGY | Facility: CLINIC | Age: 84
End: 2024-08-20
Payer: MEDICARE

## 2024-08-20 NOTE — TELEPHONE ENCOUNTER
I left message stating that only the ct is needed and not the CXR. I also notified my colleagues in case the patient returns my phone call    DB 10:53  8/20/2024      Caller: Kosta Silva    Relationship: Emergency Contact    Best call back number: [phone]    What is the best time to reach you: ANY    Who are you requesting to speak with (clinical staff, provider,  specific staff member): SHANNAN DENIS    Do you know the name of the person who called: PT'S WIFE KOSTA    What was the call regarding: PT'S WIFE WOULD LIKE TO SPEAK DIRECTLY WITH SHANNAN REGARDING THE F/U APPT. WITH DR ABURTO ON 9.23.24 WITH AN X RAY. THEY ARE WANTING TO KNOW WITH THE CT THAT IS NOW BEING ORDERED IF THEY STILL NEED TO KEEP THIS X RAY APPT. PLEASE CONTACT THE PT'S WIFE TO ADVISE THANK YOU.     PT'S WIFE STATED THAT A VOICEMAIL CAN BE LEFT CONFIRMING OR NOT.

## 2024-08-20 NOTE — TELEPHONE ENCOUNTER
Caller: Kosta Silva    Relationship: Emergency Contact    Best call back number: [phone]    What is the best time to reach you: ANY    Who are you requesting to speak with (clinical staff, provider,  specific staff member): SHANNAN DENIS    Do you know the name of the person who called: PT'S WIFE KOSTA    What was the call regarding: PT'S WIFE WOULD LIKE TO SPEAK DIRECTLY WITH SHANNAN REGARDING THE F/U APPT. WITH DR ABURTO ON 9.23.24 WITH AN X RAY. THEY ARE WANTING TO KNOW WITH THE CT THAT IS NOW BEING ORDERED IF THEY STILL NEED TO KEEP THIS X RAY APPT. PLEASE CONTACT THE PT'S WIFE TO ADVISE THANK YOU.     PT'S WIFE STATED THAT A VOICEMAIL CAN BE LEFT CONFIRMING OR NOT.

## 2024-08-20 NOTE — TELEPHONE ENCOUNTER
Last OV 7/26/24    Patient's wife called asking when patient has a procedure does patient does he have to be off both Eliquis and Jardiance 72 hours prior to a surgical procedure. Patient's wife is asking if someone could call her regarding medications and this is for thoracic surgery         Please Advise

## 2024-08-20 NOTE — TELEPHONE ENCOUNTER
I spoke with pt wife and informed her of Ctt appointment date, time and location.    DB 10:48  8/20/2024

## 2024-08-20 NOTE — TELEPHONE ENCOUNTER
I left voicemail stating that I have important appt information for Mr Silva. I have made all of my colleagues aware that I would like to inform them of 9/16/2024 Ct Appt at 10:30 in our [place] location.    DB 9:55  8/20/2024

## 2024-08-21 NOTE — TELEPHONE ENCOUNTER
Reviewed Clarita's message with Belem, Mehreen Silva IV's spouse (ok per verbal SHARITA).  She verbalized understanding, however she is asking for a more definitive response.      Belem explained that patient was previously scheduled to have Pleurx removed.  Upon arrival to procedure for the removal of the Pleurx, patient was asked if he had held his Eliquis and Jardiance, and patient had not.  Belem stated that no one had told them this needed to be done.  She stated that she does not want this situation to occur again.  She states that since our office prescribes this medication, she would like our recommendations on whether patient can/should hold these medications 72 hours prior to removal of Pleurx catheter.  This way, if CT surgery decides to remove the Pleurx catheter, patient/Belem will already know what to do with these medications before the procedure.    Please let me know how you would like to proceed.    Thank you,  Yeni KNAPP RN  Triage Nurse MAEVE  08/21/24  09:11 EDT

## 2024-08-21 NOTE — TELEPHONE ENCOUNTER
Please call wife.  I reviewed the note from thoracic surgery yesterday.  They are going to order a CT scan prior to his appointment with Dr. Noyola on 9/23/2024.  They will discuss at that time if they want Pleurx catheter removal.  Thoracic surgery will notify us at that time if they want us to give any recommendations for the procedure.

## 2024-08-21 NOTE — TELEPHONE ENCOUNTER
Dr. Noyola-do have a preference on how long someone would hold the apixaban for Pleurx catheter removal?  Thank you

## 2024-08-21 NOTE — TELEPHONE ENCOUNTER
I repeated this message to the patient's spouse multiple times. She eventually verbalized understanding.     Stella Allen RN  Triage [facility] [place]

## 2024-08-26 ENCOUNTER — OFFICE VISIT (OUTPATIENT)
Dept: SURGERY | Facility: CLINIC | Age: 84
End: 2024-08-26
Payer: MEDICARE

## 2024-08-26 VITALS
SYSTOLIC BLOOD PRESSURE: 112 MMHG | HEIGHT: 70 IN | DIASTOLIC BLOOD PRESSURE: 60 MMHG | BODY MASS INDEX: 21.05 KG/M2 | HEART RATE: 78 BPM | WEIGHT: 147 LBS | TEMPERATURE: 97.4 F | OXYGEN SATURATION: 100 %

## 2024-08-26 DIAGNOSIS — L89.150 PRESSURE INJURY OF SACRAL REGION, UNSTAGEABLE: Primary | ICD-10-CM

## 2024-08-26 PROCEDURE — 1160F RVW MEDS BY RX/DR IN RCRD: CPT | Performed by: STUDENT IN AN ORGANIZED HEALTH CARE EDUCATION/TRAINING PROGRAM

## 2024-08-26 PROCEDURE — 1159F MED LIST DOCD IN RCRD: CPT | Performed by: STUDENT IN AN ORGANIZED HEALTH CARE EDUCATION/TRAINING PROGRAM

## 2024-08-26 PROCEDURE — 99212 OFFICE O/P EST SF 10 MIN: CPT | Performed by: STUDENT IN AN ORGANIZED HEALTH CARE EDUCATION/TRAINING PROGRAM

## 2024-09-05 ENCOUNTER — TELEPHONE (OUTPATIENT)
Dept: SURGERY | Facility: CLINIC | Age: 84
End: 2024-09-05
Payer: MEDICARE

## 2024-09-05 NOTE — TELEPHONE ENCOUNTER
Caller: Belem Silva     Relationship: WIFE    Best call back number: [phone]    What is your medical concern? Blister from pleurx tube is still draining when he lies down at night, pink in color. Wife would like to know if the patient needs to be seen sooner or wait till his scheduled appt    How long has this issue been going on? 8.19.24    Is your provider already aware of this issue? YES    Have you been treated for this issue? YES

## 2024-09-06 ENCOUNTER — TELEPHONE (OUTPATIENT)
Dept: SURGERY | Facility: CLINIC | Age: 84
End: 2024-09-06
Payer: MEDICARE

## 2024-09-06 NOTE — TELEPHONE ENCOUNTER
Provider responded by stating to leave all apps the same     DB 1:24    I notified pt and made aware that her concerns have been sent to Mary burnham    DB 10:05  9/6/24    I left message for pt to call regarding her expressed concerns in message     DB 9:52    Caller: Belem Silva     Relationship: WIFE    Best call back number: [phone]    What is your medical concern? Blister from pleurx tube is still draining when he lies down at night, pink in color. Wife would like to know if the patient needs to be seen sooner or wait till his scheduled appt    How long has this issue been going on? 8.19.24    Is your provider already aware of this issue? YES    Have you been treated for this issue? YES

## 2024-09-06 NOTE — TELEPHONE ENCOUNTER
I left message for pt wife that Mary Augustin said to keep everything as it is as far as the Ct and provider appt. No need to come in sooner    DB 1:23

## 2024-09-16 ENCOUNTER — HOSPITAL ENCOUNTER (OUTPATIENT)
Dept: CT IMAGING | Facility: HOSPITAL | Age: 84
Discharge: HOME | End: 2024-09-16
Admitting: NURSE PRACTITIONER
Payer: MEDICARE

## 2024-09-16 DIAGNOSIS — J90 PLEURAL EFFUSION: ICD-10-CM

## 2024-09-16 PROCEDURE — 71250 CT THORAX DX C-: CPT

## 2024-09-17 ENCOUNTER — TELEPHONE (OUTPATIENT)
Dept: SURGERY | Facility: CLINIC | Age: 84
End: 2024-09-17
Payer: MEDICARE

## 2024-09-20 ENCOUNTER — DOCUMENTATION (OUTPATIENT)
Dept: SURGERY | Facility: CLINIC | Age: 84
End: 2024-09-20
Payer: MEDICARE

## 2024-09-23 ENCOUNTER — TELEPHONE (OUTPATIENT)
Dept: SURGERY | Facility: CLINIC | Age: 84
End: 2024-09-23

## 2024-09-23 NOTE — TELEPHONE ENCOUNTER
Caller: RicardoBelem    Relationship: Emergency Contact    Best call back number: [phone]    What is the best time to reach you: ANY    Who are you requesting to speak with (clinical staff, provider,  specific staff member): PT'S WIFE IS CALLING BACK AND IS UNSURE WHO CALLED    Do you know the name of the person who called: BELEM WRAY    What was the call regarding: PT'S WIFE RECEIVED A CALL THIS MORNING BEFORE HER HUSBANDS APPOINTMENT AT 8:45 AM. THERE WAS NO VM OR ENCOUNTER NOTE. IF THERE IS SOMETHING NEEDING TO BE RELAYED PLEASE CONTACT THE PT'S WIFE. THANK YOU    Is it okay if the provider responds through MyChart: NO

## 2024-09-30 ENCOUNTER — OFFICE VISIT (OUTPATIENT)
Dept: SURGERY | Facility: CLINIC | Age: 84
End: 2024-09-30
Payer: MEDICARE

## 2024-09-30 VITALS
HEART RATE: 74 BPM | SYSTOLIC BLOOD PRESSURE: 100 MMHG | BODY MASS INDEX: 21.05 KG/M2 | WEIGHT: 147 LBS | HEIGHT: 70 IN | DIASTOLIC BLOOD PRESSURE: 62 MMHG | OXYGEN SATURATION: 96 %

## 2024-09-30 DIAGNOSIS — J90 PLEURAL EFFUSION: Primary | ICD-10-CM

## 2024-09-30 PROCEDURE — 99214 OFFICE O/P EST MOD 30 MIN: CPT | Performed by: THORACIC SURGERY (CARDIOTHORACIC VASCULAR SURGERY)

## 2024-09-30 PROCEDURE — 1160F RVW MEDS BY RX/DR IN RCRD: CPT | Performed by: THORACIC SURGERY (CARDIOTHORACIC VASCULAR SURGERY)

## 2024-09-30 PROCEDURE — 1159F MED LIST DOCD IN RCRD: CPT | Performed by: THORACIC SURGERY (CARDIOTHORACIC VASCULAR SURGERY)

## 2024-09-30 RX ORDER — DIGOXIN 125 MCG
1 TABLET ORAL DAILY
COMMUNITY

## 2024-09-30 RX ORDER — SODIUM CHLORIDE 0.9 % (FLUSH) 0.9 %
3-10 SYRINGE (ML) INJECTION AS NEEDED
OUTPATIENT
Start: 2024-09-30

## 2024-09-30 RX ORDER — SULFAMETHOXAZOLE/TRIMETHOPRIM 800-160 MG
TABLET ORAL
COMMUNITY

## 2024-09-30 RX ORDER — SODIUM CHLORIDE 9 MG/ML
40 INJECTION, SOLUTION INTRAVENOUS AS NEEDED
OUTPATIENT
Start: 2024-09-30

## 2024-09-30 RX ORDER — SODIUM CHLORIDE 0.9 % (FLUSH) 0.9 %
3 SYRINGE (ML) INJECTION EVERY 12 HOURS SCHEDULED
OUTPATIENT
Start: 2024-09-30

## 2024-09-30 NOTE — LETTER
"October 10, 2024     Taiwo Powers MD  [address] [id number]    Patient: Mehreen Silva IV   YOB: 1940   Date of Visit: 9/30/2024     Dear Taiwo Powers MD:       Thank you for referring Mehreen Silva to me for evaluation. Below are the relevant portions of my assessment and plan of care.    If you have questions, please do not hesitate to call me. I look forward to following Mehreen along with you.         Sincerely,        Nataliya Noyola MD        CC: No Recipients    Nataliya Noyola MD  10/10/24 5939  Sign when Signing Visit  Chief Complaint  Pleural effusion status post Pleurx    Subjective       Mehreen Silva IV presents to [facility] THORACIC SURGERY  History of Present Illness  Mr. Silva is a pleasant 84-year-old gentleman who presents in follow-up for his Pleurx catheter.  There has been minimal drainage from his catheter over the past few months.  Objective  Vital Signs:  /62 (BP Location: Left arm, Patient Position: Sitting)   Pulse 74   Ht 177.8 cm (70\")   Wt 66.7 kg (147 lb)   SpO2 96%   BMI 21.09 kg/m²   Estimated body mass index is 21.09 kg/m² as calculated from the following:    Height as of this encounter: 177.8 cm (70\").    Weight as of this encounter: 66.7 kg (147 lb).    BMI is within normal parameters. No other follow-up for BMI required.      Physical Exam  Vitals and nursing note reviewed.   Constitutional:       Appearance: He is well-developed.   HENT:      Head: Normocephalic and atraumatic.      Nose: Nose normal.   Eyes:      Conjunctiva/sclera: Conjunctivae normal.   Pulmonary:      Effort: Pulmonary effort is normal.   Chest:      Comments: Small opening adjacent to the Pleurx catheter with granulation tissue treated with silver nitrate  Musculoskeletal:         General: Normal range of motion.      Cervical back: Normal range of motion and neck supple.   Skin:     General: Skin is warm and dry.   Neurological:      " Mental Status: He is alert and oriented to person, place, and time.   Psychiatric:         Behavior: Behavior normal.         Thought Content: Thought content normal.         Judgment: Judgment normal.        Result Review:          I have independently reviewed the CT of the chest performed on 9/16/2024 which demonstrates a total right pleural cath with a very small pleural effusion surrounding the catheter.  There is a small left pleural effusion.  Posterior right lung groundglass opacity and consolidation.     Assessment and Plan   Diagnoses and all orders for this visit:    1. Pleural effusion (Primary)  -     Case Request; Standing  -     sodium chloride 0.9 % flush 3 mL  -     sodium chloride 0.9 % flush 3-10 mL  -     sodium chloride 0.9 % infusion 40 mL  -     ceFAZolin (ANCEF) 2,000 mg in sodium chloride 0.9 % 100 mL IVPB  -     Case Request    Other orders  -     Follow Anesthesia Guidelines / Protocol; Future  -     Follow Anesthesia Guidelines / Protocol; Standing  -     Verify / Perform Chlorhexidine Skin Prep; Standing  -     Provide Patient With Instructions on NPO Status; Future  -     Provide Chlorhexidine Skin Prep Wipes and Instructions; Future  -     Insert Peripheral IV; Standing  -     Saline Lock & Maintain IV Access; Standing  -     Place Sequential Compression Device; Standing  -     Maintain Sequential Compression Device; Standing      Mr. Silva is a very pleasant 84-year-old gentleman with a Pleurx catheter that has served its purpose.  Will need to remove this catheter to prevent any more issues.  Risks and benefits were discussed with the patient and he will be scheduled in the near future.  His granulation tissue was treated with silver nitrate in the office today.     I spent 30 minutes caring for Mehreen on this date of service. This time includes time spent by me in the following activities:preparing for the visit, reviewing tests, obtaining and/or reviewing a separately obtained  history, performing a medically appropriate examination and/or evaluation , counseling and educating the patient/family/caregiver, ordering medications, tests, or procedures, documenting information in the medical record, and independently interpreting results and communicating that information with the patient/family/caregiver  Follow Up   No follow-ups on file.  Patient was given instructions and counseling regarding his condition or for health maintenance advice. Please see specific information pulled into the AVS if appropriate.

## 2024-10-01 PROBLEM — J90 PLEURAL EFFUSION: Status: ACTIVE | Noted: 2024-09-30

## 2024-10-10 NOTE — PROGRESS NOTES
"Chief Complaint  Pleural effusion status post Pleurx    Subjective        Mehreen Silva IV presents to [facility] THORACIC SURGERY  History of Present Illness  Mr. Silva is a pleasant 84-year-old gentleman who presents in follow-up for his Pleurx catheter.  There has been minimal drainage from his catheter over the past few months.  Objective   Vital Signs:  /62 (BP Location: Left arm, Patient Position: Sitting)   Pulse 74   Ht 177.8 cm (70\")   Wt 66.7 kg (147 lb)   SpO2 96%   BMI 21.09 kg/m²   Estimated body mass index is 21.09 kg/m² as calculated from the following:    Height as of this encounter: 177.8 cm (70\").    Weight as of this encounter: 66.7 kg (147 lb).    BMI is within normal parameters. No other follow-up for BMI required.      Physical Exam  Vitals and nursing note reviewed.   Constitutional:       Appearance: He is well-developed.   HENT:      Head: Normocephalic and atraumatic.      Nose: Nose normal.   Eyes:      Conjunctiva/sclera: Conjunctivae normal.   Pulmonary:      Effort: Pulmonary effort is normal.   Chest:      Comments: Small opening adjacent to the Pleurx catheter with granulation tissue treated with silver nitrate  Musculoskeletal:         General: Normal range of motion.      Cervical back: Normal range of motion and neck supple.   Skin:     General: Skin is warm and dry.   Neurological:      Mental Status: He is alert and oriented to person, place, and time.   Psychiatric:         Behavior: Behavior normal.         Thought Content: Thought content normal.         Judgment: Judgment normal.        Result Review :          I have independently reviewed the CT of the chest performed on 9/16/2024 which demonstrates a total right pleural cath with a very small pleural effusion surrounding the catheter.  There is a small left pleural effusion.  Posterior right lung groundglass opacity and consolidation.     Assessment and Plan   Diagnoses and all orders for this " visit:    1. Pleural effusion (Primary)  -     Case Request; Standing  -     sodium chloride 0.9 % flush 3 mL  -     sodium chloride 0.9 % flush 3-10 mL  -     sodium chloride 0.9 % infusion 40 mL  -     ceFAZolin (ANCEF) 2,000 mg in sodium chloride 0.9 % 100 mL IVPB  -     Case Request    Other orders  -     Follow Anesthesia Guidelines / Protocol; Future  -     Follow Anesthesia Guidelines / Protocol; Standing  -     Verify / Perform Chlorhexidine Skin Prep; Standing  -     Provide Patient With Instructions on NPO Status; Future  -     Provide Chlorhexidine Skin Prep Wipes and Instructions; Future  -     Insert Peripheral IV; Standing  -     Saline Lock & Maintain IV Access; Standing  -     Place Sequential Compression Device; Standing  -     Maintain Sequential Compression Device; Standing      Mr. Silva is a very pleasant 84-year-old gentleman with a Pleurx catheter that has served its purpose.  Will need to remove this catheter to prevent any more issues.  Risks and benefits were discussed with the patient and he will be scheduled in the near future.  His granulation tissue was treated with silver nitrate in the office today.     I spent 30 minutes caring for Mehreen on this date of service. This time includes time spent by me in the following activities:preparing for the visit, reviewing tests, obtaining and/or reviewing a separately obtained history, performing a medically appropriate examination and/or evaluation , counseling and educating the patient/family/caregiver, ordering medications, tests, or procedures, documenting information in the medical record, and independently interpreting results and communicating that information with the patient/family/caregiver  Follow Up   No follow-ups on file.  Patient was given instructions and counseling regarding his condition or for health maintenance advice. Please see specific information pulled into the AVS if appropriate.

## 2024-10-10 NOTE — H&P (VIEW-ONLY)
"Chief Complaint  Pleural effusion status post Pleurx    Subjective        Mehreen Silva IV presents to [facility] THORACIC SURGERY  History of Present Illness  Mr. Silva is a pleasant 84-year-old gentleman who presents in follow-up for his Pleurx catheter.  There has been minimal drainage from his catheter over the past few months.  Objective   Vital Signs:  /62 (BP Location: Left arm, Patient Position: Sitting)   Pulse 74   Ht 177.8 cm (70\")   Wt 66.7 kg (147 lb)   SpO2 96%   BMI 21.09 kg/m²   Estimated body mass index is 21.09 kg/m² as calculated from the following:    Height as of this encounter: 177.8 cm (70\").    Weight as of this encounter: 66.7 kg (147 lb).    BMI is within normal parameters. No other follow-up for BMI required.      Physical Exam  Vitals and nursing note reviewed.   Constitutional:       Appearance: He is well-developed.   HENT:      Head: Normocephalic and atraumatic.      Nose: Nose normal.   Eyes:      Conjunctiva/sclera: Conjunctivae normal.   Pulmonary:      Effort: Pulmonary effort is normal.   Chest:      Comments: Small opening adjacent to the Pleurx catheter with granulation tissue treated with silver nitrate  Musculoskeletal:         General: Normal range of motion.      Cervical back: Normal range of motion and neck supple.   Skin:     General: Skin is warm and dry.   Neurological:      Mental Status: He is alert and oriented to person, place, and time.   Psychiatric:         Behavior: Behavior normal.         Thought Content: Thought content normal.         Judgment: Judgment normal.        Result Review :          I have independently reviewed the CT of the chest performed on 9/16/2024 which demonstrates a total right pleural cath with a very small pleural effusion surrounding the catheter.  There is a small left pleural effusion.  Posterior right lung groundglass opacity and consolidation.     Assessment and Plan   Diagnoses and all orders for this " visit:    1. Pleural effusion (Primary)  -     Case Request; Standing  -     sodium chloride 0.9 % flush 3 mL  -     sodium chloride 0.9 % flush 3-10 mL  -     sodium chloride 0.9 % infusion 40 mL  -     ceFAZolin (ANCEF) 2,000 mg in sodium chloride 0.9 % 100 mL IVPB  -     Case Request    Other orders  -     Follow Anesthesia Guidelines / Protocol; Future  -     Follow Anesthesia Guidelines / Protocol; Standing  -     Verify / Perform Chlorhexidine Skin Prep; Standing  -     Provide Patient With Instructions on NPO Status; Future  -     Provide Chlorhexidine Skin Prep Wipes and Instructions; Future  -     Insert Peripheral IV; Standing  -     Saline Lock & Maintain IV Access; Standing  -     Place Sequential Compression Device; Standing  -     Maintain Sequential Compression Device; Standing      Mr. Silva is a very pleasant 84-year-old gentleman with a Pleurx catheter that has served its purpose.  Will need to remove this catheter to prevent any more issues.  Risks and benefits were discussed with the patient and he will be scheduled in the near future.  His granulation tissue was treated with silver nitrate in the office today.     I spent 30 minutes caring for Mehreen on this date of service. This time includes time spent by me in the following activities:preparing for the visit, reviewing tests, obtaining and/or reviewing a separately obtained history, performing a medically appropriate examination and/or evaluation , counseling and educating the patient/family/caregiver, ordering medications, tests, or procedures, documenting information in the medical record, and independently interpreting results and communicating that information with the patient/family/caregiver  Follow Up   No follow-ups on file.  Patient was given instructions and counseling regarding his condition or for health maintenance advice. Please see specific information pulled into the AVS if appropriate.

## 2024-10-17 ENCOUNTER — PRE-ADMISSION TESTING (OUTPATIENT)
Dept: PREADMISSION TESTING | Facility: HOSPITAL | Age: 84
End: 2024-10-17
Payer: MEDICARE

## 2024-10-17 VITALS
BODY MASS INDEX: 20.76 KG/M2 | TEMPERATURE: 97.6 F | WEIGHT: 145 LBS | DIASTOLIC BLOOD PRESSURE: 64 MMHG | SYSTOLIC BLOOD PRESSURE: 101 MMHG | HEIGHT: 70 IN | OXYGEN SATURATION: 99 % | RESPIRATION RATE: 16 BRPM | HEART RATE: 75 BPM

## 2024-10-17 LAB
ANION GAP SERPL CALCULATED.3IONS-SCNC: 6.8 MMOL/L (ref 5–15)
BUN SERPL-MCNC: 28 MG/DL (ref 8–23)
BUN/CREAT SERPL: 23.3 (ref 7–25)
CALCIUM SPEC-SCNC: 8.9 MG/DL (ref 8.6–10.5)
CHLORIDE SERPL-SCNC: 105 MMOL/L (ref 98–107)
CO2 SERPL-SCNC: 29.2 MMOL/L (ref 22–29)
CREAT SERPL-MCNC: 1.2 MG/DL (ref 0.76–1.27)
DEPRECATED RDW RBC AUTO: 44.1 FL (ref 37–54)
EGFRCR SERPLBLD CKD-EPI 2021: 59.6 ML/MIN/1.73
ERYTHROCYTE [DISTWIDTH] IN BLOOD BY AUTOMATED COUNT: 16.5 % (ref 12.3–15.4)
GLUCOSE SERPL-MCNC: 93 MG/DL (ref 65–99)
HCT VFR BLD AUTO: 29.9 % (ref 37.5–51)
HGB BLD-MCNC: 8.8 G/DL (ref 13–17.7)
MCH RBC QN AUTO: 22.2 PG (ref 26.6–33)
MCHC RBC AUTO-ENTMCNC: 29.4 G/DL (ref 31.5–35.7)
MCV RBC AUTO: 75.3 FL (ref 79–97)
PLATELET # BLD AUTO: 188 10*3/MM3 (ref 140–450)
PMV BLD AUTO: 11.2 FL (ref 6–12)
POTASSIUM SERPL-SCNC: 5.2 MMOL/L (ref 3.5–5.2)
RBC # BLD AUTO: 3.97 10*6/MM3 (ref 4.14–5.8)
SODIUM SERPL-SCNC: 141 MMOL/L (ref 136–145)
WBC NRBC COR # BLD AUTO: 5.97 10*3/MM3 (ref 3.4–10.8)

## 2024-10-17 PROCEDURE — 36415 COLL VENOUS BLD VENIPUNCTURE: CPT

## 2024-10-17 PROCEDURE — 80048 BASIC METABOLIC PNL TOTAL CA: CPT

## 2024-10-17 PROCEDURE — 85027 COMPLETE CBC AUTOMATED: CPT

## 2024-10-17 NOTE — DISCHARGE INSTRUCTIONS
Take the following medications the morning of surgery:    ATIVAN IF NEEDED      TAKE  METOPROLOL, PROTONIX, GABAPENTIN, LANOXIN    If you are on prescription narcotic pain medication to control your pain you may also take that medication the morning of surgery.      General Instructions:     Do not eat solid food after midnight the night before surgery.  Clear liquids day of surgery are allowed but must be stopped at least two hours before your hospital arrival time.       Allowed clear liquids      Water, sodas, and tea or coffee with no cream or milk added.       12 to 20 ounces of a clear liquid that contains carbohydrates is recommended.  If non-diabetic, have Gatorade or Powerade.  If diabetic, have G2 or Powerade Zero. STATES NOT A DIABETIC     Do not have liquids red in color.  Do not consume chicken, beef, pork or vegetable broth or bouillon cubes of any variety as they are not considered clear liquids and are not allowed.    Patients who avoid smoking, chewing tobacco and alcohol for 4 weeks prior to surgery have a reduced risk of post-operative complications.                CHLORHEXIDINE CLOTH INSTRUCTIONS  The morning of surgery follow these instructions using the Chlorhexidine cloths you've been given.  These steps reduce bacteria on the body.  Do not use the cloths near your eyes, ears mouth, genitalia or on open wounds.  Throw the cloths away after use but do not try to flush them down a toilet.      Open and remove one cloth at a time from the package.    Leave the cloth unfolded and begin the bathing.  Massage the skin with the cloths using gentle pressure to remove bacteria.  Do not scrub harshly.   Follow the steps below with one 2% CHG cloth per area (6 total cloths).  One cloth for neck, shoulders and chest.  One cloth for both arms, hands, fingers and underarms (do underarms last).  One cloth for the abdomen followed by groin.  One cloth for right leg and foot including between the toes.  One  cloth for left leg and foot including between the toes.  The last cloth is to be used for the back of the neck, back and buttocks.    Allow the CHG to air dry 3 minutes on the skin which will give it time to work and decrease the chance of irritation.  The skin may feel sticky until it is dry.  Do not rinse with water or any other liquid or you will lose the beneficial effects of the CHG.  If mild skin irritation occurs, do rinse the skin to remove the CHG.  Report this to the nurse at time of admission.  Do not apply lotions, creams, ointments, deodorants or perfumes after using the clothes. Dress in clean clothes before coming to the hospital.          Do not smoke, use chewing tobacco or drink alcohol the day of surgery.   Bring any papers given to you in the doctor’s office.  Wear clean comfortable clothes.  Do not wear contact lenses, false eyelashes or make-up.  Bring a case for your glasses.   Remove all piercings.  Leave jewelry and any other valuables at home.  Hair extensions with metal clips must be removed prior to surgery.  The Pre-Admission Testing nurse will instruct you to bring medications if unable to obtain an accurate list in Pre-Admission Testing.          Preventing a Surgical Site Infection:  For 2 to 3 days before surgery, avoid shaving with a razor because the razor can irritate skin and make it easier to develop an infection.    Any areas of open skin can increase the risk of a post-operative wound infection by allowing bacteria to enter and travel throughout the body.  Notify your surgeon if you have any skin wounds / rashes even if it is not near the expected surgical site.  The area will need assessed to determine if surgery should be delayed until it is healed.  The night prior to surgery shower using a fresh bar of anti-bacterial soap (such as Dial) and clean washcloth.  Sleep in a clean bed with clean clothing.  Do not allow pets to sleep with you.  Shower on the morning of surgery  using a fresh bar of anti-bacterial soap (such as Dial) and clean washcloth.  Dry with a clean towel and dress in clean clothing.  Ask your surgeon if you will be receiving antibiotics prior to surgery.  Make sure you, your family, and all healthcare providers clean their hands with soap and water or an alcohol based hand  before caring for you or your wound.    Day of surgery:  Your arrival time is approximately two hours before your scheduled surgery time.  Please note if you have an early arrival time the surgery doors do not open before 5:00 AM.  Upon arrival, a Pre-op nurse and Anesthesiologist will review your health history, obtain vital signs, and answer questions you may have.  The only belongings needed at this time will be a list of your home medications and if applicable your C-PAP/BI-PAP machine.  A Pre-op nurse will start an IV and you may receive medication in preparation for surgery, including something to help you relax.     Please be aware that surgery does come with discomfort.  We want to make every effort to control your discomfort so please discuss any uncontrolled symptoms with your nurse.   Your doctor will most likely have prescribed pain medications.      If you are going home after surgery you will receive individualized written care instructions before being discharged.  A responsible adult must drive you to and from the hospital on the day of your surgery and ideally stay with you through the night.   .  Discharge prescriptions can be filled by the hospital pharmacy during regular pharmacy hours.  If you are having surgery late in the day/evening your prescription may be e-prescribed to your pharmacy.  Please verify your pharmacy hours or chose a 24 hour pharmacy to avoid not having access to your prescription because your pharmacy has closed for the day.      If you have any questions please call Pre-Admission Testing at [phone].  Deductibles and co-payments are collected  on the day of service. Please be prepared to pay the required co-pay, deductible or deposit on the day of service as defined by your plan.    Call your surgeon immediately if you experience any of the following symptoms:  Sore Throat  Shortness of Breath or difficulty breathing  Cough  Chills  Body soreness or muscle pain  Headache  Fever  New loss of taste or smell  Do not arrive for your surgery ill.  Your procedure will need to be rescheduled to another time.  You will need to call your physician before the day of surgery to avoid any unnecessary exposure to hospital staff as well as other patients.

## 2024-10-22 RX ORDER — FUROSEMIDE 40 MG
20 TABLET ORAL DAILY
Qty: 45 TABLET | Refills: 1 | Status: SHIPPED | OUTPATIENT
Start: 2024-10-22

## 2024-10-24 ENCOUNTER — HOSPITAL ENCOUNTER (OUTPATIENT)
Facility: HOSPITAL | Age: 84
Setting detail: HOSPITAL OUTPATIENT SURGERY
Discharge: HOME | End: 2024-10-24
Attending: THORACIC SURGERY (CARDIOTHORACIC VASCULAR SURGERY) | Admitting: THORACIC SURGERY (CARDIOTHORACIC VASCULAR SURGERY)
Payer: MEDICARE

## 2024-10-24 ENCOUNTER — ANESTHESIA EVENT (OUTPATIENT)
Dept: PERIOP | Facility: HOSPITAL | Age: 84
End: 2024-10-24
Payer: MEDICARE

## 2024-10-24 ENCOUNTER — APPOINTMENT (OUTPATIENT)
Dept: GENERAL RADIOLOGY | Facility: HOSPITAL | Age: 84
End: 2024-10-24
Payer: MEDICARE

## 2024-10-24 ENCOUNTER — ANESTHESIA (OUTPATIENT)
Dept: PERIOP | Facility: HOSPITAL | Age: 84
End: 2024-10-24
Payer: MEDICARE

## 2024-10-24 VITALS
DIASTOLIC BLOOD PRESSURE: 66 MMHG | HEART RATE: 132 BPM | OXYGEN SATURATION: 94 % | SYSTOLIC BLOOD PRESSURE: 97 MMHG | TEMPERATURE: 97.8 F | RESPIRATION RATE: 16 BRPM

## 2024-10-24 PROCEDURE — 32552 REMOVE LUNG CATHETER: CPT | Performed by: THORACIC SURGERY (CARDIOTHORACIC VASCULAR SURGERY)

## 2024-10-24 PROCEDURE — 25010000002 CEFAZOLIN PER 500 MG: Performed by: THORACIC SURGERY (CARDIOTHORACIC VASCULAR SURGERY)

## 2024-10-24 PROCEDURE — 71045 X-RAY EXAM CHEST 1 VIEW: CPT

## 2024-10-24 PROCEDURE — 25010000002 LIDOCAINE 1 % SOLUTION 20 ML VIAL: Performed by: THORACIC SURGERY (CARDIOTHORACIC VASCULAR SURGERY)

## 2024-10-24 PROCEDURE — 25010000002 DIGOXIN PER 500 MCG: Performed by: INTERNAL MEDICINE

## 2024-10-24 PROCEDURE — 25010000002 MIDAZOLAM PER 1 MG: Performed by: ANESTHESIOLOGY

## 2024-10-24 PROCEDURE — 25010000002 PROPOFOL 10 MG/ML EMULSION: Performed by: NURSE ANESTHETIST, CERTIFIED REGISTERED

## 2024-10-24 PROCEDURE — 25810000003 LACTATED RINGERS PER 1000 ML: Performed by: ANESTHESIOLOGY

## 2024-10-24 PROCEDURE — 99214 OFFICE O/P EST MOD 30 MIN: CPT | Performed by: INTERNAL MEDICINE

## 2024-10-24 RX ORDER — PROPOFOL 10 MG/ML
VIAL (ML) INTRAVENOUS AS NEEDED
Status: DISCONTINUED | OUTPATIENT
Start: 2024-10-24 | End: 2024-10-24 | Stop reason: SURG

## 2024-10-24 RX ORDER — IPRATROPIUM BROMIDE AND ALBUTEROL SULFATE 2.5; .5 MG/3ML; MG/3ML
3 SOLUTION RESPIRATORY (INHALATION) ONCE AS NEEDED
Status: DISCONTINUED | OUTPATIENT
Start: 2024-10-24 | End: 2024-10-24 | Stop reason: HOSPADM

## 2024-10-24 RX ORDER — NALOXONE HCL 0.4 MG/ML
0.2 VIAL (ML) INJECTION AS NEEDED
Status: DISCONTINUED | OUTPATIENT
Start: 2024-10-24 | End: 2024-10-24 | Stop reason: HOSPADM

## 2024-10-24 RX ORDER — LIDOCAINE HYDROCHLORIDE 10 MG/ML
0.5 INJECTION, SOLUTION INFILTRATION; PERINEURAL ONCE AS NEEDED
Status: DISCONTINUED | OUTPATIENT
Start: 2024-10-24 | End: 2024-10-24 | Stop reason: HOSPADM

## 2024-10-24 RX ORDER — HYDROMORPHONE HYDROCHLORIDE 1 MG/ML
0.25 INJECTION, SOLUTION INTRAMUSCULAR; INTRAVENOUS; SUBCUTANEOUS
Status: DISCONTINUED | OUTPATIENT
Start: 2024-10-24 | End: 2024-10-24 | Stop reason: HOSPADM

## 2024-10-24 RX ORDER — HYDROCODONE BITARTRATE AND ACETAMINOPHEN 7.5; 325 MG/1; MG/1
1 TABLET ORAL EVERY 4 HOURS PRN
Status: DISCONTINUED | OUTPATIENT
Start: 2024-10-24 | End: 2024-10-24 | Stop reason: HOSPADM

## 2024-10-24 RX ORDER — SODIUM CHLORIDE 0.9 % (FLUSH) 0.9 %
3 SYRINGE (ML) INJECTION EVERY 12 HOURS SCHEDULED
Status: DISCONTINUED | OUTPATIENT
Start: 2024-10-24 | End: 2024-10-24 | Stop reason: HOSPADM

## 2024-10-24 RX ORDER — PROMETHAZINE HYDROCHLORIDE 25 MG/1
25 TABLET ORAL ONCE AS NEEDED
Status: DISCONTINUED | OUTPATIENT
Start: 2024-10-24 | End: 2024-10-24 | Stop reason: HOSPADM

## 2024-10-24 RX ORDER — PROMETHAZINE HYDROCHLORIDE 25 MG/1
25 SUPPOSITORY RECTAL ONCE AS NEEDED
Status: DISCONTINUED | OUTPATIENT
Start: 2024-10-24 | End: 2024-10-24 | Stop reason: HOSPADM

## 2024-10-24 RX ORDER — MAGNESIUM HYDROXIDE 1200 MG/15ML
LIQUID ORAL AS NEEDED
Status: DISCONTINUED | OUTPATIENT
Start: 2024-10-24 | End: 2024-10-24 | Stop reason: HOSPADM

## 2024-10-24 RX ORDER — SODIUM CHLORIDE 9 MG/ML
40 INJECTION, SOLUTION INTRAVENOUS AS NEEDED
Status: DISCONTINUED | OUTPATIENT
Start: 2024-10-24 | End: 2024-10-24 | Stop reason: HOSPADM

## 2024-10-24 RX ORDER — DROPERIDOL 2.5 MG/ML
0.62 INJECTION, SOLUTION INTRAMUSCULAR; INTRAVENOUS
Status: DISCONTINUED | OUTPATIENT
Start: 2024-10-24 | End: 2024-10-24 | Stop reason: HOSPADM

## 2024-10-24 RX ORDER — DIGOXIN 0.25 MG/ML
250 INJECTION INTRAMUSCULAR; INTRAVENOUS ONCE
Status: COMPLETED | OUTPATIENT
Start: 2024-10-24 | End: 2024-10-24

## 2024-10-24 RX ORDER — LABETALOL HYDROCHLORIDE 5 MG/ML
5 INJECTION, SOLUTION INTRAVENOUS
Status: DISCONTINUED | OUTPATIENT
Start: 2024-10-24 | End: 2024-10-24 | Stop reason: HOSPADM

## 2024-10-24 RX ORDER — PHENYLEPHRINE HCL IN 0.9% NACL 1 MG/10 ML
SYRINGE (ML) INTRAVENOUS AS NEEDED
Status: DISCONTINUED | OUTPATIENT
Start: 2024-10-24 | End: 2024-10-24 | Stop reason: SURG

## 2024-10-24 RX ORDER — HYDROCODONE BITARTRATE AND ACETAMINOPHEN 5; 325 MG/1; MG/1
1 TABLET ORAL ONCE AS NEEDED
Status: DISCONTINUED | OUTPATIENT
Start: 2024-10-24 | End: 2024-10-24 | Stop reason: HOSPADM

## 2024-10-24 RX ORDER — HYDRALAZINE HYDROCHLORIDE 20 MG/ML
5 INJECTION INTRAMUSCULAR; INTRAVENOUS
Status: DISCONTINUED | OUTPATIENT
Start: 2024-10-24 | End: 2024-10-24 | Stop reason: HOSPADM

## 2024-10-24 RX ORDER — SODIUM CHLORIDE, SODIUM LACTATE, POTASSIUM CHLORIDE, CALCIUM CHLORIDE 600; 310; 30; 20 MG/100ML; MG/100ML; MG/100ML; MG/100ML
9 INJECTION, SOLUTION INTRAVENOUS CONTINUOUS
Status: DISCONTINUED | OUTPATIENT
Start: 2024-10-24 | End: 2024-10-24 | Stop reason: HOSPADM

## 2024-10-24 RX ORDER — FAMOTIDINE 10 MG/ML
20 INJECTION, SOLUTION INTRAVENOUS ONCE
Status: DISCONTINUED | OUTPATIENT
Start: 2024-10-24 | End: 2024-10-24 | Stop reason: HOSPADM

## 2024-10-24 RX ORDER — ONDANSETRON 2 MG/ML
4 INJECTION INTRAMUSCULAR; INTRAVENOUS ONCE AS NEEDED
Status: DISCONTINUED | OUTPATIENT
Start: 2024-10-24 | End: 2024-10-24 | Stop reason: HOSPADM

## 2024-10-24 RX ORDER — DIPHENHYDRAMINE HYDROCHLORIDE 50 MG/ML
12.5 INJECTION INTRAMUSCULAR; INTRAVENOUS
Status: DISCONTINUED | OUTPATIENT
Start: 2024-10-24 | End: 2024-10-24 | Stop reason: HOSPADM

## 2024-10-24 RX ORDER — FLUMAZENIL 0.1 MG/ML
0.2 INJECTION INTRAVENOUS AS NEEDED
Status: DISCONTINUED | OUTPATIENT
Start: 2024-10-24 | End: 2024-10-24 | Stop reason: HOSPADM

## 2024-10-24 RX ORDER — SODIUM CHLORIDE 0.9 % (FLUSH) 0.9 %
3-10 SYRINGE (ML) INJECTION AS NEEDED
Status: DISCONTINUED | OUTPATIENT
Start: 2024-10-24 | End: 2024-10-24 | Stop reason: HOSPADM

## 2024-10-24 RX ORDER — FENTANYL CITRATE 50 UG/ML
25 INJECTION, SOLUTION INTRAMUSCULAR; INTRAVENOUS
Status: DISCONTINUED | OUTPATIENT
Start: 2024-10-24 | End: 2024-10-24 | Stop reason: HOSPADM

## 2024-10-24 RX ORDER — EPHEDRINE SULFATE 50 MG/ML
5 INJECTION, SOLUTION INTRAVENOUS ONCE AS NEEDED
Status: DISCONTINUED | OUTPATIENT
Start: 2024-10-24 | End: 2024-10-24 | Stop reason: HOSPADM

## 2024-10-24 RX ORDER — MIDAZOLAM HYDROCHLORIDE 1 MG/ML
0.5 INJECTION INTRAMUSCULAR; INTRAVENOUS
Status: DISCONTINUED | OUTPATIENT
Start: 2024-10-24 | End: 2024-10-24 | Stop reason: HOSPADM

## 2024-10-24 RX ORDER — FENTANYL CITRATE 50 UG/ML
50 INJECTION, SOLUTION INTRAMUSCULAR; INTRAVENOUS ONCE AS NEEDED
Status: DISCONTINUED | OUTPATIENT
Start: 2024-10-24 | End: 2024-10-24 | Stop reason: HOSPADM

## 2024-10-24 RX ADMIN — PROPOFOL 120 MCG/KG/MIN: 10 INJECTION, EMULSION INTRAVENOUS at 14:54

## 2024-10-24 RX ADMIN — METOPROLOL TARTRATE 2.5 MG: 1 INJECTION, SOLUTION INTRAVENOUS at 16:51

## 2024-10-24 RX ADMIN — Medication 300 MCG: at 15:14

## 2024-10-24 RX ADMIN — Medication 100 MCG: at 15:08

## 2024-10-24 RX ADMIN — PROPOFOL 70 MG: 10 INJECTION, EMULSION INTRAVENOUS at 14:53

## 2024-10-24 RX ADMIN — SODIUM CHLORIDE 2000 MG: 900 INJECTION INTRAVENOUS at 14:30

## 2024-10-24 RX ADMIN — Medication 200 MCG: at 15:12

## 2024-10-24 RX ADMIN — Medication 200 MCG: at 15:17

## 2024-10-24 RX ADMIN — DIGOXIN 250 MCG: 0.25 INJECTION INTRAMUSCULAR; INTRAVENOUS at 16:50

## 2024-10-24 RX ADMIN — Medication 100 MCG: at 15:03

## 2024-10-24 RX ADMIN — SODIUM CHLORIDE, POTASSIUM CHLORIDE, SODIUM LACTATE AND CALCIUM CHLORIDE: 600; 310; 30; 20 INJECTION, SOLUTION INTRAVENOUS at 14:47

## 2024-10-24 RX ADMIN — MIDAZOLAM 0.5 MG: 1 INJECTION INTRAMUSCULAR; INTRAVENOUS at 14:28

## 2024-10-24 RX ADMIN — Medication 200 MCG: at 15:02

## 2024-10-24 NOTE — OP NOTE
PLEURX CATHETER REMOVAL  Procedure Report    Patient Name:  Mehreen Silva IV  YOB: 1940    Date of Surgery:  10/24/2024     Indications: Completion of Plex therapy    Pre-op Diagnosis:   Pleural effusion [J90]       Post-Op Diagnosis Codes:     * Pleural effusion [J90]    Procedure/CPT® Codes:      Procedure(s):  PLEURX CATHETER REMOVAL    Staff:  Surgeon(s):  Nataliya Noyola MD      Anesthesia: Monitored Anesthesia Care    Estimated Blood Loss: minimal    Implants:    Nothing was implanted during the procedure    Specimen:          None      Findings: Granulation tissue connected to the Pleurx catheter as prior incision site, no evidence of purulence    Complications: None apparent     Description of Procedure: Summary of procedure: Mehreen Silva IV was brought to the operating room and placed on the operating table in the supine position.  Blood pressure, EKG, pulse oximetry and airway were monitored by the anesthesia service.  Supplemental oxygen and intravenous sedation were administered by anesthesia.  With the patient adequately sedated, the right lateral chest wall was prepped and draped in usual sterile manner.  An area overlying the midportion of the catheter was infiltrated with 1% Xylocaine, where the area of granulation tissue was encountered.  An ellipse incision was made in the skin to remove the area of granulation and carried down through the skin and subcutaneous tissues.  In the lower incision, the Romeo cuff was identified and dissected free from the surrounding tissues.  The catheter was removed without difficulty and a DuoDERM dressing was placed over the incision site.    The wound was irrigated and closed in layers with 3-0 Vicryl and the skin with 4-0 Monocryl.  Dermabond was applied.  Patient was awakened and transported to seated recovery where he was observed and then discharged home per criteria.       Nataliya Noyola MD     Date: 10/24/2024  Time: 15:38 EDT

## 2024-10-24 NOTE — ANESTHESIA POSTPROCEDURE EVALUATION
Patient: Mehreen Silva IV    Procedure Summary       Date: 10/24/24 Room / Location: [facility] OR [address] [place] [address] MAIN OR    Anesthesia Start: 1447 Anesthesia Stop: 1538    Procedure: PLEURX CATHETER REMOVAL (Right) Diagnosis:       Pleural effusion      (Pleural effusion [J90])    Surgeons: Nataliya Noyola MD Provider:     Anesthesia Type: MAC ASA Status: 3            Anesthesia Type: MAC    Vitals  Vitals Value Taken Time   BP 96/74 10/24/24 1700   Temp     Pulse 107 10/24/24 1714   Resp 16 10/24/24 1700   SpO2 92 % 10/24/24 1714   Vitals shown include unfiled device data.        Post Anesthesia Care and Evaluation    Anesthetic complications: No anesthetic complications    Comments: BP 97/66   Pulse (!) 132   Temp 36.6 °C (97.8 °F) (Oral)   Resp 16   SpO2 94%     No anesthesia complications reported to me.

## 2024-10-24 NOTE — ANESTHESIA PREPROCEDURE EVALUATION
Anesthesia Evaluation     NPO Solid Status: > 8 hours             Airway   Mallampati: III  TM distance: <3 FB  Neck ROM: limited  Possible difficult intubation  Dental      Comment: Caps 3 upper front caps.      Pulmonary    (+) pleural effusion,Pulmonary embolism: right.    ROS comment: Right pleural effusion  Cardiovascular     PT is on anticoagulation therapy  Patient on routine beta blocker    (+) valvular problems/murmurs TI and MR, dysrhythmias Atrial Fib, PVD, hyperlipidemia    ROS comment: AKA  No blood thinner since 4 days ago.      Neuro/Psych  (+) numbness  GI/Hepatic/Renal/Endo    (+) renal disease- CRI, thyroid problem hypothyroidism    Musculoskeletal         ROS comment: Neck fusion.  Abdominal    Substance History      OB/GYN          Other   arthritis,                 Anesthesia Plan    ASA 3     MAC     intravenous induction     Anesthetic plan, risks, benefits, and alternatives have been provided, discussed and informed consent has been obtained with: patient and spouse/significant other.    CODE STATUS:

## 2024-10-24 NOTE — CONSULTS
Date of Consultation: 10/24/24    Referral Provider: Nataliya Noyola MD     Reason for Consultation: Rapid atrial fibrillation.    Encounter Provider: Denny Gentile MD    Group of Service: [facility]     Patient Name: Mehreen Silva IV    :1940    Chief complaint:  Pleurx catheter removal.    History of Present Illness:      This is a very pleasant 84 year-old male who follows with Dr. Chan in the office. He has a past medical history significant for thyroid disease, persistent atrial fibrillation, and chronic diastolic heart failure.      In 2023, he was admitted for anxiety, shortness of breath, hypoxia, and large pleural effusions. He subsequently underwent thoracentesis and his symptoms improved. A month later, in 2023, he underwent a right above-the-knee amputation for chronic osteomyelitis. He had an uneventful recovery.      In 2024, he was admitted with a large right pleural effusion with atelectasis and right to left mediastinal shift of unknown etiology. A chest tube was placed by thoracic surgery. Cardiology saw him in consult for his persistent atrial fibrillation.  Due to his recurrent right pleural effusions, he required the placement of a Pleurx catheter in 2024.      He does have a history of persistent atrial fibrillation.  He has had some bradycardia with this, which was noted when he was hospitalized at the end of 2024.  He typically takes metoprolol 12.5 mg twice daily and digoxin 125 mcg/day for rate control.  He also has chronic hypotension, and is on midodrine.    He underwent a Pleurx catheter removal today.  In the PACU, he was noted to have rapid atrial fibrillation afterwards.  He was asymptomatic and did not feel the atrial fibrillation.  He was adamant that he wanted to go home because of transportation issues.  He was not having any new shortness of breath or chest pain.    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, 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/2023    Atrial fibrillation     Cervical spondylosis with myelopathy     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 12/13/2023    HLD (hyperlipidemia) 09/23/2022    Hx of toxic multinodular goiter 09/23/2022    Hyperthyroidism     Lumbar radiculopathy     Nonrheumatic mitral valve regurgitation 07/31/2024    Nonrheumatic tricuspid valve regurgitation 07/31/2024    Orthostatic hypotension     Osteoarthritis     Pressure injury of sacral region, unstageable 05/14/2024    Pulmonary hypertension     Recurrent pleural effusion on right 04/27/2024    HAS PLEURAL CATH IN PLACE    Ulcer with gangrene     right heel    (  HAD ABOVE THE KNEE AMPUTATION  )         Past Surgical History:   Procedure Laterality Date    ABOVE KNEE AMPUTATION Right 12/13/2023    Procedure: ABOVE KNEE AMPUTATION RIGHT, proveena wound vac placement;  Surgeon: Issa Nieves MD;  Location: [facility] OR;  Service: Vascular;  Laterality: Right;    APPENDECTOMY      BACK SURGERY      x4    COLONOSCOPY      COLONOSCOPY N/A 6/29/2024    Procedure: COLONOSCOPY to cecum;  Surgeon: Amos Cuevas MD;  Location: [facility] ENDOSCOPY;  Service: Gastroenterology;  Laterality: N/A;  pre heme pos stool  post poor prep    CYSTOSCOPY Bilateral 8/8/2024    Procedure: CYSTOSCOPY WITH BILATERAL RETROGRADE PYELOGRAM;  Surgeon: Johnson Kang Jr., MD;  Location: [facility] MAIN OR;  Service: Urology;  Laterality: Bilateral;    ENDOSCOPY N/A 6/29/2024    Procedure: ESOPHAGOGASTRODUODENOSCOPY apc and cold biopsy;  Surgeon: Amos Cuevas MD;  Location: [facility] ENDOSCOPY;  Service: Gastroenterology;  Laterality: N/A;  pre anemia  post duodenal/stomach ulcer angio dysplasia gastritis    EXCISION MASS TRUNK N/A  06/08/2016    Procedure: Excision soft tissue neoplasm on abdominal wall and left neck;  Surgeon: Shaji Barahona Jr., MD;  Location: [facility] MAIN OR;  Service:     KNEE SURGERY      NECK SURGERY  1974    PLEURAL CATHETER INSERTION Right 4/30/2024    Procedure: RIGHT VIDEO ASSISTED THORACOSCOPY, PARTIAL DECORTICATION, PLEURX CATHETER INSERTION;  Surgeon: Nataliya Noyola MD;  Location: [facility] MAIN OR;  Service: Thoracic;  Laterality: Right;    QUADRICEPS REPAIR      ROTATOR CUFF REPAIR      SHOULDER SURGERY      SMALL INTESTINE SURGERY  2021    Bowel Obstruction         No Known Allergies      No current facility-administered medications on file prior to encounter.     Current Outpatient Medications on File Prior to Encounter   Medication Sig Dispense Refill    apixaban (Eliquis) 5 MG tablet tablet Take 1 tablet by mouth Every 12 (Twelve) Hours. 180 tablet 3    finasteride (PROSCAR) 5 MG tablet Take 1 tablet by mouth Daily. 40 tablet 11    gabapentin (NEURONTIN) 100 MG capsule Take 1 capsule by mouth every night at bedtime.      Jardiance 10 MG tablet tablet TAKE 1 TABLET BY MOUTH DAILY 90 tablet 1    methIMAzole (TAPAZOLE) 5 MG tablet Take 1 tablet by mouth 3 (Three) Times a Week. Monday, Wednesday, and Friday      metoprolol tartrate (LOPRESSOR) 25 MG tablet Take 0.5 tablets by mouth 2 (Two) Times a Day. 90 tablet 3    midodrine (PROAMATINE) 5 MG tablet Take 1 tablet by mouth 2 (Two) Times a Day Before Meals. 180 tablet 1    multivitamin with minerals tablet tablet Take 1 tablet by mouth Daily. HOLD PRIOR TO SURGERY      pantoprazole (PROTONIX) 40 MG EC tablet Take 1 tablet by mouth 2 (Two) Times a Day Before Meals. 60 tablet 0    potassium chloride (KLOR-CON M20) 20 MEQ CR tablet Take 1 tablet by mouth Daily. 90 tablet 1    Santyl 250 UNIT/GM ointment Apply 1 Application topically to the appropriate area as directed Daily. To sacrum      tamsulosin (FLOMAX) 0.4 MG capsule 24 hr capsule Take 1 capsule by mouth Every  Night. 30 capsule 0    cholecalciferol (VITAMIN D3) 25 MCG (1000 UT) tablet Take 1 tablet by mouth Daily. HOLD PRIOR TO SURGERY      digoxin (LANOXIN) 125 MCG tablet Take 1 tablet by mouth Daily.      LORazepam (ATIVAN) 0.5 MG tablet Take 1 tablet by mouth Every 8 (Eight) Hours As Needed for Anxiety.      [DISCONTINUED] furosemide (LASIX) 20 MG tablet Take 1 tablet by mouth Daily. 90 tablet 2         Social History     Socioeconomic History    Marital status:     Number of children: 1    Highest education level: Professional school degree (e.g., MD, DDS, DVM, BRITNEY)   Tobacco Use    Smoking status: Former     Current packs/day: 0.00     Types: Cigarettes     Quit date: 1983     Years since quittin.4     Passive exposure: Past    Smokeless tobacco: Never    Tobacco comments:        Vaping Use    Vaping status: Never Used   Substance and Sexual Activity    Alcohol use: Not Currently     Comment: SOCIAL; Patient is non drinker.    Drug use: No     Comment: Drug Abuse: Not a drug user    Sexual activity: Defer         Family History   Problem Relation Age of Onset    Cancer Mother     Heart disease Father     Aneurysm Father         Abdominal Aortic Aneurysm    Malig Hyperthermia Neg Hx        REVIEW OF SYSTEMS:   Pertinent positives are noted in the HPI above.  Otherwise, all other systems were reviewed, and are negative.      Objective:     Vitals:    10/24/24 1700 10/24/24 1710 10/24/24 1725 10/24/24 1730   BP: 96/74   97/66   BP Location:       Patient Position:       Pulse: 111 105 82 (!) 132   Resp: 16   16   Temp:       TempSrc:       SpO2: 93% 94% 92% 94%     There is no height or weight on file to calculate BMI.       General:    No acute distress, alert and oriented x4, chronically ill-appearing.                   Head:    Normocephalic, atraumatic.   Eyes:          Conjunctivae and sclerae normal, no icterus.   Throat:   No oral lesions, no thrush, oral mucosa moist.    Neck:   Supple, trachea  midline.   Lungs:     Decreased breath sounds bilaterally     Heart:    Irregularly irregular rhythm with tachycardic rate.  No murmurs, gallops, or rubs noted.   Abdomen:     Soft, non-tender, non-distended, positive bowel sounds.    Extremities:   Status post right AKA. No left lower extremity edema.      Pulses:   Pulses palpable and equal bilaterally.    Skin:   No bleeding or rash.   Neuro:   Non-focal.     Psychiatric:   Normal mood and affect.           Lab Review: None.                                               Assessment:   1.  Recurrent large pleural effusion, status post Pleurx catheter placement.  Status post removal on 10/24/2024.  2.  Persistent atrial fibrillation with RVR (and history of bradycardia previously)  3.  Chronic hypotension, on midodrine  4.  Chronically elevated troponin  5.  History of obstructive uropathy and kidney injury in June 2024  6.  Chronic anemia, multifactorial  7.  Chronic diastolic CHF, compensated  8.  Hyperthyroidism, on methimazole  9.  Multifactorial chronic weakness  10.  Status post right AKA on 12/13/2023 secondary to osteomyelitis and contracture of the right knee    Plan:       I saw the patient in the PACU.  He was adamant about going home.  He was in atrial fibrillation, although he was asymptomatic.  He denied any new shortness of breath, chest discomfort, or other symptoms such as palpitations.    He has had persistent atrial fibrillation for quite some time.  His rate goes up and down at times.  He has had bradycardia as recently as June 2024.  His blood pressure and hypotension (which is chronic) also limits his therapy.     I suspect that the atrial fibrillation rate is situational.  I ordered a stat dose of digoxin 250 mcg x 1, as well as a stat dose of IV Lopressor 2.5 mg.  I am okay from a cardiac standpoint with him being discharged (especially given that he is asymptomatic, has long-term persistent atrial fibrillation, and is absolutely adamant  about going home).  I suspect that his atrial fibrillation will normalize with his home medications, including the metoprolol 12.5 mg twice daily and his digoxin.  He should resume his Eliquis when okay with Dr. Noyola from a surgical standpoint.    Thank you very much for this consult.    Gio Gentile MD

## 2024-10-25 ENCOUNTER — TELEPHONE (OUTPATIENT)
Dept: SURGERY | Facility: CLINIC | Age: 84
End: 2024-10-25
Payer: MEDICARE

## 2024-10-25 NOTE — TELEPHONE ENCOUNTER
Caller: Belem Silva    Relationship to patient: Emergency Contact    Best call back number:     [phone]       Chief complaint: NO AVAILABILITY WITHIN TWO WEEKS.     Type of visit: POST OP     Requested date: 2 WEEKS WITH AZUL DESHPANDE     If rescheduling, when is the original appointment: NONE      Additional notes:PATIENT WOULD LIKE A CALL BACK.

## 2024-11-01 ENCOUNTER — TELEPHONE (OUTPATIENT)
Dept: SURGERY | Facility: CLINIC | Age: 84
End: 2024-11-01
Payer: MEDICARE

## 2024-11-01 NOTE — TELEPHONE ENCOUNTER
Left message informing wife that I have added patient to our wait-list and if an opening becomes available our office will call and move patient's appointment up.

## 2024-11-06 ENCOUNTER — HOSPITAL ENCOUNTER (INPATIENT)
Facility: HOSPITAL | Age: 84
LOS: 6 days | Discharge: HOME HEALTH | End: 2024-11-12
Attending: EMERGENCY MEDICINE | Admitting: STUDENT IN AN ORGANIZED HEALTH CARE EDUCATION/TRAINING PROGRAM
Payer: MEDICARE

## 2024-11-06 ENCOUNTER — APPOINTMENT (OUTPATIENT)
Dept: GENERAL RADIOLOGY | Facility: HOSPITAL | Age: 84
End: 2024-11-06
Payer: MEDICARE

## 2024-11-06 DIAGNOSIS — I48.91 ATRIAL FIBRILLATION WITH RAPID VENTRICULAR RESPONSE: ICD-10-CM

## 2024-11-06 DIAGNOSIS — I50.9 ACUTE ON CHRONIC CONGESTIVE HEART FAILURE, UNSPECIFIED HEART FAILURE TYPE: ICD-10-CM

## 2024-11-06 DIAGNOSIS — I27.20 PULMONARY HYPERTENSION: ICD-10-CM

## 2024-11-06 DIAGNOSIS — G47.34 NOCTURNAL HYPOXIA: ICD-10-CM

## 2024-11-06 DIAGNOSIS — I50.32 CHRONIC HEART FAILURE WITH PRESERVED EJECTION FRACTION (HFPEF): ICD-10-CM

## 2024-11-06 DIAGNOSIS — I50.33 ACUTE ON CHRONIC HEART FAILURE WITH PRESERVED EJECTION FRACTION (HFPEF): ICD-10-CM

## 2024-11-06 DIAGNOSIS — R79.89 ELEVATED TROPONIN: ICD-10-CM

## 2024-11-06 DIAGNOSIS — J96.01 ACUTE RESPIRATORY FAILURE WITH HYPOXIA: Primary | ICD-10-CM

## 2024-11-06 LAB
ALBUMIN SERPL-MCNC: 3 G/DL (ref 3.5–5.2)
ALBUMIN/GLOB SERPL: 0.8 G/DL
ALP SERPL-CCNC: 282 U/L (ref 39–117)
ALT SERPL W P-5'-P-CCNC: 110 U/L (ref 1–41)
ANION GAP SERPL CALCULATED.3IONS-SCNC: 7.9 MMOL/L (ref 5–15)
ANISOCYTOSIS BLD QL: ABNORMAL
AST SERPL-CCNC: 74 U/L (ref 1–40)
BASOPHILS # BLD MANUAL: 0 10*3/MM3 (ref 0–0.2)
BASOPHILS NFR BLD MANUAL: 0 % (ref 0–1.5)
BILIRUB SERPL-MCNC: 1.6 MG/DL (ref 0–1.2)
BUN SERPL-MCNC: 29 MG/DL (ref 8–23)
BUN/CREAT SERPL: 24.6 (ref 7–25)
CALCIUM SPEC-SCNC: 8.9 MG/DL (ref 8.6–10.5)
CHLORIDE SERPL-SCNC: 106 MMOL/L (ref 98–107)
CO2 SERPL-SCNC: 31.1 MMOL/L (ref 22–29)
CREAT SERPL-MCNC: 1.18 MG/DL (ref 0.76–1.27)
D-LACTATE SERPL-SCNC: 1.7 MMOL/L (ref 0.5–2)
DEPRECATED RDW RBC AUTO: 44.4 FL (ref 37–54)
EGFRCR SERPLBLD CKD-EPI 2021: 60.8 ML/MIN/1.73
EOSINOPHIL # BLD MANUAL: 0.08 10*3/MM3 (ref 0–0.4)
EOSINOPHIL NFR BLD MANUAL: 1 % (ref 0.3–6.2)
ERYTHROCYTE [DISTWIDTH] IN BLOOD BY AUTOMATED COUNT: 17.3 % (ref 12.3–15.4)
GEN 5 2HR TROPONIN T REFLEX: 112 NG/L
GIANT PLATELETS: ABNORMAL
GLOBULIN UR ELPH-MCNC: 3.7 GM/DL
GLUCOSE SERPL-MCNC: 119 MG/DL (ref 65–99)
HCT VFR BLD AUTO: 35.4 % (ref 37.5–51)
HGB BLD-MCNC: 10.4 G/DL (ref 13–17.7)
LYMPHOCYTES # BLD MANUAL: 0.5 10*3/MM3 (ref 0.7–3.1)
LYMPHOCYTES NFR BLD MANUAL: 4.1 % (ref 5–12)
MCH RBC QN AUTO: 21.3 PG (ref 26.6–33)
MCHC RBC AUTO-ENTMCNC: 29.4 G/DL (ref 31.5–35.7)
MCV RBC AUTO: 72.4 FL (ref 79–97)
MICROCYTES BLD QL: ABNORMAL
MONOCYTES # BLD: 0.33 10*3/MM3 (ref 0.1–0.9)
NEUTROPHILS # BLD AUTO: 7.22 10*3/MM3 (ref 1.7–7)
NEUTROPHILS NFR BLD MANUAL: 88.8 % (ref 42.7–76)
NT-PROBNP SERPL-MCNC: 4451 PG/ML (ref 0–1800)
PLATELET # BLD AUTO: 178 10*3/MM3 (ref 140–450)
PMV BLD AUTO: 10.2 FL (ref 6–12)
POLYCHROMASIA BLD QL SMEAR: ABNORMAL
POTASSIUM SERPL-SCNC: 4.6 MMOL/L (ref 3.5–5.2)
PROCALCITONIN SERPL-MCNC: 0.11 NG/ML (ref 0–0.25)
PROT SERPL-MCNC: 6.7 G/DL (ref 6–8.5)
QT INTERVAL: 322 MS
QTC INTERVAL: 455 MS
RBC # BLD AUTO: 4.89 10*6/MM3 (ref 4.14–5.8)
SMUDGE CELLS BLD QL SMEAR: ABNORMAL
SODIUM SERPL-SCNC: 145 MMOL/L (ref 136–145)
TROPONIN T DELTA: -18 NG/L
TROPONIN T SERPL HS-MCNC: 130 NG/L
VARIANT LYMPHS NFR BLD MANUAL: 6.1 % (ref 19.6–45.3)
WBC NRBC COR # BLD AUTO: 8.13 10*3/MM3 (ref 3.4–10.8)

## 2024-11-06 PROCEDURE — 99291 CRITICAL CARE FIRST HOUR: CPT

## 2024-11-06 PROCEDURE — 71045 X-RAY EXAM CHEST 1 VIEW: CPT

## 2024-11-06 PROCEDURE — 93010 ELECTROCARDIOGRAM REPORT: CPT | Performed by: INTERNAL MEDICINE

## 2024-11-06 PROCEDURE — 25010000002 FUROSEMIDE PER 20 MG: Performed by: EMERGENCY MEDICINE

## 2024-11-06 PROCEDURE — 85025 COMPLETE CBC W/AUTO DIFF WBC: CPT | Performed by: EMERGENCY MEDICINE

## 2024-11-06 PROCEDURE — 84484 ASSAY OF TROPONIN QUANT: CPT | Performed by: EMERGENCY MEDICINE

## 2024-11-06 PROCEDURE — 85007 BL SMEAR W/DIFF WBC COUNT: CPT | Performed by: EMERGENCY MEDICINE

## 2024-11-06 PROCEDURE — 84145 PROCALCITONIN (PCT): CPT | Performed by: EMERGENCY MEDICINE

## 2024-11-06 PROCEDURE — 93005 ELECTROCARDIOGRAM TRACING: CPT | Performed by: EMERGENCY MEDICINE

## 2024-11-06 PROCEDURE — 80053 COMPREHEN METABOLIC PANEL: CPT | Performed by: EMERGENCY MEDICINE

## 2024-11-06 PROCEDURE — 36415 COLL VENOUS BLD VENIPUNCTURE: CPT

## 2024-11-06 PROCEDURE — 83880 ASSAY OF NATRIURETIC PEPTIDE: CPT | Performed by: EMERGENCY MEDICINE

## 2024-11-06 PROCEDURE — 83605 ASSAY OF LACTIC ACID: CPT | Performed by: EMERGENCY MEDICINE

## 2024-11-06 RX ORDER — POLYETHYLENE GLYCOL 3350 17 G/17G
17 POWDER, FOR SOLUTION ORAL DAILY PRN
Status: DISCONTINUED | OUTPATIENT
Start: 2024-11-06 | End: 2024-11-12 | Stop reason: HOSPADM

## 2024-11-06 RX ORDER — NITROGLYCERIN 0.4 MG/1
0.4 TABLET SUBLINGUAL
Status: DISCONTINUED | OUTPATIENT
Start: 2024-11-06 | End: 2024-11-12 | Stop reason: HOSPADM

## 2024-11-06 RX ORDER — METOPROLOL TARTRATE 25 MG/1
12.5 TABLET, FILM COATED ORAL 2 TIMES DAILY
Status: DISCONTINUED | OUTPATIENT
Start: 2024-11-07 | End: 2024-11-12 | Stop reason: HOSPADM

## 2024-11-06 RX ORDER — AMOXICILLIN 250 MG
2 CAPSULE ORAL 2 TIMES DAILY PRN
Status: DISCONTINUED | OUTPATIENT
Start: 2024-11-06 | End: 2024-11-12 | Stop reason: HOSPADM

## 2024-11-06 RX ORDER — FINASTERIDE 5 MG/1
5 TABLET, FILM COATED ORAL DAILY
Status: DISCONTINUED | OUTPATIENT
Start: 2024-11-06 | End: 2024-11-12 | Stop reason: HOSPADM

## 2024-11-06 RX ORDER — PANTOPRAZOLE SODIUM 40 MG/1
40 TABLET, DELAYED RELEASE ORAL
Status: DISCONTINUED | OUTPATIENT
Start: 2024-11-07 | End: 2024-11-12 | Stop reason: HOSPADM

## 2024-11-06 RX ORDER — BISACODYL 10 MG
10 SUPPOSITORY, RECTAL RECTAL DAILY PRN
Status: DISCONTINUED | OUTPATIENT
Start: 2024-11-06 | End: 2024-11-12 | Stop reason: HOSPADM

## 2024-11-06 RX ORDER — ACETAMINOPHEN 325 MG/1
650 TABLET ORAL EVERY 4 HOURS PRN
Status: DISCONTINUED | OUTPATIENT
Start: 2024-11-06 | End: 2024-11-12 | Stop reason: HOSPADM

## 2024-11-06 RX ORDER — FUROSEMIDE 10 MG/ML
40 INJECTION INTRAMUSCULAR; INTRAVENOUS ONCE
Status: COMPLETED | OUTPATIENT
Start: 2024-11-06 | End: 2024-11-06

## 2024-11-06 RX ORDER — SODIUM CHLORIDE 0.9 % (FLUSH) 0.9 %
10 SYRINGE (ML) INJECTION AS NEEDED
Status: DISCONTINUED | OUTPATIENT
Start: 2024-11-06 | End: 2024-11-12 | Stop reason: HOSPADM

## 2024-11-06 RX ORDER — LORAZEPAM 0.5 MG/1
0.5 TABLET ORAL EVERY 8 HOURS PRN
Status: DISCONTINUED | OUTPATIENT
Start: 2024-11-06 | End: 2024-11-12 | Stop reason: HOSPADM

## 2024-11-06 RX ORDER — MIDODRINE HYDROCHLORIDE 5 MG/1
5 TABLET ORAL
Status: DISCONTINUED | OUTPATIENT
Start: 2024-11-07 | End: 2024-11-09

## 2024-11-06 RX ORDER — TAMSULOSIN HYDROCHLORIDE 0.4 MG/1
0.4 CAPSULE ORAL NIGHTLY
Status: DISCONTINUED | OUTPATIENT
Start: 2024-11-06 | End: 2024-11-12 | Stop reason: HOSPADM

## 2024-11-06 RX ORDER — BISACODYL 5 MG/1
5 TABLET, DELAYED RELEASE ORAL DAILY PRN
Status: DISCONTINUED | OUTPATIENT
Start: 2024-11-06 | End: 2024-11-12 | Stop reason: HOSPADM

## 2024-11-06 RX ORDER — POTASSIUM CHLORIDE 750 MG/1
20 TABLET, FILM COATED, EXTENDED RELEASE ORAL DAILY
Status: DISCONTINUED | OUTPATIENT
Start: 2024-11-06 | End: 2024-11-12 | Stop reason: HOSPADM

## 2024-11-06 RX ORDER — ONDANSETRON 4 MG/1
4 TABLET, ORALLY DISINTEGRATING ORAL EVERY 6 HOURS PRN
Status: DISCONTINUED | OUTPATIENT
Start: 2024-11-06 | End: 2024-11-12 | Stop reason: HOSPADM

## 2024-11-06 RX ORDER — FUROSEMIDE 10 MG/ML
40 INJECTION INTRAMUSCULAR; INTRAVENOUS
Status: DISCONTINUED | OUTPATIENT
Start: 2024-11-07 | End: 2024-11-08

## 2024-11-06 RX ORDER — METHIMAZOLE 5 MG/1
5 TABLET ORAL 3 TIMES WEEKLY
Status: DISCONTINUED | OUTPATIENT
Start: 2024-11-06 | End: 2024-11-12 | Stop reason: HOSPADM

## 2024-11-06 RX ORDER — ONDANSETRON 2 MG/ML
4 INJECTION INTRAMUSCULAR; INTRAVENOUS EVERY 6 HOURS PRN
Status: DISCONTINUED | OUTPATIENT
Start: 2024-11-06 | End: 2024-11-12 | Stop reason: HOSPADM

## 2024-11-06 RX ORDER — GABAPENTIN 100 MG/1
100 CAPSULE ORAL NIGHTLY
Status: DISCONTINUED | OUTPATIENT
Start: 2024-11-06 | End: 2024-11-12 | Stop reason: HOSPADM

## 2024-11-06 RX ADMIN — FUROSEMIDE 40 MG: 10 INJECTION, SOLUTION INTRAMUSCULAR; INTRAVENOUS at 18:43

## 2024-11-06 RX ADMIN — METOPROLOL TARTRATE 5 MG: 1 INJECTION, SOLUTION INTRAVENOUS at 16:35

## 2024-11-06 NOTE — ED TRIAGE NOTES
Pt c/o soa that started a couple hours ago while watching tv. Pt was sent here by md for evaluation

## 2024-11-06 NOTE — ED PROVIDER NOTES
EMERGENCY DEPARTMENT ENCOUNTER    Room Number:  29/29  PCP: Taiwo Powers MD  Historian: Patient      HPI:  Chief Complaint: Shortness of breath  A complete HPI/ROS/PMH/PSH/SH/FH are unobtainable due to: None  Context: Mehreen Silva IV is a 84 y.o. male who presents to the ED c/o sudden onset of shortness of breath that occurred shortly prior to ED arrival today.  He reports a history of fluid buildup and actually recent had a Pleurx catheter removal.  He states that he felt perfectly fine until the symptoms began just prior to ED arrival today.  He denies chest pain, cough, fever/chills, back pain, or nausea/vomiting.  He does report that symptoms are currently moderate in intensity and have been fairly steady since the sudden onset today.            PAST MEDICAL HISTORY  Active Ambulatory Problems     Diagnosis Date Noted    Cervical spondylosis with myelopathy 06/06/2016    Lumbar radiculopathy 06/06/2016    Hx of toxic multinodular goiter 09/23/2022    Dyslipidemia 09/23/2022    Hyperthyroidism     Hyperkalemia 11/08/2023    Permanent atrial fibrillation     Ulcer of left heel 04/02/2024    Recurrent pleural effusion on right 04/27/2024    Moderate malnutrition 04/30/2024    Pressure injury of sacral region, unstageable 05/14/2024    Complications of immobility 05/14/2024    Chronic heart failure with preserved ejection fraction (HFpEF) 06/03/2024    Orthostatic hypotension     CKD (chronic kidney disease) 06/21/2024    Anemia 06/21/2024    Nonrheumatic mitral valve regurgitation 07/31/2024    Nonrheumatic tricuspid valve regurgitation 07/31/2024    Pulmonary hypertension 07/31/2024    Pericardial effusion 07/31/2024    Hydronephrosis 08/08/2024    Pleural effusion 09/30/2024     Resolved Ambulatory Problems     Diagnosis Date Noted    (HFpEF) heart failure with preserved ejection fraction 09/24/2022    A-fib 09/24/2022    Cervical myelopathy 11/08/2023    Hypoxia 11/08/2023    Hyperglycemia 11/08/2023     Chronic heart failure with preserved ejection fraction (HFpEF) 11/12/2023    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 12/13/2023    Ankle osteomyelitis, left 12/13/2023    Pleural effusion 02/09/2024    Dyspnea 02/09/2024    JOSEPH (acute kidney injury) 02/19/2024    Infected epidermoid cyst 05/14/2024    Pleural effusion 06/13/2024     Past Medical History:   Diagnosis Date    Above-knee amputation     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, 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/2023    Atrial fibrillation     HLD (hyperlipidemia) 09/23/2022    Osteoarthritis     Ulcer with gangrene          PAST SURGICAL HISTORY  Past Surgical History:   Procedure Laterality Date    ABOVE KNEE AMPUTATION Right 12/13/2023    Procedure: ABOVE KNEE AMPUTATION RIGHT, proveena wound vac placement;  Surgeon: Issa Nieves MD;  Location: [facility];  Service: Vascular;  Laterality: Right;    APPENDECTOMY      BACK SURGERY      x4    COLONOSCOPY      COLONOSCOPY N/A 6/29/2024    Procedure: COLONOSCOPY to cecum;  Surgeon: Amos Cuevas MD;  Location: [facility] ENDOSCOPY;  Service: Gastroenterology;  Laterality: N/A;  pre heme pos stool  post poor prep    CYSTOSCOPY Bilateral 8/8/2024    Procedure: CYSTOSCOPY WITH BILATERAL RETROGRADE PYELOGRAM;  Surgeon: Johnson Kang Jr., MD;  Location: [facility] OR;  Service: Urology;  Laterality: Bilateral;    ENDOSCOPY N/A 6/29/2024    Procedure: ESOPHAGOGASTRODUODENOSCOPY apc and cold biopsy;  Surgeon: Amos Cuevas MD;  Location: [facility] ENDOSCOPY;  Service: Gastroenterology;  Laterality: N/A;  pre anemia  post duodenal/stomach ulcer angio dysplasia gastritis    EXCISION MASS TRUNK N/A 06/08/2016    Procedure: Excision soft tissue neoplasm on abdominal wall and left neck;  Surgeon: Shaji Barahona Jr., MD;  Location: [facility];  Service:     KNEE SURGERY      NECK SURGERY  1974    PLEURAL CATHETER  INSERTION Right 2024    Procedure: RIGHT VIDEO ASSISTED THORACOSCOPY, PARTIAL DECORTICATION, PLEURX CATHETER INSERTION;  Surgeon: Nataliya Noyola MD;  Location:  [facility];  Service: Thoracic;  Laterality: Right;    PLEURX CATHETER REMOVAL Right 10/24/2024    Procedure: PLEURX CATHETER REMOVAL;  Surgeon: Nataliya Noyola MD;  Location:  [facility];  Service: Thoracic;  Laterality: Right;    QUADRICEPS REPAIR      ROTATOR CUFF REPAIR      SHOULDER SURGERY      SMALL INTESTINE SURGERY      Bowel Obstruction         FAMILY HISTORY  Family History   Problem Relation Age of Onset    Cancer Mother     Heart disease Father     Aneurysm Father         Abdominal Aortic Aneurysm    Malig Hyperthermia Neg Hx          SOCIAL HISTORY  Social History     Socioeconomic History    Marital status:     Number of children: 1    Highest education level: Professional school degree (e.g., MD, DDS, DVM, BRITNEY)   Tobacco Use    Smoking status: Former     Current packs/day: 0.00     Types: Cigarettes     Quit date: 1983     Years since quittin.4     Passive exposure: Past    Smokeless tobacco: Never    Tobacco comments:        Vaping Use    Vaping status: Never Used   Substance and Sexual Activity    Alcohol use: Not Currently     Comment: SOCIAL; Patient is non drinker.    Drug use: No     Comment: Drug Abuse: Not a drug user    Sexual activity: Defer         ALLERGIES  Patient has no known allergies.        REVIEW OF SYSTEMS  Review of Systems   Constitutional:  Negative for activity change, appetite change and fever.   HENT:  Negative for congestion and sore throat.    Eyes: Negative.    Respiratory:  Positive for shortness of breath. Negative for cough.    Cardiovascular:  Negative for chest pain and leg swelling.   Gastrointestinal:  Negative for abdominal pain, diarrhea and vomiting.   Endocrine: Negative.    Genitourinary:  Negative for decreased urine volume and dysuria.   Musculoskeletal:  Negative for  neck pain.   Skin:  Negative for rash and wound.   Allergic/Immunologic: Negative.    Neurological:  Negative for weakness, numbness and headaches.   Hematological: Negative.    Psychiatric/Behavioral: Negative.     All other systems reviewed and are negative.         PHYSICAL EXAM  ED Triage Vitals [11/06/24 1559]   Temp Heart Rate Resp BP SpO2   97.6 °F (36.4 °C) 90 18 -- 90 %      Temp src Heart Rate Source Patient Position BP Location FiO2 (%)   -- Monitor -- -- --       Physical Exam  Constitutional:       General: He is not in acute distress.     Appearance: Normal appearance. He is not ill-appearing or toxic-appearing.   HENT:      Head: Normocephalic and atraumatic.   Eyes:      Extraocular Movements: Extraocular movements intact.      Pupils: Pupils are equal, round, and reactive to light.   Cardiovascular:      Rate and Rhythm: Tachycardia present. Rhythm irregularly irregular.      Heart sounds: No murmur heard.     No friction rub. No gallop.   Pulmonary:      Effort: Pulmonary effort is normal.      Breath sounds: Normal breath sounds. Examination of the right-lower field reveals rhonchi. Examination of the left-lower field reveals rhonchi.   Abdominal:      General: Abdomen is flat. There is no distension.      Palpations: Abdomen is soft.      Tenderness: There is no abdominal tenderness.   Musculoskeletal:         General: No swelling or tenderness. Normal range of motion.      Cervical back: Normal range of motion and neck supple.      Comments: Right AKA   Skin:     General: Skin is warm and dry.   Neurological:      General: No focal deficit present.      Mental Status: He is alert and oriented to person, place, and time.      Sensory: No sensory deficit.      Motor: No weakness.   Psychiatric:         Mood and Affect: Mood normal.         Behavior: Behavior normal.         Vital signs and nursing notes reviewed.          LAB RESULTS  Recent Results (from the past 24 hours)   ECG 12 Lead Dyspnea     Collection Time: 11/06/24  4:14 PM   Result Value Ref Range    QT Interval 322 ms    QTC Interval 455 ms   Comprehensive Metabolic Panel    Collection Time: 11/06/24  4:27 PM    Specimen: Arm, Right; Blood   Result Value Ref Range    Glucose 119 (H) 65 - 99 mg/dL    BUN 29 (H) 8 - 23 mg/dL    Creatinine 1.18 0.76 - 1.27 mg/dL    Sodium 145 136 - 145 mmol/L    Potassium 4.6 3.5 - 5.2 mmol/L    Chloride 106 98 - 107 mmol/L    CO2 31.1 (H) 22.0 - 29.0 mmol/L    Calcium 8.9 8.6 - 10.5 mg/dL    Total Protein 6.7 6.0 - 8.5 g/dL    Albumin 3.0 (L) 3.5 - 5.2 g/dL    ALT (SGPT) 110 (H) 1 - 41 U/L    AST (SGOT) 74 (H) 1 - 40 U/L    Alkaline Phosphatase 282 (H) 39 - 117 U/L    Total Bilirubin 1.6 (H) 0.0 - 1.2 mg/dL    Globulin 3.7 gm/dL    A/G Ratio 0.8 g/dL    BUN/Creatinine Ratio 24.6 7.0 - 25.0    Anion Gap 7.9 5.0 - 15.0 mmol/L    eGFR 60.8 >60.0 mL/min/1.73   High Sensitivity Troponin T    Collection Time: 11/06/24  4:27 PM    Specimen: Arm, Right; Blood   Result Value Ref Range    HS Troponin T 130 (C) <22 ng/L   BNP    Collection Time: 11/06/24  4:27 PM    Specimen: Arm, Right; Blood   Result Value Ref Range    proBNP 4,451.0 (H) 0.0 - 1,800.0 pg/mL   Lactic Acid, Plasma    Collection Time: 11/06/24  4:27 PM    Specimen: Arm, Right; Blood   Result Value Ref Range    Lactate 1.7 0.5 - 2.0 mmol/L   Procalcitonin    Collection Time: 11/06/24  4:27 PM    Specimen: Arm, Right; Blood   Result Value Ref Range    Procalcitonin 0.11 0.00 - 0.25 ng/mL   CBC Auto Differential    Collection Time: 11/06/24  4:27 PM    Specimen: Arm, Right; Blood   Result Value Ref Range    WBC 8.13 3.40 - 10.80 10*3/mm3    RBC 4.89 4.14 - 5.80 10*6/mm3    Hemoglobin 10.4 (L) 13.0 - 17.7 g/dL    Hematocrit 35.4 (L) 37.5 - 51.0 %    MCV 72.4 (L) 79.0 - 97.0 fL    MCH 21.3 (L) 26.6 - 33.0 pg    MCHC 29.4 (L) 31.5 - 35.7 g/dL    RDW 17.3 (H) 12.3 - 15.4 %    RDW-SD 44.4 37.0 - 54.0 fl    MPV 10.2 6.0 - 12.0 fL    Platelets 178 140 - 450 10*3/mm3    Manual Differential    Collection Time: 11/06/24  4:27 PM    Specimen: Arm, Right; Blood   Result Value Ref Range    Neutrophil % 88.8 (H) 42.7 - 76.0 %    Lymphocyte % 6.1 (L) 19.6 - 45.3 %    Monocyte % 4.1 (L) 5.0 - 12.0 %    Eosinophil % 1.0 0.3 - 6.2 %    Basophil % 0.0 0.0 - 1.5 %    Neutrophils Absolute 7.22 (H) 1.70 - 7.00 10*3/mm3    Lymphocytes Absolute 0.50 (L) 0.70 - 3.10 10*3/mm3    Monocytes Absolute 0.33 0.10 - 0.90 10*3/mm3    Eosinophils Absolute 0.08 0.00 - 0.40 10*3/mm3    Basophils Absolute 0.00 0.00 - 0.20 10*3/mm3    Anisocytosis Mod/2+ None Seen    Microcytes Mod/2+ None Seen    Polychromasia Large/3+ None Seen    Smudge Cells Slight/1+ None Seen    Giant Platelets Slight/1+ None Seen       Ordered the above labs and reviewed the results.        RADIOLOGY  XR Chest 1 View    Result Date: 11/6/2024    Portable chest radiograph  HISTORY: Shortness of air  TECHNIQUE: Single AP portable radiograph of the chest  COMPARISON: Multiple chest radiographs and back 6/30/2024      FINDINGS AND IMPRESSION: Left shoulder arthroplasty is in [place] visualized. There is pulm vascular congestion with superimposed pulmonary pacification, slightly within the bilateral mid to lower lungs. At least small to moderate bilateral pleural effusions are present. Findings appear to have worsened on the left since 10/24/2024, most concerning for pulmonary edema in the proper clinical context with multifocal pneumonia less likely. Correlation with patient history is recommended.. Cardiac silhouette while accentuated by low lung volumes appears to be at least moderately enlarged, as before., Nodular area of opacification within the left midlung is present, as before. At least continued attention on follow-up of these findings with chest radiograph in 3 to 4 weeks is recommended to ensure resolution and exclude the possibility malignancy. Functionality further evaluated but chest CT if clinically indicated.  No pneumothorax  is seen  This report was finalized on 11/6/2024 5:21 PM by Dr. Matias Fischer M.D on Workstation: [id number]       Ordered the above noted radiological studies. Reviewed by me in PACS.            PROCEDURES  Critical Care    Performed by: Konrad Germain MD  Authorized by: Konrad Germain MD    Critical care provider statement:     Critical care time (minutes):  33    Critical care time was exclusive of:  Separately billable procedures and treating other patients    Critical care was necessary to treat or prevent imminent or life-threatening deterioration of the following conditions:  Respiratory failure and cardiac failure    Critical care was time spent personally by me on the following activities:  Blood draw for specimens, development of treatment plan with patient or surrogate, discussions with consultants, evaluation of patient's response to treatment, examination of patient, obtaining history from patient or surrogate, ordering and performing treatments and interventions, ordering and review of laboratory studies, ordering and review of radiographic studies, pulse oximetry, re-evaluation of patient's condition and review of old charts    Care discussed with: admitting provider        EKG independently interpreted by myself as follows:    EKG          EKG time: 1614  Rhythm/Rate: atrial fibrillation with RVR, 120  P waves and ME: absent  QRS, axis: nml, nml   ST and T waves: nml     Interpreted Contemporaneously by me, independently viewed  changed compared to prior 6/28/24            MEDICATIONS GIVEN IN ER  Medications   sodium chloride 0.9 % flush 10 mL (has no administration in time range)   metoprolol tartrate (LOPRESSOR) injection 5 mg (5 mg Intravenous Given 11/6/24 1635)   furosemide (LASIX) injection 40 mg (40 mg Intravenous Given 11/6/24 1843)                   MEDICAL DECISION MAKING, PROGRESS, and CONSULTS    All labs have been independently reviewed by me.  All radiology studies have  been reviewed by me and I have also reviewed the radiology report.   EKG's independently viewed and interpreted by me.  Discussion below represents my analysis of pertinent findings related to patient's condition, differential diagnosis, treatment plan and final disposition.      Additional sources:  - Discussed/ obtained information from independent historians: History obtained from the patient himself at bedside.    - External (non-ED) record review: Upon medical records review, the patient was last seen and evaluated in the outpatient surgical setting with thoracic surgery on 10/24/2024 for a Pleurx catheter removal.    - Chronic or social conditions impacting care: Anticoagulation dependent history of atrial fibrillation    - Shared decision making: Admission decision based on shared conversations have between myself, the patient and family at bedside, as well as Dr. Lawrence with [facility].      Orders placed during this visit:  Orders Placed This Encounter   Procedures    Critical Care    XR Chest 1 View    Comprehensive Metabolic Panel    High Sensitivity Troponin T    BNP    Lactic Acid, Plasma    Procalcitonin    CBC Auto Differential    Manual Differential    High Sensitivity Troponin T 2Hr    [facility] (on-call MD unless specified) Details    ECG 12 Lead Dyspnea    Insert Peripheral IV    Inpatient Admission    CBC & Differential             Differential diagnosis includes but is not limited to:    Flash pulmonary edema, acute hypoxemic respiratory failure, hypercapnia, CHF with pulmonary edema, pleural effusion, acute coronary syndrome, pneumonia, pneumothorax, or pulmonary embolism      Independent interpretation of labs, radiology studies, and discussions with consultants:    Chest x-ray independently interpreted by myself with my interpretation showing cardiomegaly with bilateral interstitial edema.  Bilateral effusions present.      ED Course as of 11/06/24 1915 Wed Nov 06, 2024   1619 The patient is currently  hypoxic on room air with oxygen saturations at 88%.  We will place him on 2 L of oxygen by nasal cannula as we begin his workup.  It also appears as though he is in A-fib with RVR.  We will treat with a dose of Lopressor and reevaluate closely. [BM]   1830 On reevaluation, the patient is resting comfortably with oxygen saturations of 98% on 2 L by nasal cannula.  his heart rate as well is much better and currently in the 80s after the IV Lopressor dose.  He does however remain in atrial fibrillation but rate controlled.  I informed him that he does have edematous changes on his chest x-ray consistent with a CHF exacerbation.  We will treat with IV Lasix and admit him to the hospital today for further management and treatment.  He agrees with the plan and all questions answered. [BM]   1914 The patient's presentation, workup, as well as diagnosis and treatment plan was discussed at length with Dr. Lawrence of [facility].  He agrees to admit the patient to the hospital today for further management and treatment. [BM]      ED Course User Index  [BM] Konrad Germain MD           DIAGNOSIS  Final diagnoses:   Acute respiratory failure with hypoxia   Acute on chronic congestive heart failure, unspecified heart failure type   Atrial fibrillation with rapid ventricular response   Elevated troponin         DISPOSITION  ADMISSION    Discussed treatment plan and reason for admission with pt/family and admitting physician.  Pt/family voiced understanding of the plan for admission for further testing/treatment as needed.               Latest Documented Vital Signs:  As of 19:15 EST  BP- 128/78 HR- 103 Temp- 97.6 °F (36.4 °C) O2 sat- 96%              --    Please note that portions of this were completed with a voice recognition program.       Note Disclaimer: At [facility], we believe that sharing information builds trust and better relationships. You are receiving this note because you are receiving care at [facility] or  recently visited. It is possible you will see health information before a provider has talked with you about it. This kind of information can be easy to misunderstand. To help you fully understand what it means for your health, we urge you to discuss this note with your provider.             Konrad Germain MD  11/06/24 1916

## 2024-11-07 PROBLEM — I50.33 ACUTE ON CHRONIC HEART FAILURE WITH PRESERVED EJECTION FRACTION (HFPEF): Status: ACTIVE | Noted: 2024-11-06

## 2024-11-07 PROBLEM — R79.89 ELEVATED TROPONIN: Status: ACTIVE | Noted: 2024-11-07

## 2024-11-07 PROBLEM — Z89.611 S/P AKA (ABOVE KNEE AMPUTATION), RIGHT: Status: ACTIVE | Noted: 2024-11-07

## 2024-11-07 PROBLEM — R74.01 TRANSAMINITIS: Status: ACTIVE | Noted: 2024-11-07

## 2024-11-07 LAB
ALBUMIN SERPL-MCNC: 2.8 G/DL (ref 3.5–5.2)
ALBUMIN/GLOB SERPL: 0.9 G/DL
ALP SERPL-CCNC: 234 U/L (ref 39–117)
ALT SERPL W P-5'-P-CCNC: 95 U/L (ref 1–41)
ANION GAP SERPL CALCULATED.3IONS-SCNC: 8 MMOL/L (ref 5–15)
AST SERPL-CCNC: 73 U/L (ref 1–40)
BASOPHILS # BLD AUTO: 0.02 10*3/MM3 (ref 0–0.2)
BASOPHILS NFR BLD AUTO: 0.3 % (ref 0–1.5)
BILIRUB SERPL-MCNC: 1.5 MG/DL (ref 0–1.2)
BUN SERPL-MCNC: 29 MG/DL (ref 8–23)
BUN/CREAT SERPL: 32.2 (ref 7–25)
CALCIUM SPEC-SCNC: 8.3 MG/DL (ref 8.6–10.5)
CHLORIDE SERPL-SCNC: 103 MMOL/L (ref 98–107)
CO2 SERPL-SCNC: 31 MMOL/L (ref 22–29)
CREAT SERPL-MCNC: 0.9 MG/DL (ref 0.76–1.27)
DEPRECATED RDW RBC AUTO: 43.7 FL (ref 37–54)
EGFRCR SERPLBLD CKD-EPI 2021: 84.2 ML/MIN/1.73
EOSINOPHIL # BLD AUTO: 0.06 10*3/MM3 (ref 0–0.4)
EOSINOPHIL NFR BLD AUTO: 0.8 % (ref 0.3–6.2)
ERYTHROCYTE [DISTWIDTH] IN BLOOD BY AUTOMATED COUNT: 17.1 % (ref 12.3–15.4)
GLOBULIN UR ELPH-MCNC: 3 GM/DL
GLUCOSE SERPL-MCNC: 87 MG/DL (ref 65–99)
HCT VFR BLD AUTO: 31.2 % (ref 37.5–51)
HGB BLD-MCNC: 9.1 G/DL (ref 13–17.7)
IMM GRANULOCYTES # BLD AUTO: 0.02 10*3/MM3 (ref 0–0.05)
IMM GRANULOCYTES NFR BLD AUTO: 0.3 % (ref 0–0.5)
LYMPHOCYTES # BLD AUTO: 0.72 10*3/MM3 (ref 0.7–3.1)
LYMPHOCYTES NFR BLD AUTO: 9.4 % (ref 19.6–45.3)
MCH RBC QN AUTO: 21.2 PG (ref 26.6–33)
MCHC RBC AUTO-ENTMCNC: 29.2 G/DL (ref 31.5–35.7)
MCV RBC AUTO: 72.6 FL (ref 79–97)
MONOCYTES # BLD AUTO: 0.82 10*3/MM3 (ref 0.1–0.9)
MONOCYTES NFR BLD AUTO: 10.7 % (ref 5–12)
NEUTROPHILS NFR BLD AUTO: 6 10*3/MM3 (ref 1.7–7)
NEUTROPHILS NFR BLD AUTO: 78.5 % (ref 42.7–76)
PLATELET # BLD AUTO: 158 10*3/MM3 (ref 140–450)
PMV BLD AUTO: 10.8 FL (ref 6–12)
POTASSIUM SERPL-SCNC: 3.8 MMOL/L (ref 3.5–5.2)
POTASSIUM SERPL-SCNC: 4.2 MMOL/L (ref 3.5–5.2)
PROT SERPL-MCNC: 5.8 G/DL (ref 6–8.5)
RBC # BLD AUTO: 4.3 10*6/MM3 (ref 4.14–5.8)
SODIUM SERPL-SCNC: 142 MMOL/L (ref 136–145)
TSH SERPL DL<=0.05 MIU/L-ACNC: 1.16 UIU/ML (ref 0.27–4.2)
WBC NRBC COR # BLD AUTO: 7.64 10*3/MM3 (ref 3.4–10.8)

## 2024-11-07 PROCEDURE — 25010000002 FUROSEMIDE PER 20 MG: Performed by: STUDENT IN AN ORGANIZED HEALTH CARE EDUCATION/TRAINING PROGRAM

## 2024-11-07 PROCEDURE — 84132 ASSAY OF SERUM POTASSIUM: CPT | Performed by: HOSPITALIST

## 2024-11-07 PROCEDURE — 97166 OT EVAL MOD COMPLEX 45 MIN: CPT

## 2024-11-07 PROCEDURE — 36415 COLL VENOUS BLD VENIPUNCTURE: CPT | Performed by: STUDENT IN AN ORGANIZED HEALTH CARE EDUCATION/TRAINING PROGRAM

## 2024-11-07 PROCEDURE — 80053 COMPREHEN METABOLIC PANEL: CPT | Performed by: STUDENT IN AN ORGANIZED HEALTH CARE EDUCATION/TRAINING PROGRAM

## 2024-11-07 PROCEDURE — 85025 COMPLETE CBC W/AUTO DIFF WBC: CPT | Performed by: STUDENT IN AN ORGANIZED HEALTH CARE EDUCATION/TRAINING PROGRAM

## 2024-11-07 PROCEDURE — 97535 SELF CARE MNGMENT TRAINING: CPT

## 2024-11-07 PROCEDURE — 84443 ASSAY THYROID STIM HORMONE: CPT | Performed by: STUDENT IN AN ORGANIZED HEALTH CARE EDUCATION/TRAINING PROGRAM

## 2024-11-07 PROCEDURE — 99222 1ST HOSP IP/OBS MODERATE 55: CPT | Performed by: NURSE PRACTITIONER

## 2024-11-07 RX ORDER — POTASSIUM CHLORIDE 1.5 G/1.58G
20 POWDER, FOR SOLUTION ORAL ONCE
Status: COMPLETED | OUTPATIENT
Start: 2024-11-07 | End: 2024-11-07

## 2024-11-07 RX ORDER — DIGOXIN 125 MCG
125 TABLET ORAL
Status: DISCONTINUED | OUTPATIENT
Start: 2024-11-07 | End: 2024-11-12 | Stop reason: HOSPADM

## 2024-11-07 RX ADMIN — DIGOXIN 125 MCG: 125 TABLET ORAL at 12:44

## 2024-11-07 RX ADMIN — POTASSIUM CHLORIDE 20 MEQ: 1.5 FOR SOLUTION ORAL at 08:01

## 2024-11-07 RX ADMIN — MIDODRINE HYDROCHLORIDE 5 MG: 5 TABLET ORAL at 08:01

## 2024-11-07 RX ADMIN — METOPROLOL TARTRATE 12.5 MG: 25 TABLET, FILM COATED ORAL at 21:05

## 2024-11-07 RX ADMIN — GABAPENTIN 100 MG: 100 CAPSULE ORAL at 21:05

## 2024-11-07 RX ADMIN — FUROSEMIDE 40 MG: 10 INJECTION, SOLUTION INTRAMUSCULAR; INTRAVENOUS at 17:43

## 2024-11-07 RX ADMIN — FUROSEMIDE 40 MG: 10 INJECTION, SOLUTION INTRAMUSCULAR; INTRAVENOUS at 08:01

## 2024-11-07 RX ADMIN — TAMSULOSIN HYDROCHLORIDE 0.4 MG: 0.4 CAPSULE ORAL at 21:05

## 2024-11-07 RX ADMIN — EMPAGLIFLOZIN 10 MG: 10 TABLET, FILM COATED ORAL at 08:01

## 2024-11-07 RX ADMIN — FINASTERIDE 5 MG: 5 TABLET, FILM COATED ORAL at 08:01

## 2024-11-07 RX ADMIN — APIXABAN 5 MG: 5 TABLET, FILM COATED ORAL at 21:05

## 2024-11-07 RX ADMIN — METOPROLOL TARTRATE 12.5 MG: 25 TABLET, FILM COATED ORAL at 08:01

## 2024-11-07 RX ADMIN — APIXABAN 5 MG: 5 TABLET, FILM COATED ORAL at 08:01

## 2024-11-07 RX ADMIN — MIDODRINE HYDROCHLORIDE 5 MG: 5 TABLET ORAL at 17:43

## 2024-11-07 RX ADMIN — POTASSIUM CHLORIDE 20 MEQ: 750 TABLET, EXTENDED RELEASE ORAL at 08:14

## 2024-11-07 RX ADMIN — PANTOPRAZOLE SODIUM 40 MG: 40 TABLET, DELAYED RELEASE ORAL at 08:01

## 2024-11-07 RX ADMIN — PANTOPRAZOLE SODIUM 40 MG: 40 TABLET, DELAYED RELEASE ORAL at 17:43

## 2024-11-07 NOTE — ED NOTES
Nursing report ED to floor  Mehreen Silva IV  84 y.o.  male    HPI :  HPI  Stated Reason for Visit: soa    Chief Complaint  Chief Complaint   Patient presents with    Shortness of Breath       Admitting doctor:   Stanislav Lawrence DO    Admitting diagnosis:   The primary encounter diagnosis was Acute respiratory failure with hypoxia. Diagnoses of Acute on chronic congestive heart failure, unspecified heart failure type, Atrial fibrillation with rapid ventricular response, and Elevated troponin were also pertinent to this visit.    Code status:   Current Code Status       Date Active Code Status Order ID Comments User Context       Prior            Allergies:   Patient has no known allergies.    Isolation:   No active isolations    Intake and Output    Intake/Output Summary (Last 24 hours) at 11/6/2024 1928  Last data filed at 11/6/2024 1913  Gross per 24 hour   Intake 100 ml   Output 100 ml   Net 0 ml       Weight:       11/06/24  1559   Weight: 65.8 kg (145 lb)       Most recent vitals:   Vitals:    11/06/24 1701 11/06/24 1731 11/06/24 1843 11/06/24 1900   BP: 108/71 111/74 117/69 128/78   Pulse: 98 85 73 103   Resp:  18     Temp:       SpO2: 97% 98%  96%   Weight:       Height:           Active LDAs/IV Access:   Lines, Drains & Airways       Active LDAs       Name Placement date Placement time Site Days    Peripheral IV 11/06/24 1629 Right Antecubital 11/06/24  1629  Antecubital  less than 1    Urethral Catheter Coude 18 Fr. 08/08/24  1230  -- 90                    Labs (abnormal labs have a star):   Labs Reviewed   COMPREHENSIVE METABOLIC PANEL - Abnormal; Notable for the following components:       Result Value    Glucose 119 (*)     BUN 29 (*)     CO2 31.1 (*)     Albumin 3.0 (*)     ALT (SGPT) 110 (*)     AST (SGOT) 74 (*)     Alkaline Phosphatase 282 (*)     Total Bilirubin 1.6 (*)     All other components within normal limits    Narrative:     GFR Normal >60  Chronic Kidney Disease <60  Kidney Failure <15    The  GFR formula is only valid for adults with stable renal function between ages 18 and 70.   TROPONIN - Abnormal; Notable for the following components:    HS Troponin T 130 (*)     All other components within normal limits    Narrative:     High Sensitive Troponin T Reference Range:  <14.0 ng/L- Negative Female for AMI  <22.0 ng/L- Negative Male for AMI  >=14 - Abnormal Female indicating possible myocardial injury.  >=22 - Abnormal Male indicating possible myocardial injury.   Clinicians would have to utilize clinical acumen, EKG, Troponin, and serial changes to determine if it is an Acute Myocardial Infarction or myocardial injury due to an underlying chronic condition.        BNP (IN-HOUSE) - Abnormal; Notable for the following components:    proBNP 4,451.0 (*)     All other components within normal limits    Narrative:     This assay is used as an aid in the diagnosis of individuals suspected of having heart failure. It can be used as an aid in the diagnosis of acute decompensated heart failure (ADHF) in patients presenting with signs and symptoms of ADHF to the emergency department (ED). In addition, NT-proBNP of <300 pg/mL indicates ADHF is not likely.    Age Range Result Interpretation  NT-proBNP Concentration (pg/mL:      <50             Positive            >450                   Gray                 300-450                    Negative             <300    50-75           Positive            >900                  Gray                300-900                  Negative            <300      >75             Positive            >1800                  Gray                300-1800                  Negative            <300   CBC WITH AUTO DIFFERENTIAL - Abnormal; Notable for the following components:    Hemoglobin 10.4 (*)     Hematocrit 35.4 (*)     MCV 72.4 (*)     MCH 21.3 (*)     MCHC 29.4 (*)     RDW 17.3 (*)     All other components within normal limits   MANUAL DIFFERENTIAL - Abnormal; Notable for the following  "components:    Neutrophil % 88.8 (*)     Lymphocyte % 6.1 (*)     Monocyte % 4.1 (*)     Neutrophils Absolute 7.22 (*)     Lymphocytes Absolute 0.50 (*)     All other components within normal limits   HIGH SENSITIVITIY TROPONIN T 2HR - Abnormal; Notable for the following components:    HS Troponin T 112 (*)     Troponin T Delta -18 (*)     All other components within normal limits    Narrative:     High Sensitive Troponin T Reference Range:  <14.0 ng/L- Negative Female for AMI  <22.0 ng/L- Negative Male for AMI  >=14 - Abnormal Female indicating possible myocardial injury.  >=22 - Abnormal Male indicating possible myocardial injury.   Clinicians would have to utilize clinical acumen, EKG, Troponin, and serial changes to determine if it is an Acute Myocardial Infarction or myocardial injury due to an underlying chronic condition.        LACTIC ACID, PLASMA - Normal   PROCALCITONIN - Normal    Narrative:     As a Marker for Sepsis (Non-Neonates):    1. <0.5 ng/mL represents a low risk of severe sepsis and/or septic shock.  2. >2 ng/mL represents a high risk of severe sepsis and/or septic shock.    As a Marker for Lower Respiratory Tract Infections that require antibiotic therapy:    PCT on Admission    Antibiotic Therapy       6-12 Hrs later    >0.5                Strongly Recommended  >0.25 - <0.5        Recommended   0.1 - 0.25          Discouraged              Remeasure/reassess PCT  <0.1                Strongly Discouraged     Remeasure/reassess PCT    As 28 day mortality risk marker: \"Change in Procalcitonin Result\" (>80% or <=80%) if Day 0 (or Day 1) and Day 4 values are available. Refer to http://www.[facility]-pct-calculator.com    Change in PCT <=80%  A decrease of PCT levels below or equal to 80% defines a positive change in PCT test result representing a higher risk for 28-day all-cause mortality of patients diagnosed with severe sepsis for septic shock.    Change in PCT >80%  A decrease of PCT levels of more " than 80% defines a negative change in PCT result representing a lower risk for 28-day all-cause mortality of patients diagnosed with severe sepsis or septic shock.      CBC AND DIFFERENTIAL    Narrative:     The following orders were created for panel order CBC & Differential.  Procedure                               Abnormality         Status                     ---------                               -----------         ------                     CBC Auto Differential[[id number]]        Abnormal            Final result                 Please view results for these tests on the individual orders.       EKG:   ECG 12 Lead Dyspnea   Final Result   HEART ZTDM=387  bpm   RR Qyewurvj=522  ms   WA Interval=  ms   P Horizontal Axis=  deg   P Front Axis=  deg   QRSD Interval=77  ms   QT Ockkzjec=793  ms   PByK=851  ms   QRS Axis=17  deg   T Wave Axis=77  deg   - ABNORMAL ECG -   Atrial fibrillation   Ventricular premature complex   Low voltage, extremity leads   Anteroseptal  infarct, age indeterminate   Non-Specific STT wave changes   No change from previous tracing   Electronically Signed By: Javier HaynesEDMOND) ([facility]) 2024-11-06 17:26:37   Date and Time of Study:2024-11-06 16:14:19          Meds given in ED:   Medications   sodium chloride 0.9 % flush 10 mL (has no administration in time range)   metoprolol tartrate (LOPRESSOR) injection 5 mg (5 mg Intravenous Given 11/6/24 1635)   furosemide (LASIX) injection 40 mg (40 mg Intravenous Given 11/6/24 1843)       Imaging results:  XR Chest 1 View    Result Date: 11/6/2024  FINDINGS AND IMPRESSION: Left shoulder arthroplasty is in [place] visualized. There is pulm vascular congestion with superimposed pulmonary pacification, slightly within the bilateral mid to lower lungs. At least small to moderate bilateral pleural effusions are present. Findings appear to have worsened on the left since 10/24/2024, most concerning for pulmonary edema in the proper clinical context with  multifocal pneumonia less likely. Correlation with patient history is recommended.. Cardiac silhouette while accentuated by low lung volumes appears to be at least moderately enlarged, as before., Nodular area of opacification within the left midlung is present, as before. At least continued attention on follow-up of these findings with chest radiograph in 3 to 4 weeks is recommended to ensure resolution and exclude the possibility malignancy. Functionality further evaluated but chest CT if clinically indicated.  No pneumothorax is seen  This report was finalized on 2024 5:21 PM by Dr. Matias Fischer M.D on Workstation: [id number]       Ambulatory status:   Wheelchair (RLE amputee)    Social issues:   Social History     Socioeconomic History    Marital status:     Number of children: 1    Highest education level: Professional school degree (e.g., MD, DDS, DVM, [employer])   Tobacco Use    Smoking status: Former     Current packs/day: 0.00     Types: Cigarettes     Quit date: 1983     Years since quittin.4     Passive exposure: Past    Smokeless tobacco: Never    Tobacco comments:        Vaping Use    Vaping status: Never Used   Substance and Sexual Activity    Alcohol use: Not Currently     Comment: SOCIAL; Patient is non drinker.    Drug use: No     Comment: Drug Abuse: Not a drug user    Sexual activity: Defer       Peripheral Neurovascular  Peripheral Neurovascular (Adult)  Peripheral Neurovascular WDL: WDL    Neuro Cognitive  Neuro Cognitive (Adult)  Cognitive/Neuro/Behavioral WDL: .WDL except, mood/behavior  Mood/Behavior: anxious    Learning  Learning Assessment  Learning Readiness and Ability: no barriers identified    Respiratory  Respiratory WDL  Respiratory WDL: .WDL except, rhythm/pattern  Rhythm/Pattern, Respiratory: shortness of breath    Abdominal Pain       Pain Assessments  Pain (Adult)  (0-10) Pain Rating: Rest: 0    NIH Stroke Scale       Dilma Segal RN  24 19:28 EST

## 2024-11-07 NOTE — H&P
Patient Name:  Mehreen Silva IV  YOB: 1940  MRN:  [id number]  Admit Date:  11/6/2024  Patient Care Team:  Taiwo Powers MD as PCP - General (Internal Medicine)  Self, Bess YO MD as Consulting Physician (Endocrinology)  Huang Chan MD as Cardiologist (Cardiology)      Subjective   History Present Illness     Chief Complaint   Patient presents with    Shortness of Breath       Mr. Silva is a 84 y.o. with a history of HFpEF, pleural effusion with prior Pleurx catheter (removed 10/2024), mitral regurgitation, pulmonary hypertension, orthostatic hypotension, CKD stage 2, hyperthyroidism, permanent atrial fibrillation on Eliquis, gangrene s/p right AKA  that presents to [facility] complaining of worsening shortness of breath over the past week prior to arrival. He states that since onset, symptoms have been constant and progressive in nature, mostly noticeable over the past day or so, which prompted him to come to ED for evaluation. He endorses associated orthopnea and PND. He denies recollection of inciting factors. Denies associated fever, chills, chest pain, palpitations, cough, abdominal pain, N/V/, dizziness, lightheadedness or syncope. Upon arrival, patient was found to have imaging evidence of pulmonary vasculature congestion along with elevated troponin and proBNP. He was also noted to have atrial fibrillation with RVR, he was given x1 dose of IV diuresis and IV Lopressor in ED, which led to improvement in rate. He is being admitted for further evaluation and management.    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, 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/2023    Atrial fibrillation     Cervical spondylosis with myelopathy     Chronic osteomyelitis of  right ankle with draining sinus 12/13/2023    HLD (hyperlipidemia) 09/23/2022    Hx of toxic multinodular goiter 09/23/2022    Hyperthyroidism     Lumbar radiculopathy     Nonrheumatic mitral valve regurgitation 07/31/2024    Nonrheumatic tricuspid valve regurgitation 07/31/2024    Orthostatic hypotension     Osteoarthritis     Pressure injury of sacral region, unstageable 05/14/2024    Pulmonary hypertension     Recurrent pleural effusion on right 04/27/2024    HAS PLEURAL CATH IN PLACE    Ulcer with gangrene     right heel    (  HAD ABOVE THE KNEE AMPUTATION  )     Past Surgical History:   Procedure Laterality Date    ABOVE KNEE AMPUTATION Right 12/13/2023    Procedure: ABOVE KNEE AMPUTATION RIGHT, proveena wound vac placement;  Surgeon: Issa Nieves MD;  Location: [facility] OR;  Service: Vascular;  Laterality: Right;    APPENDECTOMY      BACK SURGERY      x4    COLONOSCOPY      COLONOSCOPY N/A 6/29/2024    Procedure: COLONOSCOPY to cecum;  Surgeon: Amos Cuevas MD;  Location: [facility] ENDOSCOPY;  Service: Gastroenterology;  Laterality: N/A;  pre heme pos stool  post poor prep    CYSTOSCOPY Bilateral 8/8/2024    Procedure: CYSTOSCOPY WITH BILATERAL RETROGRADE PYELOGRAM;  Surgeon: Johnson Kang Jr., MD;  Location: [facility] MAIN OR;  Service: Urology;  Laterality: Bilateral;    ENDOSCOPY N/A 6/29/2024    Procedure: ESOPHAGOGASTRODUODENOSCOPY apc and cold biopsy;  Surgeon: Amos Cuevas MD;  Location: [facility] ENDOSCOPY;  Service: Gastroenterology;  Laterality: N/A;  pre anemia  post duodenal/stomach ulcer angio dysplasia gastritis    EXCISION MASS TRUNK N/A 06/08/2016    Procedure: Excision soft tissue neoplasm on abdominal wall and left neck;  Surgeon: Shaji Barahona Jr., MD;  Location: [facility] OR;  Service:     KNEE SURGERY      NECK SURGERY  1974    PLEURAL CATHETER INSERTION Right 4/30/2024    Procedure: RIGHT VIDEO ASSISTED THORACOSCOPY, PARTIAL DECORTICATION, PLEURX CATHETER  INSERTION;  Surgeon: Nataliya Noyola MD;  Location: [facility] MAIN OR;  Service: Thoracic;  Laterality: Right;    PLEURX CATHETER REMOVAL Right 10/24/2024    Procedure: PLEURX CATHETER REMOVAL;  Surgeon: Nataliya Noyola MD;  Location: [facility] MAIN OR;  Service: Thoracic;  Laterality: Right;    QUADRICEPS REPAIR      ROTATOR CUFF REPAIR      SHOULDER SURGERY      SMALL INTESTINE SURGERY      Bowel Obstruction     Family History   Problem Relation Age of Onset    Cancer Mother     Heart disease Father     Aneurysm Father         Abdominal Aortic Aneurysm    Malig Hyperthermia Neg Hx      Social History     Tobacco Use    Smoking status: Former     Current packs/day: 0.00     Types: Cigarettes     Quit date: 1983     Years since quittin.4     Passive exposure: Past    Smokeless tobacco: Never    Tobacco comments:        Vaping Use    Vaping status: Never Used   Substance Use Topics    Alcohol use: Not Currently     Comment: SOCIAL; Patient is non drinker.    Drug use: No     Comment: Drug Abuse: Not a drug user     No current facility-administered medications on file prior to encounter.     Current Outpatient Medications on File Prior to Encounter   Medication Sig Dispense Refill    apixaban (Eliquis) 5 MG tablet tablet Take 1 tablet by mouth Every 12 (Twelve) Hours. 180 tablet 3    cholecalciferol (VITAMIN D3) 25 MCG (1000 UT) tablet Take 1 tablet by mouth Daily. HOLD PRIOR TO SURGERY      finasteride (PROSCAR) 5 MG tablet Take 1 tablet by mouth Daily. 40 tablet 11    furosemide (LASIX) 40 MG tablet TAKE 1/2 TABLET BY MOUTH DAILY 45 tablet 1    gabapentin (NEURONTIN) 100 MG capsule Take 1 capsule by mouth every night at bedtime.      Jardiance 10 MG tablet tablet TAKE 1 TABLET BY MOUTH DAILY 90 tablet 1    LORazepam (ATIVAN) 0.5 MG tablet Take 1 tablet by mouth Every 8 (Eight) Hours As Needed for Anxiety.      methIMAzole (TAPAZOLE) 5 MG tablet Take 1 tablet by mouth 3 (Three) Times a Week. Monday,  Wednesday, and Friday      metoprolol tartrate (LOPRESSOR) 25 MG tablet Take 0.5 tablets by mouth 2 (Two) Times a Day. 90 tablet 3    midodrine (PROAMATINE) 5 MG tablet Take 1 tablet by mouth 2 (Two) Times a Day Before Meals. 180 tablet 1    multivitamin with minerals tablet tablet Take 1 tablet by mouth Daily. HOLD PRIOR TO SURGERY      pantoprazole (PROTONIX) 40 MG EC tablet Take 1 tablet by mouth 2 (Two) Times a Day Before Meals. 60 tablet 0    potassium chloride (KLOR-CON M20) 20 MEQ CR tablet Take 1 tablet by mouth Daily. 90 tablet 1    Santyl 250 UNIT/GM ointment Apply 1 Application topically to the appropriate area as directed Daily. To sacrum      tamsulosin (FLOMAX) 0.4 MG capsule 24 hr capsule Take 1 capsule by mouth Every Night. 30 capsule 0     No Known Allergies    Objective    Objective     Vital Signs  Temp:  [97.6 °F (36.4 °C)] 97.6 °F (36.4 °C)  Heart Rate:  [] 103  Resp:  [18] 18  BP: (108-138)/(69-94) 128/78  SpO2:  [90 %-98 %] 96 %  on   ;   Device (Oxygen Therapy): room air  Body mass index is 20.81 kg/m².    Physical Exam  Vitals and nursing note reviewed.   Constitutional:       General: He is awake. He is not in acute distress.     Comments: Chronically ill appearing   Cardiovascular:      Rate and Rhythm: Normal rate and regular rhythm.      Pulses: Normal pulses.      Heart sounds: Normal heart sounds.   Pulmonary:      Effort: Pulmonary effort is normal. No respiratory distress.      Breath sounds: Decreased breath sounds present.   Abdominal:      Palpations: Abdomen is soft.      Tenderness: There is no abdominal tenderness.   Musculoskeletal:      Comments: Right AKA   Skin:     General: Skin is warm and dry.   Neurological:      Mental Status: He is alert.   Psychiatric:         Behavior: Behavior is cooperative.         Results Review:  I reviewed the patient's new clinical results.  I reviewed the patient's new imaging results and agree with the interpretation.  I reviewed the  patient's other test results and agree with the interpretation  I personally viewed and interpreted the patient's EKG/Telemetry data  Discussed with ED provider.    Lab Results (last 24 hours)       Procedure Component Value Units Date/Time    Comprehensive Metabolic Panel [[id number]]  (Abnormal) Collected: 11/06/24 1627    Specimen: Blood from Arm, Right Updated: 11/06/24 1712     Glucose 119 mg/dL      BUN 29 mg/dL      Creatinine 1.18 mg/dL      Sodium 145 mmol/L      Potassium 4.6 mmol/L      Chloride 106 mmol/L      CO2 31.1 mmol/L      Calcium 8.9 mg/dL      Total Protein 6.7 g/dL      Albumin 3.0 g/dL      ALT (SGPT) 110 U/L      AST (SGOT) 74 U/L      Alkaline Phosphatase 282 U/L      Total Bilirubin 1.6 mg/dL      Globulin 3.7 gm/dL      A/G Ratio 0.8 g/dL      BUN/Creatinine Ratio 24.6     Anion Gap 7.9 mmol/L      eGFR 60.8 mL/min/1.73     Narrative:      GFR Normal >60  Chronic Kidney Disease <60  Kidney Failure <15    The GFR formula is only valid for adults with stable renal function between ages 18 and 70.    CBC & Differential [[id number]]  (Abnormal) Collected: 11/06/24 1627    Specimen: Blood from Arm, Right Updated: 11/06/24 1703    Narrative:      The following orders were created for panel order CBC & Differential.  Procedure                               Abnormality         Status                     ---------                               -----------         ------                     CBC Auto Differential[[id number]]        Abnormal            Final result                 Please view results for these tests on the individual orders.    High Sensitivity Troponin T [[id number]]  (Abnormal) Collected: 11/06/24 1627    Specimen: Blood from Arm, Right Updated: 11/06/24 1720     HS Troponin T 130 ng/L     Narrative:      High Sensitive Troponin T Reference Range:  <14.0 ng/L- Negative Female for AMI  <22.0 ng/L- Negative Male for AMI  >=14 - Abnormal Female indicating possible myocardial injury.  >=22  - Abnormal Male indicating possible myocardial injury.   Clinicians would have to utilize clinical acumen, EKG, Troponin, and serial changes to determine if it is an Acute Myocardial Infarction or myocardial injury due to an underlying chronic condition.         BNP [[id number]]  (Abnormal) Collected: 11/06/24 1627    Specimen: Blood from Arm, Right Updated: 11/06/24 1718     proBNP 4,451.0 pg/mL     Narrative:      This assay is used as an aid in the diagnosis of individuals suspected of having heart failure. It can be used as an aid in the diagnosis of acute decompensated heart failure (ADHF) in patients presenting with signs and symptoms of ADHF to the emergency department (ED). In addition, NT-proBNP of <300 pg/mL indicates ADHF is not likely.    Age Range Result Interpretation  NT-proBNP Concentration (pg/mL:      <50             Positive            >450                   Gray                 300-450                    Negative             <300    50-75           Positive            >900                  Gray                300-900                  Negative            <300      >75             Positive            >1800                  Gray                300-1800                  Negative            <300    Lactic Acid, Plasma [[id number]]  (Normal) Collected: 11/06/24 1627    Specimen: Blood from Arm, Right Updated: 11/06/24 1706     Lactate 1.7 mmol/L     Procalcitonin [[id number]]  (Normal) Collected: 11/06/24 1627    Specimen: Blood from Arm, Right Updated: 11/06/24 1718     Procalcitonin 0.11 ng/mL     Narrative:      As a Marker for Sepsis (Non-Neonates):    1. <0.5 ng/mL represents a low risk of severe sepsis and/or septic shock.  2. >2 ng/mL represents a high risk of severe sepsis and/or septic shock.    As a Marker for Lower Respiratory Tract Infections that require antibiotic therapy:    PCT on Admission    Antibiotic Therapy       6-12 Hrs later    >0.5                Strongly Recommended  >0.25 - <0.5  "       Recommended   0.1 - 0.25          Discouraged              Remeasure/reassess PCT  <0.1                Strongly Discouraged     Remeasure/reassess PCT    As 28 day mortality risk marker: \"Change in Procalcitonin Result\" (>80% or <=80%) if Day 0 (or Day 1) and Day 4 values are available. Refer to http://[email or web address]-pct-[email or web address]    Change in PCT <=80%  A decrease of PCT levels below or equal to 80% defines a positive change in PCT test result representing a higher risk for 28-day all-cause mortality of patients diagnosed with severe sepsis for septic shock.    Change in PCT >80%  A decrease of PCT levels of more than 80% defines a negative change in PCT result representing a lower risk for 28-day all-cause mortality of patients diagnosed with severe sepsis or septic shock.       CBC Auto Differential [[id number]]  (Abnormal) Collected: 11/06/24 1627    Specimen: Blood from Arm, Right Updated: 11/06/24 1703     WBC 8.13 10*3/mm3      RBC 4.89 10*6/mm3      Hemoglobin 10.4 g/dL      Hematocrit 35.4 %      MCV 72.4 fL      MCH 21.3 pg      MCHC 29.4 g/dL      RDW 17.3 %      RDW-SD 44.4 fl      MPV 10.2 fL      Platelets 178 10*3/mm3     Manual Differential [[id number]]  (Abnormal) Collected: 11/06/24 1627    Specimen: Blood from Arm, Right Updated: 11/06/24 1728     Neutrophil % 88.8 %      Lymphocyte % 6.1 %      Monocyte % 4.1 %      Eosinophil % 1.0 %      Basophil % 0.0 %      Neutrophils Absolute 7.22 10*3/mm3      Lymphocytes Absolute 0.50 10*3/mm3      Monocytes Absolute 0.33 10*3/mm3      Eosinophils Absolute 0.08 10*3/mm3      Basophils Absolute 0.00 10*3/mm3      Anisocytosis Mod/2+     Microcytes Mod/2+     Polychromasia Large/3+     Smudge Cells Slight/1+     Giant Platelets Slight/1+    High Sensitivity Troponin T 2Hr [[id number]]  (Abnormal) Collected: 11/06/24 1843    Specimen: Blood Updated: 11/06/24 1917     HS Troponin T 112 ng/L      Troponin T Delta -18 ng/L     Narrative:      High " Sensitive Troponin T Reference Range:  <14.0 ng/L- Negative Female for AMI  <22.0 ng/L- Negative Male for AMI  >=14 - Abnormal Female indicating possible myocardial injury.  >=22 - Abnormal Male indicating possible myocardial injury.   Clinicians would have to utilize clinical acumen, EKG, Troponin, and serial changes to determine if it is an Acute Myocardial Infarction or myocardial injury due to an underlying chronic condition.                 Imaging Results (Last 24 Hours)       Procedure Component Value Units Date/Time    XR Chest 1 View [[id number]] Collected: 11/06/24 1713     Updated: 11/06/24 1724    Narrative:        Portable chest radiograph     HISTORY: Shortness of air     TECHNIQUE: Single AP portable radiograph of the chest     COMPARISON: Multiple chest radiographs and back 6/30/2024       Impression:      FINDINGS AND IMPRESSION:  Left shoulder arthroplasty is in [place] visualized. There is pulm  vascular congestion with superimposed pulmonary pacification, slightly  within the bilateral mid to lower lungs. At least small to moderate  bilateral pleural effusions are present. Findings appear to have  worsened on the left since 10/24/2024, most concerning for pulmonary  edema in the proper clinical context with multifocal pneumonia less  likely. Correlation with patient history is recommended.. Cardiac  silhouette while accentuated by low lung volumes appears to be at least  moderately enlarged, as before., Nodular area of opacification within  the left midlung is present, as before. At least continued attention on  follow-up of these findings with chest radiograph in 3 to 4 weeks is  recommended to ensure resolution and exclude the possibility malignancy.  Functionality further evaluated but chest CT if clinically indicated.     No pneumothorax is seen     This report was finalized on 11/6/2024 5:21 PM by Dr. Matias Fischer M.D  on Workstation: [id number]               Results for orders placed  during the hospital encounter of 04/27/24    Adult Transthoracic Echo Limited W/ Cont if Necessary Per Protocol    Interpretation Summary    Limited study.    The left ventricular cavity is small in size. Left ventricular systolic function is normal. Calculated left ventricular EF = 62.4%    Left ventricular wall thickness is consistent with moderate concentric hypertrophy. Left ventricular diastolic function was indeterminate due to AFib    There is moderate anterior mitral valve thickening present. Moderate to severe mitral valve regurgitation is present.    Estimated right ventricular systolic pressure from tricuspid regurgitation is moderately elevated (45-55 mmHg).    There is a trivial pericardial effusion. There is a large right pleural effusion with likely consolidated right lung base.    Severe LA enlargement.  IVC normal in size and respirophasic variation.      ECG 12 Lead Dyspnea   Final Result   HEART GCOC=158  bpm   RR Dwvbjrgd=995  ms   [place] Interval=  ms   P Horizontal Axis=  deg   P Front Axis=  deg   QRSD Interval=77  ms   QT Vylzabvy=376  ms   RQmF=672  ms   QRS Axis=17  deg   T Wave Axis=77  deg   - ABNORMAL ECG -   Atrial fibrillation   Ventricular premature complex   Low voltage, extremity leads   Anteroseptal  infarct, age indeterminate   Non-Specific STT wave changes   No change from previous tracing   Electronically Signed By: Javier HaynesEDMOND) ([facility]) 2024-11-06 17:26:37   Date and Time of Study:2024-11-06 16:14:19           Assessment/Plan     Active Hospital Problems    Diagnosis  POA    **Acute on chronic heart failure with preserved ejection fraction (HFpEF) [I50.33]  Yes    S/P AKA (above knee amputation), right [Z89.611]  Not Applicable    Transaminitis [R74.01]  Yes    Elevated troponin [R79.89]  Yes    Pleural effusion [J90]  Yes    Nonrheumatic mitral valve regurgitation [I34.0]  Yes    Pulmonary hypertension [I27.20]  Yes    CKD (chronic kidney disease) [N18.9]  Yes    Anemia  [D64.9]  Yes    Orthostatic hypotension [I95.1]  Yes    Complications of immobility [Z74.09]  Yes    Permanent atrial fibrillation [I48.21]  Yes    Hyperthyroidism [E05.90]  Yes    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     Mr. Silva is a 84 y.o. with a history of HFpEF, pleural effusion with prior Pleurx catheter (removed 10/2024), mitral regurgitation, pulmonary hypertension, orthostatic hypotension, CKD stage 2, hyperthyroidism, permanent atrial fibrillation on Eliquis, gangrene s/p right AKA  that presents to [facility] complaining of worsening shortness of breath over the past week prior to arrival.     Acute on chronic HFpEF  Pleural effusions  Elevated troponin  - 2D Echo (04/28/24) showing EF 62.4%, indeterminate LV diastolic function, moderate to severe MR.  - Imaging obtained on admission, reviewed, showing pulmonary vasculature congestion, pleural effusions. ProBNP elevated, troponin elevated but downtrending on repeat testing. EKG showing findings similar to prior, without new evidence of ischemia. S/P IV Lasix in ED.  - Order  Lasix 40 mg IV q12H. Closely monitor urine output and volume status to guide further management.  - Consult Cardiology. Follow their plans/recommendations, greatly appreciate their help.  - Strict I/O, daily weights, telemetry, pulse oximetry, low sodium diet.    Permanent Atrial fibrillation on long term anticoagulation with RVR on arrival  - Elevations in HR noted on arrival, improved after IV Metoprolol, now better controlled.  - Continue Metoprolol and Eliquis.  - Continue telemetry monitoring.    Transaminitis  - Noted on arrival, likely related to volume overload in setting of CHF. Abdominal exam benign.  - Monitor for now with CMP.    Chronic hypotension  - Continue Midodrine.    Anemia  - Hemoglobin low on most recent labs, however, stable from prior. No evidence of overt blood loss.  No indications for acute intervention at this time.    - Order repeat CBC in AM for reassessment. Continue to monitor, transfuse for hemoglobin <7.    Hyperthyroidism  - Check TSH.  - Continue home medication as prescribed.    Hyperglycemia  - A1c 5.30% (June 2024).  - Check AM A1c.      I discussed the patient's findings and my recommendations with patient and ED provider.    VTE Prophylaxis - Eliquis (home med).  Code Status - Full code.       Stanislav Lawrence DO  [facility]  11/06/24  20:04 EST

## 2024-11-07 NOTE — PLAN OF CARE
Goal Outcome Evaluation:              Outcome Evaluation: vss, pt a/o x4 and able to speak/make own decisions, CHG bath completed, pt stated he would like to keep personal clothes on after being cleaned up, pt stated he weight shifts himself although wife is asking he be turned q2h, heel boot applied, wound care consult placed and waiting for them to see pt, wife refused PT this shift and stated she does not want pt getting up at this time, low appetite, dressing on arm removed with nothing present underneath, pt asked to have this nurse change dressing on shin and although nothing but a small closed scab was present pt preferred a dressing over it, new dressing CDI, education provided that with pt thin skin putting bandages on may result in skin tears.

## 2024-11-07 NOTE — CASE MANAGEMENT/SOCIAL WORK
Discharge Planning Assessment  [facility]     Patient Name: Mehreen Silva IV  MRN: [id number]  Today's Date: 11/7/2024    Admit Date: 11/6/2024    Plan: Home with spouse and in home caregiver   Discharge Needs Assessment       Row Name 11/07/24 1637       Living Environment    People in Home spouse    Current Living Arrangements home    Primary Care Provided by self    Provides Primary Care For no one    Family Caregiver if Needed spouse  Caregiver    Quality of Family Relationships involved;helpful;supportive    Able to Return to Prior Arrangements yes       Resource/Environmental Concerns    Resource/Environmental Concerns none    Transportation Concerns none       Transition Planning    Patient/Family Anticipates Transition to home with family    Patient/Family Anticipated Services at Transition none    Transportation Anticipated family or friend will provide       Discharge Needs Assessment    Readmission Within the Last 30 Days no previous admission in last 30 days    Equipment Currently Used at Home wheelchair;commode;shower chair    Anticipated Changes Related to Illness none    Equipment Needed After Discharge none    Provided Post Acute Provider List? N/A    Provided Post Acute Provider Quality & Resource List? N/A                   Discharge Plan       Row Name 11/07/24 1637       Plan    Plan Home with spouse and in home caregiver    Plan Comments CCP met with patient and spouse at bedside. Introduced self and explained role of CCP. Patients spouse confirmed the information on the patients face sheet as accurate. Patients PCP is Taiwo James. Patient lives at home with his spouse and son. Patient has had HH in the past. Patient has a private caregiver that helps take care of the patient at home. [facility] Patient spouse stated that the plan is home with family and private caregiver that can also transport the patient at discharge. CCP following.                   Continued Care and Services - Admitted Since 11/6/2024    No active coordination exists for this encounter.          Demographic Summary       Row Name 11/07/24 5316       General Information    Admission Type inpatient    Arrived From emergency department    Referral Source admission list    Preferred Language English                   Functional Status    No documentation.                  Psychosocial    No documentation.                  Abuse/Neglect    No documentation.                  Legal    No documentation.                  Substance Abuse    No documentation.                  Patient Forms    No documentation.

## 2024-11-07 NOTE — CONSULTS
11/7/2024  10:38 EST      [facility] Non-visit Consult Note    Consult received for lopez catheter management     Patient is well known to urology group. Currently sees Dr. Kang. Has a chronic indwelling lopez. Scheduled for catheter changes in our [facility] every 4 weeks.     Plan  - Continue indwelling catheter  - Keep previously scheduled appointment in our [facility] on 11/18 for catheter exchange  - Urology will sign-off. Call for any questions, concerns, or clinical change.     Alexa Mcgregor, CHARLEE  11/07/24  10:38 EST

## 2024-11-07 NOTE — PROGRESS NOTES
[facility]     LOS: 1 day     Subjective:    CC:Shortness of Breath    DIET:  Diet Order   Procedures    Diet: Cardiac, Diabetic; Healthy Heart (2-3 Na+); Consistent Carbohydrate; Fluid Consistency: Thin (IDDSI 0)       Objective:    Vital Signs:  Temp:  [97.3 °F (36.3 °C)-98.2 °F (36.8 °C)] 97.5 °F (36.4 °C)  Heart Rate:  [] 94  Resp:  [16-18] 18  BP: (105-138)/(59-94) 107/69    SpO2:  [92 %-99 %] 99 %  on   ;   Device (Oxygen Therapy): room air  Body mass index is 21.35 kg/m².    Physical Exam  Constitutional:       General: He is not in acute distress.  Cardiovascular:      Rate and Rhythm: Normal rate. Rhythm irregular.   Pulmonary:      Effort: Pulmonary effort is normal.      Breath sounds: Normal breath sounds.   Abdominal:      General: There is no distension.      Palpations: Abdomen is soft.      Tenderness: There is no abdominal tenderness.   Skin:     General: Skin is warm and dry.   Neurological:      General: No focal deficit present.      Mental Status: He is alert.   Psychiatric:         Mood and Affect: Mood normal.         Behavior: Behavior normal.         Results Review:    Glucose   Date Value Ref Range Status   11/07/2024 87 65 - 99 mg/dL Final   11/06/2024 119 (H) 65 - 99 mg/dL Final     Results from last 7 days   Lab Units 11/07/24  0532   WBC 10*3/mm3 7.64   HEMOGLOBIN g/dL 9.1*   HEMATOCRIT % 31.2*   PLATELETS 10*3/mm3 158     Results from last 7 days   Lab Units 11/07/24  1348 11/07/24  0532   SODIUM mmol/L  --  142   POTASSIUM mmol/L 4.2 3.8   CHLORIDE mmol/L  --  103   CO2 mmol/L  --  31.0*   BUN mg/dL  --  29*   CREATININE mg/dL  --  0.90   CALCIUM mg/dL  --  8.3*   BILIRUBIN mg/dL  --  1.5*   ALK PHOS U/L  --  234*   ALT (SGPT) U/L  --  95*   AST (SGOT) U/L  --  73*   GLUCOSE mg/dL  --  87             Results from last 7 days   Lab Units 11/06/24  1843 11/06/24  1627   HSTROP T ng/L 112* 130*     Cultures:  No results found for: \"BLOODCX\", \"URINECX\", " "\"WOUNDCX\", \"MRSACX\", \"RESPCX\", \"STOOLCX\"    I have reviewed daily medications and changes in CPOE    Scheduled meds  apixaban, 5 mg, Oral, Q12H  digoxin, 125 mcg, Oral, Daily  empagliflozin, 10 mg, Oral, Daily  finasteride, 5 mg, Oral, Daily  furosemide, 40 mg, Intravenous, BID  gabapentin, 100 mg, Oral, Nightly  methIMAzole, 5 mg, Oral, Once per day on Monday Wednesday Friday  metoprolol tartrate, 12.5 mg, Oral, BID  midodrine, 5 mg, Oral, BID AC  pantoprazole, 40 mg, Oral, BID AC  potassium chloride, 20 mEq, Oral, Daily  tamsulosin, 0.4 mg, Oral, Nightly           PRN meds    acetaminophen    senna-docusate sodium **AND** polyethylene glycol **AND** bisacodyl **AND** bisacodyl    Calcium Replacement - Follow Nurse / BPA Driven Protocol    LORazepam    Magnesium Standard Dose Replacement - Follow Nurse / BPA Driven Protocol    nitroglycerin    nitroglycerin    ondansetron ODT **OR** ondansetron    Phosphorus Replacement - Follow Nurse / BPA Driven Protocol    Potassium Replacement - Follow Nurse / BPA Driven Protocol    [COMPLETED] Insert Peripheral IV **AND** sodium chloride        Acute on chronic heart failure with preserved ejection fraction (HFpEF)    Hx of toxic multinodular goiter    Hyperthyroidism    Permanent atrial fibrillation    Complications of immobility    Orthostatic hypotension    CKD (chronic kidney disease)    Anemia    Nonrheumatic mitral valve regurgitation    Pulmonary hypertension    Pleural effusion    S/P AKA (above knee amputation), right    Transaminitis    Elevated troponin        Assessment/Plan:  Acute on chronic heart failure with preserved ejection fraction (EF 62%) with indeterminate left ventricular diastolic function moderate to severe MR  -X-ray with pulmonary vascular congestion, elevated proBNP  -IV Lasix given  -Cardiology is following  -Started on digoxin    Atrial fibrillation  -Elevated heart rate is improved  -Continue with metoprolol and Eliquis  -Started on " digoxin    Transaminitis  -Enzymes are improving  -Thought to be from heart failure    Chronic hypotension  -Continue with midodrine    Anemia  -Hemoglobin 9.1  -MCV 72 with a high RDW of 17    Chronic [place]  -Urology consulted    Possible discharge 1 to 2 days      Kristopher Nicole MD  11/07/24  16:34 EST

## 2024-11-07 NOTE — SIGNIFICANT NOTE
11/07/24 1557   Therapy Assessment/Plan (PT)   Criteria for Skilled Interventions Met (PT) patient/family refuse skilled intervention at this time  (Pt and wife deferred therapy, stating he doesn't need it.  Spouse reports pt only pivots to a chair at home. He has a personal caregiver and plans to return home. PT will sign off as requested. Rn notified)

## 2024-11-07 NOTE — CONSULTS
Patient Name: Mehreen Silva IV  :1940  84 y.o.    Date of Admission: 2024  Date of Consultation:  24  Encounter Provider: CHARLEE Chappell  Place of Service: [facility] CARDIOLOGY  Referring Provider: Stanislav Lawrence DO  Patient Care Team:  Taiwo Powers MD as PCP - General (Internal Medicine)  Self, Bess YO MD as Consulting Physician (Endocrinology)  Huang Chan MD as Cardiologist (Cardiology)      Chief complaint: SOA    History of Present Illness:  Mr. Silva is an 84 year old gentleman followed by Dr. Chan for persistent atrial fibrillation and chronic diastolic heart failure. He has recurrent  pleural effusion, chronic hypotension on midodrine, hyperthyroidism and right AKA due to osteomyelitis and contracture of the right knee. Coronary artery calcification by CT.     He was hospitalized in  for acute anemia with Hgb 6.6 and renal failure with hyperkalemia. Scopes showed AVM. AC held for a week then restarted. He had bradycardia. Beta blocker was adjusted and digoxin stopped.     He was in the hospital on  for pleurx catheter removal. Post op - he had atrial fibrillation with RVR. He got a dose of IV digoxin and IV metoprolol and was discharged home.     He came to the emergency room last night with SOA, orthopnea and PND. CXR with pulmonary vascular congestion . Normal renal function. HS troponin 130 then 112. proBNP 4451. Hgb 10. Normal WBC count. AF with RVR.     He says his HR has been running a little fast. He started feeling SOA a few days ago. +orthopnea. Denies chest pain, pressure.     Echo 24    Limited study.    The left ventricular cavity is small in size. Left ventricular systolic function is normal. Calculated left ventricular EF = 62.4%    Left ventricular wall thickness is consistent with moderate concentric hypertrophy. Left ventricular diastolic function was indeterminate due to AFib    There is moderate  anterior mitral valve thickening present. Moderate to severe mitral valve regurgitation is present.    Estimated right ventricular systolic pressure from tricuspid regurgitation is moderately elevated (45-55 mmHg).    There is a trivial pericardial effusion. There is a large right pleural effusion with likely consolidated right lung base.    Severe LA enlargement.  IVC normal in size and respirophasic variation.     Echo 2/10/24     Left ventricular systolic function is hyperdynamic (EF > 70%).    Left ventricular wall thickness is consistent with mild concentric hypertrophy.    Left ventricular diastolic function was indeterminate.    Normal right ventricular cavity size and systolic function noted.    The left atrial cavity is moderately dilated.    There is moderate aortic valve calcification    Mild to moderate mitral valve regurgitation is present.    Insufficient TR velocity profile to estimate the right ventricular systolic pressure.    There is a small (<1cm) circumferential pericardial effusion. There is no evidence of cardiac tamponade.  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 12/13/2023    HLD (hyperlipidemia) 09/23/2022    Hx of toxic multinodular goiter 09/23/2022    Hyperthyroidism     Lumbar radiculopathy     Nonrheumatic mitral valve regurgitation 07/31/2024    Nonrheumatic tricuspid valve regurgitation 07/31/2024    Orthostatic hypotension     Osteoarthritis     Pressure injury of sacral region, unstageable 05/14/2024    Pulmonary hypertension     Recurrent pleural effusion on right 04/27/2024    HAS PLEURAL CATH IN PLACE    Ulcer  with gangrene     right heel    (  HAD ABOVE THE KNEE AMPUTATION  )       Past Surgical History:   Procedure Laterality Date    ABOVE KNEE AMPUTATION Right 12/13/2023    Procedure: ABOVE KNEE AMPUTATION RIGHT, proveena wound vac placement;  Surgeon: Issa Nieves MD;  Location: [facility] MAIN OR;  Service: Vascular;  Laterality: Right;    APPENDECTOMY      BACK SURGERY      x4    COLONOSCOPY      COLONOSCOPY N/A 6/29/2024    Procedure: COLONOSCOPY to cecum;  Surgeon: Amos Cuevas MD;  Location: [facility] ENDOSCOPY;  Service: Gastroenterology;  Laterality: N/A;  pre heme pos stool  post poor prep    CYSTOSCOPY Bilateral 8/8/2024    Procedure: CYSTOSCOPY WITH BILATERAL RETROGRADE PYELOGRAM;  Surgeon: Johnson Kang Jr., MD;  Location: [facility] MAIN OR;  Service: Urology;  Laterality: Bilateral;    ENDOSCOPY N/A 6/29/2024    Procedure: ESOPHAGOGASTRODUODENOSCOPY apc and cold biopsy;  Surgeon: Amos Cuevas MD;  Location: [facility] ENDOSCOPY;  Service: Gastroenterology;  Laterality: N/A;  pre anemia  post duodenal/stomach ulcer angio dysplasia gastritis    EXCISION MASS TRUNK N/A 06/08/2016    Procedure: Excision soft tissue neoplasm on abdominal wall and left neck;  Surgeon: Shaji Barahona Jr., MD;  Location: [facility] MAIN OR;  Service:     KNEE SURGERY      NECK SURGERY  1974    PLEURAL CATHETER INSERTION Right 4/30/2024    Procedure: RIGHT VIDEO ASSISTED THORACOSCOPY, PARTIAL DECORTICATION, PLEURX CATHETER INSERTION;  Surgeon: Nataliya Noyola MD;  Location: [facility] MAIN OR;  Service: Thoracic;  Laterality: Right;    PLEURX CATHETER REMOVAL Right 10/24/2024    Procedure: PLEURX CATHETER REMOVAL;  Surgeon: Nataliya Noyola MD;  Location: [facility] MAIN OR;  Service: Thoracic;  Laterality: Right;    QUADRICEPS REPAIR      ROTATOR CUFF REPAIR      SHOULDER SURGERY      SMALL INTESTINE SURGERY  2021    Bowel Obstruction         Prior to Admission medications    Medication Sig Start Date End Date Taking?  Authorizing Provider   apixaban (Eliquis) 5 MG tablet tablet Take 1 tablet by mouth Every 12 (Twelve) Hours. 7/31/24   Clarita Armenta APRN   cholecalciferol (VITAMIN D3) 25 MCG (1000 UT) tablet Take 1 tablet by mouth Daily. HOLD PRIOR TO SURGERY    Vijaya Montez MD   finasteride (PROSCAR) 5 MG tablet Take 1 tablet by mouth Daily. 8/8/24   Johnson Kang Jr., MD   furosemide (LASIX) 40 MG tablet TAKE 1/2 TABLET BY MOUTH DAILY 10/22/24   Huang Chan MD   gabapentin (NEURONTIN) 100 MG capsule Take 1 capsule by mouth every night at bedtime.    Vijaya Montez MD   Jardiance 10 MG tablet tablet TAKE 1 TABLET BY MOUTH DAILY 6/17/24   Priya Musatfa APRN   LORazepam (ATIVAN) 0.5 MG tablet Take 1 tablet by mouth Every 8 (Eight) Hours As Needed for Anxiety.    Vijaya Montez MD   methIMAzole (TAPAZOLE) 5 MG tablet Take 1 tablet by mouth 3 (Three) Times a Week. Monday, Wednesday, and Friday    Vijaya Montez MD   metoprolol tartrate (LOPRESSOR) 25 MG tablet Take 0.5 tablets by mouth 2 (Two) Times a Day. 7/31/24   Clarita Armenta APRN   midodrine (PROAMATINE) 5 MG tablet Take 1 tablet by mouth 2 (Two) Times a Day Before Meals. 6/3/24   Huang Chan MD   multivitamin with minerals tablet tablet Take 1 tablet by mouth Daily. HOLD PRIOR TO SURGERY    Vijaya Montez MD   pantoprazole (PROTONIX) 40 MG EC tablet Take 1 tablet by mouth 2 (Two) Times a Day Before Meals. 6/30/24   Feliciano Torres MD   potassium chloride (KLOR-CON M20) 20 MEQ CR tablet Take 1 tablet by mouth Daily. 6/3/24   Huang Chan MD   Santyl 250 UNIT/GM ointment Apply 1 Application topically to the appropriate area as directed Daily. To sacrum    Vijaya Montez MD   tamsulosin (FLOMAX) 0.4 MG capsule 24 hr capsule Take 1 capsule by mouth Every Night. 6/30/24   Feliciano Torres MD       No Known Allergies    Social History     Socioeconomic History    Marital status:     Number of  children: 1    Highest education level: Professional school degree (e.g., MD, DDS, DVM, BRITNEY)   Tobacco Use    Smoking status: Former     Current packs/day: 0.00     Types: Cigarettes     Quit date: 1983     Years since quittin.4     Passive exposure: Past    Smokeless tobacco: Never    Tobacco comments:        Vaping Use    Vaping status: Never Used   Substance and Sexual Activity    Alcohol use: Not Currently     Comment: SOCIAL; Patient is non drinker.    Drug use: No     Comment: Drug Abuse: Not a drug user    Sexual activity: Defer       Family History   Problem Relation Age of Onset    Cancer Mother     Heart disease Father     Aneurysm Father         Abdominal Aortic Aneurysm    Malig Hyperthermia Neg Hx        REVIEW OF SYSTEMS:   All systems reviewed.  Pertinent positives identified in HPI.  All other systems are negative.      Objective:     Vitals:    24 0543 24 0740 24 0823 24 08   BP:  105/76     BP Location:  Right arm     Patient Position:  Lying     Pulse:  (!) 138 111 90   Resp:  18     Temp:  98.2 °F (36.8 °C)     TempSrc:  Axillary     SpO2:  99% 99% 98%   Weight: 67.5 kg (148 lb 13 oz)      Height:         Body mass index is 21.35 kg/m².    Physical Exam:  Constitutional: He is oriented to person, place, and time. He appears frail and chronically ill appearing.   HENT:   Head: Normocephalic and atraumatic. Head is without contusion.   Right Ear: Hearing normal. No drainage.   Left Ear: Hearing normal. No drainage.   Nose: No nasal deformity. No epistaxis.   Eyes: Lids are normal. Right eye exhibits no exudate. Left eye exhibits no exudate.  Neck: No JVD present. Carotid bruit is not present. No tracheal deviation present. No thyroid mass and no thyromegaly present.   Cardiovascular: Normal rate, irregular rhythm and normal heart sounds.    Pulses:       Posterior tibial pulses are 2+ on the right side, and 2+ on the left side.   Pulmonary/Chest: Effort normal and  breath sounds normal.   Abdominal: Soft. Normal appearance and bowel sounds are normal. There is no tenderness.   Musculoskeletal: Normal range of motion.        Right shoulder: He exhibits no deformity.        Left shoulder: He exhibits no deformity.   Neurological: He is alert and oriented to person, place, and time. He has normal strength.   Skin: Skin is warm, dry and intact. No rash noted.   Psychiatric: He has a normal mood and affect. His behavior is normal. Thought content normal.   Vitals reviewed      Lab Review:     Results from last 7 days   Lab Units 11/07/24  0532   SODIUM mmol/L 142   POTASSIUM mmol/L 3.8   CHLORIDE mmol/L 103   CO2 mmol/L 31.0*   BUN mg/dL 29*   CREATININE mg/dL 0.90   CALCIUM mg/dL 8.3*   BILIRUBIN mg/dL 1.5*   ALK PHOS U/L 234*   ALT (SGPT) U/L 95*   AST (SGOT) U/L 73*   GLUCOSE mg/dL 87     Results from last 7 days   Lab Units 11/06/24  1843 11/06/24  1627   HSTROP T ng/L 112* 130*     Results from last 7 days   Lab Units 11/07/24  0532   WBC 10*3/mm3 7.64   HEMOGLOBIN g/dL 9.1*   HEMATOCRIT % 31.2*   PLATELETS 10*3/mm3 158                       Current Facility-Administered Medications:     acetaminophen (TYLENOL) tablet 650 mg, 650 mg, Oral, Q4H PRN, Stanislav Lawrence,     apixaban (ELIQUIS) tablet 5 mg, 5 mg, Oral, Q12H, Stanislav Lawrence, , 5 mg at 11/07/24 0801    sennosides-docusate (PERICOLACE) 8.6-50 MG per tablet 2 tablet, 2 tablet, Oral, BID PRN **AND** polyethylene glycol (MIRALAX) packet 17 g, 17 g, Oral, Daily PRN **AND** bisacodyl (DULCOLAX) EC tablet 5 mg, 5 mg, Oral, Daily PRN **AND** bisacodyl (DULCOLAX) suppository 10 mg, 10 mg, Rectal, Daily PRN, Stanislav Lawrence, DO    Calcium Replacement - Follow Nurse / BPA Driven Protocol, , Does not apply, PRN, Stanislav Lawrence,     empagliflozin (JARDIANCE) tablet 10 mg, 10 mg, Oral, Daily, Stanislav Lawrence DO, 10 mg at 11/07/24 0801    finasteride (PROSCAR) tablet 5 mg, 5 mg, Oral, Daily, Stanislav Lawrence DO, 5 mg at 11/07/24 0801     furosemide (LASIX) injection 40 mg, 40 mg, Intravenous, BID, Stanislav Lawrence, DO, 40 mg at 11/07/24 0801    gabapentin (NEURONTIN) capsule 100 mg, 100 mg, Oral, Nightly, Stanislav Lawrence,     LORazepam (ATIVAN) tablet 0.5 mg, 0.5 mg, Oral, Q8H PRN, Stanislav Lawrence, DO    Magnesium Standard Dose Replacement - Follow Nurse / BPA Driven Protocol, , Does not apply, PRN, Stanislav Lawrence, DO    methIMAzole (TAPAZOLE) tablet 5 mg, 5 mg, Oral, Once per day on Monday Wednesday Friday, Stanislav Lawrence,     metoprolol tartrate (LOPRESSOR) tablet 12.5 mg, 12.5 mg, Oral, BID, Stanislav Lawrence, DO, 12.5 mg at 11/07/24 0801    midodrine (PROAMATINE) tablet 5 mg, 5 mg, Oral, BID AC, Stanislav Lawrence, , 5 mg at 11/07/24 0801    nitroglycerin (NITROSTAT) SL tablet 0.4 mg, 0.4 mg, Sublingual, Q5 Min PRN, Stanislav Lawrence,     nitroglycerin (NITROSTAT) SL tablet 0.4 mg, 0.4 mg, Sublingual, Q5 Min PRN, Stanislav Lawrence,     ondansetron ODT (ZOFRAN-ODT) disintegrating tablet 4 mg, 4 mg, Oral, Q6H PRN **OR** ondansetron (ZOFRAN) injection 4 mg, 4 mg, Intravenous, Q6H PRN, Stanislav Lawrence DO    pantoprazole (PROTONIX) EC tablet 40 mg, 40 mg, Oral, BID AC, Howard, Stanislav, DO, 40 mg at 11/07/24 0801    Phosphorus Replacement - Follow Nurse / BPA Driven Protocol, , Does not apply, PRN, Stanislav Lawrence,     potassium chloride (K-DUR,KLOR-CON) ER tablet 20 mEq, 20 mEq, Oral, Daily, Stanislav Lawrence, , 20 mEq at 11/07/24 0814    Potassium Replacement - Follow Nurse / BPA Driven Protocol, , Does not apply, PRN, Stanislav Lawrence DO    [COMPLETED] Insert Peripheral IV, , , Once **AND** sodium chloride 0.9 % flush 10 mL, 10 mL, Intravenous, PRN, Konrad Germain MD    tamsulosin (FLOMAX) 24 hr capsule 0.4 mg, 0.4 mg, Oral, Nightly, Stanislav Lawrence DO    Assessment and Plan:       Active Hospital Problems    Diagnosis  POA    **Acute on chronic heart failure with preserved ejection fraction (HFpEF) [I50.33]  Yes    S/P AKA (above knee amputation), right [Z89.611]  Not Applicable     Transaminitis [R74.01]  Yes    Elevated troponin [R79.89]  Yes    Pleural effusion [J90]  Yes    Nonrheumatic mitral valve regurgitation [I34.0]  Yes    Pulmonary hypertension [I27.20]  Yes    CKD (chronic kidney disease) [N18.9]  Yes    Anemia [D64.9]  Yes    Orthostatic hypotension [I95.1]  Yes    Complications of immobility [Z74.09]  Yes    Permanent atrial fibrillation [I48.21]  Yes    Hyperthyroidism [E05.90]  Yes    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     Acute on chronic HFpEF, exacerbated by RVR   Persistent atrial fibrillation, likely SSS . Digoxin stopped in June for bradycardia.   Recurrent pleural effusion  Transaminitis   Anemia , stable.  Hyperthyroidism TSH ok  right AKA due to osteomyelitis and contracture of the right knee.   Chronic hypotension on midodrine.   Chronic kidney disease  Elevated HS troponin, chronically elevated and similar to levels from June 2024. Elevated is likely multifactorial due to tachycardia / heart failure.     Will add back digoxin and monitor HR closely.   Unable to titrate beta blocker due to hypotension on midodrine.   IV godwine.     Katrina Guerrero, APRN  11/07/24  09:24 EST

## 2024-11-07 NOTE — THERAPY EVALUATION
Patient Name: Mehreen Silva IV  : 1940    MRN: [id number]                              Today's Date: 2024       Admit Date: 2024    Visit Dx:     ICD-10-CM ICD-9-CM   1. Acute respiratory failure with hypoxia  J96.01 518.81   2. Acute on chronic congestive heart failure, unspecified heart failure type  I50.9 428.0   3. Atrial fibrillation with rapid ventricular response  I48.91 427.31   4. Elevated troponin  R79.89 790.6     Patient Active Problem List   Diagnosis    Cervical spondylosis with myelopathy    Lumbar radiculopathy    Hx of toxic multinodular goiter    Dyslipidemia    Hyperthyroidism    Hyperkalemia    Permanent atrial fibrillation    Ulcer of left heel    Recurrent pleural effusion on right    Moderate malnutrition    Pressure injury of sacral region, unstageable    Complications of immobility    Chronic heart failure with preserved ejection fraction (HFpEF)    Orthostatic hypotension    CKD (chronic kidney disease)    Anemia    Nonrheumatic mitral valve regurgitation    Nonrheumatic tricuspid valve regurgitation    Pulmonary hypertension    Pericardial effusion    Hydronephrosis    Pleural effusion    Acute on chronic heart failure with preserved ejection fraction (HFpEF)    S/P AKA (above knee amputation), right    Transaminitis    Elevated troponin     Past Medical History:   Diagnosis Date    (HFpEF) heart failure with preserved ejection fraction 2022    Above-knee amputation     RIGHT    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, right leg 2023    Atrial fibrillation     Cervical spondylosis with myelopathy     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    HLD (hyperlipidemia) 2022    Hx of toxic multinodular goiter 2022    Hyperthyroidism     Lumbar radiculopathy     Nonrheumatic mitral valve regurgitation 2024    Nonrheumatic tricuspid valve  regurgitation 07/31/2024    Orthostatic hypotension     Osteoarthritis     Pressure injury of sacral region, unstageable 05/14/2024    Pulmonary hypertension     Recurrent pleural effusion on right 04/27/2024    HAS PLEURAL CATH IN PLACE    Ulcer with gangrene     right heel    (  HAD ABOVE THE KNEE AMPUTATION  )     Past Surgical History:   Procedure Laterality Date    ABOVE KNEE AMPUTATION Right 12/13/2023    Procedure: ABOVE KNEE AMPUTATION RIGHT, proveena wound vac placement;  Surgeon: Issa Nieves MD;  Location: [facility] MAIN OR;  Service: Vascular;  Laterality: Right;    APPENDECTOMY      BACK SURGERY      x4    COLONOSCOPY      COLONOSCOPY N/A 6/29/2024    Procedure: COLONOSCOPY to cecum;  Surgeon: Amos Cuevas MD;  Location: [facility] ENDOSCOPY;  Service: Gastroenterology;  Laterality: N/A;  pre heme pos stool  post poor prep    CYSTOSCOPY Bilateral 8/8/2024    Procedure: CYSTOSCOPY WITH BILATERAL RETROGRADE PYELOGRAM;  Surgeon: Johnson Kang Jr., MD;  Location: [facility] OR;  Service: Urology;  Laterality: Bilateral;    ENDOSCOPY N/A 6/29/2024    Procedure: ESOPHAGOGASTRODUODENOSCOPY apc and cold biopsy;  Surgeon: Amos Cuevas MD;  Location: [facility] ENDOSCOPY;  Service: Gastroenterology;  Laterality: N/A;  pre anemia  post duodenal/stomach ulcer angio dysplasia gastritis    EXCISION MASS TRUNK N/A 06/08/2016    Procedure: Excision soft tissue neoplasm on abdominal wall and left neck;  Surgeon: Shaji Barahona Jr., MD;  Location: [facility] OR;  Service:     KNEE SURGERY      NECK SURGERY  1974    PLEURAL CATHETER INSERTION Right 4/30/2024    Procedure: RIGHT VIDEO ASSISTED THORACOSCOPY, PARTIAL DECORTICATION, PLEURX CATHETER INSERTION;  Surgeon: Nataliya Noyola MD;  Location: [facility] OR;  Service: Thoracic;  Laterality: Right;    PLEURX CATHETER REMOVAL Right 10/24/2024    Procedure: PLEURX CATHETER REMOVAL;  Surgeon: Nataliya Noyola MD;  Location: [facility] MAIN OR;  Service:  Thoracic;  Laterality: Right;    QUADRICEPS REPAIR      ROTATOR CUFF REPAIR      SHOULDER SURGERY      SMALL INTESTINE SURGERY  2021    Bowel Obstruction      General Information       Row Name 11/07/24 1120          OT Time and Intention    Document Type discharge evaluation/summary  -PP     Mode of Treatment individual therapy;occupational therapy  -PP       Row Name 11/07/24 1120          General Information    Patient Profile Reviewed yes  -PP     Prior Level of Function dependent:  Pt has 24/7 caregiver for ADLs and dep for squat pviot transfers to w/c/ commode/car.  -PP     Barriers to Rehab medically complex;previous functional deficit  -PP       Row Name 11/07/24 1120          Living Environment    People in Home spouse  -PP       Row Name 11/07/24 1120          Home Main Entrance    Number of Stairs, Main Entrance --  ramp  -PP       Row Name 11/07/24 1120          Cognition    Orientation Status (Cognition) oriented x 3  -PP       Row Name 11/07/24 1120          Safety Issues/Impairments Affecting Functional Mobility    Impairments Affecting Function (Mobility) balance;strength;endurance/activity tolerance  -PP     Comment, Safety Issues/Impairments (Mobility) gait belt and non skid socks worn for safety  -PP               User Key  (r) = Recorded By, (t) = Taken By, (c) = Cosigned By      Initials Name Provider Type    PP Pierre Coy, OT Occupational Therapist                     Mobility/ADL's       Row Name 11/07/24 1122          Bed Mobility    Bed Mobility rolling left;rolling right  -PP     Rolling Left [place] (Bed Mobility) dependent (less than 25% patient effort)  -PP     Rolling Right [place] (Bed Mobility) dependent (less than 25% patient effort)  -PP     Supine-Sit [place] (Bed Mobility) --  -PP     Sit-Supine [place] (Bed Mobility) --  -PP     Comment, (Bed Mobility) dep for rolling during pressure relief and UB dressing  -PP       Row Name 11/07/24 1122           Transfers    Comment, (Transfers) deferred d/t pt c/o feeling too tired to participate.  -PP       Row Name 11/07/24 1122          Activities of Daily Living    BADL Assessment/Intervention upper body dressing;lower body dressing;grooming;feeding;toileting  -PP       Row Name 11/07/24 1122          Upper Body Dressing Assessment/Training    [place] Level (Upper Body Dressing) upper body dressing skills;maximum assist (25% patient effort);doff;don;pull-over garment  -PP       Row Name 11/07/24 1122          Lower Body Dressing Assessment/Training    [place] Level (Lower Body Dressing) lower body dressing skills;dependent (less than 25% patient effort)  -PP       Row Name 11/07/24 1122          Grooming Assessment/Training    [place] Level (Grooming) grooming skills;wash face, hands;set up  -PP       Row Name 11/07/24 1122          Toileting Assessment/Training    [place] Level (Toileting) dependent (less than 25% patient effort)  -PP     Comment, (Toileting) FC present and brief in place, at baseline pt tolerates dep xfer to commode from w/c and recieves max assist for hygiene and clothing mgmt.  -PP               User Key  (r) = Recorded By, (t) = Taken By, (c) = Cosigned By      Initials Name Provider Type    Pierre Ridley OT Occupational Therapist                   Obj/Interventions       Row Name 11/07/24 1518          Vision Assessment/Intervention    Visual Impairment/Limitations WFL  -PP       Row Name 11/07/24 1518          Range of Motion Comprehensive    Comment, General Range of Motion BUE WFL, but L shoulder ROM imapired somewhat  -       Row Name 11/07/24 1518          Strength Comprehensive (MMT)    Comment, General Manual Muscle Testing (MMT) Assessment BUE generalized weakness  -       Row Name 11/07/24 1518          Balance    Comment, Balance pt tolerated sitting up in bed w/ min incline during UB dressing briefly  -PP               User Key  (r) = Recorded By, (t) =  Taken By, (c) = Cosigned By      Initials Name Provider Type    PP Pierre Coy, OT Occupational Therapist                   Goals/Plan       Row Name 11/07/24 1520          Dressing Goal 1 (OT)    Activity/Device (Dressing Goal 1, OT) dressing skills, all;upper body dressing  -PP     [place]/Cues Needed (Dressing Goal 1, OT) maximum assist (25-49% patient effort)  -PP     Time Frame (Dressing Goal 1, OT) short term goal (STG);1 day  -PP     Progress/Outcome (Dressing Goal 1, OT) goal met  -PP               User Key  (r) = Recorded By, (t) = Taken By, (c) = Cosigned By      Initials Name Provider Type    PP Pierre Coy, OT Occupational Therapist                   Clinical Impression       Row Name 11/07/24 1519          Pain Assessment    Pretreatment Pain Rating 0/10 - no pain  -PP     Posttreatment Pain Rating 0/10 - no pain  -PP       Row Name 11/07/24 1519          Plan of Care Review    Progress no change  -PP     Outcome Evaluation Pt is a 84 y.o. male who presents to [facility] on 11/6/24 w/ c/o sudden onset of SOB. PMHx includes HFpEF, pleural effusion with prior Pleurx catheter (removed 10/2024), mitral regurgitation, pulmonary hypertension, orthostatic hypotension, CKD stage 2, hyperthyroidism, permanent a fib on Eliquis, and gangrene s/p right AKA. Pt caregiver present during am and wife present in pm both providing PLOF. At baseline pt has caregiver 24/7 to provide high level of assist w/ ADLs and is dep for squat pivot xfers to w/c, car, commode and SC. Today pt was initially too tired to participate but agreeable later in pm. He req Max A to don/doff shirt and gown d/t weakness. He appears to be at baseline and plans to dc home w/ caregiver, and necessary DME already in place once medically able to do so. OT will s/o at this time. RN, pt and family aware and in agreement.  -PP       Row Name 11/07/24 1519          Therapy Assessment/Plan (OT)    Criteria for Skilled Therapeutic Interventions  Met (OT) no;patient/family refuse skilled intervention at this time  -PP       Row Name 11/07/24 1519          Therapy Plan Review/Discharge Plan (OT)    Anticipated Discharge Disposition (OT) home with 24/7 care  -PP       Row Name 11/07/24 1519          Vital Signs    O2 Delivery Pre Treatment supplemental O2  -PP     O2 Delivery Intra Treatment supplemental O2  -PP     O2 Delivery Post Treatment supplemental O2  -PP     Pre Patient Position Supine  -PP     Intra Patient Position Sitting  -PP     Post Patient Position Supine  -PP       Row Name 11/07/24 1519          Positioning and Restraints    Pre-Treatment Position in bed  -PP     Post Treatment Position bed  -PP     In Bed sitting;notified nsg;with family/caregiver;call light within reach;encouraged to call for assist;exit alarm on  -PP               User Key  (r) = Recorded By, (t) = Taken By, (c) = Cosigned By      Initials Name Provider Type    Pierre Ridley, HAJA Occupational Therapist                   Outcome Measures       Row Name 11/07/24 1125          How much help from another is currently needed...    Putting on and taking off regular lower body clothing? 1  -PP     Bathing (including washing, rinsing, and drying) 1  -PP     Toileting (which includes using toilet bed pan or urinal) 1  -PP     Putting on and taking off regular upper body clothing 1  -PP     Taking care of personal grooming (such as brushing teeth) 3  -PP     Eating meals 3  -PP     AM-PAC 6 Clicks Score (OT) 10  -PP       Row Name 11/07/24 0841          How much help from another person do you currently need...    Turning from your back to your side while in flat bed without using bedrails? 3  -CC     Moving from lying on back to sitting on the side of a flat bed without bedrails? 2  -CC     Moving to and from a bed to a chair (including a wheelchair)? 2  -CC     Standing up from a chair using your arms (e.g., wheelchair, bedside chair)? 2  -CC     Climbing 3-5 steps with a  railing? 1  -CC     To walk in hospital room? 1  -CC     AM-PAC 6 Clicks Score (PT) 11  -CC       Row Name 11/07/24 1125          Functional Assessment    Outcome Measure Options AM-PAC 6 Clicks Daily Activity (OT)  -PP               User Key  (r) = Recorded By, (t) = Taken By, (c) = Cosigned By      Initials Name Provider Type    CC Becca Mota, RN Registered Nurse    PP Pierre Coy OT Occupational Therapist                    Occupational Therapy Education       Title: PT OT SLP Therapies (In Progress)       Topic: Occupational Therapy (In Progress)       Point: ADL training (Done)       Description:   Instruct learner(s) on proper safety adaptation and remediation techniques during self care or transfers.   Instruct in proper use of assistive devices.                  Learning Progress Summary            Patient Acceptance, E, VU by PP at 11/7/2024 1522    Comment: Pt Ed on OT role and dc palnning.   Family Acceptance, E, VU by PP at 11/7/2024 1522    Comment: Pt Ed on OT role and dc palnning.                      Point: Home exercise program (Not Started)       Description:   Instruct learner(s) on appropriate technique for monitoring, assisting and/or progressing therapeutic exercises/activities.                  Learner Progress:  Not documented in this visit.              Point: Precautions (Not Started)       Description:   Instruct learner(s) on prescribed precautions during self-care and functional transfers.                  Learner Progress:  Not documented in this visit.              Point: Body mechanics (Not Started)       Description:   Instruct learner(s) on proper positioning and spine alignment during self-care, functional mobility activities and/or exercises.                  Learner Progress:  Not documented in this visit.                              User Key       Initials Effective Dates Name Provider Type Discipline    PP 06/09/23 -  Pierre Coy OT Occupational  Therapist OT                  OT Recommendation and Plan     Plan of Care Review  Progress: no change  Outcome Evaluation: Pt is a 84 y.o. male who presents to [facility] on 11/6/24 w/ c/o sudden onset of SOB. PMHx includes HFpEF, pleural effusion with prior Pleurx catheter (removed 10/2024), mitral regurgitation, pulmonary hypertension, orthostatic hypotension, CKD stage 2, hyperthyroidism, permanent a fib on Eliquis, and gangrene s/p right AKA. Pt caregiver present during am and wife present in pm both providing PLOF. At baseline pt has caregiver 24/7 to provide high level of assist w/ ADLs and is dep for squat pivot xfers to w/c, car, commode and SC. Today pt was initially too tired to participate but agreeable later in pm. He req Max A to don/doff shirt and gown d/t weakness. He appears to be at baseline and plans to dc home w/ caregiver, and necessary DME already in place once medically able to do so. OT will s/o at this time. RN, pt and family aware and in agreement.     Time Calculation:   Evaluation Complexity (OT)  Review Occupational Profile/Medical/Therapy History Complexity: expanded/moderate complexity  Assessment, Occupational Performance/Identification of Deficit Complexity: 3-5 performance deficits  Clinical Decision Making Complexity (OT): detailed assessment/moderate complexity  Overall Complexity of Evaluation (OT): moderate complexity     Time Calculation- OT       Row Name 11/07/24 1116             Time Calculation- OT    OT Start Time 1340  -PP      OT Stop Time 1402  -PP      OT Time Calculation (min) 22 min  -PP      Total Timed Code Minutes- OT 12 minute(s)  -PP      OT Received On 11/07/24  -PP         Timed Charges    17633 - OT Self Care/Mgmt Minutes 12  -PP         Untimed Charges    OT Eval/Re-eval Minutes 10  -PP         Total Minutes    Timed Charges Total Minutes 12  -PP      Untimed Charges Total Minutes 10  -PP       Total Minutes 22  -PP                User Key  (r) = Recorded By, (t) =  Taken By, (c) = Cosigned By      Initials Name Provider Type    PP Pierre Coy, OT Occupational Therapist                  Therapy Charges for Today       Code Description Service Date Service Provider Modifiers Qty    26622521727  OT EVAL MOD COMPLEXITY 3 11/7/2024 Pierre Coy OT GO 1    [id number]  OT SELF CARE/MGMT/TRAIN EA 15 MIN 11/7/2024 Pierre Coy OT GO 1                 Pierre Coy OT  11/7/2024

## 2024-11-07 NOTE — PLAN OF CARE
Goal Outcome Evaluation:           Progress: no change  Outcome Evaluation: Pt is a 84 y.o. male who presents to [facility] on 11/6/24 w/ c/o sudden onset of SOB. PMHx includes HFpEF, pleural effusion with prior Pleurx catheter (removed 10/2024), mitral regurgitation, pulmonary hypertension, orthostatic hypotension, CKD stage 2, hyperthyroidism, permanent a fib on Eliquis, and gangrene s/p right AKA. Pt caregiver present during am and wife present in pm both providing PLOF. At baseline pt has caregiver 24/7 to provide high level of assist w/ ADLs and is dep for squat pivot xfers to w/c, car, commode and SC. Today pt was initially too tired to participate but agreeable later in pm. He req Max A to don/doff shirt and gown d/t weakness. He appears to be at baseline and plans to dc home w/ caregiver, and necessary DME already in place once medically able to do so. OT will s/o at this time. RN, pt and family aware and in agreement.    Anticipated Discharge Disposition (OT): home with 24/7 care

## 2024-11-07 NOTE — NURSING NOTE
Per [facility], patient currently still seeing [facility] for monthly catheter changes. Next appointment is scheduled for 11/18 and they will change catheter than. Catheter to stay in during hospital stay.

## 2024-11-08 LAB
ALBUMIN SERPL-MCNC: 2.9 G/DL (ref 3.5–5.2)
ALBUMIN/GLOB SERPL: 1 G/DL
ALP SERPL-CCNC: 226 U/L (ref 39–117)
ALT SERPL W P-5'-P-CCNC: 100 U/L (ref 1–41)
ANION GAP SERPL CALCULATED.3IONS-SCNC: 5.5 MMOL/L (ref 5–15)
AST SERPL-CCNC: 108 U/L (ref 1–40)
BASOPHILS # BLD AUTO: 0.02 10*3/MM3 (ref 0–0.2)
BASOPHILS NFR BLD AUTO: 0.3 % (ref 0–1.5)
BILIRUB SERPL-MCNC: 1.5 MG/DL (ref 0–1.2)
BUN SERPL-MCNC: 25 MG/DL (ref 8–23)
BUN/CREAT SERPL: 24.8 (ref 7–25)
CALCIUM SPEC-SCNC: 8.1 MG/DL (ref 8.6–10.5)
CHLORIDE SERPL-SCNC: 99 MMOL/L (ref 98–107)
CO2 SERPL-SCNC: 34.5 MMOL/L (ref 22–29)
CREAT SERPL-MCNC: 1.01 MG/DL (ref 0.76–1.27)
DEPRECATED RDW RBC AUTO: 43.3 FL (ref 37–54)
EGFRCR SERPLBLD CKD-EPI 2021: 73.3 ML/MIN/1.73
EOSINOPHIL # BLD AUTO: 0.09 10*3/MM3 (ref 0–0.4)
EOSINOPHIL NFR BLD AUTO: 1.3 % (ref 0.3–6.2)
ERYTHROCYTE [DISTWIDTH] IN BLOOD BY AUTOMATED COUNT: 17 % (ref 12.3–15.4)
GLOBULIN UR ELPH-MCNC: 2.9 GM/DL
GLUCOSE SERPL-MCNC: 81 MG/DL (ref 65–99)
HCT VFR BLD AUTO: 30.6 % (ref 37.5–51)
HGB BLD-MCNC: 9.1 G/DL (ref 13–17.7)
IMM GRANULOCYTES # BLD AUTO: 0.03 10*3/MM3 (ref 0–0.05)
IMM GRANULOCYTES NFR BLD AUTO: 0.4 % (ref 0–0.5)
LYMPHOCYTES # BLD AUTO: 0.66 10*3/MM3 (ref 0.7–3.1)
LYMPHOCYTES NFR BLD AUTO: 9.4 % (ref 19.6–45.3)
MCH RBC QN AUTO: 21.4 PG (ref 26.6–33)
MCHC RBC AUTO-ENTMCNC: 29.7 G/DL (ref 31.5–35.7)
MCV RBC AUTO: 71.8 FL (ref 79–97)
MONOCYTES # BLD AUTO: 0.71 10*3/MM3 (ref 0.1–0.9)
MONOCYTES NFR BLD AUTO: 10.1 % (ref 5–12)
NEUTROPHILS NFR BLD AUTO: 5.53 10*3/MM3 (ref 1.7–7)
NEUTROPHILS NFR BLD AUTO: 78.5 % (ref 42.7–76)
PLATELET # BLD AUTO: 160 10*3/MM3 (ref 140–450)
PMV BLD AUTO: 10.2 FL (ref 6–12)
POTASSIUM SERPL-SCNC: 3.7 MMOL/L (ref 3.5–5.2)
POTASSIUM SERPL-SCNC: 4.4 MMOL/L (ref 3.5–5.2)
PROT SERPL-MCNC: 5.8 G/DL (ref 6–8.5)
RBC # BLD AUTO: 4.26 10*6/MM3 (ref 4.14–5.8)
SODIUM SERPL-SCNC: 139 MMOL/L (ref 136–145)
WBC NRBC COR # BLD AUTO: 7.04 10*3/MM3 (ref 3.4–10.8)

## 2024-11-08 PROCEDURE — 80053 COMPREHEN METABOLIC PANEL: CPT | Performed by: STUDENT IN AN ORGANIZED HEALTH CARE EDUCATION/TRAINING PROGRAM

## 2024-11-08 PROCEDURE — 84132 ASSAY OF SERUM POTASSIUM: CPT | Performed by: HOSPITALIST

## 2024-11-08 PROCEDURE — 25010000002 FUROSEMIDE PER 20 MG: Performed by: INTERNAL MEDICINE

## 2024-11-08 PROCEDURE — 36415 COLL VENOUS BLD VENIPUNCTURE: CPT | Performed by: STUDENT IN AN ORGANIZED HEALTH CARE EDUCATION/TRAINING PROGRAM

## 2024-11-08 PROCEDURE — 99232 SBSQ HOSP IP/OBS MODERATE 35: CPT | Performed by: INTERNAL MEDICINE

## 2024-11-08 PROCEDURE — 85025 COMPLETE CBC W/AUTO DIFF WBC: CPT | Performed by: STUDENT IN AN ORGANIZED HEALTH CARE EDUCATION/TRAINING PROGRAM

## 2024-11-08 PROCEDURE — 25010000002 FUROSEMIDE PER 20 MG: Performed by: STUDENT IN AN ORGANIZED HEALTH CARE EDUCATION/TRAINING PROGRAM

## 2024-11-08 RX ORDER — POTASSIUM CHLORIDE 750 MG/1
20 TABLET, FILM COATED, EXTENDED RELEASE ORAL ONCE
Status: COMPLETED | OUTPATIENT
Start: 2024-11-08 | End: 2024-11-08

## 2024-11-08 RX ORDER — FUROSEMIDE 10 MG/ML
40 INJECTION INTRAMUSCULAR; INTRAVENOUS
Status: DISCONTINUED | OUTPATIENT
Start: 2024-11-08 | End: 2024-11-09

## 2024-11-08 RX ADMIN — APIXABAN 5 MG: 5 TABLET, FILM COATED ORAL at 20:56

## 2024-11-08 RX ADMIN — Medication 10 ML: at 16:03

## 2024-11-08 RX ADMIN — POTASSIUM CHLORIDE 20 MEQ: 750 TABLET, EXTENDED RELEASE ORAL at 08:30

## 2024-11-08 RX ADMIN — FUROSEMIDE 40 MG: 10 INJECTION, SOLUTION INTRAMUSCULAR; INTRAVENOUS at 16:02

## 2024-11-08 RX ADMIN — EMPAGLIFLOZIN 10 MG: 10 TABLET, FILM COATED ORAL at 08:32

## 2024-11-08 RX ADMIN — FUROSEMIDE 40 MG: 10 INJECTION, SOLUTION INTRAMUSCULAR; INTRAVENOUS at 08:32

## 2024-11-08 RX ADMIN — MIDODRINE HYDROCHLORIDE 5 MG: 5 TABLET ORAL at 08:32

## 2024-11-08 RX ADMIN — APIXABAN 5 MG: 5 TABLET, FILM COATED ORAL at 08:32

## 2024-11-08 RX ADMIN — MIDODRINE HYDROCHLORIDE 5 MG: 5 TABLET ORAL at 17:30

## 2024-11-08 RX ADMIN — GABAPENTIN 100 MG: 100 CAPSULE ORAL at 20:56

## 2024-11-08 RX ADMIN — Medication 10 ML: at 08:32

## 2024-11-08 RX ADMIN — FINASTERIDE 5 MG: 5 TABLET, FILM COATED ORAL at 08:32

## 2024-11-08 RX ADMIN — TAMSULOSIN HYDROCHLORIDE 0.4 MG: 0.4 CAPSULE ORAL at 20:56

## 2024-11-08 RX ADMIN — DIGOXIN 125 MCG: 125 TABLET ORAL at 12:10

## 2024-11-08 RX ADMIN — METHIMAZOLE 5 MG: 5 TABLET ORAL at 08:30

## 2024-11-08 RX ADMIN — METOPROLOL TARTRATE 12.5 MG: 25 TABLET, FILM COATED ORAL at 08:31

## 2024-11-08 RX ADMIN — PANTOPRAZOLE SODIUM 40 MG: 40 TABLET, DELAYED RELEASE ORAL at 08:30

## 2024-11-08 RX ADMIN — PANTOPRAZOLE SODIUM 40 MG: 40 TABLET, DELAYED RELEASE ORAL at 17:30

## 2024-11-08 NOTE — NURSING NOTE
WOCN: patient needs assist of 2 to reposition. He has history of right leg amputation. Has had open wound coccyx which is now healed. Buttock and heel pink but blanchable. Pro Plus mattress ordered for pressure relief and distribution. Skin prevention protocols placed in epic.

## 2024-11-08 NOTE — PROGRESS NOTES
LOS: 2 days   Patient Care Team:  Taiwo Powers MD as PCP - General (Internal Medicine)  Self, Bess YO MD as Consulting Physician (Endocrinology)  Huang Chan MD as Cardiologist (Cardiology)    Chief Complaint: Follow-up persistent atrial fibrillation with RVR, chronically elevated troponin, acute on chronic diastolic CHF.    Interval History: He is doing better.  Less short of breath today.  No chest pain.  Heart rate is better controlled.    Vital Signs:  Temp:  [97.3 °F (36.3 °C)-99.5 °F (37.5 °C)] 99.5 °F (37.5 °C)  Heart Rate:  [] 75  Resp:  [18-24] 20  BP: ()/(59-62) 95/62    Intake/Output Summary (Last 24 hours) at 11/8/2024 1619  Last data filed at 11/8/2024 1448  Gross per 24 hour   Intake 690 ml   Output 1900 ml   Net -1210 ml       Physical Exam:   General Appearance:    No acute distress, alert and oriented x4   Lungs:     Mildly decreased breath sounds.    Heart:    Irregularly irregular rhythm with a normal rate. No murmurs, gallops, or rubs.   Abdomen:     Soft, nontender, nondistended.    Extremities:   Status post right AKA. No left lower extremity edema.        Results Review:    Results from last 7 days   Lab Units 11/08/24  1258 11/08/24  0327   SODIUM mmol/L  --  139   POTASSIUM mmol/L 4.4 3.7   CHLORIDE mmol/L  --  99   CO2 mmol/L  --  34.5*   BUN mg/dL  --  25*   CREATININE mg/dL  --  1.01   GLUCOSE mg/dL  --  81   CALCIUM mg/dL  --  8.1*     Results from last 7 days   Lab Units 11/06/24  1843 11/06/24  1627   HSTROP T ng/L 112* 130*     Results from last 7 days   Lab Units 11/08/24  0327   WBC 10*3/mm3 7.04   HEMOGLOBIN g/dL 9.1*   HEMATOCRIT % 30.6*   PLATELETS 10*3/mm3 160                       I reviewed the patient's new clinical results.        Assessment:  1.  Acute on chronic diastolic CHF, secondary to #2  2.  Persistent atrial fibrillation with intermittent RVR (history of bradycardia as well)  3.  Moderate to severe mitral regurgitation by echo on  4/28/2024  4.  Chronic hypotension, on midodrine  5.  Recurrent large pleural effusion, status post Pleurx catheter placement and subsequent removal on 10/24/2024.  6.  Chronically elevated troponin  7.  History of obstructive uropathy and kidney injury in June 2024  8.  Chronic anemia, multifactorial  9.  Hyperthyroidism, on methimazole  10.  Multifactorial chronic weakness  11.  Status post right AKA on 12/13/2023 secondary to osteomyelitis and contracture of the right knee  12.  Mild transaminitis    Plan:  -I suspect that the atrial fibrillation with RVR triggered the acute on chronic diastolic CHF.  He has had difficulty with bradycardia in the past, which limits his medications.  However, we are going to continue with the digoxin at 125 mcg/day and the metoprolol at 12.5 mg twice daily for now.  His rate is much better, and he has responded since adding back the digoxin this admission.    -Hypotension also limits titrating his beta-blocker.  He is on midodrine.    -Continue Lasix 40 mg IV twice daily.  He is improving.  I suspect will be potentially able to convert to oral diuretics tomorrow.  Currently tolerating Jardiance 10 mg/day.    -His troponin is chronically elevated, and this is not consistent with ACS or a type II NSTEMI.    -TSH was checked and is 1.16.    Denny Gentile MD  11/08/24  16:19 EST

## 2024-11-08 NOTE — PLAN OF CARE
Goal Outcome Evaluation:   VSS throughout shift. Pt turned every 2 hours while until dinner time, pt refused to turn. Pt has no c/o pain or nausea. Pt tolerating PO intake well, appetite is good, but pt did not eat dinner. Family and or caregiver at bedside until after lunch. Call bell in reach, bed alarm set.

## 2024-11-08 NOTE — PLAN OF CARE
Goal Outcome Evaluation:  Plan of Care Reviewed With: patient           Outcome Evaluation: Pt A&Ox4. VSS on O2 @ 2l per NC. Controlled Afib per monitor. F/C->BSD with CYU. Heel boot in place. Pt turned q2hrs. Plan of care continues.

## 2024-11-09 ENCOUNTER — APPOINTMENT (OUTPATIENT)
Dept: CT IMAGING | Facility: HOSPITAL | Age: 84
End: 2024-11-09
Payer: MEDICARE

## 2024-11-09 LAB
ALBUMIN SERPL-MCNC: 2.9 G/DL (ref 3.5–5.2)
ALBUMIN/GLOB SERPL: 0.9 G/DL
ALP SERPL-CCNC: 237 U/L (ref 39–117)
ALT SERPL W P-5'-P-CCNC: 113 U/L (ref 1–41)
ANION GAP SERPL CALCULATED.3IONS-SCNC: 8.7 MMOL/L (ref 5–15)
ANISOCYTOSIS BLD QL: ABNORMAL
AST SERPL-CCNC: 138 U/L (ref 1–40)
BASOPHILS # BLD MANUAL: 0.06 10*3/MM3 (ref 0–0.2)
BASOPHILS NFR BLD MANUAL: 1 % (ref 0–1.5)
BILIRUB SERPL-MCNC: 1.8 MG/DL (ref 0–1.2)
BUN SERPL-MCNC: 25 MG/DL (ref 8–23)
BUN/CREAT SERPL: 28.7 (ref 7–25)
CALCIUM SPEC-SCNC: 8.6 MG/DL (ref 8.6–10.5)
CHLORIDE SERPL-SCNC: 100 MMOL/L (ref 98–107)
CO2 SERPL-SCNC: 35.3 MMOL/L (ref 22–29)
CREAT SERPL-MCNC: 0.87 MG/DL (ref 0.76–1.27)
DEPRECATED RDW RBC AUTO: 43.5 FL (ref 37–54)
DIGOXIN SERPL-MCNC: 0.5 NG/ML (ref 0.6–1.2)
EGFRCR SERPLBLD CKD-EPI 2021: 85.1 ML/MIN/1.73
EOSINOPHIL # BLD MANUAL: 0 10*3/MM3 (ref 0–0.4)
EOSINOPHIL NFR BLD MANUAL: 0 % (ref 0.3–6.2)
ERYTHROCYTE [DISTWIDTH] IN BLOOD BY AUTOMATED COUNT: 17 % (ref 12.3–15.4)
GLOBULIN UR ELPH-MCNC: 3.1 GM/DL
GLUCOSE SERPL-MCNC: 75 MG/DL (ref 65–99)
HCT VFR BLD AUTO: 34.5 % (ref 37.5–51)
HGB BLD-MCNC: 9.8 G/DL (ref 13–17.7)
HYPOCHROMIA BLD QL: ABNORMAL
LYMPHOCYTES # BLD MANUAL: 0.78 10*3/MM3 (ref 0.7–3.1)
LYMPHOCYTES NFR BLD MANUAL: 7.1 % (ref 5–12)
MCH RBC QN AUTO: 20.9 PG (ref 26.6–33)
MCHC RBC AUTO-ENTMCNC: 28.4 G/DL (ref 31.5–35.7)
MCV RBC AUTO: 73.4 FL (ref 79–97)
MICROCYTES BLD QL: ABNORMAL
MONOCYTES # BLD: 0.46 10*3/MM3 (ref 0.1–0.9)
NEUTROPHILS # BLD AUTO: 5.16 10*3/MM3 (ref 1.7–7)
NEUTROPHILS NFR BLD MANUAL: 79.8 % (ref 42.7–76)
PLAT MORPH BLD: NORMAL
PLATELET # BLD AUTO: 167 10*3/MM3 (ref 140–450)
PMV BLD AUTO: 11 FL (ref 6–12)
POLYCHROMASIA BLD QL SMEAR: ABNORMAL
POTASSIUM SERPL-SCNC: 4 MMOL/L (ref 3.5–5.2)
PROT SERPL-MCNC: 6 G/DL (ref 6–8.5)
QT INTERVAL: 246 MS
QTC INTERVAL: 404 MS
RBC # BLD AUTO: 4.7 10*6/MM3 (ref 4.14–5.8)
SODIUM SERPL-SCNC: 144 MMOL/L (ref 136–145)
TSH SERPL DL<=0.05 MIU/L-ACNC: 0.73 UIU/ML (ref 0.27–4.2)
VARIANT LYMPHS NFR BLD MANUAL: 12.1 % (ref 19.6–45.3)
WBC MORPH BLD: NORMAL
WBC NRBC COR # BLD AUTO: 6.47 10*3/MM3 (ref 3.4–10.8)

## 2024-11-09 PROCEDURE — 93010 ELECTROCARDIOGRAM REPORT: CPT | Performed by: INTERNAL MEDICINE

## 2024-11-09 PROCEDURE — 71250 CT THORAX DX C-: CPT

## 2024-11-09 PROCEDURE — 99232 SBSQ HOSP IP/OBS MODERATE 35: CPT | Performed by: INTERNAL MEDICINE

## 2024-11-09 PROCEDURE — 85025 COMPLETE CBC W/AUTO DIFF WBC: CPT | Performed by: STUDENT IN AN ORGANIZED HEALTH CARE EDUCATION/TRAINING PROGRAM

## 2024-11-09 PROCEDURE — 93005 ELECTROCARDIOGRAM TRACING: CPT | Performed by: INTERNAL MEDICINE

## 2024-11-09 PROCEDURE — 80162 ASSAY OF DIGOXIN TOTAL: CPT | Performed by: INTERNAL MEDICINE

## 2024-11-09 PROCEDURE — 80053 COMPREHEN METABOLIC PANEL: CPT | Performed by: STUDENT IN AN ORGANIZED HEALTH CARE EDUCATION/TRAINING PROGRAM

## 2024-11-09 PROCEDURE — 99222 1ST HOSP IP/OBS MODERATE 55: CPT | Performed by: SURGERY

## 2024-11-09 PROCEDURE — 84443 ASSAY THYROID STIM HORMONE: CPT | Performed by: INTERNAL MEDICINE

## 2024-11-09 PROCEDURE — 25010000002 DIGOXIN PER 500 MCG: Performed by: INTERNAL MEDICINE

## 2024-11-09 PROCEDURE — 85007 BL SMEAR W/DIFF WBC COUNT: CPT | Performed by: STUDENT IN AN ORGANIZED HEALTH CARE EDUCATION/TRAINING PROGRAM

## 2024-11-09 PROCEDURE — 36415 COLL VENOUS BLD VENIPUNCTURE: CPT | Performed by: STUDENT IN AN ORGANIZED HEALTH CARE EDUCATION/TRAINING PROGRAM

## 2024-11-09 PROCEDURE — 25010000002 FUROSEMIDE PER 20 MG: Performed by: INTERNAL MEDICINE

## 2024-11-09 RX ORDER — DIGOXIN 0.25 MG/ML
250 INJECTION INTRAMUSCULAR; INTRAVENOUS ONCE
Status: COMPLETED | OUTPATIENT
Start: 2024-11-09 | End: 2024-11-09

## 2024-11-09 RX ORDER — FUROSEMIDE 40 MG/1
40 TABLET ORAL
Status: DISCONTINUED | OUTPATIENT
Start: 2024-11-09 | End: 2024-11-12 | Stop reason: HOSPADM

## 2024-11-09 RX ORDER — MIDODRINE HYDROCHLORIDE 5 MG/1
5 TABLET ORAL
Status: DISCONTINUED | OUTPATIENT
Start: 2024-11-09 | End: 2024-11-12 | Stop reason: HOSPADM

## 2024-11-09 RX ADMIN — FINASTERIDE 5 MG: 5 TABLET, FILM COATED ORAL at 08:00

## 2024-11-09 RX ADMIN — MIDODRINE HYDROCHLORIDE 5 MG: 5 TABLET ORAL at 16:39

## 2024-11-09 RX ADMIN — PANTOPRAZOLE SODIUM 40 MG: 40 TABLET, DELAYED RELEASE ORAL at 06:27

## 2024-11-09 RX ADMIN — POTASSIUM CHLORIDE 20 MEQ: 750 TABLET, EXTENDED RELEASE ORAL at 08:00

## 2024-11-09 RX ADMIN — APIXABAN 5 MG: 5 TABLET, FILM COATED ORAL at 08:00

## 2024-11-09 RX ADMIN — DIGOXIN 250 MCG: 0.25 INJECTION INTRAMUSCULAR; INTRAVENOUS at 09:40

## 2024-11-09 RX ADMIN — EMPAGLIFLOZIN 10 MG: 10 TABLET, FILM COATED ORAL at 08:00

## 2024-11-09 RX ADMIN — APIXABAN 5 MG: 5 TABLET, FILM COATED ORAL at 21:13

## 2024-11-09 RX ADMIN — MIDODRINE HYDROCHLORIDE 5 MG: 5 TABLET ORAL at 06:27

## 2024-11-09 RX ADMIN — METOPROLOL TARTRATE 12.5 MG: 25 TABLET, FILM COATED ORAL at 08:01

## 2024-11-09 RX ADMIN — PANTOPRAZOLE SODIUM 40 MG: 40 TABLET, DELAYED RELEASE ORAL at 16:39

## 2024-11-09 RX ADMIN — METOPROLOL TARTRATE 12.5 MG: 25 TABLET, FILM COATED ORAL at 21:13

## 2024-11-09 RX ADMIN — FUROSEMIDE 40 MG: 40 TABLET ORAL at 16:40

## 2024-11-09 RX ADMIN — GABAPENTIN 100 MG: 100 CAPSULE ORAL at 21:13

## 2024-11-09 RX ADMIN — FUROSEMIDE 40 MG: 10 INJECTION, SOLUTION INTRAMUSCULAR; INTRAVENOUS at 08:00

## 2024-11-09 RX ADMIN — DIGOXIN 125 MCG: 125 TABLET ORAL at 11:27

## 2024-11-09 RX ADMIN — TAMSULOSIN HYDROCHLORIDE 0.4 MG: 0.4 CAPSULE ORAL at 21:13

## 2024-11-09 NOTE — PROGRESS NOTES
Progress note    CT scan reviewed.  He has mild fluid collection in the right hemithorax where he had the Pleurx catheter before.  There is a thick rind around it and there is some air bubbles suggesting empyema.  I believe that the air bubble unlikely with related to recent Pleurx catheter placement.  He had much more air in the space when he had a Pleurx catheter.  Unless there is a clinical concern for sepsis or identified infection, I do not recommend right-sided thoracentesis.  Moreover, this will not result in lung expansion or improvement in his breathing.    He has moderate to large amount of simple left pleural effusion.  If we cannot effectively diurese him and his breathing is getting worse, left-sided thoracentesis can be attempted for symptomatic improvement.    In summary, I do not recommend right thoracentesis or chest tube placement unless there is clinical concern for infection.  Left-sided thoracentesis can be attempted for symptomatic improvement if medical treatment fails to effectively diurese him.    Please call thoracic surgery with questions/concerns.    Rolando Santoro MD  Thoracic surgeon

## 2024-11-09 NOTE — PROGRESS NOTES
[facility]     LOS: 2 days     Subjective:    CC:Shortness of Breath    DIET:  Diet Order   Procedures    Diet: Cardiac, Diabetic; Healthy Heart (2-3 Na+); Consistent Carbohydrate; Fluid Consistency: Thin (IDDSI 0)       Objective:    Vital Signs:  Temp:  [97.3 °F (36.3 °C)-99.5 °F (37.5 °C)] 99.5 °F (37.5 °C)  Heart Rate:  [75] 75  Resp:  [18-24] 20  BP: ()/(59-62) 95/62    SpO2:  [100 %] 100 %  on  Flow (L/min) (Oxygen Therapy):  [2] 2;   Device (Oxygen Therapy): nasal cannula  Body mass index is 19.39 kg/m².    Physical Exam  Constitutional:       General: He is not in acute distress.  Cardiovascular:      Rate and Rhythm: Normal rate. Rhythm irregular.   Pulmonary:      Effort: Pulmonary effort is normal.      Breath sounds: Normal breath sounds.   Abdominal:      General: There is no distension.      Palpations: Abdomen is soft.      Tenderness: There is no abdominal tenderness.   Skin:     General: Skin is warm and dry.   Neurological:      General: No focal deficit present.      Mental Status: He is alert.   Psychiatric:         Mood and Affect: Mood normal.         Behavior: Behavior normal.         Results Review:    Glucose   Date Value Ref Range Status   11/08/2024 81 65 - 99 mg/dL Final   11/07/2024 87 65 - 99 mg/dL Final   11/06/2024 119 (H) 65 - 99 mg/dL Final     Results from last 7 days   Lab Units 11/08/24  0327   WBC 10*3/mm3 7.04   HEMOGLOBIN g/dL 9.1*   HEMATOCRIT % 30.6*   PLATELETS 10*3/mm3 160     Results from last 7 days   Lab Units 11/08/24  1258 11/08/24  0327   SODIUM mmol/L  --  139   POTASSIUM mmol/L 4.4 3.7   CHLORIDE mmol/L  --  99   CO2 mmol/L  --  34.5*   BUN mg/dL  --  25*   CREATININE mg/dL  --  1.01   CALCIUM mg/dL  --  8.1*   BILIRUBIN mg/dL  --  1.5*   ALK PHOS U/L  --  226*   ALT (SGPT) U/L  --  100*   AST (SGOT) U/L  --  108*   GLUCOSE mg/dL  --  81             Results from last 7 days   Lab Units 11/06/24  1843 11/06/24  1627   HSTROP T  "ng/L 112* 130*     Cultures:  No results found for: \"BLOODCX\", \"URINECX\", \"WOUNDCX\", \"MRSACX\", \"RESPCX\", \"STOOLCX\"    I have reviewed daily medications and changes in CPOE    Scheduled meds  apixaban, 5 mg, Oral, Q12H  digoxin, 125 mcg, Oral, Daily  empagliflozin, 10 mg, Oral, Daily  finasteride, 5 mg, Oral, Daily  furosemide, 40 mg, Intravenous, BID  gabapentin, 100 mg, Oral, Nightly  methIMAzole, 5 mg, Oral, Once per day on Monday Wednesday Friday  metoprolol tartrate, 12.5 mg, Oral, BID  midodrine, 5 mg, Oral, BID AC  pantoprazole, 40 mg, Oral, BID AC  potassium chloride, 20 mEq, Oral, Daily  tamsulosin, 0.4 mg, Oral, Nightly           PRN meds    acetaminophen    senna-docusate sodium **AND** polyethylene glycol **AND** bisacodyl **AND** bisacodyl    Calcium Replacement - Follow Nurse / BPA Driven Protocol    LORazepam    Magnesium Standard Dose Replacement - Follow Nurse / BPA Driven Protocol    nitroglycerin    nitroglycerin    ondansetron ODT **OR** ondansetron    Phosphorus Replacement - Follow Nurse / BPA Driven Protocol    Potassium Replacement - Follow Nurse / BPA Driven Protocol    [COMPLETED] Insert Peripheral IV **AND** sodium chloride        Acute on chronic heart failure with preserved ejection fraction (HFpEF)    Hx of toxic multinodular goiter    Hyperthyroidism    Permanent atrial fibrillation    Complications of immobility    Orthostatic hypotension    CKD (chronic kidney disease)    Anemia    Nonrheumatic mitral valve regurgitation    Pulmonary hypertension    Pleural effusion    S/P AKA (above knee amputation), right    Transaminitis    Elevated troponin        Assessment/Plan:  Acute on chronic heart failure with preserved ejection fraction (EF 62%) with indeterminate left ventricular diastolic function moderate to severe MR  -X-ray with pulmonary vascular congestion, elevated proBNP  -IV Lasix given  -Cardiology is following and they feel A-fib with RVR triggered his heart failure  -Started " on digoxin  -Continue with Jardiance    Atrial fibrillation  -Mildly elevated heart rate is improved  -Continue with metoprolol and Eliquis  -Started on digoxin    Transaminitis  -Enzymes are improving  -Thought to be from heart failure    Chronic hypotension  -Continue with midodrine    Anemia  -Hemoglobin 9.1  -MCV 72 with a high RDW of 17    Chronic Whitehead  -Urology consulted    Type II non-ST elevation MI    Hyperthyroidism  -On methimazole    Previously had a large pleural effusion for which she had a Pleurx catheter from about April of last year until removal on 10/24.  -Does not appear to have reoccurred to any significant amount on x-ray, thoracic consultation    Possible discharge 1 to 2 days    Discussed patient's CODE STATUS and he wishes to be a full code at this time    Kristopher Nicole MD  11/08/24  19:11 EST

## 2024-11-09 NOTE — PROGRESS NOTES
Name: Mehreen Silva IV ADMIT: 2024   : 1940  PCP: Taiwo Powers MD    MRN: [id number] LOS: 3 days   AGE/SEX: 84 y.o. male  ROOM: [facility]     Subjective   Subjective   Patient reports resolution of the dyspnea.  No chest pain.  No palpitation.  No PND or orthopnea.  No ankle edema.  No cough.  No wheeze.  No hemoptysis.  No fever or chills.    Review of Systems  GI.  No abdominal pain.  No nausea or vomiting  .  Clear urine in the Whitehead bag.  CNS.  No loss of consciousness or focal neurological symptoms.     Objective   Objective   Vital Signs  Temp:  [97.5 °F (36.4 °C)] 97.5 °F (36.4 °C)  Heart Rate:  [] 94  Resp:  [16-20] 20  BP: ()/(53-93) 110/75  SpO2:  [99 %-100 %] 100 %  on  Flow (L/min) (Oxygen Therapy):  [2-2.5] 2;   Device (Oxygen Therapy): room air    Intake/Output Summary (Last 24 hours) at 2024 1737  Last data filed at 2024 1400  Gross per 24 hour   Intake 600 ml   Output 3350 ml   Net -2750 ml     Body mass index is 19.8 kg/m².      24  0543 24  0500 24  0500   Weight: 67.5 kg (148 lb 13 oz) 61.3 kg (135 lb 2.3 oz) 62.6 kg (138 lb 0.1 oz)     Physical Exam  General.  Elderly gentleman.  He is alert and oriented x 4.  No apparent pain/distress/diaphoresis.  Normal mood and affect.  Eyes.  Pupils equal round and reactive.  Intact extraocular musculature.  No pallor or jaundice  Oral cavity.  Moist mucous membrane.  Neck.  Supple.  No JVD.  No lymphadenopathy or thyromegaly.  Cardio vascular.  Controlled rate atrial fibrillation with grade 2 systolic murmur.  Abdomen.  Soft lax.  No tenderness.  No organomegaly.  No guarding or rebound  Extremities.  Status post right above-knee amputation.  No clubbing/cyanosis/edema.  .  Clear urine in indwelling Whitehead cath.  CNS.  No acute focal neurological deficits.    Results Review:      Results from last 7 days   Lab Units 24  0629 24  1258 24  0327 24  1348 24  0532  "11/06/24  1627   SODIUM mmol/L 144  --  139  --  142 145   POTASSIUM mmol/L 4.0 4.4 3.7 4.2 3.8 4.6   CHLORIDE mmol/L 100  --  99  --  103 106   CO2 mmol/L 35.3*  --  34.5*  --  31.0* 31.1*   BUN mg/dL 25*  --  25*  --  29* 29*   CREATININE mg/dL 0.87  --  1.01  --  0.90 1.18   GLUCOSE mg/dL 75  --  81  --  87 119*   CALCIUM mg/dL 8.6  --  8.1*  --  8.3* 8.9   AST (SGOT) U/L 138*  --  108*  --  73* 74*   ALT (SGPT) U/L 113*  --  100*  --  95* 110*     Estimated Creatinine Clearance: 56 mL/min (by C-G formula based on SCr of 0.87 mg/dL).          Results from last 7 days   Lab Units 11/06/24  1843 11/06/24  1627   HSTROP T ng/L 112* 130*     Results from last 7 days   Lab Units 11/06/24  1627   PROBNP pg/mL 4,451.0*     Results from last 7 days   Lab Units 11/07/24  0532   TSH uIU/mL 1.160               Invalid input(s): \"LDLCALC\"  Results from last 7 days   Lab Units 11/09/24  0629 11/08/24  0327 11/07/24  0532 11/06/24  1627 11/06/24  1627   WBC 10*3/mm3 6.47 7.04 7.64  --  8.13   HEMOGLOBIN g/dL 9.8* 9.1* 9.1*  --  10.4*   HEMATOCRIT % 34.5* 30.6* 31.2*  --  35.4*   PLATELETS 10*3/mm3 167 160 158  --  178   MCV fL 73.4* 71.8* 72.6*  --  72.4*   MCH pg 20.9* 21.4* 21.2*  --  21.3*   MCHC g/dL 28.4* 29.7* 29.2*  --  29.4*   RDW % 17.0* 17.0* 17.1*  --  17.3*   RDW-SD fl 43.5 43.3 43.7  --  44.4   MPV fL 11.0 10.2 10.8  --  10.2   NEUTROPHIL % %  --  78.5* 78.5*   < >  --    LYMPHOCYTE % %  --  9.4* 9.4*   < >  --    MONOCYTES % %  --  10.1 10.7   < >  --    EOSINOPHIL % %  --  1.3 0.8   < >  --    BASOPHIL % %  --  0.3 0.3   < >  --    IMM GRAN % %  --  0.4 0.3   < >  --    NEUTROS ABS 10*3/mm3 5.16 5.53 6.00   < > 7.22*   LYMPHS ABS 10*3/mm3  --  0.66* 0.72   < >  --    MONOS ABS 10*3/mm3  --  0.71 0.82   < >  --    EOS ABS 10*3/mm3 0.00 0.09 0.06   < > 0.08   BASOS ABS 10*3/mm3 0.06 0.02 0.02   < > 0.00   IMMATURE GRANS (ABS) 10*3/mm3  --  0.03 0.02   < >  --     < > = values in this interval not displayed.       "       Results from last 7 days   Lab Units 11/06/24  1627   PROCALCITONIN ng/mL 0.11   LACTATE mmol/L 1.7                               Imaging:  Imaging Results (Last 24 Hours)       Procedure Component Value Units Date/Time    CT Chest Without Contrast Diagnostic [[id number]] Collected: 11/09/24 1352     Updated: 11/09/24 1416    Narrative:      CT CHEST WO CONTRAST DIAGNOSTIC-     DATE OF EXAM: 11/9/2024 12:53 PM     INDICATION: Evaluate for loculated pleural effusion.     COMPARISON: Radiographs 1/6/2024, 10/24/2024, and 6/30/2024. CT  9/16/2024.     TECHNIQUE: Multiple contiguous axial images were acquired through the  chest without the intravenous administration of contrast. Reformatted  coronal and sagittal sequences were also reviewed. Radiation dose  reduction techniques were utilized, including automated exposure control  and exposure modulation based on body size.     FINDINGS:  Thick-walled fluid and air collection in the posterior pleural space at  the base of the right hemithorax, measuring 8.5 cm x 5 cm in axial  dimensions (axial series 2 image 83) and 3.5 cm in thickness (coronal  series 4 image 124), possible empyema/abscess. Adjacent dependent and  basilar consolidation of the right lung with air bronchograms,  surrounding groundglass opacities, and interstitial thickening  throughout the right lung, likely pneumonia. Increased moderate left  pleural effusion with associated compressive atelectasis of the left  lung and dependent consolidation of the left lower lobe that could  reflect superimposed pneumonia. Scattered groundglass opacities and  interstitial thickening in the left lung are also likely  infectious/inflammatory. Likely infectious/inflammatory sub-6 mm  pulmonary nodule in the right upper lobe (axial series 3 image 43) the  central airways are widely patent.     The heart is stable in size. Severe calcified coronary artery disease.  Trace pericardial fluid. Aortic valve calcifications  and calcified  atherosclerotic disease in the thoracic aorta with stable fusiform 3.8  cm dilatation of the ascending thoracic aorta. Enlargement the main  pulmonary artery suggests pulmonary arterial hypertension. Calcified  remnants of prior granulomatous disease in the right hilum. No  pathologically enlarged intrathoracic lymph nodes are identified.     Limited noncontrast CT imaging of the upper abdomen demonstrates  cholelithiasis.     Diffuse osteopenia. Flowing multilevel mid and lower thoracic anterior  osteophyte formation suggesting DISH. Partially imaged severe chronic  degenerative changes in the right shoulder. Partially imaged left  reverse TSA. No acute osseous abnormality or concerning osseous lesion.       Impression:         1. Thick walled fluid and air collection in the pleural space at the  base of the right hemithorax, probable empyema/abscess.  2. Adjacent dependent and basilar consolidation of the right lung with  air bronchograms, surrounding groundglass opacities, and interstitial  thickening, likely pneumonia.  3. Enlarged moderate left pleural effusion with associated compressive  atelectasis of the left lung and dependent consolidation in the left  lower lobe that could reflect superimposed pneumonia.  4. Scattered groundglass opacities and interstitial thickening in the  left lung are likely infectious/inflammatory.     This report was finalized on 11/9/2024 2:13 PM by Dionisio Kinsey MD on  Workstation: [id number]                  I reviewed the patient's new clinical results / labs / tests / procedures      Assessment/Plan     Active Hospital Problems    Diagnosis  POA    **Acute on chronic heart failure with preserved ejection fraction (HFpEF) [I50.33]  Yes    S/P AKA (above knee amputation), right [Z89.611]  Not Applicable    Transaminitis [R74.01]  Yes    Elevated troponin [R79.89]  Yes    Pleural effusion [J90]  Yes    Nonrheumatic mitral valve regurgitation [I34.0]  Yes     Pulmonary hypertension [I27.20]  Yes    CKD (chronic kidney disease) [N18.9]  Yes    Anemia [D64.9]  Yes    Orthostatic hypotension [I95.1]  Yes    Complications of immobility [Z74.09]  Yes    Permanent atrial fibrillation [I48.21]  Yes    Hyperthyroidism [E05.90]  Yes    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           Acute on chronic diastolic congestive heart failure/type II NSTEMI in a patient with a history of moderate to severe MR/atrial fibrillation/chronic hypotension.  Elevated proBNP and troponin.  Chest x-ray with vascular congestion.  Cardiology saw the patient and believes that its mostly a rapid A-fib that has caused the congestive heart failure.  Status post IV diuresis and switch to p.o. Lasix today.  Currently on Eliquis, digoxin, Jardiance, p.o. Lasix, Lopressor, midodrine.  Patient with clinical improvement.  Patient weaned off oxygen.    Bilateral pleural effusion in a patient with a history of right pleural effusion status post Pleurx with removal.  CT of the chest revealed mild right pleural effusion and moderate left pleural effusion.  Thoracic surgery does not recommend any Pleurx there is no need for a left thoracocentesis less he has failed conservative diuresis.  Hyperthyroidism on methimazole.  Check thyroid function test.  Chronic anemia.  Baseline hemoglobin between 7.8 and 8.2.  Hemoglobin at baseline  Elevated liver function test.  Mostly secondary to congestive heart failure.  Benign GI examination.  Continue to monitor.  Neurontin could be playing a role in the elevation of the liver test.  VTE prophylaxis.  Patient on Eliquis.      Discussed my findings and plan of treatment with the patient  Disposition.  Hopefully home tomorrow        Hectorr CHANDU Youngblood MD  [facility]  11/09/24  17:37 EST

## 2024-11-09 NOTE — PLAN OF CARE
Goal Outcome Evaluation:              Outcome Evaluation: Pt A&O x4. RA. Episode of afib rvr this am, IV and po digoxin given. Whitehead catheter remains in place, drsgs changed, clean and intact. Q2 turn. CT chest completed. IV lasix switched to po. VSS. No other complaints this shift.                              Breath sounds clear and equal bilaterally.

## 2024-11-09 NOTE — CONSULTS
THORACIC SURGERY INPATIENT CONSULT NOTE    REASON FOR CONSULT: Right recurrent pleural effusion    REFERRING PROVIDER: Stanislav Lawrence DO    Subjective   HISTORY OF PRESENTING ILLNESS:   Mehreen Silva IV is a 84 y.o. male who has significant medical problems as mentioned in the medical chart.     Patient is known to thoracic surgery service.  He is a patient of Dr. Noyola.  He had Pleurx catheter removed on 10/24/2024.  He presented to the hospital on 11/6/2024 with shortness of breath.  X-ray showed evidence of pulmonary edema and recurrent pleural effusion.  Thoracic surgery was asked to evaluate for recurrent pleural effusion.    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 12/13/2023    HLD (hyperlipidemia) 09/23/2022    Hx of toxic multinodular goiter 09/23/2022    Hyperthyroidism     Lumbar radiculopathy     Nonrheumatic mitral valve regurgitation 07/31/2024    Nonrheumatic tricuspid valve regurgitation 07/31/2024    Orthostatic hypotension     Osteoarthritis     Pressure injury of sacral region, unstageable 05/14/2024    Pulmonary hypertension     Recurrent pleural effusion on right 04/27/2024    HAS PLEURAL CATH IN PLACE    Ulcer with gangrene     right heel    (  HAD ABOVE THE KNEE AMPUTATION  )       Past Surgical History:   Procedure Laterality Date    ABOVE KNEE AMPUTATION Right 12/13/2023    Procedure: ABOVE KNEE AMPUTATION RIGHT, proveena wound vac placement;  Surgeon: Issa Nieves MD;  Location: [facility];  Service: Vascular;  Laterality: Right;    APPENDECTOMY      BACK SURGERY      x4    COLONOSCOPY      COLONOSCOPY N/A  2024    Procedure: COLONOSCOPY to cecum;  Surgeon: Amos Cuevas MD;  [place] [facility];  Service: Gastroenterology;  Laterality: N/A;  pre heme pos stool  post poor prep    CYSTOSCOPY Bilateral 2024    Procedure: CYSTOSCOPY WITH BILATERAL RETROGRADE PYELOGRAM;  Surgeon: Johnson Kang Jr., MD;  Location: [facility];  Service: Urology;  Laterality: Bilateral;    ENDOSCOPY N/A 2024    Procedure: ESOPHAGOGASTRODUODENOSCOPY apc and cold biopsy;  Surgeon: Amos Cuevas MD;  Location: [facility];  Service: Gastroenterology;  Laterality: N/A;  pre anemia  post duodenal/stomach ulcer angio dysplasia gastritis    EXCISION MASS TRUNK N/A 2016    Procedure: Excision soft tissue neoplasm on abdominal wall and left neck;  Surgeon: Shaji Barahona Jr., MD;  Location[place] [facility];  Service:     KNEE SURGERY      NECK SURGERY  1974    PLEURAL CATHETER INSERTION Right 2024    Procedure: RIGHT VIDEO ASSISTED THORACOSCOPY, PARTIAL DECORTICATION, PLEURX CATHETER INSERTION;  Surgeon: Nataliya Noyola MD;  [facility] [facility];  Service: Thoracic;  Laterality: Right;    PLEURX CATHETER REMOVAL Right 10/24/2024    Procedure: PLEURX CATHETER REMOVAL;  Surgeon: Nataliya Noyola MD;  [facility] [facility];  Service: Thoracic;  Laterality: Right;    QUADRICEPS REPAIR      ROTATOR CUFF REPAIR      SHOULDER SURGERY      SMALL INTESTINE SURGERY      Bowel Obstruction       Family History   Problem Relation Age of Onset    Cancer Mother     Heart disease Father     Aneurysm Father         Abdominal Aortic Aneurysm    Malig Hyperthermia Neg Hx        Social History     Socioeconomic History    Marital status:     Number of children: 1    Highest education level: Professional school degree (e.g., MD, DDS, DVM, BRITNEY)   Tobacco Use    Smoking status: Former     Current packs/day: 0.00     Types: Cigarettes     Quit date: 1983     Years since quittin.4     Passive  exposure: Past    Smokeless tobacco: Never    Tobacco comments:        Vaping Use    Vaping status: Never Used   Substance and Sexual Activity    Alcohol use: Not Currently     Comment: SOCIAL; Patient is non drinker.    Drug use: No     Comment: Drug Abuse: Not a drug user    Sexual activity: Defer         Current Facility-Administered Medications:     acetaminophen (TYLENOL) tablet 650 mg, 650 mg, Oral, Q4H PRN, Stanislav Lawrence, DO    apixaban (ELIQUIS) tablet 5 mg, 5 mg, Oral, Q12H, Howard, Stanislav, DO, 5 mg at 11/09/24 0800    sennosides-docusate (PERICOLACE) 8.6-50 MG per tablet 2 tablet, 2 tablet, Oral, BID PRN **AND** polyethylene glycol (MIRALAX) packet 17 g, 17 g, Oral, Daily PRN **AND** bisacodyl (DULCOLAX) EC tablet 5 mg, 5 mg, Oral, Daily PRN **AND** bisacodyl (DULCOLAX) suppository 10 mg, 10 mg, Rectal, Daily PRN, Stanislav Lawrence, DO    Calcium Replacement - Follow Nurse / BPA Driven Protocol, , Does not apply, PRN, Stanislav Lawrence, DO    digoxin (LANOXIN) tablet 125 mcg, 125 mcg, Oral, Daily, Katrina Guerrero APRN, 125 mcg at 11/08/24 1210    empagliflozin (JARDIANCE) tablet 10 mg, 10 mg, Oral, Daily, Howard, Stanislav, DO, 10 mg at 11/09/24 0800    finasteride (PROSCAR) tablet 5 mg, 5 mg, Oral, Daily, Stanislav Lawrence, DO, 5 mg at 11/09/24 0800    furosemide (LASIX) injection 40 mg, 40 mg, Intravenous, BID, Denny Gentile MD, 40 mg at 11/09/24 0800    gabapentin (NEURONTIN) capsule 100 mg, 100 mg, Oral, Nightly, Stanislav Lawrence, , 100 mg at 11/08/24 2056    LORazepam (ATIVAN) tablet 0.5 mg, 0.5 mg, Oral, Q8H PRN, Stanislav Lawrence,     Magnesium Standard Dose Replacement - Follow Nurse / BPA Driven Protocol, , Does not apply, PRN, Stanislav Lawrence DO    methIMAzole (TAPAZOLE) tablet 5 mg, 5 mg, Oral, Once per day on Monday Wednesday Friday, Stanislav Lawrence DO, 5 mg at 11/08/24 0830    metoprolol tartrate (LOPRESSOR) tablet 12.5 mg, 12.5 mg, Oral, BID, Stanislav Lawrence DO, 12.5 mg at 11/09/24 0801    midodrine (PROAMATINE)  "tablet 5 mg, 5 mg, Oral, BID AC, Howard, Stanislav, DO, 5 mg at 11/09/24 0627    nitroglycerin (NITROSTAT) SL tablet 0.4 mg, 0.4 mg, Sublingual, Q5 Min PRN, Howard, Stanislav, DO    nitroglycerin (NITROSTAT) SL tablet 0.4 mg, 0.4 mg, Sublingual, Q5 Min PRN, Catracho Lawrencemy, DO    ondansetron ODT (ZOFRAN-ODT) disintegrating tablet 4 mg, 4 mg, Oral, Q6H PRN **OR** ondansetron (ZOFRAN) injection 4 mg, 4 mg, Intravenous, Q6H PRN, Stanislav Lawrence, DO    pantoprazole (PROTONIX) EC tablet 40 mg, 40 mg, Oral, BID AC, [place], Stanislav, DO, 40 mg at 11/09/24 0627    Phosphorus Replacement - Follow Nurse / BPA Driven Protocol, , Does not apply, PRN, Stanislav Lawrence, DO    potassium chloride (K-DUR,KLOR-CON) ER tablet 20 mEq, 20 mEq, Oral, Daily, [place], Stanislav, DO, 20 mEq at 11/09/24 0800    Potassium Replacement - Follow Nurse / BPA Driven Protocol, , Does not apply, PRN, Catracho Lawrencemy, DO    [COMPLETED] Insert Peripheral IV, , , Once **AND** sodium chloride 0.9 % flush 10 mL, 10 mL, Intravenous, PRN, Konrad Germain MD, 10 mL at 11/08/24 1603    tamsulosin (FLOMAX) 24 hr capsule 0.4 mg, 0.4 mg, Oral, Nightly, [place], Stanislav, DO, 0.4 mg at 11/08/24 2056     No Known Allergies          Objective    OBJECTIVE:     VITAL SIGNS:  /93   Pulse 78   Temp 97.5 °F (36.4 °C) (Oral)   Resp 20   Ht 177.8 cm (70\")   Wt 62.6 kg (138 lb 0.1 oz)   SpO2 100%   BMI 19.80 kg/m²     PHYSICAL EXAM:  Elderly, frail.  Head is normocephalic.   Nose appears normal.   No obvious deformity of the mouth and throat.  Conjunctivae normal.   Heart rate and rhythm is normal.  Pulmonary effort is normal.   On 2 LPM nasal cannula.  Moving all 4 extremities.  Extremities warm.  No focal deficit present.   He is alert and oriented to person, place, and time.     LAB RESULTS:  I have reviewed all the available laboratory results in the chart.    RESULTS REVIEW:  I have reviewed the patient's all relevant laboratory and imaging findings.           ASSESSMENT & PLAN:  Mehreen SCHOFIELD" Ricardo CARR is a 84 y.o. male with significant medical conditions as mentioned above presented to the hospital with problem mentioned in history of presenting illness.    Recurrent pleural effusion  Patient presented with recurrent pleural effusion and evidence of volume overload.  He had Pleurx catheter removed recently.      I requested CT scan of the chest to better evaluate the pleural effusion.  He is etiology of shortness of breath is multifactorial.  We will unlikely place Pleurx catheter.  If he has significant pleural effusion, we will recommend thoracentesis.    I discussed the patient's findings and my recommendations with the patient. The patient was given adequate time to ask questions and all questions were answered to patient satisfaction.     Rolando Santoro MD  Thoracic Surgeon  [facility]        Dictated utilizing Dragon dictation    I spent 60 minutes caring for Mehreen on this date of service. This time includes time spent by me in the following activities: reviewing tests, obtaining and/or reviewing a separately obtained history, performing a medically appropriate examination and/or evaluation , counseling and educating the patient/family/caregiver, ordering medications, tests, or procedures, referring and communicating with other health care professionals , documenting information in the medical record, independently interpreting results and communicating that information with the patient/family/caregiver, and care coordination and more than half the time was spent in direct face to face evaluation and decision making.

## 2024-11-09 NOTE — SIGNIFICANT NOTE
11/09/24 1321   OTHER   Discipline physical therapist   Rehab Time/Intention   Session Not Performed other (see comments)  (Pt. down for chest CT, with potential for thoracentesis. Will follow up pending results and medical appropriateness.)   Therapy Assessment/Plan (PT)   Criteria for Skilled Interventions Met (PT) yes   Recommendation   PT - Next Appointment 11/10/24

## 2024-11-09 NOTE — PROGRESS NOTES
LOS: 3 days   Patient Care Team:  Taiwo Powers MD as PCP - General (Internal Medicine)  Self, Bess YO MD as Consulting Physician (Endocrinology)  Huang Chan MD as Cardiologist (Cardiology)    Chief Complaint: Follow-up persistent atrial fibrillation with RVR, chronically elevated troponin, acute on chronic diastolic CHF.    Interval History: He is less short of breath.  He did have some rapid atrial fibrillation earlier today, although it is improved currently.  No chest pain.    Vital Signs:  Temp:  [97.5 °F (36.4 °C)] 97.5 °F (36.4 °C)  Heart Rate:  [] 96  Resp:  [16-20] 20  BP: ()/(53-93) 94/63    Intake/Output Summary (Last 24 hours) at 11/9/2024 1614  Last data filed at 11/9/2024 1400  Gross per 24 hour   Intake 600 ml   Output 3350 ml   Net -2750 ml       Physical Exam:   General Appearance:    No acute distress, alert and oriented x4   Lungs:     Mildly decreased breath sounds.    Heart:    Irregularly irregular rhythm with a normal rate. No murmurs, gallops, or rubs.   Abdomen:     Soft, nontender, nondistended.    Extremities:   Status post right AKA. No left lower extremity edema.        Results Review:    Results from last 7 days   Lab Units 11/09/24  0629   SODIUM mmol/L 144   POTASSIUM mmol/L 4.0   CHLORIDE mmol/L 100   CO2 mmol/L 35.3*   BUN mg/dL 25*   CREATININE mg/dL 0.87   GLUCOSE mg/dL 75   CALCIUM mg/dL 8.6     Results from last 7 days   Lab Units 11/06/24  1843 11/06/24  1627   HSTROP T ng/L 112* 130*     Results from last 7 days   Lab Units 11/09/24  0629   WBC 10*3/mm3 6.47   HEMOGLOBIN g/dL 9.8*   HEMATOCRIT % 34.5*   PLATELETS 10*3/mm3 167                       I reviewed the patient's new clinical results.        Assessment:  1.  Acute on chronic diastolic CHF, secondary to #2  2.  Persistent atrial fibrillation with intermittent RVR (history of bradycardia as well)  3.  Moderate to severe mitral regurgitation by echo on 4/28/2024  4.  Chronic hypotension, on  midodrine  5.  Recurrent large pleural effusion, status post Pleurx catheter placement and subsequent removal on 10/24/2024.  6.  Chronically elevated troponin  7.  History of obstructive uropathy and kidney injury in June 2024  8.  Chronic anemia, multifactorial  9.  Hyperthyroidism, on methimazole  10.  Multifactorial chronic weakness  11.  Status post right AKA on 12/13/2023 secondary to osteomyelitis and contracture of the right knee  12.  Mild transaminitis    Plan:  -I suspect that the atrial fibrillation with RVR triggered the acute on chronic diastolic CHF.  He has had difficulty with bradycardia in the past, which limits his medications.     -Continue with the digoxin at 125 mcg/day and the metoprolol at 12.5 mg twice daily for now.  His digoxin level was 0.5 this morning, and I gave him another 250 mcg IV because of continuing intermittent rapid atrial fibrillation.  He responded to this thus far.    -Hypotension limits titrating his beta-blocker.  He is on midodrine.  I am going to increase this to 5 mg 3 times a day from twice a day to have better parameters on his blood pressure to treat his heart rate.    -I switched him to Lasix 40 mg p.o. twice daily from IV Lasix.  Currently tolerating Jardiance 10 mg/day.    -His troponin is chronically elevated, and this is not consistent with ACS or a type II NSTEMI.    -Continue Eliquis for anticoagulation.    -TSH was checked and is normal at 1.16.    Denny Gentile MD  11/09/24  16:14 EST

## 2024-11-09 NOTE — PLAN OF CARE
Goal Outcome Evaluation:      Pt resting in bed at this time. SBP on softer side w/ MAP above 60. Pt asymptomatic. Afib on tele. Pt turned every 2 hrs. No acute distress noted.

## 2024-11-10 LAB
ALBUMIN SERPL-MCNC: 2.9 G/DL (ref 3.5–5.2)
ALBUMIN/GLOB SERPL: 0.9 G/DL
ALP SERPL-CCNC: 225 U/L (ref 39–117)
ALT SERPL W P-5'-P-CCNC: 116 U/L (ref 1–41)
ANION GAP SERPL CALCULATED.3IONS-SCNC: 7.4 MMOL/L (ref 5–15)
AST SERPL-CCNC: 132 U/L (ref 1–40)
BASOPHILS # BLD AUTO: 0.02 10*3/MM3 (ref 0–0.2)
BASOPHILS NFR BLD AUTO: 0.3 % (ref 0–1.5)
BILIRUB SERPL-MCNC: 1.8 MG/DL (ref 0–1.2)
BUN SERPL-MCNC: 25 MG/DL (ref 8–23)
BUN/CREAT SERPL: 27.2 (ref 7–25)
CALCIUM SPEC-SCNC: 8.5 MG/DL (ref 8.6–10.5)
CHLORIDE SERPL-SCNC: 96 MMOL/L (ref 98–107)
CO2 SERPL-SCNC: 35.6 MMOL/L (ref 22–29)
CREAT SERPL-MCNC: 0.92 MG/DL (ref 0.76–1.27)
DEPRECATED RDW RBC AUTO: 43.1 FL (ref 37–54)
DIGOXIN SERPL-MCNC: 1.1 NG/ML (ref 0.6–1.2)
EGFRCR SERPLBLD CKD-EPI 2021: 82 ML/MIN/1.73
EOSINOPHIL # BLD AUTO: 0.08 10*3/MM3 (ref 0–0.4)
EOSINOPHIL NFR BLD AUTO: 1.2 % (ref 0.3–6.2)
ERYTHROCYTE [DISTWIDTH] IN BLOOD BY AUTOMATED COUNT: 17 % (ref 12.3–15.4)
GLOBULIN UR ELPH-MCNC: 3.1 GM/DL
GLUCOSE SERPL-MCNC: 78 MG/DL (ref 65–99)
HCT VFR BLD AUTO: 33.4 % (ref 37.5–51)
HGB BLD-MCNC: 10 G/DL (ref 13–17.7)
IMM GRANULOCYTES # BLD AUTO: 0.01 10*3/MM3 (ref 0–0.05)
IMM GRANULOCYTES NFR BLD AUTO: 0.2 % (ref 0–0.5)
LYMPHOCYTES # BLD AUTO: 0.78 10*3/MM3 (ref 0.7–3.1)
LYMPHOCYTES NFR BLD AUTO: 11.7 % (ref 19.6–45.3)
MCH RBC QN AUTO: 21.2 PG (ref 26.6–33)
MCHC RBC AUTO-ENTMCNC: 29.9 G/DL (ref 31.5–35.7)
MCV RBC AUTO: 70.9 FL (ref 79–97)
MONOCYTES # BLD AUTO: 0.65 10*3/MM3 (ref 0.1–0.9)
MONOCYTES NFR BLD AUTO: 9.8 % (ref 5–12)
NEUTROPHILS NFR BLD AUTO: 5.1 10*3/MM3 (ref 1.7–7)
NEUTROPHILS NFR BLD AUTO: 76.8 % (ref 42.7–76)
PLATELET # BLD AUTO: 171 10*3/MM3 (ref 140–450)
PMV BLD AUTO: 10.1 FL (ref 6–12)
POTASSIUM SERPL-SCNC: 3.9 MMOL/L (ref 3.5–5.2)
PROT SERPL-MCNC: 6 G/DL (ref 6–8.5)
RBC # BLD AUTO: 4.71 10*6/MM3 (ref 4.14–5.8)
SODIUM SERPL-SCNC: 139 MMOL/L (ref 136–145)
WBC NRBC COR # BLD AUTO: 6.64 10*3/MM3 (ref 3.4–10.8)

## 2024-11-10 PROCEDURE — 85025 COMPLETE CBC W/AUTO DIFF WBC: CPT | Performed by: STUDENT IN AN ORGANIZED HEALTH CARE EDUCATION/TRAINING PROGRAM

## 2024-11-10 PROCEDURE — 80162 ASSAY OF DIGOXIN TOTAL: CPT | Performed by: INTERNAL MEDICINE

## 2024-11-10 PROCEDURE — 80053 COMPREHEN METABOLIC PANEL: CPT | Performed by: STUDENT IN AN ORGANIZED HEALTH CARE EDUCATION/TRAINING PROGRAM

## 2024-11-10 PROCEDURE — 99232 SBSQ HOSP IP/OBS MODERATE 35: CPT | Performed by: INTERNAL MEDICINE

## 2024-11-10 RX ORDER — DIGOXIN 125 MCG
125 TABLET ORAL
Qty: 30 TABLET | Refills: 3 | Status: SHIPPED | OUTPATIENT
Start: 2024-11-10

## 2024-11-10 RX ORDER — FUROSEMIDE 40 MG/1
40 TABLET ORAL 2 TIMES DAILY
Qty: 60 TABLET | Refills: 3 | Status: SHIPPED | OUTPATIENT
Start: 2024-11-10

## 2024-11-10 RX ORDER — POTASSIUM CHLORIDE 750 MG/1
20 CAPSULE, EXTENDED RELEASE ORAL 2 TIMES DAILY
Qty: 60 CAPSULE | Refills: 3 | Status: SHIPPED | OUTPATIENT
Start: 2024-11-10

## 2024-11-10 RX ADMIN — APIXABAN 5 MG: 5 TABLET, FILM COATED ORAL at 08:06

## 2024-11-10 RX ADMIN — FINASTERIDE 5 MG: 5 TABLET, FILM COATED ORAL at 08:06

## 2024-11-10 RX ADMIN — PANTOPRAZOLE SODIUM 40 MG: 40 TABLET, DELAYED RELEASE ORAL at 17:09

## 2024-11-10 RX ADMIN — METOPROLOL TARTRATE 12.5 MG: 25 TABLET, FILM COATED ORAL at 20:20

## 2024-11-10 RX ADMIN — EMPAGLIFLOZIN 10 MG: 10 TABLET, FILM COATED ORAL at 08:06

## 2024-11-10 RX ADMIN — DIGOXIN 125 MCG: 125 TABLET ORAL at 11:00

## 2024-11-10 RX ADMIN — APIXABAN 5 MG: 5 TABLET, FILM COATED ORAL at 20:20

## 2024-11-10 RX ADMIN — FUROSEMIDE 40 MG: 40 TABLET ORAL at 17:09

## 2024-11-10 RX ADMIN — PANTOPRAZOLE SODIUM 40 MG: 40 TABLET, DELAYED RELEASE ORAL at 06:29

## 2024-11-10 RX ADMIN — TAMSULOSIN HYDROCHLORIDE 0.4 MG: 0.4 CAPSULE ORAL at 20:20

## 2024-11-10 RX ADMIN — GABAPENTIN 100 MG: 100 CAPSULE ORAL at 20:20

## 2024-11-10 RX ADMIN — MIDODRINE HYDROCHLORIDE 5 MG: 5 TABLET ORAL at 17:09

## 2024-11-10 RX ADMIN — MIDODRINE HYDROCHLORIDE 5 MG: 5 TABLET ORAL at 06:28

## 2024-11-10 RX ADMIN — MIDODRINE HYDROCHLORIDE 5 MG: 5 TABLET ORAL at 11:00

## 2024-11-10 RX ADMIN — METOPROLOL TARTRATE 12.5 MG: 25 TABLET, FILM COATED ORAL at 08:06

## 2024-11-10 RX ADMIN — POTASSIUM CHLORIDE 20 MEQ: 750 TABLET, EXTENDED RELEASE ORAL at 08:05

## 2024-11-10 RX ADMIN — FUROSEMIDE 40 MG: 40 TABLET ORAL at 08:05

## 2024-11-10 NOTE — PROGRESS NOTES
LOS: 4 days   Patient Care Team:  Taiwo Powers MD as PCP - General (Internal Medicine)  Self, Bess OY MD as Consulting Physician (Endocrinology)  Huang Chan MD as Cardiologist (Cardiology)    Chief Complaint: Follow-up persistent atrial fibrillation with RVR, chronically elevated troponin, acute on chronic diastolic CHF.    Interval History: He looks much better today.  He was off of oxygen.  Atrial fibrillation rate has been well-controlled since yesterday.  Shortness of breath is improved.  No chest pain.    Vital Signs:  Temp:  [97.5 °F (36.4 °C)-98.2 °F (36.8 °C)] 97.5 °F (36.4 °C)  Heart Rate:  [72-89] 72  Resp:  [18-20] 20  BP: ()/(54-76) 116/73    Intake/Output Summary (Last 24 hours) at 11/10/2024 1849  Last data filed at 11/10/2024 1756  Gross per 24 hour   Intake 120 ml   Output 2200 ml   Net -2080 ml       Physical Exam:   General Appearance:    No acute distress, alert and oriented x4   Lungs:     Mildly decreased breath sounds.    Heart:    Irregularly irregular rhythm with a normal rate. No murmurs, gallops, or rubs.   Abdomen:     Soft, nontender, nondistended.    Extremities:   Status post right AKA. No left lower extremity edema.        Results Review:    Results from last 7 days   Lab Units 11/10/24  0555   SODIUM mmol/L 139   POTASSIUM mmol/L 3.9   CHLORIDE mmol/L 96*   CO2 mmol/L 35.6*   BUN mg/dL 25*   CREATININE mg/dL 0.92   GLUCOSE mg/dL 78   CALCIUM mg/dL 8.5*     Results from last 7 days   Lab Units 11/06/24  1843 11/06/24  1627   HSTROP T ng/L 112* 130*     Results from last 7 days   Lab Units 11/10/24  0555   WBC 10*3/mm3 6.64   HEMOGLOBIN g/dL 10.0*   HEMATOCRIT % 33.4*   PLATELETS 10*3/mm3 171                       I reviewed the patient's new clinical results.        Assessment:  1.  Acute on chronic diastolic CHF, secondary to #2  2.  Persistent atrial fibrillation with intermittent RVR (history of bradycardia as well)  3.  Moderate to severe mitral regurgitation  by echo on 4/28/2024  4.  Chronic hypotension, on midodrine  5.  Recurrent large pleural effusion, status post Pleurx catheter placement and subsequent removal on 10/24/2024.  6.  Chronically elevated troponin  7.  History of obstructive uropathy and kidney injury in June 2024  8.  Chronic anemia, multifactorial  9.  Hyperthyroidism, on methimazole  10.  Multifactorial chronic weakness  11.  Status post right AKA on 12/13/2023 secondary to osteomyelitis and contracture of the right knee  12.  Mild transaminitis    Plan:  -I suspect that the atrial fibrillation with RVR triggered the acute on chronic diastolic CHF.  He has had difficulty with bradycardia in the past, which limits his medications.     -Continue with the digoxin at 125 mcg/day and the metoprolol at 12.5 mg twice daily for now.  His digoxin level was 1.1 this morning.  Watch for any potential bradycardia, although he seems to be tolerating this dose well thus far.  I increased his midodrine to 5 mg 3 times a day from twice a day yesterday to allow more room to treat the heart rate if needed.    -Hypotension limits titrating his beta-blocker.  To allow more leeway in treating the atrial fibrillation.    -Continue the Lasix at 40 mg p.o. twice daily.  Currently tolerating Jardiance 10 mg/day.    -His troponin is chronically elevated, and this is not consistent with ACS or a type II NSTEMI.    -Continue Eliquis for anticoagulation.    -TSH was checked and is normal at 1.16.    -He is stable for discharge from a cardiology perspective.  It appears that he was going to be discharged, but had a transportation issue, and is going to go home tomorrow morning.    Denny Gentile MD  11/10/24  18:49 EST

## 2024-11-10 NOTE — DISCHARGE SUMMARY
Patient Name: Mehreen Silva IV  : 1940  MRN: [id number]    Date of Admission: 2024  Date of Discharge:  11/10/2024  Primary Care Physician: Taiwo Powers MD      Discharge Diagnoses     Active Hospital Problems    Diagnosis  POA    **Acute on chronic heart failure with preserved ejection fraction (HFpEF) [I50.33]  Yes    S/P AKA (above knee amputation), right [Z89.611]  Not Applicable    Transaminitis [R74.01]  Yes    Elevated troponin [R79.89]  Yes    Pleural effusion [J90]  Yes    Nonrheumatic mitral valve regurgitation [I34.0]  Yes    Pulmonary hypertension [I27.20]  Yes    CKD (chronic kidney disease) [N18.9]  Yes    Anemia [D64.9]  Yes    Orthostatic hypotension [I95.1]  Yes    Complications of immobility [Z74.09]  Yes    Permanent atrial fibrillation [I48.21]  Yes    Hyperthyroidism [E05.90]  Yes    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        Hospital Course     Brief admission history and physical.  Please refer to the H&P for full details.  A pleasant 84 years old gentleman with a past history of chronic diastolic congestive heart failure/pulmonary effusion status post right Pleurx placement with removal on 10/2024/mitral regurgitation/pulmonary hypertension/orthostasis/hypothyroidism/A-fib status post right above-the-knee amputation who presents to the emergency department with shortness of breath and progressive.  Positive orthopnea and PND.  Physical examination admission included an afebrile patient with stable vital signs/chronically ill-appearing gentleman/poor bilateral air entry/right above-knee amputation.  Hospital course.  Initial ER evaluation included a CMP that was normal except for glucose of 119, BUN 29, CO2 31.1, albumin of 3, , AST 74, alkaline phosphatase 283, total bilirubin 1.6.  Troponin is elevated at 130.  The proBNP is elevated at 4451.  Procalcitonin and lactic acid were  normal.  CBC was normal except a hemoglobin of 10.4.  Chest x-ray with pulmonary vascular congestion small to moderate bilateral pleural effusion.  Hospital course will be summarized in a problem-oriented manner as below.  1.  Acute on chronic diastolic congestive heart failure exacerbation/type II NSTEMI in a patient with a history of moderate to severe MR, A-fib, chronic hypotension.  Patient presented with elevated troponin and proBNP.  Chest x-ray with vascular congestion.  Cardiology consulted.  IV diuresis started.  He was noted to have a rapid A-fib which probably is the cause for his acute exacerbation of the heart failure.  Digoxin was added to the patient's regimen of Eliquis/Jardiance/Lopressor/midodrine.  Unfortunately he can will not increase his blood pressure because of soft blood pressure.  He was eventually switched by cardiology to p.o. Lasix at a higher dose.  His symptomatology has greatly improved by the time of discharge.  He is to follow-up with cardiology as an outpatient  2.  Bilateral pleural effusion in a patient with a history of right pleural effusion status post Pleurx with removal.  Thoracic surgery was consulted.  CT scan of the chest revealed mild right pleural effusion and moderate left pleural effusion.  Thoracic surgery did not recommend any Pleurx insertion and stated that left thoracocentesis should only be considered if the patient fails to clinically improve with diuresis which he has.  3.  Hyperthyroidism.  Patient was maintained on methimazole.  TSH was normal.  4.  Chronic anemia.  His hemoglobin remained at baseline between 7.8 and 8.2.  This needs to be followed up as an outpatient  5.  Transaminitis.  His GI examination was benign.  This was thought to be secondary to the heart failure and remained stable during this admission.  No incriminating drugs except the Neurontin was noted.  This needs to be followed up as an outpatient and if persistent further workup is  indicated    At the time of discharge he was hemodynamically stable and with resolution of the shortness of breath.  Cardiology okayed the discharge.  He will be discharged home with a follow-up with primary MD/cardiology/home health  Consultants     Consult Orders (all) (From admission, onward)       Start     Ordered    11/08/24 1109  Inpatient Thoracic Surgery Consult  Once        Specialty:  Thoracic Surgery  Provider:  Nataliya Noyola MD    11/08/24 1108    11/07/24 0943  Inpatient Urology Consult  Once        Specialty:  Urology  Provider:  Johnson Miller Jr., MD    11/07/24 0943    11/07/24 0702  Inpatient Cardiology Consult  IN AM        Specialty:  Cardiology  Provider:  Huang Chan MD    11/07/24 0046    11/06/24 1851  [facility] (on-call MD unless specified) Details  Once        Specialty:  Hospitalist  Provider:  (Not yet assigned)    11/06/24 1850                  Procedures     Imaging Results (All)       Procedure Component Value Units Date/Time    CT Chest Without Contrast Diagnostic [[id number]] Collected: 11/09/24 1352     Updated: 11/09/24 1416    Narrative:      CT CHEST WO CONTRAST DIAGNOSTIC-     DATE OF EXAM: 11/9/2024 12:53 PM     INDICATION: Evaluate for loculated pleural effusion.     COMPARISON: Radiographs 1/6/2024, 10/24/2024, and 6/30/2024. CT  9/16/2024.     TECHNIQUE: Multiple contiguous axial images were acquired through the  chest without the intravenous administration of contrast. Reformatted  coronal and sagittal sequences were also reviewed. Radiation dose  reduction techniques were utilized, including automated exposure control  and exposure modulation based on body size.     FINDINGS:  Thick-walled fluid and air collection in the posterior pleural space at  the base of the right hemithorax, measuring 8.5 cm x 5 cm in axial  dimensions (axial series 2 image 83) and 3.5 cm in thickness (coronal  series 4 image 124), possible empyema/abscess. Adjacent dependent and  basilar  consolidation of the right lung with air bronchograms,  surrounding groundglass opacities, and interstitial thickening  throughout the right lung, likely pneumonia. Increased moderate left  pleural effusion with associated compressive atelectasis of the left  lung and dependent consolidation of the left lower lobe that could  reflect superimposed pneumonia. Scattered groundglass opacities and  interstitial thickening in the left lung are also likely  infectious/inflammatory. Likely infectious/inflammatory sub-6 mm  pulmonary nodule in the right upper lobe (axial series 3 image 43) the  central airways are widely patent.     The heart is stable in size. Severe calcified coronary artery disease.  Trace pericardial fluid. Aortic valve calcifications and calcified  atherosclerotic disease in the thoracic aorta with stable fusiform 3.8  cm dilatation of the ascending thoracic aorta. Enlargement the main  pulmonary artery suggests pulmonary arterial hypertension. Calcified  remnants of prior granulomatous disease in the right hilum. No  pathologically enlarged intrathoracic lymph nodes are identified.     Limited noncontrast CT imaging of the upper abdomen demonstrates  cholelithiasis.     Diffuse osteopenia. Flowing multilevel mid and lower thoracic anterior  osteophyte formation suggesting DISH. Partially imaged severe chronic  degenerative changes in the right shoulder. Partially imaged left  reverse TSA. No acute osseous abnormality or concerning osseous lesion.       Impression:         1. Thick walled fluid and air collection in the pleural space at the  base of the right hemithorax, probable empyema/abscess.  2. Adjacent dependent and basilar consolidation of the right lung with  air bronchograms, surrounding groundglass opacities, and interstitial  thickening, likely pneumonia.  3. Enlarged moderate left pleural effusion with associated compressive  atelectasis of the left lung and dependent consolidation in the  left  lower lobe that could reflect superimposed pneumonia.  4. Scattered groundglass opacities and interstitial thickening in the  left lung are likely infectious/inflammatory.     This report was finalized on 11/9/2024 2:13 PM by Dionisio Kinsey MD on  Workstation: [id number]       XR Chest 1 View [[id number]] Collected: 11/06/24 1713     Updated: 11/06/24 1724    Narrative:        Portable chest radiograph     HISTORY: Shortness of air     TECHNIQUE: Single AP portable radiograph of the chest     COMPARISON: Multiple chest radiographs and back 6/30/2024       Impression:      FINDINGS AND IMPRESSION:  Left shoulder arthroplasty is in Kar[place] visualized. There is pulm  vascular congestion with superimposed pulmonary pacification, slightly  within the bilateral mid to lower lungs. At least small to moderate  bilateral pleural effusions are present. Findings appear to have  worsened on the left since 10/24/2024, most concerning for pulmonary  edema in the proper clinical context with multifocal pneumonia less  likely. Correlation with patient history is recommended.. Cardiac  silhouette while accentuated by low lung volumes appears to be at least  moderately enlarged, as before., Nodular area of opacification within  the left midlung is present, as before. At least continued attention on  follow-up of these findings with chest radiograph in 3 to 4 weeks is  recommended to ensure resolution and exclude the possibility malignancy.  Functionality further evaluated but chest CT if clinically indicated.     No pneumothorax is seen     This report was finalized on 11/6/2024 5:21 PM by Dr. Matias Fischer M.D  on Workstation: [id number]               Pertinent Labs     Results from last 7 days   Lab Units 11/10/24  0555 11/09/24  0629 11/08/24  0327 11/07/24  0532   WBC 10*3/mm3 6.64 6.47 7.04 7.64   HEMOGLOBIN g/dL 10.0* 9.8* 9.1* 9.1*   PLATELETS 10*3/mm3 171 167 160 158     Results from last 7 days   Lab Units  "11/10/24  0555 11/09/24  0629 11/08/24  1258 11/08/24  0327 11/07/24  1348 11/07/24  0532   SODIUM mmol/L 139 144  --  139  --  142   POTASSIUM mmol/L 3.9 4.0 4.4 3.7   < > 3.8   CHLORIDE mmol/L 96* 100  --  99  --  103   CO2 mmol/L 35.6* 35.3*  --  34.5*  --  31.0*   BUN mg/dL 25* 25*  --  25*  --  29*   CREATININE mg/dL 0.92 0.87  --  1.01  --  0.90   GLUCOSE mg/dL 78 75  --  81  --  87    < > = values in this interval not displayed.   Estimated Creatinine Clearance: 50.9 mL/min (by C-G formula based on SCr of 0.92 mg/dL).  Results from last 7 days   Lab Units 11/10/24  0555 11/09/24  0629 11/08/24  0327 11/07/24  0532   ALBUMIN g/dL 2.9* 2.9* 2.9* 2.8*   BILIRUBIN mg/dL 1.8* 1.8* 1.5* 1.5*   ALK PHOS U/L 225* 237* 226* 234*   AST (SGOT) U/L 132* 138* 108* 73*   ALT (SGPT) U/L 116* 113* 100* 95*     Results from last 7 days   Lab Units 11/10/24  0555 11/09/24  0629 11/08/24  0327 11/07/24  0532   CALCIUM mg/dL 8.5* 8.6 8.1* 8.3*   ALBUMIN g/dL 2.9* 2.9* 2.9* 2.8*       Results from last 7 days   Lab Units 11/10/24  0555 11/09/24  0629 11/06/24  1843 11/06/24  1627   HSTROP T ng/L  --   --  112* 130*   PROBNP pg/mL  --   --   --  4,451.0*   DIGOXIN LVL ng/mL 1.10 0.50*  --   --            Invalid input(s): \"LDLCALC\"      Imaging Results (Last 24 Hours)       Procedure Component Value Units Date/Time    CT Chest Without Contrast Diagnostic [[id number]] Collected: 11/09/24 1352     Updated: 11/09/24 1416    Narrative:      CT CHEST WO CONTRAST DIAGNOSTIC-     DATE OF EXAM: 11/9/2024 12:53 PM     INDICATION: Evaluate for loculated pleural effusion.     COMPARISON: Radiographs 1/6/2024, 10/24/2024, and 6/30/2024. CT  9/16/2024.     TECHNIQUE: Multiple contiguous axial images were acquired through the  chest without the intravenous administration of contrast. Reformatted  coronal and sagittal sequences were also reviewed. Radiation dose  reduction techniques were utilized, including automated exposure control  and " exposure modulation based on body size.     FINDINGS:  Thick-walled fluid and air collection in the posterior pleural space at  the base of the right hemithorax, measuring 8.5 cm x 5 cm in axial  dimensions (axial series 2 image 83) and 3.5 cm in thickness (coronal  series 4 image 124), possible empyema/abscess. Adjacent dependent and  basilar consolidation of the right lung with air bronchograms,  surrounding groundglass opacities, and interstitial thickening  throughout the right lung, likely pneumonia. Increased moderate left  pleural effusion with associated compressive atelectasis of the left  lung and dependent consolidation of the left lower lobe that could  reflect superimposed pneumonia. Scattered groundglass opacities and  interstitial thickening in the left lung are also likely  infectious/inflammatory. Likely infectious/inflammatory sub-6 mm  pulmonary nodule in the right upper lobe (axial series 3 image 43) the  central airways are widely patent.     The heart is stable in size. Severe calcified coronary artery disease.  Trace pericardial fluid. Aortic valve calcifications and calcified  atherosclerotic disease in the thoracic aorta with stable fusiform 3.8  cm dilatation of the ascending thoracic aorta. Enlargement the main  pulmonary artery suggests pulmonary arterial hypertension. Calcified  remnants of prior granulomatous disease in the right hilum. No  pathologically enlarged intrathoracic lymph nodes are identified.     Limited noncontrast CT imaging of the upper abdomen demonstrates  cholelithiasis.     Diffuse osteopenia. Flowing multilevel mid and lower thoracic anterior  osteophyte formation suggesting DISH. Partially imaged severe chronic  degenerative changes in the right shoulder. Partially imaged left  reverse TSA. No acute osseous abnormality or concerning osseous lesion.       Impression:         1. Thick walled fluid and air collection in the pleural space at the  base of the right  hemithorax, probable empyema/abscess.  2. Adjacent dependent and basilar consolidation of the right lung with  air bronchograms, surrounding groundglass opacities, and interstitial  thickening, likely pneumonia.  3. Enlarged moderate left pleural effusion with associated compressive  atelectasis of the left lung and dependent consolidation in the left  lower lobe that could reflect superimposed pneumonia.  4. Scattered groundglass opacities and interstitial thickening in the  left lung are likely infectious/inflammatory.     This report was finalized on 11/9/2024 2:13 PM by Dionisio Kinsey MD on  Workstation: [id number]               Test Results Pending at Discharge         Discharge Exam   Physical Exam  Vitals.  Temperature 97.5 a pulse of 84 respirate rate of 28 blood pressure 104/67 and an O2 sats of 92% on room air  General.  Elderly gentleman.  He is alert and oriented x 4.  No apparent pain/distress/diaphoresis.  Normal mood and affect.  Eyes.  Pupils equal round and reactive.  Intact extraocular musculature.  No pallor or jaundice  Oral cavity.  Moist mucous membrane.  Neck.  Supple.  No JVD.  No lymphadenopathy or thyromegaly.  Cardio vascular.  Regular rate and rhythm with grade 2 systolic murmur.  Abdomen.  Soft lax.  No tenderness.  No organomegaly.  No guarding or rebound  Extremities.  Status post right above-knee amputation.  No clubbing/cyanosis/edema.  .  Clear urine in indwelling Whitehead cath.  CNS.  No acute focal neurological deficits.  Discharge Details        Discharge Medications        New Medications        Instructions Start Date   digoxin 125 MCG tablet  Commonly known as: LANOXIN   125 mcg, Oral, Daily Digoxin      potassium chloride 10 MEQ CR capsule  Commonly known as: MICRO-K   20 mEq, Oral, 2 Times Daily             Changes to Medications        Instructions Start Date   furosemide 40 MG tablet  Commonly known as: LASIX  What changed:   how much to take  when to take this   40 mg,  Oral, 2 Times Daily             Continue These Medications        Instructions Start Date   apixaban 5 MG tablet tablet  Commonly known as: Eliquis   5 mg, Oral, Every 12 Hours      cholecalciferol 25 MCG (1000 UT) tablet  Commonly known as: VITAMIN D3   1,000 Units, Daily      finasteride 5 MG tablet  Commonly known as: PROSCAR   5 mg, Oral, Daily      gabapentin 100 MG capsule  Commonly known as: NEURONTIN   100 mg, Every Night at Bedtime      Jardiance 10 MG tablet tablet  Generic drug: empagliflozin   10 mg, Oral, Daily      LORazepam 0.5 MG tablet  Commonly known as: ATIVAN   0.5 mg, Every 8 Hours PRN      methIMAzole 5 MG tablet  Commonly known as: TAPAZOLE   5 mg, 3 Times Weekly      metoprolol tartrate 25 MG tablet  Commonly known as: LOPRESSOR   12.5 mg, Oral, 2 Times Daily      midodrine 5 MG tablet  Commonly known as: PROAMATINE   5 mg, Oral, 2 Times Daily Before Meals      multivitamin with minerals tablet tablet   1 tablet, Daily      pantoprazole 40 MG EC tablet  Commonly known as: PROTONIX   40 mg, Oral, 2 Times Daily Before Meals      potassium chloride 20 MEQ CR tablet  Commonly known as: KLOR-CON M20   20 mEq, Oral, Daily      Santyl 250 UNIT/GM ointment  Generic drug: collagenase   Apply 1 Application topically to the appropriate area as directed Daily. To sacrum      tamsulosin 0.4 MG capsule 24 hr capsule  Commonly known as: FLOMAX   0.4 mg, Oral, Nightly               No Known Allergies      Discharge Disposition:  Condition: Stable    Diet:   Diet Order   Procedures    Diet: Cardiac, Diabetic; Healthy Heart (2-3 Na+); Consistent Carbohydrate; Fluid Consistency: Thin (IDDSI 0)       Activity:   Activity Instructions       Activity as Tolerated      Other Activity Instructions      Activity Instructions: Home health PT to evaluate and treat    Up WIth Assist              Counseling : Avoid nonsteroidal anti-inflammatory medication    CODE STATUS:    Code Status and Medical Interventions: CPR  (Attempt to Resuscitate); Full   Ordered at: 11/06/24 2003     Level Of Support Discussed With:    Patient     Code Status (Patient has no pulse and is not breathing):    CPR (Attempt to Resuscitate)     Medical Interventions (Patient has pulse or is breathing):    Full       Future Appointments   Date Time Provider Department Center   11/14/2024  1:30 PM Nataliya Noyola MD [facility]   1/29/2025 10:30 AM Huang Chan MD [facility]   2/3/2025  1:00 PM Yulia Wynn MD [facility]     Additional Instructions for the Follow-ups that You Need to Schedule       Ambulatory Referral to Home Health   As directed      Face to Face Visit Date: 11/10/2024   Follow-up provider for Plan of Care?: I treated the patient in an acute care facility and will not continue treatment after discharge.   Follow-up provider: NO KNOWN PROVIDER [[id number]]   Reason/Clinical Findings: Acute on chronic diastolic congestive heart failure/A-fib/mitral regurgitation/pleural effusion   Describe mobility limitations that make leaving home difficult: As above   Nursing/Therapeutic Services Requested: Physical Therapy Skilled Nursing   Skilled nursing orders: Cardiopulmonary assessments   PT orders: Gait Training Transfer training Strengthening   Weight Bearing Status: As Tolerated   Frequency: 1 Week 1        Call MD With Problems / Concerns   As directed      Instructions: Call MD or return to ER if chest pain/shortness of breath/lower extremity edema/loss of consciousness/dizziness/nausea or vomiting/focal neurological symptoms    Order Comments: Instructions: Call MD or return to ER if chest pain/shortness of breath/lower extremity edema/loss of consciousness/dizziness/nausea or vomiting/focal neurological symptoms         Discharge Follow-up with PCP   As directed       Currently Documented PCP:    Taiwo Powers MD    PCP Phone Number:    [phone]     Follow Up Details: 1 week.  Acute on chronic diastolic  congestive heart failure/type II NSTEMI/mitral valve disease/atrial fibrillation/chronic hypotension/pleural effusion/hyperthyroidism goiter/anemia/elevated liver function test        Discharge Follow-up with Specified Provider: Cardiology.; 2 Weeks   As directed      To: Cardiology.   Follow Up: 2 Weeks   Follow Up Details: Type II NSTEMI/acute on chronic diastolic congestive heart failure/mitral regurgitation/chronic hypertension/A-fib               Follow-up Information       Taiwo Powers MD .    Specialty: Internal Medicine  Why: 1 week.  Acute on chronic diastolic congestive heart failure/type II NSTEMI/mitral valve disease/atrial fibrillation/chronic hypotension/pleural effusion/hyperthyroidism goiter/anemia/elevated liver function test  Contact information:  [address] [id number] [phone]                               Time Spent on Discharge:  Greater than 30 minutes      Mile Youngblood MD  [facility]  11/10/24  10:05 EST

## 2024-11-10 NOTE — PLAN OF CARE
Goal Outcome Evaluation:      Pt resting in bed at this time. VSS. On 1L O2 NC. Pt denies SOB. Afib with controlled HR this shift. Plan of care ongoing.

## 2024-11-10 NOTE — NURSING NOTE
Pt A&O x4. RA. Medications given per Mar.  Whitehead catheter remains in place, drsgs changed, clean and intact. Q2 turn. VSS. No other complaints this shift. Delay in Discharge due to family & caregiver transportation, will be d/c tomorrow. VSS

## 2024-11-11 ENCOUNTER — APPOINTMENT (OUTPATIENT)
Dept: GENERAL RADIOLOGY | Facility: HOSPITAL | Age: 84
End: 2024-11-11
Payer: MEDICARE

## 2024-11-11 LAB
ALBUMIN SERPL-MCNC: 2.8 G/DL (ref 3.5–5.2)
ALBUMIN/GLOB SERPL: 0.9 G/DL
ALP SERPL-CCNC: 227 U/L (ref 39–117)
ALT SERPL W P-5'-P-CCNC: 116 U/L (ref 1–41)
ANION GAP SERPL CALCULATED.3IONS-SCNC: 7 MMOL/L (ref 5–15)
AST SERPL-CCNC: 125 U/L (ref 1–40)
BASOPHILS # BLD AUTO: 0.03 10*3/MM3 (ref 0–0.2)
BASOPHILS NFR BLD AUTO: 0.4 % (ref 0–1.5)
BILIRUB SERPL-MCNC: 1.9 MG/DL (ref 0–1.2)
BUN SERPL-MCNC: 23 MG/DL (ref 8–23)
BUN/CREAT SERPL: 27.1 (ref 7–25)
CALCIUM SPEC-SCNC: 8.4 MG/DL (ref 8.6–10.5)
CHLORIDE SERPL-SCNC: 97 MMOL/L (ref 98–107)
CO2 SERPL-SCNC: 35 MMOL/L (ref 22–29)
CREAT SERPL-MCNC: 0.85 MG/DL (ref 0.76–1.27)
DEPRECATED RDW RBC AUTO: 42.8 FL (ref 37–54)
EGFRCR SERPLBLD CKD-EPI 2021: 85.7 ML/MIN/1.73
EOSINOPHIL # BLD AUTO: 0.09 10*3/MM3 (ref 0–0.4)
EOSINOPHIL NFR BLD AUTO: 1.2 % (ref 0.3–6.2)
ERYTHROCYTE [DISTWIDTH] IN BLOOD BY AUTOMATED COUNT: 17 % (ref 12.3–15.4)
GLOBULIN UR ELPH-MCNC: 3.2 GM/DL
GLUCOSE SERPL-MCNC: 76 MG/DL (ref 65–99)
HCT VFR BLD AUTO: 34.2 % (ref 37.5–51)
HGB BLD-MCNC: 10.2 G/DL (ref 13–17.7)
IMM GRANULOCYTES # BLD AUTO: 0.03 10*3/MM3 (ref 0–0.05)
IMM GRANULOCYTES NFR BLD AUTO: 0.4 % (ref 0–0.5)
LYMPHOCYTES # BLD AUTO: 0.74 10*3/MM3 (ref 0.7–3.1)
LYMPHOCYTES NFR BLD AUTO: 9.7 % (ref 19.6–45.3)
MCH RBC QN AUTO: 21.2 PG (ref 26.6–33)
MCHC RBC AUTO-ENTMCNC: 29.8 G/DL (ref 31.5–35.7)
MCV RBC AUTO: 71.1 FL (ref 79–97)
MONOCYTES # BLD AUTO: 0.75 10*3/MM3 (ref 0.1–0.9)
MONOCYTES NFR BLD AUTO: 9.9 % (ref 5–12)
NEUTROPHILS NFR BLD AUTO: 5.95 10*3/MM3 (ref 1.7–7)
NEUTROPHILS NFR BLD AUTO: 78.4 % (ref 42.7–76)
PLATELET # BLD AUTO: 170 10*3/MM3 (ref 140–450)
PMV BLD AUTO: 10.4 FL (ref 6–12)
POTASSIUM SERPL-SCNC: 3.8 MMOL/L (ref 3.5–5.2)
PROT SERPL-MCNC: 6 G/DL (ref 6–8.5)
RBC # BLD AUTO: 4.81 10*6/MM3 (ref 4.14–5.8)
SODIUM SERPL-SCNC: 139 MMOL/L (ref 136–145)
WBC NRBC COR # BLD AUTO: 7.59 10*3/MM3 (ref 3.4–10.8)

## 2024-11-11 PROCEDURE — 94762 N-INVAS EAR/PLS OXIMTRY CONT: CPT

## 2024-11-11 PROCEDURE — 80053 COMPREHEN METABOLIC PANEL: CPT | Performed by: STUDENT IN AN ORGANIZED HEALTH CARE EDUCATION/TRAINING PROGRAM

## 2024-11-11 PROCEDURE — 36415 COLL VENOUS BLD VENIPUNCTURE: CPT | Performed by: STUDENT IN AN ORGANIZED HEALTH CARE EDUCATION/TRAINING PROGRAM

## 2024-11-11 PROCEDURE — 71045 X-RAY EXAM CHEST 1 VIEW: CPT

## 2024-11-11 PROCEDURE — 85025 COMPLETE CBC W/AUTO DIFF WBC: CPT | Performed by: STUDENT IN AN ORGANIZED HEALTH CARE EDUCATION/TRAINING PROGRAM

## 2024-11-11 RX ADMIN — FUROSEMIDE 40 MG: 40 TABLET ORAL at 08:51

## 2024-11-11 RX ADMIN — GABAPENTIN 100 MG: 100 CAPSULE ORAL at 20:20

## 2024-11-11 RX ADMIN — EMPAGLIFLOZIN 10 MG: 10 TABLET, FILM COATED ORAL at 08:50

## 2024-11-11 RX ADMIN — PANTOPRAZOLE SODIUM 40 MG: 40 TABLET, DELAYED RELEASE ORAL at 06:23

## 2024-11-11 RX ADMIN — APIXABAN 5 MG: 5 TABLET, FILM COATED ORAL at 20:20

## 2024-11-11 RX ADMIN — METOPROLOL TARTRATE 12.5 MG: 25 TABLET, FILM COATED ORAL at 08:33

## 2024-11-11 RX ADMIN — METOPROLOL TARTRATE 12.5 MG: 25 TABLET, FILM COATED ORAL at 20:20

## 2024-11-11 RX ADMIN — PANTOPRAZOLE SODIUM 40 MG: 40 TABLET, DELAYED RELEASE ORAL at 17:28

## 2024-11-11 RX ADMIN — MIDODRINE HYDROCHLORIDE 5 MG: 5 TABLET ORAL at 11:59

## 2024-11-11 RX ADMIN — POTASSIUM CHLORIDE 20 MEQ: 750 TABLET, EXTENDED RELEASE ORAL at 08:52

## 2024-11-11 RX ADMIN — FINASTERIDE 5 MG: 5 TABLET, FILM COATED ORAL at 08:50

## 2024-11-11 RX ADMIN — MIDODRINE HYDROCHLORIDE 5 MG: 5 TABLET ORAL at 17:28

## 2024-11-11 RX ADMIN — APIXABAN 5 MG: 5 TABLET, FILM COATED ORAL at 08:50

## 2024-11-11 RX ADMIN — DIGOXIN 125 MCG: 125 TABLET ORAL at 12:00

## 2024-11-11 RX ADMIN — FUROSEMIDE 40 MG: 40 TABLET ORAL at 17:28

## 2024-11-11 RX ADMIN — TAMSULOSIN HYDROCHLORIDE 0.4 MG: 0.4 CAPSULE ORAL at 20:20

## 2024-11-11 RX ADMIN — METHIMAZOLE 5 MG: 5 TABLET ORAL at 08:51

## 2024-11-11 RX ADMIN — MIDODRINE HYDROCHLORIDE 5 MG: 5 TABLET ORAL at 06:23

## 2024-11-11 NOTE — CASE MANAGEMENT/SOCIAL WORK
Continued Stay Note  [facility]     Patient Name: Mehreen Silva IV  MRN: [id number]  Today's Date: 11/11/2024    Admit Date: 11/6/2024    Plan: Home w/ HH (pending) and in-home caregiver   Discharge Plan       Row Name 11/11/24 1325       Plan    Plan Comments [facility] cannot accept due to staffing. CCP spoke with spouse who is agreeable to additional referrals as needed.      Row Name 11/11/24 1257       Plan    Plan Home w/ HH (pending) and in-home caregiver    Plan Comments Per MD, patient has been requiring oxygen at night and will need a nocturnal oximetry prior to dc; order has been placed. CCP spoke with patient at bedside regarding HH order. Patient is agreeable to HH and would like a referral to [facility]. Patient has no preference on DME company for oxygen if needed. ELICEO PHILLIPS                   Discharge Codes    No documentation.                 Expected Discharge Date and Time       Expected Discharge Date Expected Discharge Time    Nov 10, 2024               ELICEO Oshea

## 2024-11-11 NOTE — CASE MANAGEMENT/SOCIAL WORK
Continued Stay Note  [facility]     Patient Name: Mehreen Silva IV  MRN: [id number]  Today's Date: 11/11/2024    Admit Date: 11/6/2024    Plan: Home w/ HH (pending) and in-home caregiver   Discharge Plan       Row Name 11/11/24 1257       Plan    Plan Home w/ HH (pending) and in-home caregiver    Plan Comments Per MD, patient has been requiring oxygen at night and will need a nocturnal oximetry prior to dc; order has been placed. CCP spoke with patient at bedside regarding HH order. Patient is agreeable to HH and would like a referral to [facility]. Patient has no preference on [facility] company for oxygen if needed. ELICEO PHILLIPS                   Discharge Codes    No documentation.                 Expected Discharge Date and Time       Expected Discharge Date Expected Discharge Time    Nov 10, 2024               ELICEO Oshea

## 2024-11-11 NOTE — DISCHARGE PLACEMENT REQUEST
"Ellen Silva IV (84 y.o. Male)       Date of Birth   1940    Social Security Number       Address   [address] [place] [address]    Home Phone   [phone]    MRN   [id number]       Temple   None    Marital Status                               Admission Date   11/6/24    Admission Type   Emergency    Admitting Provider   Stanislav Lawrence DO    Attending Provider   Mile Youngblood MD    Department, Room/Bed   [address], S607/1       Discharge Date       Discharge Disposition   [place] [facility] [place]    [place]charge Destination                                 Attending Provider: Mile Youngblood MD    Allergies: No Known Allergies    Isolation: None   Infection: None   Code Status: CPR    Ht: 177.8 cm (70\")   Wt: 59.9 kg (132 lb 0.9 oz)    Admission Cmt: None   Principal Problem: Acute on chronic heart failure with preserved ejection fraction (HFpEF) [I50.33]                   Active Insurance as of 11/6/2024       Primary Coverage       Payor Plan Insurance Group Employer/Plan Group    MEDICARE MEDICARE A & B        Payor Plan Address Payor Plan Phone Number Payor Plan Fax Number Effective Dates    [address] [phone]  5/1/2005 - None Entered    [facility] [id number]         Subscriber Name Subscriber Birth Date Member ID       ELLEN SILVA IV 1940 [id number]               Secondary Coverage       Payor Plan Insurance Group Employer/Plan Group    [facility] SUPP [id number]       Payor Plan Address Payor Plan Phone Number Payor Plan Fax Number Effective Dates    [address]   5/1/2005 - None Entered    [facility] [id number]         Subscriber Name Subscriber Birth Date Member ID       ELLEN SILVA IV 1940 [id number]                     Emergency Contacts        (Rel.) Home Phone Work Phone Mobile Phone    Belem Silva (Spouse) [phone] -- [phone]    ELLEN SILVA (Son) [phone] -- [phone]    LIBERTY BOLIVAR " (Care Giver) -- -- [phone]

## 2024-11-11 NOTE — PROGRESS NOTES
Name: Mehreen Silva IV ADMIT: 2024   : 1940  PCP: Taiwo Powers MD    MRN: [id number] LOS: 5 days   AGE/SEX: 84 y.o. male  ROOM: [facility]     Subjective   Subjective   No shortness of breath.  No chest pain.  No palpitation.  No PND or orthopnea.  No ankle edema.  No cough.  No wheeze.  No hemoptysis.  No fever or chills.    Review of Systems  GI.  No abdominal pain.  No nausea or vomiting  .  Clear urine in the Whitehead bag.  CNS.  No loss of consciousness or focal neurological symptoms.     Objective   Objective   Vital Signs  Temp:  [97.5 °F (36.4 °C)-99.1 °F (37.3 °C)] 97.9 °F (36.6 °C)  Heart Rate:  [72-89] 80  Resp:  [18-20] 18  BP: ()/(52-76) 96/57  SpO2:  [97 %] 97 %  on  Flow (L/min) (Oxygen Therapy):  [1] 1;   Device (Oxygen Therapy): nasal cannula    Intake/Output Summary (Last 24 hours) at 2024 0951  Last data filed at 2024 0241  Gross per 24 hour   Intake 120 ml   Output 2200 ml   Net -2080 ml     Body mass index is 18.95 kg/m².      24  0500 11/10/24  0500 24  0241   Weight: 62.6 kg (138 lb 0.1 oz) 60.2 kg (132 lb 11.5 oz) 59.9 kg (132 lb 0.9 oz)     Physical Exam  General.  Elderly gentleman.  He is alert and oriented x 4.  No apparent pain/distress/diaphoresis.  Normal mood and affect.  Eyes.  Pupils equal round and reactive.  Intact extraocular musculature.  No pallor or jaundice  Oral cavity.  Moist mucous membrane.  Neck.  Supple.  No JVD.  No lymphadenopathy or thyromegaly.  Cardio vascular.  Controlled rate atrial fibrillation with grade 2 systolic murmur.  Abdomen.  Soft lax.  No tenderness.  No organomegaly.  No guarding or rebound  Extremities.  Status post right above-knee amputation.  No clubbing/cyanosis/edema.  .  Clear urine in indwelling Whitehead cath.  CNS.  No acute focal neurological deficits.    Results Review:      Results from last 7 days   Lab Units 24  0451 11/10/24  0555 24  0629 24  1258 24  0327  "11/07/24  1348 11/07/24  0532 11/06/24  1627   SODIUM mmol/L 139 139 144  --  139  --  142 145   POTASSIUM mmol/L 3.8 3.9 4.0 4.4 3.7 4.2 3.8 4.6   CHLORIDE mmol/L 97* 96* 100  --  99  --  103 106   CO2 mmol/L 35.0* 35.6* 35.3*  --  34.5*  --  31.0* 31.1*   BUN mg/dL 23 25* 25*  --  25*  --  29* 29*   CREATININE mg/dL 0.85 0.92 0.87  --  1.01  --  0.90 1.18   GLUCOSE mg/dL 76 78 75  --  81  --  87 119*   CALCIUM mg/dL 8.4* 8.5* 8.6  --  8.1*  --  8.3* 8.9   AST (SGOT) U/L 125* 132* 138*  --  108*  --  73* 74*   ALT (SGPT) U/L 116* 116* 113*  --  100*  --  95* 110*     Estimated Creatinine Clearance: 54.8 mL/min (by C-G formula based on SCr of 0.85 mg/dL).          Results from last 7 days   Lab Units 11/06/24  1843 11/06/24  1627   HSTROP T ng/L 112* 130*     Results from last 7 days   Lab Units 11/06/24  1627   PROBNP pg/mL 4,451.0*     Results from last 7 days   Lab Units 11/09/24  0629 11/07/24  0532   TSH uIU/mL 0.730 1.160               Invalid input(s): \"LDLCALC\"  Results from last 7 days   Lab Units 11/11/24  0451 11/10/24  0555 11/09/24  0629 11/08/24  0327 11/07/24  0532 11/06/24  1627 11/06/24  1627   WBC 10*3/mm3 7.59 6.64 6.47 7.04 7.64  --  8.13   HEMOGLOBIN g/dL 10.2* 10.0* 9.8* 9.1* 9.1*  --  10.4*   HEMATOCRIT % 34.2* 33.4* 34.5* 30.6* 31.2*  --  35.4*   PLATELETS 10*3/mm3 170 171 167 160 158  --  178   MCV fL 71.1* 70.9* 73.4* 71.8* 72.6*  --  72.4*   MCH pg 21.2* 21.2* 20.9* 21.4* 21.2*  --  21.3*   MCHC g/dL 29.8* 29.9* 28.4* 29.7* 29.2*  --  29.4*   RDW % 17.0* 17.0* 17.0* 17.0* 17.1*  --  17.3*   RDW-SD fl 42.8 43.1 43.5 43.3 43.7  --  44.4   MPV fL 10.4 10.1 11.0 10.2 10.8  --  10.2   NEUTROPHIL % % 78.4* 76.8*  --  78.5* 78.5*   < >  --    LYMPHOCYTE % % 9.7* 11.7*  --  9.4* 9.4*   < >  --    MONOCYTES % % 9.9 9.8  --  10.1 10.7   < >  --    EOSINOPHIL % % 1.2 1.2  --  1.3 0.8   < >  --    BASOPHIL % % 0.4 0.3  --  0.3 0.3   < >  --    IMM GRAN % % 0.4 0.2  --  0.4 0.3   < >  --    NEUTROS ABS " 10*3/mm3 5.95 5.10 5.16 5.53 6.00   < > 7.22*   LYMPHS ABS 10*3/mm3 0.74 0.78  --  0.66* 0.72   < >  --    MONOS ABS 10*3/mm3 0.75 0.65  --  0.71 0.82   < >  --    EOS ABS 10*3/mm3 0.09 0.08 0.00 0.09 0.06   < > 0.08   BASOS ABS 10*3/mm3 0.03 0.02 0.06 0.02 0.02   < > 0.00   IMMATURE GRANS (ABS) 10*3/mm3 0.03 0.01  --  0.03 0.02   < >  --     < > = values in this interval not displayed.             Results from last 7 days   Lab Units 11/06/24  1627   PROCALCITONIN ng/mL 0.11   LACTATE mmol/L 1.7                               Imaging:  Imaging Results (Last 24 Hours)       ** No results found for the last 24 hours. 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           Acute on chronic diastolic congestive heart failure/type II NSTEMI in a patient with a history of moderate to severe MR/atrial fibrillation/chronic hypotension.  Elevated proBNP and troponin.  Chest x-ray with vascular congestion.  Cardiology saw the patient and believes that its mostly a rapid A-fib that has caused the congestive heart failure.  Status post IV diuresis and switch to p.o. Lasix today.  Currently on Eliquis, digoxin, Jardiance, p.o.  Lasix, Lopressor, midodrine.  Patient with clinical improvement.    Bilateral pleural effusion in a patient with a history of right pleural effusion status post Pleurx with removal/nocturnal hypoxemia.  CT of the chest revealed mild right pleural effusion and moderate left pleural effusion.  Thoracic surgery does not recommend any Pleurx there is no need for a left thoracocentesis less he has failed conservative diuresis.  Will check chest x-ray secondary to nocturnal hypoxemia and do an overnight pulse oximetry.  May be requiring oxygen at discharge.  Hyperthyroidism on methimazole.  Normal TSH.    Chronic anemia.  Baseline hemoglobin between 7.8 and 8.2.  Hemoglobin at baseline  Elevated liver function test.  Mostly secondary to congestive heart failure.  Benign GI examination.  Continue to monitor.  Neurontin could be playing a role in the elevation of the liver test.  Liver function test and started to improve.  Will need follow-up as an outpatient.  VTE prophylaxis.  Patient on Eliquis.      Discussed my findings and plan of treatment with the patient  Disposition.  Hopefully home tomorrow after overnight pulse oximetry.        Mile Youngblood MD  [facility]  11/11/24  09:51 EST

## 2024-11-11 NOTE — PLAN OF CARE
Goal Outcome Evaluation:      Pt resting in bed at this time. VSS. Pt denies SOB. On 1L O2 NC. No acute distress noted.

## 2024-11-12 ENCOUNTER — TRANSCRIBE ORDERS (OUTPATIENT)
Dept: HOME HEALTH SERVICES | Facility: HOME HEALTHCARE | Age: 84
End: 2024-11-12
Payer: MEDICARE

## 2024-11-12 ENCOUNTER — HOME HEALTH ADMISSION (OUTPATIENT)
Dept: HOME HEALTH SERVICES | Facility: HOME HEALTHCARE | Age: 84
End: 2024-11-12
Payer: MEDICARE

## 2024-11-12 ENCOUNTER — DOCUMENTATION (OUTPATIENT)
Dept: HOME HEALTH SERVICES | Facility: HOME HEALTHCARE | Age: 84
End: 2024-11-12
Payer: MEDICARE

## 2024-11-12 ENCOUNTER — READMISSION MANAGEMENT (OUTPATIENT)
Dept: CALL CENTER | Facility: HOSPITAL | Age: 84
End: 2024-11-12
Payer: MEDICARE

## 2024-11-12 VITALS
OXYGEN SATURATION: 91 % | TEMPERATURE: 97.1 F | DIASTOLIC BLOOD PRESSURE: 53 MMHG | HEIGHT: 70 IN | RESPIRATION RATE: 18 BRPM | SYSTOLIC BLOOD PRESSURE: 99 MMHG | BODY MASS INDEX: 18.81 KG/M2 | WEIGHT: 131.39 LBS | HEART RATE: 71 BPM

## 2024-11-12 DIAGNOSIS — I50.9 ACUTE ON CHRONIC CONGESTIVE HEART FAILURE, UNSPECIFIED HEART FAILURE TYPE: Primary | ICD-10-CM

## 2024-11-12 DIAGNOSIS — G47.34 NOCTURNAL HYPOXIA: ICD-10-CM

## 2024-11-12 DIAGNOSIS — I21.4 NSTEMI (NON-ST ELEVATED MYOCARDIAL INFARCTION): ICD-10-CM

## 2024-11-12 DIAGNOSIS — J90 PLEURAL EFFUSION: ICD-10-CM

## 2024-11-12 LAB
ALBUMIN SERPL-MCNC: 2.7 G/DL (ref 3.5–5.2)
ALBUMIN/GLOB SERPL: 0.8 G/DL
ALP SERPL-CCNC: 218 U/L (ref 39–117)
ALT SERPL W P-5'-P-CCNC: 116 U/L (ref 1–41)
ANION GAP SERPL CALCULATED.3IONS-SCNC: 8 MMOL/L (ref 5–15)
AST SERPL-CCNC: 128 U/L (ref 1–40)
BASOPHILS # BLD AUTO: 0.02 10*3/MM3 (ref 0–0.2)
BASOPHILS NFR BLD AUTO: 0.3 % (ref 0–1.5)
BILIRUB SERPL-MCNC: 2 MG/DL (ref 0–1.2)
BUN SERPL-MCNC: 23 MG/DL (ref 8–23)
BUN/CREAT SERPL: 24.7 (ref 7–25)
CALCIUM SPEC-SCNC: 8.5 MG/DL (ref 8.6–10.5)
CHLORIDE SERPL-SCNC: 97 MMOL/L (ref 98–107)
CO2 SERPL-SCNC: 33 MMOL/L (ref 22–29)
CREAT SERPL-MCNC: 0.93 MG/DL (ref 0.76–1.27)
DEPRECATED RDW RBC AUTO: 42.7 FL (ref 37–54)
EGFRCR SERPLBLD CKD-EPI 2021: 81 ML/MIN/1.73
EOSINOPHIL # BLD AUTO: 0.07 10*3/MM3 (ref 0–0.4)
EOSINOPHIL NFR BLD AUTO: 1 % (ref 0.3–6.2)
ERYTHROCYTE [DISTWIDTH] IN BLOOD BY AUTOMATED COUNT: 17.3 % (ref 12.3–15.4)
GLOBULIN UR ELPH-MCNC: 3.6 GM/DL
GLUCOSE SERPL-MCNC: 84 MG/DL (ref 65–99)
HCT VFR BLD AUTO: 34.9 % (ref 37.5–51)
HGB BLD-MCNC: 10.5 G/DL (ref 13–17.7)
IMM GRANULOCYTES # BLD AUTO: 0.03 10*3/MM3 (ref 0–0.05)
IMM GRANULOCYTES NFR BLD AUTO: 0.4 % (ref 0–0.5)
LYMPHOCYTES # BLD AUTO: 0.8 10*3/MM3 (ref 0.7–3.1)
LYMPHOCYTES NFR BLD AUTO: 11.5 % (ref 19.6–45.3)
MCH RBC QN AUTO: 21.4 PG (ref 26.6–33)
MCHC RBC AUTO-ENTMCNC: 30.1 G/DL (ref 31.5–35.7)
MCV RBC AUTO: 71.2 FL (ref 79–97)
MONOCYTES # BLD AUTO: 0.58 10*3/MM3 (ref 0.1–0.9)
MONOCYTES NFR BLD AUTO: 8.3 % (ref 5–12)
NEUTROPHILS NFR BLD AUTO: 5.48 10*3/MM3 (ref 1.7–7)
NEUTROPHILS NFR BLD AUTO: 78.5 % (ref 42.7–76)
PLATELET # BLD AUTO: 176 10*3/MM3 (ref 140–450)
PMV BLD AUTO: 10.2 FL (ref 6–12)
POTASSIUM SERPL-SCNC: 3.9 MMOL/L (ref 3.5–5.2)
PROT SERPL-MCNC: 6.3 G/DL (ref 6–8.5)
RBC # BLD AUTO: 4.9 10*6/MM3 (ref 4.14–5.8)
SODIUM SERPL-SCNC: 138 MMOL/L (ref 136–145)
WBC NRBC COR # BLD AUTO: 6.98 10*3/MM3 (ref 3.4–10.8)

## 2024-11-12 PROCEDURE — 36415 COLL VENOUS BLD VENIPUNCTURE: CPT | Performed by: STUDENT IN AN ORGANIZED HEALTH CARE EDUCATION/TRAINING PROGRAM

## 2024-11-12 PROCEDURE — 85025 COMPLETE CBC W/AUTO DIFF WBC: CPT | Performed by: STUDENT IN AN ORGANIZED HEALTH CARE EDUCATION/TRAINING PROGRAM

## 2024-11-12 PROCEDURE — 80053 COMPREHEN METABOLIC PANEL: CPT | Performed by: STUDENT IN AN ORGANIZED HEALTH CARE EDUCATION/TRAINING PROGRAM

## 2024-11-12 RX ADMIN — METOPROLOL TARTRATE 12.5 MG: 25 TABLET, FILM COATED ORAL at 08:30

## 2024-11-12 RX ADMIN — MIDODRINE HYDROCHLORIDE 5 MG: 5 TABLET ORAL at 08:30

## 2024-11-12 RX ADMIN — FINASTERIDE 5 MG: 5 TABLET, FILM COATED ORAL at 08:31

## 2024-11-12 RX ADMIN — APIXABAN 5 MG: 5 TABLET, FILM COATED ORAL at 08:31

## 2024-11-12 RX ADMIN — PANTOPRAZOLE SODIUM 40 MG: 40 TABLET, DELAYED RELEASE ORAL at 08:31

## 2024-11-12 RX ADMIN — FUROSEMIDE 40 MG: 40 TABLET ORAL at 08:31

## 2024-11-12 RX ADMIN — MIDODRINE HYDROCHLORIDE 5 MG: 5 TABLET ORAL at 11:39

## 2024-11-12 RX ADMIN — POTASSIUM CHLORIDE 20 MEQ: 750 TABLET, EXTENDED RELEASE ORAL at 08:31

## 2024-11-12 RX ADMIN — EMPAGLIFLOZIN 10 MG: 10 TABLET, FILM COATED ORAL at 08:31

## 2024-11-12 NOTE — PLAN OF CARE
Goal Outcome Evaluation:     Patient is resting well this shift and is anticipating discharging home today. He was on overnight pulse oximetry monitoring on room air and was able to get through the night on room air. Patient did have a few episodes of desaturation, and he is an open mouth breather that recovers to normal oxygen saturation. VSS.

## 2024-11-12 NOTE — DISCHARGE SUMMARY
Patient Name: Mehreen Silva IV  : 1940  MRN: [id number]    Date of Admission: 2024  Date of Discharge:  2024  Primary Care Physician: Taiwo Powers MD      Chief Complaint:   Shortness of Breath      Discharge Diagnoses     Active Hospital Problems    Diagnosis  POA   • **Acute on chronic heart failure with preserved ejection fraction (HFpEF) [I50.33]  Yes   • Nocturnal hypoxia [G47.34]  Unknown   • S/P AKA (above knee amputation), right [Z89.611]  Not Applicable   • Transaminitis [R74.01]  Yes   • Elevated troponin [R79.89]  Yes   • Pleural effusion [J90]  Yes   • Nonrheumatic mitral valve regurgitation [I34.0]  Yes   • Pulmonary hypertension [I27.20]  Yes   • CKD (chronic kidney disease) [N18.9]  Yes   • Anemia [D64.9]  Yes   • Orthostatic hypotension [I95.1]  Yes   • Complications of immobility [Z74.09]  Yes   • Permanent atrial fibrillation [I48.21]  Yes   • Hyperthyroidism [E05.90]  Yes   • 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        Hospital Course     Mr. Silva is a 84 y.o. male with a history of *** who presented to [facility] initially complaining of ***.  Please see the admitting history and physical for further details.  He was found to have *** and was admitted to the hospital for further evaluation and treatment.  ***    2D Echo (24) showing EF 62.4%, indeterminate LV diastolic function, moderate to severe MR.    Se update:  Cardiology feels he's acute shortness of air is likely related to A-fib with ventricular response and CHF    2024.  SH.  Admitted with an acute on chronic diastolic congestive heart failure thought to NSTEMI in a patient with a history of moderate to severe MR/A-fib/chronic hypotension.  Status post diuresis and rate controlled of his A-fib.  Cardiology saw the patient.  Stable cardiac status.  Has bilateral pleural effusion and a history of right  Pleurx with removal.  Positive nocturnal hypoxemia.  Getting a pulse ox tonight.  Status post thoracic surgery consultation with no need for any intervention.  Elevated liver function test secondary to heart failure that is slowly improving needs to be monitored as an outpatient.  Discharge home with care give her tomorrow after evaluation of overnight pulse ox and need for oxygen at home  Edited by: Mile Youngblood MD at 11/11/2024 1825    Day of Discharge     Subjective:  ***    Physical Exam:  Temp:  [97.1 °F (36.2 °C)-98.2 °F (36.8 °C)] 97.1 °F (36.2 °C)  Heart Rate:  [71-85] 71  Resp:  [18] 18  BP: ()/(57-83) 100/68  Body mass index is 18.85 kg/m².  Physical Exam    Consultants     Consult Orders (all) (From admission, onward)       Start     Ordered    11/08/24 1109  Inpatient Thoracic Surgery Consult  Once        Specialty:  Thoracic Surgery  Provider:  Nataliya Noyola MD    11/08/24 1108    11/07/24 0943  Inpatient Urology Consult  Once        Specialty:  Urology  Provider:  Johnson Miller Jr., MD    11/07/24 0943    11/07/24 0702  Inpatient Cardiology Consult  IN AM        Specialty:  Cardiology  Provider:  Huang Chan MD    11/07/24 0046    11/06/24 1851  [facility] (on-call MD unless specified) Details  Once        Specialty:  Hospitalist  Provider:  (Not yet assigned)    11/06/24 1850                  Procedures     * Surgery not found *    Imaging Results (All)       Procedure Component Value Units Date/Time    XR Chest 1 View [[id number]] Collected: 11/11/24 1042     Updated: 11/11/24 1048    Narrative:      XR CHEST 1 VW-     DATE OF EXAM: 11/11/2024 10:04 AM     INDICATION: Hypoxemia.     COMPARISON: Radiographs 11/6/2024, 10/24/2024, and 6/30/2024. CT   11/9/2024.     TECHNIQUE: A single portable AP view of the chest was obtained.     FINDINGS:  Lordotic positioning. Similar-appearing basilar predominant right lung  opacities, obscuring the right hemidiaphragm and costophrenic angle,  with  small foci of air in the lateral right base. Decreased small left  pleural effusion and left perihilar and left basilar opacities. No  pneumothorax. Unchanged cardiac and mediastinal contours. Diffuse  osteopenia. Partially imaged left reverse TSA and chronic degenerative  changes in the right shoulder.       Impression:         1. Similar-appearing right lung opacities, likely pneumonia with an  empyema or abscess in the right lung base.  2. Decreased now small left pleural effusion with underlying left  perihilar and left basilar atelectasis and/or pneumonia.     This report was finalized on 11/11/2024 10:45 AM by Dionisio Kinsey MD on  Workstation: [id number]       CT Chest Without Contrast Diagnostic [[id number]] Collected: 11/09/24 1352     Updated: 11/09/24 1416    Narrative:      CT CHEST WO CONTRAST DIAGNOSTIC-     DATE OF EXAM: 11/9/2024 12:53 PM     INDICATION: Evaluate for loculated pleural effusion.     COMPARISON: Radiographs 1/6/2024, 10/24/2024, and 6/30/2024. CT  9/16/2024.     TECHNIQUE: Multiple contiguous axial images were acquired through the  chest without the intravenous administration of contrast. Reformatted  coronal and sagittal sequences were also reviewed. Radiation dose  reduction techniques were utilized, including automated exposure control  and exposure modulation based on body size.     FINDINGS:  Thick-walled fluid and air collection in the posterior pleural space at  the base of the right hemithorax, measuring 8.5 cm x 5 cm in axial  dimensions (axial series 2 image 83) and 3.5 cm in thickness (coronal  series 4 image 124), possible empyema/abscess. Adjacent dependent and  basilar consolidation of the right lung with air bronchograms,  surrounding groundglass opacities, and interstitial thickening  throughout the right lung, likely pneumonia. Increased moderate left  pleural effusion with associated compressive atelectasis of the left  lung and dependent consolidation of the left  lower lobe that could  reflect superimposed pneumonia. Scattered groundglass opacities and  interstitial thickening in the left lung are also likely  infectious/inflammatory. Likely infectious/inflammatory sub-6 mm  pulmonary nodule in the right upper lobe (axial series 3 image 43) the  central airways are widely patent.     The heart is stable in size. Severe calcified coronary artery disease.  Trace pericardial fluid. Aortic valve calcifications and calcified  atherosclerotic disease in the thoracic aorta with stable fusiform 3.8  cm dilatation of the ascending thoracic aorta. Enlargement the main  pulmonary artery suggests pulmonary arterial hypertension. Calcified  remnants of prior granulomatous disease in the right hilum. No  pathologically enlarged intrathoracic lymph nodes are identified.     Limited noncontrast CT imaging of the upper abdomen demonstrates  cholelithiasis.     Diffuse osteopenia. Flowing multilevel mid and lower thoracic anterior  osteophyte formation suggesting DISH. Partially imaged severe chronic  degenerative changes in the right shoulder. Partially imaged left  reverse TSA. No acute osseous abnormality or concerning osseous lesion.       Impression:         1. Thick walled fluid and air collection in the pleural space at the  base of the right hemithorax, probable empyema/abscess.  2. Adjacent dependent and basilar consolidation of the right lung with  air bronchograms, surrounding groundglass opacities, and interstitial  thickening, likely pneumonia.  3. Enlarged moderate left pleural effusion with associated compressive  atelectasis of the left lung and dependent consolidation in the left  lower lobe that could reflect superimposed pneumonia.  4. Scattered groundglass opacities and interstitial thickening in the  left lung are likely infectious/inflammatory.     This report was finalized on 11/9/2024 2:13 PM by Dionisio Kinsey MD on  Workstation: [id number]       XR Chest 1 View  [[id number]] Collected: 11/06/24 1713     Updated: 11/06/24 1724    Narrative:        Portable chest radiograph     HISTORY: Shortness of air     TECHNIQUE: Single AP portable radiograph of the chest     COMPARISON: Multiple chest radiographs and back 6/30/2024       Impression:      FINDINGS AND IMPRESSION:  Left shoulder arthroplasty is in Karrie visualized. There is pulm  vascular congestion with superimposed pulmonary pacification, slightly  within the bilateral mid to lower lungs. At least small to moderate  bilateral pleural effusions are present. Findings appear to have  worsened on the left since 10/24/2024, most concerning for pulmonary  edema in the proper clinical context with multifocal pneumonia less  likely. Correlation with patient history is recommended.. Cardiac  silhouette while accentuated by low lung volumes appears to be at least  moderately enlarged, as before., Nodular area of opacification within  the left midlung is present, as before. At least continued attention on  follow-up of these findings with chest radiograph in 3 to 4 weeks is  recommended to ensure resolution and exclude the possibility malignancy.  Functionality further evaluated but chest CT if clinically indicated.     No pneumothorax is seen     This report was finalized on 11/6/2024 5:21 PM by Dr. Matias Fischer M.D  on Workstation: [id number]             Results for orders placed during the hospital encounter of 02/27/24    Doppler Ankle Brachial Index Single Level CAR    Interpretation Summary  •  Left Conclusion: The left XIN is normal. Mild digital ischemia.    Results for orders placed during the hospital encounter of 04/27/24    Adult Transthoracic Echo Limited W/ Cont if Necessary Per Protocol    Interpretation Summary  •  Limited study.  •  The left ventricular cavity is small in size. Left ventricular systolic function is normal. Calculated left ventricular EF = 62.4%  •  Left ventricular wall thickness is consistent  "with moderate concentric hypertrophy. Left ventricular diastolic function was indeterminate due to AFib  •  There is moderate anterior mitral valve thickening present. Moderate to severe mitral valve regurgitation is present.  •  Estimated right ventricular systolic pressure from tricuspid regurgitation is moderately elevated (45-55 mmHg).  •  There is a trivial pericardial effusion. There is a large right pleural effusion with likely consolidated right lung base.  •  Severe LA enlargement.  IVC normal in size and respirophasic variation.    Pertinent Labs     Results from last 7 days   Lab Units 11/12/24  0545 11/11/24  0451 11/10/24  0555 11/09/24  0629   WBC 10*3/mm3 6.98 7.59 6.64 6.47   HEMOGLOBIN g/dL 10.5* 10.2* 10.0* 9.8*   PLATELETS 10*3/mm3 176 170 171 167     Results from last 7 days   Lab Units 11/12/24 0545 11/11/24  0451 11/10/24  0555 11/09/24  0629   SODIUM mmol/L 138 139 139 144   POTASSIUM mmol/L 3.9 3.8 3.9 4.0   CHLORIDE mmol/L 97* 97* 96* 100   CO2 mmol/L 33.0* 35.0* 35.6* 35.3*   BUN mg/dL 23 23 25* 25*   CREATININE mg/dL 0.93 0.85 0.92 0.87   GLUCOSE mg/dL 84 76 78 75   EGFR mL/min/1.73 81.0 85.7 82.0 85.1     Results from last 7 days   Lab Units 11/12/24 0545 11/11/24  0451 11/10/24  0555 11/09/24  0629   ALBUMIN g/dL 2.7* 2.8* 2.9* 2.9*   BILIRUBIN mg/dL 2.0* 1.9* 1.8* 1.8*   ALK PHOS U/L 218* 227* 225* 237*   AST (SGOT) U/L 128* 125* 132* 138*   ALT (SGPT) U/L 116* 116* 116* 113*     Results from last 7 days   Lab Units 11/12/24  0545 11/11/24  0451 11/10/24  0555 11/09/24  0629   CALCIUM mg/dL 8.5* 8.4* 8.5* 8.6   ALBUMIN g/dL 2.7* 2.8* 2.9* 2.9*       Results from last 7 days   Lab Units 11/10/24  0555 11/09/24  0629 11/06/24  1843 11/06/24  1627   HSTROP T ng/L  --   --  112* 130*   PROBNP pg/mL  --   --   --  4,451.0*   DIGOXIN LVL ng/mL 1.10 0.50*  --   --            Invalid input(s): \"LDLCALC\"          Test Results Pending at Discharge     Pending Results       None      "         Discharge Details        Discharge Medications        New Medications        Instructions Start Date   digoxin 125 MCG tablet  Commonly known as: LANOXIN   125 mcg, Oral, Daily Digoxin      potassium chloride 10 MEQ CR capsule  Commonly known as: MICRO-K   20 mEq, Oral, 2 Times Daily             Changes to Medications        Instructions Start Date   furosemide 40 MG tablet  Commonly known as: LASIX  What changed:   how much to take  when to take this   40 mg, Oral, 2 Times Daily             Continue These Medications        Instructions Start Date   apixaban 5 MG tablet tablet  Commonly known as: Eliquis   5 mg, Oral, Every 12 Hours      cholecalciferol 25 MCG (1000 UT) tablet  Commonly known as: VITAMIN D3   1,000 Units, Daily      finasteride 5 MG tablet  Commonly known as: PROSCAR   5 mg, Oral, Daily      gabapentin 100 MG capsule  Commonly known as: NEURONTIN   100 mg, Every Night at Bedtime      Jardiance 10 MG tablet tablet  Generic drug: empagliflozin   10 mg, Oral, Daily      LORazepam 0.5 MG tablet  Commonly known as: ATIVAN   0.5 mg, Every 8 Hours PRN      methIMAzole 5 MG tablet  Commonly known as: TAPAZOLE   5 mg, 3 Times Weekly      metoprolol tartrate 25 MG tablet  Commonly known as: LOPRESSOR   12.5 mg, Oral, 2 Times Daily      midodrine 5 MG tablet  Commonly known as: PROAMATINE   5 mg, Oral, 2 Times Daily Before Meals      multivitamin with minerals tablet tablet   1 tablet, Daily      pantoprazole 40 MG EC tablet  Commonly known as: PROTONIX   40 mg, Oral, 2 Times Daily Before Meals      potassium chloride 20 MEQ CR tablet  Commonly known as: KLOR-CON M20   20 mEq, Oral, Daily      Santyl 250 UNIT/GM ointment  Generic drug: collagenase   Apply 1 Application topically to the appropriate area as directed Daily. To sacrum      tamsulosin 0.4 MG capsule 24 hr capsule  Commonly known as: FLOMAX   0.4 mg, Oral, Nightly               No Known Allergies    Discharge Disposition:  Home-Health Care  Svc      Discharge Diet:  Diet Order   Procedures   • Diet: Cardiac, Diabetic; Healthy Heart (2-3 Na+); Consistent Carbohydrate; Fluid Consistency: Thin (IDDSI 0)       Discharge Activity:   Activity Instructions       Activity as Tolerated      Activity as Tolerated      Other Activity Instructions      Activity Instructions: Home health PT to evaluate and treat    Up WIth Assist              CODE STATUS:    Code Status and Medical Interventions: CPR (Attempt to Resuscitate); Full   Ordered at: 11/06/24 2003     Level Of Support Discussed With:    Patient     Code Status (Patient has no pulse and is not breathing):    CPR (Attempt to Resuscitate)     Medical Interventions (Patient has pulse or is breathing):    Full       Future Appointments   Date Time Provider Department Center   11/26/2024  1:00 PM Clarita Armenta APRN [facility]   1/29/2025 10:30 AM Huang Chan MD [facility]   2/3/2025  1:00 PM Yulia Wynn MD [facility]     Additional Instructions for the Follow-ups that You Need to Schedule       Ambulatory Referral to Home Health   As directed      Face to Face Visit Date: 11/10/2024   Follow-up provider for Plan of Care?: I treated the patient in an acute care facility and will not continue treatment after discharge.   Follow-up provider: NO KNOWN PROVIDER [[id number]]   Reason/Clinical Findings: Acute on chronic diastolic congestive heart failure/A-fib/mitral regurgitation/pleural effusion   Describe mobility limitations that make leaving home difficult: As above   Nursing/Therapeutic Services Requested: Physical Therapy Skilled Nursing   Skilled nursing orders: Cardiopulmonary assessments   PT orders: Gait Training Transfer training Strengthening   Weight Bearing Status: As Tolerated   Frequency: 1 Week 1        Ambulatory Referral to Sleep Medicine   As directed      Follow-up needed: Yes        Call MD With Problems / Concerns   As directed      Instructions: Call MD or return to  ER if chest pain/shortness of breath/lower extremity edema/loss of consciousness/dizziness/nausea or vomiting/focal neurological symptoms    Order Comments: Instructions: Call MD or return to ER if chest pain/shortness of breath/lower extremity edema/loss of consciousness/dizziness/nausea or vomiting/focal neurological symptoms         Discharge Follow-up with PCP   As directed       Currently Documented PCP:    Taiwo Powers MD    PCP Phone Number:    [phone]     Follow Up Details: 1 week.  Acute on chronic diastolic congestive heart failure/type II NSTEMI/mitral valve disease/atrial fibrillation/chronic hypotension/pleural effusion/hyperthyroidism goiter/anemia/elevated liver function test        Discharge Follow-up with PCP   As directed       Currently Documented PCP:    Taiwo Powers MD    PCP Phone Number:    [phone]     Follow Up Details: 1-2 weeks        Discharge Follow-up with Specified Provider: Cardiology.; 2 Weeks   As directed      To: Cardiology.   Follow Up: 2 Weeks   Follow Up Details: Type II NSTEMI/acute on chronic diastolic congestive heart failure/mitral regurgitation/chronic hypertension/A-fib        Discharge Follow-up with Specified Provider: FU urology on 11/18 as previously scheduled   As directed      To: FU urology on 11/18 as previously scheduled        Discharge Follow-up with Specified Provider: cardiology; 2 Weeks   As directed      To: cardiology   Follow Up: 2 Weeks               Contact information for follow-up providers       Taiwo Powers MD .    Specialty: Internal Medicine  Why: 1 week.  Acute on chronic diastolic congestive heart failure/type II NSTEMI/mitral valve disease/atrial fibrillation/chronic hypotension/pleural effusion/hyperthyroidism goiter/anemia/elevated liver function test  Contact information:  [address] [facility] [address]  [phone]               Huang Chan MD Follow up in 2 week(s).    Specialty:  Cardiology  Contact information:  [address]  [facility] [address]  [facility] [address]  [phone]               Taiwo Powers MD .    Specialty: Internal Medicine  Why: 1-2 weeks  Contact information:  [address] [facility] [id number] [phone]                       Contact information for after-discharge care       Home Medical Care       [facility] .    Services: Home Nursing, Home Health Services  Contact information:  [address] [facility] [address]  [facility] [address] [phone]                                   Additional Instructions for the Follow-ups that You Need to Schedule       Ambulatory Referral to Home Health   As directed      Face to Face Visit Date: 11/10/2024   Follow-up provider for Plan of Care?: I treated the patient in an acute care facility and will not continue treatment after discharge.   Follow-up provider: NO KNOWN PROVIDER [[id number]]   Reason/Clinical Findings: Acute on chronic diastolic congestive heart failure/A-fib/mitral regurgitation/pleural effusion   Describe mobility limitations that make leaving home difficult: As above   Nursing/Therapeutic Services Requested: Physical Therapy Skilled Nursing   Skilled nursing orders: Cardiopulmonary assessments   PT orders: Gait Training Transfer training Strengthening   Weight Bearing Status: As Tolerated   Frequency: 1 Week 1        Ambulatory Referral to Sleep Medicine   As directed      Follow-up needed: Yes        Call MD With Problems / Concerns   As directed      Instructions: Call MD or return to ER if chest pain/shortness of breath/lower extremity edema/loss of consciousness/dizziness/nausea or vomiting/focal neurological symptoms    Order Comments: Instructions: Call MD or return to ER if chest pain/shortness of breath/lower extremity edema/loss of consciousness/dizziness/nausea or vomiting/focal neurological symptoms         Discharge Follow-up with PCP   As directed       Currently  Documented PCP:    Taiwo Powers MD    PCP Phone Number:    [phone]     Follow Up Details: 1 week.  Acute on chronic diastolic congestive heart failure/type II NSTEMI/mitral valve disease/atrial fibrillation/chronic hypotension/pleural effusion/hyperthyroidism goiter/anemia/elevated liver function test        Discharge Follow-up with PCP   As directed       Currently Documented PCP:    Taiwo Powers MD    PCP Phone Number:    [phone]     Follow Up Details: 1-2 weeks        Discharge Follow-up with Specified Provider: Cardiology.; 2 Weeks   As directed      To: Cardiology.   Follow Up: 2 Weeks   Follow Up Details: Type II NSTEMI/acute on chronic diastolic congestive heart failure/mitral regurgitation/chronic hypertension/A-fib        Discharge Follow-up with Specified Provider: FU urology on 11/18 as previously scheduled   As directed      To: FU urology on 11/18 as previously scheduled        Discharge Follow-up with Specified Provider: cardiology; 2 Weeks   As directed      To: cardiology   Follow Up: 2 Weeks            Time Spent on Discharge:  Greater than 30 minutes      Kristopher Soliman MD  [facility]  11/12/24  08:26 EST

## 2024-11-12 NOTE — CASE MANAGEMENT/SOCIAL WORK
Continued Stay Note  [facility]     Patient Name: Mehreen Silva IV  MRN: [id number]  Today's Date: 11/12/2024    Admit Date: 11/6/2024    Plan: Home w/ [facility], in-home caregiver, and nocturnal oxygen   Discharge Plan       Row Name 11/12/24 1137       Plan    Plan Home w/ [facility], in-home caregiver, and nocturnal oxygen    Plan Comments Patient does qualify for nocturnal oxygen. Referral sent to [facility] who will deliver it to the home. Patient will dc home w/ [facility], in home caregivers, and nocturnal oxygen/goulds. Spouse updated. ELICEO PHILLIPS                   Discharge Codes    No documentation.                 Expected Discharge Date and Time       Expected Discharge Date Expected Discharge Time    Nov 12, 2024               ELICEO Oshea

## 2024-11-12 NOTE — PROGRESS NOTES
[facility] given referral for home SN to follow.  Spoke with Korin with Dr Powers and verbal auth given for home health needs.  Verified all info with wife, [place] and she is agreeable for home SN.  Informed on services, denied need for home PT at this time as  is chairbound.  States prefers call day prior to set up home health visits so wife can plan to be present.  Has hired private healthcare for patient.  Noted chronic F/C that is changed at continence center with Dr Sainz.  May accept orders from Dr Chan and cardiology group as needed.  New nocturnal home O2 being set up by CCP.  D/C planned for today.  Wife is agreeable for our services, used alternate home health agency in the past.

## 2024-11-12 NOTE — DISCHARGE SUMMARY
Patient Name: Mehreen Silva IV  : 1940  MRN: [id number]    Date of Admission: 2024  Date of Discharge:  2024  Primary Care Physician: Taiwo Powers MD      Chief Complaint:   Shortness of Breath      Discharge Diagnoses     Active Hospital Problems    Diagnosis  POA    **Acute on chronic heart failure with preserved ejection fraction (HFpEF) [I50.33]  Yes    Nocturnal hypoxia [G47.34]  Unknown    S/P AKA (above knee amputation), right [Z89.611]  Not Applicable    Transaminitis [R74.01]  Yes    Elevated troponin [R79.89]  Yes    Pleural effusion [J90]  Yes    Nonrheumatic mitral valve regurgitation [I34.0]  Yes    Pulmonary hypertension [I27.20]  Yes    CKD (chronic kidney disease) [N18.9]  Yes    Anemia [D64.9]  Yes    Orthostatic hypotension [I95.1]  Yes    Complications of immobility [Z74.09]  Yes    Permanent atrial fibrillation [I48.21]  Yes    Hyperthyroidism [E05.90]  Yes    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        Hospital Course     Mr. Silva is a 84 y.o. male with a history of chronic heart failure with preserved ejection fraction, atrial fibrillation, valvular heart disease, chronic kidney disease, hypothyroidism, pulmonary hypertension, and hyperlipidemia who presented to [facility] initially complaining of shortness of breath.  Please see the admitting history and physical for further details.  He was found to have acute on chronic diastolic heart failure and was admitted to the hospital for further evaluation and treatment.  He was admitted to the hospital and diuresed aggressively.  His heart rate was also aggressively controlled with medications.  With active rate control and improvement in his volume status his symptoms significantly improved.  He had a previous Pleurx catheter and was seen by thoracic surgery this admission with no recommendations for additional testing or  reinsertion of catheter at this time.  He has elevated LFTs likely secondary to passive congestion from his heart failure.  He did have some nocturnal hypoxia here in the hospital.  I did personally review the overnight pulse oximetry and patient did have significant desaturations for a good portion of the night and recommend to use nocturnal oxygen at 2 L nasal cannula with sleep.  I have also placed an outpatient referral to sleep medicine for an outpatient sleep study to be performed.  At this time he is medically stable to discharge from the hospital with close outpatient follow-up from his primary care physician and cardiologist.  Plan was discussed with the patient and his son at the bedside and all questions have been addressed and answered      Day of Discharge     Subjective:  He rested well overnight.  He is feeling much better and really wants to go home.  Denies any shortness of breath or chest pain.  Ate well for breakfast    Physical Exam:  Temp:  [97.1 °F (36.2 °C)-98.2 °F (36.8 °C)] 97.1 °F (36.2 °C)  Heart Rate:  [71-85] 71  Resp:  [18] 18  BP: ()/(57-83) 100/68  Body mass index is 18.85 kg/m².  Physical Exam  Constitutional:       General: He is not in acute distress.     Appearance: He is ill-appearing.   Cardiovascular:      Rate and Rhythm: Rhythm irregular.   Pulmonary:      Effort: Pulmonary effort is normal. No respiratory distress.   Abdominal:      Palpations: Abdomen is soft.   Neurological:      General: No focal deficit present.      Mental Status: He is alert. Mental status is at baseline.         Consultants     Consult Orders (all) (From admission, onward)       Start     Ordered    11/08/24 1109  Inpatient Thoracic Surgery Consult  Once        Specialty:  Thoracic Surgery  Provider:  Nataliya Noyola MD    11/08/24 1108    11/07/24 0943  Inpatient Urology Consult  Once        Specialty:  Urology  Provider:  Johnson Miller Jr., MD    11/07/24 0943    11/07/24 0702  Inpatient  Cardiology Consult  IN AM        Specialty:  Cardiology  Provider:  Huang Chan MD    11/07/24 0046    11/06/24 1851  [facility] (on-call MD unless specified) Details  Once        Specialty:  Hospitalist  Provider:  (Not yet assigned)    11/06/24 1850                  Procedures     * Surgery not found *    Imaging Results (All)       Procedure Component Value Units Date/Time    XR Chest 1 View [[id number]] Collected: 11/11/24 1042     Updated: 11/11/24 1048    Narrative:      XR CHEST 1 VW-     DATE OF EXAM: 11/11/2024 10:04 AM     INDICATION: Hypoxemia.     COMPARISON: Radiographs 11/6/2024, 10/24/2024, and 6/30/2024. CT   11/9/2024.     TECHNIQUE: A single portable AP view of the chest was obtained.     FINDINGS:  Lordotic positioning. Similar-appearing basilar predominant right lung  opacities, obscuring the right hemidiaphragm and costophrenic angle,  with small foci of air in the lateral right base. Decreased small left  pleural effusion and left perihilar and left basilar opacities. No  pneumothorax. Unchanged cardiac and mediastinal contours. Diffuse  osteopenia. Partially imaged left reverse TSA and chronic degenerative  changes in the right shoulder.       Impression:         1. Similar-appearing right lung opacities, likely pneumonia with an  empyema or abscess in the right lung base.  2. Decreased now small left pleural effusion with underlying left  perihilar and left basilar atelectasis and/or pneumonia.     This report was finalized on 11/11/2024 10:45 AM by Dionisio Kinsey MD on  Workstation: [id number]       CT Chest Without Contrast Diagnostic [[id number]] Collected: 11/09/24 1352     Updated: 11/09/24 1416    Narrative:      CT CHEST WO CONTRAST DIAGNOSTIC-     DATE OF EXAM: 11/9/2024 12:53 PM     INDICATION: Evaluate for loculated pleural effusion.     COMPARISON: Radiographs 1/6/2024, 10/24/2024, and 6/30/2024. CT  9/16/2024.     TECHNIQUE: Multiple contiguous axial images were acquired through  the  chest without the intravenous administration of contrast. Reformatted  coronal and sagittal sequences were also reviewed. Radiation dose  reduction techniques were utilized, including automated exposure control  and exposure modulation based on body size.     FINDINGS:  Thick-walled fluid and air collection in the posterior pleural space at  the base of the right hemithorax, measuring 8.5 cm x 5 cm in axial  dimensions (axial series 2 image 83) and 3.5 cm in thickness (coronal  series 4 image 124), possible empyema/abscess. Adjacent dependent and  basilar consolidation of the right lung with air bronchograms,  surrounding groundglass opacities, and interstitial thickening  throughout the right lung, likely pneumonia. Increased moderate left  pleural effusion with associated compressive atelectasis of the left  lung and dependent consolidation of the left lower lobe that could  reflect superimposed pneumonia. Scattered groundglass opacities and  interstitial thickening in the left lung are also likely  infectious/inflammatory. Likely infectious/inflammatory sub-6 mm  pulmonary nodule in the right upper lobe (axial series 3 image 43) the  central airways are widely patent.     The heart is stable in size. Severe calcified coronary artery disease.  Trace pericardial fluid. Aortic valve calcifications and calcified  atherosclerotic disease in the thoracic aorta with stable fusiform 3.8  cm dilatation of the ascending thoracic aorta. Enlargement the main  pulmonary artery suggests pulmonary arterial hypertension. Calcified  remnants of prior granulomatous disease in the right hilum. No  pathologically enlarged intrathoracic lymph nodes are identified.     Limited noncontrast CT imaging of the upper abdomen demonstrates  cholelithiasis.     Diffuse osteopenia. Flowing multilevel mid and lower thoracic anterior  osteophyte formation suggesting DISH. Partially imaged severe chronic  degenerative changes in the right  shoulder. Partially imaged left  reverse TSA. No acute osseous abnormality or concerning osseous lesion.       Impression:         1. Thick walled fluid and air collection in the pleural space at the  base of the right hemithorax, probable empyema/abscess.  2. Adjacent dependent and basilar consolidation of the right lung with  air bronchograms, surrounding groundglass opacities, and interstitial  thickening, likely pneumonia.  3. Enlarged moderate left pleural effusion with associated compressive  atelectasis of the left lung and dependent consolidation in the left  lower lobe that could reflect superimposed pneumonia.  4. Scattered groundglass opacities and interstitial thickening in the  left lung are likely infectious/inflammatory.     This report was finalized on 11/9/2024 2:13 PM by Dionisio Kinsey MD on  Workstation: [id number]       XR Chest 1 View [[id number]] Collected: 11/06/24 1713     Updated: 11/06/24 1724    Narrative:        Portable chest radiograph     HISTORY: Shortness of air     TECHNIQUE: Single AP portable radiograph of the chest     COMPARISON: Multiple chest radiographs and back 6/30/2024       Impression:      FINDINGS AND IMPRESSION:  Left shoulder arthroplasty is in [place] visualized. There is pulm  vascular congestion with superimposed pulmonary pacification, slightly  within the bilateral mid to lower lungs. At least small to moderate  bilateral pleural effusions are present. Findings appear to have  worsened on the left since 10/24/2024, most concerning for pulmonary  edema in the proper clinical context with multifocal pneumonia less  likely. Correlation with patient history is recommended.. Cardiac  silhouette while accentuated by low lung volumes appears to be at least  moderately enlarged, as before., Nodular area of opacification within  the left midlung is present, as before. At least continued attention on  follow-up of these findings with chest radiograph in 3 to 4 weeks  is  recommended to ensure resolution and exclude the possibility malignancy.  Functionality further evaluated but chest CT if clinically indicated.     No pneumothorax is seen     This report was finalized on 11/6/2024 5:21 PM by Dr. Matias Fischer M.D  on Workstation: [id number]             Results for orders placed during the hospital encounter of 02/27/24    Doppler Ankle Brachial Index Single Level CAR    Interpretation Summary    Left Conclusion: The left XIN is normal. Mild digital ischemia.    Results for orders placed during the hospital encounter of 04/27/24    Adult Transthoracic Echo Limited W/ Cont if Necessary Per Protocol    Interpretation Summary    Limited study.    The left ventricular cavity is small in size. Left ventricular systolic function is normal. Calculated left ventricular EF = 62.4%    Left ventricular wall thickness is consistent with moderate concentric hypertrophy. Left ventricular diastolic function was indeterminate due to AFib    There is moderate anterior mitral valve thickening present. Moderate to severe mitral valve regurgitation is present.    Estimated right ventricular systolic pressure from tricuspid regurgitation is moderately elevated (45-55 mmHg).    There is a trivial pericardial effusion. There is a large right pleural effusion with likely consolidated right lung base.    Severe LA enlargement.  IVC normal in size and respirophasic variation.    Pertinent Labs     Results from last 7 days   Lab Units 11/12/24  0545 11/11/24  0451 11/10/24  0555 11/09/24  0629   WBC 10*3/mm3 6.98 7.59 6.64 6.47   HEMOGLOBIN g/dL 10.5* 10.2* 10.0* 9.8*   PLATELETS 10*3/mm3 176 170 171 167     Results from last 7 days   Lab Units 11/12/24  0545 11/11/24  0451 11/10/24  0555 11/09/24  0629   SODIUM mmol/L 138 139 139 144   POTASSIUM mmol/L 3.9 3.8 3.9 4.0   CHLORIDE mmol/L 97* 97* 96* 100   CO2 mmol/L 33.0* 35.0* 35.6* 35.3*   BUN mg/dL 23 23 25* 25*   CREATININE mg/dL 0.93 0.85 0.92 0.87  "  GLUCOSE mg/dL 84 76 78 75   EGFR mL/min/1.73 81.0 85.7 82.0 85.1     Results from last 7 days   Lab Units 11/12/24  0545 11/11/24  0451 11/10/24  0555 11/09/24  0629   ALBUMIN g/dL 2.7* 2.8* 2.9* 2.9*   BILIRUBIN mg/dL 2.0* 1.9* 1.8* 1.8*   ALK PHOS U/L 218* 227* 225* 237*   AST (SGOT) U/L 128* 125* 132* 138*   ALT (SGPT) U/L 116* 116* 116* 113*     Results from last 7 days   Lab Units 11/12/24  0545 11/11/24  0451 11/10/24  0555 11/09/24  0629   CALCIUM mg/dL 8.5* 8.4* 8.5* 8.6   ALBUMIN g/dL 2.7* 2.8* 2.9* 2.9*       Results from last 7 days   Lab Units 11/10/24  0555 11/09/24  0629 11/06/24  1843 11/06/24  1627   HSTROP T ng/L  --   --  112* 130*   PROBNP pg/mL  --   --   --  4,451.0*   DIGOXIN LVL ng/mL 1.10 0.50*  --   --            Invalid input(s): \"LDLCALC\"          Test Results Pending at Discharge     Pending Results       None              Discharge Details        Discharge Medications        New Medications        Instructions Start Date   digoxin 125 MCG tablet  Commonly known as: LANOXIN   125 mcg, Oral, Daily Digoxin      potassium chloride 10 MEQ CR capsule  Commonly known as: MICRO-K   20 mEq, Oral, 2 Times Daily             Changes to Medications        Instructions Start Date   furosemide 40 MG tablet  Commonly known as: LASIX  What changed:   how much to take  when to take this   40 mg, Oral, 2 Times Daily             Continue These Medications        Instructions Start Date   apixaban 5 MG tablet tablet  Commonly known as: Eliquis   5 mg, Oral, Every 12 Hours      cholecalciferol 25 MCG (1000 UT) tablet  Commonly known as: VITAMIN D3   1,000 Units, Daily      finasteride 5 MG tablet  Commonly known as: PROSCAR   5 mg, Oral, Daily      gabapentin 100 MG capsule  Commonly known as: NEURONTIN   100 mg, Every Night at Bedtime      Jardiance 10 MG tablet tablet  Generic drug: empagliflozin   10 mg, Oral, Daily      LORazepam 0.5 MG tablet  Commonly known as: ATIVAN   0.5 mg, Every 8 Hours PRN    "   methIMAzole 5 MG tablet  Commonly known as: TAPAZOLE   5 mg, 3 Times Weekly      metoprolol tartrate 25 MG tablet  Commonly known as: LOPRESSOR   12.5 mg, Oral, 2 Times Daily      midodrine 5 MG tablet  Commonly known as: PROAMATINE   5 mg, Oral, 2 Times Daily Before Meals      multivitamin with minerals tablet tablet   1 tablet, Daily      pantoprazole 40 MG EC tablet  Commonly known as: PROTONIX   40 mg, Oral, 2 Times Daily Before Meals      potassium chloride 20 MEQ CR tablet  Commonly known as: KLOR-CON M20   20 mEq, Oral, Daily      Santyl 250 UNIT/GM ointment  Generic drug: collagenase   Apply 1 Application topically to the appropriate area as directed Daily. To sacrum      tamsulosin 0.4 MG capsule 24 hr capsule  Commonly known as: FLOMAX   0.4 mg, Oral, Nightly               No Known Allergies    Discharge Disposition:  Home-Health Care [facility]      Discharge Diet:  Diet Order   Procedures    Diet: Cardiac, Diabetic; Healthy Heart (2-3 Na+); Consistent Carbohydrate; Fluid Consistency: Thin (IDDSI 0)       Discharge Activity:   Activity Instructions       Activity as Tolerated      Activity as Tolerated      Other Activity Instructions      Activity Instructions: Home health PT to evaluate and treat    Up WIth Assist              CODE STATUS:    Code Status and Medical Interventions: CPR (Attempt to Resuscitate); Full   Ordered at: 11/06/24 2003     Level Of Support Discussed With:    Patient     Code Status (Patient has no pulse and is not breathing):    CPR (Attempt to Resuscitate)     Medical Interventions (Patient has pulse or is breathing):    Full       Future Appointments   Date Time Provider Department Center   11/26/2024  1:00 PM Clarita Armenta APRN [facility]   1/29/2025 10:30 AM Huang Chan MD [facility]   2/3/2025  1:00 PM Yulia Wynn MD [facility]     Additional Instructions for the Follow-ups that You Need to Schedule       Ambulatory Referral to Home Health   As  directed      Face to Face Visit Date: 11/10/2024   Follow-up provider for Plan of Care?: I treated the patient in an acute care facility and will not continue treatment after discharge.   Follow-up provider: NO KNOWN PROVIDER [[id number]]   Reason/Clinical Findings: Acute on chronic diastolic congestive heart failure/A-fib/mitral regurgitation/pleural effusion   Describe mobility limitations that make leaving home difficult: As above   Nursing/Therapeutic Services Requested: Physical Therapy Skilled Nursing   Skilled nursing orders: Cardiopulmonary assessments   PT orders: Gait Training Transfer training Strengthening   Weight Bearing Status: As Tolerated   Frequency: 1 Week 1        Ambulatory Referral to Sleep Medicine   As directed      Follow-up needed: Yes        Call MD With Problems / Concerns   As directed      Instructions: Call MD or return to ER if chest pain/shortness of breath/lower extremity edema/loss of consciousness/dizziness/nausea or vomiting/focal neurological symptoms    Order Comments: Instructions: Call MD or return to ER if chest pain/shortness of breath/lower extremity edema/loss of consciousness/dizziness/nausea or vomiting/focal neurological symptoms         Discharge Follow-up with PCP   As directed       Currently Documented PCP:    Taiwo Powers MD    PCP Phone Number:    [phone]     Follow Up Details: 1 week.  Acute on chronic diastolic congestive heart failure/type II NSTEMI/mitral valve disease/atrial fibrillation/chronic hypotension/pleural effusion/hyperthyroidism goiter/anemia/elevated liver function test        Discharge Follow-up with PCP   As directed       Currently Documented PCP:    Taiwo Powers MD    PCP Phone Number:    [phone]     Follow Up Details: 1-2 weeks        Discharge Follow-up with Specified Provider: Cardiology.; 2 Weeks   As directed      To: Cardiology.   Follow Up: 2 Weeks   Follow Up Details: Type II NSTEMI/acute on chronic  diastolic congestive heart failure/mitral regurgitation/chronic hypertension/A-fib        Discharge Follow-up with Specified Provider: FU urology on 11/18 as previously scheduled   As directed      To: FU urology on 11/18 as previously scheduled        Discharge Follow-up with Specified Provider: cardiology; 2 Weeks   As directed      To: cardiology   Follow Up: 2 Weeks               Contact information for follow-up providers       Taiwo Powers MD .    Specialty: Internal Medicine  Why: 1 week.  Acute on chronic diastolic congestive heart failure/type II NSTEMI/mitral valve disease/atrial fibrillation/chronic hypotension/pleural effusion/hyperthyroidism goiter/anemia/elevated liver function test  Contact information:  [address] [address]  [phone]               Huang Chan MD Follow up in 2 week(s).    Specialty: Cardiology  Contact information:  [address] [facility] [address]  [place] [address]  [phone]               Taiwo Powers MD .    Specialty: Internal Medicine  Why: 1-2 weeks  Contact information:  [address] [id number] [phone]                       Contact information for after-discharge care       Home Medical Care       [facility] .    Services: Home Nursing, Home Health Services  Contact information:  [address] [facility] [address]  [facility] [address] [phone]                                   Additional Instructions for the Follow-ups that You Need to Schedule       Ambulatory Referral to Home Health   As directed      Face to Face Visit Date: 11/10/2024   Follow-up provider for Plan of Care?: I treated the patient in an acute care facility and will not continue treatment after discharge.   Follow-up provider: NO KNOWN PROVIDER [[id number]]   Reason/Clinical Findings: Acute on chronic diastolic congestive heart failure/A-fib/mitral regurgitation/pleural effusion   Describe mobility limitations  that make leaving home difficult: As above   Nursing/Therapeutic Services Requested: Physical Therapy Skilled Nursing   Skilled nursing orders: Cardiopulmonary assessments   PT orders: Gait Training Transfer training Strengthening   Weight Bearing Status: As Tolerated   Frequency: 1 Week 1        Ambulatory Referral to Sleep Medicine   As directed      Follow-up needed: Yes        Call MD With Problems / Concerns   As directed      Instructions: Call MD or return to ER if chest pain/shortness of breath/lower extremity edema/loss of consciousness/dizziness/nausea or vomiting/focal neurological symptoms    Order Comments: Instructions: Call MD or return to ER if chest pain/shortness of breath/lower extremity edema/loss of consciousness/dizziness/nausea or vomiting/focal neurological symptoms         Discharge Follow-up with PCP   As directed       Currently Documented PCP:    Taiwo Powers MD    PCP Phone Number:    [phone]     Follow Up Details: 1 week.  Acute on chronic diastolic congestive heart failure/type II NSTEMI/mitral valve disease/atrial fibrillation/chronic hypotension/pleural effusion/hyperthyroidism goiter/anemia/elevated liver function test        Discharge Follow-up with PCP   As directed       Currently Documented PCP:    Taiwo Powers MD    PCP Phone Number:    [phone]     Follow Up Details: 1-2 weeks        Discharge Follow-up with Specified Provider: Cardiology.; 2 Weeks   As directed      To: Cardiology.   Follow Up: 2 Weeks   Follow Up Details: Type II NSTEMI/acute on chronic diastolic congestive heart failure/mitral regurgitation/chronic hypertension/A-fib        Discharge Follow-up with Specified Provider: FU urology on 11/18 as previously scheduled   As directed      To: FU urology on 11/18 as previously scheduled        Discharge Follow-up with Specified Provider: cardiology; 2 Weeks   As directed      To: cardiology   Follow Up: 2 Weeks            Time Spent  on Discharge:  Greater than 30 minutes      Kristopher Soliman MD  [facility]  11/12/24  08:26 EST

## 2024-11-13 ENCOUNTER — READMISSION MANAGEMENT (OUTPATIENT)
Dept: CALL CENTER | Facility: HOSPITAL | Age: 84
End: 2024-11-13
Payer: MEDICARE

## 2024-11-13 NOTE — OUTREACH NOTE
Prep Survey      Flowsheet Row Responses   Gnosticist facility patient discharged from? [place]   Is LACE score < 7 ? No   Eligibility Readm Mgmt   Discharge diagnosis A/C CHF   Does the patient have one of the following disease processes/diagnoses(primary or secondary)? CHF   Does the patient have Home health ordered? Yes   What is the Home health agency?  [facility]   Is there a DME ordered? Yes   What DME was ordered? nocturnal O2 ordered   Prep survey completed? Yes            Melissa ZAMARRIPA - Registered Nurse

## 2024-11-13 NOTE — CASE MANAGEMENT/SOCIAL WORK
Case Management Discharge Note      Final Note: Home with [facility] and in home caregivers. Oxygen via [facility].    Provided Post Acute Provider List?: N/A  Provided Post Acute Provider Quality & Resource List?: N/A    Selected Continued Care - Discharged on 11/12/2024 Admission date: 11/6/2024 - Discharge disposition: Home-Health Care c      Destination    No services have been selected for the patient.                Durable Medical Equipment    No services have been selected for the patient.                Dialysis/Infusion    No services have been selected for the patient.                Home Medical Care Coordination complete.      Service Provider Services Address Phone Fax Patient Preferred     [facility], [facility] [address] [address] [phone] --              Therapy    No services have been selected for the patient.                Community Resources    No services have been selected for the patient.                Community & DME    No services have been selected for the patient.                    Transportation Services  Private: Car    Final Discharge Disposition Code: 06 - home with home health care

## 2024-11-13 NOTE — OUTREACH NOTE
CHF Week 1 Survey      Flowsheet Row Responses   [facility] patient discharged from? [place]   Does the patient have one of the following disease processes/diagnoses(primary or secondary)? CHF   CHF Week 1 attempt successful? Yes   Call start time 0947   Call end time 0955   Discharge diagnosis A/C CHF   Person spoke with today (if not patient) and relationship Wife   Meds reviewed with patient/caregiver? Yes   Is the patient having any side effects they believe may be caused by any medication additions or changes? No   Does the patient have all medications ordered at discharge? Yes   Is the patient taking all medications as directed (includes completed medication regime)? Yes   Does the patient have a primary care provider?  Yes   Does the patient have an appointment with their PCP within 7 days of discharge? No   What is preventing the patient from scheduling follow up appointments within 7 days of discharge? Haven't had time   Nursing Interventions Advised patient to make appointment   Has the patient kept scheduled appointments due by today? N/A   What is the Home health agency?  [facility]   Has home health visited the patient within 72 hours of discharge? Yes   Has all DME been delivered? Yes   Pulse Ox monitoring None   Psychosocial issues? No   Did the patient receive a copy of their discharge instructions? Yes   Nursing interventions Reviewed instructions with patient   What is the patient's perception of their health status since discharge? Same  [Tired and weak]   Is the patient able to teach back signs and symptoms of worsening condition? (i.e. weight gain, shortness of air, etc.) Yes    CHF Week 1 call completed? Yes   Wrap up additional comments Pt wife states pt is doing ok. Has been tired and resting a lot.  has reached out to pt. Went over meds. Gave CHF clinics phone number and they will call. Advised to make f/u appts as directed by d/c forms.   Call end time 0955            Aaliyah De La Torre  Registered Nurse

## 2024-11-14 ENCOUNTER — HOME CARE VISIT (OUTPATIENT)
Dept: HOME HEALTH SERVICES | Facility: HOME HEALTHCARE | Age: 84
End: 2024-11-14
Payer: MEDICARE

## 2024-11-14 ENCOUNTER — TELEPHONE (OUTPATIENT)
Dept: CARDIOLOGY | Facility: CLINIC | Age: 84
End: 2024-11-14
Payer: MEDICARE

## 2024-11-14 VITALS
SYSTOLIC BLOOD PRESSURE: 124 MMHG | DIASTOLIC BLOOD PRESSURE: 72 MMHG | HEART RATE: 80 BPM | RESPIRATION RATE: 20 BRPM | OXYGEN SATURATION: 93 % | TEMPERATURE: 97.2 F

## 2024-11-14 PROCEDURE — G0299 HHS/HOSPICE OF RN EA 15 MIN: HCPCS

## 2024-11-14 NOTE — TELEPHONE ENCOUNTER
The discharge summary said for him to take metoprolol tartrate 25 mg tablets-take half tablet twice per day.    He will need a 1 week hospital follow-up visit with me or another nurse practitioner.  Stephanie Mustafa has also seen him in the past.  Thank you

## 2024-11-14 NOTE — TELEPHONE ENCOUNTER
Patient's wife called to get some clarification on patient's Metoprolol dosage and instructions on when to take. Patient's wife states patient was just released from the hospital.       Please advise

## 2024-11-14 NOTE — TELEPHONE ENCOUNTER
Called and left VM, will continue to try to reach pt.    HUB- please put patient straight through to triage    Dottie Champagne, RN  Triage RN  11/14/24 13:17 EST

## 2024-11-14 NOTE — Clinical Note
"SOC Note    RE  Mehreen Silva IV    40    Home Health ordered for: disciplines:  SN, PT    Reason for Hosp/Primary Dx/Co-morbidities: Acute on chronic diastolic (congestive) heart failure   Focus of Care: Acute on chronic diastolic (congestive) heart failure teaching on disease mgmt and meds, as well as safety teahcing, skin care teaching, teaching on nutrition and hydration   Patient's goal(s): \"I would like to stay at home and out of the hospital\"    Current Functional status/mobility/DME: Has paid caregivers , who assist him from chair/bed to wheelchair and or recliner.  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  He has paid CG     Skin Integrity/wound status: no skin issues at this time.  Has santyl to use to prevent pressure ulcers to buttocks     Code Status: full     Fall Risk/Safety concerns: high risk for falls     Educated on Emergency Plan, steps to take prior to going to the ER and when to Call Home Health [facility]:  yes      Medication issues/Concerns:  no issues at this time     Additional Problems/Concerns: n/a    SDOH Barriers (i.e. caregiver concerns, social isolation, transportation, food insecurity, environment, income etc.)/Need for MSW: not at thsi "

## 2024-11-15 NOTE — TELEPHONE ENCOUNTER
Pt's wife, Belem (okay per SHARITA), returned call.  I gave her update on metoprolol.  Pt had an appt with TK on 11/26 that was scheduled as a 2 week hospital FU.  I've added pt on for a 1 week hospital FU per TK request with JK.    Thank you,    Rachel VELASQUEZ RN  Triage [facility]  11/15/24 09:14 EST

## 2024-11-19 ENCOUNTER — HOME CARE VISIT (OUTPATIENT)
Dept: HOME HEALTH SERVICES | Facility: HOME HEALTHCARE | Age: 84
End: 2024-11-19
Payer: MEDICARE

## 2024-11-19 VITALS
TEMPERATURE: 97.1 F | RESPIRATION RATE: 18 BRPM | HEART RATE: 58 BPM | OXYGEN SATURATION: 92 % | DIASTOLIC BLOOD PRESSURE: 76 MMHG | SYSTOLIC BLOOD PRESSURE: 118 MMHG

## 2024-11-19 PROCEDURE — G0495 RN CARE TRAIN/EDU IN HH: HCPCS

## 2024-11-19 NOTE — PROGRESS NOTES
RM:________     PCP: Taiwo Powers MD    : 1940  AGE: 84 y.o.  EST PATIENT     REASON FOR VISIT/  CC:        BP Readings from Last 3 Encounters:   24 118/76   24 124/72   24 99/53      Wt Readings from Last 3 Encounters:   24 59.6 kg (131 lb 6.3 oz)   10/17/24 65.8 kg (145 lb)   24 66.7 kg (147 lb)        WT: ____________ BP: __________L __________R HR______    CHEST PAIN: _____________    SOA: _____________PALPS: _______________     LIGHTHEADED: ___________FATIGUE: ________________ EDEMA __________    ALLERGIES:Patient has no known allergies. SMOKING HISTORY:  Social History     Tobacco Use    Smoking status: Former     Current packs/day: 0.00     Types: Cigarettes     Quit date: 1983     Years since quittin.4     Passive exposure: Past    Smokeless tobacco: Never    Tobacco comments:        Vaping Use    Vaping status: Never Used   Substance Use Topics    Alcohol use: Not Currently     Comment: SOCIAL; Patient is non drinker.    Drug use: No     Comment: Drug Abuse: Not a drug user     CAFFEINE USE_________________  ALCOHOL ______________________

## 2024-11-19 NOTE — HOME HEALTH
"SOC Note    RE  Mehreen Silva IV    40    Home Health ordered for: disciplines:  SN, PT    Reason for Hosp/Primary Dx/Co-morbidities: Acute on chronic diastolic (congestive) heart failure   Focus of Care: Acute on chronic diastolic (congestive) heart failure teaching on disease mgmt and meds, as well as safety teahcing, skin care teaching, teaching on nutrition and hydration   Patient's goal(s): \"I would like to stay at home and out of the hospital\"    Current Functional status/mobility/DME: Has paid caregivers , who assist him from chair/bed to wheelchair and or recliner.  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  He has paid CG     Skin Integrity/wound status: no skin issues at this time.  Has santyl to use to prevent pressure ulcers to buttocks     Code Status: full     Fall Risk/Safety concerns: high risk for falls     Educated on Emergency Plan, steps to take prior to going to the ER and when to Call Home Health [facility]:  yes      Medication issues/Concerns:  no issues at this time     Additional Problems/Concerns: n/a    SDOH Barriers (i.e. caregiver concerns, social isolation, transportation, food insecurity, environment, income etc.)/Need for MSW: not at thsi time"

## 2024-11-21 ENCOUNTER — OFFICE VISIT (OUTPATIENT)
Dept: CARDIOLOGY | Facility: CLINIC | Age: 84
End: 2024-11-21
Payer: MEDICARE

## 2024-11-21 VITALS
HEART RATE: 81 BPM | WEIGHT: 145 LBS | HEIGHT: 70 IN | DIASTOLIC BLOOD PRESSURE: 54 MMHG | SYSTOLIC BLOOD PRESSURE: 84 MMHG | BODY MASS INDEX: 20.76 KG/M2 | OXYGEN SATURATION: 99 %

## 2024-11-21 DIAGNOSIS — I50.32 CHRONIC HEART FAILURE WITH PRESERVED EJECTION FRACTION (HFPEF): Primary | ICD-10-CM

## 2024-11-21 DIAGNOSIS — I48.21 PERMANENT ATRIAL FIBRILLATION: ICD-10-CM

## 2024-11-21 DIAGNOSIS — I36.1 NONRHEUMATIC TRICUSPID VALVE REGURGITATION: ICD-10-CM

## 2024-11-21 DIAGNOSIS — I34.0 NONRHEUMATIC MITRAL VALVE REGURGITATION: ICD-10-CM

## 2024-11-21 DIAGNOSIS — I95.1 ORTHOSTATIC HYPOTENSION: ICD-10-CM

## 2024-11-21 DIAGNOSIS — J90 PLEURAL EFFUSION: ICD-10-CM

## 2024-11-21 PROBLEM — R79.89 ELEVATED TROPONIN: Status: RESOLVED | Noted: 2024-11-07 | Resolved: 2024-11-21

## 2024-11-21 PROBLEM — E87.5 HYPERKALEMIA: Status: RESOLVED | Noted: 2023-11-08 | Resolved: 2024-11-21

## 2024-11-21 PROBLEM — I50.33 ACUTE ON CHRONIC HEART FAILURE WITH PRESERVED EJECTION FRACTION (HFPEF): Status: RESOLVED | Noted: 2024-11-06 | Resolved: 2024-11-21

## 2024-11-21 PROBLEM — I31.39 PERICARDIAL EFFUSION: Status: RESOLVED | Noted: 2024-07-31 | Resolved: 2024-11-21

## 2024-11-21 PROCEDURE — 99214 OFFICE O/P EST MOD 30 MIN: CPT | Performed by: INTERNAL MEDICINE

## 2024-11-21 PROCEDURE — 1159F MED LIST DOCD IN RCRD: CPT | Performed by: INTERNAL MEDICINE

## 2024-11-21 PROCEDURE — 1160F RVW MEDS BY RX/DR IN RCRD: CPT | Performed by: INTERNAL MEDICINE

## 2024-11-21 NOTE — LETTER
2024     Taiwo Powers MD  [id number] [id number]    Patient: Mehreen Silva IV   YOB: 1940   Date of Visit: 2024       Dear Taiwo Powers MD    Mehreen Silva was in my office today. Below is a copy of my note.    If you have questions, please do not hesitate to call me. I look forward to following Mehreen along with you.         Sincerely,        Huang Chan MD        CC: No Recipients    Date of Office Visit: 2024  Encounter Provider: Huang Chan MD  Place of Service: [facility] CARDIOLOGY  Patient Name: Mehreen Silva IV  :1940    Chief Complaint   Patient presents with   • Hospital Follow Up Visit     1 week   :   HPI:     Mr. Silva is 84 y.o. and presents today in follow up. I have reviewed prior notes and there are no changes except for any new updates described below. I have also reviewed any information entered into the medical record by the patient or by ancillary staff.     He presented in 2022 with dyspnea; he was found to have acute diastolic CHF and atrial fibrillation. An echo revealed normal LVSF (60%), moderate LVH, bi-atrial dilation, and very mild PHTN. He was started on metoprolol, apixaban, and furosemide.  Between then and now, he has had progressive decline due to an indeterminant neurodegenerative illness.  He is immobile.  He has required a right lower extremity amputation due to poor circulation and a pressure injury to the heel.  He has had issues with a sacral decubitus ulcer.  He has had a recurrent right pleural effusion and has required a Pleurx catheter.  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  He has had trouble with diuretics and that it has been very easy for him to develop volume overload, as well as overdiuresis.    He was recently hospitalized.  He is home and his family and caregivers take good care of him.  They have been draining his catheter 3 times per  week and today they did not get any fluid out at all.    Echo 4/2024    •  Limited study.  •  The left ventricular cavity is small in size. Left ventricular systolic function is normal. Calculated left ventricular EF = 62.4%  •  Left ventricular wall thickness is consistent with moderate concentric hypertrophy. Left ventricular diastolic function was indeterminate due to AFib  •  There is moderate anterior mitral valve thickening present. Moderate to severe mitral valve regurgitation is present.  •  Estimated right ventricular systolic pressure from tricuspid regurgitation is moderately elevated (45-55 mmHg).  •  There is a trivial pericardial effusion. There is a large right pleural effusion with likely consolidated right lung base.  •  Severe LA enlargement.  IVC normal in size and respirophasic variation.    He was recently hospitalized for acute HF.  He had recently undergone placement of a tube to drain a persistent/recurrent pleural effusion.  His heart rate was a little bit fast and he was volume overloaded.  He was diuresed and his medications were adjusted.    He is back at home.  His wife and son accompany him.  Home health comes to their house twice per week.  His family is very attentive and knowledgeable about his care.  His systolic pressure is usually in the 90s but sometimes it is in the 80s.  He is asymptomatic from this.  He is wheelchair-bound and denies any dyspnea at rest.  He denies chest pain.  He and his family are concerned about some drainage from his chest tube wound.    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  12/13/2023   • HLD (hyperlipidemia) 09/23/2022   • Hx of toxic multinodular goiter 09/23/2022   • Hyperthyroidism    • Lumbar radiculopathy    • Nonrheumatic mitral valve regurgitation 07/31/2024   • Nonrheumatic tricuspid valve regurgitation 07/31/2024   • Orthostatic hypotension    • Osteoarthritis    • Pressure injury of sacral region, unstageable 05/14/2024   • Pulmonary hypertension    • Recurrent pleural effusion on right 04/27/2024    HAS PLEURAL CATH IN PLACE   • Ulcer with gangrene     right heel    (  HAD ABOVE THE KNEE AMPUTATION  )       Past Surgical History:   Procedure Laterality Date   • ABOVE KNEE AMPUTATION Right 12/13/2023    Procedure: ABOVE KNEE AMPUTATION RIGHT, proveena wound vac placement;  Surgeon: Issa Nieves MD;  Location: [facility] OR;  Service: Vascular;  Laterality: Right;   • APPENDECTOMY     • BACK SURGERY      x4   • COLONOSCOPY     • COLONOSCOPY N/A 6/29/2024    Procedure: COLONOSCOPY to cecum;  Surgeon: Amos Cuevas MD;  Location: [facility] ENDOSCOPY;  Service: Gastroenterology;  Laterality: N/A;  pre heme pos stool  post poor prep   • CYSTOSCOPY Bilateral 8/8/2024    Procedure: CYSTOSCOPY WITH BILATERAL RETROGRADE PYELOGRAM;  Surgeon: Johnson Kang Jr., MD;  Location: [facility] OR;  Service: Urology;  Laterality: Bilateral;   • ENDOSCOPY N/A 6/29/2024    Procedure: ESOPHAGOGASTRODUODENOSCOPY apc and cold biopsy;  Surgeon: Amos Cuevas MD;  Location: [facility] ENDOSCOPY;  Service: Gastroenterology;  Laterality: N/A;  pre anemia  post duodenal/stomach ulcer angio dysplasia gastritis   • EXCISION MASS TRUNK N/A 06/08/2016    Procedure: Excision soft tissue neoplasm on abdominal wall and left neck;  Surgeon: Shaji Barahona Jr., MD;  Location: [facility] OR;  Service:    • KNEE SURGERY     • NECK SURGERY  1974   • PLEURAL CATHETER INSERTION Right 4/30/2024    Procedure: RIGHT VIDEO ASSISTED THORACOSCOPY, PARTIAL DECORTICATION, PLEURX CATHETER INSERTION;   Surgeon: Nataliya Noyola MD;  Location: [facility] MAIN OR;  Service: Thoracic;  Laterality: Right;   • PLEURX CATHETER REMOVAL Right 10/24/2024    Procedure: PLEURX CATHETER REMOVAL;  Surgeon: Nataliya Noyola MD;  Location: [facility] MAIN OR;  Service: Thoracic;  Laterality: Right;   • QUADRICEPS REPAIR     • ROTATOR CUFF REPAIR     • SHOULDER SURGERY     • SMALL INTESTINE SURGERY      Bowel Obstruction       Social History     Socioeconomic History   • Marital status:    • Number of children: 1   • Highest education level: Professional school degree (e.g., MD, DDS, DVM, BRITNEY)   Tobacco Use   • Smoking status: Former     Current packs/day: 0.00     Types: Cigarettes     Quit date: 1983     Years since quittin.4     Passive exposure: Past   • Smokeless tobacco: Never   • Tobacco comments:        Vaping Use   • Vaping status: Never Used   Substance and Sexual Activity   • Alcohol use: Not Currently     Comment: SOCIAL; Patient is non drinker.   • Drug use: No     Comment: Drug Abuse: Not a drug user   • Sexual activity: Defer       Family History   Problem Relation Age of Onset   • Cancer Mother    • Heart disease Father    • Aneurysm Father         Abdominal Aortic Aneurysm   • Malig Hyperthermia Neg Hx        Review of Systems   Constitutional: Positive for malaise/fatigue.   Skin:  Positive for poor wound healing.   Musculoskeletal:  Positive for muscle weakness.   Neurological:  Positive for weakness.   Psychiatric/Behavioral:  Positive for depression.      No Known Allergies      Current Outpatient Medications:   •  apixaban (Eliquis) 5 MG tablet tablet, Take 1 tablet by mouth Every 12 (Twelve) Hours., Disp: 180 tablet, Rfl: 3  •  cholecalciferol (VITAMIN D3) 25 MCG (1000 UT) tablet, Take 1 tablet by mouth Daily. Indications: Vitamin D Deficiency, Disp: , Rfl:   •  digoxin (LANOXIN) 125 MCG tablet, Take 1 tablet by mouth Daily., Disp: 30 tablet, Rfl: 3  •  finasteride (PROSCAR) 5 MG tablet, Take 1  tablet by mouth Daily., Disp: 40 tablet, Rfl: 11  •  furosemide (LASIX) 40 MG tablet, Take 1 tablet by mouth 2 (Two) Times a Day. (Patient taking differently: Take 1 tablet by mouth Daily. Indications: Edema), Disp: 60 tablet, Rfl: 3  •  gabapentin (NEURONTIN) 100 MG capsule, Take 1 capsule by mouth every night at bedtime. Indications: Neuropathic Pain, Disp: , Rfl:   •  Jardiance 10 MG tablet tablet, TAKE 1 TABLET BY MOUTH DAILY, Disp: 90 tablet, Rfl: 1  •  LORazepam (ATIVAN) 0.5 MG tablet, Take 1 tablet by mouth Every 8 (Eight) Hours As Needed for Anxiety. Indications: Feeling Anxious, Disp: , Rfl:   •  methIMAzole (TAPAZOLE) 5 MG tablet, Take 1 tablet by mouth 3 (Three) Times a Week. Monday, Wednesday, and Friday   Indications: Overactive Thyroid Gland, Disp: , Rfl:   •  metoprolol tartrate (LOPRESSOR) 25 MG tablet, Take 0.5 tablets by mouth 2 (Two) Times a Day., Disp: 90 tablet, Rfl: 3  •  midodrine (PROAMATINE) 5 MG tablet, Take 1 tablet by mouth 2 (Two) Times a Day Before Meals., Disp: 180 tablet, Rfl: 1  •  multivitamin with minerals tablet tablet, Take 1 tablet by mouth Daily. Indications: vitamin deficiency, Disp: , Rfl:   •  O2 (OXYGEN), Inhale 2 L/min Daily As Needed (SOA). mainly using at night   Indications: COPD suspected, Disp: , Rfl:   •  pantoprazole (PROTONIX) 40 MG EC tablet, Take 1 tablet by mouth 2 (Two) Times a Day Before Meals., Disp: 60 tablet, Rfl: 0  •  potassium chloride (MICRO-K) 10 MEQ CR capsule, Take 2 capsules by mouth 2 (Two) Times a Day., Disp: 60 capsule, Rfl: 3  •  Santyl 250 UNIT/GM ointment, Apply 1 Application topically to the appropriate area as directed Daily. To sacrum  Indications: skin care, Disp: , Rfl:   •  tamsulosin (FLOMAX) 0.4 MG capsule 24 hr capsule, Take 1 capsule by mouth Every Night., Disp: 30 capsule, Rfl: 0      Objective:     Vitals:    11/21/24 1039   BP: (!) 84/54   BP Location: Right arm   Patient Position: Sitting   Cuff Size: Adult   Pulse: 81   SpO2:  "99%   Weight: 65.8 kg (145 lb)   Height: 177.8 cm (70\")       Body mass index is 20.81 kg/m².    Vitals reviewed.   Constitutional:       Appearance: Well-developed and not in distress.      Comments: In a wheelchair, very frail appearing   Eyes:      Conjunctiva/sclera: Conjunctivae normal.   HENT:      Head: Normocephalic.      Nose: Nose normal.   Neck:      Vascular: No JVD. JVD normal.      Lymphadenopathy: No cervical adenopathy.   Pulmonary:      Effort: Pulmonary effort is normal.      Breath sounds: Examination of the right-middle field reveals decreased breath sounds. Examination of the right-lower field reveals decreased breath sounds. Decreased breath sounds present.   Chest:      Comments: Small (1.5 cm) healing lesion right thorax; no surrounding redness, no ability to express pus, granulation tissue is filling in nicely  Cardiovascular:      Normal rate. Irregular rhythm.      Murmurs: There is no murmur.      Comments: No LLE edema  Abdominal:      Palpations: Abdomen is soft.   Musculoskeletal:      Cervical back: Normal range of motion.      Comments: RLE amputation Skin:     General: Skin is warm and dry.   Neurological:      Mental Status: Alert and oriented to person, place, and time.      Cranial Nerves: No cranial nerve deficit.   Psychiatric:         Behavior: Behavior normal.         Thought Content: Thought content normal.         Judgment: Judgment normal.       Procedures  EKG --   I have personally reviewed EKG on 04/27/2024 and my interpretation of the tracing is as follows: atrial fibrillation, NSST abn      Assessment:       Diagnosis Plan   1. Chronic heart failure with preserved ejection fraction (HFpEF)        2. Permanent atrial fibrillation        3. Pleural effusion        4. Nonrheumatic mitral valve regurgitation        5. Nonrheumatic tricuspid valve regurgitation        6. Orthostatic hypotension                Plan:       Mr Silva has permanent atrial fibrillation, chronic " HFpEF, and a recurrent right pleural effusion. Rate control has been a bit tricky given his hypotension, but his frailty and severe atrial dilation preclude rhythm control. He will remain on metoprolol, digoxin, and apixaban. He's on midodrine to help his BP, which is low today. However, he's asymptomatic and his BP is followed at home. As long as his weight is above 60 kg, he should remain on 5 mg of apixaban twice daily.    His low BP limits GDMT; he's on metoprolol, furosemide, and empagliflozin    He had a chest tube that has been removed. His family was concerned about the appearance of the site. I reassured them that the white material they were seeing was granulation tissue and that the scant drainage is serous. I recommended that they paint it with Betadine twice daily until they see Dr Noyola next week.     Sincerely,       Huang Chan MD

## 2024-11-21 NOTE — PROGRESS NOTES
Date of Office Visit: 2024  Encounter Provider: Huang Chan MD  Place of Service: [facility] CARDIOLOGY  Patient Name: Mehreen Silva IV  :1940    Chief Complaint   Patient presents with    Hospital Follow Up Visit     1 week   :   HPI:     Mr. Silva is 84 y.o. and presents today in follow up. I have reviewed prior notes and there are no changes except for any new updates described below. I have also reviewed any information entered into the medical record by the patient or by ancillary staff.     He presented in 2022 with dyspnea; he was found to have acute diastolic CHF and atrial fibrillation. An echo revealed normal LVSF (60%), moderate LVH, bi-atrial dilation, and very mild PHTN. He was started on metoprolol, apixaban, and furosemide.  Between then and now, he has had progressive decline due to an indeterminant neurodegenerative illness.  He is immobile.  He has required a right lower extremity amputation due to poor circulation and a pressure injury to the heel.  He has had issues with a sacral decubitus ulcer.  He has had a recurrent right pleural effusion and has required a Pleurx catheter.  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  He has had trouble with diuretics and that it has been very easy for him to develop volume overload, as well as overdiuresis.    He was recently hospitalized.  He is home and his family and caregivers take good care of him.  They have been draining his catheter 3 times per week and today they did not get any fluid out at all.    Echo 2024      Limited study.    The left ventricular cavity is small in size. Left ventricular systolic function is normal. Calculated left ventricular EF = 62.4%    Left ventricular wall thickness is consistent with moderate concentric hypertrophy. Left ventricular diastolic function was indeterminate due to AFib    There is moderate anterior mitral valve thickening present. Moderate to severe  mitral valve regurgitation is present.    Estimated right ventricular systolic pressure from tricuspid regurgitation is moderately elevated (45-55 mmHg).    There is a trivial pericardial effusion. There is a large right pleural effusion with likely consolidated right lung base.    Severe LA enlargement.  IVC normal in size and respirophasic variation.    He was recently hospitalized for acute HF.  He had recently undergone placement of a tube to drain a persistent/recurrent pleural effusion.  His heart rate was a little bit fast and he was volume overloaded.  He was diuresed and his medications were adjusted.    He is back at home.  His wife and son accompany him.  Home health comes to their house twice per week.  His family is very attentive and knowledgeable about his care.  His systolic pressure is usually in the 90s but sometimes it is in the 80s.  He is asymptomatic from this.  He is wheelchair-bound and denies any dyspnea at rest.  He denies chest pain.  He and his family are concerned about some drainage from his chest tube wound.    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 12/13/2023    HLD (hyperlipidemia) 09/23/2022    Hx of toxic multinodular goiter 09/23/2022    Hyperthyroidism     Lumbar radiculopathy     Nonrheumatic mitral valve regurgitation 07/31/2024    Nonrheumatic tricuspid valve regurgitation 07/31/2024    Orthostatic hypotension     Osteoarthritis     Pressure injury of sacral region, unstageable 05/14/2024    Pulmonary hypertension     Recurrent pleural effusion on right 04/27/2024    HAS PLEURAL CATH IN PLACE    Ulcer with  gangrene     right heel    (  HAD ABOVE THE KNEE AMPUTATION  )       Past Surgical History:   Procedure Laterality Date    ABOVE KNEE AMPUTATION Right 12/13/2023    Procedure: ABOVE KNEE AMPUTATION RIGHT, proveena wound vac placement;  Surgeon: Issa Nieves MD;  Location: [facility] MAIN OR;  Service: Vascular;  Laterality: Right;    APPENDECTOMY      BACK SURGERY      x4    COLONOSCOPY      COLONOSCOPY N/A 6/29/2024    Procedure: COLONOSCOPY to cecum;  Surgeon: Amos Cuevas MD;  [facility];  Service: Gastroenterology;  Laterality: N/A;  pre heme pos stool  post poor prep    CYSTOSCOPY Bilateral 8/8/2024    Procedure: CYSTOSCOPY WITH BILATERAL RETROGRADE PYELOGRAM;  Surgeon: Johnson Kang Jr., MD;  Location: [facility] MAIN OR;  Service: Urology;  Laterality: Bilateral;    ENDOSCOPY N/A 6/29/2024    Procedure: ESOPHAGOGASTRODUODENOSCOPY apc and cold biopsy;  Surgeon: Amos Cuevas MD;  Location: [facility] ENDOSCOPY;  Service: Gastroenterology;  Laterality: N/A;  pre anemia  post duodenal/stomach ulcer angio dysplasia gastritis    EXCISION MASS TRUNK N/A 06/08/2016    Procedure: Excision soft tissue neoplasm on abdominal wall and left neck;  Surgeon: Shaji Barahona Jr., MD;  Location: [facility] MAIN OR;  Service:     KNEE SURGERY      NECK SURGERY  1974    PLEURAL CATHETER INSERTION Right 4/30/2024    Procedure: RIGHT VIDEO ASSISTED THORACOSCOPY, PARTIAL DECORTICATION, PLEURX CATHETER INSERTION;  Surgeon: Nataliya Noyola MD;  Location: [facility] MAIN OR;  Service: Thoracic;  Laterality: Right;    PLEURX CATHETER REMOVAL Right 10/24/2024    Procedure: PLEURX CATHETER REMOVAL;  Surgeon: Natalyia Nyoola MD;  Location: [facility] MAIN OR;  Service: Thoracic;  Laterality: Right;    QUADRICEPS REPAIR      ROTATOR CUFF REPAIR      SHOULDER SURGERY      SMALL INTESTINE SURGERY  2021    Bowel Obstruction       Social History     Socioeconomic History    Marital status:     Number of children: 1     Highest education level: Professional school degree (e.g., MD, DDS, DVM, BRITNEY)   Tobacco Use    Smoking status: Former     Current packs/day: 0.00     Types: Cigarettes     Quit date: 1983     Years since quittin.4     Passive exposure: Past    Smokeless tobacco: Never    Tobacco comments:        Vaping Use    Vaping status: Never Used   Substance and Sexual Activity    Alcohol use: Not Currently     Comment: SOCIAL; Patient is non drinker.    Drug use: No     Comment: Drug Abuse: Not a drug user    Sexual activity: Defer       Family History   Problem Relation Age of Onset    Cancer Mother     Heart disease Father     Aneurysm Father         Abdominal Aortic Aneurysm    Malig Hyperthermia Neg Hx        Review of Systems   Constitutional: Positive for malaise/fatigue.   Skin:  Positive for poor wound healing.   Musculoskeletal:  Positive for muscle weakness.   Neurological:  Positive for weakness.   Psychiatric/Behavioral:  Positive for depression.      No Known Allergies      Current Outpatient Medications:     apixaban (Eliquis) 5 MG tablet tablet, Take 1 tablet by mouth Every 12 (Twelve) Hours., Disp: 180 tablet, Rfl: 3    cholecalciferol (VITAMIN D3) 25 MCG (1000 UT) tablet, Take 1 tablet by mouth Daily. Indications: Vitamin D Deficiency, Disp: , Rfl:     digoxin (LANOXIN) 125 MCG tablet, Take 1 tablet by mouth Daily., Disp: 30 tablet, Rfl: 3    finasteride (PROSCAR) 5 MG tablet, Take 1 tablet by mouth Daily., Disp: 40 tablet, Rfl: 11    furosemide (LASIX) 40 MG tablet, Take 1 tablet by mouth 2 (Two) Times a Day. (Patient taking differently: Take 1 tablet by mouth Daily. Indications: Edema), Disp: 60 tablet, Rfl: 3    gabapentin (NEURONTIN) 100 MG capsule, Take 1 capsule by mouth every night at bedtime. Indications: Neuropathic Pain, Disp: , Rfl:     Jardiance 10 MG tablet tablet, TAKE 1 TABLET BY MOUTH DAILY, Disp: 90 tablet, Rfl: 1    LORazepam (ATIVAN) 0.5 MG tablet, Take 1 tablet by mouth Every 8  "(Eight) Hours As Needed for Anxiety. Indications: Feeling Anxious, Disp: , Rfl:     methIMAzole (TAPAZOLE) 5 MG tablet, Take 1 tablet by mouth 3 (Three) Times a Week. Monday, Wednesday, and Friday   Indications: Overactive Thyroid Gland, Disp: , Rfl:     metoprolol tartrate (LOPRESSOR) 25 MG tablet, Take 0.5 tablets by mouth 2 (Two) Times a Day., Disp: 90 tablet, Rfl: 3    midodrine (PROAMATINE) 5 MG tablet, Take 1 tablet by mouth 2 (Two) Times a Day Before Meals., Disp: 180 tablet, Rfl: 1    multivitamin with minerals tablet tablet, Take 1 tablet by mouth Daily. Indications: vitamin deficiency, Disp: , Rfl:     O2 (OXYGEN), Inhale 2 L/min Daily As Needed (SOA). mainly using at night   Indications: COPD suspected, Disp: , Rfl:     pantoprazole (PROTONIX) 40 MG EC tablet, Take 1 tablet by mouth 2 (Two) Times a Day Before Meals., Disp: 60 tablet, Rfl: 0    potassium chloride (MICRO-K) 10 MEQ CR capsule, Take 2 capsules by mouth 2 (Two) Times a Day., Disp: 60 capsule, Rfl: 3    Santyl 250 UNIT/GM ointment, Apply 1 Application topically to the appropriate area as directed Daily. To sacrum  Indications: skin care, Disp: , Rfl:     tamsulosin (FLOMAX) 0.4 MG capsule 24 hr capsule, Take 1 capsule by mouth Every Night., Disp: 30 capsule, Rfl: 0      Objective:     Vitals:    11/21/24 1039   BP: (!) 84/54   BP Location: Right arm   Patient Position: Sitting   Cuff Size: Adult   Pulse: 81   SpO2: 99%   Weight: 65.8 kg (145 lb)   Height: 177.8 cm (70\")       Body mass index is 20.81 kg/m².    Vitals reviewed.   Constitutional:       Appearance: Well-developed and not in distress.      Comments: In a wheelchair, very frail appearing   Eyes:      Conjunctiva/sclera: Conjunctivae normal.   HENT:      Head: Normocephalic.      Nose: Nose normal.   Neck:      Vascular: No JVD. JVD normal.      Lymphadenopathy: No cervical adenopathy.   Pulmonary:      Effort: Pulmonary effort is normal.      Breath sounds: Examination of the " right-middle field reveals decreased breath sounds. Examination of the right-lower field reveals decreased breath sounds. Decreased breath sounds present.   Chest:      Comments: Small (1.5 cm) healing lesion right thorax; no surrounding redness, no ability to express pus, granulation tissue is filling in nicely  Cardiovascular:      Normal rate. Irregular rhythm.      Murmurs: There is no murmur.      Comments: No LLE edema  Abdominal:      Palpations: Abdomen is soft.   Musculoskeletal:      Cervical back: Normal range of motion.      Comments: RLE amputation Skin:     General: Skin is warm and dry.   Neurological:      Mental Status: Alert and oriented to person, place, and time.      Cranial Nerves: No cranial nerve deficit.   Psychiatric:         Behavior: Behavior normal.         Thought Content: Thought content normal.         Judgment: Judgment normal.       Procedures  EKG --   I have personally reviewed EKG on 04/27/2024 and my interpretation of the tracing is as follows: atrial fibrillation, NSST abn      Assessment:       Diagnosis Plan   1. Chronic heart failure with preserved ejection fraction (HFpEF)        2. Permanent atrial fibrillation        3. Pleural effusion        4. Nonrheumatic mitral valve regurgitation        5. Nonrheumatic tricuspid valve regurgitation        6. Orthostatic hypotension                Plan:       Mr Silva has permanent atrial fibrillation, chronic HFpEF, and a recurrent right pleural effusion. Rate control has been a bit tricky given his hypotension, but his frailty and severe atrial dilation preclude rhythm control. He will remain on metoprolol, digoxin, and apixaban. He's on midodrine to help his BP, which is low today. However, he's asymptomatic and his BP is followed at home. As long as his weight is above 60 kg, he should remain on 5 mg of apixaban twice daily.    His low BP limits GDMT; he's on metoprolol, furosemide, and empagliflozin    He had a chest tube that  has been removed. His family was concerned about the appearance of the site. I reassured them that the white material they were seeing was granulation tissue and that the scant drainage is serous. I recommended that they paint it with Betadine twice daily until they see Dr Noyola next week.     Sincerely,       Huang Chan MD

## 2024-11-22 ENCOUNTER — HOME CARE VISIT (OUTPATIENT)
Dept: HOME HEALTH SERVICES | Facility: HOME HEALTHCARE | Age: 84
End: 2024-11-22
Payer: MEDICARE

## 2024-11-22 PROCEDURE — G0299 HHS/HOSPICE OF RN EA 15 MIN: HCPCS

## 2024-11-23 VITALS
RESPIRATION RATE: 18 BRPM | HEART RATE: 73 BPM | DIASTOLIC BLOOD PRESSURE: 58 MMHG | TEMPERATURE: 97.5 F | SYSTOLIC BLOOD PRESSURE: 114 MMHG | OXYGEN SATURATION: 97 %

## 2024-11-25 ENCOUNTER — HOME CARE VISIT (OUTPATIENT)
Dept: HOME HEALTH SERVICES | Facility: HOME HEALTHCARE | Age: 84
End: 2024-11-25
Payer: MEDICARE

## 2024-11-25 VITALS
HEART RATE: 63 BPM | SYSTOLIC BLOOD PRESSURE: 116 MMHG | DIASTOLIC BLOOD PRESSURE: 58 MMHG | TEMPERATURE: 97.7 F | OXYGEN SATURATION: 95 %

## 2024-11-25 PROCEDURE — G0299 HHS/HOSPICE OF RN EA 15 MIN: HCPCS

## 2024-11-26 ENCOUNTER — TELEPHONE (OUTPATIENT)
Dept: SURGERY | Facility: CLINIC | Age: 84
End: 2024-11-26
Payer: MEDICARE

## 2024-11-26 ENCOUNTER — OFFICE VISIT (OUTPATIENT)
Dept: SURGERY | Facility: CLINIC | Age: 84
End: 2024-11-26
Payer: MEDICARE

## 2024-11-26 ENCOUNTER — HOSPITAL ENCOUNTER (OUTPATIENT)
Dept: GENERAL RADIOLOGY | Facility: HOSPITAL | Age: 84
Discharge: HOME | End: 2024-11-26
Payer: MEDICARE

## 2024-11-26 VITALS
OXYGEN SATURATION: 95 % | DIASTOLIC BLOOD PRESSURE: 62 MMHG | WEIGHT: 147 LBS | SYSTOLIC BLOOD PRESSURE: 105 MMHG | BODY MASS INDEX: 21.09 KG/M2 | HEART RATE: 55 BPM

## 2024-11-26 DIAGNOSIS — J90 PLEURAL EFFUSION: ICD-10-CM

## 2024-11-26 DIAGNOSIS — J90 PLEURAL EFFUSION: Primary | ICD-10-CM

## 2024-11-26 PROCEDURE — 99214 OFFICE O/P EST MOD 30 MIN: CPT

## 2024-11-26 PROCEDURE — 71046 X-RAY EXAM CHEST 2 VIEWS: CPT

## 2024-11-26 NOTE — PROGRESS NOTES
"Chief Complaint  Pleural effusion    Subjective        Mehreen Silva IV presents to [facility] THORACIC SURGERY  History of Present Illness  Mr. Silva is a very pleasant 84-year-old gentleman known to our service for his recurrent pleural effusion.  The right was managed with a Pleurx catheter which was removed on 10/24/2024 by Dr. Nataliya Noyola.  He was last seen by our service on 11/9/2024 and during this time recent CT imaging demonstrated a mild right pleural effusion and a moderate to large left pleural effusion.  No intervention was recommended at that time.  He was recommended to optimize his fluid volume status with aggressive diuresis.    He presents today for continued follow-up.  He is accompanied by his caregiver. He reports he has been doing well since being discharged from the hospital.  He denies any shortness of breath, orthopnea, cough, fevers, night chills, nausea or vomiting.     His caregiver does report some scant drainage from one of his surgical incisions.  He reports at times quarter size drainage on bandage placed.  He reports the drainage is serous to serosanguineous.  He denies any purulence or malodorous drainage.  They have been cleansing this incision as well as the prior Pleurx site diligently with peroxide and Betadine twice daily.  Aside from this he is without any acute pulmonary complaints.  He is mobile with a wheelchair.      Objective   Vital Signs:  /62 (BP Location: Right arm, Patient Position: Sitting, Cuff Size: Adult)   Pulse 55   Wt 66.7 kg (147 lb)   SpO2 95%   BMI 21.09 kg/m²   Estimated body mass index is 21.09 kg/m² as calculated from the following:    Height as of 11/21/24: 177.8 cm (70\").    Weight as of this encounter: 66.7 kg (147 lb).    BMI is within normal parameters. No other follow-up for BMI required.      Physical Exam  Constitutional:       General: He is not in acute distress.     Appearance: He is not ill-appearing or " toxic-appearing.   HENT:      Head: Normocephalic and atraumatic.   Eyes:      Pupils: Pupils are equal, round, and reactive to light.   Abdominal:      General: Abdomen is flat. There is no distension.      Palpations: Abdomen is soft.   Musculoskeletal:         General: No swelling or tenderness.   Skin:     General: Skin is warm and dry.      Capillary Refill: Capillary refill takes less than 2 seconds.      Comments: PleurX catheter site and adjacent incision scabbed.  Very scant serous drainage to adjacent incision.  No purulent drainage.     Neurological:      Mental Status: He is alert.        Result Review :      Data reviewed : Radiologic studies chest x-ray performed today, 11/26/2024    Left lung appears clear.  No significant progression of right effusion.  Remains small with improved aeration of the right lung for example when compared to recent chest x-ray performed 11/11/2024.  Resolution of left pleural effusion.     Assessment and Plan   Diagnoses and all orders for this visit:    1. Pleural effusion (Primary)  -     XR Chest 2 View; Future    I have independently reviewed the most recent chest x-ray performed today which demonstrates a clear left lung.  Right pleural effusion appears stable and remains small.  Appears to be improved aeration of the right lung when compared to prior chest x-ray performed 11/11/2024.  He reports his breathing is stable and he is without any acute pulmonary complaints.      His caregiver is reporting scant drainage from one of his surgical incisions adjacent to prior Pleurx catheter site.  This was previously treated with silver nitrate by Dr. Noyola.  His incisions appear to be healing nicely.  There is only scant serous drainage from this site.  It is nearly all scabbed.    I advised to continue to cleanse site with hydrogen peroxide and paint with Betadine twice daily and as needed.  Leave open to air if no drainage.  No signs of infection and expect this to heal  with time.  Plan to see him in 1 month with a repeat chest x-ray to trend his effusion.  Continue to optimize fluid volume status with diuresis.    I have advised him to reach out should any needs arise prior to scheduled appointment.        I spent 30 minutes caring for Mehreen on this date of service. This time includes time spent by me in the following activities:preparing for the visit, reviewing tests, obtaining and/or reviewing a separately obtained history, counseling and educating the patient/family/caregiver, ordering medications, tests, or procedures, documenting information in the medical record, independently interpreting results and communicating that information with the patient/family/caregiver, and care coordination  Follow Up   Return in about 1 month (around 12/26/2024).  Patient was given instructions and counseling regarding his condition or for health maintenance advice. Please see specific information pulled into the AVS if appropriate.

## 2024-11-26 NOTE — TELEPHONE ENCOUNTER
Tried wife no answer, spoke with ricci to let him know mr celaya needs to have a cxr before his appt he was understanding

## 2024-11-27 ENCOUNTER — HOME CARE VISIT (OUTPATIENT)
Dept: HOME HEALTH SERVICES | Facility: HOME HEALTHCARE | Age: 84
End: 2024-11-27
Payer: MEDICARE

## 2024-11-27 RX ORDER — POTASSIUM CHLORIDE 1500 MG/1
20 TABLET, EXTENDED RELEASE ORAL DAILY
Qty: 90 TABLET | Refills: 1 | OUTPATIENT
Start: 2024-11-27

## 2024-12-02 ENCOUNTER — READMISSION MANAGEMENT (OUTPATIENT)
Dept: CALL CENTER | Facility: HOSPITAL | Age: 84
End: 2024-12-02
Payer: MEDICARE

## 2024-12-02 ENCOUNTER — HOME CARE VISIT (OUTPATIENT)
Dept: HOME HEALTH SERVICES | Facility: HOME HEALTHCARE | Age: 84
End: 2024-12-02
Payer: MEDICARE

## 2024-12-02 VITALS
HEART RATE: 71 BPM | RESPIRATION RATE: 18 BRPM | TEMPERATURE: 97.7 F | DIASTOLIC BLOOD PRESSURE: 60 MMHG | SYSTOLIC BLOOD PRESSURE: 118 MMHG | OXYGEN SATURATION: 98 %

## 2024-12-02 PROCEDURE — G0299 HHS/HOSPICE OF RN EA 15 MIN: HCPCS

## 2024-12-02 NOTE — OUTREACH NOTE
CHF Week 3 Survey      Flowsheet Row Responses   [facility] patient discharged from? [place]   Does the patient have one of the following disease processes/diagnoses(primary or secondary)? CHF   Week 3 attempt successful? Yes   Call start time 1236   Call end time 1246   Medication alerts for this patient Eliquis   Meds reviewed with patient/caregiver? Yes   Is the patient taking all medications as directed (includes completed medication regime)? Yes   Has the patient kept scheduled appointments due by today? N/A   CHF Week 3 call completed? Yes   Graduated Yes   Is the patient interested in additional calls from an ambulatory ? No   Would this patient benefit from a Referral to [facility] Social Work? No   Wrap up additional comments Spouse reports pt has bleeding wound to theside.  is aware. Spouse stating multiple times that the  nurse shall contact cardiology immediately. Spouse is waiting on return call. PT is stable. Pt soaking through 2 dressings per day.   Call end time 1246            Maile GARZA - Registered Nurse

## 2024-12-05 ENCOUNTER — LAB REQUISITION (OUTPATIENT)
Dept: LAB | Facility: HOSPITAL | Age: 84
End: 2024-12-05
Payer: MEDICARE

## 2024-12-05 ENCOUNTER — HOME CARE VISIT (OUTPATIENT)
Dept: HOME HEALTH SERVICES | Facility: HOME HEALTHCARE | Age: 84
End: 2024-12-05
Payer: MEDICARE

## 2024-12-05 VITALS
HEART RATE: 79 BPM | SYSTOLIC BLOOD PRESSURE: 114 MMHG | OXYGEN SATURATION: 96 % | DIASTOLIC BLOOD PRESSURE: 58 MMHG | TEMPERATURE: 97.4 F | RESPIRATION RATE: 18 BRPM

## 2024-12-05 DIAGNOSIS — D64.9 ANEMIA, UNSPECIFIED: ICD-10-CM

## 2024-12-05 LAB
DEPRECATED RDW RBC AUTO: 58.8 FL (ref 37–54)
ERYTHROCYTE [DISTWIDTH] IN BLOOD BY AUTOMATED COUNT: 22.7 % (ref 12.3–15.4)
HCT VFR BLD AUTO: 38.1 % (ref 37.5–51)
HGB BLD-MCNC: 11.2 G/DL (ref 13–17.7)
MCH RBC QN AUTO: 21.7 PG (ref 26.6–33)
MCHC RBC AUTO-ENTMCNC: 29.4 G/DL (ref 31.5–35.7)
MCV RBC AUTO: 74 FL (ref 79–97)
PLATELET # BLD AUTO: 187 10*3/MM3 (ref 140–450)
PMV BLD AUTO: 11.5 FL (ref 6–12)
RBC # BLD AUTO: 5.15 10*6/MM3 (ref 4.14–5.8)
WBC NRBC COR # BLD AUTO: 7.29 10*3/MM3 (ref 3.4–10.8)

## 2024-12-05 PROCEDURE — G0299 HHS/HOSPICE OF RN EA 15 MIN: HCPCS

## 2024-12-05 PROCEDURE — 85027 COMPLETE CBC AUTOMATED: CPT

## 2024-12-09 ENCOUNTER — HOME CARE VISIT (OUTPATIENT)
Dept: HOME HEALTH SERVICES | Facility: HOME HEALTHCARE | Age: 84
End: 2024-12-09
Payer: MEDICARE

## 2024-12-09 VITALS
DIASTOLIC BLOOD PRESSURE: 54 MMHG | RESPIRATION RATE: 18 BRPM | OXYGEN SATURATION: 94 % | TEMPERATURE: 98.2 F | HEART RATE: 65 BPM | SYSTOLIC BLOOD PRESSURE: 102 MMHG

## 2024-12-09 PROCEDURE — G0299 HHS/HOSPICE OF RN EA 15 MIN: HCPCS

## 2024-12-10 RX ORDER — EMPAGLIFLOZIN 10 MG/1
10 TABLET, FILM COATED ORAL DAILY
Qty: 90 TABLET | Refills: 1 | Status: SHIPPED | OUTPATIENT
Start: 2024-12-10

## 2024-12-10 RX ORDER — APIXABAN 5 MG/1
5 TABLET, FILM COATED ORAL EVERY 12 HOURS
Qty: 180 TABLET | Refills: 3 | Status: SHIPPED | OUTPATIENT
Start: 2024-12-10

## 2024-12-12 ENCOUNTER — HOME CARE VISIT (OUTPATIENT)
Dept: HOME HEALTH SERVICES | Facility: HOME HEALTHCARE | Age: 84
End: 2024-12-12
Payer: MEDICARE

## 2024-12-12 ENCOUNTER — DOCUMENTATION (OUTPATIENT)
Dept: SURGERY | Facility: CLINIC | Age: 84
End: 2024-12-12
Payer: MEDICARE

## 2024-12-12 PROCEDURE — G0299 HHS/HOSPICE OF RN EA 15 MIN: HCPCS

## 2024-12-13 VITALS
SYSTOLIC BLOOD PRESSURE: 102 MMHG | TEMPERATURE: 97.5 F | OXYGEN SATURATION: 97 % | DIASTOLIC BLOOD PRESSURE: 60 MMHG | RESPIRATION RATE: 18 BRPM | HEART RATE: 66 BPM

## 2024-12-20 ENCOUNTER — HOME CARE VISIT (OUTPATIENT)
Dept: HOME HEALTH SERVICES | Facility: HOME HEALTHCARE | Age: 84
End: 2024-12-20
Payer: MEDICARE

## 2024-12-20 PROCEDURE — G0299 HHS/HOSPICE OF RN EA 15 MIN: HCPCS

## 2024-12-21 VITALS
SYSTOLIC BLOOD PRESSURE: 120 MMHG | RESPIRATION RATE: 18 BRPM | TEMPERATURE: 97.2 F | OXYGEN SATURATION: 96 % | DIASTOLIC BLOOD PRESSURE: 64 MMHG | HEART RATE: 53 BPM

## 2024-12-26 ENCOUNTER — HOME CARE VISIT (OUTPATIENT)
Dept: HOME HEALTH SERVICES | Facility: HOME HEALTHCARE | Age: 84
End: 2024-12-26
Payer: MEDICARE

## 2024-12-26 PROCEDURE — G0299 HHS/HOSPICE OF RN EA 15 MIN: HCPCS

## 2024-12-27 VITALS
DIASTOLIC BLOOD PRESSURE: 56 MMHG | OXYGEN SATURATION: 95 % | HEART RATE: 65 BPM | SYSTOLIC BLOOD PRESSURE: 102 MMHG | TEMPERATURE: 98.2 F | RESPIRATION RATE: 18 BRPM

## 2024-12-30 ENCOUNTER — OFFICE VISIT (OUTPATIENT)
Dept: SURGERY | Facility: CLINIC | Age: 84
End: 2024-12-30
Payer: MEDICARE

## 2024-12-30 ENCOUNTER — HOSPITAL ENCOUNTER (OUTPATIENT)
Dept: GENERAL RADIOLOGY | Facility: HOSPITAL | Age: 84
Discharge: HOME | End: 2024-12-30
Payer: MEDICARE

## 2024-12-30 VITALS
HEART RATE: 66 BPM | RESPIRATION RATE: 16 BRPM | SYSTOLIC BLOOD PRESSURE: 97 MMHG | DIASTOLIC BLOOD PRESSURE: 58 MMHG | OXYGEN SATURATION: 96 %

## 2024-12-30 DIAGNOSIS — J90 PLEURAL EFFUSION: ICD-10-CM

## 2024-12-30 DIAGNOSIS — T14.8XXA WOUND DISCHARGE: Primary | ICD-10-CM

## 2024-12-30 PROCEDURE — 71046 X-RAY EXAM CHEST 2 VIEWS: CPT

## 2024-12-31 ENCOUNTER — HOME CARE VISIT (OUTPATIENT)
Dept: HOME HEALTH SERVICES | Facility: HOME HEALTHCARE | Age: 84
End: 2024-12-31
Payer: MEDICARE

## 2024-12-31 VITALS
DIASTOLIC BLOOD PRESSURE: 56 MMHG | RESPIRATION RATE: 18 BRPM | SYSTOLIC BLOOD PRESSURE: 106 MMHG | OXYGEN SATURATION: 97 % | HEART RATE: 70 BPM | TEMPERATURE: 97.5 F

## 2024-12-31 PROCEDURE — G0299 HHS/HOSPICE OF RN EA 15 MIN: HCPCS

## 2024-12-31 NOTE — PROGRESS NOTES
"Chief Complaint  Pleural Effusion (1 MO cxr )    Subjective        Mehreen Silva IV presents to [facility] THORACIC SURGERY  History of Present Illness    Mr. Silva is a pleasant 84-year-old gentleman who presents today in follow-up status post Pleurx catheter removal Dr. Noyola on 10/24/2024.  He has had a hospital admission since that time.  He continues to have drainage from his former Pleurx site on the right chest.  This is worse at night and he wakes up with his pajamas and bed soaked.  His shortness of breath has improved since his admission in November 2024.  He has multiple comorbidities and remains on Eliquis.  He presents today very frustrated with the oozing from his site.  He reports poor appetite and limited mobility.  He would like answers to why his Pleurx site continues to ooze.        Objective   Vital Signs:  BP 97/58 (BP Location: Right arm, Patient Position: Sitting, Cuff Size: Adult)   Pulse 66   Resp 16   SpO2 96%   Estimated body mass index is 21.09 kg/m² as calculated from the following:    Height as of 11/21/24: 177.8 cm (70\").    Weight as of 11/26/24: 66.7 kg (147 lb).    BMI is within normal parameters. No other follow-up for BMI required.      Physical Exam  Constitutional:       General: He is not in acute distress.     Appearance: Normal appearance. He is not ill-appearing.   HENT:      Head: Normocephalic and atraumatic.   Pulmonary:      Effort: Pulmonary effort is normal.   Musculoskeletal:      Cervical back: Normal range of motion and neck supple.   Skin:     General: Skin is warm.             Comments: Proximately [id number] opening to the right chest at the site of his former Pleurx with scant amount of serosanguineous drainage.   Neurological:      General: No focal deficit present.      Mental Status: He is alert.   Psychiatric:         Mood and Affect: Mood normal.         Thought Content: Thought content normal.        Result Review :           Chest x-ray " from today was independently reviewed and demonstrates improvement to right-sided pleural effusion.  Left side appears to be well-expanded.         Assessment and Plan   Diagnoses and all orders for this visit:    1. Wound discharge (Primary)  -     Ambulatory Referral to Home Health (Outpatient)    Mr. Silva continues to have some drainage from his former right sided Pleurx site.  Assessed in conjunction with Dr. Noyola who recommends vigilant wound care with iodoform packing at least every 24 hours to assist with healing.  His poor nutrition/lack of appetite in the setting of multiple comorbidities contribute to delayed healing to this area.  We will ask home health to visit him to assist with wound care.  I will follow-up with him in 1 month for wound check.  He is advised to increase his nutrition and that time.  Discussed at length with the patient and his family today as well as his caretaker.  They are in agreement with this plan.       I spent 56 minutes caring for Mehreen on this date of service. This time includes time spent by me in the following activities:preparing for the visit, reviewing tests, obtaining and/or reviewing a separately obtained history, performing a medically appropriate examination and/or evaluation , counseling and educating the patient/family/caregiver, ordering medications, tests, or procedures, referring and communicating with other health care professionals , and documenting information in the medical record    Follow Up   Return in about 1 month (around 1/30/2025) for Next scheduled follow up.  Patient was given instructions and counseling regarding his condition or for health maintenance advice. Please see specific information pulled into the AVS if appropriate.

## 2025-01-01 ENCOUNTER — TELEPHONE (OUTPATIENT)
Dept: CARDIOLOGY | Age: 85
End: 2025-01-01
Payer: MEDICARE

## 2025-01-09 ENCOUNTER — HOME CARE VISIT (OUTPATIENT)
Dept: HOME HEALTH SERVICES | Facility: HOME HEALTHCARE | Age: 85
End: 2025-01-09
Payer: MEDICARE

## 2025-01-09 PROCEDURE — G0299 HHS/HOSPICE OF RN EA 15 MIN: HCPCS

## 2025-01-10 VITALS
HEART RATE: 52 BPM | RESPIRATION RATE: 18 BRPM | TEMPERATURE: 98.4 F | DIASTOLIC BLOOD PRESSURE: 58 MMHG | SYSTOLIC BLOOD PRESSURE: 120 MMHG | OXYGEN SATURATION: 95 %

## 2025-01-10 NOTE — HOME HEALTH
60 Day Summary    Home Health need continues for: RIGHT FLANK WOUND    Primary diagnoses/co-morbidities/recent procedures in past 60 days that impact current episode:     Current level of functional ability: patient requires assistance with all ADLS.    Homebound status and living arrangements: Patient is home bound and requires moderate to maxium assistance when leaving the home.    Goals accomplished and/or measurable progress toward unmet goals in past 60 days:     Skilled need:  SN needed for wound care, WOCN to make visit for wound assessment/evalutation and recommendations.    Focus of care for next 60 days for each discipline ordered: right flank wound care    Skin integrity/wound status: right flank wound 0.5 L x 1.2 W x 0.2 D from entry incision for pleurx catheter that was origninally placed 4/30/24. Pleurx drain catheter was removed 10/24/24    Code status: full code    Most recent fall risk: no falls in the home    Estimated date when home care services will end: 6 weeks  Plan of Care confirmed with Mary DESHPANDE on 1/9/25.

## 2025-01-14 ENCOUNTER — HOME CARE VISIT (OUTPATIENT)
Dept: HOME HEALTH SERVICES | Facility: HOME HEALTHCARE | Age: 85
End: 2025-01-14
Payer: MEDICARE

## 2025-01-14 VITALS
TEMPERATURE: 97.9 F | OXYGEN SATURATION: 97 % | SYSTOLIC BLOOD PRESSURE: 110 MMHG | RESPIRATION RATE: 18 BRPM | HEART RATE: 69 BPM | DIASTOLIC BLOOD PRESSURE: 60 MMHG

## 2025-01-14 PROCEDURE — G0299 HHS/HOSPICE OF RN EA 15 MIN: HCPCS

## 2025-01-21 ENCOUNTER — HOME CARE VISIT (OUTPATIENT)
Dept: HOME HEALTH SERVICES | Facility: HOME HEALTHCARE | Age: 85
End: 2025-01-21
Payer: MEDICARE

## 2025-01-21 VITALS
RESPIRATION RATE: 18 BRPM | TEMPERATURE: 97.9 F | DIASTOLIC BLOOD PRESSURE: 64 MMHG | SYSTOLIC BLOOD PRESSURE: 118 MMHG | HEART RATE: 74 BPM

## 2025-01-21 PROCEDURE — G0299 HHS/HOSPICE OF RN EA 15 MIN: HCPCS

## 2025-01-21 NOTE — HOME HEALTH
DUAL VISIT WITH  FOR WOUND ASSESSMENT AND TREATMENT.  CWOCN TREATED THE RIGHT FLANK WOUND WITH SILVER NITRATE.   WOUND CLEANSED WITH NORMAL SALINE AND GAUZE, SILVER NITRATE TO THE HYPERGRANULATION TISSUE, SKIN PREP TO THE PERIWOUND TISSUE, COVERED WITH BASIC FOAM AND SECURED WITH BORDERED DRESSING.  EDUCATED THE CAREGIVER ON THE USE OF OPTILOCK DRESSING FOR NIGHTTIME TO CONTROL THE DRAINAGE AND KEEP THE WOUND HEALTHY.   EDUCATED THE CAREGIVER ON DRESSING CHANGES FOR DAYTIME.

## 2025-01-23 ENCOUNTER — HOME CARE VISIT (OUTPATIENT)
Dept: HOME HEALTH SERVICES | Facility: HOME HEALTHCARE | Age: 85
End: 2025-01-23
Payer: MEDICARE

## 2025-01-23 PROCEDURE — G0299 HHS/HOSPICE OF RN EA 15 MIN: HCPCS

## 2025-01-24 VITALS
TEMPERATURE: 98.3 F | RESPIRATION RATE: 18 BRPM | DIASTOLIC BLOOD PRESSURE: 62 MMHG | HEART RATE: 72 BPM | SYSTOLIC BLOOD PRESSURE: 102 MMHG | OXYGEN SATURATION: 93 %

## 2025-01-28 ENCOUNTER — HOME CARE VISIT (OUTPATIENT)
Dept: HOME HEALTH SERVICES | Facility: HOME HEALTHCARE | Age: 85
End: 2025-01-28
Payer: MEDICARE

## 2025-01-28 VITALS
HEART RATE: 74 BPM | SYSTOLIC BLOOD PRESSURE: 116 MMHG | RESPIRATION RATE: 18 BRPM | TEMPERATURE: 97.5 F | DIASTOLIC BLOOD PRESSURE: 64 MMHG | OXYGEN SATURATION: 97 %

## 2025-01-28 PROCEDURE — G0299 HHS/HOSPICE OF RN EA 15 MIN: HCPCS

## 2025-01-28 RX ORDER — FUROSEMIDE 40 MG/1
40 TABLET ORAL 2 TIMES DAILY
Qty: 180 TABLET | Refills: 1 | Status: SHIPPED | OUTPATIENT
Start: 2025-01-28

## 2025-01-28 NOTE — TELEPHONE ENCOUNTER
Patient's wife called stating patient is out of his Furosemide 40 mg BID that he has been on since last year when he was in the hospital. Patient needs a new prescription sent to [facility] in [place] on [address] for the 40 mg BID.         Please Advise

## 2025-01-30 ENCOUNTER — HOME CARE VISIT (OUTPATIENT)
Dept: HOME HEALTH SERVICES | Facility: HOME HEALTHCARE | Age: 85
End: 2025-01-30
Payer: MEDICARE

## 2025-01-30 ENCOUNTER — OFFICE VISIT (OUTPATIENT)
Dept: SURGERY | Facility: CLINIC | Age: 85
End: 2025-01-30
Payer: MEDICARE

## 2025-01-30 VITALS — HEART RATE: 65 BPM | OXYGEN SATURATION: 98 % | SYSTOLIC BLOOD PRESSURE: 97 MMHG | DIASTOLIC BLOOD PRESSURE: 56 MMHG

## 2025-01-30 DIAGNOSIS — Z78.9 PRESENCE OF SURGICAL INCISION: Primary | ICD-10-CM

## 2025-01-30 NOTE — PROGRESS NOTES
"Chief Complaint  Pleural Effusion (1 m f/u - WOUND CHECK)    Subjective        Mehreen Silva IV presents to [facility] THORACIC SURGERY  History of Present Illness    Mr. Silva is a pleasant 84-year-old gentleman who presents today in follow-up status post Pleurx catheter removal Dr. Noyola on 10/24/2024.  He has had a hospital admission since that time.  He continues to have drainage from his former Pleurx site on the right chest.  He was seen in the office on 12/30/24 and home health was ordered.  His drainage has significantly improved since his last appointment.  He presents today feeling well with CT chest.      Objective   Vital Signs:  BP 97/56 (BP Location: Left arm, Patient Position: Sitting, Cuff Size: Adult)   Pulse 65   SpO2 98%   Estimated body mass index is 21.09 kg/m² as calculated from the following:    Height as of 11/21/24: 177.8 cm (70\").    Weight as of 11/26/24: 66.7 kg (147 lb).    BMI is within normal parameters. No other follow-up for BMI required.      Physical Exam  Constitutional:       General: He is not in acute distress.     Appearance: Normal appearance. He is not ill-appearing.   HENT:      Head: Normocephalic and atraumatic.   Pulmonary:      Effort: Pulmonary effort is normal.   Musculoskeletal:      Cervical back: Normal range of motion and neck supple.   Skin:     General: Skin is warm.             Comments: Proximately [id number] opening to the right chest at the site of his former Pleurx with scant amount of serosanguineous drainage.  This is improved since his last appointment   Neurological:      General: No focal deficit present.      Mental Status: He is alert.      Motor: No weakness.   Psychiatric:         Mood and Affect: Mood normal.         Thought Content: Thought content normal.        Result Review :    Chest x-ray from 12/30/2024 was independently reviewed and demonstrates improvement to right-sided pleural effusion.  Left side appears to be " well-expanded.         Assessment and Plan   Diagnoses and all orders for this visit:    1. Presence of surgical incision (Primary)      Mr. Silva continues to have some drainage from his former right sided Pleurx site, but this is much improved since his previous appointment with us.  Recommend to continue vigilant wound care per home health recommendations.    Supplemental oxygen was ordered as needed at time of discharge in November 2025.  I will call [facility] and have this discontinued as patient appears to be hemodynamically stable on room air and does not need this any longer.         I spent 31 minutes caring for Mehreen on this date of service. This time includes time spent by me in the following activities:preparing for the visit, reviewing tests, obtaining and/or reviewing a separately obtained history, performing a medically appropriate examination and/or evaluation , counseling and educating the patient/family/caregiver, ordering medications, tests, or procedures, referring and communicating with other health care professionals , and documenting information in the medical record    Follow Up   Return in about 2 months (around 3/30/2025) for Next scheduled follow up.  Patient was given instructions and counseling regarding his condition or for health maintenance advice. Please see specific information pulled into the AVS if appropriate.

## 2025-02-04 ENCOUNTER — HOME CARE VISIT (OUTPATIENT)
Dept: HOME HEALTH SERVICES | Facility: HOME HEALTHCARE | Age: 85
End: 2025-02-04
Payer: MEDICARE

## 2025-02-04 PROCEDURE — G0299 HHS/HOSPICE OF RN EA 15 MIN: HCPCS

## 2025-02-05 VITALS
DIASTOLIC BLOOD PRESSURE: 62 MMHG | HEART RATE: 68 BPM | RESPIRATION RATE: 18 BRPM | SYSTOLIC BLOOD PRESSURE: 110 MMHG | OXYGEN SATURATION: 94 % | TEMPERATURE: 97.8 F

## 2025-02-11 ENCOUNTER — HOME CARE VISIT (OUTPATIENT)
Dept: HOME HEALTH SERVICES | Facility: HOME HEALTHCARE | Age: 85
End: 2025-02-11
Payer: MEDICARE

## 2025-02-11 VITALS
OXYGEN SATURATION: 97 % | TEMPERATURE: 98.6 F | DIASTOLIC BLOOD PRESSURE: 62 MMHG | SYSTOLIC BLOOD PRESSURE: 114 MMHG | HEART RATE: 67 BPM | RESPIRATION RATE: 18 BRPM

## 2025-02-11 PROCEDURE — G0299 HHS/HOSPICE OF RN EA 15 MIN: HCPCS

## 2025-02-14 ENCOUNTER — TELEPHONE (OUTPATIENT)
Dept: CARDIOLOGY | Facility: CLINIC | Age: 85
End: 2025-02-14
Payer: MEDICARE

## 2025-02-14 NOTE — TELEPHONE ENCOUNTER
Patient's wife (Belem) left a voicemail. I reached out to get more information regarding patient's upcoming appointment. LVM for patient's wife to call the office back.

## 2025-02-17 NOTE — TELEPHONE ENCOUNTER
"Patient's wife called back. Patient is scheduled to see you on 3/5/25. The wife is wondering if that appointment and all other appointments here on out can be \"phone\" visits because the patient is too frail for her to continue bringing him to our office. Patient does not have a mychart set up either.     Stella Allen RN  Triage [facility]   "

## 2025-02-17 NOTE — TELEPHONE ENCOUNTER
Notified patient's wife. I sent them a mychart invite and told them once they have it set up to let us know and we can change appointment to a televisit on 3/5/25.    Stella Allen RN  Triage [facility]

## 2025-02-17 NOTE — TELEPHONE ENCOUNTER
I can do a telehealth visit with him, but he would need to have a [employer] account. Can you help them w that?  sapna

## 2025-02-18 ENCOUNTER — HOME CARE VISIT (OUTPATIENT)
Dept: HOME HEALTH SERVICES | Facility: HOME HEALTHCARE | Age: 85
End: 2025-02-18
Payer: MEDICARE

## 2025-02-25 ENCOUNTER — HOME CARE VISIT (OUTPATIENT)
Dept: HOME HEALTH SERVICES | Facility: HOME HEALTHCARE | Age: 85
End: 2025-02-25
Payer: MEDICARE

## 2025-02-25 VITALS
TEMPERATURE: 98.4 F | DIASTOLIC BLOOD PRESSURE: 60 MMHG | RESPIRATION RATE: 18 BRPM | HEART RATE: 72 BPM | OXYGEN SATURATION: 97 % | SYSTOLIC BLOOD PRESSURE: 122 MMHG

## 2025-02-25 PROCEDURE — G0299 HHS/HOSPICE OF RN EA 15 MIN: HCPCS

## 2025-03-03 NOTE — PROGRESS NOTES
RM:________     PCP: Taiwo Powers MD    : 1940  AGE: 84 y.o.  EST PATIENT           Wt Readings from Last 3 Encounters:   24 66.7 kg (147 lb)   24 65.8 kg (145 lb)   24 59.6 kg (131 lb 6.3 oz)           CP______  SOA_______ DIZZINESS _____ FATIGUE ______  PALPS ______    WT: ____________ BP: __________L __________R HR______    ALLERGIES:Patient has no known allergies.      Social History     Tobacco Use    Smoking status: Former     Current packs/day: 0.00     Types: Cigarettes     Quit date: 1983     Years since quittin.7     Passive exposure: Past    Smokeless tobacco: Never    Tobacco comments:        Vaping Use    Vaping status: Never Used   Substance Use Topics    Alcohol use: Not Currently     Comment: SOCIAL; Patient is non drinker.    Drug use: No     Comment: Drug Abuse: Not a drug user

## 2025-03-04 ENCOUNTER — HOME CARE VISIT (OUTPATIENT)
Dept: HOME HEALTH SERVICES | Facility: HOME HEALTHCARE | Age: 85
End: 2025-03-04
Payer: MEDICARE

## 2025-03-04 VITALS
TEMPERATURE: 98.4 F | OXYGEN SATURATION: 99 % | SYSTOLIC BLOOD PRESSURE: 108 MMHG | HEART RATE: 74 BPM | DIASTOLIC BLOOD PRESSURE: 64 MMHG | RESPIRATION RATE: 18 BRPM

## 2025-03-04 PROCEDURE — G0299 HHS/HOSPICE OF RN EA 15 MIN: HCPCS

## 2025-03-05 ENCOUNTER — TELEMEDICINE (OUTPATIENT)
Dept: CARDIOLOGY | Facility: CLINIC | Age: 85
End: 2025-03-05
Payer: MEDICARE

## 2025-03-05 VITALS — DIASTOLIC BLOOD PRESSURE: 62 MMHG | SYSTOLIC BLOOD PRESSURE: 114 MMHG | HEART RATE: 74 BPM

## 2025-03-05 DIAGNOSIS — I95.1 ORTHOSTATIC HYPOTENSION: ICD-10-CM

## 2025-03-05 DIAGNOSIS — I48.21 PERMANENT ATRIAL FIBRILLATION: ICD-10-CM

## 2025-03-05 DIAGNOSIS — I27.20 PULMONARY HYPERTENSION: ICD-10-CM

## 2025-03-05 DIAGNOSIS — I50.32 CHRONIC HEART FAILURE WITH PRESERVED EJECTION FRACTION (HFPEF): Primary | ICD-10-CM

## 2025-03-05 DIAGNOSIS — I34.0 NONRHEUMATIC MITRAL VALVE REGURGITATION: ICD-10-CM

## 2025-03-05 DIAGNOSIS — I36.1 NONRHEUMATIC TRICUSPID VALVE REGURGITATION: ICD-10-CM

## 2025-03-05 PROBLEM — N18.9 CKD (CHRONIC KIDNEY DISEASE): Status: RESOLVED | Noted: 2024-06-21 | Resolved: 2025-03-05

## 2025-03-05 PROCEDURE — 1159F MED LIST DOCD IN RCRD: CPT | Performed by: INTERNAL MEDICINE

## 2025-03-05 PROCEDURE — 99214 OFFICE O/P EST MOD 30 MIN: CPT | Performed by: INTERNAL MEDICINE

## 2025-03-05 PROCEDURE — 1160F RVW MEDS BY RX/DR IN RCRD: CPT | Performed by: INTERNAL MEDICINE

## 2025-03-05 RX ORDER — DIGOXIN 125 MCG
125 TABLET ORAL 3 TIMES WEEKLY
Start: 2025-03-05

## 2025-03-05 NOTE — PROGRESS NOTES
Date of Office Visit: 2024  Encounter Provider: Huang Chan MD  Place of Service: [facility] CARDIOLOGY  Patient Name: Mehreen Silva IV  :1940    Chief Complaint   Patient presents with    Congestive Heart Failure   :   HPI:     Mr. Silva is 84 y.o. and presents today in follow up via telehealth/video visit. I have reviewed prior notes and there are no changes except for any new updates described below. I have also reviewed any information entered into the medical record by the patient or by ancillary staff.     With the patient's severe fragility and difficulty moving from bed, we are conducting a telehealth visit.  The patient has been deemed appropriate for a telephone visit and the patient has consented to this.     He presented in 2022 with dyspnea; he was found to have acute diastolic CHF and atrial fibrillation. An echo revealed normal LVSF (60%), moderate LVH, bi-atrial dilation, and very mild PHTN. He was started on metoprolol, apixaban, and furosemide.  Between then and now, he has had progressive decline due to an indeterminant neurodegenerative illness.  He is immobile.  He has required a right lower extremity amputation due to poor circulation and a pressure injury to the heel.  He has had issues with a sacral decubitus ulcer.  He has had a recurrent right pleural effusion and has required a Pleurx catheter.  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  He has had trouble with diuretics and that it has been very easy for him to develop volume overload, as well as overdiuresis.    He was recently hospitalized.  He is home and his family and caregivers take good care of him.  They have been draining his catheter 3 times per week and today they did not get any fluid out at all.    Echo 2024      Limited study.    The left ventricular cavity is small in size. Left ventricular systolic function is normal. Calculated left ventricular EF = 62.4%    Left  ventricular wall thickness is consistent with moderate concentric hypertrophy. Left ventricular diastolic function was indeterminate due to AFib    There is moderate anterior mitral valve thickening present. Moderate to severe mitral valve regurgitation is present.    Estimated right ventricular systolic pressure from tricuspid regurgitation is moderately elevated (45-55 mmHg).    There is a trivial pericardial effusion. There is a large right pleural effusion with likely consolidated right lung base.    Severe LA enlargement.  IVC normal in size and respirophasic variation.    He was hospitalized for acute HF in late 2024.  He had undergone placement of a tube to drain a persistent/recurrent pleural effusion.  His heart rate was a little bit fast and he was volume overloaded.  He was diuresed and his medications were adjusted.    He is back at home.  His wife and son accompany him.  Home health comes to their house twice per week.  His family is very attentive and knowledgeable about his care.  He is in good spirits and denies chest pain, SOA, palps, LH, syncope, or leg swelling.     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 12/13/2023    HLD (hyperlipidemia) 09/23/2022    Hx of toxic multinodular goiter 09/23/2022    Hyperthyroidism     Lumbar radiculopathy     Nonrheumatic mitral valve regurgitation 07/31/2024    Nonrheumatic tricuspid valve regurgitation 07/31/2024    Orthostatic hypotension     Osteoarthritis     Pressure injury of sacral region, unstageable 05/14/2024    Pulmonary hypertension     Recurrent pleural effusion on right 04/27/2024     HAS PLEURAL CATH IN PLACE    Ulcer with gangrene     right heel    (  HAD ABOVE THE KNEE AMPUTATION  )       Past Surgical History:   Procedure Laterality Date    ABOVE KNEE AMPUTATION Right 12/13/2023    Procedure: ABOVE KNEE AMPUTATION RIGHT, proveena wound vac placement;  Surgeon: Issa Nieves MD;  Location: [facility] MAIN OR;  Service: Vascular;  Laterality: Right;    APPENDECTOMY      BACK SURGERY      x4    COLONOSCOPY      COLONOSCOPY N/A 6/29/2024    Procedure: COLONOSCOPY to cecum;  Surgeon: Amos Cuevas MD;  Location: [facility] ENDOSCOPY;  Service: Gastroenterology;  Laterality: N/A;  pre heme pos stool  post poor prep    CYSTOSCOPY Bilateral 8/8/2024    Procedure: CYSTOSCOPY WITH BILATERAL RETROGRADE PYELOGRAM;  Surgeon: Johnson Kang Jr., MD;  Location: [facility] MAIN OR;  Service: Urology;  Laterality: Bilateral;    ENDOSCOPY N/A 6/29/2024    Procedure: ESOPHAGOGASTRODUODENOSCOPY apc and cold biopsy;  Surgeon: Amos Cuevas MD;  Location: [facility] ENDOSCOPY;  Service: Gastroenterology;  Laterality: N/A;  pre anemia  post duodenal/stomach ulcer angio dysplasia gastritis    EXCISION MASS TRUNK N/A 06/08/2016    Procedure: Excision soft tissue neoplasm on abdominal wall and left neck;  Surgeon: Shaji Barahona Jr., MD;  Location: [facility] MAIN OR;  Service:     KNEE SURGERY      NECK SURGERY  1974    PLEURAL CATHETER INSERTION Right 4/30/2024    Procedure: RIGHT VIDEO ASSISTED THORACOSCOPY, PARTIAL DECORTICATION, PLEURX CATHETER INSERTION;  Surgeon: Nataliya Noyola MD;  Location: [facility] OR;  Service: Thoracic;  Laterality: Right;    PLEURX CATHETER REMOVAL Right 10/24/2024    Procedure: PLEURX CATHETER REMOVAL;  Surgeon: Nataliya Noyola MD;  Location: [facility] MAIN OR;  Service: Thoracic;  Laterality: Right;    QUADRICEPS REPAIR      ROTATOR CUFF REPAIR      SHOULDER SURGERY      SMALL INTESTINE SURGERY  2021    Bowel Obstruction       Social History     Socioeconomic History    Marital  status:     Number of children: 1    Highest education level: Professional school degree (e.g., MD, DDS, DVM, BRITNEY)   Tobacco Use    Smoking status: Former     Current packs/day: 0.00     Types: Cigarettes     Quit date: 1983     Years since quittin.7     Passive exposure: Past    Smokeless tobacco: Never    Tobacco comments:        Vaping Use    Vaping status: Never Used   Substance and Sexual Activity    Alcohol use: Not Currently     Comment: SOCIAL; Patient is non drinker.    Drug use: No     Comment: Drug Abuse: Not a drug user    Sexual activity: Defer       Family History   Problem Relation Age of Onset    Cancer Mother     Heart disease Father     Aneurysm Father         Abdominal Aortic Aneurysm    Malig Hyperthermia Neg Hx        Review of Systems   Constitutional: Positive for malaise/fatigue.   Skin:  Positive for poor wound healing.   Musculoskeletal:  Positive for muscle weakness.   Neurological:  Positive for weakness.   Psychiatric/Behavioral:  Positive for depression.      No Known Allergies      Current Outpatient Medications:     cholecalciferol (VITAMIN D3) 25 MCG (1000 UT) tablet, Take 1 tablet by mouth Daily. Indications: Vitamin D Deficiency, Disp: , Rfl:     digoxin (LANOXIN) 125 MCG tablet, Take 1 tablet by mouth 3 (Three) Times a Week. Indications: Cardiac Failure, Disp: , Rfl:     Eliquis 5 MG tablet tablet, TAKE ONE TABLET BY MOUTH EVERY 12 HOURS, Disp: 180 tablet, Rfl: 3    escitalopram (LEXAPRO) 5 MG tablet, Take 1 tablet by mouth Daily. Indications: Generalized Anxiety Disorder, Disp: , Rfl:     finasteride (PROSCAR) 5 MG tablet, Take 1 tablet by mouth Daily., Disp: 40 tablet, Rfl: 11    furosemide (LASIX) 40 MG tablet, Take 1 tablet by mouth 2 (Two) Times a Day. Indications: Cardiac Failure, Disp: 180 tablet, Rfl: 1    gabapentin (NEURONTIN) 100 MG capsule, Take 1 capsule by mouth every night at bedtime. Indications: Neuropathic Pain, Disp: , Rfl:     Jardiance 10 MG  tablet tablet, TAKE 1 TABLET BY MOUTH DAILY, Disp: 90 tablet, Rfl: 1    LORazepam (ATIVAN) 0.5 MG tablet, Take 1 tablet by mouth Every 8 (Eight) Hours As Needed for Anxiety. Indications: Feeling Anxious, Disp: , Rfl:     methIMAzole (TAPAZOLE) 5 MG tablet, Take 1 tablet by mouth 3 (Three) Times a Week. Monday, Wednesday, and Friday   Indications: Overactive Thyroid Gland, Disp: , Rfl:     metoprolol tartrate (LOPRESSOR) 25 MG tablet, Take 0.5 tablets by mouth 2 (Two) Times a Day., Disp: 90 tablet, Rfl: 3    midodrine (PROAMATINE) 5 MG tablet, Take 1 tablet by mouth 2 (Two) Times a Day Before Meals., Disp: 180 tablet, Rfl: 1    multivitamin with minerals tablet tablet, Take 1 tablet by mouth Daily. Indications: vitamin deficiency, Disp: , Rfl:     O2 (OXYGEN), Inhale 2 L/min Daily As Needed (SOA). mainly using at night   Indications: COPD suspected, Disp: , Rfl:     pantoprazole (PROTONIX) 40 MG EC tablet, Take 1 tablet by mouth 2 (Two) Times a Day Before Meals., Disp: 60 tablet, Rfl: 0    potassium chloride (MICRO-K) 10 MEQ CR capsule, Take 2 capsules by mouth 2 (Two) Times a Day., Disp: 60 capsule, Rfl: 3    Santyl 250 UNIT/GM ointment, Apply 1 Application topically to the appropriate area as directed Daily. To sacrum  Indications: skin care, Disp: , Rfl:     tamsulosin (FLOMAX) 0.4 MG capsule 24 hr capsule, Take 1 capsule by mouth Every Night., Disp: 30 capsule, Rfl: 0      Objective:     Vitals:    03/05/25 1153   BP: 114/62   Pulse: 74       There is no height or weight on file to calculate BMI.    Vitals reviewed.   Constitutional:       Appearance: Well-developed and not in distress.      Comments: In bed, very frail appearing, no distress   Eyes:      Conjunctiva/sclera: Conjunctivae normal.   HENT:      Head: Normocephalic.      Nose: Nose normal.         Comments: Sunken cheeks  Neck:      Vascular: No JVD.   Pulmonary:      Effort: Pulmonary effort is normal.      Breath sounds: Examination of the  right-middle field reveals decreased breath sounds. Examination of the right-lower field reveals decreased breath sounds. Decreased breath sounds present.   Chest:      Comments: Small (1.5 cm) healing lesion right thorax; no surrounding redness, no ability to express pus, granulation tissue is filling in nicely  Cardiovascular:      Comments: No LLE edema  Musculoskeletal:      Cervical back: Normal range of motion.      Comments: RLE amputation Neurological:      Mental Status: Alert and oriented to person, place, and time.      Cranial Nerves: No cranial nerve deficit.   Psychiatric:         Behavior: Behavior normal.         Thought Content: Thought content normal.         Judgment: Judgment normal.       Procedures  EKG --   I have personally reviewed EKG on 04/27/2024 and my interpretation of the tracing is as follows: atrial fibrillation, NSST abn      Assessment:       Diagnosis Plan   1. Chronic heart failure with preserved ejection fraction (HFpEF)        2. Pulmonary hypertension        3. Nonrheumatic mitral valve regurgitation        4. Nonrheumatic tricuspid valve regurgitation        5. Orthostatic hypotension        6. Permanent atrial fibrillation          Plan:       Mr Silva has permanent atrial fibrillation, chronic HFpEF, and a recurrent right pleural effusion. Rate control has been a bit tricky given his hypotension, but his frailty and severe atrial dilation preclude rhythm control. He will remain on metoprolol, digoxin, and apixaban. I'm going to wean the digoxin slightly to 3x/week. He's on midodrine to help his BP, which is pretty good today. As long as his weight is above 60 kg, he should remain on 5 mg of apixaban twice daily.    His low BP limits GDMT; he's on metoprolol, furosemide, and empagliflozin    He had moderately severe MR and moderate TR in 2024; his immobility precludes the option of coming in for an echo. I don't feel it would change our management, regardless.    Sincerely,        Huang Chan MD

## 2025-03-11 ENCOUNTER — HOME CARE VISIT (OUTPATIENT)
Dept: HOME HEALTH SERVICES | Facility: HOME HEALTHCARE | Age: 85
End: 2025-03-11
Payer: MEDICARE

## 2025-03-11 ENCOUNTER — LAB REQUISITION (OUTPATIENT)
Dept: LAB | Facility: HOSPITAL | Age: 85
End: 2025-03-11
Payer: MEDICARE

## 2025-03-11 VITALS
RESPIRATION RATE: 18 BRPM | TEMPERATURE: 97.9 F | OXYGEN SATURATION: 96 % | HEART RATE: 69 BPM | DIASTOLIC BLOOD PRESSURE: 64 MMHG | SYSTOLIC BLOOD PRESSURE: 108 MMHG

## 2025-03-11 DIAGNOSIS — E05.20 THYROTOXICOSIS WITH TOXIC MULTINODULAR GOITER WITHOUT THYROTOXIC CRISIS OR STORM: ICD-10-CM

## 2025-03-11 LAB
T4 FREE SERPL-MCNC: 2.08 NG/DL (ref 0.92–1.68)
TSH SERPL DL<=0.05 MIU/L-ACNC: 0.5 UIU/ML (ref 0.27–4.2)

## 2025-03-11 PROCEDURE — 84443 ASSAY THYROID STIM HORMONE: CPT | Performed by: INTERNAL MEDICINE

## 2025-03-11 PROCEDURE — 84439 ASSAY OF FREE THYROXINE: CPT | Performed by: INTERNAL MEDICINE

## 2025-03-11 PROCEDURE — G0299 HHS/HOSPICE OF RN EA 15 MIN: HCPCS

## 2025-03-13 ENCOUNTER — HOME CARE VISIT (OUTPATIENT)
Dept: HOME HEALTH SERVICES | Facility: HOME HEALTHCARE | Age: 85
End: 2025-03-13
Payer: MEDICARE

## 2025-03-17 ENCOUNTER — TELEPHONE (OUTPATIENT)
Dept: SURGERY | Facility: CLINIC | Age: 85
End: 2025-03-17
Payer: MEDICARE

## 2025-03-17 NOTE — TELEPHONE ENCOUNTER
Caller: Belem Silva    Relationship to patient: Emergency Contact    Best call back number: [phone]     Chief complaint: PTS WIFE WANTS TO CHANGE IN PERSON [facility]PT TO [facility] OR PHONE CALL. STATES ITS TOO HARD TO GET ELLEN UP AND OUT OF THE HOUSE. ANY TIME OR DAY WORKS.     Type of visit: FOLLOW UP     Requested date: ANY     If rescheduling, when is the original appointment: 4.3.25

## 2025-03-17 NOTE — TELEPHONE ENCOUNTER
OK FOR HUB TO SHARE:    Left voicemail informing that [facility] the [facility] at 2:30 PM.

## 2025-03-18 DIAGNOSIS — T14.8XXA WOUND DISCHARGE: Primary | ICD-10-CM

## 2025-04-03 ENCOUNTER — TELEMEDICINE (OUTPATIENT)
Dept: SURGERY | Facility: CLINIC | Age: 85
End: 2025-04-03
Payer: MEDICARE

## 2025-04-03 DIAGNOSIS — T14.8XXA WOUND DISCHARGE: Primary | ICD-10-CM

## 2025-04-03 RX ORDER — DIGOXIN 125 MCG
125 TABLET ORAL
Start: 2025-04-03

## 2025-04-03 NOTE — PROGRESS NOTES
"Chief Complaint  Follow up, pleurx site    Subjective        Mehreen Silva IV presents to [facility] THORACIC SURGERY  History of Present Illness    Mr. Silva is a pleasant 84-year-old gentleman who presents today in follow-up status post Pleurx catheter removal Dr. Noyola on 10/24/2024.  He has had a hospital admission since then.  He is frustrated that his incision continues to drain despite the efforts of home health.  He continues to be progressively weak.  He presents today via video visit.        Objective   Vital Signs:  There were no vitals taken for this visit.  Estimated body mass index is 21.09 kg/m² as calculated from the following:    Height as of 11/21/24: 177.8 cm (70\").    Weight as of 11/26/24: 66.7 kg (147 lb).    BMI is within normal parameters. No other follow-up for BMI required.      Physical Exam  Constitutional:       Appearance: Normal appearance.   HENT:      Head: Normocephalic and atraumatic.   Cardiovascular:      Rate and Rhythm: Normal rate.   Musculoskeletal:      Cervical back: Normal range of motion.   Skin:     General: Skin is warm.   Neurological:      Mental Status: He is alert. Mental status is at baseline.      Motor: Weakness present.   Psychiatric:         Mood and Affect: Mood normal.        Result Review :    Chest x-ray from 12/30/2024 was independently reviewed and demonstrates improvement to right-sided pleural effusion.  Left side appears to be well-expanded.         Assessment and Plan   Diagnoses and all orders for this visit:    1. Wound discharge (Primary)    Other orders  -     digoxin (LANOXIN) 125 MCG tablet; Take 1 tablet by mouth 6 (Six) Times a Week. Indications: Cardiac Failure    Dig order updated per patient. Follow up with Dr. Chan as scheduled.    Mr. Silva continues to have some drainage from his former right sided Pleurx site, but this is much improved overall. Recommend to continue vigilant wound care per home health recommendations.  " Would not recommend stitching this area as this could result in infection.  Will further discuss wound care recommendations with Dr. Noyola.  Call in the future as issues arise.     I spent 22 minutes caring for Mehreen on this date of service. This time includes time spent by me in the following activities:preparing for the visit, reviewing tests, obtaining and/or reviewing a separately obtained history, performing a medically appropriate examination and/or evaluation , counseling and educating the patient/family/caregiver, ordering medications, tests, or procedures, referring and communicating with other health care professionals , and documenting information in the medical record    Follow Up   Return for Video visit, Next scheduled follow up.  Patient was given instructions and counseling regarding his condition or for health maintenance advice. Please see specific information pulled into the AVS if appropriate.

## 2025-04-11 ENCOUNTER — TELEPHONE (OUTPATIENT)
Dept: SURGERY | Facility: CLINIC | Age: 85
End: 2025-04-11
Payer: MEDICARE

## 2025-04-11 DIAGNOSIS — Z78.9 PRESENCE OF SURGICAL INCISION: Primary | ICD-10-CM

## 2025-04-11 DIAGNOSIS — R05.1 ACUTE COUGH: ICD-10-CM

## 2025-04-11 RX ORDER — BROMPHENIRAMINE MALEATE, PSEUDOEPHEDRINE HYDROCHLORIDE, AND DEXTROMETHORPHAN HYDROBROMIDE 2; 30; 10 MG/5ML; MG/5ML; MG/5ML
1.25 SYRUP ORAL 4 TIMES DAILY PRN
Qty: 473 ML | Refills: 0 | Status: SHIPPED | OUTPATIENT
Start: 2025-04-11

## 2025-04-11 RX ORDER — AZITHROMYCIN 250 MG/1
TABLET, FILM COATED ORAL
Qty: 6 TABLET | Refills: 0 | Status: SHIPPED | OUTPATIENT
Start: 2025-04-11

## 2025-04-11 NOTE — TELEPHONE ENCOUNTER
Caller: Belem Silva    Relationship: Emergency Contact    Best call back number: [phone]    What is the best time to reach you: ANY     Who are you requesting to speak with (clinical staff, provider,  specific staff member): PTS WIFE  WANTS SHANNAN DENIS TO CALL HER     What was the call regarding: PTS WIFE SAID THE PT STILL HAS A LOT OF PHELM AND WANTS TO KNOW WHAT TO DO NEXT. SHE ALSO SAID HE NEEDS AN XRAY AND THAT IT HAS TO BE DONE AT HOME. PATIENTS WIFE  DOES NOT WANT TO TAKE HIM TO THE ER AND SAID SHE WILL ONLY TALK TO AZUL DENIS OR CRISELDA. PATIENTS WIFE SAID ITS URGENT.     Is it okay if the provider responds through MyChart: NO, WANTS A CALL BACK

## 2025-04-11 NOTE — TELEPHONE ENCOUNTER
Spoke with patient's wife regarding increased productive cough with clear secretions.  No fever or significant shortness of breath.  She is concerned given the amount of expectoration and has history of pneumonia.  I recommended Bromfed and will send in an antibiotic to take if his cough does not improve through the weekend.  Chest x-ray has been ordered as well.  I will follow-up on these results.    Discussed with home health nurse and recommend silver nitrate to former Pleurx catheter site.  She reports they are unable to do this.  Recommend to continue vigilant wound care with dry dressings.    CHARLEE Travis

## 2025-04-14 ENCOUNTER — TELEPHONE (OUTPATIENT)
Dept: SURGERY | Facility: CLINIC | Age: 85
End: 2025-04-14
Payer: MEDICARE

## 2025-04-14 ENCOUNTER — HOSPITAL ENCOUNTER (OUTPATIENT)
Dept: GENERAL RADIOLOGY | Facility: HOSPITAL | Age: 85
Discharge: HOME | End: 2025-04-14
Admitting: NURSE PRACTITIONER
Payer: MEDICARE

## 2025-04-14 DIAGNOSIS — R05.1 ACUTE COUGH: ICD-10-CM

## 2025-04-14 PROCEDURE — 71046 X-RAY EXAM CHEST 2 VIEWS: CPT

## 2025-04-14 NOTE — TELEPHONE ENCOUNTER
Called pt and discussed with wife findings on CXR, suspect a pneumonia. Continue z-veronica as prescribed. Defer to PCP for further management.     Mary Augustin, APRN

## 2025-04-17 RX ORDER — FUROSEMIDE 40 MG/1
40 TABLET ORAL 2 TIMES DAILY
Qty: 180 TABLET | Refills: 0 | Status: SHIPPED | OUTPATIENT
Start: 2025-04-17

## 2025-04-17 RX ORDER — FUROSEMIDE 40 MG/1
20 TABLET ORAL DAILY
Qty: 45 TABLET | Refills: 0 | Status: SHIPPED | OUTPATIENT
Start: 2025-04-17 | End: 2025-04-17 | Stop reason: SDUPTHER

## 2025-04-22 ENCOUNTER — TELEPHONE (OUTPATIENT)
Dept: SURGERY | Facility: CLINIC | Age: 85
End: 2025-04-22
Payer: MEDICARE

## 2025-04-22 NOTE — TELEPHONE ENCOUNTER
Patients wife called regarding patients suspected pneumonia and clear phlegm. I informed the patients wife that she would need to reach out to the patients primary care per our providers last note. Patients wife voiced understanding.

## 2025-05-09 NOTE — TELEPHONE ENCOUNTER
Called and left VM. Will continue to try to reach patient. HUB transfer call to triage.     Stella Allen RN  T[place] [facility]

## 2025-05-09 NOTE — TELEPHONE ENCOUNTER
Triage, will you call Mrs Silva? Is he still on Jardiance? If so, he should stop it. Is he still on midodrine 5mg BID? If so, he should increase that to 10mg BID.    jdk

## 2025-05-09 NOTE — TELEPHONE ENCOUNTER
Carmencita with [facility] called and reported that pt's wife had some concerns with pt at home.  Per pt's wife, he passes out during transfers.  It last for about 1 min and his bp has been running 82/50.  Pt is taking Metoprolol bid.      I called and left a vm for Carmencita and for pt's wife to contact our office.  Instructed them to ask for our triage department for HH concerns.

## 2025-05-12 NOTE — TELEPHONE ENCOUNTER
Called and left VM. Will continue to try to reach patient. HUB transfer call to triage.     Stella Allen RN  [place] [facility] [place]

## 2025-05-13 NOTE — TELEPHONE ENCOUNTER
I called and spoke to wife. Unfortunately, Mr. Silva passed away yesterday from a stroke. I offered my condolences to Mrs. Silva and let her know to please reach out to us if there was anything further that she needed.     Stella Allen RN  Triage [facility] [place]

## 2025-05-27 NOTE — TELEPHONE ENCOUNTER
"Spoke with patient, informed I  was calling to make an appt. Patient stated she did not want to see Dr Stallings, she wants to see Dr Head. Explained policy for changing providers. Patient stated " that's a shame".  Informed I can send message to provider to decide on change, patient verbally understood.   " Called and left VM. Will continue to try to reach patient.     Triage: please relay both messages    Stella Hernandez RN  [place] [facility] [place]

## (undated) DEVICE — TBG PENCL TELESCP MEGADYNE SMOKE EVAC 10FT

## (undated) DEVICE — T-DRAPE,EXTREMITY,STERILE: Brand: MEDLINE

## (undated) DEVICE — TUBING, SUCTION, 1/4" X 20', STRAIGHT: Brand: MEDLINE INDUSTRIES, INC.

## (undated) DEVICE — SYR LL TP 10ML STRL

## (undated) DEVICE — MEDI-VAC YANKAUER SUCTION HANDLE W/BULBOUS TIP: Brand: CARDINAL HEALTH

## (undated) DEVICE — KT ORCA ORCAPOD DISP STRL

## (undated) DEVICE — TUBING, SUCTION, 1/4" X 10', STRAIGHT: Brand: MEDLINE

## (undated) DEVICE — SUT VIC 3/0 TIES 18IN J110T

## (undated) DEVICE — TIDISHIELD UROLOGY DRAIN BAGS FROSTY VINYL STERILE FITS SIEMENS UROSKOP ACCESS 20 PER CASE: Brand: TIDISHIELD

## (undated) DEVICE — BLCK/BITE BLOX W/DENTL/RIM W/STRAP 54F

## (undated) DEVICE — APPL CHLORAPREP HI/LITE 26ML ORNG

## (undated) DEVICE — NDL HYPO PRECISIONGLIDE REG 25G 1 1/2

## (undated) DEVICE — WOUND RETRACTOR AND PROTECTOR: Brand: ALEXIS WOUND PROTECTOR-RETRACTOR

## (undated) DEVICE — GLV SURG BIOGEL LTX PF 7

## (undated) DEVICE — DRSNG DUODERM CGF CONTRL/GEL SQ 10X10CM STRL BGE

## (undated) DEVICE — COVER,C-ARM,41X74: Brand: MEDLINE

## (undated) DEVICE — Device: Brand: FABCO

## (undated) DEVICE — UNDERGLV SURG BIOGEL INDICAT PI SZ8.5 BLU

## (undated) DEVICE — 3M™ IOBAN™ 2 ANTIMICROBIAL INCISE DRAPE 6640EZ: Brand: IOBAN™ 2

## (undated) DEVICE — GLV SURG BIOGEL LTX PF 8 1/2

## (undated) DEVICE — OASIS DRAIN, SINGLE, INLINE & ATS COMPATIBLE: Brand: OASIS

## (undated) DEVICE — TOTAL TRAY, 16FR 10ML SIL FOLEY, URN: Brand: MEDLINE

## (undated) DEVICE — SUT VIC 2/0 TIES 18IN J111T

## (undated) DEVICE — PK ORTHO MAJ 40

## (undated) DEVICE — GLV SURG BIOGEL LTX PF 6

## (undated) DEVICE — KT CATH DRN PLEURL PLEURX/SAFE/T SIL 15.5F 66CM 4BT/1000ML

## (undated) DEVICE — CANN O2 ETCO2 FITS ALL CONN CO2 SMPL A/ 7IN DISP LF

## (undated) DEVICE — LABEL SHEET CUSTOM 2X2 YELLOW: Brand: MEDLINE INDUSTRIES, INC.

## (undated) DEVICE — STPLR SKIN VISISTAT WD 35CT

## (undated) DEVICE — PENCL ES MEGADINE EZ/CLEAN BUTN W/HOLSTR 10FT

## (undated) DEVICE — SUT SILK 2/0 SH CR8 18IN CR8 C012D

## (undated) DEVICE — ADHS SKIN SURG TISS VISC PREMIERPRO EXOFIN HI/VISC FAST/DRY

## (undated) DEVICE — SUT MNCRYL PLS ANTIB UD 4/0 PS2 18IN

## (undated) DEVICE — GOWN,REINF,POLY,SIRUS,BRTH SLV,XLNG/XXL: Brand: MEDLINE

## (undated) DEVICE — SUT VIC 0 CT1 CR8 27IN JJ41G

## (undated) DEVICE — LOU MINOR PROCEDURE: Brand: MEDLINE INDUSTRIES, INC.

## (undated) DEVICE — LOU CYSTO: Brand: MEDLINE INDUSTRIES, INC.

## (undated) DEVICE — ANTIBACTERIAL UNDYED BRAIDED (POLYGLACTIN 910), SYNTHETIC ABSORBABLE SUTURE: Brand: COATED VICRYL

## (undated) DEVICE — ADAPT CLN BIOGUARD AIR/H2O DISP

## (undated) DEVICE — SINGLE-USE BIOPSY FORCEPS: Brand: RADIAL JAW 4

## (undated) DEVICE — DUAL CUT SAGITTAL BLADE

## (undated) DEVICE — CATH FOL COUDE TIEMAN 2WY 18F 5CC

## (undated) DEVICE — ERBE NESSY®PLATE 170 SPLIT; 168CM²; CABLE 3M: Brand: ERBE

## (undated) DEVICE — PATIENT RETURN ELECTRODE, SINGLE-USE, CONTACT QUALITY MONITORING, ADULT, WITH 9FT CORD, FOR PATIENTS WEIGING OVER 33LBS. (15KG): Brand: MEGADYNE

## (undated) DEVICE — APC PROBE 2200 A, SINGLE USE OD 2.3MM/6.9FR; L. 2.2M/7.2FT: Brand: ERBE

## (undated) DEVICE — 24 FR STRAIGHT – SOFT PVC CATHETER: Brand: PVC THORACIC CATHETERS

## (undated) DEVICE — Device

## (undated) DEVICE — SENSR O2 OXIMAX FNGR A/ 18IN NONSTR

## (undated) DEVICE — 450 ML BOTTLE OF 0.05% CHLORHEXIDINE GLUCONATE IN 99.95% STERILE WATER FOR IRRIGATION, USP AND APPLICATOR.: Brand: IRRISEPT ANTIMICROBIAL WOUND LAVAGE

## (undated) DEVICE — TRAP FLD MINIVAC MEGADYNE 100ML

## (undated) DEVICE — PREVENA INCISION MANAGEMENT SYSTEM- PEEL & PLACE DRESSING: Brand: PREVENA™ PEEL & PLACE™

## (undated) DEVICE — LN SMPL CO2 SHTRM SD STREAM W/M LUER